# Patient Record
Sex: MALE | Race: WHITE | NOT HISPANIC OR LATINO
[De-identification: names, ages, dates, MRNs, and addresses within clinical notes are randomized per-mention and may not be internally consistent; named-entity substitution may affect disease eponyms.]

---

## 2017-01-17 ENCOUNTER — APPOINTMENT (OUTPATIENT)
Dept: PODIATRY | Facility: HOSPITAL | Age: 47
End: 2017-01-17

## 2017-03-28 ENCOUNTER — APPOINTMENT (OUTPATIENT)
Dept: PODIATRY | Facility: HOSPITAL | Age: 47
End: 2017-03-28

## 2017-05-23 ENCOUNTER — APPOINTMENT (OUTPATIENT)
Dept: NEUROLOGY | Facility: CLINIC | Age: 47
End: 2017-05-23

## 2017-05-23 ENCOUNTER — OUTPATIENT (OUTPATIENT)
Dept: OUTPATIENT SERVICES | Facility: HOSPITAL | Age: 47
LOS: 1 days | Discharge: HOME | End: 2017-05-23

## 2017-05-30 ENCOUNTER — APPOINTMENT (OUTPATIENT)
Dept: PODIATRY | Facility: HOSPITAL | Age: 47
End: 2017-05-30

## 2017-06-28 DIAGNOSIS — G40.802 OTHER EPILEPSY, NOT INTRACTABLE, WITHOUT STATUS EPILEPTICUS: ICD-10-CM

## 2017-08-01 ENCOUNTER — APPOINTMENT (OUTPATIENT)
Dept: PODIATRY | Facility: HOSPITAL | Age: 47
End: 2017-08-01

## 2017-09-05 ENCOUNTER — OUTPATIENT (OUTPATIENT)
Dept: OUTPATIENT SERVICES | Facility: HOSPITAL | Age: 47
LOS: 1 days | Discharge: HOME | End: 2017-09-05

## 2017-09-05 ENCOUNTER — APPOINTMENT (OUTPATIENT)
Dept: PODIATRY | Facility: HOSPITAL | Age: 47
End: 2017-09-05

## 2017-09-05 DIAGNOSIS — K52.9 NONINFECTIVE GASTROENTERITIS AND COLITIS, UNSPECIFIED: ICD-10-CM

## 2017-09-05 DIAGNOSIS — N39.0 URINARY TRACT INFECTION, SITE NOT SPECIFIED: ICD-10-CM

## 2017-09-05 DIAGNOSIS — F79 UNSPECIFIED INTELLECTUAL DISABILITIES: ICD-10-CM

## 2017-09-05 DIAGNOSIS — B35.1 TINEA UNGUIUM: ICD-10-CM

## 2017-09-05 DIAGNOSIS — E03.9 HYPOTHYROIDISM, UNSPECIFIED: ICD-10-CM

## 2017-09-05 DIAGNOSIS — G40.909 EPILEPSY, UNSPECIFIED, NOT INTRACTABLE, WITHOUT STATUS EPILEPTICUS: ICD-10-CM

## 2017-09-06 ENCOUNTER — APPOINTMENT (OUTPATIENT)
Dept: OTOLARYNGOLOGY | Facility: CLINIC | Age: 47
End: 2017-09-06
Payer: MEDICAID

## 2017-09-06 VITALS — BODY MASS INDEX: 19.1 KG/M2 | HEIGHT: 68 IN | WEIGHT: 126 LBS

## 2017-09-06 PROCEDURE — 69210 REMOVE IMPACTED EAR WAX UNI: CPT

## 2017-09-06 PROCEDURE — 99213 OFFICE O/P EST LOW 20 MIN: CPT | Mod: 25

## 2017-09-14 ENCOUNTER — OUTPATIENT (OUTPATIENT)
Dept: OUTPATIENT SERVICES | Facility: HOSPITAL | Age: 47
LOS: 1 days | Discharge: HOME | End: 2017-09-14

## 2017-09-14 DIAGNOSIS — F79 UNSPECIFIED INTELLECTUAL DISABILITIES: ICD-10-CM

## 2017-09-14 DIAGNOSIS — E87.1 HYPO-OSMOLALITY AND HYPONATREMIA: ICD-10-CM

## 2017-09-14 DIAGNOSIS — E03.9 HYPOTHYROIDISM, UNSPECIFIED: ICD-10-CM

## 2017-09-14 DIAGNOSIS — N39.0 URINARY TRACT INFECTION, SITE NOT SPECIFIED: ICD-10-CM

## 2017-09-14 DIAGNOSIS — K52.9 NONINFECTIVE GASTROENTERITIS AND COLITIS, UNSPECIFIED: ICD-10-CM

## 2017-09-14 DIAGNOSIS — G40.909 EPILEPSY, UNSPECIFIED, NOT INTRACTABLE, WITHOUT STATUS EPILEPTICUS: ICD-10-CM

## 2017-11-28 ENCOUNTER — OUTPATIENT (OUTPATIENT)
Dept: OUTPATIENT SERVICES | Facility: HOSPITAL | Age: 47
LOS: 1 days | Discharge: HOME | End: 2017-11-28

## 2017-11-28 ENCOUNTER — APPOINTMENT (OUTPATIENT)
Dept: NEUROLOGY | Facility: CLINIC | Age: 47
End: 2017-11-28

## 2017-11-28 VITALS
HEART RATE: 75 BPM | BODY MASS INDEX: 19.4 KG/M2 | HEIGHT: 68 IN | SYSTOLIC BLOOD PRESSURE: 102 MMHG | DIASTOLIC BLOOD PRESSURE: 70 MMHG | WEIGHT: 128 LBS

## 2017-11-28 DIAGNOSIS — F79 UNSPECIFIED INTELLECTUAL DISABILITIES: ICD-10-CM

## 2017-11-28 DIAGNOSIS — E03.9 HYPOTHYROIDISM, UNSPECIFIED: ICD-10-CM

## 2017-11-28 DIAGNOSIS — G40.909 EPILEPSY, UNSPECIFIED, NOT INTRACTABLE, WITHOUT STATUS EPILEPTICUS: ICD-10-CM

## 2017-11-28 DIAGNOSIS — K52.9 NONINFECTIVE GASTROENTERITIS AND COLITIS, UNSPECIFIED: ICD-10-CM

## 2017-11-28 DIAGNOSIS — N39.0 URINARY TRACT INFECTION, SITE NOT SPECIFIED: ICD-10-CM

## 2018-01-27 ENCOUNTER — INPATIENT (INPATIENT)
Facility: HOSPITAL | Age: 48
LOS: 16 days | Discharge: NURSING, REHAB OR LONG-TERM CARE | End: 2018-02-13
Attending: HOSPITALIST | Admitting: INTERNAL MEDICINE

## 2018-02-03 VITALS
DIASTOLIC BLOOD PRESSURE: 67 MMHG | RESPIRATION RATE: 18 BRPM | TEMPERATURE: 96 F | SYSTOLIC BLOOD PRESSURE: 106 MMHG | HEART RATE: 82 BPM

## 2018-02-03 RX ORDER — BENZTROPINE MESYLATE 1 MG
1 TABLET ORAL EVERY 12 HOURS
Qty: 0 | Refills: 0 | Status: DISCONTINUED | OUTPATIENT
Start: 2018-02-03 | End: 2018-02-13

## 2018-02-03 RX ORDER — BUPROPION HYDROCHLORIDE 150 MG/1
150 TABLET, EXTENDED RELEASE ORAL DAILY
Qty: 0 | Refills: 0 | Status: DISCONTINUED | OUTPATIENT
Start: 2018-02-03 | End: 2018-02-13

## 2018-02-03 RX ORDER — MIRTAZAPINE 45 MG/1
15 TABLET, ORALLY DISINTEGRATING ORAL AT BEDTIME
Qty: 0 | Refills: 0 | Status: DISCONTINUED | OUTPATIENT
Start: 2018-02-03 | End: 2018-02-13

## 2018-02-03 RX ORDER — DIVALPROEX SODIUM 500 MG/1
750 TABLET, DELAYED RELEASE ORAL EVERY 8 HOURS
Qty: 0 | Refills: 0 | Status: DISCONTINUED | OUTPATIENT
Start: 2018-02-03 | End: 2018-02-13

## 2018-02-03 RX ORDER — LEVOTHYROXINE SODIUM 125 MCG
100 TABLET ORAL DAILY
Qty: 0 | Refills: 0 | Status: DISCONTINUED | OUTPATIENT
Start: 2018-02-04 | End: 2018-02-13

## 2018-02-03 RX ORDER — ENOXAPARIN SODIUM 100 MG/ML
40 INJECTION SUBCUTANEOUS AT BEDTIME
Qty: 0 | Refills: 0 | Status: COMPLETED | OUTPATIENT
Start: 2018-02-03 | End: 2018-02-04

## 2018-02-03 RX ORDER — HALOPERIDOL DECANOATE 100 MG/ML
10 INJECTION INTRAMUSCULAR EVERY 8 HOURS
Qty: 0 | Refills: 0 | Status: DISCONTINUED | OUTPATIENT
Start: 2018-02-03 | End: 2018-02-08

## 2018-02-03 RX ORDER — ACETAMINOPHEN 500 MG
650 TABLET ORAL EVERY 6 HOURS
Qty: 0 | Refills: 0 | Status: DISCONTINUED | OUTPATIENT
Start: 2018-02-03 | End: 2018-02-13

## 2018-02-03 RX ORDER — SENNA PLUS 8.6 MG/1
1 TABLET ORAL AT BEDTIME
Qty: 0 | Refills: 0 | Status: DISCONTINUED | OUTPATIENT
Start: 2018-02-03 | End: 2018-02-13

## 2018-02-03 RX ADMIN — MIRTAZAPINE 15 MILLIGRAM(S): 45 TABLET, ORALLY DISINTEGRATING ORAL at 21:33

## 2018-02-03 RX ADMIN — Medication 1 MILLIGRAM(S): at 18:35

## 2018-02-03 RX ADMIN — HALOPERIDOL DECANOATE 10 MILLIGRAM(S): 100 INJECTION INTRAMUSCULAR at 21:32

## 2018-02-03 RX ADMIN — SENNA PLUS 1 TABLET(S): 8.6 TABLET ORAL at 21:33

## 2018-02-03 RX ADMIN — ENOXAPARIN SODIUM 40 MILLIGRAM(S): 100 INJECTION SUBCUTANEOUS at 21:30

## 2018-02-04 RX ADMIN — MIRTAZAPINE 15 MILLIGRAM(S): 45 TABLET, ORALLY DISINTEGRATING ORAL at 21:52

## 2018-02-04 RX ADMIN — Medication 1 MILLIGRAM(S): at 17:48

## 2018-02-04 RX ADMIN — DIVALPROEX SODIUM 750 MILLIGRAM(S): 500 TABLET, DELAYED RELEASE ORAL at 21:53

## 2018-02-04 RX ADMIN — Medication 1 MILLIGRAM(S): at 06:07

## 2018-02-04 RX ADMIN — SENNA PLUS 1 TABLET(S): 8.6 TABLET ORAL at 21:52

## 2018-02-04 RX ADMIN — HALOPERIDOL DECANOATE 10 MILLIGRAM(S): 100 INJECTION INTRAMUSCULAR at 14:01

## 2018-02-04 RX ADMIN — DIVALPROEX SODIUM 750 MILLIGRAM(S): 500 TABLET, DELAYED RELEASE ORAL at 00:12

## 2018-02-04 RX ADMIN — HALOPERIDOL DECANOATE 10 MILLIGRAM(S): 100 INJECTION INTRAMUSCULAR at 06:06

## 2018-02-04 RX ADMIN — ENOXAPARIN SODIUM 40 MILLIGRAM(S): 100 INJECTION SUBCUTANEOUS at 21:52

## 2018-02-04 RX ADMIN — DIVALPROEX SODIUM 750 MILLIGRAM(S): 500 TABLET, DELAYED RELEASE ORAL at 06:41

## 2018-02-04 RX ADMIN — Medication 100 MICROGRAM(S): at 06:07

## 2018-02-04 RX ADMIN — DIVALPROEX SODIUM 750 MILLIGRAM(S): 500 TABLET, DELAYED RELEASE ORAL at 14:01

## 2018-02-04 RX ADMIN — HALOPERIDOL DECANOATE 10 MILLIGRAM(S): 100 INJECTION INTRAMUSCULAR at 22:04

## 2018-02-04 RX ADMIN — BUPROPION HYDROCHLORIDE 150 MILLIGRAM(S): 150 TABLET, EXTENDED RELEASE ORAL at 12:21

## 2018-02-04 NOTE — PROGRESS NOTE ADULT - SUBJECTIVE AND OBJECTIVE BOX
S : Still refusing to walk. NO CP/ SOB      Vital Signs Last 24 Hrs  T(C): 36.1 (04 Feb 2018 14:28), Max: 36.1 (04 Feb 2018 06:18)  T(F): 96.9 (04 Feb 2018 14:28), Max: 96.9 (04 Feb 2018 06:18)  HR: 97 (04 Feb 2018 14:28) (79 - 97)  BP: 131/77 (04 Feb 2018 14:28) (106/67 - 131/77)  BP(mean): --  RR: 16 (04 Feb 2018 14:28) (16 - 18)  SpO2: --  PHYSICAL EXAM:    Constitutional: NAD, awake and alert, Mentally retarded  HEENT: PERR, EOMI, Normal Hearing, MMM  Neck: Soft and supple, No LAD, No JVD  Respiratory: Breath sounds are clear bilaterally, No wheezing, rales or rhonchi  Cardiovascular: S1 and S2, regular rate and rhythm, no Murmurs, gallops or rubs  Gastrointestinal: Bowel Sounds present, soft, nontender, nondistended, no guarding, no rebound  Extremities: No peripheral edema  Vascular: 2+ peripheral pulses  Neurological: A/O x 3, no focal deficits  Musculoskeletal: 5/5 strength b/l upper and lower extremities  Skin: No rashes    MEDICATIONS:  MEDICATIONS  (STANDING):  benztropine 1 milliGRAM(s) Oral every 12 hours  buPROPion XL . 150 milliGRAM(s) Oral daily  diVALproex  milliGRAM(s) Oral every 8 hours  enoxaparin Injectable 40 milliGRAM(s) SubCutaneous at bedtime  haloperidol     Tablet 10 milliGRAM(s) Oral every 8 hours  levothyroxine 100 MICROGram(s) Oral daily  mirtazapine 15 milliGRAM(s) Oral at bedtime  senna 1 Tablet(s) Oral at bedtime      LABS: All Labs Reviewed:                Blood Culture:

## 2018-02-04 NOTE — PROGRESS NOTE ADULT - ASSESSMENT
1) Mild Mental Reatardation  2) Schizophrenia   3)  Refusal to walk    Just a behavioral issue. No intervention per psych. Will check with Group home again tomorrow. At one point in time during this admission did take a few steps. Will only walk further when he wishes to. Jose take a lot of convincing like a child. Unable to achieve that yesterday. Best chances will be in his familiar surroundings.     Try to transfer him back to his group home tomorrow.     Will F/u.

## 2018-02-05 ENCOUNTER — TRANSCRIPTION ENCOUNTER (OUTPATIENT)
Age: 48
End: 2018-02-05

## 2018-02-05 LAB
ANION GAP SERPL CALC-SCNC: 6 MMOL/L — LOW (ref 7–14)
BUN SERPL-MCNC: 11 MG/DL — SIGNIFICANT CHANGE UP (ref 10–20)
CALCIUM SERPL-MCNC: 9.2 MG/DL — SIGNIFICANT CHANGE UP (ref 8.5–10.1)
CHLORIDE SERPL-SCNC: 94 MMOL/L — LOW (ref 98–110)
CO2 SERPL-SCNC: 29 MMOL/L — SIGNIFICANT CHANGE UP (ref 17–32)
CREAT SERPL-MCNC: 0.8 MG/DL — SIGNIFICANT CHANGE UP (ref 0.7–1.5)
GLUCOSE SERPL-MCNC: 76 MG/DL — SIGNIFICANT CHANGE UP (ref 70–110)
HCT VFR BLD CALC: 34.5 % — LOW (ref 42–52)
HGB BLD-MCNC: 11.9 G/DL — LOW (ref 14–18)
MCHC RBC-ENTMCNC: 33.3 PG — HIGH (ref 27–31)
MCHC RBC-ENTMCNC: 34.5 G/DL — SIGNIFICANT CHANGE UP (ref 32–37)
MCV RBC AUTO: 96.6 FL — HIGH (ref 80–94)
NRBC # BLD: 0 /100 WBCS — SIGNIFICANT CHANGE UP (ref 0–0)
PLATELET # BLD AUTO: 150 K/UL — SIGNIFICANT CHANGE UP (ref 130–400)
POTASSIUM SERPL-MCNC: 4.1 MMOL/L — SIGNIFICANT CHANGE UP (ref 3.5–5)
POTASSIUM SERPL-SCNC: 4.1 MMOL/L — SIGNIFICANT CHANGE UP (ref 3.5–5)
RBC # BLD: 3.57 M/UL — LOW (ref 4.7–6.1)
RBC # FLD: 13.2 % — SIGNIFICANT CHANGE UP (ref 11.5–14.5)
SODIUM SERPL-SCNC: 129 MMOL/L — LOW (ref 135–146)
WBC # BLD: 9.27 K/UL — SIGNIFICANT CHANGE UP (ref 4.8–10.8)
WBC # FLD AUTO: 9.27 K/UL — SIGNIFICANT CHANGE UP (ref 4.8–10.8)

## 2018-02-05 RX ADMIN — HALOPERIDOL DECANOATE 10 MILLIGRAM(S): 100 INJECTION INTRAMUSCULAR at 06:11

## 2018-02-05 RX ADMIN — HALOPERIDOL DECANOATE 10 MILLIGRAM(S): 100 INJECTION INTRAMUSCULAR at 21:56

## 2018-02-05 RX ADMIN — SENNA PLUS 1 TABLET(S): 8.6 TABLET ORAL at 21:56

## 2018-02-05 RX ADMIN — Medication 1 MILLIGRAM(S): at 17:26

## 2018-02-05 RX ADMIN — MIRTAZAPINE 15 MILLIGRAM(S): 45 TABLET, ORALLY DISINTEGRATING ORAL at 21:56

## 2018-02-05 RX ADMIN — BUPROPION HYDROCHLORIDE 150 MILLIGRAM(S): 150 TABLET, EXTENDED RELEASE ORAL at 12:52

## 2018-02-05 RX ADMIN — DIVALPROEX SODIUM 750 MILLIGRAM(S): 500 TABLET, DELAYED RELEASE ORAL at 06:10

## 2018-02-05 RX ADMIN — Medication 1 MILLIGRAM(S): at 06:11

## 2018-02-05 RX ADMIN — Medication 100 MICROGRAM(S): at 06:10

## 2018-02-05 RX ADMIN — DIVALPROEX SODIUM 750 MILLIGRAM(S): 500 TABLET, DELAYED RELEASE ORAL at 14:31

## 2018-02-05 RX ADMIN — HALOPERIDOL DECANOATE 10 MILLIGRAM(S): 100 INJECTION INTRAMUSCULAR at 15:11

## 2018-02-05 RX ADMIN — DIVALPROEX SODIUM 750 MILLIGRAM(S): 500 TABLET, DELAYED RELEASE ORAL at 21:57

## 2018-02-05 NOTE — DISCHARGE NOTE ADULT - NS AS ACTIVITY OBS
Bathing allowed/Showering allowed/Sex allowed/Do not make important decisions/Walking-Outdoors allowed/Stairs allowed/Walking-Indoors allowed

## 2018-02-05 NOTE — DIETITIAN INITIAL EVALUATION ADULT. - OTHER INFO
Refusal to walk 2/2 R hip pain, no displaced fx. pain control. f/u PT/rehab. Pt direct care provider at bedside who was unsure of pt UBW but denied wt changes. PMH: mild MR, schizophrenia, ICD control disorder, hypothyroid, osteroporosis, epilepsy

## 2018-02-05 NOTE — PROGRESS NOTE ADULT - SUBJECTIVE AND OBJECTIVE BOX
S : Still refusing to walk.      Vital Signs Last 24 Hrs  T(C): 36.7 (05 Feb 2018 13:00), Max: 36.7 (05 Feb 2018 13:00)  T(F): 98.1 (05 Feb 2018 13:00), Max: 98.1 (05 Feb 2018 13:00)  HR: 88 (05 Feb 2018 06:00) (84 - 88)  BP: 116/79 (05 Feb 2018 13:00) (109/55 - 116/79)  BP(mean): --  RR: 18 (05 Feb 2018 13:00) (16 - 18)  SpO2: 97% (04 Feb 2018 20:28) (97% - 97%)  PHYSICAL EXAM:    Constitutional: NAD, awake and alert, well-developed  HEENT: PERR, EOMI, Normal Hearing, MMM  Neck: Soft and supple, No LAD, No JVD  Respiratory: Breath sounds are clear bilaterally, No wheezing, rales or rhonchi  Cardiovascular: S1 and S2, regular rate and rhythm, no Murmurs, gallops or rubs  Gastrointestinal: Bowel Sounds present, soft, nontender, nondistended, no guarding, no rebound  Extremities: No peripheral edema  Vascular: 2+ peripheral pulses  Neurological: A/O x 3, no focal deficits  Musculoskeletal: 5/5 strength b/l upper and lower extremities  Skin: No rashes    MEDICATIONS:  MEDICATIONS  (STANDING):  benztropine 1 milliGRAM(s) Oral every 12 hours  buPROPion XL . 150 milliGRAM(s) Oral daily  diVALproex  milliGRAM(s) Oral every 8 hours  haloperidol     Tablet 10 milliGRAM(s) Oral every 8 hours  levothyroxine 100 MICROGram(s) Oral daily  mirtazapine 15 milliGRAM(s) Oral at bedtime  senna 1 Tablet(s) Oral at bedtime      LABS: All Labs Reviewed:                        11.9   9.27  )-----------( 150      ( 05 Feb 2018 05:09 )             34.5     02-05    129<L>  |  94<L>  |  11  ----------------------------<  76  4.1   |  29  |  0.8    Ca    9.2      05 Feb 2018 05:09            Blood Culture:

## 2018-02-05 NOTE — DISCHARGE NOTE ADULT - MEDICATION SUMMARY - MEDICATIONS TO TAKE
I will START or STAY ON the medications listed below when I get home from the hospital:    calcium carbonate  -- calcium carbonate and calcium gluconate-vitamin d2 oral 400 mg oral 2 times per day  -- Indication: For osteopenia    Depakote  -- depakote oral (divalproex oral) 1 tablet oral 3 times a day  -- Indication: For mood d/o    mirtazapine 15 mg oral tablet  -- mirtazapine 15mg tablet 1 tablet oral at bedtime  -- Indication: For mood d/o    benztropine  -- benztropine oral 2 times per day  -- Indication: For eps    Haldol 5 mg/mL injectable solution  -- haldol 5mg/ml injection solution (haloperidol lactate 5mg/ml injection solution)1 solution injection every 8 hours  -- Indication: For schizphrenia    Fosamax  -- fosamax oral (alendronate oral) oral every saturday  -- Indication: For osteopenia    Senna Lax 8.6 mg oral tablet  -- senna lax 8.6 mg tablet (sennosides 8.6 mg tablet) 1 tablet oral every day   -- Indication: For constipation    buPROPion  -- bupropion HCl oral every morning  -- Indication: For mood d/o    Synthroid 100 mcg (0.1 mg) oral tablet  -- synthroid 100mcg tablet (levothyroxine 100 mcg tablet) 1 tablet oral every day  -- Indication: For hypothyroidism I will START or STAY ON the medications listed below when I get home from the hospital:    calcium carbonate  -- calcium carbonate and calcium gluconate-vitamin d2 oral 400 mg oral 2 times per day  -- Indication: For osteopenia    Depakote  -- depakote oral (divalproex oral) 1 tablet oral 3 times a day  -- Indication: For mood d/o    mirtazapine 15 mg oral tablet  -- mirtazapine 15mg tablet 1 tablet oral at bedtime  -- Indication: For mood d/o    benztropine  -- benztropine oral 2 times per day  -- Indication: For eps    haloperidol 0.5 mg oral tablet  -- 1 tab(s) by mouth 2 times a day, As needed, agitation  -- Indication: For Schizophrenia, unspecified type    Fosamax  -- fosamax oral (alendronate oral) oral every saturday  -- Indication: For osteopenia    Senna Lax 8.6 mg oral tablet  -- senna lax 8.6 mg tablet (sennosides 8.6 mg tablet) 1 tablet oral every day   -- Indication: For constipation    buPROPion  -- bupropion HCl oral every morning  -- Indication: For mood d/o    Synthroid 100 mcg (0.1 mg) oral tablet  -- synthroid 100mcg tablet (levothyroxine 100 mcg tablet) 1 tablet oral every day  -- Indication: For hypothyroidism    Multiple Vitamins oral tablet  -- 1 tab(s) by mouth once a day  -- Indication: For HCM

## 2018-02-05 NOTE — DISCHARGE NOTE ADULT - PATIENT PORTAL LINK FT
You can access the [facility] Patient Portal, offered by [facility], by registering with the following website: [email or web address]

## 2018-02-05 NOTE — DIETITIAN INITIAL EVALUATION ADULT. - ORAL INTAKE PTA
pt direct care provider states pt with good appetite/intake PTP (consuming >3 meals a day with snacks and 2-3 Ensure supplements)/good

## 2018-02-05 NOTE — DISCHARGE NOTE ADULT - CARE PLAN
Principal Discharge DX:	Schizophrenia, unspecified type  Goal:	mood stability  Assessment and plan of treatment:	take all meds as prescribed  psychotherapy  Secondary Diagnosis:	Mental retardation

## 2018-02-05 NOTE — PROGRESS NOTE ADULT - ASSESSMENT
1) Mild Mental Reatardation  2) Schizophrenia   3)  Refusal to walk    Just a behavioral issue. No intervention per psych. Will check with Group home again tomorrow. At one point in time during this admission did take a few steps. Will only walk further when he wishes to. Jose take a lot of convincing like a child. Unable to achieve that yesterday. Best chances will be in his familiar surroundings.     Try to transfer him back to his group home tomorrow.     Will F/u. 1) Mild Mental Reatardation  2) Schizophrenia   3)  Refusal to walk    Just a behavioral issue. No intervention per psych. Will check with Group home again tomorrow. At one point in time during this admission did take a few steps. Will only walk further when he wishes to. Jose take a lot of convincing like a child. Unable to achieve that yesterday. Best chances will be in his familiar surroundings.     Today again he stood up but didn't show the will to walk. Per his RN at his Group [facility], [facility], he will have to go to [facility].     Will discuss again with them tomorrow that his best bet is his group home if they can take him.     4) Hyponatremia : Monitor. If persists, do free water restriction.   Check Uosm, lytes.     Will F/u.

## 2018-02-05 NOTE — DISCHARGE NOTE ADULT - HOSPITAL COURSE
Pt presented with the fall and inabilty to walk. The hospital course was complicated by the UTI and was treated with antibiotics. Pt needs physical therapy and rehab. Pt also needs to follow up with psychiatry.

## 2018-02-05 NOTE — PROGRESS NOTE ADULT - SUBJECTIVE AND OBJECTIVE BOX
; Lorena Pearson;   Patient is a 47y old  Male who presents with a chief complaint of inability to ambulate.    Vital Signs Last 24 Hrs  T(C): 36.1 (05 Feb 2018 06:00), Max: 36.6 (04 Feb 2018 20:31)  T(F): 97 (05 Feb 2018 06:00), Max: 97.8 (04 Feb 2018 20:31)  HR: 88 (05 Feb 2018 06:00) (84 - 97)  BP: 109/55 (05 Feb 2018 06:00) (109/55 - 131/77)  BP(mean): --  RR: 18 (05 Feb 2018 06:00) (16 - 18)  SpO2: 97% (04 Feb 2018 20:28) (97% - 97%)  CAPILLARY BLOOD GLUCOSE          PAST MEDICAL & SURGICAL HISTORY:    MEDICATIONS  (STANDING):  benztropine 1 milliGRAM(s) Oral every 12 hours  buPROPion XL . 150 milliGRAM(s) Oral daily  diVALproex  milliGRAM(s) Oral every 8 hours  haloperidol     Tablet 10 milliGRAM(s) Oral every 8 hours  levothyroxine 100 MICROGram(s) Oral daily  mirtazapine 15 milliGRAM(s) Oral at bedtime  senna 1 Tablet(s) Oral at bedtime    MEDICATIONS  (PRN):  acetaminophen   Tablet. 650 milliGRAM(s) Oral every 6 hours PRN Severe Pain (7 - 10)      Physical Exam:  Constitutional: NAD, awake and alert, Mentally retarded  HEENT: PERR, EOMI, Normal Hearing, MMM  Neck: Soft and supple, No LAD, No JVD  Respiratory: Breath sounds are clear bilaterally, No wheezing, rales or rhonchi  Cardiovascular: S1 and S2, regular rate and rhythm, no Murmurs, gallops or rubs  Gastrointestinal: Bowel Sounds present, soft, nontender, nondistended, no guarding, no rebound  Extremities: No peripheral edema  Vascular: 2+ peripheral pulses  Neurological: A/O x 3, no focal deficits  Musculoskeletal: 5/5 strength b/l upper and lower extremities  Skin: No rashes    Labs:                        11.9   9.27  )-----------( 150      ( 05 Feb 2018 05:09 )             34.5               I&O's Summary      Imaging:    ECG:    Assessment and Plan:   ·		  1) Mild Mental Reatardation  2) Schizophrenia   3)  Refusal to walk    Just a behavioral issue. No intervention per psych. Will check with Group home again tomorrow. At one point in time during this admission did take a few steps. Will only walk further when he wishes to. Jose take a lot of convincing like a child. Unable to achieve that yesterday. Best chances will be in his familiar surroundings.     Try to transfer him back to his group home tomorrow.     Will F/u. ; Lorena Pearson;   Patient is a 47y old  Male who presents with a chief complaint of B/L knee pain after falling down in group home prior to arrival landing on knees, currently unable to ambulate. Pt usually able to walk by himself, now refusing to walk.     Vital Signs Last 24 Hrs  T(C): 36.1 (05 Feb 2018 06:00), Max: 36.6 (04 Feb 2018 20:31)  T(F): 97 (05 Feb 2018 06:00), Max: 97.8 (04 Feb 2018 20:31)  HR: 88 (05 Feb 2018 06:00) (84 - 97)  BP: 109/55 (05 Feb 2018 06:00) (109/55 - 131/77)  BP(mean): --  RR: 18 (05 Feb 2018 06:00) (16 - 18)  SpO2: 97% (04 Feb 2018 20:28) (97% - 97%)  CAPILLARY BLOOD GLUCOSE          PAST MEDICAL & SURGICAL HISTORY: Anemia, Hypothyroid, Mental retardation ,Schizophrenia, Seizure  Allergies phenothiazines, macrolides    MEDICATIONS  (STANDING):  benztropine 1 milliGRAM(s) Oral every 12 hours  buPROPion XL . 150 milliGRAM(s) Oral daily  diVALproex  milliGRAM(s) Oral every 8 hours  haloperidol     Tablet 10 milliGRAM(s) Oral every 8 hours  levothyroxine 100 MICROGram(s) Oral daily  mirtazapine 15 milliGRAM(s) Oral at bedtime  senna 1 Tablet(s) Oral at bedtime    MEDICATIONS  (PRN):  acetaminophen   Tablet. 650 milliGRAM(s) Oral every 6 hours PRN Severe Pain (7 - 10)      Physical Exam:  Constitutional: NAD, awake and alert, Mentally retarded  HEENT: PERR, EOMI, Normal Hearing, MMM  Neck: Soft and supple, No LAD, No JVD  Respiratory: Breath sounds are clear bilaterally, No wheezing, rales or rhonchi  Cardiovascular: S1 and S2, regular rate and rhythm, no Murmurs, gallops or rubs  Gastrointestinal: Bowel Sounds present, soft, nontender, nondistended, no guarding, no rebound  Extremities: No peripheral edema  Vascular: 2+ peripheral pulses  Neurological: A/O x 3, no focal deficits  Musculoskeletal: 5/5 strength b/l upper and lower extremities  Skin: No rashes    Labs:                        11.9   9.27  )-----------( 150      ( 05 Feb 2018 05:09 )             34.5               I&O's Summary      Imaging:    ECG:    Assessment and Plan:   ·		  1) Mild Mental Retardation  -c/w home meds, behavioral intervention    2) Schizophrenia   -as per psych- no intervention at this time  -c/w home meds  -behavioral intervention    3)  Refusal to walk  -PT/Rehab  -confirm Xrays were done, if not order  -psychoeduation    4. Dispo- back to group home Patient is a 47y old  Male who presents with a chief complaint of B/L knee pain after falling down in group home prior to arrival landing on knees, currently unable to ambulate. Pt usually able to walk by himself, now refusing to walk.     Vital Signs Last 24 Hrs  T(C): 36.1 (05 Feb 2018 06:00), Max: 36.6 (04 Feb 2018 20:31)  T(F): 97 (05 Feb 2018 06:00), Max: 97.8 (04 Feb 2018 20:31)  HR: 88 (05 Feb 2018 06:00) (84 - 97)  BP: 109/55 (05 Feb 2018 06:00) (109/55 - 131/77)  BP(mean): --  RR: 18 (05 Feb 2018 06:00) (16 - 18)  SpO2: 97% (04 Feb 2018 20:28) (97% - 97%)  CAPILLARY BLOOD GLUCOSE    PAST MEDICAL & SURGICAL HISTORY: Anemia, Hypothyroid, Mental retardation ,Schizophrenia, Seizure  Allergies phenothiazines, macrolides    MEDICATIONS  (STANDING):  benztropine 1 milliGRAM(s) Oral every 12 hours  buPROPion XL . 150 milliGRAM(s) Oral daily  diVALproex  milliGRAM(s) Oral every 8 hours  haloperidol     Tablet 10 milliGRAM(s) Oral every 8 hours  levothyroxine 100 MICROGram(s) Oral daily  mirtazapine 15 milliGRAM(s) Oral at bedtime  senna 1 Tablet(s) Oral at bedtime    MEDICATIONS  (PRN):  acetaminophen   Tablet. 650 milliGRAM(s) Oral every 6 hours PRN Severe Pain (7 - 10)      Physical Exam:  Constitutional: NAD, awake and alert, Mentally retarded  HEENT: PERR, EOMI, Normal Hearing, MMM  Neck: Soft and supple, No LAD, No JVD  Respiratory: Breath sounds are clear bilaterally, No wheezing, rales or rhonchi  Cardiovascular: S1 and S2, regular rate and rhythm, no Murmurs, gallops or rubs  Gastrointestinal: Bowel Sounds present, soft, nontender, nondistended, no guarding, no rebound  Extremities: No peripheral edema  Vascular: 2+ peripheral pulses  Neurological: A/O x 3, no focal deficits  Musculoskeletal: 5/5 strength b/l upper and lower extremities  Skin: No rashes    Labs:                        11.9   9.27  )-----------( 150      ( 05 Feb 2018 05:09 )             34.5               I&O's Summary      Imaging: negative for acute pathology, + for osteopenia    ECG:    Assessment and Plan:   ·		  1) Mild Mental Retardation  -c/w home meds, behavioral intervention    2) Schizophrenia   -as per psych- no intervention at this time  -c/w home meds  -behavioral intervention    3)  Refusal to walk  -psychoeduation    4. Dispo- back to group home. Extensive counseling given to charge nurse at group home [facility]  that pt's presentation is most likely behavioral and requires extensive psychoeducation and therapy. Pt is med stable for discharge and would not benefit from pharmacotherapy adjustment. [facility] informed pt would be d/c'ed today.

## 2018-02-06 DIAGNOSIS — R33.9 RETENTION OF URINE, UNSPECIFIED: ICD-10-CM

## 2018-02-06 LAB
ANION GAP SERPL CALC-SCNC: 9 MMOL/L — SIGNIFICANT CHANGE UP (ref 7–14)
BUN SERPL-MCNC: 13 MG/DL — SIGNIFICANT CHANGE UP (ref 10–20)
CALCIUM SERPL-MCNC: 9.7 MG/DL — SIGNIFICANT CHANGE UP (ref 8.5–10.1)
CHLORIDE SERPL-SCNC: 96 MMOL/L — LOW (ref 98–110)
CO2 SERPL-SCNC: 31 MMOL/L — SIGNIFICANT CHANGE UP (ref 17–32)
CREAT SERPL-MCNC: 0.8 MG/DL — SIGNIFICANT CHANGE UP (ref 0.7–1.5)
GLUCOSE SERPL-MCNC: 67 MG/DL — LOW (ref 70–110)
HCT VFR BLD CALC: 35.5 % — LOW (ref 42–52)
HGB BLD-MCNC: 12.3 G/DL — LOW (ref 14–18)
MCHC RBC-ENTMCNC: 33.7 PG — HIGH (ref 27–31)
MCHC RBC-ENTMCNC: 34.6 G/DL — SIGNIFICANT CHANGE UP (ref 32–37)
MCV RBC AUTO: 97.3 FL — HIGH (ref 80–94)
NRBC # BLD: 0 /100 WBCS — SIGNIFICANT CHANGE UP (ref 0–0)
PLATELET # BLD AUTO: 129 K/UL — LOW (ref 130–400)
POTASSIUM SERPL-MCNC: 4 MMOL/L — SIGNIFICANT CHANGE UP (ref 3.5–5)
POTASSIUM SERPL-SCNC: 4 MMOL/L — SIGNIFICANT CHANGE UP (ref 3.5–5)
RBC # BLD: 3.65 M/UL — LOW (ref 4.7–6.1)
RBC # FLD: 13.6 % — SIGNIFICANT CHANGE UP (ref 11.5–14.5)
SODIUM SERPL-SCNC: 136 MMOL/L — SIGNIFICANT CHANGE UP (ref 135–146)
WBC # BLD: 8.61 K/UL — SIGNIFICANT CHANGE UP (ref 4.8–10.8)
WBC # FLD AUTO: 8.61 K/UL — SIGNIFICANT CHANGE UP (ref 4.8–10.8)

## 2018-02-06 RX ADMIN — HALOPERIDOL DECANOATE 10 MILLIGRAM(S): 100 INJECTION INTRAMUSCULAR at 17:48

## 2018-02-06 RX ADMIN — DIVALPROEX SODIUM 750 MILLIGRAM(S): 500 TABLET, DELAYED RELEASE ORAL at 05:42

## 2018-02-06 RX ADMIN — Medication 1 MILLIGRAM(S): at 05:42

## 2018-02-06 RX ADMIN — Medication 100 MICROGRAM(S): at 05:42

## 2018-02-06 RX ADMIN — BUPROPION HYDROCHLORIDE 150 MILLIGRAM(S): 150 TABLET, EXTENDED RELEASE ORAL at 11:53

## 2018-02-06 RX ADMIN — HALOPERIDOL DECANOATE 10 MILLIGRAM(S): 100 INJECTION INTRAMUSCULAR at 22:08

## 2018-02-06 RX ADMIN — DIVALPROEX SODIUM 750 MILLIGRAM(S): 500 TABLET, DELAYED RELEASE ORAL at 17:49

## 2018-02-06 RX ADMIN — MIRTAZAPINE 15 MILLIGRAM(S): 45 TABLET, ORALLY DISINTEGRATING ORAL at 22:05

## 2018-02-06 RX ADMIN — DIVALPROEX SODIUM 750 MILLIGRAM(S): 500 TABLET, DELAYED RELEASE ORAL at 22:06

## 2018-02-06 RX ADMIN — Medication 1 MILLIGRAM(S): at 17:47

## 2018-02-06 RX ADMIN — SENNA PLUS 1 TABLET(S): 8.6 TABLET ORAL at 22:06

## 2018-02-06 RX ADMIN — HALOPERIDOL DECANOATE 10 MILLIGRAM(S): 100 INJECTION INTRAMUSCULAR at 05:42

## 2018-02-06 NOTE — CONSULT NOTE ADULT - SUBJECTIVE AND OBJECTIVE BOX
Patient is a 47y old  Male who presents with a chief complaint of inability to ambulate (05 Feb 2018 10:19)      HPI:      PAST MEDICAL & SURGICAL HISTORY:      REVIEW OF SYSTEMS:    CONSTITUTIONAL:  fevers or chills  HEENT: No visual changes  ENDO: No sweating  NECK: No pain or stiffness  MUSCULOSKELETAL: No back pain, no joint pain  RESPIRATORY: No shortness of breath  CARDIOVASCULAR: No chest pain  GASTROINTESTINAL: No abdominal or epigastric pain. No nausea, vomiting,  No diarrhea or constipation.   NEUROLOGICAL: No mental status changes  PSYCH: No depression, no mood changes  SKIN: No itching      MEDICATIONS  (STANDING):  benztropine 1 milliGRAM(s) Oral every 12 hours  buPROPion XL . 150 milliGRAM(s) Oral daily  diVALproex  milliGRAM(s) Oral every 8 hours  haloperidol     Tablet 10 milliGRAM(s) Oral every 8 hours  levothyroxine 100 MICROGram(s) Oral daily  mirtazapine 15 milliGRAM(s) Oral at bedtime  senna 1 Tablet(s) Oral at bedtime    MEDICATIONS  (PRN):  acetaminophen   Tablet. 650 milliGRAM(s) Oral every 6 hours PRN Severe Pain (7 - 10)      Allergies    erythromycin (Other)  phenothiazines (Unknown)    Intolerances        SOCIAL HISTORY: No illicit drug use    FAMILY HISTORY:      Vital Signs Last 24 Hrs  T(C): 36.4 (06 Feb 2018 13:00), Max: 37.1 (06 Feb 2018 06:39)  T(F): 97.5 (06 Feb 2018 13:00), Max: 98.7 (06 Feb 2018 06:39)  HR: 84 (06 Feb 2018 13:00) (80 - 84)  BP: 101/62 (06 Feb 2018 13:00) (101/62 - 116/73)  BP(mean): --  RR: 18 (06 Feb 2018 13:00) (18 - 18)  SpO2: --    PHYSICAL EXAM:    Constitutional: NAD, well-developed  HEENT: EOMI  Neck: no pain  Back: No CVA tenderness  Respiratory: No accessory respiratory muscle use  Abd: Soft, NT/ND  no organomegally  no hernia  :   SUSANNAH:   Extremities: no edema  Neurological: A/O x 3  Psychiatric: Normal mood, normal affect  Skin: No rashes    I&O's Summary      LABS:                        12.3   8.61  )-----------( 129      ( 06 Feb 2018 07:20 )             35.5     02-06    136  |  96<L>  |  13  ----------------------------<  67<L>  4.0   |  31  |  0.8    Ca    9.7      06 Feb 2018 07:20          Urine Culture:         RADIOLOGY & ADDITIONAL STUDIES: Patient is a 47y old  Male who presents with a chief complaint of inability to ambulate (05 Feb 2018 10:19)      HPI:48 y/o Male w pmhx of MR, called to place difficult long after nurse did bladder scan at bed side showing >900cc. Pt laying in bed comfortably, denying any pain, fevers, chills, N/V      PAST MEDICAL & SURGICAL HISTORY:      REVIEW OF SYSTEMS:    CONSTITUTIONAL:  No fevers or chills  HEENT: No visual changes  ENDO: No sweating  NECK: No pain or stiffness  MUSCULOSKELETAL: No back pain, no joint pain  RESPIRATORY: No shortness of breath  CARDIOVASCULAR: No chest pain  GASTROINTESTINAL: No abdominal or epigastric pain. No nausea, vomiting,  No diarrhea or constipation.   NEUROLOGICAL: No mental status changes  PSYCH: No depression, no mood changes  SKIN: No itching      MEDICATIONS  (STANDING):  benztropine 1 milliGRAM(s) Oral every 12 hours  buPROPion XL . 150 milliGRAM(s) Oral daily  diVALproex  milliGRAM(s) Oral every 8 hours  haloperidol     Tablet 10 milliGRAM(s) Oral every 8 hours  levothyroxine 100 MICROGram(s) Oral daily  mirtazapine 15 milliGRAM(s) Oral at bedtime  senna 1 Tablet(s) Oral at bedtime    MEDICATIONS  (PRN):  acetaminophen   Tablet. 650 milliGRAM(s) Oral every 6 hours PRN Severe Pain (7 - 10)      Allergies    erythromycin (Other)  phenothiazines (Unknown)    Intolerances        SOCIAL HISTORY: No illicit drug use    FAMILY HISTORY:      Vital Signs Last 24 Hrs  T(C): 36.4 (06 Feb 2018 13:00), Max: 37.1 (06 Feb 2018 06:39)  T(F): 97.5 (06 Feb 2018 13:00), Max: 98.7 (06 Feb 2018 06:39)  HR: 84 (06 Feb 2018 13:00) (80 - 84)  BP: 101/62 (06 Feb 2018 13:00) (101/62 - 116/73)  BP(mean): --  RR: 18 (06 Feb 2018 13:00) (18 - 18)  SpO2: --    PHYSICAL EXAM:    Constitutional: NAD, well-developed  HEENT: EOMI  Neck: no pain  Back: No CVA tenderness  Respiratory: No accessory respiratory muscle use  Abd: Soft, NT/ND  no organomegally  no hernia, No suprapubic tenderness, bladder non palpable, No CVAT b/l  : + blood at meatus (traumatic long attempt)   SUSANNAH:   Extremities: no edema  Neurological: A/O x 3  Psychiatric: Normal mood, normal affect  Skin: No rashes    I&O's Summary      LABS:                        12.3   8.61  )-----------( 129      ( 06 Feb 2018 07:20 )             35.5     02-06    136  |  96<L>  |  13  ----------------------------<  67<L>  4.0   |  31  |  0.8    Ca    9.7      06 Feb 2018 07:20          Urine Culture:         RADIOLOGY & ADDITIONAL STUDIES: Patient is a 47y old  Male who presents with a chief complaint of inability to ambulate (05 Feb 2018 10:19)      HPI:46 y/o Male w pmhx of MR, called to place difficult long after nurse did bladder scan at bed side showing >900cc. Pt laying in bed comfortably, denying any pain, fevers, chills, N/V      PAST MEDICAL & SURGICAL HISTORY:      REVIEW OF SYSTEMS:    CONSTITUTIONAL:  No fevers or chills  HEENT: No visual changes  ENDO: No sweating  NECK: No pain or stiffness  MUSCULOSKELETAL: No back pain, no joint pain  RESPIRATORY: No shortness of breath  CARDIOVASCULAR: No chest pain  GASTROINTESTINAL: No abdominal or epigastric pain. No nausea, vomiting,  No diarrhea or constipation.   NEUROLOGICAL: No mental status changes  PSYCH: No depression, no mood changes  SKIN: No itching      MEDICATIONS  (STANDING):  benztropine 1 milliGRAM(s) Oral every 12 hours  buPROPion XL . 150 milliGRAM(s) Oral daily  diVALproex  milliGRAM(s) Oral every 8 hours  haloperidol     Tablet 10 milliGRAM(s) Oral every 8 hours  levothyroxine 100 MICROGram(s) Oral daily  mirtazapine 15 milliGRAM(s) Oral at bedtime  senna 1 Tablet(s) Oral at bedtime    MEDICATIONS  (PRN):  acetaminophen   Tablet. 650 milliGRAM(s) Oral every 6 hours PRN Severe Pain (7 - 10)      Allergies    erythromycin (Other)  phenothiazines (Unknown)    Intolerances        SOCIAL HISTORY: No illicit drug use    FAMILY HISTORY:      Vital Signs Last 24 Hrs  T(C): 36.4 (06 Feb 2018 13:00), Max: 37.1 (06 Feb 2018 06:39)  T(F): 97.5 (06 Feb 2018 13:00), Max: 98.7 (06 Feb 2018 06:39)  HR: 84 (06 Feb 2018 13:00) (80 - 84)  BP: 101/62 (06 Feb 2018 13:00) (101/62 - 116/73)  BP(mean): --  RR: 18 (06 Feb 2018 13:00) (18 - 18)  SpO2: --    PHYSICAL EXAM:    Constitutional: NAD, well-developed  HEENT: EOMI  Neck: no pain  Back: No CVA tenderness  Respiratory: No accessory respiratory muscle use  Abd: Soft, NT/ND  no organomegally  no hernia, No suprapubic tenderness, bladder non palpable, No CVAT b/l  : + blood at meatus (traumatic long attempt)   SUSANNAH:   Extremities: no edema  Neurological: A/O x 3  Psychiatric: Normal mood, normal affect  Skin: No rashes    I&O's Summary      LABS:                        12.3   8.61  )-----------( 129      ( 06 Feb 2018 07:20 )             35.5     02-06    136  |  96<L>  |  13  ----------------------------<  67<L>  4.0   |  31  |  0.8    Ca    9.7      06 Feb 2018 07:20          Urine Culture: NG from 02/08        RADIOLOGY & ADDITIONAL STUDIES:    bladder sono      EXAM:  US URINARY BLADDER            PROCEDURE DATE:  02/06/2018            INTERPRETATION:  Clinical History / Reason for exam: Bladder wall   trabeculation.    Urinary bladder and prostate sonogram.    Correlation: CT abdomen and pelvis December 25, 2016.    Urinary bladder wall thickness is upper normal limits measuring 0.47 cm   with trabeculation. There is intraluminal debris with no stone or mass.   Bilateral ureteral jets. Urinary bladder volume 780 cc.    The prostate is not visualized.        Impression:    1.  Urinary bladder trabeculation wall thickness upper normal limits.   Intraluminal debris.    2.  Urinary bladder volume 780 cc. It is reported that the patient was   unable to void. Co    3.  Nonvisualization of the prostate.    renal sono 02/06 no hydro

## 2018-02-06 NOTE — CONSULT NOTE ADULT - PROBLEM SELECTOR RECOMMENDATION 9
STAT bladder sono w PVR  If in retention will do bedside urethral dilation and long placement.  Discussed W Dr. Cole

## 2018-02-06 NOTE — PROGRESS NOTE ADULT - SUBJECTIVE AND OBJECTIVE BOX
Patient is a 47y old  Male who presents with a chief complaint of B/L knee pain after falling down in group home prior to arrival landing on knees, currently unable to ambulate. Pt usually able to walk by himself, now refusing to walk.     Pt had 700ccs urine in bladder US overnight. long placed.    PAST MEDICAL & SURGICAL HISTORY: Anemia, Hypothyroid, Mental retardation ,Schizophrenia, Seizure  Allergies phenothiazines, macrolides      Vital Signs Last 24 Hrs  T(C): 37.1 (06 Feb 2018 06:39), Max: 37.1 (06 Feb 2018 06:39)  T(F): 98.7 (06 Feb 2018 06:39), Max: 98.7 (06 Feb 2018 06:39)  HR: 80 (06 Feb 2018 06:39) (80 - 82)  BP: 115/75 (06 Feb 2018 06:39) (115/75 - 116/79)  BP(mean): --  RR: 18 (06 Feb 2018 06:39) (18 - 18)  SpO2: --  CAPILLARY BLOOD GLUCOSE          PAST MEDICAL & SURGICAL HISTORY:    MEDICATIONS  (STANDING):  benztropine 1 milliGRAM(s) Oral every 12 hours  buPROPion XL . 150 milliGRAM(s) Oral daily  diVALproex  milliGRAM(s) Oral every 8 hours  haloperidol     Tablet 10 milliGRAM(s) Oral every 8 hours  levothyroxine 100 MICROGram(s) Oral daily  mirtazapine 15 milliGRAM(s) Oral at bedtime  senna 1 Tablet(s) Oral at bedtime    MEDICATIONS  (PRN):  acetaminophen   Tablet. 650 milliGRAM(s) Oral every 6 hours PRN Severe Pain (7 - 10)      Physical Exam:  General: WNWD, NAD  HEENT: Neck supple, no lymphadenopathy, PERRLA, EOMI  Heart: RRR, S1, S2 noted, no murmurs, rubs, gallops  Lungs: CTA b/l, no wheezes, rales, rhonchi   Abdomen: soft, non tender, non distended, + bowel sounds  Extremities: no C/C/E, full ROM in all extremities  Neuro: A&O x 3, no focal deficits  Skin: No rashes    Labs:                        11.9   9.27  )-----------( 150      ( 05 Feb 2018 05:09 )             34.5             02-05    129<L>  |  94<L>  |  11  ----------------------------<  76  4.1   |  29  |  0.8    Ca    9.2      05 Feb 2018 05:09    Imaging: negative for acute pathology, + for osteopenia    ECG:    Assessment and Plan:   ·		  1.Urinary Retention with hyponatremia  -pt had 700 ccs upon bladder scan after nurse noticed pt had not urinated for 8 hours, long inserted  -possibly secondary to anticholinergic meds  -Trend BMP  -check Uosm and lytes  -US retroperitoneal    1) Mild Mental Retardation  -c/w home meds    2) Schizophrenia   -as per psych- no intervention at this time  -c/w home meds  -behavioral intervention    3)  Refusal to walk  -psychoeduation    4. Dispo- back to group home. Extensive counseling given to charge nurse at group home [facility]  that pt's presentation is most likely behavioral and requires extensive psychoeducation and therapy. Pt is med stable for discharge and would not benefit from pharmacotherapy adjustment. [facility] informed pt would be d/c'ed today. [facility] requesting pt go to SNF, however it is medical team's opinion pt would benefit from familiar group home surroundings and SNF would be inappropriate Patient is a 47y old  Male who presents with a chief complaint of B/L knee pain after falling down in group home prior to arrival landing on knees, currently unable to ambulate. Pt usually able to walk by himself, now refusing to walk.     Pt had 700ccs urine in bladder US overnight. long placed.    PAST MEDICAL & SURGICAL HISTORY: Anemia, Hypothyroid, Mental retardation ,Schizophrenia, Seizure  Allergies phenothiazines, macrolides    Vital Signs Last 24 Hrs  T(C): 37.1 (06 Feb 2018 06:39), Max: 37.1 (06 Feb 2018 06:39)  T(F): 98.7 (06 Feb 2018 06:39), Max: 98.7 (06 Feb 2018 06:39)  HR: 80 (06 Feb 2018 06:39) (80 - 82)  BP: 115/75 (06 Feb 2018 06:39) (115/75 - 116/79)  BP(mean): --  RR: 18 (06 Feb 2018 06:39) (18 - 18)  SpO2: --  CAPILLARY BLOOD GLUCOSE    MEDICATIONS  (STANDING):  benztropine 1 milliGRAM(s) Oral every 12 hours  buPROPion XL . 150 milliGRAM(s) Oral daily  diVALproex  milliGRAM(s) Oral every 8 hours  haloperidol     Tablet 10 milliGRAM(s) Oral every 8 hours  levothyroxine 100 MICROGram(s) Oral daily  mirtazapine 15 milliGRAM(s) Oral at bedtime  senna 1 Tablet(s) Oral at bedtime    MEDICATIONS  (PRN):  acetaminophen   Tablet. 650 milliGRAM(s) Oral every 6 hours PRN Severe Pain (7 - 10)    Physical Exam:  General: WNWD, NAD  HEENT: Neck supple, no lymphadenopathy, PERRLA, EOMI  Heart: RRR, S1, S2 noted, no murmurs, rubs, gallops  Lungs: CTA b/l, no wheezes, rales, rhonchi   Abdomen: soft, non tender, non distended, + bowel sounds  Extremities: no C/C/E, full ROM in all extremities  Neuro: A&O x 3, no focal deficits  Skin: No rashes    Labs:                        11.9   9.27  )-----------( 150      ( 05 Feb 2018 05:09 )             34.5             02-05    129<L>  |  94<L>  |  11  ----------------------------<  76  4.1   |  29  |  0.8    Ca    9.2      05 Feb 2018 05:09    Feb 6, 2018 labs pending.    Imaging: negative for acute pathology, + for osteopenia    ECG:    Assessment and Plan:   ·		  1.Urinary Retention with hyponatremia  -pt had 700 ccs upon bladder scan after nurse noticed pt had not urinated for 8 hours. Pt urinated spontaneously, repeat bladder scan showed over 900ccs of urine. 16 gauge long inserted.  -Uro consult pending  -possibly secondary to anticholinergic meds  -Trend BMP  -check Uosm and lytes  -US retroperitoneal pending    1) Mild Mental Retardation  -c/w home meds    2) Schizophrenia   -as per psych- no intervention at this time  -c/w home meds  -behavioral intervention    3)  Refusal to walk  -psychoeduation, r/o relationship to urinary retention  -PT eval pending    4. Dispo- back to group home once stable Patient is a 47y old  Male who presents with a chief complaint of B/L knee pain after falling down in group home prior to arrival landing on knees, currently unable to ambulate. Pt usually able to walk by himself, now refusing to walk.     Pt had 700ccs urine in bladder US overnight. long placed.    PAST MEDICAL & SURGICAL HISTORY: Anemia, Hypothyroid, Mental retardation ,Schizophrenia, Seizure  Allergies phenothiazines, macrolides    Vital Signs Last 24 Hrs  T(C): 37.1 (06 Feb 2018 06:39), Max: 37.1 (06 Feb 2018 06:39)  T(F): 98.7 (06 Feb 2018 06:39), Max: 98.7 (06 Feb 2018 06:39)  HR: 80 (06 Feb 2018 06:39) (80 - 82)  BP: 115/75 (06 Feb 2018 06:39) (115/75 - 116/79)  BP(mean): --  RR: 18 (06 Feb 2018 06:39) (18 - 18)  SpO2: --  CAPILLARY BLOOD GLUCOSE    MEDICATIONS  (STANDING):  benztropine 1 milliGRAM(s) Oral every 12 hours  buPROPion XL . 150 milliGRAM(s) Oral daily  diVALproex  milliGRAM(s) Oral every 8 hours  haloperidol     Tablet 10 milliGRAM(s) Oral every 8 hours  levothyroxine 100 MICROGram(s) Oral daily  mirtazapine 15 milliGRAM(s) Oral at bedtime  senna 1 Tablet(s) Oral at bedtime    MEDICATIONS  (PRN):  acetaminophen   Tablet. 650 milliGRAM(s) Oral every 6 hours PRN Severe Pain (7 - 10)    Physical Exam:  General: WNWD, NAD  HEENT: Neck supple, no lymphadenopathy, PERRLA, EOMI  Heart: RRR, S1, S2 noted, no murmurs, rubs, gallops  Lungs: CTA b/l, no wheezes, rales, rhonchi   Abdomen: soft, non tender, non distended, + bowel sounds  Extremities: pt unwilling to participate in exam  Neuro: A&O x2  Skin: No rashes    Labs:                        11.9   9.27  )-----------( 150      ( 05 Feb 2018 05:09 )             34.5             02-05    129<L>  |  94<L>  |  11  ----------------------------<  76  4.1   |  29  |  0.8    Ca    9.2      05 Feb 2018 05:09    Feb 6, 2018 labs pending.    Imaging: negative for acute pathology, + for osteopenia    ECG:    Assessment and Plan:   ·		  1.Urinary Retention with hyponatremia  -pt had 700 ccs upon bladder scan after nurse noticed pt had not urinated for 8 hours. Pt urinated spontaneously, repeat bladder scan showed over 900ccs of urine. 16 gauge long inserted.  -Uro consult pending  -possibly secondary to anticholinergic meds  -Trend BMP  -check Uosm and lytes  -US retroperitoneal pending    1) Mild Mental Retardation  -c/w home meds    2) Schizophrenia   -as per psych- no intervention at this time  -c/w home meds  -behavioral intervention    3)  Refusal to walk  -psychoeduation, r/o relationship to urinary retention  -PT eval pending    4. Dispo- back to group home once stable

## 2018-02-06 NOTE — CONSULT NOTE ADULT - ASSESSMENT
46 y/o male with difficult long placement, 48 y/o male with difficult long placement, working with my associate DR. Blackmon a Long was placed and should be left for a week    needs better catheter care  if he is intolerant it can be removed but increased risk of going into retention    he can be D/C home with a long and f/u in the clinics if otherwise ok

## 2018-02-06 NOTE — CONSULT NOTE ADULT - ATTENDING COMMENTS
Patient seen and examined at the bedside  likely urethral stricutre  urinary retention  bladder sonogram showed a pvr of about 800ml -- final read still pending  long catheter needed for I and O    using sterile technique I was able to place a complicated long catheter using a sensor wire and dilating to 18F  I then placed a 16F Chipewwa tip catheter successfully into the bladder  about 200ml came out which was clear    Recommend keep long catheter at least a week then can have trial of void

## 2018-02-07 LAB
ALBUMIN SERPL ELPH-MCNC: 2.9 G/DL — LOW (ref 3–5.5)
ALP SERPL-CCNC: 81 U/L — SIGNIFICANT CHANGE UP (ref 30–115)
ALT FLD-CCNC: 14 U/L — SIGNIFICANT CHANGE UP (ref 0–41)
ANION GAP SERPL CALC-SCNC: 6 MMOL/L — LOW (ref 7–14)
AST SERPL-CCNC: 25 U/L — SIGNIFICANT CHANGE UP (ref 0–41)
BILIRUB SERPL-MCNC: 0.4 MG/DL — SIGNIFICANT CHANGE UP (ref 0.2–1.2)
BUN SERPL-MCNC: 13 MG/DL — SIGNIFICANT CHANGE UP (ref 10–20)
CALCIUM SERPL-MCNC: 9.6 MG/DL — SIGNIFICANT CHANGE UP (ref 8.5–10.1)
CHLORIDE SERPL-SCNC: 100 MMOL/L — SIGNIFICANT CHANGE UP (ref 98–110)
CHLORIDE UR-SCNC: 67 MMOL/L — SIGNIFICANT CHANGE UP
CO2 SERPL-SCNC: 29 MMOL/L — SIGNIFICANT CHANGE UP (ref 17–32)
CREAT ?TM UR-MCNC: 120 MG/DL — SIGNIFICANT CHANGE UP
CREAT SERPL-MCNC: 0.8 MG/DL — SIGNIFICANT CHANGE UP (ref 0.7–1.5)
GLUCOSE SERPL-MCNC: 87 MG/DL — SIGNIFICANT CHANGE UP (ref 70–110)
HCT VFR BLD CALC: 35.6 % — LOW (ref 42–52)
HGB BLD-MCNC: 12.4 G/DL — LOW (ref 14–18)
MCHC RBC-ENTMCNC: 33.2 PG — HIGH (ref 27–31)
MCHC RBC-ENTMCNC: 34.8 G/DL — SIGNIFICANT CHANGE UP (ref 32–37)
MCV RBC AUTO: 95.2 FL — HIGH (ref 80–94)
NRBC # BLD: 0 /100 WBCS — SIGNIFICANT CHANGE UP (ref 0–0)
OSMOLALITY UR: 703 MOS/KG — SIGNIFICANT CHANGE UP (ref 50–1400)
PLATELET # BLD AUTO: 126 K/UL — LOW (ref 130–400)
POTASSIUM SERPL-MCNC: 4.6 MMOL/L — SIGNIFICANT CHANGE UP (ref 3.5–5)
POTASSIUM SERPL-SCNC: 4.6 MMOL/L — SIGNIFICANT CHANGE UP (ref 3.5–5)
PROT SERPL-MCNC: 6.3 G/DL — SIGNIFICANT CHANGE UP (ref 6–8)
RBC # BLD: 3.74 M/UL — LOW (ref 4.7–6.1)
RBC # FLD: 13.2 % — SIGNIFICANT CHANGE UP (ref 11.5–14.5)
SODIUM SERPL-SCNC: 135 MMOL/L — SIGNIFICANT CHANGE UP (ref 135–146)
SODIUM UR-SCNC: 67 MMOL/L — SIGNIFICANT CHANGE UP
WBC # BLD: 8.85 K/UL — SIGNIFICANT CHANGE UP (ref 4.8–10.8)
WBC # FLD AUTO: 8.85 K/UL — SIGNIFICANT CHANGE UP (ref 4.8–10.8)

## 2018-02-07 RX ORDER — MEROPENEM 1 G/30ML
1000 INJECTION INTRAVENOUS ONCE
Qty: 0 | Refills: 0 | Status: COMPLETED | OUTPATIENT
Start: 2018-02-07 | End: 2018-02-07

## 2018-02-07 RX ORDER — SODIUM CHLORIDE 9 MG/ML
1000 INJECTION INTRAMUSCULAR; INTRAVENOUS; SUBCUTANEOUS
Qty: 0 | Refills: 0 | Status: DISCONTINUED | OUTPATIENT
Start: 2018-02-07 | End: 2018-02-10

## 2018-02-07 RX ORDER — MEROPENEM 1 G/30ML
INJECTION INTRAVENOUS
Qty: 0 | Refills: 0 | Status: DISCONTINUED | OUTPATIENT
Start: 2018-02-07 | End: 2018-02-08

## 2018-02-07 RX ORDER — SODIUM CHLORIDE 9 MG/ML
500 INJECTION INTRAMUSCULAR; INTRAVENOUS; SUBCUTANEOUS ONCE
Qty: 0 | Refills: 0 | Status: COMPLETED | OUTPATIENT
Start: 2018-02-07 | End: 2018-02-07

## 2018-02-07 RX ORDER — ACETAMINOPHEN 500 MG
650 TABLET ORAL EVERY 6 HOURS
Qty: 0 | Refills: 0 | Status: DISCONTINUED | OUTPATIENT
Start: 2018-02-07 | End: 2018-02-13

## 2018-02-07 RX ORDER — SODIUM CHLORIDE 9 MG/ML
500 INJECTION INTRAMUSCULAR; INTRAVENOUS; SUBCUTANEOUS ONCE
Qty: 0 | Refills: 0 | Status: DISCONTINUED | OUTPATIENT
Start: 2018-02-07 | End: 2018-02-11

## 2018-02-07 RX ORDER — MEROPENEM 1 G/30ML
1000 INJECTION INTRAVENOUS EVERY 8 HOURS
Qty: 0 | Refills: 0 | Status: DISCONTINUED | OUTPATIENT
Start: 2018-02-07 | End: 2018-02-08

## 2018-02-07 RX ADMIN — DIVALPROEX SODIUM 750 MILLIGRAM(S): 500 TABLET, DELAYED RELEASE ORAL at 14:31

## 2018-02-07 RX ADMIN — SODIUM CHLORIDE 75 MILLILITER(S): 9 INJECTION INTRAMUSCULAR; INTRAVENOUS; SUBCUTANEOUS at 23:27

## 2018-02-07 RX ADMIN — HALOPERIDOL DECANOATE 10 MILLIGRAM(S): 100 INJECTION INTRAMUSCULAR at 21:34

## 2018-02-07 RX ADMIN — Medication 1 MILLIGRAM(S): at 06:24

## 2018-02-07 RX ADMIN — HALOPERIDOL DECANOATE 10 MILLIGRAM(S): 100 INJECTION INTRAMUSCULAR at 06:24

## 2018-02-07 RX ADMIN — MEROPENEM 100 MILLIGRAM(S): 1 INJECTION INTRAVENOUS at 21:37

## 2018-02-07 RX ADMIN — Medication 650 MILLIGRAM(S): at 04:23

## 2018-02-07 RX ADMIN — SODIUM CHLORIDE 75 MILLILITER(S): 9 INJECTION INTRAMUSCULAR; INTRAVENOUS; SUBCUTANEOUS at 23:21

## 2018-02-07 RX ADMIN — DIVALPROEX SODIUM 750 MILLIGRAM(S): 500 TABLET, DELAYED RELEASE ORAL at 06:25

## 2018-02-07 RX ADMIN — HALOPERIDOL DECANOATE 10 MILLIGRAM(S): 100 INJECTION INTRAMUSCULAR at 15:26

## 2018-02-07 RX ADMIN — DIVALPROEX SODIUM 750 MILLIGRAM(S): 500 TABLET, DELAYED RELEASE ORAL at 21:36

## 2018-02-07 RX ADMIN — Medication 650 MILLIGRAM(S): at 22:50

## 2018-02-07 RX ADMIN — SODIUM CHLORIDE 75 MILLILITER(S): 9 INJECTION INTRAMUSCULAR; INTRAVENOUS; SUBCUTANEOUS at 23:19

## 2018-02-07 RX ADMIN — SENNA PLUS 1 TABLET(S): 8.6 TABLET ORAL at 21:28

## 2018-02-07 RX ADMIN — MEROPENEM 100 MILLIGRAM(S): 1 INJECTION INTRAVENOUS at 13:38

## 2018-02-07 RX ADMIN — Medication 100 MICROGRAM(S): at 06:24

## 2018-02-07 RX ADMIN — Medication 1 MILLIGRAM(S): at 17:51

## 2018-02-07 RX ADMIN — MEROPENEM 100 MILLIGRAM(S): 1 INJECTION INTRAVENOUS at 08:42

## 2018-02-07 RX ADMIN — MIRTAZAPINE 15 MILLIGRAM(S): 45 TABLET, ORALLY DISINTEGRATING ORAL at 21:28

## 2018-02-07 RX ADMIN — Medication 325 MILLIGRAM(S): at 18:01

## 2018-02-07 RX ADMIN — BUPROPION HYDROCHLORIDE 150 MILLIGRAM(S): 150 TABLET, EXTENDED RELEASE ORAL at 12:19

## 2018-02-07 NOTE — CHART NOTE - NSCHARTNOTEFT_GEN_A_CORE
Event Note:     S: Called by nurse to evaluate pt for hyperthermia 104 rectally with /62 . Pt is mentally disabled at baseline and unable to communicate. Pt is being treated for complicated cystitis with meropenem for less than 24hr. Pt is more lethargic than baseline but in no resp distress. As per nurse pt has been unable to eat or drink most of the day and his urine is dark yellow      O:  T(C): 40.2 (02-07-18 @ 22:00), Max: 40.2 (02-07-18 @ 22:00)  T(F): 104.3 (02-07-18 @ 22:00), Max: 104.3 (02-07-18 @ 22:00)  HR: 114 (02-07-18 @ 21:50) (114 - 114)  BP: 100/62 (02-07-18 @ 21:50) (100/62 - 100/62)  RR: 16 (02-07-18 @ 21:50) (16 - 16)                          12.4   8.85  )-----------( 126      ( 07 Feb 2018 03:52 )             35.6     02-07    135  |  100  |  13  ----------------------------<  87  4.6   |  29  |  0.8    Ca    9.6      07 Feb 2018 03:52    TPro  6.3  /  Alb  2.9<L>  /  TBili  0.4  /  DBili  x   /  AST  25  /  ALT  14  /  AlkPhos  81  02-07    LIVER FUNCTIONS - ( 07 Feb 2018 03:52 )  Alb: 2.9 g/dL / Pro: 6.3 g/dL / ALK PHOS: 81 U/L / ALT: 14 U/L / AST: 25 U/L / GGT: x               Gen: Lethargic  CV: Regular rhythm but tachycardic  Resp: CTAB; No W/R/R; No increased WOB   Abd: soft; ND; NT  Extr: No pedal edema  : Packer in place with 200cc of dark yellow urine     A/P: 47yMale with complicated cystitis  - IVF bolus 1000cc STAT NS, Recheck BP after.   - Tylenol Supp and Cooling blanket  - Cot Meropenem 1gr q8hr  - F/U blood and urine culture

## 2018-02-07 NOTE — PROVIDER CONTACT NOTE (OTHER) - SITUATION
MD Yoder # [id number] notified of patient's lethargy. Pt is responsive to sternal rub but not name calling. As per MD Yoder, pt was seen in am and MD is aware of lethargy. No further dispo given.

## 2018-02-07 NOTE — PROGRESS NOTE ADULT - SUBJECTIVE AND OBJECTIVE BOX
Patient is a 47y old  Male who presents with a chief complaint of B/L knee pain after falling down in group home prior to arrival landing on knees, currently unable to ambulate. Pt usually able to walk by himself, now refusing to walk.     Yesterday, Pt had over 900ccs of urine s/p void in bladder scan. S/p Uro intervention, long placed overnight, which yielded purulent drainage, urine and blood. Pt spiked fever of 102. UA, UC, BC, CBC, CMP pending. Pt started on Saray 1g q8h this AM pending cultures.     PAST MEDICAL & SURGICAL HISTORY: Anemia, Hypothyroid, Mental retardation ,Schizophrenia, Seizure  Allergies phenothiazines, macrolides      Vital Signs Last 24 Hrs  T(C): 37.9 (07 Feb 2018 06:20), Max: 38.9 (07 Feb 2018 05:22)  T(F): 100.2 (07 Feb 2018 06:20), Max: 102.1 (07 Feb 2018 05:22)  HR: 92 (07 Feb 2018 05:22) (84 - 92)  BP: 122/71 (07 Feb 2018 05:22) (101/62 - 122/71)  BP(mean): --  RR: 17 (07 Feb 2018 05:22) (16 - 18)  SpO2: --  CAPILLARY BLOOD GLUCOSE    PAST MEDICAL & SURGICAL HISTORY:    MEDICATIONS  (STANDING):  benztropine 1 milliGRAM(s) Oral every 12 hours  buPROPion XL . 150 milliGRAM(s) Oral daily  diVALproex  milliGRAM(s) Oral every 8 hours  haloperidol     Tablet 10 milliGRAM(s) Oral every 8 hours  levothyroxine 100 MICROGram(s) Oral daily  meropenem  IVPB 1000 milliGRAM(s) IV Intermittent once  meropenem  IVPB 1000 milliGRAM(s) IV Intermittent every 8 hours  meropenem  IVPB      mirtazapine 15 milliGRAM(s) Oral at bedtime  senna 1 Tablet(s) Oral at bedtime    MEDICATIONS  (PRN):  acetaminophen   Tablet 650 milliGRAM(s) Oral every 6 hours PRN For Temp greater than 38 C (100.4 F)  acetaminophen   Tablet. 650 milliGRAM(s) Oral every 6 hours PRN Severe Pain (7 - 10)      Physical Exam:  General: WNWD, NAD  HEENT: Neck supple, no lymphadenopathy, PERRLA, EOMI  Heart: RRR, S1, S2 noted, no murmurs, rubs, gallops  Lungs: CTA b/l, no wheezes, rales, rhonchi   Abdomen: soft, non tender, non distended, + bowel sounds  Extremities: no C/C/E, full ROM in all extremities  Neuro: A&O x 3, no focal deficits  Skin: No rashes    Labs:                        12.4   8.85  )-----------( 126      ( 07 Feb 2018 03:52 )             35.6             02-07    135  |  100  |  13  ----------------------------<  87  4.6   |  29  |  0.8    Ca    9.6      07 Feb 2018 03:52    TPro  6.3  /  Alb  2.9<L>  /  TBili  0.4  /  DBili  x   /  AST  25  /  ALT  14  /  AlkPhos  81  02-07    LIVER FUNCTIONS - ( 07 Feb 2018 03:52 )  Alb: 2.9 g/dL / Pro: 6.3 g/dL / ALK PHOS: 81 U/L / ALT: 14 U/L / AST: 25 U/L / GGT: x                   I&O's Summary    06 Feb 2018 07:01  -  07 Feb 2018 06:51  --------------------------------------------------------  IN: 0 mL / OUT: 600 mL / NET: -600 mL      1) UTI r/o with Acute Urinary Retention today : PVR >900cc on Bladder Sono.   - long placed overnight, which yielded purulent drainage, urine and blood. Pt spiked fever of 102. UA, UC, BC, CBC, CMP pending. Pt started on Saray 1g q8h this AM pending cultures.   -Urology found a likely Urethral Stricture, Dilated at bedside and now s/p long by urology. ,Keep the long for around 1 week then TOV. f/u Urology.   -Trend BMP  -check Uosm and lytes    2) Mild Mental Retardation  -c/w home meds    3) Schizophrenia   -as per psych- no intervention at this time  -c/w home meds  -behavioral intervention    4)  Refusal to walk  -psychoeduation, r/o relationship to urinary retention  -PT eval pending    5. Dispo- back to group home once stable Patient is a 47y old  Male who presents with a chief complaint of B/L knee pain after falling down in group home prior to arrival landing on knees, currently unable to ambulate. Pt usually able to walk by himself, now refusing to walk.     Yesterday, Pt had over 900ccs of urine s/p void in bladder scan. S/p Uro intervention, long placed overnight, which yielded purulent drainage, urine and blood. Pt spiked fever of 102. UA, UC, BC, CBC, CMP pending. Pt started on Saray 1g q8h this AM pending cultures.     PAST MEDICAL & SURGICAL HISTORY: Anemia, Hypothyroid, Mental retardation ,Schizophrenia, Seizure  Allergies phenothiazines, macrolides      Vital Signs Last 24 Hrs  T(C): 37.9 (07 Feb 2018 06:20), Max: 38.9 (07 Feb 2018 05:22)  T(F): 100.2 (07 Feb 2018 06:20), Max: 102.1 (07 Feb 2018 05:22)  HR: 92 (07 Feb 2018 05:22) (84 - 92)  BP: 122/71 (07 Feb 2018 05:22) (101/62 - 122/71)  BP(mean): --  RR: 17 (07 Feb 2018 05:22) (16 - 18)  SpO2: --  CAPILLARY BLOOD GLUCOSE    PAST MEDICAL & SURGICAL HISTORY:    MEDICATIONS  (STANDING):  benztropine 1 milliGRAM(s) Oral every 12 hours  buPROPion XL . 150 milliGRAM(s) Oral daily  diVALproex  milliGRAM(s) Oral every 8 hours  haloperidol     Tablet 10 milliGRAM(s) Oral every 8 hours  levothyroxine 100 MICROGram(s) Oral daily  meropenem  IVPB 1000 milliGRAM(s) IV Intermittent once  meropenem  IVPB 1000 milliGRAM(s) IV Intermittent every 8 hours  meropenem  IVPB      mirtazapine 15 milliGRAM(s) Oral at bedtime  senna 1 Tablet(s) Oral at bedtime    MEDICATIONS  (PRN):  acetaminophen   Tablet 650 milliGRAM(s) Oral every 6 hours PRN For Temp greater than 38 C (100.4 F)  acetaminophen   Tablet. 650 milliGRAM(s) Oral every 6 hours PRN Severe Pain (7 - 10)      Physical Exam:  General: WNWD, NAD  HEENT: Neck supple, no lymphadenopathy, PERRLA, EOMI  Heart: RRR, S1, S2 noted, no murmurs, rubs, gallops  Lungs: CTA b/l, no wheezes, rales, rhonchi   Abdomen: soft, non tender, non distended, + bowel sounds  Extremities: no C/C/E, full ROM in all extremities  Neuro: A&O x 3, no focal deficits  Skin: No rashes    Labs:                        12.4   8.85  )-----------( 126      ( 07 Feb 2018 03:52 )             35.6             02-07    135  |  100  |  13  ----------------------------<  87  4.6   |  29  |  0.8    Ca    9.6      07 Feb 2018 03:52    TPro  6.3  /  Alb  2.9<L>  /  TBili  0.4  /  DBili  x   /  AST  25  /  ALT  14  /  AlkPhos  81  02-07    LIVER FUNCTIONS - ( 07 Feb 2018 03:52 )  Alb: 2.9 g/dL / Pro: 6.3 g/dL / ALK PHOS: 81 U/L / ALT: 14 U/L / AST: 25 U/L / GGT: x                   I&O's Summary    06 Feb 2018 07:01  -  07 Feb 2018 06:51  --------------------------------------------------------  IN: 0 mL / OUT: 600 mL / NET: -600 mL      1) Complicated cystitis  - long placed overnight, which yielded purulent drainage, urine and blood. Pt spiked fever of 102. UA, BC, CBC, CMP pending. Pt started on Saray 1g q8h this AM, pending cultures.   -UC not drawn during long placement. Clinical decision made to not draw now since long in place, pt started on abx, and currently afebrile.  -Urology impression- likely Urethral Stricture, Dilated at bedside and now s/p long by urology. ,Keep the long for around 1 week then TOV. f/u Urology.   -Trend BMP  - Uosm and lytes    2) Mild Mental Retardation  -c/w home meds    3) Schizophrenia   -as per psych- no intervention at this time  -c/w home meds  -behavioral intervention    4)  Refusal to walk  -psychoeduation, r/o relationship to urinary retention. Reattempt s/p resolution of complicated cystitis.  -PT eval pending    5. Dispo- back to group home once stable Patient is a 47y old  Male who presents with a chief complaint of B/L knee pain after falling down in group home prior to arrival landing on knees, currently unable to ambulate. Pt usually able to walk by himself, now refusing to walk.     Yesterday, Pt had over 900ccs of urine s/p void in bladder scan. S/p Uro intervention, long placed overnight, which yielded purulent drainage, urine and blood. Pt spiked fever of 102. UA, UC, BC, CBC, CMP pending. Pt started on Saray 1g q8h this AM pending cultures.     PAST MEDICAL & SURGICAL HISTORY: Anemia, Hypothyroid, Mental retardation ,Schizophrenia, Seizure  Allergies phenothiazines, macrolides      Vital Signs Last 24 Hrs  T(C): 37.9 (07 Feb 2018 06:20), Max: 38.9 (07 Feb 2018 05:22)  T(F): 100.2 (07 Feb 2018 06:20), Max: 102.1 (07 Feb 2018 05:22)  HR: 92 (07 Feb 2018 05:22) (84 - 92)  BP: 122/71 (07 Feb 2018 05:22) (101/62 - 122/71)  BP(mean): --  RR: 17 (07 Feb 2018 05:22) (16 - 18)  SpO2: --  CAPILLARY BLOOD GLUCOSE    PAST MEDICAL & SURGICAL HISTORY:    MEDICATIONS  (STANDING):  benztropine 1 milliGRAM(s) Oral every 12 hours  buPROPion XL . 150 milliGRAM(s) Oral daily  diVALproex  milliGRAM(s) Oral every 8 hours  haloperidol     Tablet 10 milliGRAM(s) Oral every 8 hours  levothyroxine 100 MICROGram(s) Oral daily  meropenem  IVPB 1000 milliGRAM(s) IV Intermittent once  meropenem  IVPB 1000 milliGRAM(s) IV Intermittent every 8 hours  meropenem  IVPB      mirtazapine 15 milliGRAM(s) Oral at bedtime  senna 1 Tablet(s) Oral at bedtime    MEDICATIONS  (PRN):  acetaminophen   Tablet 650 milliGRAM(s) Oral every 6 hours PRN For Temp greater than 38 C (100.4 F)  acetaminophen   Tablet. 650 milliGRAM(s) Oral every 6 hours PRN Severe Pain (7 - 10)      Physical Exam:  General: Pt somnolent, not compliant with exam  HEENT: Neck supple, no lymphadenopathy, PERRLA, EOMI  Heart: RRR, S1, S2 noted, no murmurs, rubs, gallops  Lungs: CTA b/l, no wheezes, rales, rhonchi   Abdomen:  + bowel sounds, + guarding  Extremities: no C/C/E, full ROM in all extremities  Neuro: A&O x 2  Skin: No rashes    Labs:                        12.4   8.85  )-----------( 126      ( 07 Feb 2018 03:52 )             35.6             02-07    135  |  100  |  13  ----------------------------<  87  4.6   |  29  |  0.8    Ca    9.6      07 Feb 2018 03:52    TPro  6.3  /  Alb  2.9<L>  /  TBili  0.4  /  DBili  x   /  AST  25  /  ALT  14  /  AlkPhos  81  02-07    LIVER FUNCTIONS - ( 07 Feb 2018 03:52 )  Alb: 2.9 g/dL / Pro: 6.3 g/dL / ALK PHOS: 81 U/L / ALT: 14 U/L / AST: 25 U/L / GGT: x                   I&O's Summary    06 Feb 2018 07:01  -  07 Feb 2018 06:51  --------------------------------------------------------  IN: 0 mL / OUT: 600 mL / NET: -600 mL      1) Complicated cystitis  - long placed overnight, which yielded purulent drainage, urine and blood. Pt spiked fever of 102. UA, BC, CBC, CMP pending. Pt started on Saray 1g q8h this AM, pending cultures.   -UC not drawn during long placement. Clinical decision made to not draw now since long in place, pt started on abx, and currently afebrile.  -Urology impression- likely Urethral Stricture, Dilated at bedside and now s/p long by urology. ,Keep the long for around 1 week then TOV. f/u Urology.   -Trend BMP  - Uosm and lytes    2) Mild Mental Retardation  -c/w home meds    3) Schizophrenia   -as per psych- no intervention at this time  -c/w home meds  -behavioral intervention    4)  Refusal to walk  -psychoeduation, r/o relationship to urinary retention. Reattempt s/p resolution of complicated cystitis.  -PT eval pending    5. Dispo- back to group home once stable Patient is a 47y old  Male who presents with a chief complaint of B/L knee pain after falling down in group home prior to arrival landing on knees, currently unable to ambulate. Pt usually able to walk by himself, now refusing to walk.     Yesterday, Pt had over 900ccs of urine s/p void in bladder scan. S/p Uro intervention, long placed overnight, which yielded purulent drainage, urine and blood. Pt spiked fever of 102. UA, UC, BC, CBC, CMP pending. Pt started on Saray 1g q8h this AM pending cultures.     PAST MEDICAL & SURGICAL HISTORY: Anemia, Hypothyroid, Mental retardation ,Schizophrenia, Seizure  Allergies phenothiazines, macrolides      Vital Signs Last 24 Hrs  T(C): 37.9 (07 Feb 2018 06:20), Max: 38.9 (07 Feb 2018 05:22)  T(F): 100.2 (07 Feb 2018 06:20), Max: 102.1 (07 Feb 2018 05:22)  HR: 92 (07 Feb 2018 05:22) (84 - 92)  BP: 122/71 (07 Feb 2018 05:22) (101/62 - 122/71)  BP(mean): --  RR: 17 (07 Feb 2018 05:22) (16 - 18)  SpO2: --  CAPILLARY BLOOD GLUCOSE    PAST MEDICAL & SURGICAL HISTORY:    MEDICATIONS  (STANDING):  benztropine 1 milliGRAM(s) Oral every 12 hours  buPROPion XL . 150 milliGRAM(s) Oral daily  diVALproex  milliGRAM(s) Oral every 8 hours  haloperidol     Tablet 10 milliGRAM(s) Oral every 8 hours  levothyroxine 100 MICROGram(s) Oral daily  meropenem  IVPB 1000 milliGRAM(s) IV Intermittent once  meropenem  IVPB 1000 milliGRAM(s) IV Intermittent every 8 hours  meropenem  IVPB      mirtazapine 15 milliGRAM(s) Oral at bedtime  senna 1 Tablet(s) Oral at bedtime    MEDICATIONS  (PRN):  acetaminophen   Tablet 650 milliGRAM(s) Oral every 6 hours PRN For Temp greater than 38 C (100.4 F)  acetaminophen   Tablet. 650 milliGRAM(s) Oral every 6 hours PRN Severe Pain (7 - 10)      Physical Exam:  General: Pt somnolent, not compliant with exam  HEENT: Neck supple, no lymphadenopathy, PERRLA, EOMI  Heart: RRR, S1, S2 noted, no murmurs, rubs, gallops  Lungs: CTA b/l, no wheezes, rales, rhonchi   Abdomen:  + bowel sounds, + guarding  Extremities: no C/C/E, full ROM in all extremities  Neuro: A&O x 2  Skin: No rashes    Labs:                        12.4   8.85  )-----------( 126      ( 07 Feb 2018 03:52 )             35.6             02-07    135  |  100  |  13  ----------------------------<  87  4.6   |  29  |  0.8    Ca    9.6      07 Feb 2018 03:52    TPro  6.3  /  Alb  2.9<L>  /  TBili  0.4  /  DBili  x   /  AST  25  /  ALT  14  /  AlkPhos  81  02-07    LIVER FUNCTIONS - ( 07 Feb 2018 03:52 )  Alb: 2.9 g/dL / Pro: 6.3 g/dL / ALK PHOS: 81 U/L / ALT: 14 U/L / AST: 25 U/L / GGT: x                   I&O's Summary    06 Feb 2018 07:01  -  07 Feb 2018 06:51  --------------------------------------------------------  IN: 0 mL / OUT: 600 mL / NET: -600 mL      1) Complicated cystitis  - long placed overnight, which yielded purulent drainage, urine and blood. Pt spiked fever of 102. UA, BC, CBC, CMP pending. Pt started on Saray 1g q8h this AM, pending cultures.   -UC not drawn during long placement. Clinical decision made to not draw now since long in place, pt started on abx, and currently afebrile.  -Urology impression- likely Urethral Stricture, Dilated at bedside and now s/p long by urology. ,Keep the long for around 1 week then TOV. f/u Urology.   -Trend BMP  - Uosm and lytes    2) Mild Mental Retardation  -c/w home meds    3) Schizophrenia   -as per psych- no intervention at this time  -c/w home meds  -behavioral intervention    4)  Refusal to walk  -psychoeduation, r/o relationship to urinary retention. Reattempt s/p resolution of complicated cystitis.  -PT eval pending  5. Dispo- back to group home once stable    Patient was evaluated by bedside, all labs and radiology studies was reviewed, agree with Medical Resident's medical plan outlined above.   On physical exam:  General: Patient is laying comfortably in bed, NAD  HEENT: AT, NC  LUNG: CTA B/L  CVS: normal S1, S2, RRR  ABD: BS present, normoactive , non-tender, non-distended  EXT: no E/C/C, positive peripheral pulses b/l  : long draining clear yellow urine  Neuro: lethargic, not following verbal commands

## 2018-02-08 LAB
ANION GAP SERPL CALC-SCNC: 6 MMOL/L — LOW (ref 7–14)
APPEARANCE UR: CLEAR — SIGNIFICANT CHANGE UP
BILIRUB UR-MCNC: NEGATIVE — SIGNIFICANT CHANGE UP
BUN SERPL-MCNC: 11 MG/DL — SIGNIFICANT CHANGE UP (ref 10–20)
CALCIUM SERPL-MCNC: 9 MG/DL — SIGNIFICANT CHANGE UP (ref 8.5–10.1)
CHLORIDE SERPL-SCNC: 102 MMOL/L — SIGNIFICANT CHANGE UP (ref 98–110)
CO2 SERPL-SCNC: 29 MMOL/L — SIGNIFICANT CHANGE UP (ref 17–32)
COLOR SPEC: YELLOW — SIGNIFICANT CHANGE UP
CREAT SERPL-MCNC: 0.8 MG/DL — SIGNIFICANT CHANGE UP (ref 0.7–1.5)
DIFF PNL FLD: (no result)
GLUCOSE SERPL-MCNC: 73 MG/DL — SIGNIFICANT CHANGE UP (ref 70–110)
GLUCOSE UR QL: NEGATIVE MG/DL — SIGNIFICANT CHANGE UP
HCT VFR BLD CALC: 33.5 % — LOW (ref 42–52)
HGB BLD-MCNC: 11.5 G/DL — LOW (ref 14–18)
KETONES UR-MCNC: NEGATIVE — SIGNIFICANT CHANGE UP
LEUKOCYTE ESTERASE UR-ACNC: NEGATIVE — SIGNIFICANT CHANGE UP
MCHC RBC-ENTMCNC: 33.3 PG — HIGH (ref 27–31)
MCHC RBC-ENTMCNC: 34.3 G/DL — SIGNIFICANT CHANGE UP (ref 32–37)
MCV RBC AUTO: 97.1 FL — HIGH (ref 80–94)
NITRITE UR-MCNC: NEGATIVE — SIGNIFICANT CHANGE UP
NRBC # BLD: 0 /100 WBCS — SIGNIFICANT CHANGE UP (ref 0–0)
PH UR: 6 — SIGNIFICANT CHANGE UP (ref 5–8)
PLATELET # BLD AUTO: 95 K/UL — LOW (ref 130–400)
POTASSIUM SERPL-MCNC: 3.7 MMOL/L — SIGNIFICANT CHANGE UP (ref 3.5–5)
POTASSIUM SERPL-SCNC: 3.7 MMOL/L — SIGNIFICANT CHANGE UP (ref 3.5–5)
PROT UR-MCNC: NEGATIVE MG/DL — SIGNIFICANT CHANGE UP
RBC # BLD: 3.45 M/UL — LOW (ref 4.7–6.1)
RBC # FLD: 13.3 % — SIGNIFICANT CHANGE UP (ref 11.5–14.5)
RBC CASTS # UR COMP ASSIST: (no result) /HPF
SODIUM SERPL-SCNC: 137 MMOL/L — SIGNIFICANT CHANGE UP (ref 135–146)
SP GR SPEC: 1.01 — SIGNIFICANT CHANGE UP (ref 1.01–1.03)
UROBILINOGEN FLD QL: 1 MG/DL (ref 0.2–0.2)
VALPROATE SERPL-MCNC: 40 UG/ML — LOW (ref 50–100)
WBC # BLD: 9.59 K/UL — SIGNIFICANT CHANGE UP (ref 4.8–10.8)
WBC # FLD AUTO: 9.59 K/UL — SIGNIFICANT CHANGE UP (ref 4.8–10.8)

## 2018-02-08 RX ORDER — CEFEPIME 1 G/1
2000 INJECTION, POWDER, FOR SOLUTION INTRAMUSCULAR; INTRAVENOUS ONCE
Qty: 0 | Refills: 0 | Status: COMPLETED | OUTPATIENT
Start: 2018-02-08 | End: 2018-02-08

## 2018-02-08 RX ORDER — ENOXAPARIN SODIUM 100 MG/ML
40 INJECTION SUBCUTANEOUS ONCE
Qty: 0 | Refills: 0 | Status: DISCONTINUED | OUTPATIENT
Start: 2018-02-08 | End: 2018-02-08

## 2018-02-08 RX ORDER — ENOXAPARIN SODIUM 100 MG/ML
40 INJECTION SUBCUTANEOUS DAILY
Qty: 0 | Refills: 0 | Status: DISCONTINUED | OUTPATIENT
Start: 2018-02-08 | End: 2018-02-09

## 2018-02-08 RX ORDER — HALOPERIDOL DECANOATE 100 MG/ML
1 INJECTION INTRAMUSCULAR EVERY 8 HOURS
Qty: 0 | Refills: 0 | Status: DISCONTINUED | OUTPATIENT
Start: 2018-02-08 | End: 2018-02-10

## 2018-02-08 RX ORDER — CEFEPIME 1 G/1
2000 INJECTION, POWDER, FOR SOLUTION INTRAMUSCULAR; INTRAVENOUS EVERY 12 HOURS
Qty: 0 | Refills: 0 | Status: DISCONTINUED | OUTPATIENT
Start: 2018-02-08 | End: 2018-02-09

## 2018-02-08 RX ORDER — CEFEPIME 1 G/1
INJECTION, POWDER, FOR SOLUTION INTRAMUSCULAR; INTRAVENOUS
Qty: 0 | Refills: 0 | Status: DISCONTINUED | OUTPATIENT
Start: 2018-02-08 | End: 2018-02-09

## 2018-02-08 RX ADMIN — HALOPERIDOL DECANOATE 1 MILLIGRAM(S): 100 INJECTION INTRAMUSCULAR at 21:55

## 2018-02-08 RX ADMIN — DIVALPROEX SODIUM 750 MILLIGRAM(S): 500 TABLET, DELAYED RELEASE ORAL at 05:59

## 2018-02-08 RX ADMIN — MIRTAZAPINE 15 MILLIGRAM(S): 45 TABLET, ORALLY DISINTEGRATING ORAL at 21:55

## 2018-02-08 RX ADMIN — Medication 100 MICROGRAM(S): at 05:59

## 2018-02-08 RX ADMIN — SENNA PLUS 1 TABLET(S): 8.6 TABLET ORAL at 21:56

## 2018-02-08 RX ADMIN — DIVALPROEX SODIUM 750 MILLIGRAM(S): 500 TABLET, DELAYED RELEASE ORAL at 21:55

## 2018-02-08 RX ADMIN — DIVALPROEX SODIUM 750 MILLIGRAM(S): 500 TABLET, DELAYED RELEASE ORAL at 13:23

## 2018-02-08 RX ADMIN — SODIUM CHLORIDE 75 MILLILITER(S): 9 INJECTION INTRAMUSCULAR; INTRAVENOUS; SUBCUTANEOUS at 13:19

## 2018-02-08 RX ADMIN — HALOPERIDOL DECANOATE 10 MILLIGRAM(S): 100 INJECTION INTRAMUSCULAR at 05:58

## 2018-02-08 RX ADMIN — MEROPENEM 100 MILLIGRAM(S): 1 INJECTION INTRAVENOUS at 05:57

## 2018-02-08 RX ADMIN — Medication 1 MILLIGRAM(S): at 05:59

## 2018-02-08 RX ADMIN — ENOXAPARIN SODIUM 40 MILLIGRAM(S): 100 INJECTION SUBCUTANEOUS at 13:24

## 2018-02-08 RX ADMIN — BUPROPION HYDROCHLORIDE 150 MILLIGRAM(S): 150 TABLET, EXTENDED RELEASE ORAL at 11:38

## 2018-02-08 RX ADMIN — CEFEPIME 100 MILLIGRAM(S): 1 INJECTION, POWDER, FOR SOLUTION INTRAMUSCULAR; INTRAVENOUS at 20:45

## 2018-02-08 RX ADMIN — Medication 1 MILLIGRAM(S): at 20:44

## 2018-02-08 RX ADMIN — CEFEPIME 100 MILLIGRAM(S): 1 INJECTION, POWDER, FOR SOLUTION INTRAMUSCULAR; INTRAVENOUS at 13:19

## 2018-02-08 NOTE — PROGRESS NOTE ADULT - SUBJECTIVE AND OBJECTIVE BOX
Patient is a 47y old  Male who presents with a chief complaint of B/L knee pain after falling down in group home prior to arrival landing on knees, currently unable to ambulate. Pt usually able to walk by himself, now refusing to walk.   Yesterday, Pt had over 900ccs of urine s/p void in bladder scan. S/p Uro intervention, long placed overnight 2/7, which yielded purulent drainage, urine and blood. Pt spiked fever of 102. UA, UC, BC, sent. Pt started on Saray 1g q8h 2/7 AM pending cultures.   Overnight, Pt spiked fever 104 with /62 and . Pt was given fluids and rectal tylenol. UA not recorded, UA and UC reordered. Due to worsening clinical picture, ID consulted. Due to lack of leukocytosis and pt's long hospital course, rapid flu resent.       Vital Signs Last 24 Hrs  T(C): 36.9 (08 Feb 2018 06:32), Max: 40.2 (07 Feb 2018 22:00)  T(F): 98.5 (08 Feb 2018 06:32), Max: 104.3 (07 Feb 2018 22:00)  HR: 84 (08 Feb 2018 06:32) (72 - 114)  BP: 115/67 (08 Feb 2018 06:32) (100/62 - 128/82)  BP(mean): --  RR: 16 (08 Feb 2018 06:32) (16 - 18)  SpO2: --  CAPILLARY BLOOD GLUCOSE          PAST MEDICAL & SURGICAL HISTORY:    MEDICATIONS  (STANDING):  benztropine 1 milliGRAM(s) Oral every 12 hours  buPROPion XL . 150 milliGRAM(s) Oral daily  diVALproex  milliGRAM(s) Oral every 8 hours  haloperidol     Tablet 10 milliGRAM(s) Oral every 8 hours  levothyroxine 100 MICROGram(s) Oral daily  meropenem  IVPB 1000 milliGRAM(s) IV Intermittent every 8 hours  meropenem  IVPB      mirtazapine 15 milliGRAM(s) Oral at bedtime  senna 1 Tablet(s) Oral at bedtime  sodium chloride 0.9% Bolus 500 milliLiter(s) IV Bolus once  sodium chloride 0.9% Bolus 500 milliLiter(s) IV Bolus once    MEDICATIONS  (PRN):  acetaminophen   Tablet 650 milliGRAM(s) Oral every 6 hours PRN For Temp greater than 38 C (100.4 F)  acetaminophen   Tablet. 650 milliGRAM(s) Oral every 6 hours PRN Severe Pain (7 - 10)  acetaminophen  Suppository 650 milliGRAM(s) Rectal every 6 hours PRN For Temp greater than 38 C (100.4 F)    General: Patient is somnolent  HEENT: AT, NC  LUNG: CTA B/L  CVS: normal S1, S2, RRR  ABD: BS present, normoactive , non-tender, non-distended  EXT: no E/C/C, positive peripheral pulses b/l  : long draining clear yellow urine, concentrated  Neuro: lethargic, not following verbal commands    Labs:                        12.4   8.85  )-----------( 126      ( 07 Feb 2018 03:52 )             35.6             02-07    135  |  100  |  13  ----------------------------<  87  4.6   |  29  |  0.8    Ca    9.6      07 Feb 2018 03:52    TPro  6.3  /  Alb  2.9<L>  /  TBili  0.4  /  DBili  x   /  AST  25  /  ALT  14  /  AlkPhos  81  02-07    LIVER FUNCTIONS - ( 07 Feb 2018 03:52 )  Alb: 2.9 g/dL / Pro: 6.3 g/dL / ALK PHOS: 81 U/L / ALT: 14 U/L / AST: 25 U/L / GGT: x                   I&O's Summary    06 Feb 2018 07:01  -  07 Feb 2018 07:00  --------------------------------------------------------  IN: 0 mL / OUT: 600 mL / NET: -600 mL    07 Feb 2018 07:01  -  08 Feb 2018 06:55  --------------------------------------------------------  IN: 1000 mL / OUT: 1850 mL / NET: -850 mL    Imaging: < from: US Urinary Bladder (02.06.18 @ 17:18) >  1.  Urinary bladder trabeculation wall thickness upper normal limits.   Intraluminal debris.    2.  Urinary bladder volume 780 cc. It is reported that the patient was   unable to void. Co    3.  Nonvisualization of the prostate.      A/P    1) Complicated cystitis  -Pt spiked fever overnight. UA not recorded. UA, UC reordered. BC pending. ID consult ordered for worsening clinical picture. Rapid flu sent for r/o due to long hospital course, worsening clinical picture, and lack of leukocytosis.   - s/p long placement by Uro 2/7 overnight. Keep the long for around 1 week then TOV. f/u Urology.   - Pt started on Saray 1g q8h started 2/7 AM (febrile, from group home), pending cultures.   - BMP and CBC trending    2) Mild Mental Retardation  -c/w home meds    3) Schizophrenia   -c/w home meds  -behavioral interventions as necessary     4)  Refusal to walk  -psychoeduation, r/o relationship to urinary retention. Reattempt s/p resolution of complicated cystitis.  -PT eval when stable    5. Dispo- back to group home once stable Patient is a 47y old  Male who presents with a chief complaint of B/L knee pain after falling down in group home prior to arrival landing on knees, currently unable to ambulate. Pt usually able to walk by himself, now refusing to walk.   Yesterday, Pt had over 900ccs of urine s/p void in bladder scan. S/p Uro intervention, long placed overnight 2/7, which yielded purulent drainage, urine and blood. Pt spiked fever of 102. UA, UC, BC, sent. Pt started on Saray 1g q8h 2/7 AM pending cultures.   Overnight, Pt spiked fever 104 with /62 and . Pt was given fluids and rectal tylenol. UA not recorded, UA and UC reordered. Due to worsening clinical picture, ID consulted. Due to lack of leukocytosis and pt's long hospital course, rapid flu resent.       Vital Signs Last 24 Hrs  T(C): 36.9 (08 Feb 2018 06:32), Max: 40.2 (07 Feb 2018 22:00)  T(F): 98.5 (08 Feb 2018 06:32), Max: 104.3 (07 Feb 2018 22:00)  HR: 84 (08 Feb 2018 06:32) (72 - 114)  BP: 115/67 (08 Feb 2018 06:32) (100/62 - 128/82)  BP(mean): --  RR: 16 (08 Feb 2018 06:32) (16 - 18)  SpO2: --  CAPILLARY BLOOD GLUCOSE          PAST MEDICAL & SURGICAL HISTORY:    MEDICATIONS  (STANDING):  benztropine 1 milliGRAM(s) Oral every 12 hours  buPROPion XL . 150 milliGRAM(s) Oral daily  diVALproex  milliGRAM(s) Oral every 8 hours  haloperidol     Tablet 10 milliGRAM(s) Oral every 8 hours  levothyroxine 100 MICROGram(s) Oral daily  meropenem  IVPB 1000 milliGRAM(s) IV Intermittent every 8 hours  meropenem  IVPB      mirtazapine 15 milliGRAM(s) Oral at bedtime  senna 1 Tablet(s) Oral at bedtime  sodium chloride 0.9% Bolus 500 milliLiter(s) IV Bolus once  sodium chloride 0.9% Bolus 500 milliLiter(s) IV Bolus once    MEDICATIONS  (PRN):  acetaminophen   Tablet 650 milliGRAM(s) Oral every 6 hours PRN For Temp greater than 38 C (100.4 F)  acetaminophen   Tablet. 650 milliGRAM(s) Oral every 6 hours PRN Severe Pain (7 - 10)  acetaminophen  Suppository 650 milliGRAM(s) Rectal every 6 hours PRN For Temp greater than 38 C (100.4 F)    General: Patient is somnolent  HEENT: AT, NC  LUNG: CTA B/L  CVS: normal S1, S2, RRR  ABD: BS present, normoactive , non-tender, non-distended  EXT: no E/C/C, positive peripheral pulses b/l  : long draining clear yellow urine, concentrated  Neuro: lethargic, not following verbal commands    Labs:                        12.4   8.85  )-----------( 126      ( 07 Feb 2018 03:52 )             35.6             02-07    135  |  100  |  13  ----------------------------<  87  4.6   |  29  |  0.8    Ca    9.6      07 Feb 2018 03:52    TPro  6.3  /  Alb  2.9<L>  /  TBili  0.4  /  DBili  x   /  AST  25  /  ALT  14  /  AlkPhos  81  02-07    LIVER FUNCTIONS - ( 07 Feb 2018 03:52 )  Alb: 2.9 g/dL / Pro: 6.3 g/dL / ALK PHOS: 81 U/L / ALT: 14 U/L / AST: 25 U/L / GGT: x                   I&O's Summary    06 Feb 2018 07:01  -  07 Feb 2018 07:00  --------------------------------------------------------  IN: 0 mL / OUT: 600 mL / NET: -600 mL    07 Feb 2018 07:01  -  08 Feb 2018 06:55  --------------------------------------------------------  IN: 1000 mL / OUT: 1850 mL / NET: -850 mL    Imaging: < from: US Urinary Bladder (02.06.18 @ 17:18) >  1.  Urinary bladder trabeculation wall thickness upper normal limits.   Intraluminal debris.    2.  Urinary bladder volume 780 cc. It is reported that the patient was   unable to void. Co    3.  Nonvisualization of the prostate.      A/P    1) Complicated cystitis  -Pt spiked fever overnight. UA not recorded. UA, UC reordered. BC pending. ID consult ordered for worsening clinical picture. Rapid flu sent for r/o due to long hospital course, worsening clinical picture, and lack of leukocytosis.   - s/p long placement by Uro 2/7 overnight. Keep the long for around 1 week then TOV. f/u Urology.   - Pt started on Saray 1g q8h started 2/7 AM (febrile, from group home), pending cultures.   -c/w IV fluids  - BMP and CBC trending    2) Mild Mental Retardation  -c/w home meds    3) Schizophrenia   -c/w home meds  -behavioral interventions as necessary     4)  Refusal to walk  -psychoeduation, r/o relationship to urinary retention. Reattempt s/p resolution of complicated cystitis.  -PT eval when stable    5. Dispo- back to group home once stable Patient is a 47y old  Male who presents with a chief complaint of B/L knee pain after falling down in group home prior to arrival landing on knees, currently unable to ambulate. Pt usually able to walk by himself, now refusing to walk.   Yesterday, Pt had over 900ccs of urine s/p void in bladder scan. S/p Uro intervention, long placed overnight 2/7, which yielded purulent drainage, urine and blood. Pt spiked fever of 102. UA, UC, BC, sent. Pt started on Saray 1g q8h 2/7 AM pending cultures.   Overnight, Pt spiked fever 104 with /62 and . Pt was given fluids and rectal tylenol. UA not recorded, UA and UC reordered. Due to worsening clinical picture, ID consulted. Pt has not had leukocytosis during hospital course. UA returned + for hematuria.      Vital Signs Last 24 Hrs  T(C): 36.9 (08 Feb 2018 06:32), Max: 40.2 (07 Feb 2018 22:00)  T(F): 98.5 (08 Feb 2018 06:32), Max: 104.3 (07 Feb 2018 22:00)  HR: 84 (08 Feb 2018 06:32) (72 - 114)  BP: 115/67 (08 Feb 2018 06:32) (100/62 - 128/82)  BP(mean): --  RR: 16 (08 Feb 2018 06:32) (16 - 18)  SpO2: --  CAPILLARY BLOOD GLUCOSE    PAST MEDICAL & SURGICAL HISTORY:  Anemia, Hypothyroid, Mental retardation ,Schizophrenia, Seizure  Allergies phenothiazines, macrolides    MEDICATIONS  (STANDING):  benztropine 1 milliGRAM(s) Oral every 12 hours  buPROPion XL . 150 milliGRAM(s) Oral daily  diVALproex  milliGRAM(s) Oral every 8 hours  haloperidol     Tablet 10 milliGRAM(s) Oral every 8 hours  levothyroxine 100 MICROGram(s) Oral daily  meropenem  IVPB 1000 milliGRAM(s) IV Intermittent every 8 hours  meropenem  IVPB      mirtazapine 15 milliGRAM(s) Oral at bedtime  senna 1 Tablet(s) Oral at bedtime  sodium chloride 0.9% Bolus 500 milliLiter(s) IV Bolus once  sodium chloride 0.9% Bolus 500 milliLiter(s) IV Bolus once    MEDICATIONS  (PRN):  acetaminophen   Tablet 650 milliGRAM(s) Oral every 6 hours PRN For Temp greater than 38 C (100.4 F)  acetaminophen   Tablet. 650 milliGRAM(s) Oral every 6 hours PRN Severe Pain (7 - 10)  acetaminophen  Suppository 650 milliGRAM(s) Rectal every 6 hours PRN For Temp greater than 38 C (100.4 F)    General: NAD, pt eating breakfast  HEENT: AT, NC  LUNG: CTA B/L  CVS: normal S1, S2, RRR  ABD: BS present, normoactive , non-tender, non-distended  EXT: no E/C/C, positive peripheral pulses b/l  : long draining clear yellow urine  Neuro: AAOx2    Labs:                        12.4   8.85  )-----------( 126      ( 07 Feb 2018 03:52 )             35.6             02-07    135  |  100  |  13  ----------------------------<  87  4.6   |  29  |  0.8    Ca    9.6      07 Feb 2018 03:52    TPro  6.3  /  Alb  2.9<L>  /  TBili  0.4  /  DBili  x   /  AST  25  /  ALT  14  /  AlkPhos  81  02-07    LIVER FUNCTIONS - ( 07 Feb 2018 03:52 )  Alb: 2.9 g/dL / Pro: 6.3 g/dL / ALK PHOS: 81 U/L / ALT: 14 U/L / AST: 25 U/L / GGT: x           Urinalysis (02.08.18 @ 06:54)    pH Urine: 6.0    Glucose Qualitative, Urine: Negative mg/dL    Blood, Urine: Large    Color: Yellow    Urine Appearance: Clear    Bilirubin: Negative    Ketone - Urine: Negative    Specific Gravity: 1.015    Protein, Urine: Negative mg/dL    Urobilinogen: 1.0 mg/dL    Nitrite: Negative    Leukocyte Esterase Concentration: Negative    Urine Microscopic-Add On (NC) (02.08.18 @ 06:54)    Red Blood Cell - Urine: 5-10 /HPF            I&O's Summary    06 Feb 2018 07:01  -  07 Feb 2018 07:00  --------------------------------------------------------  IN: 0 mL / OUT: 600 mL / NET: -600 mL    07 Feb 2018 07:01  -  08 Feb 2018 06:55  --------------------------------------------------------  IN: 1000 mL / OUT: 1850 mL / NET: -850 mL    Imaging: < from: US Urinary Bladder (02.06.18 @ 17:18) >  1.  Urinary bladder trabeculation wall thickness upper normal limits.   Intraluminal debris.    2.  Urinary bladder volume 780 cc. It is reported that the patient was   unable to void. Co    3.  Nonvisualization of the prostate.      A/P    1) Complicated cystitis  -Pt spiked fever overnight. UA not recorded. UA, UC reordered. BC pending. ID consult ordered for worsening clinical picture and lack of leukocytosis.   - s/p long placement by Uro 2/7 overnight. Keep the long for around 1 week then TOV. f/u Urology.   - Pt started on Saray 1g q8h started 2/7 AM (febrile, from group home), pending cultures.   -c/w IV fluids  - BMP and CBC trending  -tylenol for fever    2) Intellectual Disability  -behavioral interventions as necessary    3) Schizophrenia   -c/w home meds  -behavioral interventions as necessary     4)  Refusal to walk  -psychoeduation, r/o relationship to urinary retention. Reattempt s/p resolution of complicated cystitis.  -PT eval when stable    5. Dispo- back to group home once stable as most appropriate for someone with this pt's [facility] Patient is a 47y old  Male who presents with a chief complaint of B/L knee pain after falling down in group home prior to arrival landing on knees, currently unable to ambulate. Pt usually able to walk by himself, now refusing to walk.   Yesterday, Pt had over 900ccs of urine s/p void in bladder scan. S/p Uro intervention, long placed overnight 2/7, which yielded purulent drainage, urine and blood. Pt spiked fever of 102. UA, UC, BC, sent. Pt started on Saray 1g q8h 2/7 AM pending cultures.   Overnight, Pt spiked fever 104 with /62 and . Pt was given fluids and rectal tylenol. UA not recorded, UA and UC reordered. Due to worsening clinical picture, ID consulted. Pt has not had leukocytosis during hospital course. UA returned + for hematuria.      Vital Signs Last 24 Hrs  T(C): 36.9 (08 Feb 2018 06:32), Max: 40.2 (07 Feb 2018 22:00)  T(F): 98.5 (08 Feb 2018 06:32), Max: 104.3 (07 Feb 2018 22:00)  HR: 84 (08 Feb 2018 06:32) (72 - 114)  BP: 115/67 (08 Feb 2018 06:32) (100/62 - 128/82)  BP(mean): --  RR: 16 (08 Feb 2018 06:32) (16 - 18)  SpO2: --  CAPILLARY BLOOD GLUCOSE    PAST MEDICAL & SURGICAL HISTORY:  Anemia, Hypothyroid, Mental retardation ,Schizophrenia, Seizure  Allergies phenothiazines, macrolides    MEDICATIONS  (STANDING):  benztropine 1 milliGRAM(s) Oral every 12 hours  buPROPion XL . 150 milliGRAM(s) Oral daily  diVALproex  milliGRAM(s) Oral every 8 hours  haloperidol     Tablet 10 milliGRAM(s) Oral every 8 hours  levothyroxine 100 MICROGram(s) Oral daily  meropenem  IVPB 1000 milliGRAM(s) IV Intermittent every 8 hours  meropenem  IVPB      mirtazapine 15 milliGRAM(s) Oral at bedtime  senna 1 Tablet(s) Oral at bedtime  sodium chloride 0.9% Bolus 500 milliLiter(s) IV Bolus once  sodium chloride 0.9% Bolus 500 milliLiter(s) IV Bolus once    MEDICATIONS  (PRN):  acetaminophen   Tablet 650 milliGRAM(s) Oral every 6 hours PRN For Temp greater than 38 C (100.4 F)  acetaminophen   Tablet. 650 milliGRAM(s) Oral every 6 hours PRN Severe Pain (7 - 10)  acetaminophen  Suppository 650 milliGRAM(s) Rectal every 6 hours PRN For Temp greater than 38 C (100.4 F)    General: NAD, pt eating breakfast  HEENT: AT, NC  LUNG: CTA B/L  CVS: normal S1, S2, RRR  ABD: BS present, normoactive , non-tender, non-distended  EXT: no E/C/C, positive peripheral pulses b/l  : long draining clear yellow urine  Neuro: AAOx2    Labs:                        12.4   8.85  )-----------( 126      ( 07 Feb 2018 03:52 )             35.6             02-07    135  |  100  |  13  ----------------------------<  87  4.6   |  29  |  0.8    Ca    9.6      07 Feb 2018 03:52    TPro  6.3  /  Alb  2.9<L>  /  TBili  0.4  /  DBili  x   /  AST  25  /  ALT  14  /  AlkPhos  81  02-07    LIVER FUNCTIONS - ( 07 Feb 2018 03:52 )  Alb: 2.9 g/dL / Pro: 6.3 g/dL / ALK PHOS: 81 U/L / ALT: 14 U/L / AST: 25 U/L / GGT: x           Urinalysis (02.08.18 @ 06:54)    pH Urine: 6.0    Glucose Qualitative, Urine: Negative mg/dL    Blood, Urine: Large    Color: Yellow    Urine Appearance: Clear    Bilirubin: Negative    Ketone - Urine: Negative    Specific Gravity: 1.015    Protein, Urine: Negative mg/dL    Urobilinogen: 1.0 mg/dL    Nitrite: Negative    Leukocyte Esterase Concentration: Negative    Urine Microscopic-Add On (NC) (02.08.18 @ 06:54)    Red Blood Cell - Urine: 5-10 /HPF            I&O's Summary    06 Feb 2018 07:01  -  07 Feb 2018 07:00  --------------------------------------------------------  IN: 0 mL / OUT: 600 mL / NET: -600 mL    07 Feb 2018 07:01  -  08 Feb 2018 06:55  --------------------------------------------------------  IN: 1000 mL / OUT: 1850 mL / NET: -850 mL    Imaging: < from: US Urinary Bladder (02.06.18 @ 17:18) >  1.  Urinary bladder trabeculation wall thickness upper normal limits.   Intraluminal debris.    2.  Urinary bladder volume 780 cc. It is reported that the patient was   unable to void. Co    3.  Nonvisualization of the prostate.      A/P    1) Complicated cystitis  -Pt spiked fever overnight. UA not recorded. UA, UC reordered. BC pending.   -Cefepime 2gm q12h as per ID   - s/p long placement by Uro 2/7 overnight. Keep the long for around 1 week then TOV. f/u Urology.   -c/w IV fluids  - BMP and CBC trending  -tylenol for fever    2) Intellectual Disability  -behavioral interventions as necessary    3) Schizophrenia   -c/w home meds  -behavioral interventions as necessary     4)  Refusal to walk  -psychoeduation, r/o relationship to urinary retention. Reattempt s/p resolution of complicated cystitis.  -PT eval when stable    5. Dispo- back to group home once stable as most appropriate for someone with this pt's [facility]

## 2018-02-08 NOTE — CONSULT NOTE ADULT - SUBJECTIVE AND OBJECTIVE BOX
ALINA CHERY  47y, Male  Allergy: erythromycin (Other)  phenothiazines (Unknown)      HPI: 47 year old male with MR lives in a group home, past medical history of schizophrenia; presented with bilateral knee pain after fall on his knees. Was found to have 700ccs of bladder retention, long placed by urology drained purulent bloody urine with possible urethral stricture. Patient spiked 104F yesterday and was started on Meropenem.     FAMILY HISTORY:    PAST MEDICAL & SURGICAL HISTORY:        VITALS:  T(F): 98.5, Max: 104.3 (18 @ 22:00)  HR: 84  BP: 115/67  RR: 16Vital Signs Last 24 Hrs  T(C): 36.9 (2018 06:32), Max: 40.2 (2018 22:00)  T(F): 98.5 (2018 06:32), Max: 104.3 (2018 22:00)  HR: 84 (2018 06:32) (72 - 114)  BP: 115/67 (2018 06:32) (100/62 - 128/82)  BP(mean): --  RR: 16 (2018 06:32) (16 - 18)  SpO2: --    TESTS & MEASUREMENTS:                        12.4   8.85  )-----------( 126      ( 2018 03:52 )             35.6     02-08    137  |  102  |  11  ----------------------------<  73  3.7   |  29  |  0.8    Ca    9.0      2018 06:32    TPro  6.3  /  Alb  2.9<L>  /  TBili  0.4  /  DBili  x   /  AST  25  /  ALT  14  /  AlkPhos  81  02-07    LIVER FUNCTIONS - ( 2018 03:52 )  Alb: 2.9 g/dL / Pro: 6.3 g/dL / ALK PHOS: 81 U/L / ALT: 14 U/L / AST: 25 U/L / GGT: x             Urinalysis Basic - ( 2018 06:54 )    Color: Yellow / Appearance: Clear / S.015 / pH: x  Gluc: x / Ketone: Negative  / Bili: Negative / Urobili: 1.0 mg/dL   Blood: x / Protein: Negative mg/dL / Nitrite: Negative   Leuk Esterase: Negative / RBC: 5-10 /HPF / WBC x   Sq Epi: x / Non Sq Epi: x / Bacteria: x          RADIOLOGY & ADDITIONAL TESTS:  < from: US Urinary Bladder (18 @ 17:18) >  1.  Urinary bladder trabeculation wall thickness upper normal limits.   Intraluminal debris.    2.  Urinary bladder volume 780 cc. It is reported that the patient was   unable to void. Co    3.  Nonvisualization of the prostate.    ANTIBIOTICS:  meropenem  IVPB 1000 milliGRAM(s) IV Intermittent every 8 hours  meropenem  IVPB ALINA CHERY  47y, Male  Allergy: erythromycin (Other)  phenothiazines (Unknown)      HPI: 47 year old male with MR lives in a group home, past medical history of schizophrenia; presented with bilateral knee pain after fall on his knees. Was found to have 700ccs of bladder retention, long placed by urology drained purulent bloody urine with possible urethral stricture. Patient spiked 104F yesterday and was started on Meropenem.   Is feeling better today, has no complaints at this time.     FAMILY HISTORY:    PAST MEDICAL & SURGICAL HISTORY:        VITALS:  T(F): 98.5, Max: 104.3 (18 @ 22:00)  HR: 84  BP: 115/67  RR: 16Vital Signs Last 24 Hrs  T(C): 36.9 (2018 06:32), Max: 40.2 (2018 22:00)  T(F): 98.5 (2018 06:32), Max: 104.3 (2018 22:00)  HR: 84 (2018 06:32) (72 - 114)  BP: 115/67 (2018 06:32) (100/62 - 128/82)  BP(mean): --  RR: 16 (2018 06:32) (16 - 18)  SpO2: --    TESTS & MEASUREMENTS:                        12.4   8.85  )-----------( 126      ( 2018 03:52 )             35.6     02-08    137  |  102  |  11  ----------------------------<  73  3.7   |  29  |  0.8    Ca    9.0      2018 06:32    TPro  6.3  /  Alb  2.9<L>  /  TBili  0.4  /  DBili  x   /  AST  25  /  ALT  14  /  AlkPhos  81  02-07    LIVER FUNCTIONS - ( 2018 03:52 )  Alb: 2.9 g/dL / Pro: 6.3 g/dL / ALK PHOS: 81 U/L / ALT: 14 U/L / AST: 25 U/L / GGT: x             Urinalysis Basic - ( 2018 06:54 )    Color: Yellow / Appearance: Clear / S.015 / pH: x  Gluc: x / Ketone: Negative  / Bili: Negative / Urobili: 1.0 mg/dL   Blood: x / Protein: Negative mg/dL / Nitrite: Negative   Leuk Esterase: Negative / RBC: 5-10 /HPF / WBC x   Sq Epi: x / Non Sq Epi: x / Bacteria: x          RADIOLOGY & ADDITIONAL TESTS:  < from: US Urinary Bladder (18 @ 17:18) >  1.  Urinary bladder trabeculation wall thickness upper normal limits.   Intraluminal debris.    2.  Urinary bladder volume 780 cc. It is reported that the patient was   unable to void. Co    3.  Nonvisualization of the prostate.    ANTIBIOTICS:  meropenem  IVPB 1000 milliGRAM(s) IV Intermittent every 8 hours

## 2018-02-08 NOTE — CONSULT NOTE ADULT - SUBJECTIVE AND OBJECTIVE BOX
ALINA CHERY  47y, Male  Allergy: erythromycin (Other)  phenothiazines (Unknown)      HPI:    HPI: 47 year old male with MR lives in a group home, past medical history of schizophrenia; presented with bilateral knee pain after fall on his knees. Was found to have 700ccs of bladder retention, long placed by urology drained purulent bloody urine with possible urethral stricture. Patient spiked 104F yesterday and was started on Meropenem.   Is feeling better today, has no complaints at this time.  FAMILY HISTORY:    PAST MEDICAL & SURGICAL HISTORY:        VITALS:  T(F): 98.5, Max: 104.3 (18 @ 22:00)  HR: 84  BP: 115/67  RR: 16Vital Signs Last 24 Hrs  T(C): 36.9 (2018 06:32), Max: 40.2 (2018 22:00)  T(F): 98.5 (2018 06:32), Max: 104.3 (2018 22:00)  HR: 84 (2018 06:32) (72 - 114)  BP: 115/67 (2018 06:32) (100/62 - 128/82)  BP(mean): --  RR: 16 (2018 06:32) (16 - 18)      TESTS & MEASUREMENTS:                        12.4   8.85  )-----------( 126      ( 2018 03:52 )             35.6     02-08    137  |  102  |  11  ----------------------------<  73  3.7   |  29  |  0.8    Ca    9.0      2018 06:32    TPro  6.3  /  Alb  2.9<L>  /  TBili  0.4  /  DBili  x   /  AST  25  /  ALT  14  /  AlkPhos  81  02-07    LIVER FUNCTIONS - ( 2018 03:52 )  Alb: 2.9 g/dL / Pro: 6.3 g/dL / ALK PHOS: 81 U/L / ALT: 14 U/L / AST: 25 U/L / GGT: x             Urinalysis Basic - ( 2018 06:54 )    Color: Yellow / Appearance: Clear / S.015 / pH: x  Gluc: x / Ketone: Negative  / Bili: Negative / Urobili: 1.0 mg/dL   Blood: x / Protein: Negative mg/dL / Nitrite: Negative   Leuk Esterase: Negative / RBC: 5-10 /HPF / WBC x   Sq Epi: x / Non Sq Epi: x / Bacteria: x          RADIOLOGY & ADDITIONAL TESTS:    ANTIBIOTICS:  meropenem  IVPB 1000 milliGRAM(s) IV Intermittent every 8 hours  meropenem  IVPB

## 2018-02-08 NOTE — CONSULT NOTE ADULT - ASSESSMENT
A/P: 47 year old make with MR and schizophrenia presents with urinary retention and fever of 104F. Patient has a normal white count in this admission and U/A is negative for leukocyte esterase and nitrates, culture is pending. A/P: 47 year old make with MR and schizophrenia presents with urinary retention and fever of 104F. Patient has a normal white count in this admission and U/A is negative for leukocyte esterase and nitrates, culture is pending.     NOT DISCUSSED WITH ATTENDING

## 2018-02-08 NOTE — CONSULT NOTE ADULT - GENITOURINARY COMMENTS
Packer in an draining 800ccs of clear yellow urine
Packer in an draining 800ccs of clear yellow urine

## 2018-02-09 LAB
ANION GAP SERPL CALC-SCNC: 6 MMOL/L — LOW (ref 7–14)
BUN SERPL-MCNC: 6 MG/DL — LOW (ref 10–20)
CALCIUM SERPL-MCNC: 8.7 MG/DL — SIGNIFICANT CHANGE UP (ref 8.5–10.1)
CHLORIDE SERPL-SCNC: 102 MMOL/L — SIGNIFICANT CHANGE UP (ref 98–110)
CO2 SERPL-SCNC: 31 MMOL/L — SIGNIFICANT CHANGE UP (ref 17–32)
CREAT SERPL-MCNC: 0.7 MG/DL — SIGNIFICANT CHANGE UP (ref 0.7–1.5)
CULTURE RESULTS: NO GROWTH — SIGNIFICANT CHANGE UP
GLUCOSE SERPL-MCNC: 63 MG/DL — LOW (ref 70–110)
HCT VFR BLD CALC: 30.7 % — LOW (ref 42–52)
HGB BLD-MCNC: 10.3 G/DL — LOW (ref 14–18)
MCHC RBC-ENTMCNC: 33.1 PG — HIGH (ref 27–31)
MCHC RBC-ENTMCNC: 33.6 G/DL — SIGNIFICANT CHANGE UP (ref 32–37)
MCV RBC AUTO: 98.7 FL — HIGH (ref 80–94)
NRBC # BLD: 0 /100 WBCS — SIGNIFICANT CHANGE UP (ref 0–0)
PLATELET # BLD AUTO: 87 K/UL — LOW (ref 130–400)
POTASSIUM SERPL-MCNC: 3.7 MMOL/L — SIGNIFICANT CHANGE UP (ref 3.5–5)
POTASSIUM SERPL-SCNC: 3.7 MMOL/L — SIGNIFICANT CHANGE UP (ref 3.5–5)
RBC # BLD: 3.11 M/UL — LOW (ref 4.7–6.1)
RBC # FLD: 13.5 % — SIGNIFICANT CHANGE UP (ref 11.5–14.5)
SODIUM SERPL-SCNC: 139 MMOL/L — SIGNIFICANT CHANGE UP (ref 135–146)
SPECIMEN SOURCE: SIGNIFICANT CHANGE UP
WBC # BLD: 7.38 K/UL — SIGNIFICANT CHANGE UP (ref 4.8–10.8)
WBC # FLD AUTO: 7.38 K/UL — SIGNIFICANT CHANGE UP (ref 4.8–10.8)

## 2018-02-09 RX ORDER — CIPROFLOXACIN LACTATE 400MG/40ML
500 VIAL (ML) INTRAVENOUS EVERY 12 HOURS
Qty: 0 | Refills: 0 | Status: DISCONTINUED | OUTPATIENT
Start: 2018-02-09 | End: 2018-02-10

## 2018-02-09 RX ADMIN — SODIUM CHLORIDE 75 MILLILITER(S): 9 INJECTION INTRAMUSCULAR; INTRAVENOUS; SUBCUTANEOUS at 03:12

## 2018-02-09 RX ADMIN — DIVALPROEX SODIUM 750 MILLIGRAM(S): 500 TABLET, DELAYED RELEASE ORAL at 05:34

## 2018-02-09 RX ADMIN — HALOPERIDOL DECANOATE 1 MILLIGRAM(S): 100 INJECTION INTRAMUSCULAR at 05:34

## 2018-02-09 RX ADMIN — MIRTAZAPINE 15 MILLIGRAM(S): 45 TABLET, ORALLY DISINTEGRATING ORAL at 21:39

## 2018-02-09 RX ADMIN — DIVALPROEX SODIUM 750 MILLIGRAM(S): 500 TABLET, DELAYED RELEASE ORAL at 13:44

## 2018-02-09 RX ADMIN — HALOPERIDOL DECANOATE 1 MILLIGRAM(S): 100 INJECTION INTRAMUSCULAR at 21:40

## 2018-02-09 RX ADMIN — Medication 1 MILLIGRAM(S): at 05:34

## 2018-02-09 RX ADMIN — SODIUM CHLORIDE 1000 MILLILITER(S): 9 INJECTION INTRAMUSCULAR; INTRAVENOUS; SUBCUTANEOUS at 13:46

## 2018-02-09 RX ADMIN — Medication 100 MICROGRAM(S): at 05:34

## 2018-02-09 RX ADMIN — ENOXAPARIN SODIUM 40 MILLIGRAM(S): 100 INJECTION SUBCUTANEOUS at 11:39

## 2018-02-09 RX ADMIN — Medication 500 MILLIGRAM(S): at 18:33

## 2018-02-09 RX ADMIN — DIVALPROEX SODIUM 750 MILLIGRAM(S): 500 TABLET, DELAYED RELEASE ORAL at 21:40

## 2018-02-09 RX ADMIN — BUPROPION HYDROCHLORIDE 150 MILLIGRAM(S): 150 TABLET, EXTENDED RELEASE ORAL at 11:38

## 2018-02-09 RX ADMIN — Medication 1 TABLET(S): at 16:33

## 2018-02-09 RX ADMIN — Medication 1 MILLIGRAM(S): at 18:20

## 2018-02-09 RX ADMIN — CEFEPIME 100 MILLIGRAM(S): 1 INJECTION, POWDER, FOR SOLUTION INTRAMUSCULAR; INTRAVENOUS at 05:35

## 2018-02-09 RX ADMIN — SENNA PLUS 1 TABLET(S): 8.6 TABLET ORAL at 21:39

## 2018-02-09 RX ADMIN — HALOPERIDOL DECANOATE 1 MILLIGRAM(S): 100 INJECTION INTRAMUSCULAR at 13:45

## 2018-02-09 RX ADMIN — SODIUM CHLORIDE 75 MILLILITER(S): 9 INJECTION INTRAMUSCULAR; INTRAVENOUS; SUBCUTANEOUS at 16:39

## 2018-02-09 NOTE — PROGRESS NOTE ADULT - SUBJECTIVE AND OBJECTIVE BOX
ALINA CHERY  47y, Male      OVERNIGHT EVENTS:  none, alert, responsive, happy, has long cath, no hematuria  VITALS:  T(F): 96.9, Max: 98.5 (18 @ 06:32)  HR: 76  BP: 111/71  RR: 17Vital Signs Last 24 Hrs  T(C): 36.1 (2018 06:08), Max: 36.9 (2018 06:32)  T(F): 96.9 (2018 06:08), Max: 98.5 (2018 06:32)  HR: 76 (2018 06:08) (72 - 84)  BP: 111/71 (2018 06:08) (90/53 - 115/67)  BP(mean): --  RR: 17 (2018 06:08) (16 - 18)  SpO2: --    TESTS & MEASUREMENTS:                        11.5   9.59  )-----------( 95       ( 2018 06:32 )             33.5         137  |  102  |  11  ----------------------------<  73  3.7   |  29  |  0.8    Ca    9.0      2018 06:32          Culture - Blood (collected 18 @ 03:52)  Source: .Blood Blood  Preliminary Report (18 @ 15:01):    No growth to date.      Urinalysis Basic - ( 2018 06:54 )    Color: Yellow / Appearance: Clear / S.015 / pH: x  Gluc: x / Ketone: Negative  / Bili: Negative / Urobili: 1.0 mg/dL   Blood: x / Protein: Negative mg/dL / Nitrite: Negative   Leuk Esterase: Negative / RBC: 5-10 /HPF / WBC x   Sq Epi: x / Non Sq Epi: x / Bacteria: x          RADIOLOGY & ADDITIONAL TESTS:    ANTIBIOTICS:  cefepime  IVPB      cefepime  IVPB 2000 milliGRAM(s) IV Intermittent every 12 hours

## 2018-02-09 NOTE — PROGRESS NOTE ADULT - SUBJECTIVE AND OBJECTIVE BOX
Patient is a 47y old  Male who presents with a chief complaint of B/L knee pain after falling down in group home prior to arrival landing on knees, currently unable to ambulate. Pt usually able to walk by himself, now refusing to walk.    Pt had over 900ccs of urine s/p void in bladder scan. S/p Uro intervention, long placed overnight , which yielded purulent drainage, urine and blood. Pt spiked fever of 102. UA, UC, BC, sent. Pt started on Saray 1g q8h  AM pending cultures.    overnight Pt spiked fever 104 with /62 and . Pt was given fluids and rectal tylenol. UA not recorded, UA and UC reordered.   Pt afebrile since.    Vital Signs Last 24 Hrs  T(C): 36.1 (2018 06:08), Max: 36.9 (2018 21:11)  T(F): 96.9 (2018 06:08), Max: 98.5 (2018 21:11)  HR: 76 (2018 06:08) (72 - 84)  BP: 111/71 (2018 06:08) (90/53 - 111/71)  BP(mean): --  RR: 17 (2018 06:08) (17 - 18)  SpO2: --  CAPILLARY BLOOD GLUCOSE    PAST MEDICAL & SURGICAL HISTORY: Anemia, Hypothyroid, Mental retardation ,Schizophrenia, Seizure  Allergies phenothiazines, macrolides    MEDICATIONS  (STANDING):  benztropine 1 milliGRAM(s) Oral every 12 hours  buPROPion XL . 150 milliGRAM(s) Oral daily  cefepime  IVPB      cefepime  IVPB 2000 milliGRAM(s) IV Intermittent every 12 hours  diVALproex  milliGRAM(s) Oral every 8 hours  enoxaparin Injectable 40 milliGRAM(s) SubCutaneous daily  haloperidol     Tablet 1 milliGRAM(s) Oral every 8 hours  levothyroxine 100 MICROGram(s) Oral daily  mirtazapine 15 milliGRAM(s) Oral at bedtime  senna 1 Tablet(s) Oral at bedtime  sodium chloride 0.9% Bolus 500 milliLiter(s) IV Bolus once  sodium chloride 0.9% Bolus 500 milliLiter(s) IV Bolus once  sodium chloride 0.9%. 1000 milliLiter(s) (75 mL/Hr) IV Continuous <Continuous>    MEDICATIONS  (PRN):  acetaminophen   Tablet 650 milliGRAM(s) Oral every 6 hours PRN For Temp greater than 38 C (100.4 F)  acetaminophen   Tablet. 650 milliGRAM(s) Oral every 6 hours PRN Severe Pain (7 - 10)  acetaminophen  Suppository 650 milliGRAM(s) Rectal every 6 hours PRN For Temp greater than 38 C (100.4 F)      General: NAD  HEENT: AT, NC  LUNG: CTA B/L  CVS: normal S1, S2, RRR  ABD: BS present, normoactive , non-tender, non-distended  EXT: no E/C/C, positive peripheral pulses b/l  : long draining clear yellow urine  Neuro: AAOx2    Labs:                        11.5   9.59  )-----------( 95       ( 2018 06:32 )             33.5             02-08    137  |  102  |  11  ----------------------------<  73  3.7   |  29  |  0.8    Ca    9.0      2018 06:32    Urinalysis Basic - ( 2018 06:54 )    Color: Yellow / Appearance: Clear / S.015 / pH: x  Gluc: x / Ketone: Negative  / Bili: Negative / Urobili: 1.0 mg/dL   Blood: x / Protein: Negative mg/dL / Nitrite: Negative   Leuk Esterase: Negative / RBC: 5-10 /HPF / WBC x   Sq Epi: x / Non Sq Epi: x / Bacteria: x    Culture - Blood (collected 2018 03:52)  Source: .Blood Blood  Preliminary Report (2018 15:01):    No growth to date.      I&O's Summary    2018 07:01  -  2018 07:00  --------------------------------------------------------  IN: 2000 mL / OUT: 2750 mL / NET: -750 mL    Imaging:Imaging: < from: US Urinary Bladder (18 @ 17:18) >  1.  Urinary bladder trabeculation wall thickness upper normal limits.   Intraluminal debris.    2.  Urinary bladder volume 780 cc. It is reported that the patient was   unable to void. Co    3.  Nonvisualization of the prostate.      A/P    1) Traumatic sepsis vs Complicated cystitis r/o  - pt afebrile x 24hrs  -BC NGTD  -As per ID- transient sepsis related to trauma, d/c iv ABx and change to vantin 299mg q12h for 7 days.  - s/p long placement by Uro  overnight. Keep the long for around 1 week then TOV. f/u Urology.   -d/c IV fluids as pt has normal PO intake  - BMP and CBC trending  -tylenol for fever PRN    2) Intellectual Disability  -behavioral interventions as necessary    3) Schizophrenia   -c/w home meds  -behavioral interventions as necessary     4)  Refusal to walk  -psychoeduation  -PT reeval pending as pt now stable    5. Dispo- back to group home once stable as most appropriate for someone with this pt's PMH Patient is a 47y old  Male who presents with a chief complaint of B/L knee pain after falling down in group home prior to arrival landing on knees, currently unable to ambulate. Pt usually able to walk by himself, now refusing to walk.    Pt had over 900ccs of urine s/p void in bladder scan. S/p Uro intervention, long placed overnight , which yielded purulent drainage, urine and blood. Pt spiked fever of 102. UA, UC, BC, sent. Pt started on Saray 1g q8h  AM pending cultures.    overnight Pt spiked fever 104 with /62 and . Pt was given fluids and rectal tylenol. UA not recorded, UA and UC reordered.   Pt afebrile since.    Vital Signs Last 24 Hrs  T(C): 36.1 (2018 06:08), Max: 36.9 (2018 21:11)  T(F): 96.9 (2018 06:08), Max: 98.5 (2018 21:11)  HR: 76 (2018 06:08) (72 - 84)  BP: 111/71 (2018 06:08) (90/53 - 111/71)  BP(mean): --  RR: 17 (2018 06:08) (17 - 18)  SpO2: --  CAPILLARY BLOOD GLUCOSE    PAST MEDICAL & SURGICAL HISTORY: Anemia, Hypothyroid, Mental retardation ,Schizophrenia, Seizure  Allergies phenothiazines, macrolides    MEDICATIONS  (STANDING):  benztropine 1 milliGRAM(s) Oral every 12 hours  buPROPion XL . 150 milliGRAM(s) Oral daily  cefepime  IVPB      cefepime  IVPB 2000 milliGRAM(s) IV Intermittent every 12 hours  diVALproex  milliGRAM(s) Oral every 8 hours  enoxaparin Injectable 40 milliGRAM(s) SubCutaneous daily  haloperidol     Tablet 1 milliGRAM(s) Oral every 8 hours  levothyroxine 100 MICROGram(s) Oral daily  mirtazapine 15 milliGRAM(s) Oral at bedtime  senna 1 Tablet(s) Oral at bedtime  sodium chloride 0.9% Bolus 500 milliLiter(s) IV Bolus once  sodium chloride 0.9% Bolus 500 milliLiter(s) IV Bolus once  sodium chloride 0.9%. 1000 milliLiter(s) (75 mL/Hr) IV Continuous <Continuous>    MEDICATIONS  (PRN):  acetaminophen   Tablet 650 milliGRAM(s) Oral every 6 hours PRN For Temp greater than 38 C (100.4 F)  acetaminophen   Tablet. 650 milliGRAM(s) Oral every 6 hours PRN Severe Pain (7 - 10)  acetaminophen  Suppository 650 milliGRAM(s) Rectal every 6 hours PRN For Temp greater than 38 C (100.4 F)      General: NAD  HEENT: AT, NC  LUNG: CTA B/L  CVS: normal S1, S2, RRR  ABD: BS present, normoactive , non-tender, non-distended  EXT: no E/C/C, positive peripheral pulses b/l  : long draining clear yellow urine  Neuro: AAOx2  Skin: diffuse rash over chest, back and b/l upper extremities     Labs:                        11.5   9.59  )-----------( 95       ( 2018 06:32 )             33.5             02-    137  |  102  |  11  ----------------------------<  73  3.7   |  29  |  0.8    Ca    9.0      2018 06:32    Urinalysis Basic - ( 2018 06:54 )    Color: Yellow / Appearance: Clear / S.015 / pH: x  Gluc: x / Ketone: Negative  / Bili: Negative / Urobili: 1.0 mg/dL   Blood: x / Protein: Negative mg/dL / Nitrite: Negative   Leuk Esterase: Negative / RBC: 5-10 /HPF / WBC x   Sq Epi: x / Non Sq Epi: x / Bacteria: x    Culture - Blood (collected 2018 03:52)  Source: .Blood Blood  Preliminary Report (2018 15:01):    No growth to date.      I&O's Summary    2018 07:01  -  2018 07:00  --------------------------------------------------------  IN: 2000 mL / OUT: 2750 mL / NET: -750 mL    Imaging:Imaging: < from: US Urinary Bladder (18 @ 17:18) >  1.  Urinary bladder trabeculation wall thickness upper normal limits.   Intraluminal debris.    2.  Urinary bladder volume 780 cc. It is reported that the patient was   unable to void. Co    3.  Nonvisualization of the prostate.      A/P    1) Traumatic sepsis vs Complicated cystitis r/o  - pt afebrile x 24hrs  -BC NGTD  -As per ID- transient sepsis related to trauma, d/c iv ABx and change to vantin 299mg q12h for 7 days.  -Pt developed rash s/p cefepime. Cefepime d/c'ed as no longer indicated, will give solumedrol if needed.  - s/p long placement by Uro  overnight. Keep the long for around 1 week then TOV. f/u Urology.   -d/c IV fluids as pt has normal PO intake  - BMP and CBC trending  -tylenol for fever PRN    2) Intellectual Disability  -behavioral interventions as necessary    3) Schizophrenia   -c/w home meds  -behavioral interventions as necessary     4)  Refusal to walk  -psychoeduation  -PT reeval pending as pt now stable    5. Dispo- back to group home once stable as most appropriate for someone with this pt's PMH Patient is a 47y old  Male who presents with a chief complaint of B/L knee pain after falling down in group home prior to arrival landing on knees, currently unable to ambulate. Pt usually able to walk by himself, now refusing to walk.    Pt had over 900ccs of urine s/p void in bladder scan. S/p Uro intervention, long placed overnight , which yielded purulent drainage, urine and blood. Pt spiked fever of 102. UA, UC, BC, sent. Pt started on Saray 1g q8h  AM pending cultures.    overnight Pt spiked fever 104 with /62 and . Pt was given fluids and rectal tylenol. UA not recorded, UA and UC reordered.   Pt afebrile since.    Vital Signs Last 24 Hrs  T(C): 36.1 (2018 06:08), Max: 36.9 (2018 21:11)  T(F): 96.9 (2018 06:08), Max: 98.5 (2018 21:11)  HR: 76 (2018 06:08) (72 - 84)  BP: 111/71 (2018 06:08) (90/53 - 111/71)  BP(mean): --  RR: 17 (2018 06:08) (17 - 18)  SpO2: --  CAPILLARY BLOOD GLUCOSE    PAST MEDICAL & SURGICAL HISTORY: Anemia, Hypothyroid, Mental retardation ,Schizophrenia, Seizure  Allergies phenothiazines, macrolides    MEDICATIONS  (STANDING):  benztropine 1 milliGRAM(s) Oral every 12 hours  buPROPion XL . 150 milliGRAM(s) Oral daily  cefepime  IVPB      cefepime  IVPB 2000 milliGRAM(s) IV Intermittent every 12 hours  diVALproex  milliGRAM(s) Oral every 8 hours  enoxaparin Injectable 40 milliGRAM(s) SubCutaneous daily  haloperidol     Tablet 1 milliGRAM(s) Oral every 8 hours  levothyroxine 100 MICROGram(s) Oral daily  mirtazapine 15 milliGRAM(s) Oral at bedtime  senna 1 Tablet(s) Oral at bedtime  sodium chloride 0.9% Bolus 500 milliLiter(s) IV Bolus once  sodium chloride 0.9% Bolus 500 milliLiter(s) IV Bolus once  sodium chloride 0.9%. 1000 milliLiter(s) (75 mL/Hr) IV Continuous <Continuous>    MEDICATIONS  (PRN):  acetaminophen   Tablet 650 milliGRAM(s) Oral every 6 hours PRN For Temp greater than 38 C (100.4 F)  acetaminophen   Tablet. 650 milliGRAM(s) Oral every 6 hours PRN Severe Pain (7 - 10)  acetaminophen  Suppository 650 milliGRAM(s) Rectal every 6 hours PRN For Temp greater than 38 C (100.4 F)      General: NAD  HEENT: AT, NC  LUNG: CTA B/L  CVS: normal S1, S2, RRR  ABD: BS present, normoactive , non-tender, non-distended  EXT: no E/C/C, positive peripheral pulses b/l  : long draining clear yellow urine  Neuro: AAOx2  Skin: diffuse rash over chest, back and b/l upper extremities     Labs:                        11.5   9.59  )-----------( 95       ( 2018 06:32 )             33.5             02-    137  |  102  |  11  ----------------------------<  73  3.7   |  29  |  0.8    Ca    9.0      2018 06:32    Urinalysis Basic - ( 2018 06:54 )    Color: Yellow / Appearance: Clear / S.015 / pH: x  Gluc: x / Ketone: Negative  / Bili: Negative / Urobili: 1.0 mg/dL   Blood: x / Protein: Negative mg/dL / Nitrite: Negative   Leuk Esterase: Negative / RBC: 5-10 /HPF / WBC x   Sq Epi: x / Non Sq Epi: x / Bacteria: x    Culture - Blood (collected 2018 03:52)  Source: .Blood Blood  Preliminary Report (2018 15:01):    No growth to date.      I&O's Summary    2018 07:01  -  2018 07:00  --------------------------------------------------------  IN: 2000 mL / OUT: 2750 mL / NET: -750 mL    Imaging:Imaging: < from: US Urinary Bladder (18 @ 17:18) >  1.  Urinary bladder trabeculation wall thickness upper normal limits.   Intraluminal debris.    2.  Urinary bladder volume 780 cc. It is reported that the patient was   unable to void. Co    3.  Nonvisualization of the prostate.      A/P    1) Traumatic sepsis vs Complicated cystitis r/o  - pt afebrile x 24hrs  -BC NGTD  -As per ID- transient sepsis related to trauma, d/c iv ABx and change to Cipro q12h for 7 days.  -Pt developed rash s/p cefepime. Cefepime d/c'ed as no longer indicated, will give solumedrol if needed.  - s/p long placement by Uro  overnight. Keep the long for around 1 week then TOV. f/u Urology.   -d/c IV fluids as pt has normal PO intake  - BMP and CBC trending  -tylenol for fever PRN    2) Intellectual Disability  -behavioral interventions as necessary    3) Schizophrenia   -c/w home meds  -behavioral interventions as necessary     4)  Refusal to walk  -psychoeduation  -PT reeval pending as pt now stable    5. Dispo- back to group home once stable as most appropriate for someone with this pt's PMH

## 2018-02-09 NOTE — PROGRESS NOTE ADULT - ASSESSMENT
A/P: 47 year old make with MR and schizophrenia presents with urinary retention and fever of 104F  . Patient has a normal white count in this admission and U/A is negative for leukocyte esterase and nitrates.  blood cultures are negative, nl WBC  had transient sepsis which was trauma related  could discontinue iv ABx and change to vantin 299mg q12h for 7 days.    recall ID prn please.

## 2018-02-10 DIAGNOSIS — F79 UNSPECIFIED INTELLECTUAL DISABILITIES: ICD-10-CM

## 2018-02-10 DIAGNOSIS — F20.9 SCHIZOPHRENIA, UNSPECIFIED: ICD-10-CM

## 2018-02-10 LAB
BASOPHILS # BLD AUTO: 0.01 K/UL — SIGNIFICANT CHANGE UP (ref 0–0.2)
BASOPHILS NFR BLD AUTO: 0.2 % — SIGNIFICANT CHANGE UP (ref 0–1)
EOSINOPHIL # BLD AUTO: 0.05 K/UL — SIGNIFICANT CHANGE UP (ref 0–0.7)
EOSINOPHIL NFR BLD AUTO: 0.8 % — SIGNIFICANT CHANGE UP (ref 0–8)
FOLATE SERPL-MCNC: 14.6 NG/ML — SIGNIFICANT CHANGE UP (ref 4.8–24.2)
HCT VFR BLD CALC: 30.3 % — LOW (ref 42–52)
HGB BLD-MCNC: 10.4 G/DL — LOW (ref 14–18)
IMM GRANULOCYTES NFR BLD AUTO: 0.6 % — HIGH (ref 0.1–0.3)
LYMPHOCYTES # BLD AUTO: 1.25 K/UL — SIGNIFICANT CHANGE UP (ref 1.2–3.4)
LYMPHOCYTES # BLD AUTO: 18.8 % — LOW (ref 20.5–51.1)
MCHC RBC-ENTMCNC: 33.5 PG — HIGH (ref 27–31)
MCHC RBC-ENTMCNC: 34.3 G/DL — SIGNIFICANT CHANGE UP (ref 32–37)
MCV RBC AUTO: 97.7 FL — HIGH (ref 80–94)
MONOCYTES # BLD AUTO: 1.04 K/UL — HIGH (ref 0.1–0.6)
MONOCYTES NFR BLD AUTO: 15.6 % — HIGH (ref 1.7–9.3)
NEUTROPHILS # BLD AUTO: 4.26 K/UL — SIGNIFICANT CHANGE UP (ref 1.4–6.5)
NEUTROPHILS NFR BLD AUTO: 64 % — SIGNIFICANT CHANGE UP (ref 42.2–75.2)
PLATELET # BLD AUTO: 94 K/UL — LOW (ref 130–400)
RBC # BLD: 3.1 M/UL — LOW (ref 4.7–6.1)
RBC # FLD: 13.3 % — SIGNIFICANT CHANGE UP (ref 11.5–14.5)
VIT B12 SERPL-MCNC: 1951 PG/ML — HIGH (ref 232–1245)
WBC # BLD: 6.65 K/UL — SIGNIFICANT CHANGE UP (ref 4.8–10.8)
WBC # FLD AUTO: 6.65 K/UL — SIGNIFICANT CHANGE UP (ref 4.8–10.8)

## 2018-02-10 RX ORDER — LACTOBACILLUS ACIDOPHILUS 100MM CELL
1 CAPSULE ORAL
Qty: 0 | Refills: 0 | Status: DISCONTINUED | OUTPATIENT
Start: 2018-02-10 | End: 2018-02-13

## 2018-02-10 RX ORDER — HALOPERIDOL DECANOATE 100 MG/ML
0.5 INJECTION INTRAMUSCULAR
Qty: 0 | Refills: 0 | Status: DISCONTINUED | OUTPATIENT
Start: 2018-02-10 | End: 2018-02-11

## 2018-02-10 RX ORDER — CIPROFLOXACIN LACTATE 400MG/40ML
250 VIAL (ML) INTRAVENOUS
Qty: 0 | Refills: 0 | Status: COMPLETED | OUTPATIENT
Start: 2018-02-10 | End: 2018-02-12

## 2018-02-10 RX ADMIN — Medication 1 MILLIGRAM(S): at 05:31

## 2018-02-10 RX ADMIN — MIRTAZAPINE 15 MILLIGRAM(S): 45 TABLET, ORALLY DISINTEGRATING ORAL at 21:42

## 2018-02-10 RX ADMIN — Medication 1 TABLET(S): at 13:21

## 2018-02-10 RX ADMIN — Medication 100 MICROGRAM(S): at 05:32

## 2018-02-10 RX ADMIN — Medication 250 MILLIGRAM(S): at 21:41

## 2018-02-10 RX ADMIN — HALOPERIDOL DECANOATE 0.5 MILLIGRAM(S): 100 INJECTION INTRAMUSCULAR at 21:41

## 2018-02-10 RX ADMIN — DIVALPROEX SODIUM 750 MILLIGRAM(S): 500 TABLET, DELAYED RELEASE ORAL at 13:22

## 2018-02-10 RX ADMIN — Medication 1 MILLIGRAM(S): at 21:42

## 2018-02-10 RX ADMIN — DIVALPROEX SODIUM 750 MILLIGRAM(S): 500 TABLET, DELAYED RELEASE ORAL at 05:32

## 2018-02-10 RX ADMIN — HALOPERIDOL DECANOATE 1 MILLIGRAM(S): 100 INJECTION INTRAMUSCULAR at 05:31

## 2018-02-10 RX ADMIN — Medication 1 TABLET(S): at 13:23

## 2018-02-10 RX ADMIN — Medication 500 MILLIGRAM(S): at 05:31

## 2018-02-10 RX ADMIN — DIVALPROEX SODIUM 750 MILLIGRAM(S): 500 TABLET, DELAYED RELEASE ORAL at 21:40

## 2018-02-10 RX ADMIN — BUPROPION HYDROCHLORIDE 150 MILLIGRAM(S): 150 TABLET, EXTENDED RELEASE ORAL at 13:22

## 2018-02-10 NOTE — PROGRESS NOTE ADULT - SUBJECTIVE AND OBJECTIVE BOX
ALINA CHERY  [facility]      Patient is a 47y old  Male who presents with a chief complaint of inability to ambulate, with s/p fall at group home, which was not witnessed.         Patient was evaluated  and examined by bedside, as per caregiver report, patient had a good night sleep, had bowel movement, - long draining clear yellow urine, patient tolerating diet well, remains afebrile          REVIEW OF SYSTEMS:  unable to obtain due to patient's mental retardation state.      T(C): , Max: 36.7 (02-10-18 @ 06:07)  HR: 81 (02-10-18 @ 06:07)  BP: 120/68 (02-10-18 @ 06:07)  RR: 18 (02-10-18 @ 06:07)  SpO2: 98% (02-09-18 @ 22:00)  CAPILLARY BLOOD GLUCOSE          PHYSICAL EXAM:  General: NAD, awake, patient is laying comfortably in bed  HEENT: AT, NC, Supple, NO JVD, NO CB  Lungs: CTA B/L, no wheezing, no rhonchi  CVS: normal S1, S2, RRR, NO M/G/R  Abdomen: soft, bowel sounds present, non-tender, non-distended  Extremities: no edema, no clubbing, no cyanosis, positive peripheral pulses b/l, peripheral muscle wasting present  : no penile or testicular edema, long catheter-draining clear yellow urine  Neuro: patient functioning at his baseline, gait not tested  Skin: resolved pink rash on neck and upper chest area, no ecchymosis      LAB  CBC  Date: 02-09-18 @ 07:39  Mean cell Drcpslfkur04.1  Mean cell Hemoglobin Conc33.6  Mean cell Volum 98.7  Platelet count-Automate 87  RBC Count 3.11  Red Cell Distrib Width13.5  WBC Count7.38  % Albumin, Urine--  Hematocrit 30.7  Hemoglobin 10.3  CBC  Date: 02-08-18 @ 06:32  Mean cell Ohbbssffbq72.3  Mean cell Hemoglobin Conc34.3  Mean cell Volum 97.1  Platelet count-Automate 95  RBC Count 3.45  Red Cell Distrib Width13.3  WBC Count9.59  % Albumin, Urine--  Hematocrit 33.5  Hemoglobin 11.5  CBC  Date: 02-07-18 @ 03:52  Mean cell Hrzeowwked58.2  Mean cell Hemoglobin Conc34.8  Mean cell Volum 95.2  Platelet count-Automate 126  RBC Count 3.74  Red Cell Distrib Width13.2  WBC Count8.85  % Albumin, Urine--  Hematocrit 35.6  Hemoglobin 12.4  CBC  Date: 02-06-18 @ 07:20  Mean cell Qceodikluz62.7  Mean cell Hemoglobin Conc34.6  Mean cell Volum 97.3  Platelet count-Automate 129  RBC Count 3.65  Red Cell Distrib Width13.6  WBC Count8.61  % Albumin, Urine--  Hematocrit 35.5  Hemoglobin 12.3  CBC  Date: 02-05-18 @ 05:09  Mean cell Hsigyzxosl64.3  Mean cell Hemoglobin Conc34.5  Mean cell Volum 96.6  Platelet count-Automate 150  RBC Count 3.57  Red Cell Distrib Width13.2  WBC Count9.27  % Albumin, Urine--  Hematocrit 34.5  Hemoglobin 11.9    [facility]  02-09-18 @ 07:39  Blood Gas Arterial-Calcium,Ionized--  Blood Urea Nitrogen, Serum 6 mg/dL<L> [10 - 20]  Carbon Dioxide, Serum31 mmol/L [17 - 32]  Chloride, [id number] mmol/L [98 - 110]  Creatinie, Serum0.7 mg/dL [0.7 - 1.5]  Glucose, Serum63 mg/dL<L> [70 - 110]  Potassium, Serum3.7 mmol/L [3.5 - 5.0]  Sodium, Serum 139 mmol/L [135 - 146]  [facility]  02-08-18 @ 06:32  Blood Gas Arterial-Calcium,Ionized--  Blood Urea Nitrogen, Serum 11 mg/dL [10 - 20]  Carbon Dioxide, Serum29 mmol/L [17 - 32]  Chloride, [id number] mmol/L [98 - 110]  Creatinie, Serum0.8 mg/dL [0.7 - 1.5]  Glucose, Serum73 mg/dL [70 - 110]  Potassium, Serum3.7 mmol/L [3.5 - 5.0]  Sodium, Serum 137 mmol/L [135 - 146]  [facility]  02-07-18 @ 03:52  Blood Gas Arterial-Calcium,Ionized--  Blood Urea Nitrogen, Serum 13 mg/dL [10 - 20]  Carbon Dioxide, Serum29 mmol/L [17 - 32]  Chloride, [id number] mmol/L [98 - 110]  Creatinie, Serum0.8 mg/dL [0.7 - 1.5]  Glucose, Serum87 mg/dL [70 - 110]  Potassium, Serum4.6 mmol/L [3.5 - 5.0]  Sodium, Serum 135 mmol/L [135 - 146]  BMP  02-06-18 @ 07:20  Blood Gas Arterial-Calcium,Ionized--  Blood Urea Nitrogen, Serum 13 mg/dL [10 - 20]  Carbon Dioxide, Serum31 mmol/L [17 - 32]  Chloride, Serum96 mmol/L<L> [98 - 110]  Creatinie, Serum0.8 mg/dL [0.7 - 1.5]  Glucose, Serum67 mg/dL<L> [70 - 110]  Potassium, Serum4.0 mmol/L [3.5 - 5.0]  Sodium, Serum 136 mmol/L [135 - 146]              Microbiology:    Culture - Urine (collected 02-08-18 @ 06:54)  Source: .Urine Clean Catch (Midstream)  Final Report (02-09-18 @ 12:18):    No growth    Culture - Blood (collected 02-07-18 @ 03:52)  Source: .Blood Blood  Preliminary Report (02-08-18 @ 15:01):    No growth to date.        RADIOLOGY & ADDITIONAL TESTS:  CXR(2/8/18) - no acute cardiopulmonary changes        Medications:  acetaminophen   Tablet 650 milliGRAM(s) Oral every 6 hours PRN  acetaminophen   Tablet. 650 milliGRAM(s) Oral every 6 hours PRN  acetaminophen  Suppository 650 milliGRAM(s) Rectal every 6 hours PRN  benztropine 1 milliGRAM(s) Oral every 12 hours  buPROPion XL . 150 milliGRAM(s) Oral daily  ciprofloxacin     Tablet 250 milliGRAM(s) Oral two times a day  diVALproex  milliGRAM(s) Oral every 8 hours  haloperidol     Tablet 0.5 milliGRAM(s) Oral two times a day  lactobacillus acidophilus 1 Tablet(s) Oral two times a day with meals  levothyroxine 100 MICROGram(s) Oral daily  mirtazapine 15 milliGRAM(s) Oral at bedtime  multivitamin 1 Tablet(s) Oral daily  senna 1 Tablet(s) Oral at bedtime  sodium chloride 0.9% Bolus 500 milliLiter(s) IV Bolus once    Assessment and Plan:  1) S/P FALL IN GROUP HOME  -CT of right hip, diffuse osteopenia, no acute fractures.   -Right knee x ray -no acute fractures  - pt remains afebrile  - blood cultures and urine cultures negative  -when long was placed, pus was noted, currently resolved, patient will be completing oral ciprofloxacin tx for 2 more days  -(add probiotics, meanwhile patient is on abx tx.)  -PT/OT tx . as tolerated      2) Acute Urinary retention - patient has good urine output, renal/bladder  US- no acute obstruction, no hydronephrosis  - will complete voiding trial today.        3) Intellectual Disability  -behavioral interventions as necessary    4) Schizophrenia   -decreased haldol dose, to avoid excessive sedation.  -behavioral interventions as necessary   - caregiver at bedside with 24 assistance    5)  Refusal to walk  -to encourage ambulation with PT/OT  tx.    6) Hypothyroidism- elevated TSH- 6.94    - obtain free t3, free t 4, continue daily Synthroid supplementation    7) Macrocytic anemia- normal vit b 12 and folate levels, continue daily MVI supplementation, hemoglobin remains stable    8) Thrombocytopenia- continue to monitor platelets count, lovenox is being held.    9) New onset rash ? post Cefepime tx., noted on 02/09/18 ,  currently resolved, continue clinical observation.    10) Dispo- back to group home, possibly on monday.

## 2018-02-10 NOTE — PROGRESS NOTE ADULT - PROBLEM SELECTOR PLAN 1
e had placed a Long after dilation to stay a week. Long now out.  check how well / if pt is voiding with bladder scan / sono. ck urine C&S now that long is out. recall if further issues develop otherwise f/u [facility] clinic after d/c

## 2018-02-10 NOTE — PROGRESS NOTE ADULT - SUBJECTIVE AND OBJECTIVE BOX
pt had long placed a few days ago after urethral dilation. we requested it be left for 7 days.   hospitalist saw the patient earlier to day and decided te catheter had been in long enough so it was d/c'd as per my PA he has no knowledge of  being called to see if it was okay.    pt voided since but he was incontinent.    pt now curled in a ball asleep  and resistant to waking

## 2018-02-11 LAB
HCT VFR BLD CALC: 30.8 % — LOW (ref 42–52)
HGB BLD-MCNC: 10.3 G/DL — LOW (ref 14–18)
MCHC RBC-ENTMCNC: 32.6 PG — HIGH (ref 27–31)
MCHC RBC-ENTMCNC: 33.4 G/DL — SIGNIFICANT CHANGE UP (ref 32–37)
MCV RBC AUTO: 97.5 FL — HIGH (ref 80–94)
NRBC # BLD: 0 /100 WBCS — SIGNIFICANT CHANGE UP (ref 0–0)
PLATELET # BLD AUTO: 128 K/UL — LOW (ref 130–400)
RBC # BLD: 3.16 M/UL — LOW (ref 4.7–6.1)
RBC # FLD: 13.2 % — SIGNIFICANT CHANGE UP (ref 11.5–14.5)
WBC # BLD: 6.96 K/UL — SIGNIFICANT CHANGE UP (ref 4.8–10.8)
WBC # FLD AUTO: 6.96 K/UL — SIGNIFICANT CHANGE UP (ref 4.8–10.8)

## 2018-02-11 RX ORDER — HALOPERIDOL DECANOATE 100 MG/ML
0.5 INJECTION INTRAMUSCULAR
Qty: 0 | Refills: 0 | Status: DISCONTINUED | OUTPATIENT
Start: 2018-02-11 | End: 2018-02-13

## 2018-02-11 RX ADMIN — Medication 1 TABLET(S): at 16:46

## 2018-02-11 RX ADMIN — Medication 1 MILLIGRAM(S): at 06:19

## 2018-02-11 RX ADMIN — BUPROPION HYDROCHLORIDE 150 MILLIGRAM(S): 150 TABLET, EXTENDED RELEASE ORAL at 11:36

## 2018-02-11 RX ADMIN — SENNA PLUS 1 TABLET(S): 8.6 TABLET ORAL at 21:31

## 2018-02-11 RX ADMIN — MIRTAZAPINE 15 MILLIGRAM(S): 45 TABLET, ORALLY DISINTEGRATING ORAL at 21:30

## 2018-02-11 RX ADMIN — Medication 250 MILLIGRAM(S): at 06:20

## 2018-02-11 RX ADMIN — DIVALPROEX SODIUM 750 MILLIGRAM(S): 500 TABLET, DELAYED RELEASE ORAL at 21:31

## 2018-02-11 RX ADMIN — Medication 100 MICROGRAM(S): at 06:19

## 2018-02-11 RX ADMIN — Medication 250 MILLIGRAM(S): at 19:58

## 2018-02-11 RX ADMIN — Medication 1 TABLET(S): at 11:35

## 2018-02-11 RX ADMIN — DIVALPROEX SODIUM 750 MILLIGRAM(S): 500 TABLET, DELAYED RELEASE ORAL at 06:20

## 2018-02-11 RX ADMIN — DIVALPROEX SODIUM 750 MILLIGRAM(S): 500 TABLET, DELAYED RELEASE ORAL at 14:28

## 2018-02-11 RX ADMIN — Medication 1 MILLIGRAM(S): at 19:57

## 2018-02-11 NOTE — PROGRESS NOTE ADULT - SUBJECTIVE AND OBJECTIVE BOX
Patient is a 47y old  Male who presents with a chief complaint of B/L knee pain after falling down in group home prior to arrival landing on knees, currently unable to ambulate. Pt usually able to walk by himself, now refusing to walk.   2/7 Pt had over 900ccs of urine s/p void in bladder scan. S/p Uro intervention, long placed overnight 2/7, which yielded purulent drainage, urine and blood. Pt spiked fever of 102. UA, UC, BC, sent. Pt started on Saray 1g q8h 2/7 AM pending cultures.   2/8 overnight Pt spiked fever 104 with /62 and . Pt was given fluids and rectal tylenol. UA not recorded, UA and UC reordered.   Pt afebrile since.    Vital Signs Last 24 Hrs  T(C): 36.8 (11 Feb 2018 04:24), Max: 37.4 (10 Feb 2018 14:51)  T(F): 98.3 (11 Feb 2018 04:24), Max: 99.3 (10 Feb 2018 14:51)  HR: 73 (11 Feb 2018 04:24) (73 - 77)  BP: 95/57 (11 Feb 2018 04:24) (95/57 - 110/70)  BP(mean): --  RR: 16 (11 Feb 2018 04:24) (16 - 18)  SpO2: 100% (10 Feb 2018 14:51) (100% - 100%)  CAPILLARY BLOOD GLUCOSE    PAST MEDICAL & SURGICAL HISTORY:Anemia, Hypothyroid, intellectual disability, Schizophrenia, Seizure  Allergies phenothiazines, macrolides    MEDICATIONS  (STANDING):  benztropine 1 milliGRAM(s) Oral every 12 hours  buPROPion XL . 150 milliGRAM(s) Oral daily  ciprofloxacin     Tablet 250 milliGRAM(s) Oral two times a day  diVALproex  milliGRAM(s) Oral every 8 hours  haloperidol     Tablet 0.5 milliGRAM(s) Oral two times a day  lactobacillus acidophilus 1 Tablet(s) Oral two times a day with meals  levothyroxine 100 MICROGram(s) Oral daily  mirtazapine 15 milliGRAM(s) Oral at bedtime  multivitamin 1 Tablet(s) Oral daily  senna 1 Tablet(s) Oral at bedtime  sodium chloride 0.9% Bolus 500 milliLiter(s) IV Bolus once    MEDICATIONS  (PRN):  acetaminophen   Tablet 650 milliGRAM(s) Oral every 6 hours PRN For Temp greater than 38 C (100.4 F)  acetaminophen   Tablet. 650 milliGRAM(s) Oral every 6 hours PRN Severe Pain (7 - 10)  acetaminophen  Suppository 650 milliGRAM(s) Rectal every 6 hours PRN For Temp greater than 38 C (100.4 F)      Physical Exam:  General: NAD  HEENT: PERRLA,   Heart: RRR, S1, S2 noted,   Lungs: CTA b/l,   Abdomen: soft, non tender, non distended, + bowel sounds  Extremities: no C/C/E  Neuro: A&O x 2  Skin: No rashes    Labs:                        10.4   6.65  )-----------( 94       ( 10 Feb 2018 04:30 )             30.3     I&O's Summary    10 Feb 2018 07:01  -  11 Feb 2018 07:00  --------------------------------------------------------  IN: 0 mL / OUT: 452 mL / NET: -452 mL    A/P    1) Traumatic sepsis vs Complicated cystitis r/o  - pt afebrile x 24hrs+  -BC NGTD  -As per ID- transient sepsis related to trauma, d/c iv ABx and change to Vantin. Vantin not in pharmacy, decision made to switch to Cipro q12h for 7 days.  - Long d/c'ed 2/10 as per attending. OP f/u with Uro.  - BMP and CBC trending  -tylenol for fever PRN  -B12 WNL    2) Intellectual Disability  -behavioral interventions as necessary    3) Schizophrenia   -c/w home meds, Haldol lowered from 1mg q8h to 0.5 q12h as per attending. Monitor for signs of worsening psychosis.  -behavioral interventions as necessary     4)  Refusal to walk  -psychoeduation  -PT reeval pending     5. Dispo- back to group home once stable as most appropriate for someone with this pt's PMH

## 2018-02-11 NOTE — PROGRESS NOTE ADULT - SUBJECTIVE AND OBJECTIVE BOX
ALINA CHERY  [facility] T1-3A 012 A ([facility] T1-3A)            Patient was evaluated and examined  by bedside, awake today, asking to eat food, smiling today, remains afebrile, post d/c long yesterday, no post voidal residual, rechecked today with 21 ml, during bedside urinary  bladder scan by covering nurse.  Patient will be encouraged to ambulate today, with anticipated plan to be d/c to group home tomorrow                REVIEW OF SYSTEMS:  unable to obtain due to patients intellectual disabilities      T(C): , Max: 37.4 (02-10-18 @ 14:51)  HR: 73 (02-11-18 @ 04:24)  BP: 95/57 (02-11-18 @ 04:24)  RR: 16 (02-11-18 @ 04:24)  SpO2: 100% (02-10-18 @ 14:51)  CAPILLARY BLOOD GLUCOSE          PHYSICAL EXAM:  General: NAD, awake, patient is laying comfortably in bed  HEENT: AT, NC, Supple, NO JVD, NO CB  Lungs: CTA B/L, no wheezing, no rhonchi  CVS: normal S1, S2, RRR, NO M/G/R  Abdomen: soft, bowel sounds present, non-tender, non-distended  Extremities: no edema, no clubbing, no cyanosis, positive peripheral pulses b/l, peripheral muscle wasting present  : no penile or testicular edema  Neuro: patient functioning at his baseline, gait not tested  Skin: resolved pink rash on neck and upper chest area, no ecchymosis      LAB  CBC  Date: 02-10-18 @ 04:30  Mean cell Wsfrhfsiau48.5  Mean cell Hemoglobin Conc34.3  Mean cell Volum 97.7  Platelet count-Automate 94  RBC Count 3.10  Red Cell Distrib Width13.3  WBC Count6.65  % Albumin, Urine--  Hematocrit 30.3  Hemoglobin 10.4  CBC  Date: 02-09-18 @ 07:39  Mean cell Pkrkrzeyym79.1  Mean cell Hemoglobin Conc33.6  Mean cell Volum 98.7  Platelet count-Automate 87  RBC Count 3.11  Red Cell Distrib Width13.5  WBC Count7.38  % Albumin, Urine--  Hematocrit 30.7  Hemoglobin 10.3  CBC  Date: 02-08-18 @ 06:32  Mean cell Lnmpfqnecz72.3  Mean cell Hemoglobin Conc34.3  Mean cell Volum 97.1  Platelet count-Automate 95  RBC Count 3.45  Red Cell Distrib Width13.3  WBC Count9.59  % Albumin, Urine--  Hematocrit 33.5  Hemoglobin 11.5  CBC  Date: 02-07-18 @ 03:52  Mean cell Clfelupqdc05.2  Mean cell Hemoglobin Conc34.8  Mean cell Volum 95.2  Platelet count-Automate 126  RBC Count 3.74  Red Cell Distrib Width13.2  WBC Count8.85  % Albumin, Urine--  Hematocrit 35.6  Hemoglobin 12.4  CBC  Date: 02-06-18 @ 07:20  Mean cell Rhadzrzmcj68.7  Mean cell Hemoglobin Conc34.6  Mean cell Volum 97.3  Platelet count-Automate 129  RBC Count 3.65  Red Cell Distrib Width13.6  WBC Count8.61  % Albumin, Urine--  Hematocrit 35.5  Hemoglobin 12.3  CBC  Date: 02-05-18 @ 05:09  Mean cell Fyoxflnqzg51.3  Mean cell Hemoglobin Conc34.5  Mean cell Volum 96.6  Platelet count-Automate 150  RBC Count 3.57  Red Cell Distrib Width13.2  WBC Count9.27  % Albumin, Urine--  Hematocrit 34.5  Hemoglobin 11.9    [facility] [place] @ 07:39  Blood Gas Arterial-Calcium,Ionized--  Blood Urea Nitrogen, Serum 6 mg/dL<L> [10 - 20]  Carbon Dioxide, Serum31 mmol/L [17 - 32]  Chloride, Umzni481 mmol/L [98 - 110]  Creatinie, Serum0.7 mg/dL [0.7 - 1.5]  Glucose, Serum63 mg/dL<L> [70 - 110]  Potassium, Serum3.7 mmol/L [3.5 - 5.0]  Sodium, Serum 139 mmol/L [135 - 146]  [facility] [place]18 @ 06:32  Blood Gas Arterial-Calcium,Ionized--  Blood Urea Nitrogen, Serum 11 mg/dL [10 - 20]  Carbon Dioxide, Serum29 mmol/L [17 - 32]  Chloride, Eyjzz419 mmol/L [98 - 110]  Creatinie, Serum0.8 mg/dL [0.7 - 1.5]  Glucose, Serum73 mg/dL [70 - 110]  Potassium, Serum3.7 mmol/L [3.5 - 5.0]  Sodium, Serum 137 mmol/L [135 - 146]  [facility] [place]18 @ 03:52  Blood Gas Arterial-Calcium,Ionized--  Blood Urea Nitrogen, Serum 13 mg/dL [10 - 20]  Carbon Dioxide, Serum29 mmol/L [17 - 32]  Chloride, [id number] mmol/L [98 - 110]  Creatinie, Serum0.8 mg/dL [0.7 - 1.5]  Glucose, Serum87 mg/dL [70 - 110]  Potassium, Serum4.6 mmol/L [3.5 - 5.0]  Sodium, Serum 135 mmol/L [135 - 146]              Microbiology:    Culture - Urine (collected 02-08-18 @ 06:54)  Source: .Urine Clean Catch (Midstream)  Final Report (02-09-18 @ 12:18):    No growth    Culture - Blood (collected 02-07-18 @ 03:52)  Source: .Blood Blood  Preliminary Report (02-08-18 @ 15:01):    No growth to date.        RADIOLOGY & ADDITIONAL TESTS:        Medications:  acetaminophen   Tablet 650 milliGRAM(s) Oral every 6 hours PRN  acetaminophen   Tablet. 650 milliGRAM(s) Oral every 6 hours PRN  acetaminophen  Suppository 650 milliGRAM(s) Rectal every 6 hours PRN  benztropine 1 milliGRAM(s) Oral every 12 hours  buPROPion XL . 150 milliGRAM(s) Oral daily  ciprofloxacin     Tablet 250 milliGRAM(s) Oral two times a day  diVALproex  milliGRAM(s) Oral every 8 hours  haloperidol     Tablet 0.5 milliGRAM(s) Oral two times a day PRN  lactobacillus acidophilus 1 Tablet(s) Oral two times a day with meals  levothyroxine 100 MICROGram(s) Oral daily  mirtazapine 15 milliGRAM(s) Oral at bedtime  multivitamin 1 Tablet(s) Oral daily  senna 1 Tablet(s) Oral at bedtime        Assessment and Plan:    1) S/P FALL IN GROUP HOME  -CT of right hip, diffuse osteopenia, no acute fractures.   -Right knee x ray -no acute fractures  - pt remains afebrile  - blood cultures and urine cultures negative  -PT/OT tx . as tolerated, patient to be encouraged to participate with PT/OT tx.      2) Acute Urinary retention - patient has good urine output, renal/bladder  US- no acute obstruction, no hydronephrosis  - voiding trial was successful yesterday, patient urination w/o retention.   - patient will need outpatient  f/up  -? UTI although urine cxs negative, when long was placed, pus was noted. patient completing oral ciprofloxacin tx.     3) Intellectual Disability  -behavioral interventions as necessary    4) Schizophrenia   -decreased haldol dose, to avoid excessive sedation.  -behavioral interventions as necessary   - caregiver at bedside with 24 assistance    5)  Refusal to walk  -to encourage ambulation with PT/OT  tx.    6) Hypothyroidism- elevated TSH- 6.94    - f/up  free t3, free t 4, continue daily Synthroid supplementation    7) Macrocytic anemia- normal vit b 12 and folate levels, continue daily MVI supplementation, hemoglobin remains stable    8) Thrombocytopenia- platelets level remain stable, will need outpatient CBC monitoring    9) New onset rash ? post Cefepime tx., noted on 02/09/18 ,  resolved, continue clinical observation.    10) Dispo- back to group home on monday.

## 2018-02-12 LAB
CULTURE RESULTS: SIGNIFICANT CHANGE UP
HCT VFR BLD CALC: 30.5 % — LOW (ref 42–52)
HGB BLD-MCNC: 10.5 G/DL — LOW (ref 14–18)
MCHC RBC-ENTMCNC: 33.4 PG — HIGH (ref 27–31)
MCHC RBC-ENTMCNC: 34.4 G/DL — SIGNIFICANT CHANGE UP (ref 32–37)
MCV RBC AUTO: 97.1 FL — HIGH (ref 80–94)
NRBC # BLD: 0 /100 WBCS — SIGNIFICANT CHANGE UP (ref 0–0)
PLATELET # BLD AUTO: 142 K/UL — SIGNIFICANT CHANGE UP (ref 130–400)
RBC # BLD: 3.14 M/UL — LOW (ref 4.7–6.1)
RBC # FLD: 13.1 % — SIGNIFICANT CHANGE UP (ref 11.5–14.5)
SPECIMEN SOURCE: SIGNIFICANT CHANGE UP
WBC # BLD: 6.45 K/UL — SIGNIFICANT CHANGE UP (ref 4.8–10.8)
WBC # FLD AUTO: 6.45 K/UL — SIGNIFICANT CHANGE UP (ref 4.8–10.8)

## 2018-02-12 RX ADMIN — BUPROPION HYDROCHLORIDE 150 MILLIGRAM(S): 150 TABLET, EXTENDED RELEASE ORAL at 12:15

## 2018-02-12 RX ADMIN — MIRTAZAPINE 15 MILLIGRAM(S): 45 TABLET, ORALLY DISINTEGRATING ORAL at 21:14

## 2018-02-12 RX ADMIN — Medication 1 MILLIGRAM(S): at 05:30

## 2018-02-12 RX ADMIN — Medication 1 MILLIGRAM(S): at 17:38

## 2018-02-12 RX ADMIN — Medication 1 TABLET(S): at 17:38

## 2018-02-12 RX ADMIN — DIVALPROEX SODIUM 750 MILLIGRAM(S): 500 TABLET, DELAYED RELEASE ORAL at 05:47

## 2018-02-12 RX ADMIN — Medication 1 TABLET(S): at 12:15

## 2018-02-12 RX ADMIN — SENNA PLUS 1 TABLET(S): 8.6 TABLET ORAL at 21:14

## 2018-02-12 RX ADMIN — Medication 1 TABLET(S): at 08:51

## 2018-02-12 RX ADMIN — Medication 100 MICROGRAM(S): at 05:30

## 2018-02-12 RX ADMIN — DIVALPROEX SODIUM 750 MILLIGRAM(S): 500 TABLET, DELAYED RELEASE ORAL at 17:38

## 2018-02-12 RX ADMIN — DIVALPROEX SODIUM 750 MILLIGRAM(S): 500 TABLET, DELAYED RELEASE ORAL at 21:17

## 2018-02-12 RX ADMIN — Medication 250 MILLIGRAM(S): at 05:30

## 2018-02-12 NOTE — PROGRESS NOTE ADULT - ATTENDING COMMENTS
Patient was evaluated and examined by bedside, tolerating diet well, remains afebrile, no dysuria, no urinary retention, patient refused to walk.  All labs, radiology studies, VS was reviewed  GENERAL: NAD, awake, patient is sitting comfortably in a chair  HEENT: AT, NC, PERRLA, SUPPLE, NO JVD, NO CB  LUNG: CTA B/L  CVS: normal S1, S2, RRR, NO M/G/R  ABDOMEN: soft, bowel sounds present, normoactive in all 4 quadrants, non-tender, non-distended  EXT: no E/C/C, positive PP b/l extremities  NEURO: no acute focal neurological deficits, gait not tested  SKIN: no rash, no ecchymosis    I agree with medical plan outlined by Medical resident as stated above. Patient is awaiting to be d/c ? to STR prior to d/c to group home due to physical deconditioning with ambulatory dysfunction, for PT/OT  tx.  continue current medical regimen tx.
1) Mild Mental Reatardation  2) Schizophrenia   3)  Refusal to walk    Just a behavioral issue. No intervention per psych. At one point in time during this admission did take a few steps. Will only walk further when he wishes to. Jose take a lot of convincing like a child. Unable to achieve that as of now. Best chances will be in his familiar surroundings.     Today again he stood up but didn't show the will to walk. Took a few steps with his knees bent. Per his RN at his Group Home, [facility], he will have to go to STR. Doing JOSUE now.         4) Hyponatremia : Was just a one time thing on 2/5. Better today.     5) Acute Urinary Retention today : PVR >900cc on Bladder Sono.   Urology found a likely Urethral Stricture.  Dilated at bedside and now s/p long by urology.   Keep the long for around 1 week then TOV. f/u Urology.       Will F/u
Patient was evaluated and examined by bedside, more awake today, tolerated breakfast well, as per caregiver report, long draining clear yellow urine. All labs, radiology studies, VS was reviewed, on physical exam- no pulmonary congestion, cvs: normal S1,S2, RRR, abdomen is soft, bs present, nt, nd, ext: no e/c/c, neuro: awake, non-verbal  I agree with medical plan outlined by Medical resident, with additional recommendations:  continue IVF hydration, f/up urine cxs and blood cxs, obtain portable CXR, f/up CBC, BMP  in am  to prevent lethargic state - decrease haldol dose and review valproic acid levels.  continue to encourage po intake.   patient is not medically stable for d/c.
Patient was evaluated and examined by bedside, tolerating diet well, as per caregiver report- patient had good appetite, ate breakfast, noted to have mild dry cough, no fever, no N/V/D  All labs, radiology studies, VS was reviewed  on physical exam: HEENT: at,nc, supple, no JVD. Skin: non-raised pink colored rash noted on  neck /upper chest/upper back area  Lungs: cta b/l, CVS: normal S1, S2, RRR , ABD: soft, bs present, nt,nd  EXT: peripheral muscle wasting present, no edema, positive peripheral pulses b/l  I have reviewed medical plan outlined by medical resident with additional recommendations:  -new onset skin rash -continue to observe, if remains persistent consider ? allergy to ciprofloxacin tx.  - new onset thrombocytopenia- d/c lovenox, send hit, start on manual venodyne boots compressions b/l for dvt proph.  - anemia- f/up iron/tibc, folate, vit b 12 levels, started on daily mvi suppelment  -h/o MR,seizures- resumed on home regimen tx.  patient is not medically stable for d/c today.

## 2018-02-12 NOTE — CHART NOTE - NSCHARTNOTEFT_GEN_A_CORE
Registered Dietitian Limited Follow-Up     Pt stable for discharge to group home as behavioral issue, but group home insisting pt go rehab/ stay in hospital. Regular diet with good appetite/intake - 100% of meals and snacks per pt health aide at bedside. Health aide denies GI distress/issues chewing swallowing. Last BM 2/11. Skin intact. BS =15.  No new weights to assess. No nutrition interventions at this time. Registered Dietitian Limited Follow-Up     Pt stable for discharge to group home as behavioral issue, but group home insisting pt go rehab/ stay in hospital. Regular diet with good appetite/intake - 100% of meals and snacks per pt health aide at bedside. Health aide denies GI distress/issues chewing swallowing. Last BM 2/11. Skin intact. BS =15. Labs and meds reviewed. No new weights to assess. No nutrition interventions at this time.

## 2018-02-12 NOTE — PROGRESS NOTE ADULT - SUBJECTIVE AND OBJECTIVE BOX
Patient is a 47y old  Male who presents with a chief complaint of inability to ambulate (05 Feb 2018 10:19)      PAST MEDICAL & SURGICAL HISTORY:      MEDICATIONS  (STANDING):  benztropine 1 milliGRAM(s) Oral every 12 hours  buPROPion XL . 150 milliGRAM(s) Oral daily  diVALproex  milliGRAM(s) Oral every 8 hours  lactobacillus acidophilus 1 Tablet(s) Oral two times a day with meals  levothyroxine 100 MICROGram(s) Oral daily  mirtazapine 15 milliGRAM(s) Oral at bedtime  multivitamin 1 Tablet(s) Oral daily  senna 1 Tablet(s) Oral at bedtime    MEDICATIONS  (PRN):  acetaminophen   Tablet 650 milliGRAM(s) Oral every 6 hours PRN For Temp greater than 38 C (100.4 F)  acetaminophen   Tablet. 650 milliGRAM(s) Oral every 6 hours PRN Severe Pain (7 - 10)  acetaminophen  Suppository 650 milliGRAM(s) Rectal every 6 hours PRN For Temp greater than 38 C (100.4 F)  haloperidol     Tablet 0.5 milliGRAM(s) Oral two times a day PRN agitation      Allergy:  erythromycin (Other)  phenothiazines (Unknown)      Overnight events:    Vital Signs Last 24 Hrs  T(C): 36.6 (12 Feb 2018 04:20), Max: 36.6 (12 Feb 2018 04:20)  T(F): 97.9 (12 Feb 2018 04:20), Max: 97.9 (12 Feb 2018 04:20)  HR: 67 (12 Feb 2018 04:20) (67 - 85)  BP: 112/71 (12 Feb 2018 04:20) (91/53 - 112/71)  BP(mean): --  RR: 16 (12 Feb 2018 04:20) (16 - 18)  SpO2: --  CAPILLARY BLOOD GLUCOSE      Physical Exam:    -     General :     -      HEENT:    -      Cardiac:    -      Pulm:    -      GI:    -      Musculoskeletal:    -      Neuro:    Labs:                        10.3   6.96  )-----------( 128      ( 11 Feb 2018 07:40 )             30.8                                         Imaging:    A/P    1) Traumatic sepsis vs Complicated cystitis r/o  - pt afebrile   -BC NGTD  -As per ID- transient sepsis related to trauma, d/c iv ABx and change to Vantin. Vantin not in pharmacy, decision made to switch to Cipro q12h for 7 days.  - Birdie d/c'ed 2/10 as per attending. OP f/u with Uro.  - BMP and CBC trending  -tylenol for fever PRN  -B12 WNL    2) Intellectual Disability  -behavioral interventions as necessary    3) Schizophrenia   -c/w home meds, Haldol lowered from 1mg q8h to 0.5 q12h as per attending. Monitor for signs of worsening psychosis.  -behavioral interventions as necessary     4)  Refusal to walk  -psychoeduation  -PT reeval pending     5. Dispo- back to group home once stable as most appropriate for someone with this pt's PMH Patient is a 47y old  Male who presents with a chief complaint of inability to ambulate (05 Feb 2018 10:19)      PAST MEDICAL & SURGICAL HISTORY:      MEDICATIONS  (STANDING):  benztropine 1 milliGRAM(s) Oral every 12 hours  buPROPion XL . 150 milliGRAM(s) Oral daily  diVALproex  milliGRAM(s) Oral every 8 hours  lactobacillus acidophilus 1 Tablet(s) Oral two times a day with meals  levothyroxine 100 MICROGram(s) Oral daily  mirtazapine 15 milliGRAM(s) Oral at bedtime  multivitamin 1 Tablet(s) Oral daily  senna 1 Tablet(s) Oral at bedtime    MEDICATIONS  (PRN):  acetaminophen   Tablet 650 milliGRAM(s) Oral every 6 hours PRN For Temp greater than 38 C (100.4 F)  acetaminophen   Tablet. 650 milliGRAM(s) Oral every 6 hours PRN Severe Pain (7 - 10)  acetaminophen  Suppository 650 milliGRAM(s) Rectal every 6 hours PRN For Temp greater than 38 C (100.4 F)  haloperidol     Tablet 0.5 milliGRAM(s) Oral two times a day PRN agitation      Allergy:  erythromycin (Other)  phenothiazines (Unknown)      Overnight events:    Vital Signs Last 24 Hrs  T(C): 36.6 (12 Feb 2018 04:20), Max: 36.6 (12 Feb 2018 04:20)  T(F): 97.9 (12 Feb 2018 04:20), Max: 97.9 (12 Feb 2018 04:20)  HR: 67 (12 Feb 2018 04:20) (67 - 85)  BP: 112/71 (12 Feb 2018 04:20) (91/53 - 112/71)  BP(mean): --  RR: 16 (12 Feb 2018 04:20) (16 - 18)  SpO2: --    Physical Exam:    -     General : NAD 	     -      HEENT: WNL    -      Cardiac: Regular S1/S2 No M/R/G    -      Pulm: CTABL      Labs:                        10.3   6.96  )-----------( 128      ( 11 Feb 2018 07:40 )             30.8               A/P    1) Traumatic sepsis vs Complicated cystitis r/o  - pt afebrile   -BC NGTD Urine culture   -As per ID- transient sepsis related to trauma, d/c iv ABx   - Birdie d/c'ed 2/10 as per attending. OP f/u with Uro.  -tylenol for fever PRN    2) Intellectual Disability  -behavioral interventions as necessary    3) Schizophrenia   -c/w home meds, Haldol lowered from 1mg q8h to 0.5 q12h as per attending. Monitor for signs of worsening psychosis.  -behavioral interventions as necessary     4)  Refusal to walk  -psychoeduation  -PT reevaluation     5. Dispo- back to group home once stable as most appropriate for someone with this pt's PMH

## 2018-02-13 ENCOUNTER — APPOINTMENT (OUTPATIENT)
Dept: PODIATRY | Facility: HOSPITAL | Age: 48
End: 2018-02-13

## 2018-02-13 VITALS
HEART RATE: 86 BPM | DIASTOLIC BLOOD PRESSURE: 78 MMHG | SYSTOLIC BLOOD PRESSURE: 120 MMHG | RESPIRATION RATE: 18 BRPM | OXYGEN SATURATION: 97 % | TEMPERATURE: 97 F

## 2018-02-13 LAB
HCT VFR BLD CALC: 33.3 % — LOW (ref 42–52)
HGB BLD-MCNC: 11.1 G/DL — LOW (ref 14–18)
MCHC RBC-ENTMCNC: 33 PG — HIGH (ref 27–31)
MCHC RBC-ENTMCNC: 33.3 G/DL — SIGNIFICANT CHANGE UP (ref 32–37)
MCV RBC AUTO: 99.1 FL — HIGH (ref 80–94)
NRBC # BLD: 0 /100 WBCS — SIGNIFICANT CHANGE UP (ref 0–0)
PLATELET # BLD AUTO: 189 K/UL — SIGNIFICANT CHANGE UP (ref 130–400)
RBC # BLD: 3.36 M/UL — LOW (ref 4.7–6.1)
RBC # FLD: 13.3 % — SIGNIFICANT CHANGE UP (ref 11.5–14.5)
WBC # BLD: 6.85 K/UL — SIGNIFICANT CHANGE UP (ref 4.8–10.8)
WBC # FLD AUTO: 6.85 K/UL — SIGNIFICANT CHANGE UP (ref 4.8–10.8)

## 2018-02-13 RX ORDER — HALOPERIDOL DECANOATE 100 MG/ML
0 INJECTION INTRAMUSCULAR
Qty: 0 | Refills: 0 | COMMUNITY

## 2018-02-13 RX ORDER — HALOPERIDOL DECANOATE 100 MG/ML
1 INJECTION INTRAMUSCULAR
Qty: 0 | Refills: 0 | COMMUNITY
Start: 2018-02-13

## 2018-02-13 RX ADMIN — BUPROPION HYDROCHLORIDE 150 MILLIGRAM(S): 150 TABLET, EXTENDED RELEASE ORAL at 11:45

## 2018-02-13 RX ADMIN — DIVALPROEX SODIUM 750 MILLIGRAM(S): 500 TABLET, DELAYED RELEASE ORAL at 13:57

## 2018-02-13 RX ADMIN — Medication 1 TABLET(S): at 08:44

## 2018-02-13 RX ADMIN — Medication 1 MILLIGRAM(S): at 17:41

## 2018-02-13 RX ADMIN — Medication 1 MILLIGRAM(S): at 05:17

## 2018-02-13 RX ADMIN — Medication 100 MICROGRAM(S): at 05:17

## 2018-02-13 RX ADMIN — Medication 1 TABLET(S): at 17:41

## 2018-02-13 RX ADMIN — DIVALPROEX SODIUM 750 MILLIGRAM(S): 500 TABLET, DELAYED RELEASE ORAL at 05:20

## 2018-02-13 RX ADMIN — Medication 1 TABLET(S): at 11:45

## 2018-02-13 NOTE — PROGRESS NOTE ADULT - SUBJECTIVE AND OBJECTIVE BOX
ALINA CHERY  [facility] T1-3A 012 A ([facility] T1-3A)            Patient was evaluated and examined  by bedside, tolerating diet well, remains afebrile, awaiting to be d/c to SN today          REVIEW OF SYSTEMS:  unable to obtain due to patient's intellectual disability      T(C): , Max: 36.1 (02-13-18 @ 06:04)  HR: 71 (02-13-18 @ 06:04)  BP: 127/85 (02-13-18 @ 06:04)  RR: 18 (02-13-18 @ 06:04)  SpO2: --  CAPILLARY BLOOD GLUCOSE  87 (12 Feb 2018 11:29)          PHYSICAL EXAM:  GENERAL: NAD, awake, patient is sitting comfortably in a chair  HEENT: AT, NC, PERRLA, SUPPLE, NO JVD, NO CB  LUNG: CTA B/L  CVS: normal S1, S2, RRR, NO M/G/R  ABDOMEN: soft, bowel sounds present, normoactive in all 4 quadrants, non-tender, non-distended  EXT: no E/C/C, positive PP b/l extremities  NEURO: no acute focal neurological deficits, gait not tested  SKIN: no rash, no ecchymosis      LAB  CBC  Date: 02-13-18 @ 07:15  Mean cell Qzojoupdmw80.0  Mean cell Hemoglobin Conc33.3  Mean cell Volum 99.1  Platelet count-Automate 189  RBC Count 3.36  Red Cell Distrib Width13.3  WBC Count6.85  % Albumin, Urine--  Hematocrit 33.3  Hemoglobin 11.1  CBC  Date: 02-12-18 @ 07:19  Mean cell Qssgqyvmgl59.4  Mean cell Hemoglobin Conc34.4  Mean cell Volum 97.1  Platelet count-Automate 142  RBC Count 3.14  Red Cell Distrib Width13.1  WBC Count6.45  % Albumin, Urine--  Hematocrit 30.5  Hemoglobin 10.5  CBC  Date: 02-11-18 @ 07:40  Mean cell Wmuybuqblq09.6  Mean cell Hemoglobin Conc33.4  Mean cell Volum 97.5  Platelet count-Automate 128  RBC Count 3.16  Red Cell Distrib Width13.2  WBC Count6.96  % Albumin, Urine--  Hematocrit 30.8  Hemoglobin 10.3  CBC  Date: 02-10-18 @ 04:30  Mean cell Dtpvrwgnsb99.5  Mean cell Hemoglobin Conc34.3  Mean cell Volum 97.7  Platelet count-Automate 94  RBC Count 3.10  Red Cell Distrib Width13.3  WBC Count6.65  % Albumin, Urine--  Hematocrit 30.3  Hemoglobin 10.4  CBC  Date: 02-09-18 @ 07:39  Mean cell Vjyaymyymj86.1  Mean cell Hemoglobin Conc33.6  Mean cell Volum 98.7  Platelet count-Automate 87  RBC Count 3.11  Red Cell Distrib Width13.5  WBC Count7.38  % Albumin, Urine--  Hematocrit 30.7  Hemoglobin 10.3  CBC  Date: 02-08-18 @ 06:32  Mean cell Owpdohypuf85.3  Mean cell Hemoglobin Conc34.3  Mean cell Volum 97.1  Platelet count-Automate 95  RBC Count 3.45  Red Cell Distrib Width13.3  WBC Count9.59  % Albumin, Urine--  Hematocrit 33.5  Hemoglobin 11.5  CBC  Date: 02-07-18 @ 03:52  Mean cell Oohepvknal07.2  Mean cell Hemoglobin Conc34.8  Mean cell Volum 95.2  Platelet count-Automate 126  RBC Count 3.74  Red Cell Distrib Width13.2  WBC Count8.85  % Albumin, Urine--  Hematocrit 35.6  Hemoglobin 12.4    BMP  02-09-18 @ 07:39  Blood Gas Arterial-Calcium,Ionized--  Blood Urea Nitrogen, Serum 6 mg/dL<L> [10 - 20]  Carbon Dioxide, Serum31 mmol/L [17 - 32]  Chloride, Cusua489 mmol/L [98 - 110]  Creatinie, Serum0.7 mg/dL [0.7 - 1.5]  Glucose, Serum63 mg/dL<L> [70 - 110]  Potassium, Serum3.7 mmol/L [3.5 - 5.0]  Sodium, Serum 139 mmol/L [135 - 146]              Microbiology:    Culture - Urine (collected 02-08-18 @ 06:54)  Source: .Urine Clean Catch (Midstream)  Final Report (02-09-18 @ 12:18):    No growth    Culture - Blood (collected 02-07-18 @ 03:52)  Source: .Blood Blood  Final Report (02-12-18 @ 15:00):    No growth at 5 days.        Medications:  acetaminophen   Tablet 650 milliGRAM(s) Oral every 6 hours PRN  acetaminophen   Tablet. 650 milliGRAM(s) Oral every 6 hours PRN  acetaminophen  Suppository 650 milliGRAM(s) Rectal every 6 hours PRN  benztropine 1 milliGRAM(s) Oral every 12 hours  buPROPion XL . 150 milliGRAM(s) Oral daily  diVALproex  milliGRAM(s) Oral every 8 hours  haloperidol     Tablet 0.5 milliGRAM(s) Oral two times a day PRN  lactobacillus acidophilus 1 Tablet(s) Oral two times a day with meals  levothyroxine 100 MICROGram(s) Oral daily  mirtazapine 15 milliGRAM(s) Oral at bedtime  multivitamin 1 Tablet(s) Oral daily  senna 1 Tablet(s) Oral at bedtime        Assessment and Plan:  1) S/P FALL IN GROUP HOME  -CT of right hip, diffuse osteopenia, no acute fractures.   -Right knee x ray -no acute fractures  - pt remains afebrile  - blood cultures and urine cultures negative  -PT/OT tx . as tolerated, patient to be encouraged to participate with PT/OT tx.      2) Acute Urinary retention - patient has good urine output, renal/bladder  US- no acute obstruction, no hydronephrosis  - voiding trial was successful, patient urination w/o retention.   - patient will need outpatient  f/up  -? UTI although urine cxs negative, when long was placed, pus was noted. patient completed oral ciprofloxacin tx.     3) Intellectual Disability  -behavioral interventions as necessary    4) Schizophrenia   -decreased haldol dose, to avoid excessive sedation.  -behavioral interventions as necessary   - caregiver at bedside with 24 assistance    5)  Refusal to walk  -to encourage ambulation with PT/OT  tx.    6) Hypothyroidism- elevated TSH- 6.94    -  continue daily Synthroid supplementation    7) Macrocytic anemia- normal vit b 12 and folate levels, continue daily MVI supplementation, hemoglobin remains stable    8) Thrombocytopenia- platelets level remain stable    9) New onset rash ? post Cefepime tx., noted on 02/09/18 ,  resolved    10) Dispo- d/c to [facility] level of care, for PT/OT therapy prior to d/c to group home

## 2018-02-14 LAB
IRON SATN MFR SERPL: 18 % — SIGNIFICANT CHANGE UP (ref 16–55)
IRON SATN MFR SERPL: 41 UG/DL — LOW (ref 45–165)
TIBC SERPL-MCNC: 227 UG/DL — SIGNIFICANT CHANGE UP (ref 220–430)
UIBC SERPL-MCNC: 186 UG/DL — SIGNIFICANT CHANGE UP (ref 110–370)

## 2018-02-15 ENCOUNTER — OUTPATIENT (OUTPATIENT)
Dept: OUTPATIENT SERVICES | Facility: HOSPITAL | Age: 48
LOS: 1 days | Discharge: HOME | End: 2018-02-15

## 2018-02-15 DIAGNOSIS — M25.551 PAIN IN RIGHT HIP: ICD-10-CM

## 2018-02-15 DIAGNOSIS — E03.9 HYPOTHYROIDISM, UNSPECIFIED: ICD-10-CM
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
## 2018-02-15 DIAGNOSIS — R26.2 DIFFICULTY IN WALKING, NOT ELSEWHERE CLASSIFIED: ICD-10-CM

## 2018-02-15 DIAGNOSIS — M81.8 OTHER OSTEOPOROSIS WITHOUT CURRENT PATHOLOGICAL FRACTURE: ICD-10-CM

## 2018-02-15 DIAGNOSIS — F70 MILD INTELLECTUAL DISABILITIES: ICD-10-CM

## 2018-02-15 DIAGNOSIS — A41.89 OTHER SPECIFIED SEPSIS: ICD-10-CM

## 2018-02-15 DIAGNOSIS — M25.562 PAIN IN LEFT KNEE: ICD-10-CM

## 2018-02-15 DIAGNOSIS — Z88.1 ALLERGY STATUS TO OTHER ANTIBIOTIC AGENTS STATUS: ICD-10-CM

## 2018-02-15 DIAGNOSIS — F20.9 SCHIZOPHRENIA, UNSPECIFIED: ICD-10-CM

## 2018-02-15 DIAGNOSIS — G40.909 EPILEPSY, UNSPECIFIED, NOT INTRACTABLE, WITHOUT STATUS EPILEPTICUS: ICD-10-CM

## 2018-02-15 DIAGNOSIS — D69.6 THROMBOCYTOPENIA, UNSPECIFIED: ICD-10-CM

## 2018-02-15 DIAGNOSIS — R21 RASH AND OTHER NONSPECIFIC SKIN ERUPTION: ICD-10-CM

## 2018-02-15 DIAGNOSIS — N35.9 URETHRAL STRICTURE, UNSPECIFIED: ICD-10-CM
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
## 2018-02-15 DIAGNOSIS — R33.8 OTHER RETENTION OF URINE: ICD-10-CM

## 2018-02-15 DIAGNOSIS — N30.91 CYSTITIS, UNSPECIFIED WITH HEMATURIA: ICD-10-CM

## 2018-02-15 DIAGNOSIS — D64.9 ANEMIA, UNSPECIFIED: ICD-10-CM

## 2018-02-15 DIAGNOSIS — F63.9 IMPULSE DISORDER, UNSPECIFIED: ICD-10-CM

## 2018-02-15 DIAGNOSIS — M25.561 PAIN IN RIGHT KNEE: ICD-10-CM

## 2018-02-15 DIAGNOSIS — E87.1 HYPO-OSMOLALITY AND HYPONATREMIA: ICD-10-CM
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
## 2018-02-16 DIAGNOSIS — R33.8 OTHER RETENTION OF URINE: ICD-10-CM

## 2018-02-16 DIAGNOSIS — F79 UNSPECIFIED INTELLECTUAL DISABILITIES: ICD-10-CM

## 2018-02-16 DIAGNOSIS — F20.3 UNDIFFERENTIATED SCHIZOPHRENIA: ICD-10-CM

## 2018-02-20 ENCOUNTER — OUTPATIENT (OUTPATIENT)
Dept: OUTPATIENT SERVICES | Facility: HOSPITAL | Age: 48
LOS: 1 days | Discharge: HOME | End: 2018-02-20

## 2018-02-20 ENCOUNTER — EMERGENCY (EMERGENCY)
Facility: HOSPITAL | Age: 48
LOS: 0 days | Discharge: NURSING, REHAB OR LONG-TERM CARE | End: 2018-02-20
Attending: EMERGENCY MEDICINE | Admitting: INTERNAL MEDICINE

## 2018-02-20 VITALS
HEIGHT: 67 IN | WEIGHT: 139.99 LBS | TEMPERATURE: 97 F | OXYGEN SATURATION: 97 % | DIASTOLIC BLOOD PRESSURE: 87 MMHG | HEART RATE: 78 BPM | RESPIRATION RATE: 18 BRPM | SYSTOLIC BLOOD PRESSURE: 117 MMHG

## 2018-02-20 DIAGNOSIS — Z79.899 OTHER LONG TERM (CURRENT) DRUG THERAPY: ICD-10-CM

## 2018-02-20 DIAGNOSIS — R33.9 RETENTION OF URINE, UNSPECIFIED: ICD-10-CM

## 2018-02-20 DIAGNOSIS — F20.9 SCHIZOPHRENIA, UNSPECIFIED: ICD-10-CM

## 2018-02-20 DIAGNOSIS — Z88.1 ALLERGY STATUS TO OTHER ANTIBIOTIC AGENTS STATUS: ICD-10-CM

## 2018-02-20 DIAGNOSIS — F31.30 BIPOLAR DISORDER, CURRENT EPISODE DEPRESSED, MILD OR MODERATE SEVERITY, UNSPECIFIED: ICD-10-CM

## 2018-02-20 DIAGNOSIS — F20.3 UNDIFFERENTIATED SCHIZOPHRENIA: ICD-10-CM

## 2018-02-20 DIAGNOSIS — E03.9 HYPOTHYROIDISM, UNSPECIFIED: ICD-10-CM

## 2018-02-20 DIAGNOSIS — F79 UNSPECIFIED INTELLECTUAL DISABILITIES: ICD-10-CM

## 2018-02-20 DIAGNOSIS — Z88.8 ALLERGY STATUS TO OTHER DRUGS, MEDICAMENTS AND BIOLOGICAL SUBSTANCES STATUS: ICD-10-CM

## 2018-02-20 DIAGNOSIS — R33.8 OTHER RETENTION OF URINE: ICD-10-CM

## 2018-02-20 NOTE — ED PROVIDER NOTE - ATTENDING CONTRIBUTION TO CARE
Pt is a 48yo male with schizophrenia who was sent by NH due to Urology PA unable to place Long catheter today.  No complaints.  NH requesting no labs just long placement.    Exam: soft abomdne, no CVA tenderness, cap refill <2s, normal penis and testicles  Imp: chronic retention  Plan: long placement

## 2018-02-20 NOTE — ED PROVIDER NOTE - PHYSICAL EXAMINATION
Vital Signs: I have reviewed the initial vital signs.  Constitutional: well-nourished, appears stated age, no acute distress  Cardiovascular: regular rate, regular rhythm, well-perfused extremities  Respiratory: unlabored respiratory effort, clear to auscultation bilaterally  Gastrointestinal: soft, non-tender abdomen, no pulsatile mass

## 2018-02-20 NOTE — ED PROVIDER NOTE - OBJECTIVE STATEMENT
46 y/o male presents for long catheder placement . patient had difficulty at nursing home with placing long . no fever,chills, abdominal pain

## 2018-02-20 NOTE — ED ADULT NURSE NOTE - PMH
Anemia    Bipolar 1 disorder    Constipation    Dyspepsia    Epilepsy    Hypothyroidism    Major depressive disorder    Schizophrenia    Urinary retention

## 2018-02-22 ENCOUNTER — OUTPATIENT (OUTPATIENT)
Dept: OUTPATIENT SERVICES | Facility: HOSPITAL | Age: 48
LOS: 1 days | Discharge: HOME | End: 2018-02-22

## 2018-02-24 DIAGNOSIS — F20.3 UNDIFFERENTIATED SCHIZOPHRENIA: ICD-10-CM

## 2018-02-24 DIAGNOSIS — G40.919 EPILEPSY, UNSPECIFIED, INTRACTABLE, WITHOUT STATUS EPILEPTICUS: ICD-10-CM

## 2018-02-26 DIAGNOSIS — M85.88 OTHER SPECIFIED DISORDERS OF BONE DENSITY AND STRUCTURE, OTHER SITE: ICD-10-CM

## 2018-02-26 DIAGNOSIS — N30.81 OTHER CYSTITIS WITH HEMATURIA: ICD-10-CM

## 2018-02-26 DIAGNOSIS — T81.4XXA INFECTION FOLLOWING A PROCEDURE, INITIAL ENCOUNTER: ICD-10-CM

## 2018-02-26 DIAGNOSIS — S89.81XA OTHER SPECIFIED INJURIES OF RIGHT LOWER LEG, INITIAL ENCOUNTER: ICD-10-CM
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
## 2018-02-26 DIAGNOSIS — S89.82XA OTHER SPECIFIED INJURIES OF LEFT LOWER LEG, INITIAL ENCOUNTER: ICD-10-CM

## 2018-02-26 DIAGNOSIS — T36.1X5A ADVERSE EFFECT OF CEPHALOSPORINS AND OTHER BETA-LACTAM ANTIBIOTICS, INITIAL ENCOUNTER: ICD-10-CM

## 2018-02-26 DIAGNOSIS — S79.811A OTHER SPECIFIED INJURIES OF RIGHT HIP, INITIAL ENCOUNTER: ICD-10-CM

## 2018-02-26 DIAGNOSIS — R33.9 RETENTION OF URINE, UNSPECIFIED: ICD-10-CM

## 2018-02-26 DIAGNOSIS — N39.498 OTHER SPECIFIED URINARY INCONTINENCE: ICD-10-CM

## 2018-03-07 ENCOUNTER — APPOINTMENT (OUTPATIENT)
Dept: OTOLARYNGOLOGY | Facility: CLINIC | Age: 48
End: 2018-03-07

## 2018-04-12 ENCOUNTER — APPOINTMENT (OUTPATIENT)
Dept: UROLOGY | Facility: CLINIC | Age: 48
End: 2018-04-12
Payer: MEDICAID

## 2018-04-12 ENCOUNTER — OUTPATIENT (OUTPATIENT)
Dept: OUTPATIENT SERVICES | Facility: HOSPITAL | Age: 48
LOS: 1 days | Discharge: HOME | End: 2018-04-12

## 2018-04-12 VITALS
RESPIRATION RATE: 16 BRPM | DIASTOLIC BLOOD PRESSURE: 80 MMHG | OXYGEN SATURATION: 98 % | WEIGHT: 128 LBS | HEART RATE: 75 BPM | SYSTOLIC BLOOD PRESSURE: 100 MMHG | BODY MASS INDEX: 19.4 KG/M2 | HEIGHT: 68 IN

## 2018-04-12 PROCEDURE — 99213 OFFICE O/P EST LOW 20 MIN: CPT

## 2018-04-23 ENCOUNTER — OUTPATIENT (OUTPATIENT)
Dept: OUTPATIENT SERVICES | Facility: HOSPITAL | Age: 48
LOS: 1 days | Discharge: HOME | End: 2018-04-23

## 2018-04-23 DIAGNOSIS — R33.9 RETENTION OF URINE, UNSPECIFIED: ICD-10-CM

## 2018-05-08 ENCOUNTER — LABORATORY RESULT (OUTPATIENT)
Age: 48
End: 2018-05-08

## 2018-05-08 ENCOUNTER — APPOINTMENT (OUTPATIENT)
Dept: NEUROLOGY | Facility: CLINIC | Age: 48
End: 2018-05-08

## 2018-05-08 ENCOUNTER — OUTPATIENT (OUTPATIENT)
Dept: OUTPATIENT SERVICES | Facility: HOSPITAL | Age: 48
LOS: 1 days | Discharge: HOME | End: 2018-05-08

## 2018-05-08 VITALS — DIASTOLIC BLOOD PRESSURE: 77 MMHG | SYSTOLIC BLOOD PRESSURE: 109 MMHG | HEART RATE: 80 BPM

## 2018-05-15 ENCOUNTER — OUTPATIENT (OUTPATIENT)
Dept: OUTPATIENT SERVICES | Facility: HOSPITAL | Age: 48
LOS: 1 days | Discharge: HOME | End: 2018-05-15

## 2018-05-15 ENCOUNTER — APPOINTMENT (OUTPATIENT)
Dept: PODIATRY | Facility: HOSPITAL | Age: 48
End: 2018-05-15
Payer: MEDICAID

## 2018-05-15 DIAGNOSIS — F70 MILD INTELLECTUAL DISABILITIES: ICD-10-CM

## 2018-05-15 DIAGNOSIS — B35.1 TINEA UNGUIUM: ICD-10-CM

## 2018-05-15 PROCEDURE — 11721 DEBRIDE NAIL 6 OR MORE: CPT | Mod: NC

## 2018-05-24 ENCOUNTER — OUTPATIENT (OUTPATIENT)
Dept: OUTPATIENT SERVICES | Facility: HOSPITAL | Age: 48
LOS: 1 days | Discharge: HOME | End: 2018-05-24

## 2018-05-24 ENCOUNTER — APPOINTMENT (OUTPATIENT)
Dept: UROLOGY | Facility: CLINIC | Age: 48
End: 2018-05-24
Payer: MEDICAID

## 2018-05-24 VITALS
SYSTOLIC BLOOD PRESSURE: 117 MMHG | DIASTOLIC BLOOD PRESSURE: 83 MMHG | WEIGHT: 134 LBS | BODY MASS INDEX: 20.31 KG/M2 | HEART RATE: 98 BPM | HEIGHT: 68 IN

## 2018-05-24 PROCEDURE — 99213 OFFICE O/P EST LOW 20 MIN: CPT

## 2018-05-29 LAB
25(OH)D3 SERPL-MCNC: 94 NG/ML
ALBUMIN SERPL ELPH-MCNC: 3.8 G/DL
ALP BLD-CCNC: 84 U/L
ALT SERPL-CCNC: 7 U/L
AMMONIA PLAS-MCNC: 33 UMOL/L
ANION GAP SERPL CALC-SCNC: 13 MMOL/L
AST SERPL-CCNC: 19 U/L
BILIRUB SERPL-MCNC: 0.4 MG/DL
BUN SERPL-MCNC: 18 MG/DL
CALCIUM SERPL-MCNC: 10.1 MG/DL
CHLORIDE SERPL-SCNC: 96 MMOL/L
CO2 SERPL-SCNC: 29 MMOL/L
CREAT SERPL-MCNC: 1 MG/DL
GLUCOSE SERPL-MCNC: 106 MG/DL
POTASSIUM SERPL-SCNC: 4 MMOL/L
PROT SERPL-MCNC: 7.3 G/DL
SODIUM SERPL-SCNC: 138 MMOL/L
VALPROATE SERPL-MCNC: 93 UG/ML

## 2018-07-03 ENCOUNTER — OUTPATIENT (OUTPATIENT)
Dept: OUTPATIENT SERVICES | Facility: HOSPITAL | Age: 48
LOS: 1 days | Discharge: HOME | End: 2018-07-03

## 2018-07-03 DIAGNOSIS — E87.1 HYPO-OSMOLALITY AND HYPONATREMIA: ICD-10-CM

## 2018-07-18 ENCOUNTER — APPOINTMENT (OUTPATIENT)
Dept: OTOLARYNGOLOGY | Facility: CLINIC | Age: 48
End: 2018-07-18
Payer: MEDICAID

## 2018-07-18 VITALS — HEIGHT: 68 IN | WEIGHT: 134 LBS | BODY MASS INDEX: 20.31 KG/M2

## 2018-07-18 PROBLEM — E03.9 HYPOTHYROIDISM, UNSPECIFIED: Chronic | Status: ACTIVE | Noted: 2018-02-20

## 2018-07-18 PROBLEM — F32.9 MAJOR DEPRESSIVE DISORDER, SINGLE EPISODE, UNSPECIFIED: Chronic | Status: ACTIVE | Noted: 2018-02-20

## 2018-07-18 PROBLEM — R10.13 EPIGASTRIC PAIN: Chronic | Status: ACTIVE | Noted: 2018-02-20

## 2018-07-18 PROBLEM — K59.00 CONSTIPATION, UNSPECIFIED: Chronic | Status: ACTIVE | Noted: 2018-02-20

## 2018-07-18 PROBLEM — F31.9 BIPOLAR DISORDER, UNSPECIFIED: Chronic | Status: ACTIVE | Noted: 2018-02-20

## 2018-07-18 PROBLEM — F20.9 SCHIZOPHRENIA, UNSPECIFIED: Chronic | Status: ACTIVE | Noted: 2018-02-20

## 2018-07-18 PROBLEM — D64.9 ANEMIA, UNSPECIFIED: Chronic | Status: ACTIVE | Noted: 2018-02-20

## 2018-07-18 PROCEDURE — 69210 REMOVE IMPACTED EAR WAX UNI: CPT

## 2018-07-18 PROCEDURE — 99213 OFFICE O/P EST LOW 20 MIN: CPT | Mod: 25

## 2018-07-31 ENCOUNTER — APPOINTMENT (OUTPATIENT)
Dept: PODIATRY | Facility: HOSPITAL | Age: 48
End: 2018-07-31

## 2018-08-14 ENCOUNTER — OUTPATIENT (OUTPATIENT)
Dept: OUTPATIENT SERVICES | Facility: HOSPITAL | Age: 48
LOS: 1 days | Discharge: HOME | End: 2018-08-14

## 2018-08-14 ENCOUNTER — APPOINTMENT (OUTPATIENT)
Dept: PODIATRY | Facility: HOSPITAL | Age: 48
End: 2018-08-14
Payer: MEDICAID

## 2018-08-14 DIAGNOSIS — M79.672 PAIN IN LEFT FOOT: ICD-10-CM

## 2018-08-14 DIAGNOSIS — F70 MILD INTELLECTUAL DISABILITIES: ICD-10-CM

## 2018-08-14 DIAGNOSIS — B35.1 TINEA UNGUIUM: ICD-10-CM

## 2018-08-14 DIAGNOSIS — M79.671 PAIN IN RIGHT FOOT: ICD-10-CM

## 2018-08-14 PROCEDURE — 11721 DEBRIDE NAIL 6 OR MORE: CPT | Mod: NC

## 2018-10-02 ENCOUNTER — APPOINTMENT (OUTPATIENT)
Dept: PODIATRY | Facility: HOSPITAL | Age: 48
End: 2018-10-02

## 2018-10-03 ENCOUNTER — OUTPATIENT (OUTPATIENT)
Dept: OUTPATIENT SERVICES | Facility: HOSPITAL | Age: 48
LOS: 1 days | Discharge: HOME | End: 2018-10-03

## 2018-10-03 DIAGNOSIS — R33.9 RETENTION OF URINE, UNSPECIFIED: ICD-10-CM

## 2018-10-11 ENCOUNTER — OUTPATIENT (OUTPATIENT)
Dept: OUTPATIENT SERVICES | Facility: HOSPITAL | Age: 48
LOS: 1 days | Discharge: HOME | End: 2018-10-11
Payer: MEDICAID

## 2018-10-11 ENCOUNTER — APPOINTMENT (OUTPATIENT)
Dept: PODIATRY | Facility: HOSPITAL | Age: 48
End: 2018-10-11

## 2018-10-11 DIAGNOSIS — M79.672 PAIN IN LEFT FOOT: ICD-10-CM

## 2018-10-11 DIAGNOSIS — F70 MILD INTELLECTUAL DISABILITIES: ICD-10-CM

## 2018-10-11 DIAGNOSIS — B35.1 TINEA UNGUIUM: ICD-10-CM

## 2018-10-11 DIAGNOSIS — M79.671 PAIN IN RIGHT FOOT: ICD-10-CM

## 2018-10-11 PROCEDURE — 11721 DEBRIDE NAIL 6 OR MORE: CPT | Mod: NC

## 2018-10-15 ENCOUNTER — OUTPATIENT (OUTPATIENT)
Dept: OUTPATIENT SERVICES | Facility: HOSPITAL | Age: 48
LOS: 1 days | Discharge: HOME | End: 2018-10-15

## 2018-10-15 DIAGNOSIS — R33.9 RETENTION OF URINE, UNSPECIFIED: ICD-10-CM

## 2018-10-18 ENCOUNTER — APPOINTMENT (OUTPATIENT)
Dept: UROLOGY | Facility: CLINIC | Age: 48
End: 2018-10-18
Payer: MEDICAID

## 2018-10-18 ENCOUNTER — OUTPATIENT (OUTPATIENT)
Dept: OUTPATIENT SERVICES | Facility: HOSPITAL | Age: 48
LOS: 1 days | Discharge: HOME | End: 2018-10-18

## 2018-10-18 VITALS
SYSTOLIC BLOOD PRESSURE: 120 MMHG | HEIGHT: 68 IN | HEART RATE: 93 BPM | WEIGHT: 135 LBS | DIASTOLIC BLOOD PRESSURE: 81 MMHG | BODY MASS INDEX: 20.46 KG/M2

## 2018-10-18 PROCEDURE — 99213 OFFICE O/P EST LOW 20 MIN: CPT

## 2018-10-25 ENCOUNTER — OUTPATIENT (OUTPATIENT)
Dept: OUTPATIENT SERVICES | Facility: HOSPITAL | Age: 48
LOS: 1 days | Discharge: HOME | End: 2018-10-25

## 2018-10-25 DIAGNOSIS — N36.5 URETHRAL FALSE PASSAGE: ICD-10-CM

## 2018-11-13 ENCOUNTER — OUTPATIENT (OUTPATIENT)
Dept: OUTPATIENT SERVICES | Facility: HOSPITAL | Age: 48
LOS: 1 days | Discharge: HOME | End: 2018-11-13

## 2018-11-13 ENCOUNTER — APPOINTMENT (OUTPATIENT)
Dept: NEUROLOGY | Facility: CLINIC | Age: 48
End: 2018-11-13

## 2018-11-15 ENCOUNTER — APPOINTMENT (OUTPATIENT)
Dept: UROLOGY | Facility: CLINIC | Age: 48
End: 2018-11-15
Payer: MEDICAID

## 2018-11-15 ENCOUNTER — OUTPATIENT (OUTPATIENT)
Dept: OUTPATIENT SERVICES | Facility: HOSPITAL | Age: 48
LOS: 1 days | Discharge: HOME | End: 2018-11-15

## 2018-11-15 VITALS — HEIGHT: 68 IN

## 2018-11-15 PROCEDURE — 99213 OFFICE O/P EST LOW 20 MIN: CPT

## 2018-12-13 ENCOUNTER — OUTPATIENT (OUTPATIENT)
Dept: OUTPATIENT SERVICES | Facility: HOSPITAL | Age: 48
LOS: 1 days | Discharge: HOME | End: 2018-12-13

## 2018-12-13 ENCOUNTER — APPOINTMENT (OUTPATIENT)
Dept: PODIATRY | Facility: HOSPITAL | Age: 48
End: 2018-12-13
Payer: MEDICAID

## 2018-12-13 DIAGNOSIS — M79.671 PAIN IN RIGHT FOOT: ICD-10-CM

## 2018-12-13 DIAGNOSIS — M79.672 PAIN IN LEFT FOOT: ICD-10-CM

## 2018-12-13 DIAGNOSIS — B35.1 TINEA UNGUIUM: ICD-10-CM

## 2018-12-13 PROCEDURE — 11721 DEBRIDE NAIL 6 OR MORE: CPT

## 2019-01-07 ENCOUNTER — OUTPATIENT (OUTPATIENT)
Dept: OUTPATIENT SERVICES | Facility: HOSPITAL | Age: 49
LOS: 1 days | Discharge: HOME | End: 2019-01-07

## 2019-01-07 DIAGNOSIS — N39.0 URINARY TRACT INFECTION, SITE NOT SPECIFIED: ICD-10-CM

## 2019-01-07 DIAGNOSIS — R80.9 PROTEINURIA, UNSPECIFIED: ICD-10-CM

## 2019-01-07 DIAGNOSIS — E87.1 HYPO-OSMOLALITY AND HYPONATREMIA: ICD-10-CM

## 2019-01-09 ENCOUNTER — EMERGENCY (EMERGENCY)
Facility: HOSPITAL | Age: 49
LOS: 0 days | Discharge: HOME | End: 2019-01-09
Admitting: STUDENT IN AN ORGANIZED HEALTH CARE EDUCATION/TRAINING PROGRAM

## 2019-01-09 VITALS
SYSTOLIC BLOOD PRESSURE: 118 MMHG | TEMPERATURE: 97 F | HEART RATE: 83 BPM | DIASTOLIC BLOOD PRESSURE: 84 MMHG | RESPIRATION RATE: 16 BRPM | OXYGEN SATURATION: 100 %

## 2019-01-09 DIAGNOSIS — Z79.899 OTHER LONG TERM (CURRENT) DRUG THERAPY: ICD-10-CM

## 2019-01-09 DIAGNOSIS — E03.9 HYPOTHYROIDISM, UNSPECIFIED: ICD-10-CM

## 2019-01-09 DIAGNOSIS — G40.909 EPILEPSY, UNSPECIFIED, NOT INTRACTABLE, WITHOUT STATUS EPILEPTICUS: ICD-10-CM

## 2019-01-09 DIAGNOSIS — F79 UNSPECIFIED INTELLECTUAL DISABILITIES: ICD-10-CM

## 2019-01-09 DIAGNOSIS — Z88.1 ALLERGY STATUS TO OTHER ANTIBIOTIC AGENTS STATUS: ICD-10-CM

## 2019-01-09 DIAGNOSIS — Z88.8 ALLERGY STATUS TO OTHER DRUGS, MEDICAMENTS AND BIOLOGICAL SUBSTANCES: ICD-10-CM

## 2019-01-09 DIAGNOSIS — R23.3 SPONTANEOUS ECCHYMOSES: ICD-10-CM

## 2019-01-09 DIAGNOSIS — M79.671 PAIN IN RIGHT FOOT: ICD-10-CM

## 2019-01-09 DIAGNOSIS — F25.0 SCHIZOAFFECTIVE DISORDER, BIPOLAR TYPE: ICD-10-CM

## 2019-01-09 DIAGNOSIS — M79.676 PAIN IN UNSPECIFIED TOE(S): ICD-10-CM

## 2019-01-09 DIAGNOSIS — M79.89 OTHER SPECIFIED SOFT TISSUE DISORDERS: ICD-10-CM

## 2019-01-09 RX ORDER — CEPHALEXIN 500 MG
1 CAPSULE ORAL
Qty: 30 | Refills: 0 | OUTPATIENT
Start: 2019-01-09 | End: 2019-01-18

## 2019-01-09 NOTE — ED PROVIDER NOTE - MUSCULOSKELETAL, MLM
Right foot:  FROM, (+) tenderness/swelling/ecchymosis/erythema Right great toe and dorsum foot, no motor or sensory deficit; pedal pulses 2+

## 2019-01-09 NOTE — ED PROVIDER NOTE - OBJECTIVE STATEMENT
48 y.o. male with a PMH of MR, schizophrenia, hypothyroidism, seizure disorder (childhood), and hyponatremia presented to the ER with health aid c/o pain to Right great toe x1 day.  Denies fever, chills, trauma.  Similar episode last year.

## 2019-01-09 NOTE — ED PROVIDER NOTE - MEDICAL DECISION MAKING DETAILS
prednisone for inflammation; po anbx; recheck in 48 hours; pt will follow up with PCP in 2-3 days; any new or worsening symptoms, pt will return to ER.   Note complete

## 2019-01-10 NOTE — ED POST DISCHARGE NOTE - DETAILS
LEFT MESSAGE FOR LEONCIO WINSTON AT [facility] [phone]. SPOKE WITH RNLEONCIO AND DISCUSSED REPORT, REPORT FAXED [phone]

## 2019-01-17 ENCOUNTER — EMERGENCY (EMERGENCY)
Facility: HOSPITAL | Age: 49
LOS: 0 days | Discharge: HOME | End: 2019-01-17
Attending: EMERGENCY MEDICINE | Admitting: EMERGENCY MEDICINE

## 2019-01-17 VITALS
OXYGEN SATURATION: 99 % | TEMPERATURE: 99 F | DIASTOLIC BLOOD PRESSURE: 75 MMHG | SYSTOLIC BLOOD PRESSURE: 114 MMHG | RESPIRATION RATE: 20 BRPM | HEART RATE: 89 BPM

## 2019-01-17 VITALS
OXYGEN SATURATION: 95 % | HEART RATE: 58 BPM | SYSTOLIC BLOOD PRESSURE: 98 MMHG | DIASTOLIC BLOOD PRESSURE: 60 MMHG | TEMPERATURE: 97 F

## 2019-01-17 DIAGNOSIS — R53.1 WEAKNESS: ICD-10-CM

## 2019-01-17 DIAGNOSIS — Z88.8 ALLERGY STATUS TO OTHER DRUGS, MEDICAMENTS AND BIOLOGICAL SUBSTANCES: ICD-10-CM

## 2019-01-17 DIAGNOSIS — S92.411A DISPLACED FRACTURE OF PROXIMAL PHALANX OF RIGHT GREAT TOE, INITIAL ENCOUNTER FOR CLOSED FRACTURE: ICD-10-CM

## 2019-01-17 DIAGNOSIS — Z88.0 ALLERGY STATUS TO PENICILLIN: ICD-10-CM
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
## 2019-01-17 DIAGNOSIS — E03.9 HYPOTHYROIDISM, UNSPECIFIED: ICD-10-CM
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
## 2019-01-17 DIAGNOSIS — Z79.899 OTHER LONG TERM (CURRENT) DRUG THERAPY: ICD-10-CM
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
## 2019-01-17 LAB
ALBUMIN SERPL ELPH-MCNC: 3.6 G/DL — SIGNIFICANT CHANGE UP (ref 3.5–5.2)
ALP SERPL-CCNC: 94 U/L — SIGNIFICANT CHANGE UP (ref 30–115)
ALT FLD-CCNC: 9 U/L — SIGNIFICANT CHANGE UP (ref 0–41)
ANION GAP SERPL CALC-SCNC: 14 MMOL/L — SIGNIFICANT CHANGE UP (ref 7–14)
APPEARANCE UR: CLEAR — SIGNIFICANT CHANGE UP
AST SERPL-CCNC: 22 U/L — SIGNIFICANT CHANGE UP (ref 0–41)
BASOPHILS # BLD AUTO: 0.02 K/UL — SIGNIFICANT CHANGE UP (ref 0–0.2)
BASOPHILS NFR BLD AUTO: 0.1 % — SIGNIFICANT CHANGE UP (ref 0–1)
BILIRUB SERPL-MCNC: 0.7 MG/DL — SIGNIFICANT CHANGE UP (ref 0.2–1.2)
BILIRUB UR-MCNC: NEGATIVE — SIGNIFICANT CHANGE UP
BUN SERPL-MCNC: 18 MG/DL — SIGNIFICANT CHANGE UP (ref 10–20)
CALCIUM SERPL-MCNC: 9.5 MG/DL — SIGNIFICANT CHANGE UP (ref 8.5–10.1)
CHLORIDE SERPL-SCNC: 93 MMOL/L — LOW (ref 98–110)
CO2 SERPL-SCNC: 28 MMOL/L — SIGNIFICANT CHANGE UP (ref 17–32)
COLOR SPEC: YELLOW — SIGNIFICANT CHANGE UP
CREAT SERPL-MCNC: 1.1 MG/DL — SIGNIFICANT CHANGE UP (ref 0.7–1.5)
DIFF PNL FLD: ABNORMAL
EOSINOPHIL # BLD AUTO: 0.02 K/UL — SIGNIFICANT CHANGE UP (ref 0–0.7)
EOSINOPHIL NFR BLD AUTO: 0.1 % — SIGNIFICANT CHANGE UP (ref 0–8)
GLUCOSE SERPL-MCNC: 75 MG/DL — SIGNIFICANT CHANGE UP (ref 70–99)
GLUCOSE UR QL: NEGATIVE MG/DL — SIGNIFICANT CHANGE UP
HCT VFR BLD CALC: 37.1 % — LOW (ref 42–52)
HGB BLD-MCNC: 12.4 G/DL — LOW (ref 14–18)
IMM GRANULOCYTES NFR BLD AUTO: 1.2 % — HIGH (ref 0.1–0.3)
KETONES UR-MCNC: ABNORMAL
LEUKOCYTE ESTERASE UR-ACNC: NEGATIVE — SIGNIFICANT CHANGE UP
LYMPHOCYTES # BLD AUTO: 1.82 K/UL — SIGNIFICANT CHANGE UP (ref 1.2–3.4)
LYMPHOCYTES # BLD AUTO: 13.4 % — LOW (ref 20.5–51.1)
MCHC RBC-ENTMCNC: 32.8 PG — HIGH (ref 27–31)
MCHC RBC-ENTMCNC: 33.4 G/DL — SIGNIFICANT CHANGE UP (ref 32–37)
MCV RBC AUTO: 98.1 FL — HIGH (ref 80–94)
MONOCYTES # BLD AUTO: 2.11 K/UL — HIGH (ref 0.1–0.6)
MONOCYTES NFR BLD AUTO: 15.5 % — HIGH (ref 1.7–9.3)
NEUTROPHILS # BLD AUTO: 9.49 K/UL — HIGH (ref 1.4–6.5)
NEUTROPHILS NFR BLD AUTO: 69.7 % — SIGNIFICANT CHANGE UP (ref 42.2–75.2)
NITRITE UR-MCNC: NEGATIVE — SIGNIFICANT CHANGE UP
NRBC # BLD: 0 /100 WBCS — SIGNIFICANT CHANGE UP (ref 0–0)
PH UR: 7.5 — SIGNIFICANT CHANGE UP (ref 5–8)
PLATELET # BLD AUTO: 119 K/UL — LOW (ref 130–400)
POTASSIUM SERPL-MCNC: 4.2 MMOL/L — SIGNIFICANT CHANGE UP (ref 3.5–5)
POTASSIUM SERPL-SCNC: 4.2 MMOL/L — SIGNIFICANT CHANGE UP (ref 3.5–5)
PROT SERPL-MCNC: 6.8 G/DL — SIGNIFICANT CHANGE UP (ref 6–8)
PROT UR-MCNC: NEGATIVE MG/DL — SIGNIFICANT CHANGE UP
RBC # BLD: 3.78 M/UL — LOW (ref 4.7–6.1)
RBC # FLD: 14.3 % — SIGNIFICANT CHANGE UP (ref 11.5–14.5)
RBC CASTS # UR COMP ASSIST: >50 /HPF
SODIUM SERPL-SCNC: 135 MMOL/L — SIGNIFICANT CHANGE UP (ref 135–146)
SP GR SPEC: 1.01 — SIGNIFICANT CHANGE UP (ref 1.01–1.03)
UROBILINOGEN FLD QL: 2 MG/DL (ref 0.2–0.2)
WBC # BLD: 13.62 K/UL — HIGH (ref 4.8–10.8)
WBC # FLD AUTO: 13.62 K/UL — HIGH (ref 4.8–10.8)

## 2019-01-17 RX ORDER — SODIUM CHLORIDE 9 MG/ML
1000 INJECTION, SOLUTION INTRAVENOUS ONCE
Qty: 0 | Refills: 0 | Status: COMPLETED | OUTPATIENT
Start: 2019-01-17 | End: 2019-01-17

## 2019-01-17 RX ADMIN — SODIUM CHLORIDE 2000 MILLILITER(S): 9 INJECTION, SOLUTION INTRAVENOUS at 17:03

## 2019-01-17 NOTE — ED ADULT NURSE NOTE - TEMPLATE
Spoke with pt regards to BC, pt was advised that I would speak to Chasidy Cabrera and give her call back on tomorrow.   General

## 2019-01-17 NOTE — ED PROVIDER NOTE - NSFOLLOWUPCLINICS_GEN_ALL_ED_FT
[facility]  Podiatry  .  [place]   Phone: [phone]  Fax:   Follow Up Time:

## 2019-01-17 NOTE — ED PROVIDER NOTE - ATTENDING CONTRIBUTION TO CARE
47 yo male group home resident h/o MR, hypothyroidism, depression, bipolar disease, epilepsy brought from group home for evaluation of "weakness" and unwillingness to ambulate,  Seen in ED about a week ago for rt great toe pain.  As per [facility] attendant he refuses to ambulate for meals and needs assistance walking,  No documented fever, chills, vomiting, diarrhea, complaints of abdominal pain or urinary symptoms. Middle aged male resting on a stretcher, NAD, PERRL, dry oral mucosa, nml work fo breathing, RRR, abdomen soft, NT/ND,, FROM at all joints, moving all extremities against gravity, + ecchymosis of the rt great toe, distal pulses intact, awake, alert and largely cooperative.  Will check labs, give fluids, reassess.

## 2019-01-17 NOTE — ED PROVIDER NOTE - PROGRESS NOTE DETAILS
Pt tolerating PO without difficulty. Aid at the bedside states pt always takes a very long time to eat. UA is negative, x-ray of the rt great toe shows a fracture, Darco shoe was applied, patient should follow up with podiatry. I spoke with  nurse, Gloria, discussed lab results and x-ray findings,  Advised to follow up with podiatrist within a week.  She verbalized understanding and is amenable with the plan.  Copies of tests sent with patient.

## 2019-01-17 NOTE — ED ADULT NURSE NOTE - INTERVENTIONS DEFINITIONS
Instruct patient to call for assistance/Physically safe environment: no spills, clutter or unnecessary equipment

## 2019-01-17 NOTE — ED ADULT NURSE NOTE - PMH
Anemia    Bipolar 1 disorder    Constipation    Dyspepsia    Epilepsy    Hypothyroidism    Major depressive disorder    Schizophrenia    Urinary retention Anemia    Bipolar 1 disorder    Constipation    Dyspepsia    Epilepsy    Hypothyroidism    Major depressive disorder    MR (mental retardation)    Schizophrenia    Urinary retention

## 2019-01-17 NOTE — ED PROVIDER NOTE - MEDICAL DECISION MAKING DETAILS
47 yo male sent for refusal to ambulate for 2 weeks, h/o rt great toe injury; found to have rt proximal phalanx fx.  follow up with podiatry.

## 2019-01-17 NOTE — ED PROVIDER NOTE - PMH
Anemia    Bipolar 1 disorder    Constipation    Dyspepsia    Epilepsy    Hypothyroidism    Major depressive disorder    MR (mental retardation)    Schizophrenia    Urinary retention

## 2019-01-17 NOTE — ED PROVIDER NOTE - PHYSICAL EXAMINATION
Constitutional: Thin. Well appearing. Awake and appropriately interactive.   Head: Atraumatic.  Eyes: EOMI without discomfort.   ENT: No nasal discharge. Mucous membranes moist.  Neck: Supple. Painless ROM.  Cardiovascular: Regular rhythm. Regular rate. Normal S1 and S2. No murmurs. 2+ pulses in all extremities.   Pulmonary: Normal respiratory rate and effort. Lungs clear to auscultation bilaterally. No wheezing, rales, or rhonchi. Bilateral, equal lung expansion.   Abdominal: Soft. Nondistended. Nontender. No rebound or guarding.   Extremities: No pelvic tenderness or instability. No knee or ankle tenderness. R great toe tenderness, ecchymosis, and warmth. Bilateral legs held flexed at knees and hips, but no contractures. Full ROM.   Skin: No rashes.   Neuro: AAOx3. No focal neurological deficits.  Psych: Normal mood. Normal affect.

## 2019-01-17 NOTE — ED PROVIDER NOTE - OBJECTIVE STATEMENT
48y M w PMH MR, schizophrenia, MDD, bipolar 1, epilepsy with known fracture of the proximal phalanx of the R hallux sent from group home for evaluation. Pt has not ambulated for the last 2 weeks, and group wants to confirm that he is medically well. Pt is otherwise at baseline. No n/v/d/f/c. Acting normally other than refusing to walk.

## 2019-02-12 ENCOUNTER — APPOINTMENT (OUTPATIENT)
Dept: PODIATRY | Facility: HOSPITAL | Age: 49
End: 2019-02-12

## 2019-02-12 PROBLEM — F79 UNSPECIFIED INTELLECTUAL DISABILITIES: Chronic | Status: ACTIVE | Noted: 2019-01-17

## 2019-02-28 ENCOUNTER — INPATIENT (INPATIENT)
Facility: HOSPITAL | Age: 49
LOS: 4 days | Discharge: NURSING, REHAB OR LONG-TERM CARE | End: 2019-03-05
Attending: INTERNAL MEDICINE | Admitting: INTERNAL MEDICINE
Payer: MEDICAID

## 2019-02-28 VITALS
RESPIRATION RATE: 18 BRPM | TEMPERATURE: 99 F | SYSTOLIC BLOOD PRESSURE: 141 MMHG | HEART RATE: 84 BPM | OXYGEN SATURATION: 100 % | DIASTOLIC BLOOD PRESSURE: 81 MMHG

## 2019-02-28 LAB
ALBUMIN SERPL ELPH-MCNC: 3.2 G/DL — LOW (ref 3.5–5.2)
ALP SERPL-CCNC: 182 U/L — HIGH (ref 30–115)
ALT FLD-CCNC: 15 U/L — SIGNIFICANT CHANGE UP (ref 0–41)
ANION GAP SERPL CALC-SCNC: 9 MMOL/L — SIGNIFICANT CHANGE UP (ref 7–14)
APPEARANCE UR: ABNORMAL
AST SERPL-CCNC: 37 U/L — SIGNIFICANT CHANGE UP (ref 0–41)
BACTERIA # UR AUTO: ABNORMAL /HPF
BASOPHILS # BLD AUTO: 0.01 K/UL — SIGNIFICANT CHANGE UP (ref 0–0.2)
BASOPHILS NFR BLD AUTO: 0.1 % — SIGNIFICANT CHANGE UP (ref 0–1)
BILIRUB SERPL-MCNC: 0.3 MG/DL — SIGNIFICANT CHANGE UP (ref 0.2–1.2)
BILIRUB UR-MCNC: NEGATIVE — SIGNIFICANT CHANGE UP
BUN SERPL-MCNC: 19 MG/DL — SIGNIFICANT CHANGE UP (ref 10–20)
CALCIUM SERPL-MCNC: 9.3 MG/DL — SIGNIFICANT CHANGE UP (ref 8.5–10.1)
CHLORIDE SERPL-SCNC: 99 MMOL/L — SIGNIFICANT CHANGE UP (ref 98–110)
CO2 SERPL-SCNC: 30 MMOL/L — SIGNIFICANT CHANGE UP (ref 17–32)
COLOR SPEC: SIGNIFICANT CHANGE UP
CREAT SERPL-MCNC: 0.9 MG/DL — SIGNIFICANT CHANGE UP (ref 0.7–1.5)
DIFF PNL FLD: ABNORMAL
EOSINOPHIL # BLD AUTO: 0.03 K/UL — SIGNIFICANT CHANGE UP (ref 0–0.7)
EOSINOPHIL NFR BLD AUTO: 0.3 % — SIGNIFICANT CHANGE UP (ref 0–8)
GLUCOSE SERPL-MCNC: 76 MG/DL — SIGNIFICANT CHANGE UP (ref 70–99)
GLUCOSE UR QL: NEGATIVE MG/DL — SIGNIFICANT CHANGE UP
HCT VFR BLD CALC: 33.9 % — LOW (ref 42–52)
HGB BLD-MCNC: 10.9 G/DL — LOW (ref 14–18)
IMM GRANULOCYTES NFR BLD AUTO: 0.5 % — HIGH (ref 0.1–0.3)
KETONES UR-MCNC: 40
LEUKOCYTE ESTERASE UR-ACNC: ABNORMAL
LYMPHOCYTES # BLD AUTO: 1.54 K/UL — SIGNIFICANT CHANGE UP (ref 1.2–3.4)
LYMPHOCYTES # BLD AUTO: 17.9 % — LOW (ref 20.5–51.1)
MAGNESIUM SERPL-MCNC: 2 MG/DL — SIGNIFICANT CHANGE UP (ref 1.8–2.4)
MCHC RBC-ENTMCNC: 32.2 G/DL — SIGNIFICANT CHANGE UP (ref 32–37)
MCHC RBC-ENTMCNC: 32.9 PG — HIGH (ref 27–31)
MCV RBC AUTO: 102.4 FL — HIGH (ref 80–94)
MONOCYTES # BLD AUTO: 1.2 K/UL — HIGH (ref 0.1–0.6)
MONOCYTES NFR BLD AUTO: 13.9 % — HIGH (ref 1.7–9.3)
NEUTROPHILS # BLD AUTO: 5.79 K/UL — SIGNIFICANT CHANGE UP (ref 1.4–6.5)
NEUTROPHILS NFR BLD AUTO: 67.3 % — SIGNIFICANT CHANGE UP (ref 42.2–75.2)
NITRITE UR-MCNC: NEGATIVE — SIGNIFICANT CHANGE UP
NRBC # BLD: 0 /100 WBCS — SIGNIFICANT CHANGE UP (ref 0–0)
PH UR: 7 — SIGNIFICANT CHANGE UP (ref 5–8)
PLATELET # BLD AUTO: 111 K/UL — LOW (ref 130–400)
POTASSIUM SERPL-MCNC: 5.1 MMOL/L — HIGH (ref 3.5–5)
POTASSIUM SERPL-SCNC: 5.1 MMOL/L — HIGH (ref 3.5–5)
PROT SERPL-MCNC: 6.5 G/DL — SIGNIFICANT CHANGE UP (ref 6–8)
PROT UR-MCNC: 100 MG/DL
RBC # BLD: 3.31 M/UL — LOW (ref 4.7–6.1)
RBC # FLD: 15.3 % — HIGH (ref 11.5–14.5)
RBC CASTS # UR COMP ASSIST: ABNORMAL /HPF
SODIUM SERPL-SCNC: 138 MMOL/L — SIGNIFICANT CHANGE UP (ref 135–146)
SP GR SPEC: 1.02 — SIGNIFICANT CHANGE UP (ref 1.01–1.03)
TROPONIN T SERPL-MCNC: <0.01 NG/ML — SIGNIFICANT CHANGE UP
UROBILINOGEN FLD QL: 2 MG/DL (ref 0.2–0.2)
WBC # BLD: 8.61 K/UL — SIGNIFICANT CHANGE UP (ref 4.8–10.8)
WBC # FLD AUTO: 8.61 K/UL — SIGNIFICANT CHANGE UP (ref 4.8–10.8)
WBC UR QL: >50 /HPF

## 2019-02-28 RX ORDER — MIRTAZAPINE 45 MG/1
15 TABLET, ORALLY DISINTEGRATING ORAL
Qty: 0 | Refills: 0 | Status: DISCONTINUED | OUTPATIENT
Start: 2019-02-28 | End: 2019-03-05

## 2019-02-28 RX ORDER — CHOLECALCIFEROL (VITAMIN D3) 125 MCG
1000 CAPSULE ORAL DAILY
Qty: 0 | Refills: 0 | Status: DISCONTINUED | OUTPATIENT
Start: 2019-02-28 | End: 2019-03-05

## 2019-02-28 RX ORDER — SENNA PLUS 8.6 MG/1
2 TABLET ORAL AT BEDTIME
Qty: 0 | Refills: 0 | Status: DISCONTINUED | OUTPATIENT
Start: 2019-02-28 | End: 2019-03-05

## 2019-02-28 RX ORDER — ALENDRONATE SODIUM 70 MG/1
0 TABLET ORAL
Qty: 0 | Refills: 0 | COMMUNITY

## 2019-02-28 RX ORDER — CHOLECALCIFEROL (VITAMIN D3) 125 MCG
400 CAPSULE ORAL
Qty: 0 | Refills: 0 | Status: DISCONTINUED | OUTPATIENT
Start: 2019-02-28 | End: 2019-02-28

## 2019-02-28 RX ORDER — BUPROPION HYDROCHLORIDE 150 MG/1
150 TABLET, EXTENDED RELEASE ORAL DAILY
Qty: 0 | Refills: 0 | Status: DISCONTINUED | OUTPATIENT
Start: 2019-02-28 | End: 2019-03-05

## 2019-02-28 RX ORDER — BENZTROPINE MESYLATE 1 MG
1 TABLET ORAL EVERY 12 HOURS
Qty: 0 | Refills: 0 | Status: DISCONTINUED | OUTPATIENT
Start: 2019-02-28 | End: 2019-03-05

## 2019-02-28 RX ORDER — CALCIUM CARBONATE 500(1250)
1 TABLET ORAL
Qty: 0 | Refills: 0 | Status: DISCONTINUED | OUTPATIENT
Start: 2019-02-28 | End: 2019-03-05

## 2019-02-28 RX ORDER — DIVALPROEX SODIUM 500 MG/1
0 TABLET, DELAYED RELEASE ORAL
Qty: 0 | Refills: 0 | COMMUNITY

## 2019-02-28 RX ORDER — SODIUM CHLORIDE 9 MG/ML
1000 INJECTION INTRAMUSCULAR; INTRAVENOUS; SUBCUTANEOUS ONCE
Qty: 0 | Refills: 0 | Status: COMPLETED | OUTPATIENT
Start: 2019-02-28 | End: 2019-02-28

## 2019-02-28 RX ORDER — CEFTRIAXONE 500 MG/1
1 INJECTION, POWDER, FOR SOLUTION INTRAMUSCULAR; INTRAVENOUS EVERY 24 HOURS
Qty: 0 | Refills: 0 | Status: DISCONTINUED | OUTPATIENT
Start: 2019-02-28 | End: 2019-03-04

## 2019-02-28 RX ORDER — LEVOTHYROXINE SODIUM 125 MCG
100 TABLET ORAL DAILY
Qty: 0 | Refills: 0 | Status: DISCONTINUED | OUTPATIENT
Start: 2019-02-28 | End: 2019-03-05

## 2019-02-28 RX ORDER — BUPROPION HYDROCHLORIDE 150 MG/1
0 TABLET, EXTENDED RELEASE ORAL
Qty: 0 | Refills: 0 | COMMUNITY

## 2019-02-28 RX ORDER — DIVALPROEX SODIUM 500 MG/1
750 TABLET, DELAYED RELEASE ORAL
Qty: 0 | Refills: 0 | Status: DISCONTINUED | OUTPATIENT
Start: 2019-02-28 | End: 2019-03-04

## 2019-02-28 RX ORDER — SENNA PLUS 8.6 MG/1
1 TABLET ORAL
Qty: 0 | Refills: 0 | COMMUNITY

## 2019-02-28 RX ORDER — ENOXAPARIN SODIUM 100 MG/ML
40 INJECTION SUBCUTANEOUS DAILY
Qty: 0 | Refills: 0 | Status: DISCONTINUED | OUTPATIENT
Start: 2019-02-28 | End: 2019-03-05

## 2019-02-28 RX ORDER — BENZTROPINE MESYLATE 1 MG
0 TABLET ORAL
Qty: 0 | Refills: 0 | COMMUNITY

## 2019-02-28 RX ORDER — DOCUSATE SODIUM 100 MG
100 CAPSULE ORAL
Qty: 0 | Refills: 0 | Status: DISCONTINUED | OUTPATIENT
Start: 2019-02-28 | End: 2019-03-05

## 2019-02-28 RX ORDER — CEFTRIAXONE 500 MG/1
1 INJECTION, POWDER, FOR SOLUTION INTRAMUSCULAR; INTRAVENOUS ONCE
Qty: 0 | Refills: 0 | Status: COMPLETED | OUTPATIENT
Start: 2019-02-28 | End: 2019-02-28

## 2019-02-28 RX ADMIN — DIVALPROEX SODIUM 750 MILLIGRAM(S): 500 TABLET, DELAYED RELEASE ORAL at 22:45

## 2019-02-28 RX ADMIN — SODIUM CHLORIDE 2000 MILLILITER(S): 9 INJECTION INTRAMUSCULAR; INTRAVENOUS; SUBCUTANEOUS at 16:34

## 2019-02-28 RX ADMIN — SENNA PLUS 2 TABLET(S): 8.6 TABLET ORAL at 22:45

## 2019-02-28 RX ADMIN — CEFTRIAXONE 100 GRAM(S): 500 INJECTION, POWDER, FOR SOLUTION INTRAMUSCULAR; INTRAVENOUS at 20:24

## 2019-02-28 NOTE — H&P ADULT - ASSESSMENT
a 47 yo man with history of* mild intellectual disability, schizophrenia, impulse control disorder, hypothyroidism, seizure, osteopenia and anemia was brought in [facility] group home because of Refusal to ambulate and bilateral feet swelling.    *Bilateral feet swelling  -minimal on physical exam  -r/o dependant edema, can be secondary to low albumin (alb =3.2 and positive proteinuria) r/o uncontrolled hypothyroidism? -r/o DVT less likely  -keep feet elevated  -check protein/cr ratio, add ensure  -check TSH  -duplex lower extremities    *Refusal to ambulate   -r/o uncontrolled depression r/o neurologic disease like b12 deficiency r/o myopathy  -as per aid, he was checked by dr Smith the psychologist this week and no changes have been made; consider inpatient psych evaluation  -check b12 and folate  -check CK  -get PT    *Hypothyroidism  -check TSH  -c/w levothyroxine    * Mild intellectual disability, schizophrenia, impulse control disorder  -c/w benztropine, bupropion and Remeron  -as per group home papers, haldol is 10mg tid!! [phone] and she confirmed the dose!  -will give 5 mg three times for tonight and please double check with psychiatrist in am    *Seizure  -c/w Depakote  -check level  -check ammonia    *Macrocytic anemia  -check b12 and folate    *DVt ppx: lovenox s/c  *Diet: regular with ensure  *the patient is self-surrogate a 49 yo man with history of* mild intellectual disability, schizophrenia, impulse control disorder, hypothyroidism, seizure, osteopenia and anemia was brought in [facility] group home because of Refusal to ambulate and bilateral feet swelling.    *Bilateral feet swelling  -minimal on physical exam  -r/o dependant edema, can be secondary to low albumin (alb =3.2 and positive proteinuria) r/o uncontrolled hypothyroidism? -r/o DVT less likely  -keep feet elevated  -check protein/cr ratio, add ensure  -check TSH  -duplex lower extremities    *Refusal to ambulate   -r/o uncontrolled depression r/o neurologic disease like b12 deficiency r/o myopathy  -as per aid, he was checked by dr Smith the psychologist this week and no changes have been made; consider inpatient psych evaluation  -check b12 and folate  -check CK  -get PT    *Possible UTI  -positive UA, no symptoms but cannot rely on patients complaints  -follow up urine culture  -c/w ceftriaxone    *Hypothyroidism  -check TSH  -c/w levothyroxine    * Mild intellectual disability, schizophrenia, impulse control disorder  -c/w benztropine, bupropion and Remeron  -as per group home papers, haldol is 10mg tid!! the [facility] [phone] contacted and she confirmed the dose!  -will give 5 mg three times for tonight and please double check with psychiatrist in am    *Seizure  -c/w Depakote  -check level  -check ammonia    *Macrocytic anemia  -check b12 and folate    *DVt ppx: lovenox s/c  *Diet: regular with ensure  *the patient is self-surrogate a 49 yo man with history of* mild intellectual disability, schizophrenia, impulse control disorder, hypothyroidism, seizure, osteopenia and anemia was brought in [facility] group home because of Refusal to ambulate and bilateral feet swelling.    *Bilateral feet swelling  -minimal on physical exam  -r/o dependant edema, can be secondary to low albumin (alb =3.2 and positive proteinuria) r/o uncontrolled hypothyroidism? -r/o DVT less likely  -keep feet elevated  -check protein/cr ratio, add ensure  -check TSH  -duplex lower extremities    *Refusal to ambulate   -r/o uncontrolled depression r/o neurologic disease like b12 deficiency r/o myopathy  -as per aid, he was checked by dr Smith the psychologist this week and no changes have been made; consider inpatient psych evaluation  -check b12 and folate  -check CK  -get PT    *Possible UTI  -positive UA, no symptoms but cannot rely on patients complaints  -follow up urine culture  -c/w ceftriaxone    *Hypothyroidism  -check TSH  -c/w levothyroxine    * Mild intellectual disability, schizophrenia, impulse control disorder  -c/w benztropine, bupropion and Remeron  -as per group home papers, haldol is 10mg tid!! the [facility] [phone] contacted and she confirmed the dose!  -will give 5 mg three times for tonight and please double check with psychiatrist    *Seizure  -c/w Depakote  -check level  -check ammonia    *Macrocytic anemia  -check b12 and folate    *DVt ppx: lovenox s/c  *Diet: regular with ensure  *the patient is self-surrogate a 49 yo man with history of* mild intellectual disability, schizophrenia, impulse control disorder, hypothyroidism, seizure, osteopenia and anemia was brought in [facility] group home because of Refusal to ambulate and bilateral feet swelling.    *Bilateral feet swelling  -minimal on physical exam  -r/o dependant edema, can be secondary to low albumin (alb =3.2 and positive proteinuria) r/o uncontrolled hypothyroidism? -r/o DVT less likely  -keep feet elevated  -check protein/cr ratio, add ensure  -check TSH  -duplex lower extremities    *Refusal to ambulate   -r/o uncontrolled depression r/o neurologic disease like b12 deficiency r/o myopathy  -as per aid, he was checked by dr Smith the psychologist this week and no changes have been made; consider inpatient psych evaluation  -check b12 and folate  -check CK  -get PT    *Possible UTI  -positive UA, no symptoms but cannot rely on patients complaints  -follow up urine culture  -c/w ceftriaxone    *Hypothyroidism  -check TSH  -c/w levothyroxine    * Mild intellectual disability, schizophrenia, impulse control disorder  -c/w benztropine, bupropion and Remeron  -as per group home papers, haldol is 10mg tid!! the [facility] [phone] contacted and she confirmed the dose!  -will give 5 mg three times for tonight and please double check with psychiatrist    *Seizure  -c/w Depakote  -check level  -check ammonia    *Macrocytic anemia  -check b12 and folate    - Vitmain D deficiency - supplemented     *DVt ppx: lovenox s/c  *Diet: regular with ensure  *the patient is self-surrogate

## 2019-02-28 NOTE — H&P ADULT - HISTORY OF PRESENT ILLNESS
a 49 yo man with history of mild intellectual disability, schizophrenia, impulse control disorder, hypothyroidism, seizure, osteopenia and anemia was brought in [facility] because of Refusal to ambulate and bilateral feet swelling. as per the aid [place] at the bedside, since Jan 8th he had right big toe fracture, they don't know how he had it and he said he does not remember, he was evaluated in the ED twice since then. his feet are getting more swollen and she thinks that is why he is not walking, he is refusing even to get out of bed. upon asking him, he said his feet are ok. she also reports that he lost around 35 pounds since Nov 2018 (last weight I have in the system is 135 lbs oct 2018) and he is been also refusing to eat. he kept asking during my evaluation to get him pumpkin or apple pies which [place] said she does the same at home then he refuses to eat them. No fever, no chills, no urinary symptoms, no other specific complains.

## 2019-02-28 NOTE — H&P ADULT - PMH
Anemia    Bipolar 1 disorder    Constipation    Dyspepsia    Epilepsy    Hypothyroidism    Major depressive disorder    MR (mental retardation)    Schizophrenia

## 2019-02-28 NOTE — ED PROVIDER NOTE - CLINICAL SUMMARY MEDICAL DECISION MAKING FREE TEXT BOX
pt with increased in uncooperative behavior, dec po intake, weight loss, refusal to walk, sent by group home due to difficulty at the home.  found to have uti on evaluation, consider delirium?

## 2019-02-28 NOTE — ED PROVIDER NOTE - OBJECTIVE STATEMENT
49 yo male SNP with PMH of epilepsy, MDD, Bipolar disorder,  anemia, hypothyroidism brought to the ED by aide from [facility] c/o 49 yo male SNP with PMH of epilepsy, MDD, Bipolar disorder,  anemia, hypothyroidism brought to the ED by aide from [facility] stating that the patient has been having behavior issues and is refusing to walk on his own, attend doc appts, and is not eating as much as he use to.  Aide states that patient sustained a fracture to his toe last month and since then 47 yo male SNP with PMH of epilepsy, MDD, Bipolar disorder,  anemia, hypothyroidism brought to the ED by aide from [facility] stating that the patient has been having behavior issues and is refusing to walk on his own, attend doc appts, and is not eating as much as he use to.  Aide states that patient sustained a fracture to his toe last month and since then has been acting out more then normal and aide is not sure if he is not ambulating due to fracture.  Aide has noticed patient become more lethargic and has B/L foot swelling.  Patient was told to follow up with ortho, but aide states patient has been refusing to go to appointment. Aide states patient has lost approximately 40 lb since this past December.  Aide denies patient having fever, chills, chest pain, SOB, N/V/D, syncope, or traumatic event or fall.

## 2019-02-28 NOTE — ED PROVIDER NOTE - PHYSICAL EXAMINATION
GENERAL: Well-nourished, Well-developed. NAD.  HEAD: No visible or palpable bumps or hematomas. No ecchymosis behind ears B/L.  Eyes: + pale conjunctiva B/L, PERRLA, EOMI. No asymmetry. No nystagmus. Non-icteric sclera.  ENMT: Dry mucous membranes. No pharyngeal erythema or exudates. Uvula midline. No oral lesions.   Neck: Supple. FROM  CVS: No reproducible chest wall tenderness. Normal S1,S2. No murmurs appreciated on auscultation   RESP: No use of accessory muscles. Chest rise symmetrical with good expansion. Lungs clear to auscultation B/L. No wheezing, rales, or rhonchi auscultated.  GI: Normal auscultation of bowel sounds in all 4 quadrants. Soft, Nontender, Nondistended. No guarding  MSK: + mild swelling to feet B/L with no overlying erythema, ecchymosis, or signs of infection. FROM of toes B.L  Skin: Warm, Dry. No rashes or lesions. Mild delayed cap refill B/L  EXT: Radial and pedal pulses present B/L. No calf tenderness or swelling B/L. No palpable cords. No pedal edema B/L.  Neuro: AA&O x 3. Verbal. No slurring of speech. No facial droop. No tremors. Sensation grossly intact. Strength 5/5 B/L.   Psych:  Appropriate mood and affect. Cooperative. Calm.

## 2019-02-28 NOTE — H&P ADULT - ATTENDING COMMENTS
#Progress Note Handoff    Pending (specify):  Consults_Psychiatry Test Results_Urine culture   Family discussion: - Group home resident and d/w care giver  Disposition: Unknown at this time    Worsening depression - Psychiatry follow ups

## 2019-02-28 NOTE — H&P ADULT - NSHPLABSRESULTS_GEN_ALL_CORE
Labs:                        10.9   8.61  )-----------( 111      ( 2019 16:05 )             33.9                 138  |  99  |  19  ----------------------------<  76  5.1<H>   |  30  |  0.9    Ca    9.3      2019 16:05  Mg     2.0         TPro  6.5  /  Alb  3.2<L>  /  TBili  0.3  /  DBili  x   /  AST  37  /  ALT  15  /  AlkPhos  182<H>      LIVER FUNCTIONS - ( 2019 16:05 )  Alb: 3.2 g/dL / Pro: 6.5 g/dL / ALK PHOS: 182 U/L / ALT: 15 U/L / AST: 37 U/L / GGT: x                   CARDIAC MARKERS ( 2019 16:05 )  x     / <0.01 ng/mL / x     / x     / x        Urinalysis Basic - ( 2019 16:30 )    Color: Dark Yellow / Appearance: Turbid / S.025 / pH: x  Gluc: x / Ketone: 40  / Bili: Negative / Urobili: 2.0 mg/dL   Blood: x / Protein: 100 mg/dL / Nitrite: Negative   Leuk Esterase: Large / RBC: 3-5 /HPF / WBC >50 /HPF   Sq Epi: x / Non Sq Epi: x / Bacteria: Few /HPF    Imaging:  < from: CT Head No Cont (19 @ 18:50) >    IMPRESSION:  No CT evidence of acute intracranial pathology.  Prominence of the ventricles and cortical sulci nonproportional to patient's stated age .  < end of copied text >    ECG:< from: 12 Lead ECG (19 @ 17:35) >  Diagnosis Line Normal sinus rhythm  Normal ECG  Confirmed by Kj Huffman ([id number]) on 2019 6:23:19 PM  < end of copied text >

## 2019-02-28 NOTE — ED PROVIDER NOTE - ATTENDING CONTRIBUTION TO CARE
49 yo m with pmh of MR, CP, schizophrenia, bipolar, seizure disorder, hypothyroidism, biba with c/o AMS.  as per aide, pt has been uncooperative at group home for a few weeks.  noted with toe fracture a few weeks ago and since then, has refused to walk and has been refusing to eat.  will ask for food, but will not eat, very unusual for pt as he usually eats and finishes his meals.  staff had noted that pt has been uncooperative at the home, more aggressive towards the staff.  no fever no chills.  no n/v/d.  as per aide, pt has lost 40lbs in 2 months.  pt is verbal, but not a reliable historian.  exam: thin, ncat, perrl, eomi, mmm, rrr, ctab, abd soft, nt,nd aox3, pallor imp: pt with increased in uncooperative behavior, dec po intake, weight loss, refusal to walk, sent by group home due to difficulty at the home.  found to have uti on evaluation, consider delirium?

## 2019-02-28 NOTE — H&P ADULT - NSHPPHYSICALEXAM_GEN_ALL_CORE
ICU Vital Signs Last 24 Hrs  T(C): 35.4 (28 Feb 2019 16:30), Max: 37.1 (28 Feb 2019 14:08)  T(F): 95.8 (28 Feb 2019 16:30), Max: 98.7 (28 Feb 2019 14:08)  HR: 84 (28 Feb 2019 16:30) (84 - 84)  BP: 111/72 (28 Feb 2019 16:30) (111/72 - 141/81)  RR: 18 (28 Feb 2019 16:30) (18 - 18)  SpO2: 99% (28 Feb 2019 16:30) (99% - 100%)    GENERAL: NAD, speaks in full sentences, no signs of respiratory distress  HEAD:  Atraumatic, Normocephalic  EYES: EOMI, PERRLA, poor injected conjunctiva  NECK: Supple, No JVD  CHEST/LUNG: Clear to auscultation bilaterally; No wheeze; No crackles; No accessory muscles used  HEART: Regular rate and rhythm; No murmurs;   ABDOMEN: Soft, Nontender, Nondistended; Bowel sounds present; No guarding  EXTREMITIES:  No cyanosis, +1 pitting pedal edema  NEUROLOGY: refuses to move his legs, some atrophy?  SKIN: No rashes or lesions

## 2019-02-28 NOTE — ED ADULT NURSE NOTE - OBJECTIVE STATEMENT
PT comes from group home with aid at the bedside. Per aid, pt has had decreased in eating/ drinking over the last few days. Aide, denies any nausea/vomiting, diarrhea. Lung sounds clear bilaterally, abdomen soft non tender. Bilateral trace foot edema.

## 2019-02-28 NOTE — ED ADULT TRIAGE NOTE - CHIEF COMPLAINT QUOTE
patient brought in by EMS from [facility] home for defiant behavior,. Patient refuses to eat, drink and urinate in the toilet.

## 2019-02-28 NOTE — ED PROVIDER NOTE - NS ED ROS FT
Constitutional:  (+) malaise (+) wt. loss (-) fever  Eyes: (-) visual changes  ENMT: (-) nasal or chest congestion (-) runny nose (-) sore throat (-) hoarseness  Cardiac: (-) chest pain (-) syncope  Respiratory: (-) cough (-) hemoptysis (-) SOB  GI: (+) decrease in appetite (-) vomiting (-) diarrhea (-) abdominal pain (-) hematochezia  : (-) hematuria (-) incontinence  Neuro: (+) AMS (-) dizziness (-) LOC (-) head injury (-) tremors  Skin: (-) rash (-) laceration  Psychiatric: (+) behavior issues (-) hallucinations (-) SI/HI (-) intoxication  Except as documented in the HPI, all other systems are negative.

## 2019-03-01 ENCOUNTER — TRANSCRIPTION ENCOUNTER (OUTPATIENT)
Age: 49
End: 2019-03-01

## 2019-03-01 PROBLEM — R33.9 RETENTION OF URINE, UNSPECIFIED: Chronic | Status: INACTIVE | Noted: 2018-02-20 | Resolved: 2019-02-28

## 2019-03-01 LAB
AMMONIA BLD-MCNC: 40 UMOL/L — SIGNIFICANT CHANGE UP (ref 11–55)
BASOPHILS # BLD AUTO: 0.01 K/UL — SIGNIFICANT CHANGE UP (ref 0–0.2)
BASOPHILS NFR BLD AUTO: 0.1 % — SIGNIFICANT CHANGE UP (ref 0–1)
CK SERPL-CCNC: 42 U/L — SIGNIFICANT CHANGE UP (ref 0–225)
EOSINOPHIL # BLD AUTO: 0.04 K/UL — SIGNIFICANT CHANGE UP (ref 0–0.7)
EOSINOPHIL NFR BLD AUTO: 0.5 % — SIGNIFICANT CHANGE UP (ref 0–8)
FOLATE SERPL-MCNC: 10.7 NG/ML — SIGNIFICANT CHANGE UP
HCT VFR BLD CALC: 33.5 % — LOW (ref 42–52)
HGB BLD-MCNC: 10.7 G/DL — LOW (ref 14–18)
IMM GRANULOCYTES NFR BLD AUTO: 0.4 % — HIGH (ref 0.1–0.3)
LYMPHOCYTES # BLD AUTO: 1.82 K/UL — SIGNIFICANT CHANGE UP (ref 1.2–3.4)
LYMPHOCYTES # BLD AUTO: 23.5 % — SIGNIFICANT CHANGE UP (ref 20.5–51.1)
MCHC RBC-ENTMCNC: 31.9 G/DL — LOW (ref 32–37)
MCHC RBC-ENTMCNC: 33 PG — HIGH (ref 27–31)
MCV RBC AUTO: 103.4 FL — HIGH (ref 80–94)
MONOCYTES # BLD AUTO: 0.99 K/UL — HIGH (ref 0.1–0.6)
MONOCYTES NFR BLD AUTO: 12.8 % — HIGH (ref 1.7–9.3)
NEUTROPHILS # BLD AUTO: 4.84 K/UL — SIGNIFICANT CHANGE UP (ref 1.4–6.5)
NEUTROPHILS NFR BLD AUTO: 62.7 % — SIGNIFICANT CHANGE UP (ref 42.2–75.2)
NRBC # BLD: 0 /100 WBCS — SIGNIFICANT CHANGE UP (ref 0–0)
PLATELET # BLD AUTO: 100 K/UL — LOW (ref 130–400)
RBC # BLD: 3.24 M/UL — LOW (ref 4.7–6.1)
RBC # FLD: 15.5 % — HIGH (ref 11.5–14.5)
TSH SERPL-MCNC: 3.74 UIU/ML — SIGNIFICANT CHANGE UP (ref 0.27–4.2)
VALPROATE SERPL-MCNC: 129 UG/ML — CRITICAL HIGH (ref 50–100)
VIT B12 SERPL-MCNC: 1725 PG/ML — HIGH (ref 232–1245)
WBC # BLD: 7.73 K/UL — SIGNIFICANT CHANGE UP (ref 4.8–10.8)
WBC # FLD AUTO: 7.73 K/UL — SIGNIFICANT CHANGE UP (ref 4.8–10.8)

## 2019-03-01 PROCEDURE — 93970 EXTREMITY STUDY: CPT | Mod: 26

## 2019-03-01 RX ORDER — HALOPERIDOL DECANOATE 100 MG/ML
10 INJECTION INTRAMUSCULAR THREE TIMES A DAY
Qty: 0 | Refills: 0 | Status: DISCONTINUED | OUTPATIENT
Start: 2019-03-01 | End: 2019-03-02

## 2019-03-01 RX ORDER — HALOPERIDOL DECANOATE 100 MG/ML
10 INJECTION INTRAMUSCULAR THREE TIMES A DAY
Qty: 0 | Refills: 0 | Status: DISCONTINUED | OUTPATIENT
Start: 2019-03-01 | End: 2019-03-01

## 2019-03-01 RX ADMIN — Medication 100 MILLIGRAM(S): at 06:01

## 2019-03-01 RX ADMIN — Medication 1000 UNIT(S): at 12:32

## 2019-03-01 RX ADMIN — ENOXAPARIN SODIUM 40 MILLIGRAM(S): 100 INJECTION SUBCUTANEOUS at 12:32

## 2019-03-01 RX ADMIN — MIRTAZAPINE 15 MILLIGRAM(S): 45 TABLET, ORALLY DISINTEGRATING ORAL at 19:51

## 2019-03-01 RX ADMIN — Medication 1 MILLIGRAM(S): at 06:01

## 2019-03-01 RX ADMIN — BUPROPION HYDROCHLORIDE 150 MILLIGRAM(S): 150 TABLET, EXTENDED RELEASE ORAL at 12:32

## 2019-03-01 RX ADMIN — DIVALPROEX SODIUM 750 MILLIGRAM(S): 500 TABLET, DELAYED RELEASE ORAL at 22:25

## 2019-03-01 RX ADMIN — Medication 100 MILLIGRAM(S): at 19:51

## 2019-03-01 RX ADMIN — CEFTRIAXONE 100 GRAM(S): 500 INJECTION, POWDER, FOR SOLUTION INTRAMUSCULAR; INTRAVENOUS at 12:32

## 2019-03-01 RX ADMIN — Medication 1 TABLET(S): at 06:01

## 2019-03-01 RX ADMIN — Medication 100 MICROGRAM(S): at 06:01

## 2019-03-01 RX ADMIN — Medication 1 TABLET(S): at 19:50

## 2019-03-01 RX ADMIN — SENNA PLUS 2 TABLET(S): 8.6 TABLET ORAL at 21:21

## 2019-03-01 RX ADMIN — DIVALPROEX SODIUM 750 MILLIGRAM(S): 500 TABLET, DELAYED RELEASE ORAL at 06:00

## 2019-03-01 RX ADMIN — Medication 1 MILLIGRAM(S): at 19:50

## 2019-03-01 RX ADMIN — DIVALPROEX SODIUM 750 MILLIGRAM(S): 500 TABLET, DELAYED RELEASE ORAL at 19:49

## 2019-03-01 NOTE — DISCHARGE NOTE ADULT - OTHER SIGNIFICANT FINDINGS
< from: [facility] Duplex Lower Ext Vein Scan, Bilat (03.01.19 @ 10:07) >  Impression:  No evidence of deep venous thrombosis or superficial thrombophlebitis in   the bilateral lower extremities.  < end of copied text >    < from: CT Head No Cont (02.28.19 @ 18:50) >  IMPRESSION:  No CT evidence of acute intracranial pathology.    Prominence of the ventricles and cortical sulci nonproportional to   patient's stated age .    < end of copied text > < from: [facility] Duplex Lower Ext Vein Scan, Bilat (03.01.19 @ 10:07) >  Impression:  No evidence of deep venous thrombosis or superficial thrombophlebitis in   the bilateral lower extremities.        < from: CT Head No Cont (02.28.19 @ 18:50) >  IMPRESSION:  No CT evidence of acute intracranial pathology.    Prominence of the ventricles and cortical sulci nonproportional to   patient's stated age .

## 2019-03-01 NOTE — DISCHARGE NOTE ADULT - MEDICATION SUMMARY - MEDICATIONS TO STOP TAKING
I will STOP taking the medications listed below when I get home from the hospital:    Keflex 500 mg oral capsule  -- 1 cap(s) by mouth every 8 hours   -- Finish all this medication unless otherwise directed by prescriber.    predniSONE 20 mg oral tablet  -- 3 tab(s) by mouth once a day   -- It is very important that you take or use this exactly as directed.  Do not skip doses or discontinue unless directed by your doctor.  Obtain medical advice before taking any non-prescription drugs as some may affect the action of this medication.  Take with food or milk.    Depakote 250 mg oral delayed release tablet  -- 3 tab(s) by mouth 3 times a day

## 2019-03-01 NOTE — DISCHARGE NOTE ADULT - MEDICATION SUMMARY - MEDICATIONS TO TAKE
I will START or STAY ON the medications listed below when I get home from the hospital:    calcium carbonate  -- calcium carbonate and calcium gluconate-vitamin d2 oral 400 mg oral 2 times per day  -- Indication: For Supplements    Depakote 500 mg oral delayed release tablet  -- 1 tab(s) by mouth 3 times a day   -- Do not take this drug if you are pregnant.  It is very important that you take or use this exactly as directed.  Do not skip doses or discontinue unless directed by your doctor.  May cause drowsiness.  Alcohol may intensify this effect.  Use care when operating dangerous machinery.  Swallow whole.  Do not crush.    -- Indication: For Seizure    mirtazapine 15 mg oral tablet  -- mirtazapine 15mg tablet 1 tablet oral at bedtime  -- Indication: For depression    benztropine 1 mg oral tablet  -- 1 tab(s) by mouth 2 times a day  -- Indication: For Anti parkinson    haloperidol 10 mg oral tablet  -- 1 tab(s) by mouth 3 times a day  -- Indication: For Agitation    alendronate 35 mg oral tablet  -- 1 tab(s) by mouth once a week on saturday  -- Indication: For prevention of bone resorption    Senexon-S 50 mg-8.6 mg oral tablet  -- 1 tab(s) by mouth once a day (at bedtime)  -- Indication: For constipation    buPROPion 150 mg/24 hours (XL) oral tablet, extended release  -- 1 tab(s) by mouth every 24 hours  -- Indication: For Smoking cessation    Synthroid 100 mcg (0.1 mg) oral tablet  -- synthroid 100mcg tablet (levothyroxine 100 mcg tablet) 1 tablet oral every day  -- Indication: For Hypothyroidism

## 2019-03-01 NOTE — CONSULT NOTE ADULT - SUBJECTIVE AND OBJECTIVE BOX
Patient is a 48y old  Male who presents with a chief complaint of Refusal to ambulate and bilateral feet swelling. (01 Mar 2019 11:32)    HPI:  a 49 yo man with history of mild intellectual disability, schizophrenia, impulse control disorder, hypothyroidism, seizure, osteopenia and anemia was brought in [facility] home because of Refusal to ambulate and bilateral feet swelling. as per the aid [place] at the bedside, since  he had right big toe fracture, they don't know how he had it and he said he does not remember, he was evaluated in the ED twice since then. his feet are getting more swollen and she thinks that is why he is not walking, he is refusing even to get out of bed. upon asking him, he said his feet are ok. she also reports that he lost around 35 pounds since 2018 (last weight I have in the system is 135 lbs oct 2018) and he is been also refusing to eat. he kept asking during my evaluation to get him pumpkin or apple pies which [place] said she does the same at home then he refuses to eat them. No fever, no chills, no urinary symptoms, no other specific complains. (2019 21:07)      PAST MEDICAL & SURGICAL HISTORY:  MR (mental retardation)  Constipation  Hypothyroidism  Dyspepsia  Anemia  Major depressive disorder  Bipolar 1 disorder  Schizophrenia  Epilepsy  No significant past surgical history      Hospital Course: Bilateral feet swelling  -minimal on physical exam  -r/o dependant edema, can be secondary to low albumin (alb =3.2 and positive proteinuria) r/o uncontrolled hypothyroidism? -r/o DVT less likely  -keep feet elevated  -check protein/cr ratio, add ensure  -check TSH  -duplex lower extremities    # Refusal to ambulate, resolved  -r/o uncontrolled depression r/o neurologic disease like b12 deficiency r/o myopathy  - pt ambulated today in the morning   -check b12 and folate  -check CK  -get PT    # Possible UTI  -positive UA, no symptoms but cannot rely on patients complaints  -follow up urine culture  -c/w ceftriaxone (started )    *Hypothyroidism  -f/u TSH  -c/w levothyroxine    # Mild intellectual disability, schizophrenia, impulse control disorder  -c/w benztropine, bupropion and Remeron  -halodol 10 mg TID PRN     #Seizure  -c/w Depakote  -f/u level  -f/u ammonia    #Macrocytic anemia  -check b12 and folate    #DVt ppx: lovenox s/c  #Diet: regular with ensure      TODAY'S SUBJECTIVE & REVIEW OF SYMPTOMS:     Constitutional WNL   Cardio WNL   Resp WNL   GI WNL  Heme WNL  Endo WNL  Skin WNL  MSK WNL  Neuro WNL  Cognitive WNL  Psych WNL  + incontinence    MEDICATIONS  (STANDING):  benztropine 1 milliGRAM(s) Oral every 12 hours  buPROPion XL . 150 milliGRAM(s) Oral daily  calcium carbonate    500 mG (Tums) Chewable 1 Tablet(s) Chew two times a day  cefTRIAXone   IVPB 1 Gram(s) IV Intermittent every 24 hours  cholecalciferol 1000 Unit(s) Oral daily  diVALproex  milliGRAM(s) Oral <User Schedule>  docusate sodium 100 milliGRAM(s) Oral two times a day  enoxaparin Injectable 40 milliGRAM(s) SubCutaneous daily  levothyroxine 100 MICROGram(s) Oral daily  mirtazapine 15 milliGRAM(s) Oral <User Schedule>  senna 2 Tablet(s) Oral at bedtime    MEDICATIONS  (PRN):  haloperidol     Tablet 10 milliGRAM(s) Oral three times a day PRN Agitation      FAMILY HISTORY:  No pertinent family history in first degree relatives      Allergies    erythromycin (Other)  phenothiazines (Unknown)    Intolerances        SOCIAL HISTORY:    [    ] Etoh  [    ] Smoking  [    ] Substance abuse     Home Environment:  [    ] Home Alone  [    ] Lives with Family  [ x   ] Home Health Aid    Dwelling:  [    ] Apartment  [    ] Private House  [ xx   ] Adult Home  [    ] Skilled Nursing Facility      [    ] Short Term  [    ] Long Term  [    ] Stairs                           [    ] Elevator     FUNCTIONAL STATUS PTA: (Check all that apply)  Ambulation: [     ]Independent    [  x  ] Dependent     [    ] Non-Ambulatory  Assistive Device: [    ] SA Cane  [    ]  Q Cane  [    ] Walker  [    ]  Wheelchair  ADL : [    ] Independent  [   x ]  Dependent   AMBULATES HHA    Vital Signs Last 24 Hrs  T(C): 35.9 (01 Mar 2019 04:52), Max: 37.1 (2019 14:08)  T(F): 96.7 (01 Mar 2019 04:52), Max: 98.7 (2019 14:08)  HR: 81 (01 Mar 2019 04:52) (73 - 84)  BP: 120/66 (01 Mar 2019 04:52) (111/72 - 141/81)  BP(mean): --  RR: 17 (01 Mar 2019 04:52) (17 - 18)  SpO2: 99% (2019 16:30) (99% - 100%)      PHYSICAL EXAM: Alert & Oriented X2  GENERAL: NAD, well-groomed, well-developed  HEAD:  Atraumatic, Normocephalic  EYES: EOMI, PERRLA, conjunctiva and sclera clear  NECK: Supple  CHEST/LUNG: Clear bilaterally  HEART: Regular rate and rhythm  ABDOMEN: Soft, Nontender, Nondistended; Bowel sounds present  EXTREMITIES:  no calf tenderness,trace edema BLES  increased tone les    NERVOUS SYSTEM:  Cranial Nerves 2-12 intact [ x   ] Abnormal  [    ]  ROM: WFL all extremities [  x  ]  Abnormal [     ]  Motor Strength: WFL all extremities  [   x ]  Abnormal [    ]  Sensation: intact to light touch [   x ] Abnormal [    ]    FUNCTIONAL STATUS:  Bed Mobility: [   ]  Independent [    ]  Supervision [  x  ]  Needs Assistance [  ]  N/A  Transfers: [    ]  Independent [    ]  Supervision [ x   ]  Needs Assistance [    ]  N/A    Ambulation:  [    ]  Independent [    ]  Supervision [  x  ]  Needs Assistance [    ]  N/A   ADL:  [    ]   Independent [ x   ] Requires Assistance [    ] N/A   AMBULATED HHA WITH PT    LABS:                        10.7   7.73  )-----------( 100      ( 01 Mar 2019 07:50 )             33.5         138  |  99  |  19  ----------------------------<  76  5.1<H>   |  30  |  0.9    Ca    9.3      2019 16:05  Mg     2.0         TPro  6.5  /  Alb  3.2<L>  /  TBili  0.3  /  DBili  x   /  AST  37  /  ALT  15  /  AlkPhos  182<H>        Urinalysis Basic - ( 2019 16:30 )    Color: Dark Yellow / Appearance: Turbid / S.025 / pH: x  Gluc: x / Ketone: 40  / Bili: Negative / Urobili: 2.0 mg/dL   Blood: x / Protein: 100 mg/dL / Nitrite: Negative   Leuk Esterase: Large / RBC: 3-5 /HPF / WBC >50 /HPF   Sq Epi: x / Non Sq Epi: x / Bacteria: Few /HPF        RADIOLOGY & ADDITIONAL STUDIES:

## 2019-03-01 NOTE — PHYSICAL THERAPY INITIAL EVALUATION ADULT - IMPAIRMENTS CONTRIBUTING TO GAIT DEVIATIONS, PT EVAL
decreased ROM/decreased flexibility/impaired coordination/decreased strength/impaired balance/cognition/impaired postural control

## 2019-03-01 NOTE — DISCHARGE NOTE ADULT - ADDITIONAL INSTRUCTIONS
Please follow up with your primary care doctor in 1-2 weeks.   Please follow up with your psychiatrist. Please follow up with your primary care doctor in 1-2 weeks.   Please follow up with your psychiatrist.  Depakote level has been decreased to 500mg  3 times/day. Get Depakote level checked in 2 days as outpt. Please follow up with your primary care doctor in 1-2 weeks.   Please follow up with your psychiatrist.  follow up with Orthopedics Dr Guzmán as outpt in 1 week.  Depakote level has been decreased to 500mg  3 times/day. Get Depakote level checked in 2 days as outpt.

## 2019-03-01 NOTE — CONSULT NOTE ADULT - ASSESSMENT
IMPRESSION: Rehab of gait ab    PRECAUTIONS: [    ] Cardiac  [    ] Respiratory  [    ] Seizures [    ] Contact Isolation  [    ] Droplet Isolation  [    ] Other    Weight Bearing Status:     RECOMMENDATION:    Out of Bed to Chair     DVT/Decubiti Prophylaxis    REHAB PLAN:     [   x  ] Bedside P/T 3-5 times a week   [     ] Bedside O/T  2-3 times a week   [     ] No Rehab Therapy Indicated   [     ]  Speech Therapy   Conditioning/ROM                                 ADL  Bed Mobility                                            Conditioning/ROM  Transfers                                                  Bed Mobility  Sitting /Standing Balance                      Transfers                                        Gait Training                                            Sitting/Standing Balance  Stair Training [   ]Applicable                 Home equipment Eval                                                                     Splinting  [   ] Only      GOALS:   ADL   [ x   ]   Independent         Transfers  [x    ] Independent            Ambulation  [ x    ] Independent     [     ] With device                            [    ]  CG                                               [    ]  CG                                                    [     ] CG                            [    ] Min A                                          [    ] Min A                                                [     ] Min  A          DISCHARGE PLAN:   [     ]  Good candidate for Intensive Rehabilitation/Hospital based                                             Will tolerate 3hrs Intensive Rehab Daily                                       [    x  ]  Short Term Rehab in Skilled Nursing Facility                                VS                                     [    x  ]  Home with Outpatient or  services AH                                         [      ]  Possible Candidate for Intensive Hospital based Rehab IMPRESSION: Rehab of gait ab    PRECAUTIONS: [    ] Cardiac  [    ] Respiratory  [    ] Seizures [    ] Contact Isolation  [    ] Droplet Isolation  [    ] Other    Weight Bearing Status:     RECOMMENDATION: CHECK B12/ FOLATE    Out of Bed to Chair     DVT/Decubiti Prophylaxis    REHAB PLAN:     [   x  ] Bedside P/T 3-5 times a week   [     ] Bedside O/T  2-3 times a week   [     ] No Rehab Therapy Indicated   [     ]  Speech Therapy   Conditioning/ROM                                 ADL  Bed Mobility                                            Conditioning/ROM  Transfers                                                  Bed Mobility  Sitting /Standing Balance                      Transfers                                        Gait Training                                            Sitting/Standing Balance  Stair Training [   ]Applicable                 Home equipment Eval                                                                     Splinting  [   ] Only      GOALS:   ADL   [ x   ]   Independent         Transfers  [x    ] Independent            Ambulation  [ x    ] Independent     [     ] With device                            [    ]  CG                                               [    ]  CG                                                    [     ] CG                            [    ] Min A                                          [    ] Min A                                                [     ] Min  A          DISCHARGE PLAN:   [     ]  Good candidate for Intensive Rehabilitation/Hospital based                                             Will tolerate 3hrs Intensive Rehab Daily                                       [    x  ]  Short Term Rehab in Skilled Nursing Facility                                VS                                     [    x  ]  Home with Outpatient or  services                                          [      ]  Possible Candidate for Intensive Hospital based Rehab

## 2019-03-01 NOTE — PROGRESS NOTE ADULT - SUBJECTIVE AND OBJECTIVE BOX
ALIAN CHERY 48y Male  MRN#: [id number]     SUBJECTIVE  Patient is a 48y old Male who presents with a chief complaint of Refusal to ambulate and bilateral feet swelling. (2019 21:07)    Today is hospital day 1d, and this morning he is lying in bed without distress. Pt has intelligent disability. Aids by bedside explained pt had b/l swelling feet and thats why they brought patient in to hospital.  No acute overnight events.     OBJECTIVE  PAST MEDICAL & SURGICAL HISTORY  MR (mental retardation)  Constipation  Hypothyroidism  Dyspepsia  Anemia  Major depressive disorder  Bipolar 1 disorder  Schizophrenia  Epilepsy  No significant past surgical history    ALLERGIES:  erythromycin (Other)  phenothiazines (Unknown)    MEDICATIONS:  STANDING MEDICATIONS  benztropine 1 milliGRAM(s) Oral every 12 hours  buPROPion XL . 150 milliGRAM(s) Oral daily  calcium carbonate    500 mG (Tums) Chewable 1 Tablet(s) Chew two times a day  cefTRIAXone   IVPB 1 Gram(s) IV Intermittent every 24 hours  cholecalciferol 1000 Unit(s) Oral daily  diVALproex  milliGRAM(s) Oral <User Schedule>  docusate sodium 100 milliGRAM(s) Oral two times a day  enoxaparin Injectable 40 milliGRAM(s) SubCutaneous daily  levothyroxine 100 MICROGram(s) Oral daily  mirtazapine 15 milliGRAM(s) Oral <User Schedule>  senna 2 Tablet(s) Oral at bedtime    PRN MEDICATIONS    HOME MEDICATIONS  Home Medications:  alendronate 35 mg oral tablet: 1 tab(s) orally once a week on saturday (2019 20:47)  benztropine 1 mg oral tablet: 1 tab(s) orally 2 times a day (2019 21:00)  buPROPion 150 mg/24 hours (XL) oral tablet, extended release: 1 tab(s) orally every 24 hours (2019 20:48)  calcium carbonate: calcium carbonate and calcium gluconate-vitamin d2 oral 400 mg oral 2 times per day (2018 21:25)  Depakote 250 mg oral delayed release tablet: 3 tab(s) orally 3 times a day (2019 21:02)  haloperidol 10 mg oral tablet: 1 tab(s) orally 3 times a day, As Needed (2019 21:06)  mirtazapine 15 mg oral tablet: mirtazapine 15mg tablet 1 tablet oral at bedtime (2018 21:25)  Senexon-S 50 mg-8.6 mg oral tablet: 1 tab(s) orally once a day (at bedtime) (2019 20:59)  Synthroid 100 mcg (0.1 mg) oral tablet: synthroid 100mcg tablet (levothyroxine 100 mcg tablet) 1 tablet oral every day (2018 21:25)      VITAL SIGNS: Last 24 Hours  T(C): 35.9 (01 Mar 2019 04:52), Max: 37.1 (2019 14:08)  T(F): 96.7 (01 Mar 2019 04:52), Max: 98.7 (2019 14:08)  HR: 81 (01 Mar 2019 04:52) (73 - 84)  BP: 120/66 (01 Mar 2019 04:52) (111/72 - 141/81)  BP(mean): --  RR: 17 (01 Mar 2019 04:52) (17 - 18)  SpO2: 99% (2019 16:30) (99% - 100%)      LABS:                        10.7   7.73  )-----------( 100      ( 01 Mar 2019 07:50 )             33.5         138  |  99  |  19  ----------------------------<  76  5.1<H>   |  30  |  0.9    Ca    9.3      2019 16:05  Mg     2.0         TPro  6.5  /  Alb  3.2<L>  /  TBili  0.3  /  DBili  x   /  AST  37  /  ALT  15  /  AlkPhos  182<H>      LIVER FUNCTIONS - ( 2019 16:05 )  Alb: 3.2 g/dL / Pro: 6.5 g/dL / ALK PHOS: 182 U/L / ALT: 15 U/L / AST: 37 U/L / GGT: x             Urinalysis Basic - ( 2019 16:30 )    Color: Dark Yellow / Appearance: Turbid / S.025 / pH: x  Gluc: x / Ketone: 40  / Bili: Negative / Urobili: 2.0 mg/dL   Blood: x / Protein: 100 mg/dL / Nitrite: Negative   Leuk Esterase: Large / RBC: 3-5 /HPF / WBC >50 /HPF   Sq Epi: x / Non Sq Epi: x / Bacteria: Few /HPF        Creatine Kinase, Serum: 42 U/L (19 @ 01:03)  Troponin T, Serum: <0.01 ng/mL (19 @ 16:05)      CARDIAC MARKERS ( 01 Mar 2019 01:03 )  x     / x     / 42 U/L / x     / x      CARDIAC MARKERS ( 2019 16:05 )  x     / <0.01 ng/mL / x     / x     / x          CAPILLARY BLOOD GLUCOSE      RADIOLOGY:    PHYSICAL EXAM:  GENERAL: NAD, AAO x 2, 48y M  HEAD:  Atraumatic, Normocephalic  EYES: EOMI, conjunctiva clear and sclera white  NECK: Supple, No JVD  CHEST/LUNG: Clear to auscultation bilaterally; No wheeze; No crackles; No accessory muscles used  HEART: Regular rate and rhythm; No murmurs;   ABDOMEN: Soft, Nontender, Nondistended; Bowel sounds present; No guarding  EXTREMITIES:  b/l swelling feet  NEUROLOGY: non-focal    ADMISSION SUMMARY  Patient is a 48y old Male who presents with a chief complaint of Refusal to ambulate and bilateral feet swelling. (2019 21:07)

## 2019-03-01 NOTE — DISCHARGE NOTE ADULT - HOSPITAL COURSE
49 yo man with history of mild intellectual disability, schizophrenia, impulse control disorder, hypothyroidism, seizure, osteopenia and anemia was brought in [facility] home because of Refusal to ambulate and bilateral feet swelling.    $Bilateral feet swelling  -minimal on physical exam  -r/o dependant edema, can be secondary to low albumin (alb =3.2 and positive proteinuria) r/o uncontrolled hypothyroidism? -r/o DVT less likely  -keep feet elevated  -add ensure  -duplex lower extremities WNL    # Refusal to ambulate, resolved  -r/o uncontrolled depression r/o neurologic disease like b12 deficiency r/o myopathy  - pt ambulated today in the morning   -c/w PT    # Possible UTI  -positive UA, no symptoms but cannot rely on patients complaints  -follow up urine culture  -completed ceftriaxone (started Feb 28) for 3 days    *Hypothyroidism  -f/u TSH  -c/w levothyroxine    # Mild intellectual disability, schizophrenia, impulse control disorder  -c/w benztropine, bupropion and Remeron  -halodol 10 mg TID PRN     #Seizure  -c/w Depakote  -f/u level as OP  -f/u ammonia    #Macrocytic anemia - f/u PMD 49 yo man with history of mild intellectual disability, schizophrenia, impulse control disorder, hypothyroidism, seizure, osteopenia and anemia was brought in [facility] home because of Refusal to ambulate and bilateral feet swelling. as per the aid [place] at the bedside, since Jan 8th he had right big toe fracture, they don't know how he had it and he said he does not remember, he was evaluated in the ED twice since then. his feet were getting more swollen and she thinks that is why he is not walking, he is refusing even to get out of bed. upon asking him, he said his feet are ok. she also reported that he lost around 35 pounds since Nov 2018 (last weight I have in the system is 135 lbs oct 2018) and he has been also refusing to eat.    >>>Pt was worked up for suspected DVT as the cause of bilateral lower extremity swelling. Duplex Lower extremity was negative for DVT. TSH level was also within normal limit. Xray bilateral feet showed osteopenia but no acute fracture. Orthopedics recommended it is likely degenerative joint disease and to continue using hard sole shoe.    >>>His urinalysis was also positive, Urine culture was positive for enterococcus fecalis. Pt received iv antibiotics and improved. 49 yo man with history of mild intellectual disability, schizophrenia, impulse control disorder, hypothyroidism, seizure, osteopenia and anemia was brought in [facility] home because of Refusal to ambulate and bilateral feet swelling. as per the aid [place] at the bedside, since Jan 8th he had right big toe fracture, they don't know how he had it and he said he does not remember, he was evaluated in the ED twice since then. his feet were getting more swollen and she thinks that is why he is not walking, he is refusing even to get out of bed. upon asking him, he said his feet are ok. she also reported that he lost around 35 pounds since Nov 2018 (last weight I have in the system is 135 lbs oct 2018) and he has been also refusing to eat.    # Refusal to ambulate,S/p injury to right toe- Psychologic due to mild intellectual disability, schizophrenia, impulse control disorder  - pt evaluated by ortho- Likely DJD R 1st MTP joint vs new injury.  Hard sole shoe.No orthopaedic intervention is needed at this time.   - Refusal to ambulate might be psychologic, sec to no gross deformity or orthopaedic contraindication to WBAT.   - May follow up with Dr. Guzmán as an outpatient if pain persists.   -PT eval - recommend STR    # Hypothyroidism  -c/w synthroid  - tsh- 3.74    #Macrocytic anemia  - elevated B12, normal folate TSH    # Mild intellectual disability, schizophrenia, impulse control disorder  -c/w benztropine, bupropion and Remeron  -haldol 10 mg TID   -Pt with elevated valproic acid level- decrease depakote to 500mg TID  - recheck valproic acid levels in 2-3 days  - case discussed with psychiatry team    # H/o seizure disoder  -c/w Depakote  - seizure precautions    >>>Pt was worked up for suspected DVT as the cause of bilateral lower extremity swelling. Duplex Lower extremity was negative for DVT. Xray bilateral feet showed osteopenia but no acute fracture. Orthopedics recommended it is likely degenerative joint disease and to continue using hard sole shoe.    >>>His urinalysis was also positive, Urine culture was positive for enterococcus fecalis. Pt received iv antibiotics and improved.

## 2019-03-01 NOTE — PHYSICAL THERAPY INITIAL EVALUATION ADULT - TRANSFER SAFETY CONCERNS NOTED: TOILET, REHAB EVAL
decreased weight-shifting ability/decreased balance during turns/decreased proprioception/decreased safety awareness

## 2019-03-01 NOTE — DISCHARGE NOTE ADULT - PROVIDER TOKENS
PROVIDER:[TOKEN:[[id number]],FOLLOWUP:[1 week]] PROVIDER:[TOKEN:[[id number]],FOLLOWUP:[1 week]],PROVIDER:[TOKEN:[[id number]]]

## 2019-03-01 NOTE — PHYSICAL THERAPY INITIAL EVALUATION ADULT - GENERAL OBSERVATIONS, REHAB EVAL
Pt seen [id number] for a total of 40 minutes. Pt encountered semifowlers in bed, no apparent distress, Aide present bedside, pt agreeable to physical therapy.

## 2019-03-01 NOTE — PHYSICAL THERAPY INITIAL EVALUATION ADULT - PERTINENT HX OF CURRENT PROBLEM, REHAB EVAL
Pt adm for inability to ambulate, decreased PO intake, and bilateral feet swelling. Pt with history of mild intellectual disability, schizophrenia, seizure, and more below.

## 2019-03-01 NOTE — DISCHARGE NOTE ADULT - CARE PROVIDER_API CALL
Nj Alicia)  Internal Medicine  [address] [place] [address]  Phone: [phone]  Fax: [phone]  Follow Up Time: 1 week Nj Alicia)  Internal Medicine  [address] [place] [address]  Phone: [phone]  Fax: [phone]  Follow Up Time: 1 week Nj Alicia)  Internal Medicine  [address] [place] [address]  Phone: [phone]  Fax: [phone]  Follow Up Time: 1 week    Timbo Guzmán)  Orthopaedic Surgery Surgery  [address] [address]  Phone: [phone]  Fax: [phone]  Follow Up Time:

## 2019-03-01 NOTE — PHYSICAL THERAPY INITIAL EVALUATION ADULT - ADDITIONAL COMMENTS
Pt ambulates with 1 person hand held assistance. There is a flight of steps at the group home he resides at.

## 2019-03-01 NOTE — DISCHARGE NOTE ADULT - PLAN OF CARE
Stable. Medical management. Please follow up with your primary care doctor. Your feet swelling likely due to low albumin. Please continue your medications as prescribed. Please follow up with your primary care doctor in 1-2 weeks. Resolved. Medical management. Please follow up with your primary care doctor. You completed your course of antibiotics in the hospital. Please continue your medications as prescribed. Please follow up with your primary care doctor in 1-2 weeks. Stable. Medical management. Please follow up with your psychiatrist. Please continue your medications as prescribed. Please follow up with your psychiatrist. Please continue your medications as prescribed. Please follow up with your primary care doctor in 1-2 weeks. Please continue your medications as prescribed. Please follow up with your primary care doctor in 1-2 weeks. Please obtain your blood work regularly to assess your anemia. Please continue medications as prescribed. Please follow up with psychiatrist.  Depakote has been decreased to 500mg Three times a day.  Follow up with PMD in 2 days and get depakote level checked in 2 days. Please continue medications as prescribed. Please follow up with primary care doctor in 1-2 weeks. Please continue medications as prescribed. Please follow up with primary care doctor in 1-2 weeks. Please obtain your blood work regularly to assess your anemia. Pt presented with complain of bilateral lower extremity swelling but it was subjective. There is no edema on examination.  Lower extremity Duplex was negative for DVT.   TSH was within normal limit.  Pt advised to continue taking medications as prescribed.  Maintain healthy diet and stay active. Pt completed course of antibiotics in the hospital.   Please continue medications as prescribed. Please follow up with primary care doctor in 1-2 weeks. Pt completed course of antibiotics in the hospital.   Please continue medications as prescribed. Please follow up with primary care doctor in 1-2 weeks.  stay hydrated.  maintain good personal and hand hygiene. Pt presented with complain of bilateral lower extremity swelling but it was subjective. There is no edema on examination.  Lower extremity Duplex was negative for DVT.   TSH was within normal limit.  Pt advised to continue taking medications as prescribed.  Maintain healthy diet and stay active.  follow up with Orthopedics Dr Guzmán as outpt in 1 week.  wear hard sole shoes. Continue Levothyroxine. Pt presented with complain of bilateral lower extremity swelling but it was subjective. There is no edema on examination.  Refusal to ambulate might be psychologic, no gross deformity or orthopaedic contraindication to WBAT.   Lower extremity Duplex was negative for DVT.   TSH was within normal limit.  Pt advised to continue taking medications as prescribed.  Maintain healthy diet and stay active.  follow up with Orthopedics Dr Guzmán as outpt in 1 week.  wear hard sole shoes.  Please follow up with psychiatrist.  Depakote has been decreased to 500mg Three times a day.  Follow up with PMD in 2 days and get depakote level checked in 2 days.

## 2019-03-01 NOTE — PHYSICAL THERAPY INITIAL EVALUATION ADULT - GAIT DEVIATIONS NOTED, PT EVAL
increased time in double stance/decreased weight-shifting ability/+ crouched giat, pt leans forward despite VC/TC to straighten posture. +Flexed hips/knees bilaterally/decreased eufemia/decreased velocity of limb motion

## 2019-03-01 NOTE — PHYSICAL THERAPY INITIAL EVALUATION ADULT - IMPAIRMENTS FOUND, PT EVAL
fine motor/joint integrity and mobility/neuromotor development and sensory integration/ROM/ergonomics and body mechanics/arousal, attention, and cognition/gait, locomotion, and balance/cognitive impairment/integumentary integrity/muscle strength/aerobic capacity/endurance/gross motor

## 2019-03-01 NOTE — PHYSICAL THERAPY INITIAL EVALUATION ADULT - PERSONAL SAFETY AND JUDGMENT, REHAB EVAL
pt ambulated with unsafe gait pattern and has difficulty following directions/at risk behaviors demonstrated

## 2019-03-01 NOTE — PROGRESS NOTE ADULT - ASSESSMENT
a 47 yo man with history of* mild intellectual disability, schizophrenia, impulse control disorder, hypothyroidism, seizure, osteopenia and anemia was brought in [facility] home because of Refusal to ambulate and bilateral feet swelling.    $Bilateral feet swelling  -minimal on physical exam  -r/o dependant edema, can be secondary to low albumin (alb =3.2 and positive proteinuria) r/o uncontrolled hypothyroidism? -r/o DVT less likely  -keep feet elevated  -check protein/cr ratio, add ensure  -check TSH  -duplex lower extremities    # Refusal to ambulate, resolved  -r/o uncontrolled depression r/o neurologic disease like b12 deficiency r/o myopathy  - pt ambulated today in the morning   -check b12 and folate  -check CK  -get PT    # Possible UTI  -positive UA, no symptoms but cannot rely on patients complaints  -follow up urine culture  -c/w ceftriaxone (started Feb 28)    *Hypothyroidism  -f/u TSH  -c/w levothyroxine    # Mild intellectual disability, schizophrenia, impulse control disorder  -c/w benztropine, bupropion and Remeron  -halodol 10 mg TID PRN     #Seizure  -c/w Depakote  -f/u level  -f/u ammonia    #Macrocytic anemia  -check b12 and folate    #DVt ppx: lovenox s/c  #Diet: regular with ensure

## 2019-03-01 NOTE — DISCHARGE NOTE ADULT - CARE PROVIDERS DIRECT ADDRESSES
[email or web address]

## 2019-03-01 NOTE — PHYSICAL THERAPY INITIAL EVALUATION ADULT - PLANNED THERAPY INTERVENTIONS, PT EVAL
balance training/bed mobility training/gait training/stair negotiation/ROM/strengthening/transfer training

## 2019-03-01 NOTE — PHYSICAL THERAPY INITIAL EVALUATION ADULT - CRITERIA FOR SKILLED THERAPEUTIC INTERVENTIONS
anticipated discharge recommendation/functional limitations in following categories/therapy frequency/predicted duration of therapy intervention/anticipated equipment needs at discharge/risk reduction/prevention/rehab potential/impairments found

## 2019-03-01 NOTE — DISCHARGE NOTE ADULT - PATIENT PORTAL LINK FT
You can access the [facility] Patient Portal, offered by [facility], by registering with the following website: [email or web address]

## 2019-03-02 DIAGNOSIS — F20.9 SCHIZOPHRENIA, UNSPECIFIED: ICD-10-CM

## 2019-03-02 DIAGNOSIS — F63.9 IMPULSE DISORDER, UNSPECIFIED: ICD-10-CM

## 2019-03-02 DIAGNOSIS — F79 UNSPECIFIED INTELLECTUAL DISABILITIES: ICD-10-CM

## 2019-03-02 RX ORDER — HALOPERIDOL DECANOATE 100 MG/ML
10 INJECTION INTRAMUSCULAR THREE TIMES A DAY
Qty: 0 | Refills: 0 | Status: DISCONTINUED | OUTPATIENT
Start: 2019-03-02 | End: 2019-03-05

## 2019-03-02 RX ADMIN — MIRTAZAPINE 15 MILLIGRAM(S): 45 TABLET, ORALLY DISINTEGRATING ORAL at 21:17

## 2019-03-02 RX ADMIN — CEFTRIAXONE 100 GRAM(S): 500 INJECTION, POWDER, FOR SOLUTION INTRAMUSCULAR; INTRAVENOUS at 11:56

## 2019-03-02 RX ADMIN — Medication 100 MICROGRAM(S): at 05:23

## 2019-03-02 RX ADMIN — HALOPERIDOL DECANOATE 10 MILLIGRAM(S): 100 INJECTION INTRAMUSCULAR at 21:14

## 2019-03-02 RX ADMIN — DIVALPROEX SODIUM 750 MILLIGRAM(S): 500 TABLET, DELAYED RELEASE ORAL at 22:38

## 2019-03-02 RX ADMIN — SENNA PLUS 2 TABLET(S): 8.6 TABLET ORAL at 21:15

## 2019-03-02 RX ADMIN — Medication 1 TABLET(S): at 05:22

## 2019-03-02 RX ADMIN — Medication 100 MILLIGRAM(S): at 17:40

## 2019-03-02 RX ADMIN — DIVALPROEX SODIUM 750 MILLIGRAM(S): 500 TABLET, DELAYED RELEASE ORAL at 06:05

## 2019-03-02 RX ADMIN — ENOXAPARIN SODIUM 40 MILLIGRAM(S): 100 INJECTION SUBCUTANEOUS at 11:56

## 2019-03-02 RX ADMIN — DIVALPROEX SODIUM 750 MILLIGRAM(S): 500 TABLET, DELAYED RELEASE ORAL at 17:39

## 2019-03-02 RX ADMIN — Medication 100 MILLIGRAM(S): at 05:23

## 2019-03-02 RX ADMIN — Medication 1 TABLET(S): at 17:40

## 2019-03-02 RX ADMIN — Medication 1000 UNIT(S): at 11:57

## 2019-03-02 RX ADMIN — BUPROPION HYDROCHLORIDE 150 MILLIGRAM(S): 150 TABLET, EXTENDED RELEASE ORAL at 11:57

## 2019-03-02 RX ADMIN — Medication 1 MILLIGRAM(S): at 05:23

## 2019-03-02 NOTE — PROGRESS NOTE ADULT - ATTENDING COMMENTS
#Progress Note Handoff  Pending (specify):  ortho and Psychiatry consult  Family discussion: spoken to the group home care taker   Disposition: Unknown now

## 2019-03-02 NOTE — BEHAVIORAL HEALTH ASSESSMENT NOTE - SUMMARY
This is a 49yo single, , male, resides at [facility], on disability, history of mild-moderate intellectual disability, chronic unspecified schizophrenia, and impulse control d/o, no known prior suicide attempts or IPP admissions, in outpatient psychiatric treatment with Dr. Smith at [address] [place] [address], who was admitted to medicine for bilateral LE swelling. Psychiatry was consulted to evaluate for depression as per primary team, patient has a poor appetite and is refusing to eat his meals.    Upon assessment, patient is not depressed, suicidal, psychotic, or manic. He is not an imminent danger to himself or others and does not require inpatient psychiatric hospitalization at this time. Patient's appetite seems to be improving as per his aid, he had breakfast and half of his lunch today and appears to be at his baseline mental status. Per patient's [facility] nurse manager, patient should be receiving haldol 10mg TID standing dose not PRN. Recommend rechecking patient's depakote level tomorrow morning prior to AM dose of depakote as his level from yesterday is supratherapeutic at 129. Patient also appears jaundiced and thus, recommend checking hepatic panel and hepatitis panel as depakote may cause liver toxicity. Psychiatry will continue to follow as patient's depakote dosage may need to be decreased.

## 2019-03-02 NOTE — PROGRESS NOTE ADULT - SUBJECTIVE AND OBJECTIVE BOX
ALINA CHERY 48y Male  MRN#: [id number]     SUBJECTIVE  Patient is a 48y old Male who presents with a chief complaint of Refusal to ambulate and bilateral feet swelling. (01 Mar 2019 15:20)    Today is hospital day 2d, and this morning he is lying in bed without distress.   No acute overnight events.     OBJECTIVE  PAST MEDICAL & SURGICAL HISTORY  MR (mental retardation)  Constipation  Hypothyroidism  Dyspepsia  Anemia  Major depressive disorder  Bipolar 1 disorder  Schizophrenia  Epilepsy  No significant past surgical history    ALLERGIES:  erythromycin (Other)  phenothiazines (Unknown)    MEDICATIONS:  STANDING MEDICATIONS  benztropine 1 milliGRAM(s) Oral every 12 hours  buPROPion XL . 150 milliGRAM(s) Oral daily  calcium carbonate    500 mG (Tums) Chewable 1 Tablet(s) Chew two times a day  cefTRIAXone   IVPB 1 Gram(s) IV Intermittent every 24 hours  cholecalciferol 1000 Unit(s) Oral daily  diVALproex  milliGRAM(s) Oral <User Schedule>  docusate sodium 100 milliGRAM(s) Oral two times a day  enoxaparin Injectable 40 milliGRAM(s) SubCutaneous daily  levothyroxine 100 MICROGram(s) Oral daily  mirtazapine 15 milliGRAM(s) Oral <User Schedule>  senna 2 Tablet(s) Oral at bedtime    PRN MEDICATIONS  haloperidol     Tablet 10 milliGRAM(s) Oral three times a day PRN    HOME MEDICATIONS  Home Medications:  alendronate 35 mg oral tablet: 1 tab(s) orally once a week on saturday (2019 20:47)  benztropine 1 mg oral tablet: 1 tab(s) orally 2 times a day (2019 21:00)  buPROPion 150 mg/24 hours (XL) oral tablet, extended release: 1 tab(s) orally every 24 hours (2019 20:48)  calcium carbonate: calcium carbonate and calcium gluconate-vitamin d2 oral 400 mg oral 2 times per day (2018 21:25)  Depakote 250 mg oral delayed release tablet: 3 tab(s) orally 3 times a day (2019 21:02)  haloperidol 10 mg oral tablet: 1 tab(s) orally 3 times a day, As Needed (2019 21:06)  mirtazapine 15 mg oral tablet: mirtazapine 15mg tablet 1 tablet oral at bedtime (2018 21:25)  Senexon-S 50 mg-8.6 mg oral tablet: 1 tab(s) orally once a day (at bedtime) (2019 20:59)  Synthroid 100 mcg (0.1 mg) oral tablet: synthroid 100mcg tablet (levothyroxine 100 mcg tablet) 1 tablet oral every day (2018 21:25)      VITAL SIGNS: Last 24 Hours  T(C): 36 (02 Mar 2019 04:54), Max: 36 (01 Mar 2019 14:04)  T(F): 96.8 (02 Mar 2019 04:54), Max: 96.8 (01 Mar 2019 14:04)  HR: 71 (02 Mar 2019 04:54) (71 - 77)  BP: 116/72 (02 Mar 2019 04:54) (101/62 - 116/72)  BP(mean): --  RR: 18 (02 Mar 2019 04:54) (18 - 18)  SpO2: 97% (01 Mar 2019 20:15) (97% - 97%)      LABS:                        10.7   7.73  )-----------( 100      ( 01 Mar 2019 07:50 )             33.5         138  |  99  |  19  ----------------------------<  76  5.1<H>   |  30  |  0.9    Ca    9.3      2019 16:05  Mg     2.0         TPro  6.5  /  Alb  3.2<L>  /  TBili  0.3  /  DBili  x   /  AST  37  /  ALT  15  /  AlkPhos  182<H>      LIVER FUNCTIONS - ( 2019 16:05 )  Alb: 3.2 g/dL / Pro: 6.5 g/dL / ALK PHOS: 182 U/L / ALT: 15 U/L / AST: 37 U/L / GGT: x             Urinalysis Basic - ( 2019 16:30 )    Color: Dark Yellow / Appearance: Turbid / S.025 / pH: x  Gluc: x / Ketone: 40  / Bili: Negative / Urobili: 2.0 mg/dL   Blood: x / Protein: 100 mg/dL / Nitrite: Negative   Leuk Esterase: Large / RBC: 3-5 /HPF / WBC >50 /HPF   Sq Epi: x / Non Sq Epi: x / Bacteria: Few /HPF            CARDIAC MARKERS ( 01 Mar 2019 01:03 )  x     / x     / 42 U/L / x     / x      CARDIAC MARKERS ( 2019 16:05 )  x     / <0.01 ng/mL / x     / x     / x          CAPILLARY BLOOD GLUCOSE          RADIOLOGY:  < from: [facility] Duplex Lower Ext Vein Scan, Bilat (19 @ 10:07) >  Impression:    No evidence of deep venous thrombosis or superficial thrombophlebitis in   the bilateral lower extremities.    < end of copied text >    PHYSICAL EXAM:  GENERAL: NAD, AAO x 2, 48y M  HEAD:  Atraumatic, Normocephalic  EYES: EOMI, conjunctiva clear and sclera white  NECK: Supple, No JVD  CHEST/LUNG: Clear to auscultation bilaterally; No wheeze; No crackles; No accessory muscles used  HEART: Regular rate and rhythm; No murmurs;   ABDOMEN: Soft, Nontender, Nondistended; Bowel sounds present; No guarding  EXTREMITIES:  b/l swelling feet  NEUROLOGY: non-focal      ADMISSION SUMMARY  Patient is a 48y old Male who presents with a chief complaint of Refusal to ambulate and bilateral feet swelling. (01 Mar 2019 15:20) ALINA CHERY 48y Male  MRN#: [id number]     SUBJECTIVE  Patient is a 48y old Male who presents with a chief complaint of Refusal to ambulate and bilateral feet swelling. (01 Mar 2019 15:20)    Today is hospital day 2d, and this morning he is lying in bed without distress.   No acute overnight events.     OBJECTIVE  PAST MEDICAL & SURGICAL HISTORY  MR (mental retardation)  Constipation  Hypothyroidism  Dyspepsia  Anemia  Major depressive disorder  Bipolar 1 disorder  Schizophrenia  Epilepsy  No significant past surgical history    ALLERGIES:  erythromycin (Other)  phenothiazines (Unknown)    MEDICATIONS:  STANDING MEDICATIONS  benztropine 1 milliGRAM(s) Oral every 12 hours  buPROPion XL . 150 milliGRAM(s) Oral daily  calcium carbonate    500 mG (Tums) Chewable 1 Tablet(s) Chew two times a day  cefTRIAXone   IVPB 1 Gram(s) IV Intermittent every 24 hours  cholecalciferol 1000 Unit(s) Oral daily  diVALproex  milliGRAM(s) Oral <User Schedule>  docusate sodium 100 milliGRAM(s) Oral two times a day  enoxaparin Injectable 40 milliGRAM(s) SubCutaneous daily  levothyroxine 100 MICROGram(s) Oral daily  mirtazapine 15 milliGRAM(s) Oral <User Schedule>  senna 2 Tablet(s) Oral at bedtime    PRN MEDICATIONS  haloperidol     Tablet 10 milliGRAM(s) Oral three times a day PRN    HOME MEDICATIONS  Home Medications:  alendronate 35 mg oral tablet: 1 tab(s) orally once a week on saturday (2019 20:47)  benztropine 1 mg oral tablet: 1 tab(s) orally 2 times a day (2019 21:00)  buPROPion 150 mg/24 hours (XL) oral tablet, extended release: 1 tab(s) orally every 24 hours (2019 20:48)  calcium carbonate: calcium carbonate and calcium gluconate-vitamin d2 oral 400 mg oral 2 times per day (2018 21:25)  Depakote 250 mg oral delayed release tablet: 3 tab(s) orally 3 times a day (2019 21:02)  haloperidol 10 mg oral tablet: 1 tab(s) orally 3 times a day, As Needed (2019 21:06)  mirtazapine 15 mg oral tablet: mirtazapine 15mg tablet 1 tablet oral at bedtime (2018 21:25)  Senexon-S 50 mg-8.6 mg oral tablet: 1 tab(s) orally once a day (at bedtime) (2019 20:59)  Synthroid 100 mcg (0.1 mg) oral tablet: synthroid 100mcg tablet (levothyroxine 100 mcg tablet) 1 tablet oral every day (2018 21:25)      VITAL SIGNS: Last 24 Hours  T(C): 36 (02 Mar 2019 04:54), Max: 36 (01 Mar 2019 14:04)  T(F): 96.8 (02 Mar 2019 04:54), Max: 96.8 (01 Mar 2019 14:04)  HR: 71 (02 Mar 2019 04:54) (71 - 77)  BP: 116/72 (02 Mar 2019 04:54) (101/62 - 116/72)  BP(mean): --  RR: 18 (02 Mar 2019 04:54) (18 - 18)  SpO2: 97% (01 Mar 2019 20:15) (97% - 97%)      LABS:                        10.7   7.73  )-----------( 100      ( 01 Mar 2019 07:50 )             33.5         138  |  99  |  19  ----------------------------<  76  5.1<H>   |  30  |  0.9    Ca    9.3      2019 16:05  Mg     2.0         TPro  6.5  /  Alb  3.2<L>  /  TBili  0.3  /  DBili  x   /  AST  37  /  ALT  15  /  AlkPhos  182<H>      LIVER FUNCTIONS - ( 2019 16:05 )  Alb: 3.2 g/dL / Pro: 6.5 g/dL / ALK PHOS: 182 U/L / ALT: 15 U/L / AST: 37 U/L / GGT: x             Urinalysis Basic - ( 2019 16:30 )    Color: Dark Yellow / Appearance: Turbid / S.025 / pH: x  Gluc: x / Ketone: 40  / Bili: Negative / Urobili: 2.0 mg/dL   Blood: x / Protein: 100 mg/dL / Nitrite: Negative   Leuk Esterase: Large / RBC: 3-5 /HPF / WBC >50 /HPF   Sq Epi: x / Non Sq Epi: x / Bacteria: Few /HPF            CARDIAC MARKERS ( 01 Mar 2019 01:03 )  x     / x     / 42 U/L / x     / x      CARDIAC MARKERS ( 2019 16:05 )  x     / <0.01 ng/mL / x     / x     / x          CAPILLARY BLOOD GLUCOSE          RADIOLOGY:  < from: [facility] Duplex Lower Ext Vein Scan, Bilat (19 @ 10:07) >  Impression:    No evidence of deep venous thrombosis or superficial thrombophlebitis in   the bilateral lower extremities.    < end of copied text >    PHYSICAL EXAM:  GENERAL: NAD, AAO x 2, 48y M  HEAD:  Atraumatic, Normocephalic  EYES: EOMI, conjunctiva clear and sclera white  NECK: Supple, No JVD  CHEST/LUNG: Clear to auscultation bilaterally; No wheeze; No crackles; No accessory muscles used  HEART: Regular rate and rhythm; No murmurs;   ABDOMEN: Soft, Nontender, Nondistended; Bowel sounds present; No guarding  EXTREMITIES:  b/l swelling feet improved  NEUROLOGY: non-focal      ADMISSION SUMMARY  Patient is a 48y old Male who presents with a chief complaint of Refusal to ambulate and bilateral feet swelling. (01 Mar 2019 15:20)

## 2019-03-02 NOTE — DIETITIAN INITIAL EVALUATION ADULT. - PHYSICAL APPEARANCE
BMI 20.7, confused/disoriented, skin: intact (BS 14), very pale,  moderate muscle wasting in temporal region observed- per health aide pt. has had that for a very long time. Unable to awaken pt. for a full physical assessment. RN attempted to medicate pt., but he wouldn't  wake up.

## 2019-03-02 NOTE — BEHAVIORAL HEALTH ASSESSMENT NOTE - HPI (INCLUDE ILLNESS QUALITY, SEVERITY, DURATION, TIMING, CONTEXT, MODIFYING FACTORS, ASSOCIATED SIGNS AND SYMPTOMS)
This is a 49yo single, , male, resides at [facility], on disability, history of mild-moderate intellectual disability, chronic unspecified schizophrenia, and impulse control d/o, no known prior suicide attempts or IPP admissions, in outpatient psychiatric treatment with Dr. Smith at [address] [place] [address], who was admitted to medicine for bilateral LE swelling. Psychiatry was consulted to evaluate for depression as per primary team, patient has a poor appetite and is refusing to eat his meals.    Upon approach, This is a 49yo single, , male, resides at [facility], on disability, history of mild-moderate intellectual disability, chronic unspecified schizophrenia, and impulse control d/o, no known prior suicide attempts or IPP admissions, in outpatient psychiatric treatment with Dr. Smith at [address] [place] [address], who was admitted to medicine for bilateral LE swelling. Psychiatry was consulted to evaluate for depression as per primary team, patient has a poor appetite and is refusing to eat his meals.    Upon approach, patient was calm and cooperative. He tells writer "Hi I like your hair. I'm your boyfriend!" while smiling. When asked how he is feeling, he states "I'm fine". When asked why he is not eating, he states "The food is bad here". He reports "I'm good. I'm good. I'm not sick. I want to go home". He states "I sleep good. I'm doing good". He denies feeling sad. He denies feeling nervous and states "I hate it here. I want to go home". He denies hearing voices others cannot hear or seeing things others cannot see. He denies feeling paranoid and states he feels safe in the hospital but states "I hate it here.". He denies any symptoms suggestive of tomi. He denies any other acute complaints at this time.     spoke to pt's aid at bedside- Argentina MayerVypsrakx-[phone]- she states that patient is at his mental baseline- talkative, sometimes quiet, jokes around, but lately states that he has not been eating all of his meals. She states that he ate his breakfast and half of lunch, "which is better than the last few days". She does not believe patient is depressed. She is unsure of how he fell and broke his toe. She states he is not physically aggressive but can be verbally aggressive at times. She has no other acute concerns for patient.

## 2019-03-02 NOTE — BEHAVIORAL HEALTH ASSESSMENT NOTE - NSBHCONSULTRECOMMENDOTHER_PSY_A_CORE FT
Recommend changing patient's haldol 10mg TID to standing and not PRN (confirmed with his group home). Recommend rechecking patient's depakote level tomorrow morning prior to AM dose of depakote as his level from yesterday is supratherapeutic at 129. Patient also appears jaundiced and thus, recommend checking hepatic panel and hepatitis panel as depakote may cause liver toxicity. Psychiatry will continue to follow as patient's depakote dosage may need to be decreased.

## 2019-03-02 NOTE — DIETITIAN INITIAL EVALUATION ADULT. - ORAL INTAKE PTA
poor/Per health aide pt. used to have very good appetite, but it declined over the past few weeks. Pt. requests breakfast at every meal, so he gets it prepared but doesn't eat it. He nibbles at meals or refuses to eat altogether.  Pt. was started on Ensure supplements and he initially drunk them regularly, but now refusing per health aide. No other supplement use, but pt. noted to be on appetite stimulant since July 2018.

## 2019-03-02 NOTE — DIETITIAN INITIAL EVALUATION ADULT. - FACTORS AFF FOOD INTAKE
persistent lack of appetite/no GI distress or chew/swallow difficulty noted, last BM 3/1. Per health aide usually regular BM's at group home

## 2019-03-02 NOTE — DIETITIAN INITIAL EVALUATION ADULT. - OTHER INFO
Initial assessment for malnutrition consult. P/w: Refusal to ambulate and bilateral feet swelling. -r/o dependant edema, can be secondary to low albumin (alb =3.2 and positive proteinuria) r/o uncontrolled hypothyroidism? -r/o DVT less likely. -duplex lower extremities WNL. Refusal to ambulate, resolved.  Possible UTI: on abx. Mild intellectual disability, schizophrenia, impulse control disorder- on meds.

## 2019-03-02 NOTE — BEHAVIORAL HEALTH ASSESSMENT NOTE - RISK ASSESSMENT
low- Although patient has an acute medical issue (toe fracture) and has some history of physical aggression, he is not depressed or suicidal, has no known history of past suicide attempts, has residential stability, access to care, strong social support, and is future oriented wants to go home). He is not an imminent danger to himself or others and does not require inpatient psychiatric hospitalization at this time.

## 2019-03-02 NOTE — DIETITIAN INITIAL EVALUATION ADULT. - DIET TYPE
supplement (specify)/regular/Ensure pudding TID. Per health aide ~50-75% PO at meals since admit, much better intake than usual. Refused Ensure pudding.

## 2019-03-02 NOTE — BEHAVIORAL HEALTH ASSESSMENT NOTE - NSBHCHARTREVIEWLAB_PSY_A_CORE FT
Valproic Acid Level, Serum (03.01.19 @ 07:50)    Valproic Acid Level, Serum: 129.0: TYPE:(C=Critical, N=Notification, A=Abnormal)     Complete Blood Count + Automated Diff in AM (03.01.19 @ 07:50)    WBC Count: 7.73 K/uL    RBC Count: 3.24 M/uL    Hemoglobin: 10.7 g/dL    Hematocrit: 33.5 %    Mean Cell Volume: 103.4 fL    Mean Cell Hemoglobin: 33.0 pg    Mean Cell Hemoglobin Conc: 31.9 g/dL    Red Cell Distrib Width: 15.5 %    Platelet Count - Automated: 100 K/uL    Auto Neutrophil #: 4.84 K/uL    Auto Lymphocyte #: 1.82 K/uL    Auto Monocyte #: 0.99 K/uL    Auto Eosinophil #: 0.04 K/uL    Auto Basophil #: 0.01 K/uL    Auto Neutrophil %: 62.7: Differential percentages must be correlated with absolute numbers for  clinical significance. %    Auto Lymphocyte %: 23.5 %    Auto Monocyte %: 12.8 %    Auto Eosinophil %: 0.5 %    Auto Basophil %: 0.1 %    Auto Immature Granulocyte %: 0.4 %    Nucleated RBC: 0 /100 WBCs

## 2019-03-02 NOTE — CHART NOTE - NSCHARTNOTEFT_GEN_A_CORE
Upon Nutritional Assessment by the Registered Dietitian your patient was determined to meet criteria / has evidence of the following diagnosis/diagnoses:          [ ]  Mild Protein Calorie Malnutrition        [x]  Moderate Protein Calorie Malnutrition        [ ] Severe Protein Calorie Malnutrition        [ ] Unspecified Protein Calorie Malnutrition        [ ] Underweight / BMI <19        [ ] Morbid Obesity / BMI > 40      Findings as based on:  •  Comprehensive nutrition assessment and consultation    Moderate protein calorie malnutrition in setting of chronic illness as evidenced by <75% est energy needs intake >1 month, moderate muscle wasting in temporal region, >6% UBW loss in ~ 3 months      Treatment:    The following diet has been recommended: Continue regular diet and add Ensure enlive TID, discontinue Ensure pudding. Continue Remeron daily.       PROVIDER Section:     By signing this assessment you are acknowledging and agree with the diagnosis/diagnoses assigned by the Registered Dietitian    Comments:

## 2019-03-02 NOTE — PROGRESS NOTE ADULT - ASSESSMENT
a 47 yo man with history of* mild intellectual disability, schizophrenia, impulse control disorder, hypothyroidism, seizure, osteopenia and anemia was brought in [facility] home because of Refusal to ambulate and bilateral feet swelling.    # Bilateral feet swelling  -minimal on physical exam  -r/o dependant edema, can be secondary to low albumin (alb =3.2 and positive proteinuria) r/o uncontrolled hypothyroidism? -r/o DVT less likely  -keep feet elevated  -check protein/cr ratio, add ensure  -check TSH  -duplex lower extremities WNL  - f/u B/l Feet XR    # Refusal to ambulate, resolved  -r/o uncontrolled depression r/o neurologic disease like b12 deficiency r/o myopathy  - pt ambulated today in the morning   -b12 elevated and folate WNL  -TSH WNL  -CK WNL  -f/u PT  - psychiatry consult for depression    # Possible UTI  -positive UA, no symptoms but cannot rely on patients complaints  -follow up urine culture  -c/w ceftriaxone (started Feb 28)    # Hypothyroidism  -TSH WNL  -c/w levothyroxine    # Mild intellectual disability, schizophrenia, impulse control disorder  -c/w benztropine, bupropion and Remeron  -halodol 10 mg TID PRN     #Seizure  -c/w Depakote  -f/u level    #Macrocytic anemia  -b12 elevated and folate WNL    #DVt ppx: lovenox s/c  #Diet: regular with ensure a 49 yo man with history of* mild intellectual disability, schizophrenia, impulse control disorder, hypothyroidism, seizure, osteopenia and anemia was brought in [facility] home because of Refusal to ambulate and bilateral feet swelling.    # Bilateral feet swelling  -minimal on physical exam  -r/o dependant edema, can be secondary to low albumin (alb =3.2 and positive proteinuria) r/o uncontrolled hypothyroidism? -r/o DVT less likely  -keep feet elevated  - add ensure  -duplex lower extremities WNL  - f/u B/l Feet XR    # Refusal to ambulate, resolved  -r/o uncontrolled depression r/o neurologic disease like b12 deficiency r/o myopathy  - pt ambulated today in the morning   -b12 elevated and folate WNL  -TSH WNL  -CK WNL  -f/u PT  - psychiatry consult for depression    # Possible UTI  -positive UA, no symptoms but cannot rely on patients complaints  -follow up urine culture  -c/w ceftriaxone (started Feb 28)    # Hypothyroidism  -TSH WNL  -c/w levothyroxine    # Mild intellectual disability, schizophrenia, impulse control disorder  -c/w benztropine, bupropion and Remeron  -halodol 10 mg TID PRN     #Seizure  -c/w Depakote  -f/u level    #Macrocytic anemia  -b12 elevated and folate WNL    #DVt ppx: lovenox s/c  #Diet: regular with ensure a 49 yo man with history of* mild intellectual disability, schizophrenia, impulse control disorder, hypothyroidism, seizure, osteopenia and anemia was brought in [facility] home because of Refusal to ambulate and bilateral feet swelling.    # Bilateral feet swelling  -minimal on physical exam  -r/o dependant edema, can be secondary to low albumin (alb =3.2 and positive proteinuria) r/o uncontrolled hypothyroidism? -r/o DVT less likely. NO DVT on doppler   -keep feet elevated  - add ensure  -duplex lower extremities WNL  - f/u B/l Feet XR    # Refusal to ambulate, resolved  - worsening depression   -b12 elevated and folate WNL  -TSH WNL  -CK WNL  -f/u PT  - psychiatry consult for depression    # Possible UTI  -positive UA, no symptoms but cannot rely on patients complaints  -follow up urine culture  -c/w ceftriaxone (started Feb 28)    # Hypothyroidism  -TSH WNL  -c/w levothyroxine    # Mild intellectual disability, schizophrenia, impulse control disorder  -c/w benztropine, bupropion and Remeron  -halodol 10 mg TID PRN     #Seizure  -c/w Depakote  -f/u level    #Macrocytic anemia  -b12 elevated and folate WNL    #DVt ppx: lovenox s/c  #Diet: regular with ensure    Foot pain and right first phalanx fracture. - ortho evaluation

## 2019-03-02 NOTE — BEHAVIORAL HEALTH ASSESSMENT NOTE - OTHER PAST PSYCHIATRIC HISTORY (INCLUDE DETAILS REGARDING ONSET, COURSE OF ILLNESS, INPATIENT/OUTPATIENT TREATMENT)
history of mild-moderate intellectual disability, chronic unspecified schizophrenia, and impulse control d/o, no known prior suicide attempts or IPP admissions, in outpatient psychiatric treatment with Dr. Smith at [address] [place] [address]

## 2019-03-02 NOTE — DIETITIAN INITIAL EVALUATION ADULT. - ENERGY NEEDS
calorie 1752-1898kcal (MSJ x 1.2-1.3 AF) lean towards higher end for recent weight loss  protein 62-74g (1-1.2g/kg CBW) as above   fluid 1ml/kcal or per LIP

## 2019-03-02 NOTE — BEHAVIORAL HEALTH ASSESSMENT NOTE - NSBHCHARTREVIEWINVESTIGATE_PSY_A_CORE FT
< from: 12 Lead ECG (02.28.19 @ 17:35) >      Ventricular Rate 79 BPM    Atrial Rate 79 BPM    P-R Interval 132 ms    QRS Duration 74 ms    Q-T Interval 386 ms    QTC Calculation(Bezet) 442 ms    P Axis 45 degrees    R Axis 55 degrees    T Axis 68 degrees    Diagnosis Line Normal sinus rhythm  Normal ECG    < end of copied text >

## 2019-03-02 NOTE — BEHAVIORAL HEALTH ASSESSMENT NOTE - NSBHCHARTREVIEWVS_PSY_A_CORE FT
ICU Vital Signs Last 24 Hrs  T(C): 36 (02 Mar 2019 13:23), Max: 36 (02 Mar 2019 04:54)  T(F): 96.8 (02 Mar 2019 13:23), Max: 96.8 (02 Mar 2019 04:54)  HR: 69 (02 Mar 2019 13:23) (69 - 77)  BP: 112/66 (02 Mar 2019 13:23) (101/62 - 116/72)  BP(mean): --  ABP: --  ABP(mean): --  RR: 18 (02 Mar 2019 13:23) (18 - 18)  SpO2: 97% (01 Mar 2019 20:15) (97% - 97%)

## 2019-03-02 NOTE — BEHAVIORAL HEALTH ASSESSMENT NOTE - NSBHMEDSOTHERFT_PSY_A_CORE
confirmed w/ nurse manage- Shantelle- at [facility] home- [phone]- wellbutrin 150mg Qdaily, remeron 15mg QHS, senekot, synthroid 100mcg Qdaily, benztropine 1mg BID, depakote 750mg TID, haldol 10mg TID (standing).

## 2019-03-03 LAB
-  AMPICILLIN: SIGNIFICANT CHANGE UP
-  CIPROFLOXACIN: SIGNIFICANT CHANGE UP
-  LEVOFLOXACIN: SIGNIFICANT CHANGE UP
-  NITROFURANTOIN: SIGNIFICANT CHANGE UP
-  TETRACYCLINE: SIGNIFICANT CHANGE UP
-  VANCOMYCIN: SIGNIFICANT CHANGE UP
ALBUMIN SERPL ELPH-MCNC: 2.9 G/DL — LOW (ref 3.5–5.2)
ALP SERPL-CCNC: 172 U/L — HIGH (ref 30–115)
ALT FLD-CCNC: 7 U/L — SIGNIFICANT CHANGE UP (ref 0–41)
ANION GAP SERPL CALC-SCNC: 6 MMOL/L — LOW (ref 7–14)
AST SERPL-CCNC: 12 U/L — SIGNIFICANT CHANGE UP (ref 0–41)
BILIRUB SERPL-MCNC: 0.3 MG/DL — SIGNIFICANT CHANGE UP (ref 0.2–1.2)
BUN SERPL-MCNC: 12 MG/DL — SIGNIFICANT CHANGE UP (ref 10–20)
CALCIUM SERPL-MCNC: 9.3 MG/DL — SIGNIFICANT CHANGE UP (ref 8.5–10.1)
CHLORIDE SERPL-SCNC: 98 MMOL/L — SIGNIFICANT CHANGE UP (ref 98–110)
CO2 SERPL-SCNC: 33 MMOL/L — HIGH (ref 17–32)
CREAT SERPL-MCNC: 0.7 MG/DL — SIGNIFICANT CHANGE UP (ref 0.7–1.5)
CULTURE RESULTS: SIGNIFICANT CHANGE UP
GLUCOSE SERPL-MCNC: 75 MG/DL — SIGNIFICANT CHANGE UP (ref 70–99)
METHOD TYPE: SIGNIFICANT CHANGE UP
ORGANISM # SPEC MICROSCOPIC CNT: SIGNIFICANT CHANGE UP
ORGANISM # SPEC MICROSCOPIC CNT: SIGNIFICANT CHANGE UP
POTASSIUM SERPL-MCNC: 4.2 MMOL/L — SIGNIFICANT CHANGE UP (ref 3.5–5)
POTASSIUM SERPL-SCNC: 4.2 MMOL/L — SIGNIFICANT CHANGE UP (ref 3.5–5)
PROT SERPL-MCNC: 6.2 G/DL — SIGNIFICANT CHANGE UP (ref 6–8)
SODIUM SERPL-SCNC: 137 MMOL/L — SIGNIFICANT CHANGE UP (ref 135–146)
SPECIMEN SOURCE: SIGNIFICANT CHANGE UP
VALPROATE SERPL-MCNC: 121 UG/ML — CRITICAL HIGH (ref 50–100)

## 2019-03-03 RX ADMIN — Medication 100 MILLIGRAM(S): at 06:15

## 2019-03-03 RX ADMIN — MIRTAZAPINE 15 MILLIGRAM(S): 45 TABLET, ORALLY DISINTEGRATING ORAL at 17:21

## 2019-03-03 RX ADMIN — Medication 1 MILLIGRAM(S): at 06:15

## 2019-03-03 RX ADMIN — SENNA PLUS 2 TABLET(S): 8.6 TABLET ORAL at 22:03

## 2019-03-03 RX ADMIN — HALOPERIDOL DECANOATE 10 MILLIGRAM(S): 100 INJECTION INTRAMUSCULAR at 06:15

## 2019-03-03 RX ADMIN — BUPROPION HYDROCHLORIDE 150 MILLIGRAM(S): 150 TABLET, EXTENDED RELEASE ORAL at 12:03

## 2019-03-03 RX ADMIN — Medication 100 MILLIGRAM(S): at 17:21

## 2019-03-03 RX ADMIN — Medication 1 TABLET(S): at 17:20

## 2019-03-03 RX ADMIN — Medication 1000 UNIT(S): at 12:04

## 2019-03-03 RX ADMIN — CEFTRIAXONE 100 GRAM(S): 500 INJECTION, POWDER, FOR SOLUTION INTRAMUSCULAR; INTRAVENOUS at 12:03

## 2019-03-03 RX ADMIN — Medication 1 TABLET(S): at 06:16

## 2019-03-03 RX ADMIN — HALOPERIDOL DECANOATE 10 MILLIGRAM(S): 100 INJECTION INTRAMUSCULAR at 13:10

## 2019-03-03 RX ADMIN — DIVALPROEX SODIUM 750 MILLIGRAM(S): 500 TABLET, DELAYED RELEASE ORAL at 07:29

## 2019-03-03 RX ADMIN — Medication 1 MILLIGRAM(S): at 17:21

## 2019-03-03 RX ADMIN — ENOXAPARIN SODIUM 40 MILLIGRAM(S): 100 INJECTION SUBCUTANEOUS at 12:03

## 2019-03-03 RX ADMIN — DIVALPROEX SODIUM 750 MILLIGRAM(S): 500 TABLET, DELAYED RELEASE ORAL at 17:20

## 2019-03-03 RX ADMIN — HALOPERIDOL DECANOATE 10 MILLIGRAM(S): 100 INJECTION INTRAMUSCULAR at 21:24

## 2019-03-03 RX ADMIN — Medication 100 MICROGRAM(S): at 06:15

## 2019-03-03 RX ADMIN — DIVALPROEX SODIUM 750 MILLIGRAM(S): 500 TABLET, DELAYED RELEASE ORAL at 23:20

## 2019-03-03 NOTE — PROGRESS NOTE BEHAVIORAL HEALTH - NSBHCHARTREVIEWVS_PSY_A_CORE FT
Vital Signs Last 24 Hrs  T(C): 35.6 (03 Mar 2019 04:48), Max: 36.2 (02 Mar 2019 19:50)  T(F): 96.1 (03 Mar 2019 04:48), Max: 97.1 (02 Mar 2019 19:50)  HR: 69 (03 Mar 2019 04:48) (69 - 81)  BP: 104/75 (03 Mar 2019 04:48) (104/75 - 112/66)  BP(mean): --  RR: 17 (03 Mar 2019 04:48) (16 - 18)  SpO2: --

## 2019-03-03 NOTE — CONSULT NOTE ADULT - SUBJECTIVE AND OBJECTIVE BOX
Orthopedic Consult  CC: Asked to evaluate R Hallux pain, possible distal phalanx fracture by Medicine service.    HPI:  48y Male patient who is admitted to medicine service and was found to have possible old fracture of R Hallux distal phalanx on XR of R foot. Patient seen and examined in his room, denies complaints of pain about above noted fracture. Patient is poor historian, hx of mental disease with questionable capacity. Patient stated he sustained a blunt trauma to his R big toe when he was in his nursing home about a month ago. Patient denies any sensory complaints associated with the fracture.  Endorses chronic history of pain in the area of the fracture.      Review of Symptoms: No recent fevers, night sweats or other constitutional symptoms.  No unexplained weight loss, weight gain, bowl or bladder disturbance.    PMedHx and PsurgHx: ALTERED MENTAL STATUS  ^PSYCH EVAL  No pertinent family history in first degree relatives  MR (mental retardation)  Anemia  Major depressive disorder  Bipolar 1 disorder  Schizophrenia  Epilepsy  Swelling of both lower extremities  Altered mental status  Intellectual disability  Impulse control disorder  Schizophrenia, chronic condition  No significant past surgical history    Medications: benztropine 1 milliGRAM(s) Oral every 12 hours  buPROPion XL . 150 milliGRAM(s) Oral daily  calcium carbonate    500 mG (Tums) Chewable 1 Tablet(s) Chew two times a day  cefTRIAXone   IVPB 1 Gram(s) IV Intermittent every 24 hours  cholecalciferol 1000 Unit(s) Oral daily  diVALproex  milliGRAM(s) Oral <User Schedule>  docusate sodium 100 milliGRAM(s) Oral two times a day  enoxaparin Injectable 40 milliGRAM(s) SubCutaneous daily  haloperidol     Tablet 10 milliGRAM(s) Oral three times a day  levothyroxine 100 MICROGram(s) Oral daily  mirtazapine 15 milliGRAM(s) Oral <User Schedule>  senna 2 Tablet(s) Oral at bedtime    Allergies: erythromycin (Other)  phenothiazines (Unknown)      Social: Ambulatory status walks with assistive device  Cigarette Use: chronic use  counseled at the bedside at length regarding the risks of continued tobacco use, length of counseling ~10min  cessation suggested, various options discussed  nicotine patch while inpatient offered  advised to follow up with outpatient PMD for continued monitoring and therapy  ETOH use: social; no evidence of abuse  Drugs: Denies.    Physical Exam    R Foot  No gross deformity noted, skin intact, no swelling or ecchymosis.   NTTP Hallux  Able flex/extend hallux and IPJ  SILT dp/sp/s/s/t  Motor intact EHL/FHL/TA/GS  Foot wwp    Laboratory:      03-03    137  |  98  |  12  ----------------------------<  75  4.2   |  33<H>  |  0.7    Ca    9.3      03 Mar 2019 07:00    TPro  6.2  /  Alb  2.9<L>  /  TBili  0.3  /  DBili  x   /  AST  12  /  ALT  7   /  AlkPhos  172<H>  03-03 03-03    137  |  98  |  12  ----------------------------<  75  4.2   |  33<H>  |  0.7    Ca    9.3      03 Mar 2019 07:00    TPro  6.2  /  Alb  2.9<L>  /  TBili  0.3  /  DBili  x   /  AST  12  /  ALT  7   /  AlkPhos  172<H>  03-03        Radiographs  R Foot (AP, Lateral): Hallux rigidus, possible old fracture of based of distal phalanx      Impression and Plan: 48y Male patient with R Hallux rigidus and possible old fracture of the base of distal phalanx.   - Pain control PRN  - No orthopaedic intervention is needed at this time.   - Refusal to ambulate might be psychologic, no gross deformity or orthopaedic contraindication to WBAT.   - May follow up with Dr. Guzmán as an outpatient if pain persists. Orthopedic Consult  CC: Asked to evaluate R Hallux pain, possible distal phalanx fracture by Medicine service.    HPI:  48y Male patient who is admitted to medicine service and was found to have possible old fracture of R Hallux distal phalanx on XR of R foot. Patient seen and examined in his room, denies complaints of pain about above noted fracture. Patient is poor historian, hx of mental disease with questionable capacity. Patient stated he sustained a blunt trauma to his R big toe when he was in his nursing home about a month ago. Patient denies any sensory complaints associated with the fracture.  Endorses chronic history of pain in the area of the fracture.      Review of Symptoms: No recent fevers, night sweats or other constitutional symptoms.  No unexplained weight loss, weight gain, bowl or bladder disturbance.    PMedHx and PsurgHx: ALTERED MENTAL STATUS  ^PSYCH EVAL  No pertinent family history in first degree relatives  MR (mental retardation)  Anemia  Major depressive disorder  Bipolar 1 disorder  Schizophrenia  Epilepsy  Swelling of both lower extremities  Altered mental status  Intellectual disability  Impulse control disorder  Schizophrenia, chronic condition  No significant past surgical history    Medications: benztropine 1 milliGRAM(s) Oral every 12 hours  buPROPion XL . 150 milliGRAM(s) Oral daily  calcium carbonate    500 mG (Tums) Chewable 1 Tablet(s) Chew two times a day  cefTRIAXone   IVPB 1 Gram(s) IV Intermittent every 24 hours  cholecalciferol 1000 Unit(s) Oral daily  diVALproex  milliGRAM(s) Oral <User Schedule>  docusate sodium 100 milliGRAM(s) Oral two times a day  enoxaparin Injectable 40 milliGRAM(s) SubCutaneous daily  haloperidol     Tablet 10 milliGRAM(s) Oral three times a day  levothyroxine 100 MICROGram(s) Oral daily  mirtazapine 15 milliGRAM(s) Oral <User Schedule>  senna 2 Tablet(s) Oral at bedtime    Allergies: erythromycin (Other)  phenothiazines (Unknown)      Social: Ambulatory status walks with assistive device  Cigarette Use: chronic use  counseled at the bedside at length regarding the risks of continued tobacco use, length of counseling ~10min  cessation suggested, various options discussed  nicotine patch while inpatient offered  advised to follow up with outpatient PMD for continued monitoring and therapy  ETOH use: social; no evidence of abuse  Drugs: Denies.    Physical Exam    R Foot  No gross deformity noted, skin intact, no swelling or ecchymosis.   NTTP Hallux  Able flex/extend hallux and IPJ  SILT dp/sp/s/s/t  Motor intact EHL/FHL/TA/GS  Foot wwp    Laboratory:      03-03    137  |  98  |  12  ----------------------------<  75  4.2   |  33<H>  |  0.7    Ca    9.3      03 Mar 2019 07:00    TPro  6.2  /  Alb  2.9<L>  /  TBili  0.3  /  DBili  x   /  AST  12  /  ALT  7   /  AlkPhos  172<H>  03-03 03-03    137  |  98  |  12  ----------------------------<  75  4.2   |  33<H>  |  0.7    Ca    9.3      03 Mar 2019 07:00    TPro  6.2  /  Alb  2.9<L>  /  TBili  0.3  /  DBili  x   /  AST  12  /  ALT  7   /  AlkPhos  172<H>  03-03        Radiographs  R Foot (AP, Lateral): Hallux rigidus, possible old fracture of based of distal phalanx      Impression and Plan: 48y Male patient with R Hallux rigidus and possible old fracture of the base of distal phalanx.   - Hard sole shoe  - Pain control PRN  - No orthopaedic intervention is needed at this time.   - Refusal to ambulate might be psychologic, no gross deformity or orthopaedic contraindication to WBAT.   - May follow up with Dr. Guzmán as an outpatient if pain persists.

## 2019-03-03 NOTE — PROGRESS NOTE BEHAVIORAL HEALTH - NSBHFUPINTERVALHXFT_PSY_A_CORE
No acute events overnight. Pt eating breakfast upon initial approach, reports he is providers GF. When aid returns to bedside, he then reports she is his GF. Pt reports he feels fine, denies acute complaints. Pt is AAOx1 (self), which is baseline as per aid. She reports currently is acting as usual self, denies acute psychiatric concerns. Pt denies S/H/I/I/P. LFTs appreciated, alk phos elevated. Pending Depakote level for today.

## 2019-03-03 NOTE — PROGRESS NOTE ADULT - SUBJECTIVE AND OBJECTIVE BOX
ALINA CHERY 48y Male  MRN#: [id number]     SUBJECTIVE  Patient is a 48y old Male who presents with a chief complaint of Refusal to ambulate and bilateral feet swelling. (01 Mar 2019 15:20)    No acute overnight events.     OBJECTIVE  PAST MEDICAL & SURGICAL HISTORY  MR (mental retardation)  Constipation  Hypothyroidism  Dyspepsia  Anemia  Major depressive disorder  Bipolar 1 disorder  Schizophrenia  Epilepsy  No significant past surgical history    ALLERGIES:  erythromycin (Other)  phenothiazines (Unknown)    MEDICATIONS:  STANDING MEDICATIONS  benztropine 1 milliGRAM(s) Oral every 12 hours  buPROPion XL . 150 milliGRAM(s) Oral daily  calcium carbonate    500 mG (Tums) Chewable 1 Tablet(s) Chew two times a day  cefTRIAXone   IVPB 1 Gram(s) IV Intermittent every 24 hours  cholecalciferol 1000 Unit(s) Oral daily  diVALproex  milliGRAM(s) Oral <User Schedule>  docusate sodium 100 milliGRAM(s) Oral two times a day  enoxaparin Injectable 40 milliGRAM(s) SubCutaneous daily  levothyroxine 100 MICROGram(s) Oral daily  mirtazapine 15 milliGRAM(s) Oral <User Schedule>  senna 2 Tablet(s) Oral at bedtime    PRN MEDICATIONS  haloperidol     Tablet 10 milliGRAM(s) Oral three times a day PRN    HOME MEDICATIONS  Home Medications:  alendronate 35 mg oral tablet: 1 tab(s) orally once a week on saturday (2019 20:47)  benztropine 1 mg oral tablet: 1 tab(s) orally 2 times a day (2019 21:00)  buPROPion 150 mg/24 hours (XL) oral tablet, extended release: 1 tab(s) orally every 24 hours (2019 20:48)  calcium carbonate: calcium carbonate and calcium gluconate-vitamin d2 oral 400 mg oral 2 times per day (2018 21:25)  Depakote 250 mg oral delayed release tablet: 3 tab(s) orally 3 times a day (2019 21:02)  haloperidol 10 mg oral tablet: 1 tab(s) orally 3 times a day, As Needed (2019 21:06)  mirtazapine 15 mg oral tablet: mirtazapine 15mg tablet 1 tablet oral at bedtime (2018 21:25)  Senexon-S 50 mg-8.6 mg oral tablet: 1 tab(s) orally once a day (at bedtime) (2019 20:59)  Synthroid 100 mcg (0.1 mg) oral tablet: synthroid 100mcg tablet (levothyroxine 100 mcg tablet) 1 tablet oral every day (2018 21:25)      Vital Signs Last 24 Hrs  T(C): 35.6 (03 Mar 2019 04:48), Max: 36.2 (02 Mar 2019 19:50)  T(F): 96.1 (03 Mar 2019 04:48), Max: 97.1 (02 Mar 2019 19:50)  HR: 69 (03 Mar 2019 04:48) (69 - 81)  BP: 104/75 (03 Mar 2019 04:48) (104/75 - 105/55)  BP(mean): --  RR: 17 (03 Mar 2019 04:48) (16 - 17)      LABS:                        10.7   7.73  )-----------( 100      ( 01 Mar 2019 07:50 )             33.5         138  |  99  |  19  ----------------------------<  76  5.1<H>   |  30  |  0.9    Ca    9.3      2019 16:05  Mg     2.0         TPro  6.5  /  Alb  3.2<L>  /  TBili  0.3  /  DBili  x   /  AST  37  /  ALT  15  /  AlkPhos  182<H>      LIVER FUNCTIONS - ( 2019 16:05 )  Alb: 3.2 g/dL / Pro: 6.5 g/dL / ALK PHOS: 182 U/L / ALT: 15 U/L / AST: 37 U/L / GGT: x             Urinalysis Basic - ( 2019 16:30 )    Color: Dark Yellow / Appearance: Turbid / S.025 / pH: x  Gluc: x / Ketone: 40  / Bili: Negative / Urobili: 2.0 mg/dL   Blood: x / Protein: 100 mg/dL / Nitrite: Negative   Leuk Esterase: Large / RBC: 3-5 /HPF / WBC >50 /HPF   Sq Epi: x / Non Sq Epi: x / Bacteria: Few /HPF            CARDIAC MARKERS ( 01 Mar 2019 01:03 )  x     / x     / 42 U/L / x     / x      CARDIAC MARKERS ( 2019 16:05 )  x     / <0.01 ng/mL / x     / x     / x          CAPILLARY BLOOD GLUCOSE          RADIOLOGY:  < from: VA Duplex Lower Ext Vein Scan, Bilat (19 @ 10:07) >  Impression:    No evidence of deep venous thrombosis or superficial thrombophlebitis in   the bilateral lower extremities.    < end of copied text >    PHYSICAL EXAM:  GENERAL: NAD, AAO x 2, 48y M  HEAD:  Atraumatic, Normocephalic  EYES: EOMI, conjunctiva clear and sclera white  NECK: Supple, No JVD  CHEST/LUNG: Clear to auscultation bilaterally; No wheeze; No crackles; No accessory muscles used  HEART: Regular rate and rhythm; No murmurs;   ABDOMEN: Soft, Nontender, Nondistended; Bowel sounds present; No guarding  EXTREMITIES:  b/l swelling feet improved  NEUROLOGY: non-focal      ADMISSION SUMMARY  Patient is a 48y old Male who presents with a chief complaint of Refusal to ambulate and bilateral feet swelling. (01 Mar 2019 15:20)

## 2019-03-03 NOTE — PROGRESS NOTE ADULT - ASSESSMENT
a 49 yo man with history of* mild intellectual disability, schizophrenia, impulse control disorder, hypothyroidism, seizure, osteopenia and anemia was brought in [facility] because of Refusal to ambulate and bilateral feet swelling.    # Bilateral feet swelling - resolved now   -minimal on physical exam  -r/o dependant edema, can be secondary to low albumin (alb =3.2 and positive proteinuria) r/o uncontrolled hypothyroidism? -r/o DVT less likely. NO DVT on doppler   -keep feet elevated  - add ensure  -duplex lower extremities WNL  - f/u B/l Feet XR        # Refusal to ambulate, resolved secondary to the pain at the foot secondary to the fracture patient had about a month ago at the group home     # - worsening depression   -b12 elevated and folate WNL  -TSH WNL  -CK WNL  -f/u PT  - psychiatry consult for depression - Appreciated.   - Follow Depakote level. drawn today. its high 129.   - Medications adjustments accordingly     # UTI  -positive UA, no symptoms but cannot rely on patients complaints  -follow up urine culture  -c/w ceftriaxone (started Feb 28)  D/C after march 5th dose     # Hypothyroidism  -TSH WNL  -c/w levothyroxine    # Mild intellectual disability, schizophrenia, impulse control disorder  -c/w benztropine, bupropion and Remeron  - Halodol 10 mg TID. adjusted     #Seizure  -c/w Depakote  -f/u level AND ADJUST ACCORDINGLY .     #Macrocytic anemia  -b12 elevated and folate WNL    #DVt ppx: lovenox s/c  #Diet: regular with ensure    Foot pain and right first phalanx fracture. - ortho evaluation     #Progress Note Handoff -     Pending (specify):  Consults - ORTHO   Family discussion: Group home personnel   Disposition: Unknown at this time

## 2019-03-03 NOTE — CONSULT NOTE ADULT - ATTENDING COMMENTS
Pt. 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

## 2019-03-04 RX ORDER — DIVALPROEX SODIUM 500 MG/1
500 TABLET, DELAYED RELEASE ORAL THREE TIMES A DAY
Qty: 0 | Refills: 0 | Status: DISCONTINUED | OUTPATIENT
Start: 2019-03-04 | End: 2019-03-05

## 2019-03-04 RX ORDER — DIVALPROEX SODIUM 500 MG/1
1 TABLET, DELAYED RELEASE ORAL
Qty: 90 | Refills: 0
Start: 2019-03-04 | End: 2019-04-02

## 2019-03-04 RX ORDER — HALOPERIDOL DECANOATE 100 MG/ML
1 INJECTION INTRAMUSCULAR
Qty: 0 | Refills: 0 | DISCHARGE
Start: 2019-03-04

## 2019-03-04 RX ORDER — DIVALPROEX SODIUM 500 MG/1
3 TABLET, DELAYED RELEASE ORAL
Qty: 0 | Refills: 0 | COMMUNITY

## 2019-03-04 RX ADMIN — Medication 1 MILLIGRAM(S): at 18:28

## 2019-03-04 RX ADMIN — Medication 1 TABLET(S): at 18:28

## 2019-03-04 RX ADMIN — Medication 1000 UNIT(S): at 11:28

## 2019-03-04 RX ADMIN — DIVALPROEX SODIUM 500 MILLIGRAM(S): 500 TABLET, DELAYED RELEASE ORAL at 22:09

## 2019-03-04 RX ADMIN — ENOXAPARIN SODIUM 40 MILLIGRAM(S): 100 INJECTION SUBCUTANEOUS at 11:28

## 2019-03-04 RX ADMIN — BUPROPION HYDROCHLORIDE 150 MILLIGRAM(S): 150 TABLET, EXTENDED RELEASE ORAL at 11:28

## 2019-03-04 RX ADMIN — Medication 1 MILLIGRAM(S): at 05:40

## 2019-03-04 RX ADMIN — HALOPERIDOL DECANOATE 10 MILLIGRAM(S): 100 INJECTION INTRAMUSCULAR at 05:40

## 2019-03-04 RX ADMIN — Medication 100 MILLIGRAM(S): at 18:28

## 2019-03-04 RX ADMIN — MIRTAZAPINE 15 MILLIGRAM(S): 45 TABLET, ORALLY DISINTEGRATING ORAL at 18:28

## 2019-03-04 RX ADMIN — HALOPERIDOL DECANOATE 10 MILLIGRAM(S): 100 INJECTION INTRAMUSCULAR at 22:09

## 2019-03-04 RX ADMIN — Medication 100 MILLIGRAM(S): at 05:40

## 2019-03-04 RX ADMIN — DIVALPROEX SODIUM 750 MILLIGRAM(S): 500 TABLET, DELAYED RELEASE ORAL at 06:14

## 2019-03-04 RX ADMIN — HALOPERIDOL DECANOATE 10 MILLIGRAM(S): 100 INJECTION INTRAMUSCULAR at 13:40

## 2019-03-04 RX ADMIN — Medication 1 TABLET(S): at 05:40

## 2019-03-04 RX ADMIN — SENNA PLUS 2 TABLET(S): 8.6 TABLET ORAL at 22:09

## 2019-03-04 RX ADMIN — Medication 100 MICROGRAM(S): at 05:40

## 2019-03-04 NOTE — PROGRESS NOTE ADULT - SUBJECTIVE AND OBJECTIVE BOX
Patient is a 48y old  Male who presents with a chief complaint of Refusal to ambulate and bilateral feet swelling. (03 Mar 2019 16:31)    Patient was seen and examined.  no new events    PAST MEDICAL & SURGICAL HISTORY:  MR (mental retardation)  Constipation  Hypothyroidism  Dyspepsia  Anemia  Major depressive disorder  Bipolar 1 disorder  Schizophrenia  Epilepsy  No significant past surgical history    Allergies  erythromycin (Other)  phenothiazines (Unknown)    MEDICATIONS  (STANDING):  benztropine 1 milliGRAM(s) Oral every 12 hours  buPROPion XL . 150 milliGRAM(s) Oral daily  calcium carbonate    500 mG (Tums) Chewable 1 Tablet(s) Chew two times a day  cholecalciferol 1000 Unit(s) Oral daily  docusate sodium 100 milliGRAM(s) Oral two times a day  enoxaparin Injectable 40 milliGRAM(s) SubCutaneous daily  haloperidol     Tablet 10 milliGRAM(s) Oral three times a day  levothyroxine 100 MICROGram(s) Oral daily  mirtazapine 15 milliGRAM(s) Oral <User Schedule>  senna 2 Tablet(s) Oral at bedtime    MEDICATIONS  (PRN):    Vital Signs Last 24 Hrs  T(C): 35.6  T(F): 96  HR: 75  BP: 128/78  BP(mean): --  RR: 18  SpO2: --    O/E:  Awake, not in distress.  HEENT: atraumatic, EOMI.  Chest: clear.  CVS: SIS2 +, no murmur.  P/A: Soft, BS+  CNS: non focal.  Ext: no edema feet.  Skin: no rash, no ulcers.  All systems reviewed positive findings as above.          03-03    137  |  98  |  12  ----------------------------<  75  4.2   |  33<H>  |  0.7    Ca    9.3      03 Mar 2019 07:00    TPro  6.2  /  Alb  2.9<L>  /  TBili  0.3  /  DBili  x   /  AST  12  /  ALT  7   /  AlkPhos  172<H>  03-03

## 2019-03-04 NOTE — PROGRESS NOTE ADULT - REASON FOR ADMISSION
Refusal to ambulate and bilateral feet swelling.

## 2019-03-04 NOTE — PROGRESS NOTE BEHAVIORAL HEALTH - NSBHCHARTREVIEWLAB_PSY_A_CORE FT
Valproic Acid Level, Serum (03.03.19 @ 07:00)    Valproic Acid Level, Serum: 121.0: Critical value:  TYPE:(C=Critical, N=Notification, A=Abnormal) C

## 2019-03-04 NOTE — PROGRESS NOTE ADULT - ASSESSMENT
a 47 yo man with history of* mild intellectual disability, schizophrenia, impulse control disorder, hypothyroidism, seizure, osteopenia and anemia was brought in [facility] home because of Refusal to ambulate and bilateral feet swelling.    # Bilateral feet swelling (subjective) and refusal to ambulate  -minimal on physical exam  -NO DVT on doppler 3/1  -keep feet elevated  -added ensure  -B/l Feet XR 3/1 revealed osteopenia so occult fracture could not be excluded  -Ortho rec DJD right fst MTP vs new injury/hard sole shoe/fu  -b12 elevated and folate WNL  -TSH WNL  -CK WNL      # Acute UTI: improving  -positive UA,  -urine culture showed enterococcus fecalis 2/28  -c/w ceftriaxone (started Feb 28), stopped today    # Hypothyroidism  -TSH WNL  -c/w levothyroxine    # Mild intellectual disability, schizophrenia, impulse control disorder  -c/w benztropine, bupropion and Remeron  -halodol 10 mg TID PRN     #Seizure  -Depakote decreased to 500 TID  -Depalote level 121  3/1    #Macrocytic anemia  -b12 elevated and folate WNL        DVt ppx: lovenox s/c  GI PPx: Not indicated  Diet: regular with ensure  Dispo: awaiting placement

## 2019-03-04 NOTE — PROGRESS NOTE BEHAVIORAL HEALTH - NSBHFUPINTERVALHXFT_PSY_A_CORE
Pt seen and examined. He reports feeling "good". He states he slept "fine" and reports that he ate all of his breakfast. He states "food here is not that good". He denies any other acute complaints at this time.     Per staff, no acute events reported overnight

## 2019-03-04 NOTE — PROGRESS NOTE BEHAVIORAL HEALTH - AFFECT CONGRUENCE
“You can access the FollowHealth Patient Portal, offered by [facility], by registering with the following website: [email or web address]”
Congruent
Congruent

## 2019-03-04 NOTE — PROGRESS NOTE BEHAVIORAL HEALTH - AXIS III
hypothyroidism, seizures, osteopenia, anemia, cataract
hypothyroidism, seizures, osteopenia, anemia, cataract

## 2019-03-04 NOTE — PROGRESS NOTE BEHAVIORAL HEALTH - NSBHCONSULTFOLLOWAFTERCARE_PSY_A_CORE FT
Spoke to pt's group home nursing supervisor- Shantelle Mclean- [phone]- who states that patient will be discharged to [facility] upon discharge and she understands that patient will need repeat depakote levels in 2-3 days. Patient will follow up with the psychiatrist at [facility].

## 2019-03-04 NOTE — PROGRESS NOTE BEHAVIORAL HEALTH - CASE SUMMARY
Mr Aberanthy is a 48 year old man with a history of intellectual Disability and Schizophrenia who was admitted to the medical floor for bilateral leg swelling.  Psychiatry consult was initially called for the evaluation of depressed mood as patient was observed not to be eating. It was later found that his Depakote level was sub therapeutic. According to the staff member of his group home who was at his bedside, patient appears to be at his baseline mental status and does not appear delirious despite the high Depakote level. Patient does not appear depressed, neither does he appear to be acutely psychotic or manic. He does not appear to have any suicidal ideations, intent or plan. Any behavioral issues at this time, is most probably because of intellectual disability. However, patient appears to be in good behavioral control. At this time, patient is not considered an imminent danger to himself or others and does not need inpatient psychiatric hospitalization. Patient will benefit from reducing the dose of his Depakote for now as it continues to be supra-therapeutic and monitoring the Depakote level until it is within the therapeutic range. The psychiatrist in the nursing home that patient is being discharged to can monitor patient’s Depakote level and monitor for acute mood symptoms and adjust the medication accordingly.

## 2019-03-04 NOTE — PROGRESS NOTE BEHAVIORAL HEALTH - RISK ASSESSMENT
Although patient has an acute medical issue (toe fracture) and has some history of physical aggression, he is not depressed or suicidal, has no known history of past suicide attempts, has residential stability, access to care, strong social support, and is future oriented wants to go home). He is not an imminent danger to himself or others and does not require inpatient psychiatric hospitalization at this time.
Although patient has an acute medical issue (toe fracture) and has some history of physical aggression, he is not depressed or suicidal, has no known history of past suicide attempts, has residential stability, access to care, strong social support, and is future oriented wants to go home). He is not an imminent danger to himself or others and does not require inpatient psychiatric hospitalization at this time.

## 2019-03-04 NOTE — PROGRESS NOTE BEHAVIORAL HEALTH - NSBHCHARTREVIEWVS_PSY_A_CORE FT
ICU Vital Signs Last 24 Hrs  T(C): 35.7 (04 Mar 2019 04:50), Max: 36.3 (03 Mar 2019 20:57)  T(F): 96.2 (04 Mar 2019 04:50), Max: 97.4 (03 Mar 2019 20:57)  HR: 69 (04 Mar 2019 04:50) (69 - 75)  BP: 109/65 (04 Mar 2019 04:50) (107/65 - 111/69)  BP(mean): --  ABP: --  ABP(mean): --  RR: 18 (04 Mar 2019 04:50) (18 - 18)  SpO2: --

## 2019-03-04 NOTE — PROGRESS NOTE ADULT - SUBJECTIVE AND OBJECTIVE BOX
SUBJECTIVE:  Patient is a 48y old Male who presents with a chief complaint of Refusal to ambulate and bilateral feet swelling. (03 Mar 2019 16:31)  Currently admitted to medicine with the primary diagnosis of Swelling of both lower extremities     Today is hospital day 4d. This morning he is resting comfortably in bed and reports no new issues or overnight events.       PAST MEDICAL & SURGICAL HISTORY  MR (mental retardation)  Constipation  Hypothyroidism  Dyspepsia  Anemia  Major depressive disorder  Bipolar 1 disorder  Schizophrenia  Epilepsy  No significant past surgical history    SOCIAL HISTORY:  Negative for smoking/alcohol/drug use.     ALLERGIES:  erythromycin (Other)  phenothiazines (Unknown)    MEDICATIONS:  STANDING MEDICATIONS  benztropine 1 milliGRAM(s) Oral every 12 hours  buPROPion XL . 150 milliGRAM(s) Oral daily  calcium carbonate    500 mG (Tums) Chewable 1 Tablet(s) Chew two times a day  cholecalciferol 1000 Unit(s) Oral daily  docusate sodium 100 milliGRAM(s) Oral two times a day  enoxaparin Injectable 40 milliGRAM(s) SubCutaneous daily  haloperidol     Tablet 10 milliGRAM(s) Oral three times a day  levothyroxine 100 MICROGram(s) Oral daily  mirtazapine 15 milliGRAM(s) Oral <User Schedule>  senna 2 Tablet(s) Oral at bedtime    PRN MEDICATIONS    Home Medications:  alendronate 35 mg oral tablet: 1 tab(s) orally once a week on saturday (28 Feb 2019 20:47)  benztropine 1 mg oral tablet: 1 tab(s) orally 2 times a day (28 Feb 2019 21:00)  buPROPion 150 mg/24 hours (XL) oral tablet, extended release: 1 tab(s) orally every 24 hours (28 Feb 2019 20:48)  calcium carbonate: calcium carbonate and calcium gluconate-vitamin d2 oral 400 mg oral 2 times per day (04 Feb 2018 21:25)  haloperidol 10 mg oral tablet: 1 tab(s) orally 3 times a day (04 Mar 2019 11:29)  mirtazapine 15 mg oral tablet: mirtazapine 15mg tablet 1 tablet oral at bedtime (04 Feb 2018 21:25)  Senexon-S 50 mg-8.6 mg oral tablet: 1 tab(s) orally once a day (at bedtime) (28 Feb 2019 20:59)  Synthroid 100 mcg (0.1 mg) oral tablet: synthroid 100mcg tablet (levothyroxine 100 mcg tablet) 1 tablet oral every day (04 Feb 2018 21:25)    Vital Signs Last 24 Hrs  T(C): 35.6 (04 Mar 2019 13:01), Max: 36.3 (03 Mar 2019 20:57)  T(F): 96 (04 Mar 2019 13:01), Max: 97.4 (03 Mar 2019 20:57)  HR: 75 (04 Mar 2019 13:01) (69 - 75)  BP: 128/78 (04 Mar 2019 13:01) (109/65 - 128/78)  BP(mean): --  RR: 18 (04 Mar 2019 13:01) (18 - 18)  SpO2: --      LABS:    03-03    137  |  98  |  12  ----------------------------<  75  4.2   |  33<H>  |  0.7    Ca    9.3      03 Mar 2019 07:00    TPro  6.2  /  Alb  2.9<L>  /  TBili  0.3  /  DBili  x   /  AST  12  /  ALT  7   /  AlkPhos  172<H>  03-03        RADIOLOGY:  < from: Xray Foot AP + Lateral + Oblique, Bilat (03.01.19 @ 17:55) >  IMPRESSION:  There is no evidence of fracture or dislocation. The bones are markedly   osteopenic.  Therefore, an occult fracture cannot be excluded on plain   film and if clinically warranted, consider a CT/MRI for further   evaluation.      < from: VA Duplex Lower Ext Vein Scan, Bilat (03.01.19 @ 10:07) >  Impression:  No evidence of deep venous thrombosis or superficial thrombophlebitis in   the bilateral lower extremities.      < from: CT Head No Cont (02.28.19 @ 18:50) >  IMPRESSION:  No CT evidence of acute intracranial pathology.    Prominence of the ventricles and cortical sulci nonproportional to   patient's stated age .      < from: Xray Chest 1 View-PORTABLE IMMEDIATE (02.28.19 @ 17:24) >  Impression:    No radiographic evidence of acute cardiopulmonary disease.    PHYSICAL EXAM:  GEN: No acute distress  LUNGS: Clear to auscultation bilaterally   HEART: S1/S2 present. RRR.   ABD: Soft, non-tender, non-distended. Bowel sounds present  NEURO: AAO x 3

## 2019-03-04 NOTE — PROGRESS NOTE ADULT - ASSESSMENT
49 yo man with history of mild intellectual disability, schizophrenia, impulse control disorder, hypothyroidism, seizure, osteopenia and anemia was brought in [facility] home because of Refusal to ambulate and bilateral feet swelling. as per the aid [place] at the bedside, since Jan 8th he had right big toe fracture, they don't know how he had it and he said he does not remember, he was evaluated in the ED twice since then. his feet are getting more swollen and she thinks that is why he is not walking, he is refusing even to get out of bed. upon asking him, he said his feet are ok. she also reports that he lost around 35 pounds since Nov 2018 (last weight I have in the system is 135 lbs oct 2018) and he is been also refusing to eat. he kept asking during my evaluation to get him pumpkin or apple pies which Argentina said she does the same at home then he refuses to eat them.     # Refusal to ambulate,S/p injury to right toe  - pt evaluated by ortho- Likely DJD R 1st MTP joint vs new injury.  Hard sole shoe.No orthopaedic intervention is needed at this time.   - Refusal to ambulate might be psychologic, no gross deformity or orthopaedic contraindication to WBAT.   - May follow up with Dr. Guzmán as an outpatient if pain persists.   -PT eval - recommend STR    # Hypothyroidism  -c/w synthroid  - tsh- 3.74    #Macrocytic anemia  - elevated B12, normal folate TSH    # Mild intellectual disability, schizophrenia, impulse control disorder  -c/w benztropine, bupropion and Remeron  -haldol 10 mg TID   -Pt with elevated valproic acid level- decrease depakote to 500mg TID  - recheck valproic acid levels in 2-3 days  - case discussed with psychiatry team    # H/o seizure disoder  -c/w Depakote  - seizure precautions    #DVT prophylaxis    #Disposition- awaiting rehab bed, probable discharge in AM

## 2019-03-04 NOTE — PROGRESS NOTE BEHAVIORAL HEALTH - SUMMARY
47yo single, , male, resides at [facility], on disability, history of mild-moderate intellectual disability, chronic unspecified schizophrenia, and impulse control d/o, no known prior suicide attempts or IPP admissions, in outpatient psychiatric treatment with Dr. Smith at [address] [place] [address], who was admitted to medicine for bilateral LE swelling. Psychiatry was consulted to evaluate for depression as per primary team, patient has a poor appetite and is refusing to eat his meals. Upon assessment, patient is does not present with objective signs of depression,, psychosis, or tomi. Patient's appetite appears to be improving, as pt was observed by provider eating a large breakfast. As per aid, and provider who knows pt, pt appears to be at his baseline mental status. Pt is not considered an imminent danger to himself or others at this time, and does not require inpatient psychiatric hospitalization. LFTs observed to be WNL, although alk phos is elevated. Psychiatry will continue to follow as patient's depakote level is pending, and dosage may need to be decreased.
47yo single, , male, resides at [facility], on disability, history of mild-moderate intellectual disability, chronic unspecified schizophrenia, and impulse control d/o, no known prior suicide attempts or IPP admissions, in outpatient psychiatric treatment with Dr. Smith at [address] [place] [address], who was admitted to medicine for bilateral LE swelling. Psychiatry was consulted to evaluate for depression as per primary team, patient has a poor appetite and is refusing to eat his meals. Upon assessment, patient is does not present with objective signs of depression,, psychosis, or tomi. Patient's appetite appears to be improving, as pt was observed by provider eating a large breakfast. As per aid, and provider who knows pt, pt appears to be at his baseline mental status. Pt is not considered an imminent danger to himself or others at this time, and does not require inpatient psychiatric hospitalization.     Patient's depakote level is high at 121. Recommend decreasing depakote to 500mg PO TID. Spoke to pt's [facility] nursing supervisor- Shantelle Mclean- [phone]- who states that patient will be discharged to [facility] upon discharge and she understands that patient will need repeat depakote levels in 2-3 days. Patient will follow up with the psychiatrist at [facility].

## 2019-03-04 NOTE — PROGRESS NOTE BEHAVIORAL HEALTH - NSBHCONSULTOBSREASON_PSY_A_CORE FT
per unit routine, pt has aid from residency at bedside
per unit routine, pt has aid from residency at bedside

## 2019-03-05 VITALS
RESPIRATION RATE: 18 BRPM | HEART RATE: 78 BPM | TEMPERATURE: 96 F | DIASTOLIC BLOOD PRESSURE: 73 MMHG | SYSTOLIC BLOOD PRESSURE: 110 MMHG

## 2019-03-05 RX ADMIN — ENOXAPARIN SODIUM 40 MILLIGRAM(S): 100 INJECTION SUBCUTANEOUS at 11:12

## 2019-03-05 RX ADMIN — Medication 1 TABLET(S): at 06:53

## 2019-03-05 RX ADMIN — Medication 1 MILLIGRAM(S): at 06:53

## 2019-03-05 RX ADMIN — Medication 100 MICROGRAM(S): at 06:52

## 2019-03-05 RX ADMIN — HALOPERIDOL DECANOATE 10 MILLIGRAM(S): 100 INJECTION INTRAMUSCULAR at 06:53

## 2019-03-05 RX ADMIN — DIVALPROEX SODIUM 500 MILLIGRAM(S): 500 TABLET, DELAYED RELEASE ORAL at 06:53

## 2019-03-05 RX ADMIN — Medication 100 MILLIGRAM(S): at 06:53

## 2019-03-05 RX ADMIN — BUPROPION HYDROCHLORIDE 150 MILLIGRAM(S): 150 TABLET, EXTENDED RELEASE ORAL at 11:12

## 2019-03-05 RX ADMIN — Medication 1000 UNIT(S): at 11:12

## 2019-03-05 NOTE — CHART NOTE - NSCHARTNOTEFT_GEN_A_CORE
Registered Dietitian Follow-Up     Patient Profile Reviewed                           Yes [x]   No []     Nutrition History Previously Obtained        Yes [x]  No []       Pertinent Subjective Information: Pt w/ improvement in PO intake per pt aide. consumes ensure pudding supplements- desires ensure enlive, however, pt to be d/c. Reports PO intake 100% today at BF and >75% recently.     Pertinent Medical Interventions: Pt presented to ED w/ chief complaint of refusal to ambulate and b/l feet swelling. Pt medically stable for discharge.     Diet order: regular w/ ensure pudding BID     Anthropometrics:  - Ht. 68"  - Wt. 2.28 136#  - %wt change  - BMI 20.7  - IBW 70kg+/-10%     Pertinent Lab Data: (3/3) CO2 33, alk phos 172 (3/1) hgb 10.7 hct 33.5     Pertinent Meds: lovenox, vit D3, colace, synthroid, remeron, senna     Physical Findings:  - Appearance: moderate muscle wasting in temporal region- long term per aide  - GI function: last bM 3/4, denies GI distress  - Tubes:  - Oral/Mouth cavity: tolerating regular diet consistency  - Skin: BS 16, skin intact     Nutrition Requirements  Weight Used: 136# per initial assessment     Estimated Energy Needs    Continue [x]  Adjust []  Adjusted Energy Recommendations:   kcal/day   1752-1898kcal (MSJ x 1.2-1.3 AF) lean towards higher end for recent weight loss     Estimated Protein Needs    Continue [x]  Adjust []  Adjusted Protein Recommendations:   gm/day   62-74g (1-1.2g/kg CBW) as above      Estimated Fluid Needs        Continue [x]  Adjust []  Adjusted Fluid Recommendations:   mL/day   1ml/kcal or per LIP    Nutrient Intake: likely meeting needs         [] Previous Nutrition Diagnosis: moderate PCM            [x] Ongoing          [] Resolved    [] No active nutrition diagnosis identified at this time     Nutrition Intervention meals and snacks, med food supplements  continue w/ regular diet, consider changing PO supplements to Ensure Enlive TID if pt not d/mehnaz     Goal/Expected Outcome: Pt to consume >75% of meal trays within 7 days     Indicator/Monitoring: RD to monitor diet order, energy intake, NFPF, body comp, glucose and renal profile    Pt no longer at risk, f/u 7 days

## 2019-03-06 LAB
CULTURE RESULTS: SIGNIFICANT CHANGE UP
SPECIMEN SOURCE: SIGNIFICANT CHANGE UP

## 2019-03-07 ENCOUNTER — OUTPATIENT (OUTPATIENT)
Dept: OUTPATIENT SERVICES | Facility: HOSPITAL | Age: 49
LOS: 1 days | Discharge: HOME | End: 2019-03-07

## 2019-03-07 DIAGNOSIS — G40.919 EPILEPSY, UNSPECIFIED, INTRACTABLE, WITHOUT STATUS EPILEPTICUS: ICD-10-CM

## 2019-03-07 DIAGNOSIS — R33.8 OTHER RETENTION OF URINE: ICD-10-CM

## 2019-03-07 DIAGNOSIS — D64.9 ANEMIA, UNSPECIFIED: ICD-10-CM

## 2019-03-08 DIAGNOSIS — M85.80 OTHER SPECIFIED DISORDERS OF BONE DENSITY AND STRUCTURE, UNSPECIFIED SITE: ICD-10-CM

## 2019-03-08 DIAGNOSIS — G40.909 EPILEPSY, UNSPECIFIED, NOT INTRACTABLE, WITHOUT STATUS EPILEPTICUS: ICD-10-CM

## 2019-03-08 DIAGNOSIS — N39.0 URINARY TRACT INFECTION, SITE NOT SPECIFIED: ICD-10-CM

## 2019-03-08 DIAGNOSIS — R63.6 UNDERWEIGHT: ICD-10-CM

## 2019-03-08 DIAGNOSIS — Z72.0 TOBACCO USE: ICD-10-CM

## 2019-03-08 DIAGNOSIS — E55.9 VITAMIN D DEFICIENCY, UNSPECIFIED: ICD-10-CM

## 2019-03-08 DIAGNOSIS — F20.9 SCHIZOPHRENIA, UNSPECIFIED: ICD-10-CM

## 2019-03-08 DIAGNOSIS — B95.2 ENTEROCOCCUS AS THE CAUSE OF DISEASES CLASSIFIED ELSEWHERE: ICD-10-CM

## 2019-03-08 DIAGNOSIS — D53.9 NUTRITIONAL ANEMIA, UNSPECIFIED: ICD-10-CM

## 2019-03-08 DIAGNOSIS — E03.9 HYPOTHYROIDISM, UNSPECIFIED: ICD-10-CM

## 2019-03-08 DIAGNOSIS — R41.82 ALTERED MENTAL STATUS, UNSPECIFIED: ICD-10-CM

## 2019-03-08 DIAGNOSIS — F71 MODERATE INTELLECTUAL DISABILITIES: ICD-10-CM

## 2019-03-08 DIAGNOSIS — M79.89 OTHER SPECIFIED SOFT TISSUE DISORDERS: ICD-10-CM

## 2019-03-08 DIAGNOSIS — F31.9 BIPOLAR DISORDER, UNSPECIFIED: ICD-10-CM

## 2019-03-08 DIAGNOSIS — F63.9 IMPULSE DISORDER, UNSPECIFIED: ICD-10-CM

## 2019-03-08 DIAGNOSIS — M19.071 PRIMARY OSTEOARTHRITIS, RIGHT ANKLE AND FOOT: ICD-10-CM

## 2019-04-09 ENCOUNTER — APPOINTMENT (OUTPATIENT)
Dept: PODIATRY | Facility: HOSPITAL | Age: 49
End: 2019-04-09

## 2019-05-14 ENCOUNTER — APPOINTMENT (OUTPATIENT)
Dept: NEUROLOGY | Facility: CLINIC | Age: 49
End: 2019-05-14
Payer: MEDICAID

## 2019-06-11 ENCOUNTER — APPOINTMENT (OUTPATIENT)
Dept: PODIATRY | Facility: HOSPITAL | Age: 49
End: 2019-06-11
Payer: MEDICAID

## 2019-06-11 ENCOUNTER — OUTPATIENT (OUTPATIENT)
Dept: OUTPATIENT SERVICES | Facility: HOSPITAL | Age: 49
LOS: 1 days | Discharge: HOME | End: 2019-06-11

## 2019-06-11 DIAGNOSIS — M79.672 PAIN IN LEFT FOOT: ICD-10-CM

## 2019-06-11 DIAGNOSIS — M79.671 PAIN IN RIGHT FOOT: ICD-10-CM

## 2019-06-11 DIAGNOSIS — B35.1 TINEA UNGUIUM: ICD-10-CM

## 2019-06-11 PROCEDURE — 11721 DEBRIDE NAIL 6 OR MORE: CPT

## 2019-06-11 NOTE — PHYSICAL EXAM
[Full Pulse] : the pedal pulses are present [FreeTextEntry1] : thick, dystrophic, discolored nails with subungual debris x 10

## 2019-07-17 ENCOUNTER — RX RENEWAL (OUTPATIENT)
Age: 49
End: 2019-07-17

## 2019-07-17 ENCOUNTER — APPOINTMENT (OUTPATIENT)
Dept: OTOLARYNGOLOGY | Facility: CLINIC | Age: 49
End: 2019-07-17
Payer: MEDICAID

## 2019-07-17 ENCOUNTER — OTHER (OUTPATIENT)
Age: 49
End: 2019-07-17

## 2019-07-17 DIAGNOSIS — R04.0 EPISTAXIS: ICD-10-CM

## 2019-07-17 PROCEDURE — 69210 REMOVE IMPACTED EAR WAX UNI: CPT

## 2019-07-17 PROCEDURE — 99213 OFFICE O/P EST LOW 20 MIN: CPT | Mod: 25

## 2019-07-17 PROCEDURE — 31231 NASAL ENDOSCOPY DX: CPT

## 2019-07-17 RX ORDER — BACITRACIN 500 [IU]/G
500 OINTMENT TOPICAL
Qty: 1 | Refills: 4 | Status: ACTIVE | COMMUNITY
Start: 2019-07-17 | End: 1900-01-01

## 2019-07-17 NOTE — HISTORY OF PRESENT ILLNESS
[FreeTextEntry1] : 7/17/19 Patient is following up for bilateral clogged ears. Patient with history of cerumen impaction, requiring removal. Denies otalgia and otorrhea. \par \par Patient presents with new complaints of epistaxis. Patient's aide states episodes have been present for about 1 week occurring intermittently. Alleviated on it's own. Occurring from both nostrils. Denies trauma. Patient poor historian.

## 2019-07-17 NOTE — PHYSICAL EXAM
[de-identified] : cerumen impaction bilaterally  [Midline] : trachea located in midline position [Normal] : no rashes

## 2019-08-01 ENCOUNTER — RX RENEWAL (OUTPATIENT)
Age: 49
End: 2019-08-01

## 2019-08-07 ENCOUNTER — LABORATORY RESULT (OUTPATIENT)
Age: 49
End: 2019-08-07

## 2019-08-07 ENCOUNTER — OUTPATIENT (OUTPATIENT)
Dept: OUTPATIENT SERVICES | Facility: HOSPITAL | Age: 49
LOS: 1 days | Discharge: HOME | End: 2019-08-07

## 2019-08-07 DIAGNOSIS — R56.9 UNSPECIFIED CONVULSIONS: ICD-10-CM

## 2019-08-16 ENCOUNTER — APPOINTMENT (OUTPATIENT)
Dept: OTOLARYNGOLOGY | Facility: CLINIC | Age: 49
End: 2019-08-16
Payer: MEDICAID

## 2019-08-16 DIAGNOSIS — J34.89 OTHER SPECIFIED DISORDERS OF NOSE AND NASAL SINUSES: ICD-10-CM

## 2019-08-16 PROCEDURE — 99213 OFFICE O/P EST LOW 20 MIN: CPT

## 2019-08-16 NOTE — HISTORY OF PRESENT ILLNESS
[FreeTextEntry1] : 8/16/19 Patient is following up for epistaxis and clogged ears. Patient was prescribed bacitracin BID. Patient unsure if still bleeding.

## 2019-08-29 ENCOUNTER — OUTPATIENT (OUTPATIENT)
Dept: OUTPATIENT SERVICES | Facility: HOSPITAL | Age: 49
LOS: 1 days | Discharge: HOME | End: 2019-08-29

## 2019-08-29 ENCOUNTER — APPOINTMENT (OUTPATIENT)
Dept: PODIATRY | Facility: HOSPITAL | Age: 49
End: 2019-08-29
Payer: MEDICAID

## 2019-08-29 DIAGNOSIS — M79.672 PAIN IN LEFT FOOT: ICD-10-CM

## 2019-08-29 DIAGNOSIS — B35.1 TINEA UNGUIUM: ICD-10-CM

## 2019-08-29 DIAGNOSIS — M79.671 PAIN IN RIGHT FOOT: ICD-10-CM

## 2019-08-29 PROCEDURE — 11721 DEBRIDE NAIL 6 OR MORE: CPT | Mod: NC

## 2019-10-03 ENCOUNTER — OUTPATIENT (OUTPATIENT)
Dept: OUTPATIENT SERVICES | Facility: HOSPITAL | Age: 49
LOS: 1 days | Discharge: HOME | End: 2019-10-03

## 2019-10-03 ENCOUNTER — APPOINTMENT (OUTPATIENT)
Dept: UROLOGY | Facility: CLINIC | Age: 49
End: 2019-10-03
Payer: MEDICAID

## 2019-10-03 DIAGNOSIS — N36.5 URETHRAL FALSE PASSAGE: ICD-10-CM

## 2019-10-03 PROCEDURE — 99213 OFFICE O/P EST LOW 20 MIN: CPT

## 2019-10-03 NOTE — HISTORY OF PRESENT ILLNESS
[FreeTextEntry1] : This is a 48 year male with mild mental handicap who was seen in the hospital for a difficult long catheter. Patient had a false passage/urethral stricture that required bedside cystoscopy to help with long placement. \par \par The catheter was removed before discharge from the hospital and he passed a trial of void. He has also states that he is urinating without any difficult. no blood, no dysuria. Aide states that she has not noticed any issue with his urination\par \par April 23, 2018 US Bladder; showed: bladder trabeculation. PVR was 187. prostate 25g. \par \par US Bladder on oct 15 th showed: thickening and trabeculated bladder with intraluminal debris\par  ml, prostate volume 33 ml \par \par retrograde urethrogram.\par no new issues since his last visit.\par \par after reviewing his x ray it is deemed that he will need some form of intervention to open the area of narrowing his urethra, I referred to Dr. Parmjit Mendoza at [place] last visit.

## 2019-10-03 NOTE — PHYSICAL EXAM
[General Appearance - Well Nourished] : well nourished [General Appearance - In No Acute Distress] : no acute distress [Abdomen Soft] : soft [Costovertebral Angle Tenderness] : no ~M costovertebral angle tenderness [Abdomen Tenderness] : non-tender [Urethral Meatus] : meatus normal [Skin Color & Pigmentation] : normal skin color and pigmentation [Edema] : no peripheral edema [] : no respiratory distress [Respiration, Rhythm And Depth] : normal respiratory rhythm and effort [Exaggerated Use Of Accessory Muscles For Inspiration] : no accessory muscle use [Not Anxious] : not anxious

## 2019-10-03 NOTE — ASSESSMENT
[FreeTextEntry1] : This is a 48 year male with mild mental handicap who was seen in the hospital for a difficult long catheter. Patient had a false passage/urethral stricture that required bedside cystoscopy to help with long placement. \par \par The catheter was removed before discharge from the hospital and he passed a trial of void. He has also states that he is urinating without any difficult. no blood, no dysuria. Aide states that she has not noticed any issue with his urination\par \par April 23, 2018 US Bladder; showed: bladder trabeculation. PVR was 187. prostate 25g. \par \par US Bladder on oct 15 th showed: thickening and trabeculated bladder with intraluminal debris\par  ml, prostate volume 33 ml \par \par retrograde urethrogram.\par no new issues since his last visit.\par \par after reviewing his x ray it is deemed that he will need some form of intervention to open the area of narrowing his urethra, I referred to Dr. Parmjit Mendoza at [place] last visit.

## 2019-10-08 ENCOUNTER — OUTPATIENT (OUTPATIENT)
Dept: OUTPATIENT SERVICES | Facility: HOSPITAL | Age: 49
LOS: 1 days | Discharge: HOME | End: 2019-10-08

## 2019-10-08 ENCOUNTER — APPOINTMENT (OUTPATIENT)
Dept: NEUROLOGY | Facility: CLINIC | Age: 49
End: 2019-10-08
Payer: MEDICAID

## 2019-10-08 VITALS — HEART RATE: 91 BPM | DIASTOLIC BLOOD PRESSURE: 85 MMHG | SYSTOLIC BLOOD PRESSURE: 136 MMHG

## 2019-10-08 PROCEDURE — 99213 OFFICE O/P EST LOW 20 MIN: CPT | Mod: NC

## 2019-10-08 NOTE — END OF VISIT
[] : Resident [FreeTextEntry3] : Patient seen and examined with resident and results reviewed by me personally.\par Patient is pleasant but appears to have difficulty hearing and/or following commands\par No seizures using a walker.\par No change in behavior\par \par Plan \par 1. Continue Depakote\par 2. Follow up in 6-9 months\par 3. Call for changes

## 2019-10-08 NOTE — HISTORY OF PRESENT ILLNESS
[FreeTextEntry1] : follow up [de-identified] : 48 yr old M with Hx of epilepsy on depakote, mild intellectual disability, schizophrenia, impulse control disorder, \par hypothyroidism, osteopenia and anemia here for follow up.\par was admitted on Feb for weakness and refuse ambulating, Depakote level was aurotherapeutic, dose was \par changed from 750mg TID to 500 mg TID, has been doing well, no breakthrough seizure, \par no headache or change in mental status. depakote level wnl on August 2019

## 2019-10-08 NOTE — ASSESSMENT
[FreeTextEntry1] : 46 yo with intellectual disability, SZD, hypothyroid currently here for followup for seizures currently doing well on depakote.\par depakote level wnl on August 2019\par \par Plan:\par - continue with depakote  mg 2 tab PO TID\par - RTC 6 months.

## 2019-10-08 NOTE — REVIEW OF SYSTEMS
[Negative] : Heme/Lymph [Chest Pain] : no chest pain [Palpitations] : no palpitations [Claudication] : no  leg claudication [Orthopena] : no orthopnea [Paroxysmal Nocturnal Dyspnea] : no paroxysmal nocturnal dyspnea [Headache] : no headache [Dizziness] : no dizziness [Fainting] : no fainting [Unsteady Walk] : no ataxia [Memory Loss] : no memory loss [Suicidal] : not suicidal [Insomnia] : no insomnia [Anxiety] : no anxiety

## 2019-10-08 NOTE — PHYSICAL EXAM
[Well Nourished] : well nourished [No Acute Distress] : no acute distress [Well Developed] : well developed [PERRL] : pupils equal round and reactive to light [Normal Sclera/Conjunctiva] : normal sclera/conjunctiva [EOMI] : extraocular movements intact [Normal Outer Ear/Nose] : the outer ears and nose were normal in appearance [Normal Oropharynx] : the oropharynx was normal [Normal TMs] : both tympanic membranes were normal [No Lymphadenopathy] : no lymphadenopathy [Supple] : supple [No Accessory Muscle Use] : no accessory muscle use [No Respiratory Distress] : no respiratory distress  [Clear to Auscultation] : lungs were clear to auscultation bilaterally [Normal Rate] : normal rate  [Regular Rhythm] : with a regular rhythm [No Carotid Bruits] : no carotid bruits [Normal S1, S2] : normal S1 and S2 [No Varicosities] : no varicosities [No Edema] : there was no peripheral edema [No Abdominal Bruit] : a ~M bruit was not heard ~T in the abdomen [No Palpable Aorta] : no palpable aorta [No Extremity Clubbing/Cyanosis] : no extremity clubbing/cyanosis [Normal] : no joint swelling and grossly normal strength and tone [Coordination Grossly Intact] : coordination grossly intact [No Focal Deficits] : no focal deficits [Normal Gait] : normal gait [Normal Affect] : the affect was normal [Speech Grossly Normal] : speech grossly normal [Memory Grossly Normal] : memory grossly normal [Normal Mood] : the mood was normal [Normal Insight/Judgement] : insight and judgment were intact [de-identified] : severe kyphosis with slight ataxia, narrow based.

## 2019-10-17 ENCOUNTER — EMERGENCY (EMERGENCY)
Facility: HOSPITAL | Age: 49
LOS: 0 days | Discharge: HOME | End: 2019-10-17
Admitting: EMERGENCY MEDICINE
Payer: MEDICAID

## 2019-10-17 VITALS
SYSTOLIC BLOOD PRESSURE: 130 MMHG | DIASTOLIC BLOOD PRESSURE: 71 MMHG | OXYGEN SATURATION: 97 % | TEMPERATURE: 96 F | RESPIRATION RATE: 18 BRPM | WEIGHT: 126.1 LBS | HEIGHT: 68 IN | HEART RATE: 91 BPM
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
## 2019-10-17 DIAGNOSIS — S92.402A DISPLACED UNSPECIFIED FRACTURE OF LEFT GREAT TOE, INITIAL ENCOUNTER FOR CLOSED FRACTURE: ICD-10-CM
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
## 2019-10-17 DIAGNOSIS — M79.675 PAIN IN LEFT TOE(S): ICD-10-CM
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
## 2019-10-17 PROCEDURE — 99284 EMERGENCY DEPT VISIT MOD MDM: CPT | Mod: 25

## 2019-10-17 PROCEDURE — 73630 X-RAY EXAM OF FOOT: CPT | Mod: 26,LT

## 2019-10-17 PROCEDURE — 28490 TREAT BIG TOE FRACTURE: CPT | Mod: 54

## 2019-10-17 NOTE — ED PROVIDER NOTE - OBJECTIVE STATEMENT
48 y.o male w/ pmhx noted below presents to the ED for evaluation of left toe pain x 1 day.  walking into van and stubbed L great toe.  Sudden onset left toe pain, worse w/ ambulation, alleviated with rest, mild severity, no radiation of pain. Denies head injury, upper extremity pain, paresthesias, ankle/knee/hip pain.  No further complaints.

## 2019-10-17 NOTE — ED PROVIDER NOTE - CLINICAL SUMMARY MEDICAL DECISION MAKING FREE TEXT BOX
witnessed injury by staff. no head injury.  questionable fx.  will splint and f/u with pod.  I have discussed the discharge plan with the patient. The patient agrees with the plan, as discussed.  The patient understands Emergency Department diagnosis is a preliminary diagnosis often based on limited information and that the patient must adhere to the follow-up plan as discussed.  The patient understands that if the symptoms worsen or if prescribed medications do not have the desired/planned effect that the patient may return to the Emergency Department at any time for further evaluation and treatment.

## 2019-10-17 NOTE — ED PROVIDER NOTE - NS ED ROS FT
CONST: No fever, chills or bodyaches  EYES: No pain, redness, drainage or visual changes.  ENT: No ear pain or discharge, nasal discharge or congestion. No sore throat  CARD: No chest pain, palpitations  RESP: No SOB, cough, hemoptysis. No hx of asthma or COPD  GI: No abdominal pain, N/V/D  MS: + toe pain. No back pain.  SKIN: + ecchymosis of toe  NEURO: No headache, dizziness, paresthesias or LOC

## 2019-10-17 NOTE — ED PROVIDER NOTE - PATIENT PORTAL LINK FT
You can access the FollowMyHealth Patient Portal offered by [facility] by registering at the following website: [email or web address]. By joining [employer]’s FollowMyHealth portal, you will also be able to view your health information using other applications (apps) compatible with our system.

## 2019-10-17 NOTE — ED PROVIDER NOTE - CARE PROVIDER_API CALL
Keith Toledo (DPM)  [facility]  [address] [facility] [address], [address]  [place] [address]  Phone: [phone]  Fax: [phone]  Follow Up Time: 1-3 Days

## 2019-10-17 NOTE — ED PROVIDER NOTE - PHYSICAL EXAMINATION
CONST: Well appearing in NAD  EYES:  Sclera and conjunctiva clear.  MS: + diffuse ecchymosis and TTP L great toe, from without difficulty, cap refill < 2 seconds, no ankle/knee/hip pain. Normal ROM in all extremities. No edema of lower extremities, no calf pain, radial pulses 2+ bilaterally  SKIN: Warm, dry, no acute rashes. Good turgor  NEURO: A&Ox3, No focal deficits. Strength 5/5 with no sensory deficits. Steady gait

## 2019-10-24 ENCOUNTER — APPOINTMENT (OUTPATIENT)
Dept: PODIATRY | Facility: HOSPITAL | Age: 49
End: 2019-10-24
Payer: MEDICAID

## 2019-10-24 ENCOUNTER — OUTPATIENT (OUTPATIENT)
Dept: OUTPATIENT SERVICES | Facility: HOSPITAL | Age: 49
LOS: 1 days | Discharge: HOME | End: 2019-10-24

## 2019-10-24 DIAGNOSIS — M79.672 PAIN IN LEFT FOOT: ICD-10-CM

## 2019-10-24 DIAGNOSIS — B35.1 TINEA UNGUIUM: ICD-10-CM

## 2019-10-24 DIAGNOSIS — S99.929A UNSPECIFIED INJURY OF UNSPECIFIED FOOT, INITIAL ENCOUNTER: ICD-10-CM

## 2019-10-24 DIAGNOSIS — M79.671 PAIN IN RIGHT FOOT: ICD-10-CM

## 2019-10-24 PROCEDURE — 99212 OFFICE O/P EST SF 10 MIN: CPT | Mod: NC,25

## 2019-10-24 PROCEDURE — 11721 DEBRIDE NAIL 6 OR MORE: CPT | Mod: NC

## 2019-10-24 NOTE — PROCEDURE
[FreeTextEntry1] : manual debridement of all diseased nails times ten \par xrays reviewed. did not reveal any fractures\par patient can return to regular shoe wear, as tolerated\par pt can return his regular activity\par return 2 month

## 2019-10-24 NOTE — HISTORY OF PRESENT ILLNESS
[FreeTextEntry1] : 48year old MRDD male presents for nail care. Pt presented with a darco shoe to the right foot.

## 2019-10-24 NOTE — PHYSICAL EXAM
[Full Pulse] : the pedal pulses are present [FreeTextEntry1] : thick friable nails times ten. ecchymosis on the left hallux

## 2019-11-13 ENCOUNTER — FORM ENCOUNTER (OUTPATIENT)
Age: 49
End: 2019-11-13

## 2019-11-14 ENCOUNTER — OUTPATIENT (OUTPATIENT)
Dept: OUTPATIENT SERVICES | Facility: HOSPITAL | Age: 49
LOS: 1 days | Discharge: HOME | End: 2019-11-14
Payer: MEDICAID

## 2019-11-14 DIAGNOSIS — N20.2 CALCULUS OF KIDNEY WITH CALCULUS OF URETER: ICD-10-CM

## 2019-11-14 PROCEDURE — 76770 US EXAM ABDO BACK WALL COMP: CPT | Mod: 26

## 2019-11-18 ENCOUNTER — OUTPATIENT (OUTPATIENT)
Dept: OUTPATIENT SERVICES | Facility: HOSPITAL | Age: 49
LOS: 1 days | Discharge: HOME | End: 2019-11-18

## 2019-11-18 DIAGNOSIS — E87.1 HYPO-OSMOLALITY AND HYPONATREMIA: ICD-10-CM

## 2019-11-21 ENCOUNTER — APPOINTMENT (OUTPATIENT)
Dept: UROLOGY | Facility: CLINIC | Age: 49
End: 2019-11-21
Payer: MEDICAID

## 2019-11-21 ENCOUNTER — OUTPATIENT (OUTPATIENT)
Dept: OUTPATIENT SERVICES | Facility: HOSPITAL | Age: 49
LOS: 1 days | Discharge: HOME | End: 2019-11-21

## 2019-11-21 PROCEDURE — 99214 OFFICE O/P EST MOD 30 MIN: CPT

## 2019-11-21 NOTE — ASSESSMENT
[FreeTextEntry1] : This is a 48 year male with mild mental handicap who was seen in the hospital for a difficult long catheter. Patient had a false passage/urethral stricture that required bedside cystoscopy to help with long placement. \par \par The catheter was removed before discharge from the hospital and he passed a trial of void. He has also states that he is urinating without any difficult. no blood, no dysuria. Aide states that she has not noticed any issue with his urination\par \par April 23, 2018 US Bladder; showed: bladder trabeculation. PVR was 187. prostate 25g. \par \par US Bladder on oct 15 th showed: thickening and trabeculated bladder with intraluminal debris\par  ml, prostate volume 33 ml \par \par no new issues since his last visit.\par \par Nov 2019\par US Bladder; prostate 16g with pvr 301ml\par US Renal; normal renal us

## 2019-11-21 NOTE — PHYSICAL EXAM
[General Appearance - Well Nourished] : well nourished [General Appearance - In No Acute Distress] : no acute distress [Abdomen Soft] : soft [Abdomen Tenderness] : non-tender [Costovertebral Angle Tenderness] : no ~M costovertebral angle tenderness [Urethral Meatus] : meatus normal [Skin Color & Pigmentation] : normal skin color and pigmentation [Edema] : no peripheral edema [] : no respiratory distress [Respiration, Rhythm And Depth] : normal respiratory rhythm and effort [Exaggerated Use Of Accessory Muscles For Inspiration] : no accessory muscle use [Not Anxious] : not anxious

## 2019-11-21 NOTE — HISTORY OF PRESENT ILLNESS
[FreeTextEntry1] : This is a 48 year male with mild mental handicap who was seen in the hospital for a difficult long catheter. Patient had a false passage/urethral stricture that required bedside cystoscopy to help with long placement. \par \par The catheter was removed before discharge from the hospital and he passed a trial of void. He has also states that he is urinating without any difficult. no blood, no dysuria. Aide states that she has not noticed any issue with his urination\par \par April 23, 2018 US Bladder; showed: bladder trabeculation. PVR was 187. prostate 25g. \par \par US Bladder on oct 15 th showed: thickening and trabeculated bladder with intraluminal debris\par  ml, prostate volume 33 ml \par \par no new issues since his last visit.\par \par Nov 2019-- images reviewed by me today\par US Bladder; prostate 16g with pvr 301ml\par US Renal; normal renal us\par

## 2020-01-02 ENCOUNTER — OUTPATIENT (OUTPATIENT)
Dept: OUTPATIENT SERVICES | Facility: HOSPITAL | Age: 50
LOS: 1 days | Discharge: HOME | End: 2020-01-02

## 2020-01-02 ENCOUNTER — APPOINTMENT (OUTPATIENT)
Dept: PODIATRY | Facility: HOSPITAL | Age: 50
End: 2020-01-02
Payer: MEDICAID

## 2020-01-02 DIAGNOSIS — M79.671 PAIN IN RIGHT FOOT: ICD-10-CM

## 2020-01-02 DIAGNOSIS — M79.672 PAIN IN LEFT FOOT: ICD-10-CM

## 2020-01-02 DIAGNOSIS — B35.1 TINEA UNGUIUM: ICD-10-CM

## 2020-01-02 PROCEDURE — 11721 DEBRIDE NAIL 6 OR MORE: CPT | Mod: NC

## 2020-03-05 ENCOUNTER — APPOINTMENT (OUTPATIENT)
Dept: PODIATRY | Facility: HOSPITAL | Age: 50
End: 2020-03-05
Payer: MEDICAID

## 2020-03-05 ENCOUNTER — OUTPATIENT (OUTPATIENT)
Dept: OUTPATIENT SERVICES | Facility: HOSPITAL | Age: 50
LOS: 1 days | Discharge: HOME | End: 2020-03-05

## 2020-03-05 DIAGNOSIS — M79.672 PAIN IN LEFT FOOT: ICD-10-CM

## 2020-03-05 DIAGNOSIS — B35.3 TINEA PEDIS: ICD-10-CM

## 2020-03-05 DIAGNOSIS — M79.671 PAIN IN RIGHT FOOT: ICD-10-CM

## 2020-03-05 PROCEDURE — 11721 DEBRIDE NAIL 6 OR MORE: CPT | Mod: NC

## 2020-03-12 ENCOUNTER — APPOINTMENT (OUTPATIENT)
Dept: UROLOGY | Facility: CLINIC | Age: 50
End: 2020-03-12

## 2020-04-19 ENCOUNTER — EMERGENCY (EMERGENCY)
Facility: HOSPITAL | Age: 50
LOS: 0 days | Discharge: HOME | End: 2020-04-19
Attending: EMERGENCY MEDICINE | Admitting: EMERGENCY MEDICINE
Payer: MEDICAID

## 2020-04-19 VITALS
DIASTOLIC BLOOD PRESSURE: 76 MMHG | TEMPERATURE: 96 F | RESPIRATION RATE: 20 BRPM | OXYGEN SATURATION: 97 % | HEART RATE: 80 BPM | SYSTOLIC BLOOD PRESSURE: 107 MMHG

## 2020-04-19 VITALS
OXYGEN SATURATION: 98 % | HEIGHT: 68 IN | DIASTOLIC BLOOD PRESSURE: 71 MMHG | HEART RATE: 64 BPM | RESPIRATION RATE: 18 BRPM | WEIGHT: 126.1 LBS | SYSTOLIC BLOOD PRESSURE: 121 MMHG | TEMPERATURE: 96 F

## 2020-04-19 VITALS — HEIGHT: 68 IN | WEIGHT: 126.1 LBS

## 2020-04-19 DIAGNOSIS — Z88.1 ALLERGY STATUS TO OTHER ANTIBIOTIC AGENTS STATUS: ICD-10-CM

## 2020-04-19 DIAGNOSIS — R09.81 NASAL CONGESTION: ICD-10-CM

## 2020-04-19 DIAGNOSIS — Z02.9 ENCOUNTER FOR ADMINISTRATIVE EXAMINATIONS, UNSPECIFIED: ICD-10-CM

## 2020-04-19 DIAGNOSIS — U07.1 COVID-19: ICD-10-CM

## 2020-04-19 DIAGNOSIS — R05 COUGH: ICD-10-CM

## 2020-04-19 DIAGNOSIS — Z88.8 ALLERGY STATUS TO OTHER DRUGS, MEDICAMENTS AND BIOLOGICAL SUBSTANCES STATUS: ICD-10-CM

## 2020-04-19 DIAGNOSIS — Z20.828 CONTACT WITH AND (SUSPECTED) EXPOSURE TO OTHER VIRAL COMMUNICABLE DISEASES: ICD-10-CM

## 2020-04-19 DIAGNOSIS — E03.9 HYPOTHYROIDISM, UNSPECIFIED: ICD-10-CM

## 2020-04-19 PROCEDURE — 71046 X-RAY EXAM CHEST 2 VIEWS: CPT | Mod: 26

## 2020-04-19 PROCEDURE — 99284 EMERGENCY DEPT VISIT MOD MDM: CPT

## 2020-04-19 PROCEDURE — 74176 CT ABD & PELVIS W/O CONTRAST: CPT | Mod: 26

## 2020-04-19 NOTE — ED PROVIDER NOTE - OBJECTIVE STATEMENT
48 y/o male with hx of intellectual disability, seizure disorder, hypothyroidism presents to the ed from group home for cough and congestion today. patient with known exposure to +covid patients. patient without fever, congestion.  patient seen earlier in the Ed, had covid testing, CXR and dc home. patient called back for abnormal cxr

## 2020-04-19 NOTE — ED PROVIDER NOTE - OBJECTIVE STATEMENT
50 y/o male with hx of intellectual disability, seizure disorder, hypothyroidism presents to the ed from group home for cough and congestion today. patient with known exposure to +covid patients. patient without fever, congestion.

## 2020-04-19 NOTE — ED ADULT NURSE NOTE - NSFALLRSKASSESSDT_ED_ALL_ED
REASON FOR VISIT:  Chief Complaint   Patient presents with   • Surgical Followup     S/P phaco with intraocular lens implant right eye 8/14/2018.       HISTORY OF PRESENT ILLNESS:  70 year old female is here for 1 day postoperative  follow up on cataract surgery in the right eye.  Patient says vision is blurry. Denies any flashes or new floaters.  Patient is using Acular QID, Predforte QID and Vigamox QID right eye.      ASSESSMENT:  1. Pseudophakia of right eye        PLAN:  No orders of the defined types were placed in this encounter.      FOLLOWUP:  Return in about 1 week (around 8/22/2018).      History:    Past Medical History:   Diagnosis Date   • Arthritis of knee 1/21/2016   • Cataract    • Hypertension        Past Surgical History:   Procedure Laterality Date   • Cataract extraction w/ intraocular lens implant      right eye 8/14/2018   • Foot surgery Left     heel   • Knee arthroscopy w/ laser Left    • Nasal septum surgery     • Total knee replacement Bilateral     right 2/29/2016, left 4/27/2016       ALLERGIES:   Allergen Reactions   • Lactose Intolerance (No Food Restrictions) DIARRHEA   • Penicillins RASH   • Sulfa Antibiotics RASH       Family History:  I reviewed the technician's history as outlined in EPIC; there are no additions.    Pertinent systemic medications:  See EPIC for full medication list      Eye medications: Acular QID, Predforte QID and Vigamox QID right eye    Past Ocular History:    CE with IOL Right 08/14/2018.       Impression and Plan:  1. 1 d s/p CE with PCIOL, right eye: Continue Acular QID, Predforte QID and Vigamox QID.  Continue eye shield at bedtime. No eye rubbing, bending, lifting or strenuous activity.          
19-Apr-2020 12:04

## 2020-04-19 NOTE — ED PROVIDER NOTE - CLINICAL SUMMARY MEDICAL DECISION MAKING FREE TEXT BOX
49y male above PMH sent from  for eval, has congestion, multiple covid+ contacts, on exam vital signs appreciated, well appearing smiling conversant cor reg lungs cta abd snt, imaging reviewed (colonic interposition no infiltrate), swabbed for covid/cohorting purposes. Staff counseled regarding conditions which should prompt return.

## 2020-04-19 NOTE — ED PROVIDER NOTE - ATTENDING CONTRIBUTION TO CARE
49y male above PMH seen by myself earlier for covid eval and discharged returns after radiologist "cannot rule out free air" on cxr, PE unchanged, will obtain cta/p

## 2020-04-19 NOTE — ED PROVIDER NOTE - CLINICAL SUMMARY MEDICAL DECISION MAKING FREE TEXT BOX
Pt sent to r/o "free air" after presenting for cough and cxr read as abnl, PE unchanged, imaging reveiwed, will d/c

## 2020-04-19 NOTE — ED PROVIDER NOTE - NSFOLLOWUPCLINICS_GEN_ALL_ED_FT
[facility]  Family Medicine  [address] [place] [address]  Phone: [phone]  Fax:   Follow Up Time:

## 2020-04-19 NOTE — ED PROVIDER NOTE - PATIENT PORTAL LINK FT
You can access the FollowMyHealth Patient Portal offered by [facility] by registering at the following website: [email or web address]. By joining [employer]’s FollowMyHealth portal, you will also be able to view your health information using other applications (apps) compatible with our system.

## 2020-04-19 NOTE — ED PROVIDER NOTE - PATIENT PORTAL LINK FT
You can access the FollowMyHealth Patient Portal offered by [facility] by registering at the following website: [email or web address]. By joining [employer]’s FollowMyHealth portal, you will also be able to view your health information using other applications (apps) compatible with our system.

## 2020-04-19 NOTE — ED ADULT NURSE NOTE - NSIMPLEMENTINTERV_GEN_ALL_ED
Implemented All Fall Risk Interventions:  Breeding to call system. Call bell, personal items and telephone within reach. Instruct patient to call for assistance. Room bathroom lighting operational. Non-slip footwear when patient is off stretcher. Physically safe environment: no spills, clutter or unnecessary equipment. Stretcher in lowest position, wheels locked, appropriate side rails in place. Provide visual cue, wrist band, yellow gown, etc. Monitor gait and stability. Monitor for mental status changes and reorient to person, place, and time. Review medications for side effects contributing to fall risk. Reinforce activity limits and safety measures with patient and family.

## 2020-04-20 LAB — SARS-COV-2 RNA SPEC QL NAA+PROBE: DETECTED

## 2020-04-22 NOTE — ED POST DISCHARGE NOTE - RESULT SUMMARY
+ COVID-19. SPOKE WITH KAREN SABILLON,  IN GROUP HOME. DISCUSSED RESULTS. ADVISED 14 DAYS OF QUARANTINE, 6 FT. SOCIAL DISTANCING. OTHER PATIENT IN GROUP HOME HAVE COVID-19 SO STAFF HAS EXPERIENCE ON ISOLATION AND QUARANTINE MEASURES. TOLD BYRON TO CALL MD NOW TO INFORM.

## 2020-05-07 ENCOUNTER — APPOINTMENT (OUTPATIENT)
Dept: PODIATRY | Facility: HOSPITAL | Age: 50
End: 2020-05-07

## 2020-06-03 ENCOUNTER — RESULT REVIEW (OUTPATIENT)
Age: 50
End: 2020-06-03

## 2020-06-11 ENCOUNTER — APPOINTMENT (OUTPATIENT)
Dept: PODIATRY | Facility: HOSPITAL | Age: 50
End: 2020-06-11

## 2020-06-30 ENCOUNTER — OUTPATIENT (OUTPATIENT)
Dept: OUTPATIENT SERVICES | Facility: HOSPITAL | Age: 50
LOS: 1 days | Discharge: HOME | End: 2020-06-30

## 2020-06-30 ENCOUNTER — APPOINTMENT (OUTPATIENT)
Dept: PODIATRY | Facility: CLINIC | Age: 50
End: 2020-06-30
Payer: MEDICAID

## 2020-06-30 DIAGNOSIS — M79.672 PAIN IN LEFT FOOT: ICD-10-CM

## 2020-06-30 DIAGNOSIS — M79.671 PAIN IN RIGHT FOOT: ICD-10-CM

## 2020-06-30 DIAGNOSIS — B35.1 TINEA UNGUIUM: ICD-10-CM

## 2020-06-30 PROCEDURE — 11721 DEBRIDE NAIL 6 OR MORE: CPT | Mod: NC

## 2020-06-30 NOTE — PHYSICAL EXAM
[Full Pulse] : the pedal pulses are present [FreeTextEntry1] : thick friable dystrophic nails  w/ discoloration x 10

## 2020-08-06 NOTE — PROGRESS NOTE ADULT - PROBLEM/PLAN-1
She has had poison ivy for almost a week and it is spreading to her L eye and genitals.   DISPLAY PLAN FREE TEXT

## 2020-08-25 ENCOUNTER — APPOINTMENT (OUTPATIENT)
Dept: PODIATRY | Facility: CLINIC | Age: 50
End: 2020-08-25
Payer: MEDICAID

## 2020-08-25 ENCOUNTER — OUTPATIENT (OUTPATIENT)
Dept: OUTPATIENT SERVICES | Facility: HOSPITAL | Age: 50
LOS: 1 days | Discharge: HOME | End: 2020-08-25

## 2020-08-25 DIAGNOSIS — B35.1 TINEA UNGUIUM: ICD-10-CM

## 2020-08-25 DIAGNOSIS — M79.671 PAIN IN RIGHT FOOT: ICD-10-CM

## 2020-08-25 DIAGNOSIS — M79.672 PAIN IN LEFT FOOT: ICD-10-CM

## 2020-08-25 PROCEDURE — 11721 DEBRIDE NAIL 6 OR MORE: CPT | Mod: NC

## 2020-08-25 NOTE — PROCEDURE
[FreeTextEntry1] : -Aseptic debridement of all fungal nails.  Patient has pain about the toes secondary to nail pressure and relates improvement with periodic debridement.\par -return 2 months\par

## 2020-09-16 ENCOUNTER — APPOINTMENT (OUTPATIENT)
Dept: OTOLARYNGOLOGY | Facility: CLINIC | Age: 50
End: 2020-09-16
Payer: MEDICAID

## 2020-09-16 VITALS — HEIGHT: 68 IN | TEMPERATURE: 97.8 F | BODY MASS INDEX: 25.76 KG/M2 | WEIGHT: 170 LBS

## 2020-09-16 PROCEDURE — 99212 OFFICE O/P EST SF 10 MIN: CPT | Mod: 25

## 2020-09-16 NOTE — HISTORY OF PRESENT ILLNESS
[de-identified] : 8/16/19 Patient is following up for epistaxis and clogged ears. Patient was prescribed bacitracin BID. Patient unsure if still bleeding.  [FreeTextEntry1] : 09/16/2020 Patient presents today for a follow up on b/l clogged ears. Patient states he feels fine with no complaints .

## 2020-09-16 NOTE — PHYSICAL EXAM
[de-identified] : bilateral impacted wax cleaned with curette [Midline] : trachea located in midline position [Normal] : no rashes

## 2020-09-29 ENCOUNTER — APPOINTMENT (OUTPATIENT)
Dept: NEUROLOGY | Facility: CLINIC | Age: 50
End: 2020-09-29
Payer: MEDICAID

## 2020-09-29 ENCOUNTER — OUTPATIENT (OUTPATIENT)
Dept: OUTPATIENT SERVICES | Facility: HOSPITAL | Age: 50
LOS: 1 days | Discharge: HOME | End: 2020-09-29

## 2020-09-29 PROCEDURE — 99213 OFFICE O/P EST LOW 20 MIN: CPT | Mod: GC

## 2020-09-29 RX ORDER — BENZTROPINE MESYLATE 1 MG/1
1 TABLET ORAL TWICE DAILY
Refills: 0 | Status: ACTIVE | COMMUNITY
Start: 2020-09-29

## 2020-09-29 RX ORDER — HALOPERIDOL 10 MG/1
10 TABLET ORAL
Refills: 0 | Status: ACTIVE | COMMUNITY
Start: 2020-09-29

## 2020-09-29 NOTE — PHYSICAL EXAM
[No Acute Distress] : no acute distress [Well Nourished] : well nourished [Well Developed] : well developed [Normal Sclera/Conjunctiva] : normal sclera/conjunctiva [PERRL] : pupils equal round and reactive to light [EOMI] : extraocular movements intact [Normal Outer Ear/Nose] : the outer ears and nose were normal in appearance [Normal Oropharynx] : the oropharynx was normal [Normal TMs] : both tympanic membranes were normal [No Lymphadenopathy] : no lymphadenopathy [Supple] : supple [No Respiratory Distress] : no respiratory distress  [No Accessory Muscle Use] : no accessory muscle use [Clear to Auscultation] : lungs were clear to auscultation bilaterally [Normal Rate] : normal rate  [Regular Rhythm] : with a regular rhythm [Normal S1, S2] : normal S1 and S2 [No Carotid Bruits] : no carotid bruits [No Abdominal Bruit] : a ~M bruit was not heard ~T in the abdomen [No Varicosities] : no varicosities [No Edema] : there was no peripheral edema [No Palpable Aorta] : no palpable aorta [No Extremity Clubbing/Cyanosis] : no extremity clubbing/cyanosis [Normal] : no joint swelling and grossly normal strength and tone [Coordination Grossly Intact] : coordination grossly intact [No Focal Deficits] : no focal deficits [Normal Gait] : normal gait [Speech Grossly Normal] : speech grossly normal [Memory Grossly Normal] : memory grossly normal [Normal Affect] : the affect was normal [Normal Mood] : the mood was normal [Normal Insight/Judgement] : insight and judgment were intact [de-identified] : severe kyphosis with slight ataxia, narrow based.

## 2020-09-29 NOTE — HISTORY OF PRESENT ILLNESS
[Divalproex (Depakote)] : divalproex (Depakote) [FreeTextEntry1] : 43 [de-identified] : >4 years ago

## 2020-09-29 NOTE — ASSESSMENT
[FreeTextEntry1] : 48 yo with intellectual disability, SZD, hypothyroid currently here for followup for seizures currently doing well on depakote.\par \par \par Plan:\par - continue with depakote  mg 2 tab PO TID\par - Repeat depakote level \par - repeat Ammonia level\par - RTC 6 months.

## 2020-10-22 NOTE — DISCHARGE NOTE ADULT - NSFOLLOWUPAPPTWD_ALL_CORE_SI[facility]
Prep Survey      Responses   [facility] patient discharged from?  [facility]   Is LACE score < 7 ?  No   Eligibility  Readm Mgmt   Discharge diagnosis  Small bowel obstruction  [s/p exploratory laparotomy with pylorus stomal hernia repair on 10/18/2020]   Does the patient have one of the following disease processes/diagnoses(primary or secondary)?  General Surgery   Does the patient have Home health ordered?  Yes   What is the Home health agency?   [facility]   Is there a DME ordered?  No   Comments regarding appointments  Has f/u with surgeon, needs f/u with PCP   Prep survey completed?  Yes          Radha Baxter RN         1

## 2020-10-27 ENCOUNTER — APPOINTMENT (OUTPATIENT)
Dept: PODIATRY | Facility: CLINIC | Age: 50
End: 2020-10-27
Payer: MEDICAID

## 2020-10-27 ENCOUNTER — OUTPATIENT (OUTPATIENT)
Dept: OUTPATIENT SERVICES | Facility: HOSPITAL | Age: 50
LOS: 1 days | Discharge: HOME | End: 2020-10-27

## 2020-10-27 DIAGNOSIS — M79.671 PAIN IN RIGHT FOOT: ICD-10-CM

## 2020-10-27 DIAGNOSIS — S99.929A UNSPECIFIED INJURY OF UNSPECIFIED FOOT, INITIAL ENCOUNTER: ICD-10-CM

## 2020-10-27 DIAGNOSIS — B35.1 TINEA UNGUIUM: ICD-10-CM

## 2020-10-27 DIAGNOSIS — M79.672 PAIN IN LEFT FOOT: ICD-10-CM

## 2020-10-27 PROCEDURE — 11721 DEBRIDE NAIL 6 OR MORE: CPT | Mod: NC

## 2020-11-21 ENCOUNTER — OUTPATIENT (OUTPATIENT)
Dept: OUTPATIENT SERVICES | Facility: HOSPITAL | Age: 50
LOS: 1 days | Discharge: HOME | End: 2020-11-21
Payer: MEDICAID

## 2020-11-21 VITALS
SYSTOLIC BLOOD PRESSURE: 128 MMHG | OXYGEN SATURATION: 96 % | TEMPERATURE: 96 F | WEIGHT: 160.94 LBS | RESPIRATION RATE: 16 BRPM | HEART RATE: 74 BPM | DIASTOLIC BLOOD PRESSURE: 83 MMHG

## 2020-11-21 DIAGNOSIS — H25.12 AGE-RELATED NUCLEAR CATARACT, LEFT EYE: ICD-10-CM

## 2020-11-21 DIAGNOSIS — Z01.818 ENCOUNTER FOR OTHER PREPROCEDURAL EXAMINATION: ICD-10-CM

## 2020-11-21 DIAGNOSIS — Z98.890 OTHER SPECIFIED POSTPROCEDURAL STATES: Chronic | ICD-10-CM

## 2020-11-21 DIAGNOSIS — Z87.81 PERSONAL HISTORY OF (HEALED) TRAUMATIC FRACTURE: Chronic | ICD-10-CM

## 2020-11-21 PROCEDURE — 71046 X-RAY EXAM CHEST 2 VIEWS: CPT | Mod: 26

## 2020-11-21 NOTE — H&P PST ADULT - REASON FOR ADMISSION
49 yr old male with decreased vision pt found to have Cataracts. Pt electing to have Cataract extration with intraocular lens implant left eye   Dr. Brooks  12/08/2020

## 2020-11-21 NOTE — H&P PST ADULT - HISTORY OF PRESENT ILLNESS
49 yr old male accompanied with Direct Care giver Argentina Mayer with intellectual and developmental delay ambulating with a walker independently. Pt with decreased vision pt found to have Cataracts. Pt electing to have Cataract extraction with intraocular lens implant left eye  PT at present denies any CP, SOB, Palpitations, or Dysuria for past 2 weeks  PT ambulates with cane due to Kyphosis.   Information gathered from group home chart, direct care giver and patient  ** Pt unable to void had just gone to bathroom prior to visit  pt denies any covid s/s,  tested positive in the past  for antibodies  Pt and direct care giver aware of testing appointment 12/05/2020 9am at [place]  pt advised self quarantine till day of procedure    Anesthesia Alert  NO--Difficult Airway  NO--History of neck surgery or radiation  NO--Limited ROM of neck  NO--History of Malignant hyperthermia  NO--No personal or family history of Pseudocholinesterase deficiency.  NO--Prior Anesthesia Complication  NO--Latex Allergy  NO--Loose teeth  NO--History of Rheumatoid Arthritis  NO--SOSA  NO--Other_____

## 2020-11-21 NOTE — H&P PST ADULT - NSICDXPASTMEDICALHX_GEN_ALL_CORE_FT
PAST MEDICAL HISTORY:  Anemia     Bipolar 1 disorder     Constipation     Dyspepsia     Epilepsy last event 4+ yrs ago    Hypothyroidism     Major depressive disorder     MR (mental retardation)     Schizophrenia

## 2020-11-21 NOTE — H&P PST ADULT - NSANTHOSAYNRD_GEN_A_CORE
No. SOSA screening performed.  STOP BANG Legend: 0-2 = LOW Risk; 3-4 = INTERMEDIATE Risk; 5-8 = HIGH Risk

## 2020-11-21 NOTE — H&P PST ADULT - NSICDXPASTSURGICALHX_GEN_ALL_CORE_FT
PAST SURGICAL HISTORY:  H/O cystoscopy     H/O fracture of fibula nondisplaced 03/2010    H/O laminectomy 03/2001

## 2020-11-25 PROBLEM — G40.909 EPILEPSY, UNSPECIFIED, NOT INTRACTABLE, WITHOUT STATUS EPILEPTICUS: Chronic | Status: ACTIVE | Noted: 2018-02-20

## 2020-12-05 ENCOUNTER — OUTPATIENT (OUTPATIENT)
Dept: OUTPATIENT SERVICES | Facility: HOSPITAL | Age: 50
LOS: 1 days | Discharge: HOME | End: 2020-12-05

## 2020-12-05 DIAGNOSIS — Z98.890 OTHER SPECIFIED POSTPROCEDURAL STATES: Chronic | ICD-10-CM

## 2020-12-05 DIAGNOSIS — Z87.81 PERSONAL HISTORY OF (HEALED) TRAUMATIC FRACTURE: Chronic | ICD-10-CM

## 2020-12-05 DIAGNOSIS — Z11.59 ENCOUNTER FOR SCREENING FOR OTHER VIRAL DISEASES: ICD-10-CM

## 2020-12-08 ENCOUNTER — OUTPATIENT (OUTPATIENT)
Dept: OUTPATIENT SERVICES | Facility: HOSPITAL | Age: 50
LOS: 1 days | Discharge: HOME | End: 2020-12-08

## 2020-12-08 VITALS
SYSTOLIC BLOOD PRESSURE: 131 MMHG | HEIGHT: 69 IN | RESPIRATION RATE: 18 BRPM | WEIGHT: 169.98 LBS | OXYGEN SATURATION: 97 % | TEMPERATURE: 98 F | DIASTOLIC BLOOD PRESSURE: 83 MMHG | HEART RATE: 78 BPM

## 2020-12-08 VITALS — SYSTOLIC BLOOD PRESSURE: 119 MMHG | RESPIRATION RATE: 18 BRPM | DIASTOLIC BLOOD PRESSURE: 83 MMHG | HEART RATE: 78 BPM

## 2020-12-08 DIAGNOSIS — Z98.890 OTHER SPECIFIED POSTPROCEDURAL STATES: Chronic | ICD-10-CM

## 2020-12-08 DIAGNOSIS — Z87.81 PERSONAL HISTORY OF (HEALED) TRAUMATIC FRACTURE: Chronic | ICD-10-CM

## 2020-12-08 RX ORDER — SODIUM CHLORIDE 9 MG/ML
1000 INJECTION, SOLUTION INTRAVENOUS
Refills: 0 | Status: DISCONTINUED | OUTPATIENT
Start: 2020-12-08 | End: 2020-12-22

## 2020-12-08 RX ORDER — ACETAMINOPHEN 500 MG
325 TABLET ORAL ONCE
Refills: 0 | Status: DISCONTINUED | OUTPATIENT
Start: 2020-12-08 | End: 2020-12-22

## 2020-12-08 RX ADMIN — SODIUM CHLORIDE 100 MILLILITER(S): 9 INJECTION, SOLUTION INTRAVENOUS at 15:29

## 2020-12-08 NOTE — ASU PATIENT PROFILE, ADULT - PMH
Anemia    Bipolar 1 disorder    Constipation    Dyspepsia    Epilepsy  last event 4+ yrs ago  Hypothyroidism    Major depressive disorder    MR (mental retardation)    Schizophrenia

## 2020-12-08 NOTE — PACU DISCHARGE NOTE - COMMENTS
49 y o male S/P Left ECCE with IOL Implant, GA/LMA without complications. VS /89 HR 90 RR 16 T 97.3 SaO2 96%. Pt tolerated procedure well.

## 2020-12-29 ENCOUNTER — OUTPATIENT (OUTPATIENT)
Dept: OUTPATIENT SERVICES | Facility: HOSPITAL | Age: 50
LOS: 1 days | Discharge: HOME | End: 2020-12-29

## 2020-12-29 ENCOUNTER — APPOINTMENT (OUTPATIENT)
Dept: PODIATRY | Facility: CLINIC | Age: 50
End: 2020-12-29
Payer: MEDICAID

## 2020-12-29 DIAGNOSIS — B35.1 TINEA UNGUIUM: ICD-10-CM

## 2020-12-29 DIAGNOSIS — Z87.81 PERSONAL HISTORY OF (HEALED) TRAUMATIC FRACTURE: Chronic | ICD-10-CM

## 2020-12-29 DIAGNOSIS — Z98.890 OTHER SPECIFIED POSTPROCEDURAL STATES: Chronic | ICD-10-CM

## 2020-12-29 DIAGNOSIS — M79.672 PAIN IN LEFT FOOT: ICD-10-CM

## 2020-12-29 DIAGNOSIS — M79.671 PAIN IN RIGHT FOOT: ICD-10-CM

## 2020-12-29 PROCEDURE — 11721 DEBRIDE NAIL 6 OR MORE: CPT | Mod: NC

## 2021-02-23 ENCOUNTER — OUTPATIENT (OUTPATIENT)
Dept: OUTPATIENT SERVICES | Facility: HOSPITAL | Age: 51
LOS: 1 days | Discharge: HOME | End: 2021-02-23

## 2021-02-23 ENCOUNTER — APPOINTMENT (OUTPATIENT)
Dept: PODIATRY | Facility: CLINIC | Age: 51
End: 2021-02-23
Payer: MEDICAID

## 2021-02-23 DIAGNOSIS — Z87.81 PERSONAL HISTORY OF (HEALED) TRAUMATIC FRACTURE: Chronic | ICD-10-CM

## 2021-02-23 DIAGNOSIS — Z98.890 OTHER SPECIFIED POSTPROCEDURAL STATES: Chronic | ICD-10-CM

## 2021-02-23 PROCEDURE — 11721 DEBRIDE NAIL 6 OR MORE: CPT | Mod: NC

## 2021-02-25 DIAGNOSIS — M79.671 PAIN IN RIGHT FOOT: ICD-10-CM

## 2021-02-25 DIAGNOSIS — B35.1 TINEA UNGUIUM: ICD-10-CM

## 2021-02-25 DIAGNOSIS — M79.672 PAIN IN LEFT FOOT: ICD-10-CM

## 2021-03-02 ENCOUNTER — APPOINTMENT (OUTPATIENT)
Dept: NEUROLOGY | Facility: CLINIC | Age: 51
End: 2021-03-02
Payer: MEDICAID

## 2021-03-02 ENCOUNTER — OUTPATIENT (OUTPATIENT)
Dept: OUTPATIENT SERVICES | Facility: HOSPITAL | Age: 51
LOS: 1 days | Discharge: HOME | End: 2021-03-02

## 2021-03-02 VITALS
HEIGHT: 68 IN | DIASTOLIC BLOOD PRESSURE: 89 MMHG | BODY MASS INDEX: 25.76 KG/M2 | HEART RATE: 82 BPM | TEMPERATURE: 97.2 F | SYSTOLIC BLOOD PRESSURE: 130 MMHG | WEIGHT: 170 LBS

## 2021-03-02 DIAGNOSIS — Z87.81 PERSONAL HISTORY OF (HEALED) TRAUMATIC FRACTURE: Chronic | ICD-10-CM

## 2021-03-02 DIAGNOSIS — Z98.890 OTHER SPECIFIED POSTPROCEDURAL STATES: Chronic | ICD-10-CM

## 2021-03-02 PROCEDURE — 99212 OFFICE O/P EST SF 10 MIN: CPT | Mod: GC

## 2021-03-02 NOTE — END OF VISIT
[FreeTextEntry3] : No seizure per aid. He is stable. Will check the Depakote level, ammonia level and CMP. \par RTC in 6 months  [Time Spent: ___ minutes] : I have spent [unfilled] minutes of time on the encounter.

## 2021-03-02 NOTE — PHYSICAL EXAM
[Impaired Insight] : insight and judgment were intact [Affect] : the affect was normal [Person] : oriented to person [Motor Tone] : muscle tone was normal in all four extremities [Motor Strength] : muscle strength was normal in all four extremities [Involuntary Movements] : no involuntary movements were seen [No Muscle Atrophy] : normal bulk in all four extremities [Cranial Nerves Optic (II)] : visual acuity intact bilaterally,  visual fields full to confrontation, pupils equal round and reactive to light [Cranial Nerves Oculomotor (III)] : extraocular motion intact [Cranial Nerves Trigeminal (V)] : facial sensation intact symmetrically [Cranial Nerves Facial (VII)] : face symmetrical [Cranial Nerves Vestibulocochlear (VIII)] : hearing was intact bilaterally [Cranial Nerves Accessory (XI - Cranial And Spinal)] : head turning and shoulder shrug symmetric [Balance] : balance was intact [PERRL With Normal Accommodation] : pupils were equal in size, round, reactive to light, with normal accommodation [Outer Ear] : the ears and nose were normal in appearance [Hearing Threshold Finger Rub Not Coke] : hearing was normal [Neck Appearance] : the appearance of the neck was normal [Heart Sounds] : normal S1 and S2 [Edema] : there was no peripheral edema [Abnormal Walk] : normal gait [Skin Turgor] : normal skin turgor [] : no rash [FreeTextEntry1] : intellectual impairment at baseline but oriented  [Paresis Pronator Drift Right-Sided] : no pronator drift on the right [Paresis Pronator Drift Left-Sided] : no pronator drift on the left [Motor Strength Upper Extremities Bilaterally] : strength was normal in both upper extremities [Motor Strength Lower Extremities Bilaterally] : strength was normal in both lower extremities

## 2021-03-02 NOTE — ASSESSMENT
[FreeTextEntry1] : 51 yo with intellectual disability, SZD, hypothyroid currently here for followup for seizures currently doing well on depakote.\par \par \par Plan:\par - continue with Depakote  mg 2 tab PO TID, refills sent to pharmacy \par - Blood work: check valproic acid, ammonia levels, and LFTs (last checked in 2019/2018 normal)\par - RTC 6 months \par

## 2021-03-02 NOTE — HISTORY OF PRESENT ILLNESS
[FreeTextEntry1] : 51 YO Male from [facility] w/PMHx of intellectual/developmental disability, hypothyroidism, seizure hx/ epilepsy controlled on Depakote presenting for follow up. The patient was last seen by neurology in September 2020. Per aide, patient has had no seizures for > 4 years.  No new complaints, feeling well. \par

## 2021-04-27 ENCOUNTER — OUTPATIENT (OUTPATIENT)
Dept: OUTPATIENT SERVICES | Facility: HOSPITAL | Age: 51
LOS: 1 days | Discharge: HOME | End: 2021-04-27

## 2021-04-27 ENCOUNTER — APPOINTMENT (OUTPATIENT)
Dept: PODIATRY | Facility: CLINIC | Age: 51
End: 2021-04-27
Payer: MEDICAID

## 2021-04-27 DIAGNOSIS — L85.3 XEROSIS CUTIS: ICD-10-CM

## 2021-04-27 DIAGNOSIS — Z87.81 PERSONAL HISTORY OF (HEALED) TRAUMATIC FRACTURE: Chronic | ICD-10-CM

## 2021-04-27 DIAGNOSIS — M79.671 PAIN IN RIGHT FOOT: ICD-10-CM

## 2021-04-27 DIAGNOSIS — M79.672 PAIN IN LEFT FOOT: ICD-10-CM

## 2021-04-27 DIAGNOSIS — Z98.890 OTHER SPECIFIED POSTPROCEDURAL STATES: Chronic | ICD-10-CM

## 2021-04-27 PROCEDURE — 11721 DEBRIDE NAIL 6 OR MORE: CPT | Mod: NC

## 2021-04-27 PROCEDURE — 99212 OFFICE O/P EST SF 10 MIN: CPT | Mod: NC,25

## 2021-04-27 NOTE — PHYSICAL EXAM
[FreeTextEntry1] : thick, dystrophic, discolored nails with subungual debris x 10\par \par recommend using daily moisturizer B/L LE RX Ammonium lactate\par \par  within normal limits

## 2021-04-27 NOTE — PROCEDURE
[FreeTextEntry1] : -Aseptic debridement of all fungal nails.  Patient has pain about the toes secondary to nail pressure and relates improvement with periodic debridement.\par - recommend using daily moisturizer B/L LE RX Ammonium lactate\par -return 2 months\par

## 2021-04-27 NOTE — HISTORY OF PRESENT ILLNESS
[FreeTextEntry1] : 50 year old MRDD male returns for continued care of mycotic nails \par \par Patient also has a complaint of bilateral foot dryness \par

## 2021-05-18 ENCOUNTER — TRANSCRIPTION ENCOUNTER (OUTPATIENT)
Age: 51
End: 2021-05-18

## 2021-07-19 ENCOUNTER — INPATIENT (INPATIENT)
Facility: HOSPITAL | Age: 51
LOS: 7 days | Discharge: HOME | End: 2021-07-27
Attending: INTERNAL MEDICINE | Admitting: INTERNAL MEDICINE
Payer: MEDICAID

## 2021-07-19 VITALS
HEART RATE: 75 BPM | DIASTOLIC BLOOD PRESSURE: 82 MMHG | OXYGEN SATURATION: 99 % | SYSTOLIC BLOOD PRESSURE: 137 MMHG | HEIGHT: 69 IN | TEMPERATURE: 98 F | RESPIRATION RATE: 18 BRPM | WEIGHT: 169.98 LBS

## 2021-07-19 DIAGNOSIS — Z87.81 PERSONAL HISTORY OF (HEALED) TRAUMATIC FRACTURE: Chronic | ICD-10-CM

## 2021-07-19 DIAGNOSIS — Z98.890 OTHER SPECIFIED POSTPROCEDURAL STATES: Chronic | ICD-10-CM

## 2021-07-19 LAB
ALBUMIN SERPL ELPH-MCNC: 4.2 G/DL — SIGNIFICANT CHANGE UP (ref 3.5–5.2)
ALP SERPL-CCNC: 100 U/L — SIGNIFICANT CHANGE UP (ref 30–115)
ALT FLD-CCNC: 14 U/L — SIGNIFICANT CHANGE UP (ref 0–41)
ANION GAP SERPL CALC-SCNC: 11 MMOL/L — SIGNIFICANT CHANGE UP (ref 7–14)
AST SERPL-CCNC: 36 U/L — SIGNIFICANT CHANGE UP (ref 0–41)
BASOPHILS # BLD AUTO: 0.02 K/UL — SIGNIFICANT CHANGE UP (ref 0–0.2)
BASOPHILS NFR BLD AUTO: 0.1 % — SIGNIFICANT CHANGE UP (ref 0–1)
BILIRUB SERPL-MCNC: 0.7 MG/DL — SIGNIFICANT CHANGE UP (ref 0.2–1.2)
BUN SERPL-MCNC: 12 MG/DL — SIGNIFICANT CHANGE UP (ref 10–20)
CALCIUM SERPL-MCNC: 9.6 MG/DL — SIGNIFICANT CHANGE UP (ref 8.5–10.1)
CHLORIDE SERPL-SCNC: 92 MMOL/L — LOW (ref 98–110)
CO2 SERPL-SCNC: 25 MMOL/L — SIGNIFICANT CHANGE UP (ref 17–32)
CREAT SERPL-MCNC: 1.1 MG/DL — SIGNIFICANT CHANGE UP (ref 0.7–1.5)
EOSINOPHIL # BLD AUTO: 0.01 K/UL — SIGNIFICANT CHANGE UP (ref 0–0.7)
EOSINOPHIL NFR BLD AUTO: 0.1 % — SIGNIFICANT CHANGE UP (ref 0–8)
GLUCOSE SERPL-MCNC: 108 MG/DL — HIGH (ref 70–99)
HCT VFR BLD CALC: 41.9 % — LOW (ref 42–52)
HGB BLD-MCNC: 14.4 G/DL — SIGNIFICANT CHANGE UP (ref 14–18)
IMM GRANULOCYTES NFR BLD AUTO: 0.7 % — HIGH (ref 0.1–0.3)
LYMPHOCYTES # BLD AUTO: 0.8 K/UL — LOW (ref 1.2–3.4)
LYMPHOCYTES # BLD AUTO: 5.2 % — LOW (ref 20.5–51.1)
MCHC RBC-ENTMCNC: 31.9 PG — HIGH (ref 27–31)
MCHC RBC-ENTMCNC: 34.4 G/DL — SIGNIFICANT CHANGE UP (ref 32–37)
MCV RBC AUTO: 92.9 FL — SIGNIFICANT CHANGE UP (ref 80–94)
MONOCYTES # BLD AUTO: 2.17 K/UL — HIGH (ref 0.1–0.6)
MONOCYTES NFR BLD AUTO: 14.2 % — HIGH (ref 1.7–9.3)
NEUTROPHILS # BLD AUTO: 12.22 K/UL — HIGH (ref 1.4–6.5)
NEUTROPHILS NFR BLD AUTO: 79.7 % — HIGH (ref 42.2–75.2)
NRBC # BLD: 0 /100 WBCS — SIGNIFICANT CHANGE UP (ref 0–0)
OSMOLALITY SERPL: 273 MOS/KG — LOW (ref 275–300)
PLATELET # BLD AUTO: 147 K/UL — SIGNIFICANT CHANGE UP (ref 130–400)
POTASSIUM SERPL-MCNC: 4.6 MMOL/L — SIGNIFICANT CHANGE UP (ref 3.5–5)
POTASSIUM SERPL-SCNC: 4.6 MMOL/L — SIGNIFICANT CHANGE UP (ref 3.5–5)
PROT SERPL-MCNC: 7.7 G/DL — SIGNIFICANT CHANGE UP (ref 6–8)
RBC # BLD: 4.51 M/UL — LOW (ref 4.7–6.1)
RBC # FLD: 13.6 % — SIGNIFICANT CHANGE UP (ref 11.5–14.5)
SARS-COV-2 RNA SPEC QL NAA+PROBE: SIGNIFICANT CHANGE UP
SODIUM SERPL-SCNC: 128 MMOL/L — LOW (ref 135–146)
TROPONIN T SERPL-MCNC: <0.01 NG/ML — SIGNIFICANT CHANGE UP
TROPONIN T SERPL-MCNC: <0.01 NG/ML — SIGNIFICANT CHANGE UP
WBC # BLD: 15.32 K/UL — HIGH (ref 4.8–10.8)
WBC # FLD AUTO: 15.32 K/UL — HIGH (ref 4.8–10.8)

## 2021-07-19 PROCEDURE — 99223 1ST HOSP IP/OBS HIGH 75: CPT

## 2021-07-19 PROCEDURE — 73630 X-RAY EXAM OF FOOT: CPT | Mod: 26,LT

## 2021-07-19 PROCEDURE — 71045 X-RAY EXAM CHEST 1 VIEW: CPT | Mod: 26,59

## 2021-07-19 PROCEDURE — 73610 X-RAY EXAM OF ANKLE: CPT | Mod: 26,LT

## 2021-07-19 PROCEDURE — 71046 X-RAY EXAM CHEST 2 VIEWS: CPT | Mod: 26

## 2021-07-19 PROCEDURE — 99285 EMERGENCY DEPT VISIT HI MDM: CPT

## 2021-07-19 PROCEDURE — 93010 ELECTROCARDIOGRAM REPORT: CPT

## 2021-07-19 PROCEDURE — 93971 EXTREMITY STUDY: CPT | Mod: 26,LT

## 2021-07-19 PROCEDURE — 31575 DIAGNOSTIC LARYNGOSCOPY: CPT

## 2021-07-19 RX ORDER — ENOXAPARIN SODIUM 100 MG/ML
40 INJECTION SUBCUTANEOUS AT BEDTIME
Refills: 0 | Status: DISCONTINUED | OUTPATIENT
Start: 2021-07-19 | End: 2021-07-27

## 2021-07-19 RX ORDER — ALENDRONATE SODIUM 70 MG/1
1 TABLET ORAL
Qty: 0 | Refills: 0 | DISCHARGE

## 2021-07-19 RX ORDER — ACETAMINOPHEN 500 MG
650 TABLET ORAL ONCE
Refills: 0 | Status: COMPLETED | OUTPATIENT
Start: 2021-07-19 | End: 2021-07-19

## 2021-07-19 RX ORDER — LEVOTHYROXINE SODIUM 125 MCG
100 TABLET ORAL DAILY
Refills: 0 | Status: DISCONTINUED | OUTPATIENT
Start: 2021-07-19 | End: 2021-07-27

## 2021-07-19 RX ORDER — BUPROPION HYDROCHLORIDE 150 MG/1
150 TABLET, EXTENDED RELEASE ORAL DAILY
Refills: 0 | Status: DISCONTINUED | OUTPATIENT
Start: 2021-07-19 | End: 2021-07-27

## 2021-07-19 RX ORDER — HALOPERIDOL DECANOATE 100 MG/ML
10 INJECTION INTRAMUSCULAR THREE TIMES A DAY
Refills: 0 | Status: DISCONTINUED | OUTPATIENT
Start: 2021-07-19 | End: 2021-07-27

## 2021-07-19 RX ORDER — DIVALPROEX SODIUM 500 MG/1
500 TABLET, DELAYED RELEASE ORAL THREE TIMES A DAY
Refills: 0 | Status: DISCONTINUED | OUTPATIENT
Start: 2021-07-19 | End: 2021-07-27

## 2021-07-19 RX ORDER — SODIUM CHLORIDE 9 MG/ML
1000 INJECTION INTRAMUSCULAR; INTRAVENOUS; SUBCUTANEOUS ONCE
Refills: 0 | Status: COMPLETED | OUTPATIENT
Start: 2021-07-19 | End: 2021-07-19

## 2021-07-19 RX ORDER — MIRTAZAPINE 45 MG/1
15 TABLET, ORALLY DISINTEGRATING ORAL AT BEDTIME
Refills: 0 | Status: DISCONTINUED | OUTPATIENT
Start: 2021-07-19 | End: 2021-07-27

## 2021-07-19 RX ORDER — CHLORHEXIDINE GLUCONATE 213 G/1000ML
1 SOLUTION TOPICAL
Refills: 0 | Status: DISCONTINUED | OUTPATIENT
Start: 2021-07-19 | End: 2021-07-27

## 2021-07-19 RX ORDER — BENZTROPINE MESYLATE 1 MG
1 TABLET ORAL
Refills: 0 | Status: DISCONTINUED | OUTPATIENT
Start: 2021-07-19 | End: 2021-07-27

## 2021-07-19 RX ORDER — CALCIUM CARBONATE 500(1250)
400 TABLET ORAL
Qty: 0 | Refills: 0 | DISCHARGE

## 2021-07-19 RX ADMIN — DIVALPROEX SODIUM 500 MILLIGRAM(S): 500 TABLET, DELAYED RELEASE ORAL at 22:34

## 2021-07-19 RX ADMIN — HALOPERIDOL DECANOATE 10 MILLIGRAM(S): 100 INJECTION INTRAMUSCULAR at 22:34

## 2021-07-19 RX ADMIN — ENOXAPARIN SODIUM 40 MILLIGRAM(S): 100 INJECTION SUBCUTANEOUS at 22:34

## 2021-07-19 RX ADMIN — MIRTAZAPINE 15 MILLIGRAM(S): 45 TABLET, ORALLY DISINTEGRATING ORAL at 22:34

## 2021-07-19 RX ADMIN — SODIUM CHLORIDE 1000 MILLILITER(S): 9 INJECTION INTRAMUSCULAR; INTRAVENOUS; SUBCUTANEOUS at 16:29

## 2021-07-19 RX ADMIN — Medication 1 MILLIGRAM(S): at 18:54

## 2021-07-19 RX ADMIN — Medication 650 MILLIGRAM(S): at 11:08

## 2021-07-19 NOTE — CONSULT NOTE ADULT - SUBJECTIVE AND OBJECTIVE BOX
Pt is a 51yo Male who presents with intellectual disability, hypothyroidism, seizure disorder (on Depakote), bipolar disorder, schizophrenia  s/p mechanical fall yesterday and an episode of chest pain this morning- ENT c/s for FB sensation s/p eating chicken. Patient seen and examined at bedside with Dr. Kathleen. Per Medical Resident, patient was eating lunch which included chicken and swallowed the wrong way. Patient's aid at bedside states he has voice changes, patient was able to swallow apple juice without issues/ coughing.     HPI:  The patient is a 50 year-old male with a PMH of intellectual disability, hypothyroidism, seizure disorder (on Depakote), bipolar disorder, schizophrenia, presenting from [facility] s/p mechanical fall yesterday and an episode of chest pain this morning. Per assisted staff (Argentina), the fall was unwitnessed but she believes he was walking with his walker and fell on his left ankle; when he was found he was conscious, oriented; there was no sign of loss of control of his bowels or bladder and he was not exhibiting tremors. This morning, per staff, around 9 AM he was complaining of chest pain while sitting; it has persisted since this time; he reports it is worse when pressing on his ribcage and with inspiration; he denied any associated shortness of breath or dizziness at the time. Patient denies any recent burning on urination, abdominal discomfort, nausea, vomiting or other symptoms.  (19 Jul 2021 14:24)    PAST MEDICAL & SURGICAL HISTORY:  Epilepsy  last event 4+ yrs ago    Schizophrenia    Bipolar 1 disorder    Major depressive disorder    Anemia    Dyspepsia    Hypothyroidism    Constipation    MR (mental retardation)    H/O cystoscopy    H/O laminectomy  03/2001    H/O fracture of fibula  nondisplaced 03/2010        MEDICATIONS  (STANDING):  benztropine 1 milliGRAM(s) Oral two times a day  buPROPion XL (24-Hour) . 150 milliGRAM(s) Oral daily  chlorhexidine 4% Liquid 1 Application(s) Topical <User Schedule>  diVALproex  milliGRAM(s) Oral three times a day  enoxaparin Injectable 40 milliGRAM(s) SubCutaneous at bedtime  haloperidol     Tablet 10 milliGRAM(s) Oral three times a day  levothyroxine 100 MICROGram(s) Oral daily  mirtazapine 15 milliGRAM(s) Oral at bedtime  sodium chloride 0.9% Bolus 1000 milliLiter(s) IV Bolus once    MEDICATIONS  (PRN):      Allergies    erythromycin (Other)  phenothiazines (Unknown)    Intolerances          SOCIAL HISTORY:    FAMILY HISTORY:  No pertinent family history in first degree relatives        REVIEW OF SYSTEMS   [ ] Due to altered mental status/intubation, subjective information were not able to be obtained from patient. History was obtained, to the extent possible, from review of the chart and collateral sources of information.    Vital Signs Last 24 Hrs  T(C): 36.7 (19 Jul 2021 13:01), Max: 36.7 (19 Jul 2021 09:20)  T(F): 98 (19 Jul 2021 13:01), Max: 98 (19 Jul 2021 09:20)  HR: 95 (19 Jul 2021 13:01) (75 - 95)  BP: 136/78 (19 Jul 2021 13:01) (136/78 - 137/82)  BP(mean): --  RR: 18 (19 Jul 2021 13:01) (18 - 18)  SpO2: 93% (19 Jul 2021 13:01) (93% - 99%)    GEN: Well-developed, well-nourished. NAD, awake and alert. No drooling or pooling of secretions. No stridor or stertor. Good vocal quality, no hoarseness.   SKIN: Good color, non diaphoretic.  HEENT: NC/AT; Oral mucosa prink and moist. No erythema or edema noted to buccal mucosa, tongue, FOM, uvula or posterior oropharynx. Uvula midline.   NECK: Traceha midline, Neck supple, no TTP to B/L lateral neck, no cervical LAD.  RESP: No dyspnea, non-labored breathing. No use of accessory muscles.   CARDIO: +S1/S2  ABDO: Soft, NT.  EXT: MCLEAN x 4    Fiberoptic Laryngoscopy: Patient scoped by Dr. Kathleen No masses or lesions noted to NP/OP/HP. Laryngeal structures intact, no edema or erythema noted. Epiglottis crisp, no edema. TVC/FVC mobile and intact, no glottic gap noted.     LABS:                        14.4   15.32 )-----------( 147      ( 19 Jul 2021 11:30 )             41.9     07-19    128<L>  |  92<L>  |  12  ----------------------------<  108<H>  4.6   |  25  |  1.1    Ca    9.6      19 Jul 2021 11:30    TPro  7.7  /  Alb  4.2  /  TBili  0.7  /  DBili  x   /  AST  36  /  ALT  14  /  AlkPhos  100  07-19          RADIOLOGY & ADDITIONAL STUDIES:

## 2021-07-19 NOTE — ED PROVIDER NOTE - CLINICAL SUMMARY MEDICAL DECISION MAKING FREE TEXT BOX
Patient presented with chest pain and LLE pain s/p mechanical fall last night with walker. Complaining of chest pain and LLE pain without ability to ambulate since the fall. Patient is poor historian given baseline mental status. Otherwise on arrival afebrile, HD stable, neurovascularly intact. Obtained EKG which was non-ischemic without evidence of STEMI. Obtained labs which were grossly unremarkable including no significant leukocytosis, anemia, signs of dehydration/DARYL, transaminitis or significant electrolyte abnormalities. Trop negative. CXR showed (+) possible PNA although this is unclear. LE xrays negative for acute fx or dislocation. Patient unable to ambulate and given the above will require admission for further ACS rule out, IV abx, inability to ambulate. HD stable at time of admission

## 2021-07-19 NOTE — CONSULT NOTE ADULT - ASSESSMENT
51yo Male who presents with intellectual disability, hypothyroidism, seizure disorder (on Depakote), bipolar disorder, schizophrenia  s/p mechanical fall yesterday and an episode of chest pain this morning- ENT c/s for FB sensation s/p eating chicken.    Plan:   - Patient seen and examined at bedside with Dr. Kathleen   - FFL wnl, no obstruction or airway patent   - Case d/w Medical team

## 2021-07-19 NOTE — H&P ADULT - ATTENDING COMMENTS
**Hx and physical limited due to pt being a poor historian. Supplemental information obtained from NH chart, house staff, and EMR.     51 YO M with a PMH of intellectual disability, hypothyroidism, SZ disorder, bipolar disorder, and schizophrenia who was sent in from his group home for eval of CP s/p mechanical fall yesterday. Pt points to the right side of his chest when asked about pain and agrees that it is worse with inspiration. ROS negative except as above.     In the ED, Chest X-Ray with bibasilar atelectasis. XRs of left ankle with no acute process. ENT consulted for possible airway obstruction from aspiration, and FFL was negative. Pt started on IVFs (NS) and IV ABXs (Levoflox).     FMHx: Reviewed, not relevant    Physical exam shows pt in NAD. VSS, afebrile, not hypoxic on RA. A&O. Non-focal neuro exam. Muscle strength/sensation intact. CTA B/L with no W/C/R. RRR, no M/G/R. ABD is soft and non-tender, normoactive BSs. LEs without swelling. No rashes. Labs and radiology as above.    Chest pain, atypical, suspect musculoskeletal s/p mechanical fall vs community acquired pneumonia (gram-negative); SIRs present on arrival. Serial cardiac enzymes and EKGs. PRN pain management. A1c, TSH, and Lipid panel. FU procal/CRP    Hyponatremia. Send urine/serum osmo and urine lytes. TSH. Serial BMPs. Do not over correct (<8-10 in 24 hours).     Diabetes mellitus with hyperglycemia. A1c. FSs. Insulin PRN.     HX of intellectual disability, hypothyroidism, SZ disorder, bipolar disorder, and schizophrenia. Restart home meds, except as stated above. DVT PPX. Inform PCP of pt's admission to hospital. My note supersedes the residents note.

## 2021-07-19 NOTE — ED PROVIDER NOTE - OBJECTIVE STATEMENT
49 yo M pmhx intellectual disability, hypothyroidism, anemia, bipolar d/o, schizophrenia, seizure d/o, sent to the ED from intermediate for evaluation of episode of non radiating, non exertional, substernal chest pain that has since resolved, pt also had mechanical fall while using walker last night. Pt fell backwards, landed on bed, no head trauma, no loc, witnessed by staff, not on anticoags. Staff reporting since that time pt has been complaining of L foot and ankle pain and has not been ambulating due to the pain. Denies any fever, chills, nausea, vomiting, abd pain, sob, weakness, numbness/tingling, weakness, back pain, headache, dizziness.

## 2021-07-19 NOTE — H&P ADULT - ASSESSMENT
The patient is a 50 year-old male with a PMH of intellectual disability, hypothyroidism, seizure disorder (on Depakote), bipolar disorder, schizophrenia, presenting s/p mechanical fall yesterday and an episode of chest pain this morning, admitted for further w/u.    # Chest pain, reproducible since yesterday, possibly secondary to mechanical fall vs community acquired pneumonia  WBC 15; afebrile since at home; CXR showing bibasilar atelectasis w/ RML opacity; EKG w/ no evidence of ischemia  S/p Levaquin 750 mg IV x 1 in ED;  c/w Levaquin 750 mg PO once daily beginning tomorrow  F/u AM CBC, BMP, repeat trops 4 PM and 11:30 PM  F/u AM EKG  Vitals per routine  Admit to Telemetry x 24 hr monitoring    # Possible aspiration of food this afternoon  Patient talking in complete sentences though coughing intermittently; SpO2 %  Seen by ENT (Dr. Kathleen); no evidence of upper airway obstruction  F/u repeat CXR for evidence of aspiration  NPO for now pending Speech/Swallow recommendations  Vitals per routine    # H/o seizures  Per group home staff, no witnessed seizure episode w/in last ~4 years  C/w Depakote 500 mg TID    # Hypothyroidism  C/w Synthroid 100 mcg once daily    # CHG  # GI ppx- not indicated  # DVT ppx- Lovenox 40 mg SQ qHS  # Activity- ambulate w/ assistance  # Dispo- acute on Levaquin; pending PT  # Diet- NPO for now given possible aspiration

## 2021-07-19 NOTE — H&P ADULT - NSHPPHYSICALEXAM_GEN_ALL_CORE
Vital Signs Last 24 Hrs  T(C): 36.7 (19 Jul 2021 13:01), Max: 36.7 (19 Jul 2021 09:20)  T(F): 98 (19 Jul 2021 13:01), Max: 98 (19 Jul 2021 09:20)  HR: 95 (19 Jul 2021 13:01) (75 - 95)  BP: 136/78 (19 Jul 2021 13:01) (136/78 - 137/82)  BP(mean): --  RR: 18 (19 Jul 2021 13:01) (18 - 18)  SpO2: 93% (19 Jul 2021 13:01) (93% - 99%)    VITAL SIGNS: I have reviewed nursing notes and confirm.  CONSTITUTIONAL: Well-developed; well-nourished; in no acute distress.  SKIN: Skin exam is warm and dry, no acute rash.  HEAD: Normocephalic; atraumatic.  EYES: PERRL, EOM intact; conjunctivae clear, and sclera white.  ENT: No nasal discharge; airway clear. Throat clear.  NECK: Supple; non tender.  No lymphadenopathy.  CARD: S1, S2 normal; no murmurs, gallops, or rubs. Regular rate and rhythm.  RESP: No wheezes, rales or rhonchi.  ABD: Normal bowel sounds; soft; non-distended; non-tender; no hepatosplenomegaly.  EXT: Normal ROM. No clubbing, cyanosis or edema.  NEURO: Alert, oriented. Grossly unremarkable. No focal deficits.  PSYCH: Cooperative, appropriate.

## 2021-07-19 NOTE — ED PROVIDER NOTE - ATTENDING CONTRIBUTION TO CARE
50 year old male, pmhx as documented presenting from a group home for chest pain described as substernal, non-radiating, no palliative or provocative factors, mild severity, which has since resolved. Per aid at bedside, patient is at baseline mental status. Patient was also complaining of LLE pain s/p mechanical fall last night. Fall unwitnessed and patient denies memory of the event. Unknown LOC. Per report patient has not been ambulating 2/2 pain in his LLE and chest pain. Normally ambulatory at baseline. Otherwise denies fevers, N/V/D, numbness/weakness or any other complaints.    Vital Signs: I have reviewed the initial vital signs.  Constitutional: NAD, well-nourished, appears stated age, no acute distress.  HEENT: Airway patent, moist MM, no erythema/swelling/deformity of oral structures. EOMI, PERRLA.  CV: regular rate, regular rhythm, well-perfused extremities, 2+ b/l DP and radial pulses equal.  Lungs: BCTA, no increased WOB.  ABD: NTND, no guarding or rebound, no pulsatile mass, no hernias.   MSK: Neck supple, nontender, nl ROM, no stepoff. Chest nontender. Back nontender in TLS spine or to b/l bony structures or flanks. (+) tenderness to LLE in ankle and foot region, otherwise ext nontender, nl rom, no deformity.   INTEG: Skin warm, dry, no rash.  NEURO: A&Ox3, normal strength, nl sensation throughout, normal speech.   PSYCH: Calm, cooperative, normal affect and interaction.    Will obtain EKG, CXR, labs, for CP, xrays, LLE, re-eval.

## 2021-07-19 NOTE — ED PROVIDER NOTE - CARE PLAN
Principal Discharge DX:	CAP (community acquired pneumonia)  Secondary Diagnosis:	Fall  Secondary Diagnosis:	Hyponatremia  Secondary Diagnosis:	Hypochloremia  Secondary Diagnosis:	Chest pain  Secondary Diagnosis:	Inability to ambulate due to ankle or foot

## 2021-07-19 NOTE — ED ADULT TRIAGE NOTE - CHIEF COMPLAINT QUOTE
patient brought in from group home for generalized body pain since this AM, as per EMT, patient has decreased ambulation upon arrival.

## 2021-07-19 NOTE — ED PROVIDER NOTE - PHYSICAL EXAMINATION
GENERAL: Well-nourished, Well-developed. NAD.  HEAD: No visible or palpable bumps or hematomas. No ecchymosis behind ears B/L.  Eyes: PERRLA, EOMI. No asymmetry. No nystagmus. No conjunctival injection. Non-icteric sclera.  ENMT: MMM.   Neck: Supple. FROM  CVS: RRR. Normal S1,S2. No murmurs appreciated on auscultation   RESP: No use of accessory muscles. Chest rise symmetrical with good expansion. Lungs clear to auscultation B/L. No wheezing, rales, or rhonchi auscultated.  GI: Normal auscultation of bowel sounds in all 4 quadrants. Soft, Nontender, Nondistended. No guarding or rebound tenderness. No CVAT B/L.  MSK: (+)L foot/ankle ttp and mild swelling. No visible signs of trauma such as ecchymosis, erythema, or swelling. No midline spinal TTP. FROM of back with flexion and extension.  Skin: Warm, Dry. No rashes or lesions. Good cap refill < 2 sec B/L.  EXT: Radial and pedal pulses present B/L. No calf tenderness B/L. No palpable cords.   Neuro: AA&O x 3. Sensation grossly intact. Strength 5/5 B/L.

## 2021-07-19 NOTE — ED ADULT NURSE NOTE - NSIMPLEMENTINTERV_GEN_ALL_ED
Implemented All Fall with Harm Risk Interventions:  [place] to call system. Call bell, personal items and telephone within reach. Instruct patient to call for assistance. Room bathroom lighting operational. Non-slip footwear when patient is off stretcher. Physically safe environment: no spills, clutter or unnecessary equipment. Stretcher in lowest position, wheels locked, appropriate side rails in place. Provide visual cue, wrist band, yellow gown, etc. Monitor gait and stability. Monitor for mental status changes and reorient to person, place, and time. Review medications for side effects contributing to fall risk. Reinforce activity limits and safety measures with patient and family. Provide visual clues: red socks.

## 2021-07-19 NOTE — ED PROVIDER NOTE - NS ED ROS FT
Constitutional: (-) fever (-) malaise (-) diaphoresis (-) chills   Eyes: (-) visual changes (-) eye pain (-) eye discharge (-) photophobia (-) FB sensation  ENMT: (-) nasal or chest congestion (-) runny nose (-) sore throat (-) hoarseness  (-) hearing changes (-) ear pain (-) ear discharge or infections (-) neck pain (-) neck stiffness  Cardiac: (+) chest pain  (-) palpitations (-) syncope (-) edema  Respiratory: (-) cough (-) SOB (-) BLISS  GI: (-) nausea (-) vomiting (-) diarrhea (-) abdominal pain   : (-) dysuria (-) increased frequency  (-) hematuria (-) incontinence  MS: (-) back pain (+)L foot and ankle pain  Neuro: (-) headache (-) dizziness (-) numbness/tingling to extremities B/L (-) weakness (-) LOC (-) head injury (-) AMS (-) saddle anesthesia (-) tremors  Skin: (-) rash (-) laceration    Except as documented in the HPI, all other systems are negative.

## 2021-07-19 NOTE — ED ADULT NURSE NOTE - OBJECTIVE STATEMENT
As per staff at bedside Patient had witnessed fall yesterday where he tripped over walker and landed in his bed. Today patient present c/o left ankle pain generalized body aches and weakness.

## 2021-07-19 NOTE — H&P ADULT - NSHPLABSRESULTS_GEN_ALL_CORE
14.4   15.32 )-----------( 147      ( 19 Jul 2021 11:30 )             41.9     07-19    128<L>  |  92<L>  |  12  ----------------------------<  108<H>  4.6   |  25  |  1.1    Ca    9.6      19 Jul 2021 11:30    TPro  7.7  /  Alb  4.2  /  TBili  0.7  /  DBili  x   /  AST  36  /  ALT  14  /  AlkPhos  100  07-19    < from: Xray Chest 2 Views PA/Lat (07.19.21 @ 13:21) >      EXAM:  XR CHEST PA LAT 2V            PROCEDURE DATE:  07/19/2021            INTERPRETATION:  Clinical History / Reason for exam: Chest pain    Comparison : Chest radiograph November 21, 2020.    Technique/Positioning: PA and lateral views of the chest.    Findings:    Support devices: None.    Cardiac/mediastinum/hilum: Unremarkable.    Lung parenchyma/Pleura: Low lung volumes with bibasilar atelectasis. No pneumothorax.    Skeleton/soft tissues: Gaseous distention of the left:, Partially imaged.    Impression:    Low lung volumes with bibasilar atelectasis.    Gaseous distention of the left colon, partially imaged.    --- End of Report ---    ------------------------------    < from: Xray Ankle Complete 3 Views, Left (07.19.21 @ 13:22) >      EXAM:  XR ANKLE COMP MIN 3 VIEWS LT            PROCEDURE DATE:  07/19/2021            INTERPRETATION:  Clinical History/Reason For Exam: Ankle pain.    Comparison: June 30, 2010.    Procedure: Three views of the left ankle.    Findings:  There is no evidence of acute fracture or dislocation.  Ankle mortise is intact. Chronic left distal fibular injury. No ankle joint effusion. Generalized osteopenia..    Impression:  No acute fracture or dislocation.    --- End of Report ---

## 2021-07-19 NOTE — H&P ADULT - HISTORY OF PRESENT ILLNESS
The patient is a 50 year-old male with a PMH of intellectual disability, hypothyroidism, seizure disorder (on Depakote), bipolar disorder, schizophrenia, presenting from [facility] s/p mechanical fall yesterday and an episode of chest pain this morning. Per [facility] staff ([place]), the fall was unwitnessed but she believes he was walking with his walker and fell on his left ankle; when he was found he was conscious, oriented; there was no sign of loss of control of his bowels or bladder and he was not exhibiting tremors. This morning, per staff, around 9 AM he was complaining of chest pain while sitting; it has persisted since this time; he reports it is worse when pressing on his ribcage and with inspiration; he denied any associated shortness of breath or dizziness at the time. Patient denies any recent burning on urination, abdominal discomfort, nausea, vomiting or other symptoms.

## 2021-07-20 LAB
A1C WITH ESTIMATED AVERAGE GLUCOSE RESULT: 5 % — SIGNIFICANT CHANGE UP (ref 4–5.6)
ANION GAP SERPL CALC-SCNC: 13 MMOL/L — SIGNIFICANT CHANGE UP (ref 7–14)
BASOPHILS # BLD AUTO: 0.04 K/UL — SIGNIFICANT CHANGE UP (ref 0–0.2)
BASOPHILS NFR BLD AUTO: 0.4 % — SIGNIFICANT CHANGE UP (ref 0–1)
BUN SERPL-MCNC: 10 MG/DL — SIGNIFICANT CHANGE UP (ref 10–20)
CALCIUM SERPL-MCNC: 8.9 MG/DL — SIGNIFICANT CHANGE UP (ref 8.5–10.1)
CHLORIDE SERPL-SCNC: 94 MMOL/L — LOW (ref 98–110)
CHOLEST SERPL-MCNC: 116 MG/DL — SIGNIFICANT CHANGE UP
CO2 SERPL-SCNC: 25 MMOL/L — SIGNIFICANT CHANGE UP (ref 17–32)
COVID-19 SPIKE DOMAIN AB INTERP: POSITIVE
COVID-19 SPIKE DOMAIN ANTIBODY RESULT: >250 U/ML — HIGH
CREAT SERPL-MCNC: 0.9 MG/DL — SIGNIFICANT CHANGE UP (ref 0.7–1.5)
CRP SERPL-MCNC: 117 MG/L — HIGH
EOSINOPHIL # BLD AUTO: 0.13 K/UL — SIGNIFICANT CHANGE UP (ref 0–0.7)
EOSINOPHIL NFR BLD AUTO: 1.2 % — SIGNIFICANT CHANGE UP (ref 0–8)
ESTIMATED AVERAGE GLUCOSE: 97 MG/DL — SIGNIFICANT CHANGE UP (ref 68–114)
GLUCOSE SERPL-MCNC: 77 MG/DL — SIGNIFICANT CHANGE UP (ref 70–99)
HCT VFR BLD CALC: 40.4 % — LOW (ref 42–52)
HDLC SERPL-MCNC: 53 MG/DL — SIGNIFICANT CHANGE UP
HGB BLD-MCNC: 13.5 G/DL — LOW (ref 14–18)
IMM GRANULOCYTES NFR BLD AUTO: 0.8 % — HIGH (ref 0.1–0.3)
LIPID PNL WITH DIRECT LDL SERPL: 39 MG/DL — SIGNIFICANT CHANGE UP
LYMPHOCYTES # BLD AUTO: 1.14 K/UL — LOW (ref 1.2–3.4)
LYMPHOCYTES # BLD AUTO: 10.3 % — LOW (ref 20.5–51.1)
MCHC RBC-ENTMCNC: 30.8 PG — SIGNIFICANT CHANGE UP (ref 27–31)
MCHC RBC-ENTMCNC: 33.4 G/DL — SIGNIFICANT CHANGE UP (ref 32–37)
MCV RBC AUTO: 92 FL — SIGNIFICANT CHANGE UP (ref 80–94)
MONOCYTES # BLD AUTO: 1.83 K/UL — HIGH (ref 0.1–0.6)
MONOCYTES NFR BLD AUTO: 16.6 % — HIGH (ref 1.7–9.3)
NEUTROPHILS # BLD AUTO: 7.8 K/UL — HIGH (ref 1.4–6.5)
NEUTROPHILS NFR BLD AUTO: 70.7 % — SIGNIFICANT CHANGE UP (ref 42.2–75.2)
NON HDL CHOLESTEROL: 63 MG/DL — SIGNIFICANT CHANGE UP
NRBC # BLD: 0 /100 WBCS — SIGNIFICANT CHANGE UP (ref 0–0)
PLATELET # BLD AUTO: 115 K/UL — LOW (ref 130–400)
POTASSIUM SERPL-MCNC: 4.3 MMOL/L — SIGNIFICANT CHANGE UP (ref 3.5–5)
POTASSIUM SERPL-SCNC: 4.3 MMOL/L — SIGNIFICANT CHANGE UP (ref 3.5–5)
PROCALCITONIN SERPL-MCNC: 0.1 NG/ML — SIGNIFICANT CHANGE UP (ref 0.02–0.1)
RBC # BLD: 4.39 M/UL — LOW (ref 4.7–6.1)
RBC # FLD: 13.6 % — SIGNIFICANT CHANGE UP (ref 11.5–14.5)
SARS-COV-2 IGG+IGM SERPL QL IA: >250 U/ML — HIGH
SARS-COV-2 IGG+IGM SERPL QL IA: POSITIVE
SODIUM SERPL-SCNC: 132 MMOL/L — LOW (ref 135–146)
TRIGL SERPL-MCNC: 70 MG/DL — SIGNIFICANT CHANGE UP
TROPONIN T SERPL-MCNC: <0.01 NG/ML — SIGNIFICANT CHANGE UP
TROPONIN T SERPL-MCNC: <0.01 NG/ML — SIGNIFICANT CHANGE UP
TSH SERPL-MCNC: 5.96 UIU/ML — HIGH (ref 0.27–4.2)
WBC # BLD: 11.03 K/UL — HIGH (ref 4.8–10.8)
WBC # FLD AUTO: 11.03 K/UL — HIGH (ref 4.8–10.8)

## 2021-07-20 PROCEDURE — 99233 SBSQ HOSP IP/OBS HIGH 50: CPT

## 2021-07-20 PROCEDURE — 74018 RADEX ABDOMEN 1 VIEW: CPT | Mod: 26

## 2021-07-20 RX ORDER — ACETAMINOPHEN 500 MG
650 TABLET ORAL EVERY 6 HOURS
Refills: 0 | Status: DISCONTINUED | OUTPATIENT
Start: 2021-07-20 | End: 2021-07-27

## 2021-07-20 RX ADMIN — HALOPERIDOL DECANOATE 10 MILLIGRAM(S): 100 INJECTION INTRAMUSCULAR at 05:21

## 2021-07-20 RX ADMIN — DIVALPROEX SODIUM 500 MILLIGRAM(S): 500 TABLET, DELAYED RELEASE ORAL at 05:20

## 2021-07-20 RX ADMIN — Medication 650 MILLIGRAM(S): at 23:46

## 2021-07-20 RX ADMIN — MIRTAZAPINE 15 MILLIGRAM(S): 45 TABLET, ORALLY DISINTEGRATING ORAL at 21:24

## 2021-07-20 RX ADMIN — Medication 1 MILLIGRAM(S): at 05:20

## 2021-07-20 RX ADMIN — DIVALPROEX SODIUM 500 MILLIGRAM(S): 500 TABLET, DELAYED RELEASE ORAL at 14:44

## 2021-07-20 RX ADMIN — HALOPERIDOL DECANOATE 10 MILLIGRAM(S): 100 INJECTION INTRAMUSCULAR at 21:24

## 2021-07-20 RX ADMIN — HALOPERIDOL DECANOATE 10 MILLIGRAM(S): 100 INJECTION INTRAMUSCULAR at 14:44

## 2021-07-20 RX ADMIN — BUPROPION HYDROCHLORIDE 150 MILLIGRAM(S): 150 TABLET, EXTENDED RELEASE ORAL at 11:54

## 2021-07-20 RX ADMIN — Medication 650 MILLIGRAM(S): at 21:38

## 2021-07-20 RX ADMIN — CHLORHEXIDINE GLUCONATE 1 APPLICATION(S): 213 SOLUTION TOPICAL at 05:20

## 2021-07-20 RX ADMIN — DIVALPROEX SODIUM 500 MILLIGRAM(S): 500 TABLET, DELAYED RELEASE ORAL at 21:24

## 2021-07-20 RX ADMIN — ENOXAPARIN SODIUM 40 MILLIGRAM(S): 100 INJECTION SUBCUTANEOUS at 21:27

## 2021-07-20 RX ADMIN — Medication 100 MICROGRAM(S): at 05:21

## 2021-07-20 RX ADMIN — Medication 1 MILLIGRAM(S): at 18:27

## 2021-07-20 NOTE — PHYSICAL THERAPY INITIAL EVALUATION ADULT - GENERAL OBSERVATIONS, REHAB EVAL
8:43-9:25 42 min  pt rec in bed in NAD, pt's clothes and linens were wet, PT and RN changed pt and linen, group home attendant at the b/s, pt agreeable to PT with encouragement

## 2021-07-20 NOTE — PROGRESS NOTE ADULT - SUBJECTIVE AND OBJECTIVE BOX
ALINA CHERY  50y, Male  Allergy: erythromycin (Other)  phenothiazines (Unknown)    Hospital Day: 1d    Patient seen and examined earlier today. No acute events overnight.    PMH/PSH:  PAST MEDICAL & SURGICAL HISTORY:  Epilepsy  last event 4+ yrs ago    Schizophrenia    Bipolar 1 disorder    Major depressive disorder    Anemia    Dyspepsia    Hypothyroidism    Constipation    MR (mental retardation)    H/O cystoscopy    H/O laminectomy  03/2001    H/O fracture of fibula  nondisplaced 03/2010    VITALS:  T(F): 95.8 (07-20-21 @ 05:14), Max: 96.1 (07-19-21 @ 16:13)  HR: 97 (07-20-21 @ 05:14)  BP: 134/90 (07-20-21 @ 05:14) (134/90 - 141/88)  RR: 18 (07-20-21 @ 05:14)  SpO2: 96% (07-19-21 @ 16:13)    TESTS & MEASUREMENTS:  Weight (Kg): 77.1 (07-19-21 @ 09:20)  BMI (kg/m2): 25.1 (07-19)    07-19-21 @ 07:01  -  07-20-21 @ 07:00  --------------------------------------------------------  IN: 150 mL / OUT: 0 mL / NET: 150 mL    07-20-21 @ 07:01  -  07-20-21 @ 13:38  --------------------------------------------------------  IN: 0 mL / OUT: 800 mL / NET: -800 mL                        13.5   11.03 )-----------( 115      ( 20 Jul 2021 06:40 )             40.4     07-20    132<L>  |  94<L>  |  10  ----------------------------<  77  4.3   |  25  |  0.9    Ca    8.9      20 Jul 2021 06:40    TPro  7.7  /  Alb  4.2  /  TBili  0.7  /  DBili  x   /  AST  36  /  ALT  14  /  AlkPhos  100  07-19    LIVER FUNCTIONS - ( 19 Jul 2021 11:30 )  Alb: 4.2 g/dL / Pro: 7.7 g/dL / ALK PHOS: 100 U/L / ALT: 14 U/L / AST: 36 U/L / GGT: x           CARDIAC MARKERS ( 20 Jul 2021 06:40 )  x     / <0.01 ng/mL / x     / x     / x      CARDIAC MARKERS ( 20 Jul 2021 01:03 )  x     / <0.01 ng/mL / x     / x     / x      CARDIAC MARKERS ( 19 Jul 2021 16:00 )  x     / <0.01 ng/mL / x     / x     / x      CARDIAC MARKERS ( 19 Jul 2021 11:30 )  x     / <0.01 ng/mL / x     / x     / x        RECENT DIAGNOSTIC ORDERS:  Diet, Regular (07-20-21 @ 13:27)  Basic Metabolic Panel: AM Sched. Collection: 21-Jul-2021 04:30 (07-20-21 @ 11:52)  Complete Blood Count + Automated Diff: AM Sched. Collection: 21-Jul-2021 04:30 (07-20-21 @ 11:52)  Procalcitonin, Serum: 11:00 (07-20-21 @ 09:55)  Creatinine, Random Urine: AM Sched. Collection: 20-Jul-2021 04:30 (07-20-21 @ 00:20)  Osmolality, Random Urine: AM Sched. Collection: 20-Jul-2021 04:30 (07-20-21 @ 00:20)  Sodium, Random Urine: AM Sched. Collection: 20-Jul-2021 04:30 (07-20-21 @ 00:20)  C-Reactive Protein, Serum: AM Sched. Collection: 20-Jul-2021 04:30 (07-20-21 @ 00:20)  Procalcitonin, Serum: AM Sched. Collection: 20-Jul-2021 04:30 (07-20-21 @ 00:20)  Thyroid Stimulating Hormone, Serum: AM Sched. Collection: 20-Jul-2021 04:30 (07-20-21 @ 00:20)  C-Reactive Protein, Serum: Routine (07-19-21 @ 22:58)  Procalcitonin, Serum: Routine (07-19-21 @ 22:58)  COVID-19 [place] Domain Antibody: AM Sched. Collection: 20-Jul-2021 04:30 (07-19-21 @ 16:39)    MEDICATIONS:  MEDICATIONS  (STANDING):  benztropine 1 milliGRAM(s) Oral two times a day  buPROPion XL (24-Hour) . 150 milliGRAM(s) Oral daily  chlorhexidine 4% Liquid 1 Application(s) Topical <User Schedule>  diVALproex  milliGRAM(s) Oral three times a day  enoxaparin Injectable 40 milliGRAM(s) SubCutaneous at bedtime  haloperidol     Tablet 10 milliGRAM(s) Oral three times a day  levothyroxine 100 MICROGram(s) Oral daily  mirtazapine 15 milliGRAM(s) Oral at bedtime    MEDICATIONS  (PRN):    HOME MEDICATIONS:  benztropine 1 mg oral tablet (12-08)  buPROPion 150 mg/24 hours (XL) oral tablet, extended release (12-08)  haloperidol 10 mg oral tablet (12-08)  mirtazapine 15 mg oral tablet (12-08)  Senexon-S 50 mg-8.6 mg oral tablet (12-08)  Synthroid 100 mcg (0.1 mg) oral tablet (12-08)    REVIEW OF SYSTEMS:  All other review of systems is negative unless indicated above.     PHYSICAL EXAM:  GENERAL: NAD  HEENT: No Swelling  CHEST/LUNG: Good air entry, No wheezing  HEART: RRR, No murmurs  ABDOMEN: Soft, Bowel sounds present  EXTREMITIES:  No clubbing

## 2021-07-20 NOTE — SWALLOW BEDSIDE ASSESSMENT ADULT - SLP PERTINENT HISTORY OF CURRENT PROBLEM
50 y.o Male patient admitted to ED from [facility] s/p mechanical fall and an episode of chest pain. PMHx relevant for intellectual disability, hypothyroidism, seizure disorder (on Depakote), bipolar disorder, schizophrenia. Pt. consulted for ST swallow eval 2/2 onset of swallowing difficulties w/ chicken. Pt. known to ST service from previous admission with recommendations for a regular diet w/ thin liquids. CXR: Mild bibasilar opacities/atelectasis.

## 2021-07-20 NOTE — PROGRESS NOTE ADULT - SUBJECTIVE AND OBJECTIVE BOX
ALINA CHERY 50y Male  MRN#: [id number]   CODE STATUS:________    Hospital Day: 1d    Pt is currently admitted with the primary diagnosis of fall    SUBJECTIVE    No Overnight events     No Subjective complaints     Present Today:   - Birdie:  No                                            ----------------------------------------------------------  OBJECTIVE  PAST MEDICAL & SURGICAL HISTORY  Epilepsy  last event 4+ yrs ago    Schizophrenia    Bipolar 1 disorder    Major depressive disorder    Anemia    Dyspepsia    Hypothyroidism    Constipation    MR (mental retardation)    H/O cystoscopy    H/O laminectomy  03/2001    H/O fracture of fibula  nondisplaced 03/2010                                              -----------------------------------------------------------  ALLERGIES:  erythromycin (Other)  phenothiazines (Unknown)                                            ------------------------------------------------------------    HOME MEDICATIONS  Home Medications:  benztropine 1 mg oral tablet: 1 tab(s) orally 2 times a day (08 Dec 2020 12:38)  buPROPion 150 mg/24 hours (XL) oral tablet, extended release: 1 tab(s) orally every 24 hours (08 Dec 2020 12:38)  haloperidol 10 mg oral tablet: 1 tab(s) orally 3 times a day (08 Dec 2020 12:38)  mirtazapine 15 mg oral tablet: mirtazapine 15mg tablet 1 tablet oral at bedtime (08 Dec 2020 12:38)  Senexon-S 50 mg-8.6 mg oral tablet: 1 tab(s) orally once a day (at bedtime) (08 Dec 2020 12:38)  Synthroid 100 mcg (0.1 mg) oral tablet: synthroid 100mcg tablet (levothyroxine 100 mcg tablet) 1 tablet oral every day (08 Dec 2020 12:38)                           MEDICATIONS:  STANDING MEDICATIONS  benztropine 1 milliGRAM(s) Oral two times a day  buPROPion XL (24-Hour) . 150 milliGRAM(s) Oral daily  chlorhexidine 4% Liquid 1 Application(s) Topical <User Schedule>  diVALproex  milliGRAM(s) Oral three times a day  enoxaparin Injectable 40 milliGRAM(s) SubCutaneous at bedtime  haloperidol     Tablet 10 milliGRAM(s) Oral three times a day  levothyroxine 100 MICROGram(s) Oral daily  mirtazapine 15 milliGRAM(s) Oral at bedtime    PRN MEDICATIONS                                            ------------------------------------------------------------  VITAL SIGNS: Last 24 Hours  T(C): 36 (20 Jul 2021 13:39), Max: 36 (20 Jul 2021 13:39)  T(F): 96.8 (20 Jul 2021 13:39), Max: 96.8 (20 Jul 2021 13:39)  HR: 109 (20 Jul 2021 13:39) (91 - 109)  BP: 141/81 (20 Jul 2021 13:39) (134/90 - 141/88)  BP(mean): --  RR: 19 (20 Jul 2021 13:39) (18 - 19)  SpO2: 96% (19 Jul 2021 16:13) (96% - 96%)      07-19-21 @ 07:01  -  07-20-21 @ 07:00  --------------------------------------------------------  IN: 150 mL / OUT: 0 mL / NET: 150 mL    07-20-21 @ 07:01  -  07-20-21 @ 15:43  --------------------------------------------------------  IN: 180 mL / OUT: 800 mL / NET: -620 mL                                             --------------------------------------------------------------  LABS:                        13.5   11.03 )-----------( 115      ( 20 Jul 2021 06:40 )             40.4     07-20    132<L>  |  94<L>  |  10  ----------------------------<  77  4.3   |  25  |  0.9    Ca    8.9      20 Jul 2021 06:40    TPro  7.7  /  Alb  4.2  /  TBili  0.7  /  DBili  x   /  AST  36  /  ALT  14  /  AlkPhos  100  07-19          Troponin T, Serum: <0.01 ng/mL (07-20-21 @ 06:40)  Troponin T, Serum: <0.01 ng/mL (07-20-21 @ 01:03)  Troponin T, Serum: <0.01 ng/mL (07-19-21 @ 16:00)          CARDIAC MARKERS ( 20 Jul 2021 06:40 )  x     / <0.01 ng/mL / x     / x     / x      CARDIAC MARKERS ( 20 Jul 2021 01:03 )  x     / <0.01 ng/mL / x     / x     / x      CARDIAC MARKERS ( 19 Jul 2021 16:00 )  x     / <0.01 ng/mL / x     / x     / x      CARDIAC MARKERS ( 19 Jul 2021 11:30 )  x     / <0.01 ng/mL / x     / x     / x                                                  -------------------------------------------------------------  RADIOLOGY:    < from: Xray Ankle Complete 3 Views, Left (07.19.21 @ 13:22) >  Impression:  No acute fracture or dislocation.    --- End of Report ---              ELLIOTLANDAU MD; Attending Radiologist  This document has been electronically signed. Jul 19 2021  1:24PM    < end of copied text >  < from: Xray Chest 1 View- PORTABLE-Urgent (Xray Chest 1 View- PORTABLE-Urgent .) (07.19.21 @ 20:37) >  IMPRESSION:    Stable cardiomediastinal silhouette.    Mild bibasal opacities/atelectasis. No pneumothorax. Redemonstrated gaseous distention of the left colon.    Unchanged osseous structures.    --- End of Report ---              CLEMENTE JOEL MD; Attending Radiologist  This document has been electronically signed. Jul 20 2021  9:56AM    < end of copied text >                                            --------------------------------------------------------------    PHYSICAL EXAM:  GENERAL: NAD, alert  HEAD: Normocephalic; atraumatic  CARD: S1, S2 normal; no murmurs, gallops, or rubs. Regular rate and rhythm.  RESP: No wheezes, rales or rhonchi.  ABD: Normal bowel sounds; soft; non-distended; non-tender; no hepatosplenomegaly.  EXT: Normal ROM. No clubbing, cyanosis or edema.                                             --------------------------------------------------------------

## 2021-07-20 NOTE — PHYSICAL THERAPY INITIAL EVALUATION ADULT - REFERRING PHYSICIAN, REHAB EVAL
Attempted to see pt for PT IE, however pt is refusing to participate at this time. Pt's aid from group home at bedside states that the pt "loves the hospital, and will say anything to try and get admitted." PT will f/u as appropriate. CATRACHITO Welch made aware.
Florinda

## 2021-07-20 NOTE — SWALLOW BEDSIDE ASSESSMENT ADULT - COMMENTS
Pt's group home aide received at bedside, reports no h/o dysphagia with regular solids and thin liquids prior to hospitalization. + toleration without overt s/s of aspiration penetration.

## 2021-07-20 NOTE — PROGRESS NOTE ADULT - ASSESSMENT
The patient is a 50 year-old male with a PMH of intellectual disability, hypothyroidism, seizure disorder (on Depakote), bipolar disorder, schizophrenia, presenting s/p mechanical fall yesterday and an episode of chest pain this morning. his chest pain is reproducible by palpation, it goes more with costochondritis. in ED he was started on levofloxacin for CAP which was stopped for suspicion of pneumonitis instead of a CAP and he will be monitored off antibiotics. speech and swallow eval was done to rule out aspirations ->he was cleared for regular diet. possible dc tomorrow    # Chest pain, reproducible since yesterday, possibly secondary to mechanical fall vs community acquired pneumonia  WBC 15; afebrile since at home; CXR showing bibasilar atelectasis w/ RML opacity; EKG w/ no evidence of ischemia  S/p Levaquin 750 mg IV x 1 in ED -> stopped    # Possible aspiration of food this afternoon  Patient talking in complete sentences though coughing intermittently; SpO2 %  Seen by ENT (Dr. Kathleen); no evidence of upper airway obstruction  F/u repeat CXR for evidence of aspiration  Speech/Swallow recommendations -> regular diet      # H/o seizures  Per group home staff, no witnessed seizure episode w/in last ~4 years  C/w Depakote 500 mg TID    # Hypothyroidism  C/w Synthroid 100 mcg once daily    # CHG  # GI ppx- not indicated  # DVT ppx- Lovenox 40 mg SQ qHS  # Activity- ambulate w/ assistance  # Diet- regular

## 2021-07-20 NOTE — PROGRESS NOTE ADULT - ASSESSMENT
50 year-old male with a PMH of intellectual disability, hypothyroidism, seizure disorder (on Depakote), bipolar disorder, schizophrenia, presenting s/p mechanical fall yesterday and an episode of chest pain this morning, admitted for further w/u.    #Chest pain, ACS ruled out  Reproducible, possibly pleuritic chest pain from aspiration pneumonitis  CXR (07/19): Mild bibasal opacities/atelectasis  Troponins negative x4  - f/u S/S eval  - Monitor off antibioics    #Left colon distension  Seen on CXR  - f/u KUB  - Obtain CT Abdomen/Pelvis w oral contrast based on XR findings    #Hypothyroidism  - Cont levothyroxine 100mcg qD    #Seizures disorder  - Cont depakote  TID    #Bipolar  #Schizophrenia  - Cont haldol 10mg ID  - Cont bupropion XL 150mg qD  - COnt mirtazapine 15mg qHs  - Cont benztropine 1mg BID    DVT PPX, Lovenox    #Progress Note Handoff  Pending (specify): S/S eval, VENESSA  Family discussion: d/w caretaker at bedside  Disposition: [place] 50 year-old male with a PMH of intellectual disability, hypothyroidism, seizure disorder (on Depakote), bipolar disorder, schizophrenia, presenting s/p mechanical fall yesterday and an episode of chest pain this morning, admitted for further w/u.    #Chest pain, ACS ruled out  Reproducible, possibly pleuritic chest pain from aspiration pneumonitis  CXR (07/19): Mild bibasal opacities/atelectasis  Troponins negative x4  - f/u S/S eval  - Monitor off antibioics    #Left colon distension  Seen on CXR  - f/u KUB  - Obtain CT Abdomen/Pelvis w oral contrast based on XR findings    #Hypothyroidism  - Cont levothyroxine 100mcg qD    #Seizures disorder  - Cont depakote  TID    #Bipolar  #Schizophrenia  - Cont haldol 10mg ID  - Cont bupropion XL 150mg qD  - COnt mirtazapine 15mg qHs  - Cont benztropine 1mg BID    DVT PPX, Lovenox    #Progress Note Handoff  Pending (specify): S/S eval, KUB  Family discussion: d/w caretaker at bedside. Pt is [facility] of [facility], discussed case with charge nurse at [facility] ([phone])  Disposition:

## 2021-07-21 LAB
ANION GAP SERPL CALC-SCNC: 11 MMOL/L — SIGNIFICANT CHANGE UP (ref 7–14)
APPEARANCE UR: ABNORMAL
BACTERIA # UR AUTO: NEGATIVE — SIGNIFICANT CHANGE UP
BASOPHILS # BLD AUTO: 0.04 K/UL — SIGNIFICANT CHANGE UP (ref 0–0.2)
BASOPHILS NFR BLD AUTO: 0.4 % — SIGNIFICANT CHANGE UP (ref 0–1)
BILIRUB UR-MCNC: NEGATIVE — SIGNIFICANT CHANGE UP
BUN SERPL-MCNC: 12 MG/DL — SIGNIFICANT CHANGE UP (ref 10–20)
CALCIUM SERPL-MCNC: 9 MG/DL — SIGNIFICANT CHANGE UP (ref 8.5–10.1)
CHLORIDE SERPL-SCNC: 97 MMOL/L — LOW (ref 98–110)
CO2 SERPL-SCNC: 25 MMOL/L — SIGNIFICANT CHANGE UP (ref 17–32)
COLOR SPEC: YELLOW — SIGNIFICANT CHANGE UP
CREAT SERPL-MCNC: 0.8 MG/DL — SIGNIFICANT CHANGE UP (ref 0.7–1.5)
DIFF PNL FLD: NEGATIVE — SIGNIFICANT CHANGE UP
EOSINOPHIL # BLD AUTO: 0.3 K/UL — SIGNIFICANT CHANGE UP (ref 0–0.7)
EOSINOPHIL NFR BLD AUTO: 3.2 % — SIGNIFICANT CHANGE UP (ref 0–8)
EPI CELLS # UR: 0 /HPF — SIGNIFICANT CHANGE UP (ref 0–5)
GLUCOSE SERPL-MCNC: 85 MG/DL — SIGNIFICANT CHANGE UP (ref 70–99)
GLUCOSE UR QL: NEGATIVE — SIGNIFICANT CHANGE UP
HCT VFR BLD CALC: 39.7 % — LOW (ref 42–52)
HGB BLD-MCNC: 13.3 G/DL — LOW (ref 14–18)
HYALINE CASTS # UR AUTO: 0 /LPF — SIGNIFICANT CHANGE UP (ref 0–7)
IMM GRANULOCYTES NFR BLD AUTO: 1.1 % — HIGH (ref 0.1–0.3)
KETONES UR-MCNC: SIGNIFICANT CHANGE UP
LEUKOCYTE ESTERASE UR-ACNC: NEGATIVE — SIGNIFICANT CHANGE UP
LYMPHOCYTES # BLD AUTO: 1.84 K/UL — SIGNIFICANT CHANGE UP (ref 1.2–3.4)
LYMPHOCYTES # BLD AUTO: 19.7 % — LOW (ref 20.5–51.1)
MCHC RBC-ENTMCNC: 30.4 PG — SIGNIFICANT CHANGE UP (ref 27–31)
MCHC RBC-ENTMCNC: 33.5 G/DL — SIGNIFICANT CHANGE UP (ref 32–37)
MCV RBC AUTO: 90.6 FL — SIGNIFICANT CHANGE UP (ref 80–94)
MONOCYTES # BLD AUTO: 1.57 K/UL — HIGH (ref 0.1–0.6)
MONOCYTES NFR BLD AUTO: 16.8 % — HIGH (ref 1.7–9.3)
NEUTROPHILS # BLD AUTO: 5.5 K/UL — SIGNIFICANT CHANGE UP (ref 1.4–6.5)
NEUTROPHILS NFR BLD AUTO: 58.8 % — SIGNIFICANT CHANGE UP (ref 42.2–75.2)
NITRITE UR-MCNC: NEGATIVE — SIGNIFICANT CHANGE UP
NRBC # BLD: 0 /100 WBCS — SIGNIFICANT CHANGE UP (ref 0–0)
PH UR: 7 — SIGNIFICANT CHANGE UP (ref 5–8)
PLATELET # BLD AUTO: 115 K/UL — LOW (ref 130–400)
POTASSIUM SERPL-MCNC: 4.1 MMOL/L — SIGNIFICANT CHANGE UP (ref 3.5–5)
POTASSIUM SERPL-SCNC: 4.1 MMOL/L — SIGNIFICANT CHANGE UP (ref 3.5–5)
PROCALCITONIN SERPL-MCNC: 0.11 NG/ML — HIGH (ref 0.02–0.1)
PROT UR-MCNC: SIGNIFICANT CHANGE UP
RBC # BLD: 4.38 M/UL — LOW (ref 4.7–6.1)
RBC # FLD: 13.6 % — SIGNIFICANT CHANGE UP (ref 11.5–14.5)
RBC CASTS # UR COMP ASSIST: 2 /HPF — SIGNIFICANT CHANGE UP (ref 0–4)
SODIUM SERPL-SCNC: 133 MMOL/L — LOW (ref 135–146)
SP GR SPEC: 1.01 — SIGNIFICANT CHANGE UP (ref 1.01–1.03)
UROBILINOGEN FLD QL: ABNORMAL
WBC # BLD: 9.35 K/UL — SIGNIFICANT CHANGE UP (ref 4.8–10.8)
WBC # FLD AUTO: 9.35 K/UL — SIGNIFICANT CHANGE UP (ref 4.8–10.8)
WBC UR QL: 1 /HPF — SIGNIFICANT CHANGE UP (ref 0–5)

## 2021-07-21 PROCEDURE — 99232 SBSQ HOSP IP/OBS MODERATE 35: CPT

## 2021-07-21 PROCEDURE — 99221 1ST HOSP IP/OBS SF/LOW 40: CPT

## 2021-07-21 RX ORDER — SODIUM CHLORIDE 9 MG/ML
1000 INJECTION INTRAMUSCULAR; INTRAVENOUS; SUBCUTANEOUS
Refills: 0 | Status: DISCONTINUED | OUTPATIENT
Start: 2021-07-21 | End: 2021-07-23

## 2021-07-21 RX ORDER — MEROPENEM 1 G/30ML
1000 INJECTION INTRAVENOUS EVERY 8 HOURS
Refills: 0 | Status: DISCONTINUED | OUTPATIENT
Start: 2021-07-21 | End: 2021-07-21

## 2021-07-21 RX ADMIN — BUPROPION HYDROCHLORIDE 150 MILLIGRAM(S): 150 TABLET, EXTENDED RELEASE ORAL at 11:46

## 2021-07-21 RX ADMIN — Medication 100 MICROGRAM(S): at 05:26

## 2021-07-21 RX ADMIN — ENOXAPARIN SODIUM 40 MILLIGRAM(S): 100 INJECTION SUBCUTANEOUS at 22:03

## 2021-07-21 RX ADMIN — Medication 1 MILLIGRAM(S): at 17:23

## 2021-07-21 RX ADMIN — HALOPERIDOL DECANOATE 10 MILLIGRAM(S): 100 INJECTION INTRAMUSCULAR at 05:24

## 2021-07-21 RX ADMIN — DIVALPROEX SODIUM 500 MILLIGRAM(S): 500 TABLET, DELAYED RELEASE ORAL at 05:25

## 2021-07-21 RX ADMIN — DIVALPROEX SODIUM 500 MILLIGRAM(S): 500 TABLET, DELAYED RELEASE ORAL at 22:03

## 2021-07-21 RX ADMIN — HALOPERIDOL DECANOATE 10 MILLIGRAM(S): 100 INJECTION INTRAMUSCULAR at 22:02

## 2021-07-21 RX ADMIN — DIVALPROEX SODIUM 500 MILLIGRAM(S): 500 TABLET, DELAYED RELEASE ORAL at 13:05

## 2021-07-21 RX ADMIN — CHLORHEXIDINE GLUCONATE 1 APPLICATION(S): 213 SOLUTION TOPICAL at 05:23

## 2021-07-21 RX ADMIN — SODIUM CHLORIDE 75 MILLILITER(S): 9 INJECTION INTRAMUSCULAR; INTRAVENOUS; SUBCUTANEOUS at 10:43

## 2021-07-21 RX ADMIN — MIRTAZAPINE 15 MILLIGRAM(S): 45 TABLET, ORALLY DISINTEGRATING ORAL at 22:02

## 2021-07-21 RX ADMIN — HALOPERIDOL DECANOATE 10 MILLIGRAM(S): 100 INJECTION INTRAMUSCULAR at 13:05

## 2021-07-21 RX ADMIN — Medication 1 MILLIGRAM(S): at 05:26

## 2021-07-21 NOTE — PROGRESS NOTE ADULT - SUBJECTIVE AND OBJECTIVE BOX
ALINA CHERY 50y Male  MRN#: [id number]   CODE STATUS:________    Hospital Day: 2d    Pt is currently admitted with the primary diagnosis of fall    SUBJECTIVE    patient had an episode of fever last night (100.8) associated with urinary retention. straight cath was unsuccessful. urology consult was made but patient voided by himself.    patient still complains of chest pain, increased with palpation. he denied dyspnea, palpitation    Present Today:   - Packer:  No                                          ----------------------------------------------------------  OBJECTIVE  PAST MEDICAL & SURGICAL HISTORY  Epilepsy  last event 4+ yrs ago    Schizophrenia    Bipolar 1 disorder    Major depressive disorder    Anemia    Dyspepsia    Hypothyroidism    Constipation    MR (mental retardation)    H/O cystoscopy    H/O laminectomy  03/2001    H/O fracture of fibula  nondisplaced 03/2010                                              -----------------------------------------------------------  ALLERGIES:  erythromycin (Other)  phenothiazines (Unknown)                                            ------------------------------------------------------------    HOME MEDICATIONS  Home Medications:  benztropine 1 mg oral tablet: 1 tab(s) orally 2 times a day (08 Dec 2020 12:38)  buPROPion 150 mg/24 hours (XL) oral tablet, extended release: 1 tab(s) orally every 24 hours (08 Dec 2020 12:38)  haloperidol 10 mg oral tablet: 1 tab(s) orally 3 times a day (08 Dec 2020 12:38)  mirtazapine 15 mg oral tablet: mirtazapine 15mg tablet 1 tablet oral at bedtime (08 Dec 2020 12:38)  Senexon-S 50 mg-8.6 mg oral tablet: 1 tab(s) orally once a day (at bedtime) (08 Dec 2020 12:38)  Synthroid 100 mcg (0.1 mg) oral tablet: synthroid 100mcg tablet (levothyroxine 100 mcg tablet) 1 tablet oral every day (08 Dec 2020 12:38)                           MEDICATIONS:  STANDING MEDICATIONS  benztropine 1 milliGRAM(s) Oral two times a day  buPROPion XL (24-Hour) . 150 milliGRAM(s) Oral daily  chlorhexidine 4% Liquid 1 Application(s) Topical <User Schedule>  diVALproex  milliGRAM(s) Oral three times a day  enoxaparin Injectable 40 milliGRAM(s) SubCutaneous at bedtime  haloperidol     Tablet 10 milliGRAM(s) Oral three times a day  levothyroxine 100 MICROGram(s) Oral daily  mirtazapine 15 milliGRAM(s) Oral at bedtime  sodium chloride 0.9%. 1000 milliLiter(s) IV Continuous <Continuous>    PRN MEDICATIONS  acetaminophen   Tablet .. 650 milliGRAM(s) Oral every 6 hours PRN                                            ------------------------------------------------------------  VITAL SIGNS: Last 24 Hours  T(C): 35.3 (21 Jul 2021 05:34), Max: 38.2 (20 Jul 2021 21:14)  T(F): 95.6 (21 Jul 2021 05:34), Max: 100.8 (20 Jul 2021 21:14)  HR: 84 (21 Jul 2021 05:34) (84 - 113)  BP: 130/79 (21 Jul 2021 05:34) (123/67 - 141/81)  BP(mean): --  RR: 18 (21 Jul 2021 05:34) (18 - 19)  SpO2: --      07-20-21 @ 07:01  -  07-21-21 @ 07:00  --------------------------------------------------------  IN: 180 mL / OUT: 800 mL / NET: -620 mL                                             --------------------------------------------------------------  LABS:                        13.3   9.35  )-----------( 115      ( 21 Jul 2021 06:40 )             39.7     07-21    133<L>  |  97<L>  |  12  ----------------------------<  85  4.1   |  25  |  0.8    Ca    9.0      21 Jul 2021 06:40    TPro  7.7  /  Alb  4.2  /  TBili  0.7  /  DBili  x   /  AST  36  /  ALT  14  /  AlkPhos  100  07-19                  CARDIAC MARKERS ( 20 Jul 2021 06:40 )  x     / <0.01 ng/mL / x     / x     / x      CARDIAC MARKERS ( 20 Jul 2021 01:03 )  x     / <0.01 ng/mL / x     / x     / x      CARDIAC MARKERS ( 19 Jul 2021 16:00 )  x     / <0.01 ng/mL / x     / x     / x      CARDIAC MARKERS ( 19 Jul 2021 11:30 )  x     / <0.01 ng/mL / x     / x     / x                                                  -------------------------------------------------------------  RADIOLOGY:    < from: Xray Kidney Ureter Bladder (07.20.21 @ 14:57) >  IMPRESSION:    Stool and air distended colon to 8 cm. No evidence of small bowel distention.    --- End of Report ---            RYANN SANTOS DO; Resident Radiologist  This document has been electronically signed.  KARLEY JONES MD; Attending Radiologist  This document has been electronically signed. Jul 21 2021  8:53AM    < end of copied text >                                            --------------------------------------------------------------    PHYSICAL EXAM:    GENERAL: NAD, alert  CARD: S1, S2 normal; no murmurs, gallops, or rubs. Regular rate and rhythm. chest pain upon palpation of chest  RESP: No wheezes, rales or rhonchi.  ABD: Normal bowel sounds; soft; non-distended; non-tender; no hepatosplenomegaly.  EXT: no lower limb edema

## 2021-07-21 NOTE — CONSULT NOTE ADULT - SUBJECTIVE AND OBJECTIVE BOX
UROLOGY:     51 yo M with PMH of intellectual disability, hypothyroidism, seizure disorder (on Depakote), Bipolar disorder, schizophrenia, presenting from [facility] s/p mechanical fall yesterday and an episode of chest pain this morning-  c/s for H/o ?Urethral stricture. Patient seen and examined at bedside. Subjective information was not able to be obtained from the patient, obtained to the extent possible of review of chart and collateral information. History was obtained to the extent possible from review of the chart and collateral sources of information.  Per patient's health aid patient not complaining of pain or mentioning any urge to void. Overnight,  patient had previous Bedside BS of 877cc, nursing staff attempted to place straight cath and met with resistance. Urology was called, at bedside, patient emptied bladder prior to Packer placement.  Per RN, patient's was not symptomatic; however, on bedside BS >600, 16 Fr Packer catheter was placed with immediate drainage of 800cc of clear yellow urine. Per patient's aide, patient has not been ambulating in the hospital and refusing to walk. He had a urethroplasty on   in [place] by private Urologist, Dr. Izabella Rojas and had no urinary concerns after post-op f/u.  Denies any hx of LUTS, fever, chills, N/V, abdominal pain, nocturia.      HPI:  The patient is a 50 year-old male with a PMH of intellectual disability, hypothyroidism, seizure disorder (on Depakote), bipolar disorder, schizophrenia, presenting from [facility] s/p mechanical fall yesterday and an episode of chest pain this morning. Per correction staff ([place]), the fall was unwitnessed but she believes he was walking with his walker and fell on his left ankle; when he was found he was conscious, oriented; there was no sign of loss of control of his bowels or bladder and he was not exhibiting tremors. This morning, per staff, around 9 AM he was complaining of chest pain while sitting; it has persisted since this time; he reports it is worse when pressing on his ribcage and with inspiration; he denied any associated shortness of breath or dizziness at the time. Patient denies any recent burning on urination, abdominal discomfort, nausea, vomiting or other symptoms.  (2021 14:24)        [  x  ] Due to Intellectual disability, subjective information was not able to be obtained from the patient. History was obtained to the extent possible from review of the chart and collateral sources of information.     PAST MEDICAL & SURGICAL HISTORY:  Epilepsy  last event 4+ yrs ago    Schizophrenia    Bipolar 1 disorder    Major depressive disorder    Anemia    Dyspepsia    Hypothyroidism    Constipation    MR (mental retardation)    H/O cystoscopy    H/O laminectomy  2001    H/O fracture of fibula  nondisplaced 2010        MEDICATIONS  (STANDING):  benztropine 1 milliGRAM(s) Oral two times a day  buPROPion XL (24-Hour) . 150 milliGRAM(s) Oral daily  chlorhexidine 4% Liquid 1 Application(s) Topical <User Schedule>  diVALproex  milliGRAM(s) Oral three times a day  enoxaparin Injectable 40 milliGRAM(s) SubCutaneous at bedtime  haloperidol     Tablet 10 milliGRAM(s) Oral three times a day  levothyroxine 100 MICROGram(s) Oral daily  meropenem  IVPB 1000 milliGRAM(s) IV Intermittent every 8 hours  mirtazapine 15 milliGRAM(s) Oral at bedtime  sodium chloride 0.9%. 1000 milliLiter(s) (75 mL/Hr) IV Continuous <Continuous>    MEDICATIONS  (PRN):  acetaminophen   Tablet .. 650 milliGRAM(s) Oral every 6 hours PRN Temp greater or equal to 38C (100.4F), Mild Pain (1 - 3)      Allergies    erythromycin (Other)  phenothiazines (Unknown)    Intolerances        SOCIAL HISTORY: No illicit drug use    FAMILY HISTORY:  No pertinent family history in first degree relatives        Vital Signs Last 24 Hrs  T(C): 37.1 (2021 12:29), Max: 38.2 (2021 21:14)  T(F): 98.7 (2021 12:29), Max: 100.8 (2021 21:14)  HR: 92 (2021 12:29) (84 - 113)  BP: 135/88 (2021 12:29) (123/67 - 141/81)  RR: 18 (2021 12:29) (18 - 19)    Physical Exam:   Constitutional: NAD, well-developed, Patient lying comfortably in bed   HEENT: EOMI  Neck: no pain  Back: No CVA tenderness b/l   Respiratory: No accessory respiratory muscle use  Abd: Soft, NT/ND  no organomegally  no hernia  :  16 Fr Packer in place draining clear yellow urine, 150cc urine, with Stat Lock on Left leg. No meatal discharge noted   Extremities: no edema  Skin: No rashes    Labs                        13.3   9.35  )-----------( 115      ( 2021 06:40 )             39.7     2021 06:40    133    |  97     |  12     ----------------------------<  85     4.1     |  25     |  0.8      Ca    9.0        2021 06:40        Urinalysis Basic - ( 2021 11:30 )    Color: Yellow / Appearance: Slightly Turbid / S.014 / pH: x  Gluc: x / Ketone: Trace  / Bili: Negative / Urobili: 3 mg/dL   Blood: x / Protein: Trace / Nitrite: Negative   Leuk Esterase: Negative / RBC: 2 /HPF / WBC 1 /HPF   Sq Epi: x / Non Sq Epi: 0 /HPF / Bacteria: Negative      I&O's Detail    2021 07:01  -  2021 07:00  --------------------------------------------------------  IN:    Oral Fluid: 180 mL  Total IN: 180 mL    OUT:    Voided (mL): 800 mL  Total OUT: 800 mL    Total NET: -620 mL      2021 07:01  -  2021 13:38  --------------------------------------------------------  IN:    Oral Fluid: 300 mL  Total IN: 300 mL    OUT:  Total OUT: 0 mL    Total NET: 300 mL          RADIOLOGY/IMAGING:   < from: Xray Kidney Ureter Bladder (21 @ 14:57) >  EXAM:  XR KUB 1 VIEW            PROCEDURE DATE:  2021            INTERPRETATION:  CLINICAL HISTORY/REASON FOR EXAM: Abdominal pain, assess colon distention    COMPARISON: Abdominal radiograph dated 12/3/2015    TECHNIQUE: KUB, 2 radiograph    FINDINGS:    No definite dilated small bowel is identified. There is gas and stool filled colon distended up to 7-8 cm. Moderate colonic stool in the right colon. Limited evaluation for free air on supine radiograph, although no large free air is identified..    Degenerative changes and compression deformities of the spine.    IMPRESSION:    Stool and air distended colon to 8 cm. No evidence of small bowel distention.    --- End of Report ---            RYANN SANTOS DO; Resident Radiologist  This document has been electronically signed.  KARLEY JONES MD; Attending Radiologist  This document has been electronically signed. 2021  8:53AM    < end of copied text >

## 2021-07-21 NOTE — PROGRESS NOTE ADULT - SUBJECTIVE AND OBJECTIVE BOX
Patient is a 50y old  Male who presents with a chief complaint of CAP, fall, hyponatremia, hypochloremia, chest pain, inability to ambulate w/ foot pain (2021 09:47)      Patient seen and examined at bedside.  Patient reports chest pain at approximately T2 that worsens with palpation, denies any shortness of breath.   ALLERGIES:  erythromycin (Other)  phenothiazines (Unknown)    MEDICATIONS:  acetaminophen   Tablet .. 650 milliGRAM(s) Oral every 6 hours PRN  benztropine 1 milliGRAM(s) Oral two times a day  buPROPion XL (24-Hour) . 150 milliGRAM(s) Oral daily  chlorhexidine 4% Liquid 1 Application(s) Topical <User Schedule>  diVALproex  milliGRAM(s) Oral three times a day  enoxaparin Injectable 40 milliGRAM(s) SubCutaneous at bedtime  haloperidol     Tablet 10 milliGRAM(s) Oral three times a day  levothyroxine 100 MICROGram(s) Oral daily  meropenem  IVPB 1000 milliGRAM(s) IV Intermittent every 8 hours  mirtazapine 15 milliGRAM(s) Oral at bedtime  sodium chloride 0.9%. 1000 milliLiter(s) IV Continuous <Continuous>    Vital Signs Last 24 Hrs  T(F): 98.7 (2021 12:29), Max: 100.8 (2021 21:14)  HR: 92 (2021 12:29) (84 - 113)  BP: 135/88 (2021 12:29) (123/67 - 135/88)  RR: 18 (2021 12:29) (18 - 18)  SpO2: --  I&O's Summary    2021 07:01  -  2021 07:00  --------------------------------------------------------  IN: 180 mL / OUT: 800 mL / NET: -620 mL    2021 07:01  -  2021 13:43  --------------------------------------------------------  IN: 300 mL / OUT: 0 mL / NET: 300 mL        PHYSICAL EXAM:  General: NAD, A/O x 3  ENT: MMM  Neck: Supple, No JVD  Lungs: Clear to auscultation bilaterally  Cardio: RRR, S1/S2, No murmurs, pain on palpation of T2 intrarib and rib bilateral  Abdomen: Soft, Nontender, mild distended, distended bladder; Bowel sounds present  Extremities: No cyanosis, No edema    LABS:                        13.3   9.35  )-----------( 115      ( 2021 06:40 )             39.7         133  |  97  |  12  ----------------------------<  85  4.1   |  25  |  0.8    Ca    9.0      2021 06:40    TPro  7.7  /  Alb  4.2  /  TBili  0.7  /  DBili  x   /  AST  36  /  ALT  14  /  AlkPhos  100  07-19    eGFR if Non African American: 103 mL/min/1.73M2 (21 @ 06:40)  eGFR if African American: 120 mL/min/1.73M2 (21 @ 06:40)        CARDIAC MARKERS ( 2021 06:40 )  x     / <0.01 ng/mL / x     / x     / x      CARDIAC MARKERS ( 2021 01:03 )  x     / <0.01 ng/mL / x     / x     / x      CARDIAC MARKERS ( 2021 16:00 )  x     / <0.01 ng/mL / x     / x     / x      CARDIAC MARKERS ( 2021 11:30 )  x     / <0.01 ng/mL / x     / x     / x          07-20 Chol 116 mg/dL LDL -- HDL 53 mg/dL Trig 70 mg/dL  TSH 5.96   TSH with FT4 reflex --  Total T3 --                  Urinalysis Basic - ( 2021 11:30 )    Color: Yellow / Appearance: Slightly Turbid / S.014 / pH: x  Gluc: x / Ketone: Trace  / Bili: Negative / Urobili: 3 mg/dL   Blood: x / Protein: Trace / Nitrite: Negative   Leuk Esterase: Negative / RBC: 2 /HPF / WBC 1 /HPF   Sq Epi: x / Non Sq Epi: 0 /HPF / Bacteria: Negative        COVID-19 PCR: NotDetec (21 @ 12:20)      RADIOLOGY & ADDITIONAL TESTS:    Care Discussed with Consultants/Other Providers:

## 2021-07-21 NOTE — CONSULT NOTE ADULT - ASSESSMENT
51 yo M with PMH of intellectual disability, hypothyroidism, seizure disorder (on Depakote), Bipolar disorder, schizophrenia, Hx of Urinary Retention with urethral stricture s/p Urethroplasty (in [place] by Dr. Izabella Rojas on 6/25)  presenting from [facility] s/p mechanical fall yesterday and an episode of chest pain this morning-  c/s for H/o ?Urethral stricture. Cr 0.8, UA negative,     Plan:   - 16 fr Long in place by RN draining clear yellow urine, 150cc noted in Long bag   - Continue Long Care   - F/u UCx   - No further acute  intervention indicated at this time  - Start flomax if not contraindicated  - D/c long when no longer medically necessary,   - Obtain Bladder US with PVR to evaluate retention after d/c long   - TOV when ambulating 150 ft or greater - if patient fails TOV will need to consider Long vs. CIC  and have patient f/u OP w/ private urologist  for further urologic management  - Discussed with [facility] Nurse and Healthaide,  that Long and CIC was not an option at [facility] home, due to nursing staff unequipped & inexperienced in taking care of Long/CIC. If patient fails TOV, may need to be placed in Community Home with ability to take care of catheter.   - PT eval   - Cont care per primary team  - Will d/w attending    51 yo M with PMH of intellectual disability, hypothyroidism, seizure disorder (on Depakote), Bipolar disorder, schizophrenia, Hx of Urinary Retention with urethral stricture s/p Urethroplasty (in [place] by Dr. Izabella Rojas on 6/25)  presenting from [facility] s/p mechanical fall yesterday and an episode of chest pain this morning-  c/s for H/o ?Urethral stricture. Cr 0.8, UA negative,     Plan:   - 16 fr Long in place by RN draining clear yellow urine, 150cc noted in Long bag   - Continue Long Care   - F/u UCx   - No further acute  intervention indicated at this time  - Start flomax if not contraindicated  - D/c long when no longer medically necessary,   - Obtain Bladder US with PVR to evaluate retention after d/c long   - TOV when ambulating 150 ft or greater - if patient fails TOV will need to consider Long  and have patient f/u OP w/ private urologist  for further urologic management  - Discussed with [facility] Home Nurse and Healthaide,  that Long and CIC was not an option at [facility], due to nursing staff unequipped & inexperienced in taking care of Long/CIC. If patient fails TOV, may need to be placed in Community Home with ability to take care of catheter.   - PT eval   - Cont care per primary team  - Will d/w attending

## 2021-07-21 NOTE — PROGRESS NOTE ADULT - ASSESSMENT
The patient is a 50 year-old male with a PMH of intellectual disability, hypothyroidism, seizure disorder (on Depakote), bipolar disorder, schizophrenia, presenting s/p mechanical fall yesterday and an episode of chest pain this morning. his chest pain is reproducible by palpation, it goes more with costochondritis. in ED he was started on levofloxacin for CAP which was stopped for suspicion of pneumonitis instead of a CAP and he will be monitored off antibiotics. speech and swallow eval was done to rule out aspirations ->he was cleared for regular diet. patient had an episode of fever associated with urinary retention (s/p dfailed straight cath but resolved by itself). history of ureter lengthening procedure 1 month ago?. urology eval needed. pancultures will be taken and patient will be reinstated on antibiotics    # Chest pain, reproducible since yesterday, possibly secondary to mechanical fall vs community acquired pneumonia  WBC 15; afebrile since at home; CXR showing bibasilar atelectasis w/ RML opacity; EKG w/ no evidence of ischemia  S/p Levaquin 750 mg IV x 1 in ED -> stopped (07/20)  - doesnt seem cardiac  - negative trop 3x  - no ekg changes  - dc telemetry    #fever 100.8  #urinary retention  - h/o of urological procedure 1 month ago due to episodes of urinary retention (lengthening of the ureter?)   - repeat bladder scan  - take UA UCx BCx  - start antibiotcs    # Possible aspiration of food  Patient talking in complete sentences though coughing intermittently; SpO2 %  Seen by ENT (Dr. Kathleen); no evidence of upper airway obstruction  F/u repeat CXR -> no evidence of aspiration  Speech/Swallow recommendations -> regular diet      # H/o seizures  Per group home staff, no witnessed seizure episode w/in last ~4 years  C/w Depakote 500 mg TID    # Hypothyroidism  C/w Synthroid 100 mcg once daily    # CHG  # GI ppx- not indicated  # DVT ppx- Lovenox 40 mg SQ qHS  # Activity- ambulate w/ assistance  # Diet- regular   The patient is a 50 year-old male with a PMH of intellectual disability, hypothyroidism, seizure disorder (on Depakote), bipolar disorder, schizophrenia, presenting s/p mechanical fall yesterday and an episode of chest pain this morning. his chest pain is reproducible by palpation, it goes more with costochondritis. in ED he was started on levofloxacin for CAP which was stopped for suspicion of pneumonitis instead of a CAP and he will be monitored off antibiotics. speech and swallow eval was done to rule out aspirations ->he was cleared for regular diet. patient had an episode of fever associated with urinary retention (s/p failed straight cath but resolved by itself). history of ureter lengthening procedure 1 month ago?. urology eval needed. pancultures will be taken and patient will be reinstated on antibiotics    # Chest pain, reproducible since yesterday, possibly secondary to mechanical fall vs community acquired pneumonia  WBC 15; afebrile since at home; CXR showing bibasilar atelectasis w/ RML opacity; EKG w/ no evidence of ischemia  S/p Levaquin 750 mg IV x 1 in ED -> stopped (07/20)  - doesnt seem cardiac  - negative trop 3x  - no ekg changes  - dc telemetry    #fever 100.8  #urinary retention  - h/o of urological procedure 1 month ago due to episodes of urinary retention (lengthening of the ureter?)   - repeat bladder scan  - take UA UCx BCx  - start antibiotcs (meropenem)  - pending uro eval    #distended bowel on KUB  - abdomen soft  - no stools since 3 days  - might be related to constipation  - will follow    # Possible aspiration of food  Patient talking in complete sentences though coughing intermittently; SpO2 %  Seen by ENT (Dr. Kathleen); no evidence of upper airway obstruction  F/u repeat CXR -> no evidence of aspiration  Speech/Swallow recommendations -> regular diet      # H/o seizures  Per group home staff, no witnessed seizure episode w/in last ~4 years  C/w Depakote 500 mg TID    # Hypothyroidism  C/w Synthroid 100 mcg once daily    # CHG  # GI ppx- not indicated  # DVT ppx- Lovenox 40 mg SQ qHS  # Activity- ambulate w/ assistance  # Diet- regular

## 2021-07-21 NOTE — CONSULT NOTE ADULT - ATTENDING COMMENTS
Scope exam wnl   no foreign body   plan per medicine
pt seen and examined. potentially he was not actually in retention and his "high bladder volume' was behavioral.   recommend trial of void and bladder sono w PVR  after voids/ambulating etc.  fu w pts urologist as outpatient

## 2021-07-22 LAB
CULTURE RESULTS: NO GROWTH — SIGNIFICANT CHANGE UP
SPECIMEN SOURCE: SIGNIFICANT CHANGE UP

## 2021-07-22 PROCEDURE — 99233 SBSQ HOSP IP/OBS HIGH 50: CPT

## 2021-07-22 RX ORDER — SENNA PLUS 8.6 MG/1
2 TABLET ORAL AT BEDTIME
Refills: 0 | Status: DISCONTINUED | OUTPATIENT
Start: 2021-07-22 | End: 2021-07-23

## 2021-07-22 RX ORDER — TAMSULOSIN HYDROCHLORIDE 0.4 MG/1
0.4 CAPSULE ORAL AT BEDTIME
Refills: 0 | Status: DISCONTINUED | OUTPATIENT
Start: 2021-07-22 | End: 2021-07-27

## 2021-07-22 RX ORDER — POLYETHYLENE GLYCOL 3350 17 G/17G
17 POWDER, FOR SOLUTION ORAL
Refills: 0 | Status: DISCONTINUED | OUTPATIENT
Start: 2021-07-22 | End: 2021-07-27

## 2021-07-22 RX ADMIN — HALOPERIDOL DECANOATE 10 MILLIGRAM(S): 100 INJECTION INTRAMUSCULAR at 05:41

## 2021-07-22 RX ADMIN — DIVALPROEX SODIUM 500 MILLIGRAM(S): 500 TABLET, DELAYED RELEASE ORAL at 05:41

## 2021-07-22 RX ADMIN — Medication 1 MILLIGRAM(S): at 17:33

## 2021-07-22 RX ADMIN — DIVALPROEX SODIUM 500 MILLIGRAM(S): 500 TABLET, DELAYED RELEASE ORAL at 23:34

## 2021-07-22 RX ADMIN — MIRTAZAPINE 15 MILLIGRAM(S): 45 TABLET, ORALLY DISINTEGRATING ORAL at 23:34

## 2021-07-22 RX ADMIN — HALOPERIDOL DECANOATE 10 MILLIGRAM(S): 100 INJECTION INTRAMUSCULAR at 23:34

## 2021-07-22 RX ADMIN — TAMSULOSIN HYDROCHLORIDE 0.4 MILLIGRAM(S): 0.4 CAPSULE ORAL at 23:34

## 2021-07-22 RX ADMIN — POLYETHYLENE GLYCOL 3350 17 GRAM(S): 17 POWDER, FOR SOLUTION ORAL at 09:49

## 2021-07-22 RX ADMIN — BUPROPION HYDROCHLORIDE 150 MILLIGRAM(S): 150 TABLET, EXTENDED RELEASE ORAL at 11:12

## 2021-07-22 RX ADMIN — CHLORHEXIDINE GLUCONATE 1 APPLICATION(S): 213 SOLUTION TOPICAL at 05:41

## 2021-07-22 RX ADMIN — ENOXAPARIN SODIUM 40 MILLIGRAM(S): 100 INJECTION SUBCUTANEOUS at 23:34

## 2021-07-22 RX ADMIN — HALOPERIDOL DECANOATE 10 MILLIGRAM(S): 100 INJECTION INTRAMUSCULAR at 13:47

## 2021-07-22 RX ADMIN — Medication 100 MICROGRAM(S): at 05:40

## 2021-07-22 RX ADMIN — TAMSULOSIN HYDROCHLORIDE 0.4 MILLIGRAM(S): 0.4 CAPSULE ORAL at 12:12

## 2021-07-22 RX ADMIN — POLYETHYLENE GLYCOL 3350 17 GRAM(S): 17 POWDER, FOR SOLUTION ORAL at 17:39

## 2021-07-22 RX ADMIN — DIVALPROEX SODIUM 500 MILLIGRAM(S): 500 TABLET, DELAYED RELEASE ORAL at 13:47

## 2021-07-22 RX ADMIN — Medication 1 MILLIGRAM(S): at 05:41

## 2021-07-22 RX ADMIN — SENNA PLUS 2 TABLET(S): 8.6 TABLET ORAL at 23:34

## 2021-07-22 NOTE — PROGRESS NOTE ADULT - ASSESSMENT
50 year-old male with a PMH of intellectual disability, hypothyroidism, seizure disorder (on Depakote), bipolar disorder, schizophrenia, presenting s/p mechanical fall yesterday and an episode of chest pain this morning, admitted for further w/u.    #Chest pain, ACS ruled out  Reproducible, consistent with costochondritis  Night of 7/20 patient did have elevated temperature    CXR (07/19): Mild bibasal opacities/atelectasis  Troponins negative x4  - f/u S/S eval  - ID consultation appreciated, no current concern for infection   Has been monitored on telemetry without dysrhythmia for over 24 hours    #Urinary retention  - Flomax started 7/22  - long placed 7/21 and removed 7/22  - urology consult appreciated  - monitor for urine output      #Left colon distension  Seen on CXR  - supp. ordered    #Hypothyroidism  - Cont levothyroxine 100mcg qD    #Seizures disorder  - Cont depakote  TID    #Bipolar  #Schizophrenia  - Cont haldol 10mg ID  - Cont bupropion XL 150mg qD  - COnt mirtazapine 15mg qHs  - Cont benztropine 1mg BID    DVT PPX, Lovenox    #Progress Note Handoff  Pending (specify): S/S VENESSA mota  Family discussion: d/w caretaker at bedside. Pt is [facility] of [facility], discussed case with charge nurse at [facility] ([phone])  Disposition:  if patient is able to void, plan on dc 7/23

## 2021-07-22 NOTE — PROGRESS NOTE ADULT - SUBJECTIVE AND OBJECTIVE BOX
Patient is a 50y old  Male who presents with a chief complaint of CAP, fall, hyponatremia, hypochloremia, chest pain, inability to ambulate w/ foot pain (2021 09:33)      Patient seen and examined at bedside.  Patient denies any chest pain or shortness of breath.     ALLERGIES:  erythromycin (Other)  phenothiazines (Unknown)    MEDICATIONS:  acetaminophen   Tablet .. 650 milliGRAM(s) Oral every 6 hours PRN  benztropine 1 milliGRAM(s) Oral two times a day  bisacodyl Suppository 10 milliGRAM(s) Rectal daily PRN  buPROPion XL (24-Hour) . 150 milliGRAM(s) Oral daily  chlorhexidine 4% Liquid 1 Application(s) Topical <User Schedule>  diVALproex  milliGRAM(s) Oral three times a day  enoxaparin Injectable 40 milliGRAM(s) SubCutaneous at bedtime  haloperidol     Tablet 10 milliGRAM(s) Oral three times a day  levothyroxine 100 MICROGram(s) Oral daily  mirtazapine 15 milliGRAM(s) Oral at bedtime  polyethylene glycol 3350 17 Gram(s) Oral two times a day  senna 2 Tablet(s) Oral at bedtime  sodium chloride 0.9%. 1000 milliLiter(s) IV Continuous <Continuous>  tamsulosin 0.4 milliGRAM(s) Oral at bedtime    Vital Signs Last 24 Hrs  T(F): 98.9 (2021 13:54), Max: 98.9 (2021 13:54)  HR: 97 (2021 13:54) (94 - 101)  BP: 126/84 (2021 13:54) (126/84 - 154/70)  RR: 18 (2021 13:54) (18 - 18)  SpO2: --  I&O's Summary    2021 07:01  -  2021 07:00  --------------------------------------------------------  IN: 1300 mL / OUT: 975 mL / NET: 325 mL    2021 07:01  -  2021 15:39  --------------------------------------------------------  IN: 400 mL / OUT: 1300 mL / NET: -900 mL        PHYSICAL EXAM:  General: NAD, A/O x 3  ENT: MMM  Neck: Supple, No JVD  Lungs: Clear to auscultation bilaterally  Cardio: RRR, S1/S2, No murmurs  Abdomen: Soft, Nontender, Nondistended; Bowel sounds present  Extremities: No cyanosis, No edema    LABS:                        13.3   9.35  )-----------( 115      ( 2021 06:40 )             39.7         133  |  97  |  12  ----------------------------<  85  4.1   |  25  |  0.8    Ca    9.0      2021 06:40      eGFR if Non African American: 103 mL/min/1.73M2 (21 @ 06:40)  eGFR if African American: 120 mL/min/1.73M2 (21 @ 06:40)        CARDIAC MARKERS ( 2021 06:40 )  x     / <0.01 ng/mL / x     / x     / x      CARDIAC MARKERS ( 2021 01:03 )  x     / <0.01 ng/mL / x     / x     / x      CARDIAC MARKERS ( 2021 16:00 )  x     / <0.01 ng/mL / x     / x     / x          07-20 Chol 116 mg/dL LDL -- HDL 53 mg/dL Trig 70 mg/dL  TSH 5.96   TSH with FT4 reflex --  Total T3 --                  Urinalysis Basic - ( 2021 11:30 )    Color: Yellow / Appearance: Slightly Turbid / S.014 / pH: x  Gluc: x / Ketone: Trace  / Bili: Negative / Urobili: 3 mg/dL   Blood: x / Protein: Trace / Nitrite: Negative   Leuk Esterase: Negative / RBC: 2 /HPF / WBC 1 /HPF   Sq Epi: x / Non Sq Epi: 0 /HPF / Bacteria: Negative        Culture - Urine (collected 2021 11:30)  Source: Catheterized Catheterized  Final Report (2021 12:51):    No growth      COVID-19 PCR: NotDetec (21 @ 12:20)      RADIOLOGY & ADDITIONAL TESTS:

## 2021-07-22 NOTE — CONSULT NOTE ADULT - SUBJECTIVE AND OBJECTIVE BOX
VIK ALINA  50y, Male  Allergy: erythromycin (Other)  phenothiazines (Unknown)      CHIEF COMPLAINT:   CAP, fall, hyponatremia, hypochloremia, chest pain, inability to ambulate w/ foot pain (2021 13:42)      LOS  3d    HPI  HPI:  The patient is a 50 year-old male with a PMH of intellectual disability, hypothyroidism, seizure disorder (on Depakote), bipolar disorder, schizophrenia, presenting from [facility] s/p mechanical fall yesterday and an episode of chest pain this morning. Per custodial staff ([place]), the fall was unwitnessed but she believes he was walking with his walker and fell on his left ankle; when he was found he was conscious, oriented; there was no sign of loss of control of his bowels or bladder and he was not exhibiting tremors. This morning, per staff, around 9 AM he was complaining of chest pain while sitting; it has persisted since this time; he reports it is worse when pressing on his ribcage and with inspiration; he denied any associated shortness of breath or dizziness at the time. Patient denies any recent burning on urination, abdominal discomfort, nausea, vomiting or other symptoms.  (2021 14:24)      INFECTIOUS DISEASE HISTORY:  ID consulted for fever and rule out PNA vs UTI    PMH  PAST MEDICAL & SURGICAL HISTORY:  Epilepsy  last event 4+ yrs ago    Schizophrenia    Bipolar 1 disorder    Major depressive disorder    Anemia    Dyspepsia    Hypothyroidism    Constipation    MR (mental retardation)    H/O cystoscopy    H/O laminectomy  2001    H/O fracture of fibula  nondisplaced 2010        FAMILY HISTORY  No pertinent family history in first degree relatives        SOCIAL HISTORY  Social History:  negative *3   from custodial (2019 21:07)        ROS  ***    VITALS:  T(F): 98, Max: 98.7 (21 @ 12:29)  HR: 94  BP: 133/89  RR: 18Vital Signs Last 24 Hrs  T(C): 36.7 (2021 05:25), Max: 37.1 (2021 12:29)  T(F): 98 (2021 05:25), Max: 98.7 (2021 12:29)  HR: 94 (2021 05:25) (92 - 101)  BP: 133/89 (2021 05:25) (133/89 - 154/70)  BP(mean): --  RR: 18 (2021 05:25) (18 - 18)  SpO2: --    PHYSICAL EXAM:  ***    TESTS & MEASUREMENTS:                        13.3   9.35  )-----------( 115      ( 2021 06:40 )             39.7         133<L>  |  97<L>  |  12  ----------------------------<  85  4.1   |  25  |  0.8    Ca    9.0      2021 06:40          Urinalysis Basic - ( 2021 11:30 )    Color: Yellow / Appearance: Slightly Turbid / S.014 / pH: x  Gluc: x / Ketone: Trace  / Bili: Negative / Urobili: 3 mg/dL   Blood: x / Protein: Trace / Nitrite: Negative   Leuk Esterase: Negative / RBC: 2 /HPF / WBC 1 /HPF   Sq Epi: x / Non Sq Epi: 0 /HPF / Bacteria: Negative              INFECTIOUS DISEASES TESTING  Procalcitonin, Serum: 0.11 ng/mL (21 @ 10:49)  Procalcitonin, Serum: 0.10 ng/mL (21 @ 06:40)  COVID-19 PCR: NotDetec (21 @ 12:20)      INFLAMMATORY MARKERS  C-Reactive Protein, Serum: 117 mg/L (21 @ 06:40)      RADIOLOGY & ADDITIONAL TESTS:  I have personally reviewed the last Chest xray  CXR  Xray Chest 1 View- PORTABLE-Urgent:   EXAM:  XR CHEST PORTABLE URGENT 1V            PROCEDURE DATE:  2021            INTERPRETATION:  STUDY INDICATION: screen for obstruction    TECHNIQUE:  Portable frontal view of the chest obtained.    COMPARISON: 2021.    FINDINGS/  IMPRESSION:    Stable cardiomediastinal silhouette.    Mild bibasal opacities/atelectasis. No pneumothorax. Redemonstrated gaseous distention of the left colon.    Unchanged osseous structures.    --- End of Report ---              CLEMENTE JOEL MD; Attending Radiologist  This document has been electronically signed. 2021  9:56AM (21 @ 20:37)      CT      CARDIOLOGY TESTING  12 Lead ECG:   Ventricular Rate 93 BPM    Atrial Rate 93 BPM    P-R Interval 130 ms    QRS Duration 82 ms    Q-T Interval 334 ms    QTC Calculation(Bazett) 415 ms    P Axis 22 degrees    R Axis -23 degrees    T Axis 62 degrees    Diagnosis Line Normal sinus rhythm  Baseline artifact  Nonspecific T wave abnormality  Left axis deviation    Confirmed by CANDIE BLANCO MD ([id number]) on 2021 12:40:19 PM (21 @ 11:04)      MEDICATIONS  benztropine 1 Oral two times a day  buPROPion XL (24-Hour) . 150 Oral daily  chlorhexidine 4% Liquid 1 Topical <User Schedule>  diVALproex  Oral three times a day  enoxaparin Injectable 40 SubCutaneous at bedtime  haloperidol     Tablet 10 Oral three times a day  levothyroxine 100 Oral daily  mirtazapine 15 Oral at bedtime  sodium chloride 0.9%. 1000 IV Continuous <Continuous>        ANTIBIOTICS:      ALLERGIES:  erythromycin (Other)  phenothiazines (Unknown)         VIK ALINA  50y, Male  Allergy: erythromycin (Other)  phenothiazines (Unknown)      CHIEF COMPLAINT:   CAP, fall, hyponatremia, hypochloremia, chest pain, inability to ambulate w/ foot pain (2021 13:42)      LOS  3d    HPI  HPI:  The patient is a 50 year-old male with a PMH of intellectual disability, hypothyroidism, seizure disorder (on Depakote), bipolar disorder, schizophrenia, presenting from [facility] s/p mechanical fall yesterday and an episode of chest pain this morning. Per [facility] staff ([place]), the fall was unwitnessed but she believes he was walking with his walker and fell on his left ankle; when he was found he was conscious, oriented; there was no sign of loss of control of his bowels or bladder and he was not exhibiting tremors. This morning, per staff, around 9 AM he was complaining of chest pain while sitting; it has persisted since this time; he reports it is worse when pressing on his ribcage and with inspiration; he denied any associated shortness of breath or dizziness at the time. Patient denies any recent burning on urination, abdominal discomfort, nausea, vomiting or other symptoms.  (2021 14:24)      INFECTIOUS DISEASE HISTORY:  ID consulted for fever and rule out PNA vs UTI  cannot obtain further history from the patient secondary to altered mental status or sedation     PMH  PAST MEDICAL & SURGICAL HISTORY:  Epilepsy  last event 4+ yrs ago    Schizophrenia    Bipolar 1 disorder    Major depressive disorder    Anemia    Dyspepsia    Hypothyroidism    Constipation    MR (mental retardation)    H/O cystoscopy    H/O laminectomy  2001    H/O fracture of fibula  nondisplaced 2010        FAMILY HISTORY  No pertinent family history in first degree relatives        SOCIAL HISTORY  Social History:  negative *3   from [facility] (2019 21:07)        ROS  unable to obtain history secondary to patient's mental status and/or sedation     VITALS:  T(F): 98, Max: 98.7 (21 @ 12:29)  HR: 94  BP: 133/89  RR: 18Vital Signs Last 24 Hrs  T(C): 36.7 (2021 05:25), Max: 37.1 (2021 12:29)  T(F): 98 (2021 05:25), Max: 98.7 (2021 12:29)  HR: 94 (2021 05:25) (92 - 101)  BP: 133/89 (2021 05:25) (133/89 - 154/70)  BP(mean): --  RR: 18 (2021 05:25) (18 - 18)  SpO2: --    PHYSICAL EXAM:  Gen: NAD, resting in bed  HEENT: Normocephalic, atraumatic  Neck: supple, no lymphadenopathy  CV: Regular rate & regular rhythm  Lungs: decreased BS at bases, no fremitus  Abdomen: Soft, BS present  Ext: Warm, well perfused  Neuro: non focal, awake  Skin: no rash, no erythema  Lines: no phlebitis   long  TESTS & MEASUREMENTS:                        13.3   9.35  )-----------( 115      ( 2021 06:40 )             39.7         133<L>  |  97<L>  |  12  ----------------------------<  85  4.1   |  25  |  0.8    Ca    9.0      2021 06:40          Urinalysis Basic - ( 2021 11:30 )    Color: Yellow / Appearance: Slightly Turbid / S.014 / pH: x  Gluc: x / Ketone: Trace  / Bili: Negative / Urobili: 3 mg/dL   Blood: x / Protein: Trace / Nitrite: Negative   Leuk Esterase: Negative / RBC: 2 /HPF / WBC 1 /HPF   Sq Epi: x / Non Sq Epi: 0 /HPF / Bacteria: Negative              INFECTIOUS DISEASES TESTING  Procalcitonin, Serum: 0.11 ng/mL (21 @ 10:49)  Procalcitonin, Serum: 0.10 ng/mL (21 @ 06:40)  COVID-19 PCR: NotDetec (21 @ 12:20)      INFLAMMATORY MARKERS  C-Reactive Protein, Serum: 117 mg/L (21 @ 06:40)      RADIOLOGY & ADDITIONAL TESTS:  I have personally reviewed the last Chest xray  CXR  Xray Chest 1 View- PORTABLE-Urgent:   EXAM:  XR CHEST PORTABLE URGENT 1V            PROCEDURE DATE:  2021            INTERPRETATION:  STUDY INDICATION: screen for obstruction    TECHNIQUE:  Portable frontal view of the chest obtained.    COMPARISON: 2021.    FINDINGS/  IMPRESSION:    Stable cardiomediastinal silhouette.    Mild bibasal opacities/atelectasis. No pneumothorax. Redemonstrated gaseous distention of the left colon.    Unchanged osseous structures.    --- End of Report ---              CLEMENTE JOEL MD; Attending Radiologist  This document has been electronically signed. 2021  9:56AM (21 @ 20:37)      CT      CARDIOLOGY TESTING  12 Lead ECG:   Ventricular Rate 93 BPM    Atrial Rate 93 BPM    P-R Interval 130 ms    QRS Duration 82 ms    Q-T Interval 334 ms    QTC Calculation(Bazett) 415 ms    P Axis 22 degrees    R Axis -23 degrees    T Axis 62 degrees    Diagnosis Line Normal sinus rhythm  Baseline artifact  Nonspecific T wave abnormality  Left axis deviation    Confirmed by FRANCIS SILVEIRA, CANDIE ([id number]) on 2021 12:40:19 PM (21 @ 11:04)      MEDICATIONS  benztropine 1 Oral two times a day  buPROPion XL (24-Hour) . 150 Oral daily  chlorhexidine 4% Liquid 1 Topical <User Schedule>  diVALproex  Oral three times a day  enoxaparin Injectable 40 SubCutaneous at bedtime  haloperidol     Tablet 10 Oral three times a day  levothyroxine 100 Oral daily  mirtazapine 15 Oral at bedtime  sodium chloride 0.9%. 1000 IV Continuous <Continuous>        ANTIBIOTICS:      ALLERGIES:  erythromycin (Other)  phenothiazines (Unknown)

## 2021-07-22 NOTE — PROGRESS NOTE ADULT - SUBJECTIVE AND OBJECTIVE BOX
ALINA CHERY 50y Male  MRN#: [id number]   CODE STATUS:________    Hospital Day: 3d    Pt is currently admitted with the primary diagnosis of     SUBJECTIVE    No Overnight events     No Subjective complaints     patient seen and examined    Present Today:   - Birdie:   Yes : Indication: retention                                              ----------------------------------------------------------  OBJECTIVE  PAST MEDICAL & SURGICAL HISTORY  Epilepsy  last event 4+ yrs ago    Schizophrenia    Bipolar 1 disorder    Major depressive disorder    Anemia    Dyspepsia    Hypothyroidism    Constipation    MR (mental retardation)    H/O cystoscopy    H/O laminectomy  2001    H/O fracture of fibula  nondisplaced 2010                                              -----------------------------------------------------------  ALLERGIES:  erythromycin (Other)  phenothiazines (Unknown)                                            ------------------------------------------------------------    HOME MEDICATIONS  Home Medications:  benztropine 1 mg oral tablet: 1 tab(s) orally 2 times a day (08 Dec 2020 12:38)  buPROPion 150 mg/24 hours (XL) oral tablet, extended release: 1 tab(s) orally every 24 hours (08 Dec 2020 12:38)  haloperidol 10 mg oral tablet: 1 tab(s) orally 3 times a day (08 Dec 2020 12:38)  mirtazapine 15 mg oral tablet: mirtazapine 15mg tablet 1 tablet oral at bedtime (08 Dec 2020 12:38)  Senexon-S 50 mg-8.6 mg oral tablet: 1 tab(s) orally once a day (at bedtime) (08 Dec 2020 12:38)  Synthroid 100 mcg (0.1 mg) oral tablet: synthroid 100mcg tablet (levothyroxine 100 mcg tablet) 1 tablet oral every day (08 Dec 2020 12:38)                           MEDICATIONS:  STANDING MEDICATIONS  benztropine 1 milliGRAM(s) Oral two times a day  buPROPion XL (24-Hour) . 150 milliGRAM(s) Oral daily  chlorhexidine 4% Liquid 1 Application(s) Topical <User Schedule>  diVALproex  milliGRAM(s) Oral three times a day  enoxaparin Injectable 40 milliGRAM(s) SubCutaneous at bedtime  haloperidol     Tablet 10 milliGRAM(s) Oral three times a day  levothyroxine 100 MICROGram(s) Oral daily  mirtazapine 15 milliGRAM(s) Oral at bedtime  polyethylene glycol 3350 17 Gram(s) Oral two times a day  senna 2 Tablet(s) Oral at bedtime  sodium chloride 0.9%. 1000 milliLiter(s) IV Continuous <Continuous>  tamsulosin 0.4 milliGRAM(s) Oral at bedtime    PRN MEDICATIONS  acetaminophen   Tablet .. 650 milliGRAM(s) Oral every 6 hours PRN  bisacodyl Suppository 10 milliGRAM(s) Rectal daily PRN                                            ------------------------------------------------------------  VITAL SIGNS: Last 24 Hours  T(C): 36.7 (2021 05:25), Max: 37.1 (2021 12:29)  T(F): 98 (2021 05:25), Max: 98.7 (2021 12:29)  HR: 94 (2021 05:25) (92 - 101)  BP: 133/89 (2021 05:25) (133/89 - 154/70)  BP(mean): --  RR: 18 (2021 05:25) (18 - 18)  SpO2: --      21 @ 07:01  -  21 @ 07:00  --------------------------------------------------------  IN: 1300 mL / OUT: 975 mL / NET: 325 mL                                             --------------------------------------------------------------  LABS:                        13.3   9.35  )-----------( 115      ( 2021 06:40 )             39.7     07-    133<L>  |  97<L>  |  12  ----------------------------<  85  4.1   |  25  |  0.8    Ca    9.0      2021 06:40        Urinalysis Basic - ( 2021 11:30 )    Color: Yellow / Appearance: Slightly Turbid / S.014 / pH: x  Gluc: x / Ketone: Trace  / Bili: Negative / Urobili: 3 mg/dL   Blood: x / Protein: Trace / Nitrite: Negative   Leuk Esterase: Negative / RBC: 2 /HPF / WBC 1 /HPF   Sq Epi: x / Non Sq Epi: 0 /HPF / Bacteria: Negative                                                            -------------------------------------------------------------  RADIOLOGY:                                            --------------------------------------------------------------    PHYSICAL EXAM:    GENERAL: NAD, alert  CARD: S1, S2 normal; no murmurs, gallops, or rubs. Regular rate and rhythm.   RESP: No wheezes, rales or rhonchi.  ABD: Normal bowel sounds; soft; non-distended; non-tender; no hepatosplenomegaly.  EXT: no lower limb edema       ALINA CHERY 50y Male  MRN#: [id number]   CODE STATUS:________    Hospital Day: 3d    Pt is currently admitted with the primary diagnosis of fall    SUBJECTIVE    No Overnight events     No Subjective complaints     patient seen and examined    Present Today:   - Birdie:   Yes : Indication: retention                                              ----------------------------------------------------------  OBJECTIVE  PAST MEDICAL & SURGICAL HISTORY  Epilepsy  last event 4+ yrs ago    Schizophrenia    Bipolar 1 disorder    Major depressive disorder    Anemia    Dyspepsia    Hypothyroidism    Constipation    MR (mental retardation)    H/O cystoscopy    H/O laminectomy  2001    H/O fracture of fibula  nondisplaced 2010                                              -----------------------------------------------------------  ALLERGIES:  erythromycin (Other)  phenothiazines (Unknown)                                            ------------------------------------------------------------    HOME MEDICATIONS  Home Medications:  benztropine 1 mg oral tablet: 1 tab(s) orally 2 times a day (08 Dec 2020 12:38)  buPROPion 150 mg/24 hours (XL) oral tablet, extended release: 1 tab(s) orally every 24 hours (08 Dec 2020 12:38)  haloperidol 10 mg oral tablet: 1 tab(s) orally 3 times a day (08 Dec 2020 12:38)  mirtazapine 15 mg oral tablet: mirtazapine 15mg tablet 1 tablet oral at bedtime (08 Dec 2020 12:38)  Senexon-S 50 mg-8.6 mg oral tablet: 1 tab(s) orally once a day (at bedtime) (08 Dec 2020 12:38)  Synthroid 100 mcg (0.1 mg) oral tablet: synthroid 100mcg tablet (levothyroxine 100 mcg tablet) 1 tablet oral every day (08 Dec 2020 12:38)                           MEDICATIONS:  STANDING MEDICATIONS  benztropine 1 milliGRAM(s) Oral two times a day  buPROPion XL (24-Hour) . 150 milliGRAM(s) Oral daily  chlorhexidine 4% Liquid 1 Application(s) Topical <User Schedule>  diVALproex  milliGRAM(s) Oral three times a day  enoxaparin Injectable 40 milliGRAM(s) SubCutaneous at bedtime  haloperidol     Tablet 10 milliGRAM(s) Oral three times a day  levothyroxine 100 MICROGram(s) Oral daily  mirtazapine 15 milliGRAM(s) Oral at bedtime  polyethylene glycol 3350 17 Gram(s) Oral two times a day  senna 2 Tablet(s) Oral at bedtime  sodium chloride 0.9%. 1000 milliLiter(s) IV Continuous <Continuous>  tamsulosin 0.4 milliGRAM(s) Oral at bedtime    PRN MEDICATIONS  acetaminophen   Tablet .. 650 milliGRAM(s) Oral every 6 hours PRN  bisacodyl Suppository 10 milliGRAM(s) Rectal daily PRN                                            ------------------------------------------------------------  VITAL SIGNS: Last 24 Hours  T(C): 36.7 (2021 05:25), Max: 37.1 (2021 12:29)  T(F): 98 (2021 05:25), Max: 98.7 (2021 12:29)  HR: 94 (2021 05:25) (92 - 101)  BP: 133/89 (2021 05:25) (133/89 - 154/70)  BP(mean): --  RR: 18 (2021 05:25) (18 - 18)  SpO2: --      21 @ 07:01  -  21 @ 07:00  --------------------------------------------------------  IN: 1300 mL / OUT: 975 mL / NET: 325 mL                                             --------------------------------------------------------------  LABS:                        13.3   9.35  )-----------( 115      ( 2021 06:40 )             39.7     07-    133<L>  |  97<L>  |  12  ----------------------------<  85  4.1   |  25  |  0.8    Ca    9.0      2021 06:40        Urinalysis Basic - ( 2021 11:30 )    Color: Yellow / Appearance: Slightly Turbid / S.014 / pH: x  Gluc: x / Ketone: Trace  / Bili: Negative / Urobili: 3 mg/dL   Blood: x / Protein: Trace / Nitrite: Negative   Leuk Esterase: Negative / RBC: 2 /HPF / WBC 1 /HPF   Sq Epi: x / Non Sq Epi: 0 /HPF / Bacteria: Negative                                                            -------------------------------------------------------------  RADIOLOGY:                                            --------------------------------------------------------------    PHYSICAL EXAM:    GENERAL: NAD, alert  CARD: S1, S2 normal; no murmurs, gallops, or rubs. Regular rate and rhythm.   RESP: No wheezes, rales or rhonchi.  ABD: Normal bowel sounds; soft; non-distended; non-tender; no hepatosplenomegaly.  EXT: no lower limb edema

## 2021-07-22 NOTE — CONSULT NOTE ADULT - ASSESSMENT
ASSESSMENT  50 year-old male with a PMH of intellectual disability, hypothyroidism, seizure disorder (on Depakote), bipolar disorder, schizophrenia, presenting from [facility] home s/p mechanical fall yesterday and an episode of chest pain this morning admitted 7/19. ID consulted for fever    IMPRESSION  #  #Sepsis ruled out on admission , isolated fever .8 + tachy 7/20, resolved    7/21 UA WBC 1     CXR Mild bibasal opacities/atelectasis- no evidence of PNA    Procalcitonin, Serum: 0.11 ng/mL (07-20-21 @ 10:49)    C-Reactive Protein, Serum: 117 mg/L (07-20-21 @ 06:40)  #Hyponatremia    #Abx allergy: erythromycin (Other)      Weight (kg): 77.1 (07-19-21 @ 09:20)    RECOMMENDATIONS  This is an incomplete consult note. All final recommendations to follow after interview and examination of the patient. Please follow recommendations noted below.    If any questions, please call or send a message on [employer]  Please continue to update ID with any pertinent new laboratory or radiographic findings  Spectra [id number]   ASSESSMENT  50 year-old male with a PMH of intellectual disability, hypothyroidism, seizure disorder (on Depakote), bipolar disorder, schizophrenia, presenting from [facility] home s/p mechanical fall yesterday and an episode of chest pain this morning admitted 7/19. ID consulted for fever    IMPRESSION  #No evidence of active infection, possible aspiration  #Sepsis ruled out on admission , isolated fever .8 + tachy 7/20, resolved    7/21 UA WBC 1     CXR Mild bibasal opacities/atelectasis- no evidence of PNA    Procalcitonin, Serum: 0.11 ng/mL (07-20-21 @ 10:49)    C-Reactive Protein, Serum: 117 mg/L (07-20-21 @ 06:40)  #Hyponatremia    #Abx allergy: erythromycin (Other)      Weight (kg): 77.1 (07-19-21 @ 09:20)    RECOMMENDATIONS  - Monitor off abx    Please recall ID PRN    If any questions, please call or send a message on [employer] Teams  Please continue to update ID with any pertinent new laboratory or radiographic findings  Spectra [id number]

## 2021-07-22 NOTE — PROGRESS NOTE ADULT - ASSESSMENT
The patient is a 50 year-old male with a PMH of intellectual disability, hypothyroidism, seizure disorder (on Depakote), bipolar disorder, schizophrenia, presenting s/p mechanical fall yesterday and an episode of chest pain this morning. his chest pain is reproducible by palpation, it goes more with costochondritis. in ED he was started on levofloxacin for CAP which was stopped for suspicion of pneumonitis instead of a CAP and he will be monitored off antibiotics. speech and swallow eval was done to rule out aspirations ->he was cleared for regular diet. patient had an episode of fever associated with urinary retention (s/p failed straight cath but resolved by itself). history of ureter lengthening procedure 1 month ago?. urology eval needed. pancultures will be taken awaiting ID for antibiotics    # Chest pain, reproducible since yesterday, possibly secondary to mechanical fall vs community acquired pneumonia  WBC 15; afebrile since at home; CXR showing bibasilar atelectasis w/ RML opacity; EKG w/ no evidence of ischemia  S/p Levaquin 750 mg IV x 1 in ED -> stopped (07/20)  - doesnt seem cardiac  - negative trop 3x  - no ekg changes  - dc telemetry    #fever 100.8  #urinary retention  - h/o of urological procedure 1 month ago due to episodes of urinary retention (lengthening of the ureter?)   - repeat bladder scan  - take UA UCx BCx  - uro eval -> US bladder when removing long + flomax  - ID eval pending      #distended bowel on KUB  - abdomen soft  - no stools since 3 days  - miralax given      # Possible aspiration of food  Patient talking in complete sentences though coughing intermittently; SpO2 %  Seen by ENT (Dr. Kathleen); no evidence of upper airway obstruction  F/u repeat CXR -> no evidence of aspiration  Speech/Swallow recommendations -> regular diet      # H/o seizures  Per group home staff, no witnessed seizure episode w/in last ~4 years  C/w Depakote 500 mg TID    # Hypothyroidism  C/w Synthroid 100 mcg once daily    # CHG  # GI ppx- not indicated  # DVT ppx- Lovenox 40 mg SQ qHS  # Activity- ambulate w/ assistance  # Diet- regular         The patient is a 50 year-old male with a PMH of intellectual disability, hypothyroidism, seizure disorder (on Depakote), bipolar disorder, schizophrenia, presenting s/p mechanical fall yesterday and an episode of chest pain this morning. his chest pain is reproducible by palpation, it goes more with costochondritis. in ED he was started on levofloxacin for CAP which was stopped for suspicion of pneumonitis instead of a CAP and he will be monitored off antibiotics. speech and swallow eval was done to rule out aspirations ->he was cleared for regular diet. patient had an episode of fever associated with urinary retention (s/p failed straight cath but resolved by itself). history of ureter lengthening procedure 1 month ago?. urology eval needed. pancultures will be taken. overnight there was no fever the next day. ID recommended to monitor off antibiotics. long will be removed and bladder scan will be done to assess for PVR.    # Chest pain, reproducible since yesterday, possibly secondary to mechanical fall vs community acquired pneumonia  WBC 15; afebrile since at home; CXR showing bibasilar atelectasis w/ RML opacity; EKG w/ no evidence of ischemia  S/p Levaquin 750 mg IV x 1 in ED -> stopped (07/20)  - doesnt seem cardiac  - negative trop 3x  - no ekg changes  - dc telemetry    #fever 100.8  #urinary retention  - h/o of urological procedure 1 month ago due to episodes of urinary retention (lengthening of the ureter?)   - repeat bladder scan  - take UA UCx BCx  - uro eval -> US bladder when removing long + flomax  - ID eval -> monitor off antibiotics      #distended bowel on KUB  - abdomen soft  - no stools since 3 days  - miralax given      # Possible aspiration of food  Patient talking in complete sentences though coughing intermittently; SpO2 %  Seen by ENT (Dr. Kathleen); no evidence of upper airway obstruction  F/u repeat CXR -> no evidence of aspiration  Speech/Swallow recommendations -> regular diet      # H/o seizures  Per group home staff, no witnessed seizure episode w/in last ~4 years  C/w Depakote 500 mg TID    # Hypothyroidism  C/w Synthroid 100 mcg once daily    # CHG  # GI ppx- not indicated  # DVT ppx- Lovenox 40 mg SQ qHS  # Activity- ambulate w/ assistance  # Diet- regular

## 2021-07-23 ENCOUNTER — TRANSCRIPTION ENCOUNTER (OUTPATIENT)
Age: 51
End: 2021-07-23

## 2021-07-23 LAB
ANION GAP SERPL CALC-SCNC: 10 MMOL/L — SIGNIFICANT CHANGE UP (ref 7–14)
BASOPHILS # BLD AUTO: 0.05 K/UL — SIGNIFICANT CHANGE UP (ref 0–0.2)
BASOPHILS NFR BLD AUTO: 0.6 % — SIGNIFICANT CHANGE UP (ref 0–1)
BUN SERPL-MCNC: 10 MG/DL — SIGNIFICANT CHANGE UP (ref 10–20)
CALCIUM SERPL-MCNC: 8.6 MG/DL — SIGNIFICANT CHANGE UP (ref 8.5–10.1)
CHLORIDE SERPL-SCNC: 100 MMOL/L — SIGNIFICANT CHANGE UP (ref 98–110)
CO2 SERPL-SCNC: 26 MMOL/L — SIGNIFICANT CHANGE UP (ref 17–32)
CREAT SERPL-MCNC: 0.7 MG/DL — SIGNIFICANT CHANGE UP (ref 0.7–1.5)
EOSINOPHIL # BLD AUTO: 0.37 K/UL — SIGNIFICANT CHANGE UP (ref 0–0.7)
EOSINOPHIL NFR BLD AUTO: 4.5 % — SIGNIFICANT CHANGE UP (ref 0–8)
GLUCOSE SERPL-MCNC: 91 MG/DL — SIGNIFICANT CHANGE UP (ref 70–99)
HCT VFR BLD CALC: 36.8 % — LOW (ref 42–52)
HGB BLD-MCNC: 12.2 G/DL — LOW (ref 14–18)
IMM GRANULOCYTES NFR BLD AUTO: 1 % — HIGH (ref 0.1–0.3)
LYMPHOCYTES # BLD AUTO: 1.66 K/UL — SIGNIFICANT CHANGE UP (ref 1.2–3.4)
LYMPHOCYTES # BLD AUTO: 20.3 % — LOW (ref 20.5–51.1)
MCHC RBC-ENTMCNC: 30.8 PG — SIGNIFICANT CHANGE UP (ref 27–31)
MCHC RBC-ENTMCNC: 33.2 G/DL — SIGNIFICANT CHANGE UP (ref 32–37)
MCV RBC AUTO: 92.9 FL — SIGNIFICANT CHANGE UP (ref 80–94)
MONOCYTES # BLD AUTO: 1.29 K/UL — HIGH (ref 0.1–0.6)
MONOCYTES NFR BLD AUTO: 15.8 % — HIGH (ref 1.7–9.3)
NEUTROPHILS # BLD AUTO: 4.74 K/UL — SIGNIFICANT CHANGE UP (ref 1.4–6.5)
NEUTROPHILS NFR BLD AUTO: 57.8 % — SIGNIFICANT CHANGE UP (ref 42.2–75.2)
NRBC # BLD: 0 /100 WBCS — SIGNIFICANT CHANGE UP (ref 0–0)
PLATELET # BLD AUTO: 132 K/UL — SIGNIFICANT CHANGE UP (ref 130–400)
POTASSIUM SERPL-MCNC: 4.1 MMOL/L — SIGNIFICANT CHANGE UP (ref 3.5–5)
POTASSIUM SERPL-SCNC: 4.1 MMOL/L — SIGNIFICANT CHANGE UP (ref 3.5–5)
RBC # BLD: 3.96 M/UL — LOW (ref 4.7–6.1)
RBC # FLD: 13.4 % — SIGNIFICANT CHANGE UP (ref 11.5–14.5)
SODIUM SERPL-SCNC: 136 MMOL/L — SIGNIFICANT CHANGE UP (ref 135–146)
WBC # BLD: 8.19 K/UL — SIGNIFICANT CHANGE UP (ref 4.8–10.8)
WBC # FLD AUTO: 8.19 K/UL — SIGNIFICANT CHANGE UP (ref 4.8–10.8)

## 2021-07-23 PROCEDURE — 93010 ELECTROCARDIOGRAM REPORT: CPT

## 2021-07-23 RX ORDER — IBUPROFEN 200 MG
400 TABLET ORAL
Refills: 0 | Status: DISCONTINUED | OUTPATIENT
Start: 2021-07-23 | End: 2021-07-27

## 2021-07-23 RX ORDER — IBUPROFEN 200 MG
1 TABLET ORAL
Qty: 0 | Refills: 0 | DISCHARGE
Start: 2021-07-23

## 2021-07-23 RX ORDER — SENNA PLUS 8.6 MG/1
2 TABLET ORAL EVERY 12 HOURS
Refills: 0 | Status: DISCONTINUED | OUTPATIENT
Start: 2021-07-23 | End: 2021-07-27

## 2021-07-23 RX ORDER — LACTULOSE 10 G/15ML
10 SOLUTION ORAL
Refills: 0 | Status: DISCONTINUED | OUTPATIENT
Start: 2021-07-23 | End: 2021-07-27

## 2021-07-23 RX ORDER — ACETAMINOPHEN 500 MG
2 TABLET ORAL
Qty: 0 | Refills: 0 | DISCHARGE
Start: 2021-07-23

## 2021-07-23 RX ORDER — TAMSULOSIN HYDROCHLORIDE 0.4 MG/1
1 CAPSULE ORAL
Qty: 30 | Refills: 0
Start: 2021-07-23 | End: 2021-08-21

## 2021-07-23 RX ORDER — TAMSULOSIN HYDROCHLORIDE 0.4 MG/1
1 CAPSULE ORAL
Qty: 0 | Refills: 0 | DISCHARGE
Start: 2021-07-23

## 2021-07-23 RX ADMIN — Medication 1 MILLIGRAM(S): at 05:53

## 2021-07-23 RX ADMIN — Medication 100 MICROGRAM(S): at 05:53

## 2021-07-23 RX ADMIN — POLYETHYLENE GLYCOL 3350 17 GRAM(S): 17 POWDER, FOR SOLUTION ORAL at 05:53

## 2021-07-23 RX ADMIN — DIVALPROEX SODIUM 500 MILLIGRAM(S): 500 TABLET, DELAYED RELEASE ORAL at 13:06

## 2021-07-23 RX ADMIN — TAMSULOSIN HYDROCHLORIDE 0.4 MILLIGRAM(S): 0.4 CAPSULE ORAL at 21:25

## 2021-07-23 RX ADMIN — LACTULOSE 10 GRAM(S): 10 SOLUTION ORAL at 17:06

## 2021-07-23 RX ADMIN — DIVALPROEX SODIUM 500 MILLIGRAM(S): 500 TABLET, DELAYED RELEASE ORAL at 21:25

## 2021-07-23 RX ADMIN — HALOPERIDOL DECANOATE 10 MILLIGRAM(S): 100 INJECTION INTRAMUSCULAR at 13:06

## 2021-07-23 RX ADMIN — POLYETHYLENE GLYCOL 3350 17 GRAM(S): 17 POWDER, FOR SOLUTION ORAL at 17:07

## 2021-07-23 RX ADMIN — BUPROPION HYDROCHLORIDE 150 MILLIGRAM(S): 150 TABLET, EXTENDED RELEASE ORAL at 11:29

## 2021-07-23 RX ADMIN — SENNA PLUS 2 TABLET(S): 8.6 TABLET ORAL at 17:07

## 2021-07-23 RX ADMIN — ENOXAPARIN SODIUM 40 MILLIGRAM(S): 100 INJECTION SUBCUTANEOUS at 21:25

## 2021-07-23 RX ADMIN — HALOPERIDOL DECANOATE 10 MILLIGRAM(S): 100 INJECTION INTRAMUSCULAR at 21:25

## 2021-07-23 RX ADMIN — Medication 1 MILLIGRAM(S): at 17:06

## 2021-07-23 RX ADMIN — DIVALPROEX SODIUM 500 MILLIGRAM(S): 500 TABLET, DELAYED RELEASE ORAL at 05:52

## 2021-07-23 RX ADMIN — MIRTAZAPINE 15 MILLIGRAM(S): 45 TABLET, ORALLY DISINTEGRATING ORAL at 21:25

## 2021-07-23 RX ADMIN — HALOPERIDOL DECANOATE 10 MILLIGRAM(S): 100 INJECTION INTRAMUSCULAR at 05:52

## 2021-07-23 RX ADMIN — LACTULOSE 10 GRAM(S): 10 SOLUTION ORAL at 11:29

## 2021-07-23 RX ADMIN — CHLORHEXIDINE GLUCONATE 1 APPLICATION(S): 213 SOLUTION TOPICAL at 05:52

## 2021-07-23 NOTE — PROCEDURE NOTE - ADDITIONAL PROCEDURE DETAILS
Under sterile technique 16fr long catheter placed easily without difficulty. Positive return of clear yellow urine draining. Long catheter balloon inflated with 10cc of sterile water. Catheter secured onto pt's right leg with STAT lock. Pt tolerated procedure well.

## 2021-07-23 NOTE — DISCHARGE NOTE PROVIDER - PROVIDER TOKENS
PROVIDER:[TOKEN:[[id number]]] PROVIDER:[TOKEN:[[id number]]],PROVIDER:[TOKEN:[[id number]]]

## 2021-07-23 NOTE — PROGRESS NOTE ADULT - SUBJECTIVE AND OBJECTIVE BOX
ALINA CHERY 50y Male  MRN#: [id number]   CODE STATUS: Full       SUBJECTIVE  Patient is a 50y old Male who presents with a chief complaint of CAP, fall, hyponatremia, hypochloremia, chest pain, inability to ambulate w/ foot pain (2021 15:38)  Currently admitted to medicine with the primary diagnosis of CAP (community acquired pneumonia)    Today is hospital day 4d, and this morning he is resting comfortably in bed. Patient is retaining with around 500cc on bladder scarn.   No overnight events.       OBJECTIVE  PAST MEDICAL & SURGICAL HISTORY  Epilepsy  last event 4+ yrs ago    Schizophrenia    Bipolar 1 disorder    Major depressive disorder    Anemia    Dyspepsia    Hypothyroidism    Constipation    MR (mental retardation)    H/O cystoscopy    H/O laminectomy  2001    H/O fracture of fibula  nondisplaced 2010      ALLERGIES:  erythromycin (Other)  phenothiazines (Unknown)    MEDICATIONS:  STANDING MEDICATIONS  benztropine 1 milliGRAM(s) Oral two times a day  buPROPion XL (24-Hour) . 150 milliGRAM(s) Oral daily  chlorhexidine 4% Liquid 1 Application(s) Topical <User Schedule>  diVALproex  milliGRAM(s) Oral three times a day  enoxaparin Injectable 40 milliGRAM(s) SubCutaneous at bedtime  haloperidol     Tablet 10 milliGRAM(s) Oral three times a day  levothyroxine 100 MICROGram(s) Oral daily  mirtazapine 15 milliGRAM(s) Oral at bedtime  polyethylene glycol 3350 17 Gram(s) Oral two times a day  senna 2 Tablet(s) Oral at bedtime  sodium chloride 0.9%. 1000 milliLiter(s) IV Continuous <Continuous>  tamsulosin 0.4 milliGRAM(s) Oral at bedtime    PRN MEDICATIONS  acetaminophen   Tablet .. 650 milliGRAM(s) Oral every 6 hours PRN  bisacodyl Suppository 10 milliGRAM(s) Rectal daily PRN      VITAL SIGNS: Last 24 Hours  T(C): 36.8 (2021 05:05), Max: 37.2 (2021 13:54)  T(F): 98.2 (2021 05:05), Max: 98.9 (2021 13:54)  HR: 113 (2021 05:05) (91 - 113)  BP: 159/82 (2021 05:05) (126/84 - 159/82)  BP(mean): --  RR: 18 (2021 05:05) (18 - 18)  SpO2: --    LABS:                        12.2   8.19  )-----------( 132      ( 2021 07:36 )             36.8     07-    136  |  100  |  10  ----------------------------<  91  4.1   |  26  |  0.7    Ca    8.6      2021 07:36    Urinalysis Basic - ( 2021 11:30 )  Color: Yellow / Appearance: Slightly Turbid / S.014 / pH: x  Gluc: x / Ketone: Trace  / Bili: Negative / Urobili: 3 mg/dL   Blood: x / Protein: Trace / Nitrite: Negative   Leuk Esterase: Negative / RBC: 2 /HPF / WBC 1 /HPF   Sq Epi: x / Non Sq Epi: 0 /HPF / Bacteria: Negative      Culture - Blood (collected 2021 16:00)  Source: .Blood Blood  Preliminary Report (2021 01:01):    No growth to date.    Culture - Blood (collected 2021 16:00)  Source: .Blood Blood  Preliminary Report (2021 01:01):    No growth to date.    Culture - Urine (collected 2021 11:30)  Source: Catheterized Catheterized  Final Report (2021 12:51):    No growth        PHYSICAL EXAM:  GENERAL: NAD, well-developed, resting comfortably in bed   HEENT:  Atraumatic, Normocephalic. EOMI, conjunctiva and sclera clear, No JVD  PULMONARY: Clear to auscultation bilaterally; No wheeze  CARDIOVASCULAR: tachycardic rate and rhythm; No murmurs, rubs, or gallops  GASTROINTESTINAL: Soft, Nontender, Nondistended; Bowel sounds present  MUSCULOSKELETAL: No clubbing, cyanosis, or edema  NEUROLOGY: non-focal  SKIN: No rashes or lesions      ASSESSMENT & PLAN  50 year-old male with a PMH of intellectual disability, hypothyroidism, seizure disorder (on Depakote), bipolar disorder, schizophrenia, presenting s/p mechanical fall yesterday and an episode of chest pain this morning, admitted for further w/u.    # Reproducible Chest pain likely 2/2 costochondritis   - isolated fever 100.8, CXR Mild bibasal opacities/atelectasis- no evidence of PNA  - Procal 0.11,   C-Reactive Protein, Serum: 117 mg/L (21 @ 06:40)  - ACS ruled out - Troponins negative x4  - ID recs: no abx, no evidence of infection,   - pain control: ibuprofen and tylenol     #Tachycardia   - F/u EKG   - on IVF     #Urinary retention  - Flomax started   - long placed  and removed  -> failed TOV    - urology consult appreciated  - monitor for urine output      #Left colon distension  - Seen on CXR  - supp. ordered  - increase senna to bid, add lactulose     #Hypothyroidism  - Cont levothyroxine 100mcg qD    #Seizures disorder  - Cont depakote  TID    #Bipolar  #Schizophrenia  - Cont haldol 10mg ID  - Cont bupropion XL 150mg qD  - COnt mirtazapine 15mg qHs  - Cont benztropine 1mg BID    DVT PPX, Lovenox  Diet Regular   Activity: IAT

## 2021-07-23 NOTE — DISCHARGE NOTE PROVIDER - CARE PROVIDERS DIRECT ADDRESSES
[email or web address]

## 2021-07-23 NOTE — DISCHARGE NOTE PROVIDER - CARE PROVIDER_API CALL
Bakari Bazzi  Internal Medicine  [address] [place] [address]  Phone: [phone]  Fax: [phone]  Follow Up Time:    Bakari Bazzi  Internal Medicine  [address] [place] [address]  Phone: [phone]  Fax: [phone]  Follow Up Time:     Saulo Herzog)  Urology  [address] [place] [address], [address]  [place] [address]  Phone: [phone]  Fax: [phone]  Follow Up Time:

## 2021-07-23 NOTE — DISCHARGE NOTE PROVIDER - NSDCMRMEDTOKEN_GEN_ALL_CORE_FT
acetaminophen 325 mg oral tablet: 2 tab(s) orally every 6 hours, As needed, Temp greater or equal to 38C (100.4F), Mild Pain (1 - 3)  benztropine 1 mg oral tablet: 1 tab(s) orally 2 times a day  buPROPion 150 mg/24 hours (XL) oral tablet, extended release: 1 tab(s) orally every 24 hours  Depakote 500 mg oral delayed release tablet: 1 tab(s) orally 3 times a day   haloperidol 10 mg oral tablet: 1 tab(s) orally 3 times a day  ibuprofen 400 mg oral tablet: 1 tab(s) orally 2 times a day, As needed, Mild Pain (1 - 3)  mirtazapine 15 mg oral tablet: mirtazapine 15mg tablet 1 tablet oral at bedtime  Senexon-S 50 mg-8.6 mg oral tablet: 1 tab(s) orally once a day (at bedtime)  Synthroid 100 mcg (0.1 mg) oral tablet: synthroid 100mcg tablet (levothyroxine 100 mcg tablet) 1 tablet oral every day   acetaminophen 325 mg oral tablet: 2 tab(s) orally every 6 hours, As needed, Temp greater or equal to 38C (100.4F), Mild Pain (1 - 3)  benztropine 1 mg oral tablet: 1 tab(s) orally 2 times a day  buPROPion 150 mg/24 hours (XL) oral tablet, extended release: 1 tab(s) orally every 24 hours  Depakote 500 mg oral delayed release tablet: 1 tab(s) orally 3 times a day   haloperidol 10 mg oral tablet: 1 tab(s) orally 3 times a day  ibuprofen 400 mg oral tablet: 1 tab(s) orally 2 times a day, As needed, Mild Pain (1 - 3)  mirtazapine 15 mg oral tablet: mirtazapine 15mg tablet 1 tablet oral at bedtime  Senexon-S 50 mg-8.6 mg oral tablet: 1 tab(s) orally once a day (at bedtime)  Synthroid 100 mcg (0.1 mg) oral tablet: synthroid 100mcg tablet (levothyroxine 100 mcg tablet) 1 tablet oral every day  tamsulosin 0.4 mg oral capsule: 1 cap(s) orally once a day (at bedtime)

## 2021-07-23 NOTE — CHART NOTE - NSCHARTNOTEFT_GEN_A_CORE
recalled today for long placement. As per Dr. Encarnacion, pt failed trial of void with bladder scan showing 500cc. As per resident pt's nurse attempted to straight pt but unsuccessful.     Under sterile technique 16fr long catheter placed easily without difficulty. Positive return of clear yellow urine draining. Long catheter balloon inflated with 10cc of sterile water. Catheter secured onto pt's right leg with STAT lock. Pt tolerated procedure well.     continue long catheter care   continue flomax  pt will need to follow-up as outpatient with Urology for Urodynamic testing   spoke with medicine team and aware of plan

## 2021-07-23 NOTE — DISCHARGE NOTE PROVIDER - HOSPITAL COURSE
The patient is a 50 year-old male with a PMH of intellectual disability, hypothyroidism, seizure disorder (on Depakote), bipolar disorder, schizophrenia, presenting from [facility] s/p mechanical fall yesterday and an episode of chest pain this morning. Per [facility] staff ([place]), the fall was unwitnessed but she believes he was walking with his walker and fell on his left ankle; when he was found he was conscious, oriented; there was no sign of loss of control of his bowels or bladder and he was not exhibiting tremors. This morning, per staff, around 9 AM he was complaining of chest pain while sitting; it has persisted since this time; he reports it is worse when pressing on his ribcage and with inspiration; he denied any associated shortness of breath or dizziness at the time. Patient denies any recent burning on urination, abdominal discomfort, nausea, vomiting or other symptoms. Infectious disease saw the patient for an isolated fever and no sign of infection. They recommended to monitor off antibiotics. Patient was retaining urine and had long placed 7/21 and removed the next day. He then failed TOV today and will be discharged with long.      The patient is a 50 year-old male with a PMH of intellectual disability, hypothyroidism, seizure disorder (on Depakote), bipolar disorder, schizophrenia, presenting from [facility] s/p mechanical fall yesterday and an episode of chest pain this morning. Per [facility] staff ([place]), the fall was unwitnessed but she believes he was walking with his walker and fell on his left ankle; when he was found he was conscious, oriented; there was no sign of loss of control of his bowels or bladder and he was not exhibiting tremors. This morning, per staff, around 9 AM he was complaining of chest pain while sitting; it has persisted since this time; he reports it is worse when pressing on his ribcage and with inspiration; he denied any associated shortness of breath or dizziness at the time. Patient denies any recent burning on urination, abdominal discomfort, nausea, vomiting or other symptoms. Infectious disease saw the patient for an isolated fever and no sign of infection. They recommended to monitor off antibiotics. Patient was retaining urine and had long placed 7/21 and removed the next day. He then failed TOV on day of discharge and will be discharged with long and new prescription of flomax. Will follow up with Urology within 1 week of discharge. Patient's tachycardia was evaluated via EKG and found to be sinus tachycardia. He noted that he still had chest pain, which was thought to be secondary to muscloskeletal pain. He was given advil with good affect and and decrease in HR. Can be provided 200mg advil q8hr PRN pain.    Vital Signs Last 24 Hrs  T(C): 35.7 (23 Jul 2021 12:45), Max: 36.8 (23 Jul 2021 05:05)  T(F): 96.3 (23 Jul 2021 12:45), Max: 98.2 (23 Jul 2021 05:05)  HR: 98 (23 Jul 2021 12:45) (91 - 113)  BP: 135/82 (23 Jul 2021 12:45) (131/75 - 159/82)  BP(mean): --  RR: 18 (23 Jul 2021 12:45) (18 - 18)  SpO2: --

## 2021-07-24 LAB
ALBUMIN SERPL ELPH-MCNC: 3.4 G/DL — LOW (ref 3.5–5.2)
ALP SERPL-CCNC: 82 U/L — SIGNIFICANT CHANGE UP (ref 30–115)
ALT FLD-CCNC: 9 U/L — SIGNIFICANT CHANGE UP (ref 0–41)
ANION GAP SERPL CALC-SCNC: 9 MMOL/L — SIGNIFICANT CHANGE UP (ref 7–14)
AST SERPL-CCNC: 17 U/L — SIGNIFICANT CHANGE UP (ref 0–41)
BASOPHILS # BLD AUTO: 0.04 K/UL — SIGNIFICANT CHANGE UP (ref 0–0.2)
BASOPHILS NFR BLD AUTO: 0.4 % — SIGNIFICANT CHANGE UP (ref 0–1)
BILIRUB SERPL-MCNC: 0.7 MG/DL — SIGNIFICANT CHANGE UP (ref 0.2–1.2)
BUN SERPL-MCNC: 10 MG/DL — SIGNIFICANT CHANGE UP (ref 10–20)
CALCIUM SERPL-MCNC: 9.5 MG/DL — SIGNIFICANT CHANGE UP (ref 8.5–10.1)
CHLORIDE SERPL-SCNC: 100 MMOL/L — SIGNIFICANT CHANGE UP (ref 98–110)
CO2 SERPL-SCNC: 28 MMOL/L — SIGNIFICANT CHANGE UP (ref 17–32)
CREAT SERPL-MCNC: 0.7 MG/DL — SIGNIFICANT CHANGE UP (ref 0.7–1.5)
EOSINOPHIL # BLD AUTO: 0.3 K/UL — SIGNIFICANT CHANGE UP (ref 0–0.7)
EOSINOPHIL NFR BLD AUTO: 3.3 % — SIGNIFICANT CHANGE UP (ref 0–8)
GLUCOSE SERPL-MCNC: 90 MG/DL — SIGNIFICANT CHANGE UP (ref 70–99)
HCT VFR BLD CALC: 38.3 % — LOW (ref 42–52)
HGB BLD-MCNC: 12.7 G/DL — LOW (ref 14–18)
IMM GRANULOCYTES NFR BLD AUTO: 1 % — HIGH (ref 0.1–0.3)
LYMPHOCYTES # BLD AUTO: 1.89 K/UL — SIGNIFICANT CHANGE UP (ref 1.2–3.4)
LYMPHOCYTES # BLD AUTO: 20.6 % — SIGNIFICANT CHANGE UP (ref 20.5–51.1)
MAGNESIUM SERPL-MCNC: 1.9 MG/DL — SIGNIFICANT CHANGE UP (ref 1.8–2.4)
MCHC RBC-ENTMCNC: 30.2 PG — SIGNIFICANT CHANGE UP (ref 27–31)
MCHC RBC-ENTMCNC: 33.2 G/DL — SIGNIFICANT CHANGE UP (ref 32–37)
MCV RBC AUTO: 91 FL — SIGNIFICANT CHANGE UP (ref 80–94)
MONOCYTES # BLD AUTO: 1.6 K/UL — HIGH (ref 0.1–0.6)
MONOCYTES NFR BLD AUTO: 17.4 % — HIGH (ref 1.7–9.3)
NEUTROPHILS # BLD AUTO: 5.26 K/UL — SIGNIFICANT CHANGE UP (ref 1.4–6.5)
NEUTROPHILS NFR BLD AUTO: 57.3 % — SIGNIFICANT CHANGE UP (ref 42.2–75.2)
NRBC # BLD: 0 /100 WBCS — SIGNIFICANT CHANGE UP (ref 0–0)
PLATELET # BLD AUTO: 156 K/UL — SIGNIFICANT CHANGE UP (ref 130–400)
POTASSIUM SERPL-MCNC: 4.1 MMOL/L — SIGNIFICANT CHANGE UP (ref 3.5–5)
POTASSIUM SERPL-SCNC: 4.1 MMOL/L — SIGNIFICANT CHANGE UP (ref 3.5–5)
PROT SERPL-MCNC: 6.3 G/DL — SIGNIFICANT CHANGE UP (ref 6–8)
RBC # BLD: 4.21 M/UL — LOW (ref 4.7–6.1)
RBC # FLD: 13.2 % — SIGNIFICANT CHANGE UP (ref 11.5–14.5)
SODIUM SERPL-SCNC: 137 MMOL/L — SIGNIFICANT CHANGE UP (ref 135–146)
WBC # BLD: 9.18 K/UL — SIGNIFICANT CHANGE UP (ref 4.8–10.8)
WBC # FLD AUTO: 9.18 K/UL — SIGNIFICANT CHANGE UP (ref 4.8–10.8)

## 2021-07-24 RX ADMIN — Medication 1 MILLIGRAM(S): at 06:15

## 2021-07-24 RX ADMIN — ENOXAPARIN SODIUM 40 MILLIGRAM(S): 100 INJECTION SUBCUTANEOUS at 22:04

## 2021-07-24 RX ADMIN — Medication 100 MICROGRAM(S): at 06:15

## 2021-07-24 RX ADMIN — TAMSULOSIN HYDROCHLORIDE 0.4 MILLIGRAM(S): 0.4 CAPSULE ORAL at 22:04

## 2021-07-24 RX ADMIN — BUPROPION HYDROCHLORIDE 150 MILLIGRAM(S): 150 TABLET, EXTENDED RELEASE ORAL at 12:27

## 2021-07-24 RX ADMIN — LACTULOSE 10 GRAM(S): 10 SOLUTION ORAL at 17:25

## 2021-07-24 RX ADMIN — HALOPERIDOL DECANOATE 10 MILLIGRAM(S): 100 INJECTION INTRAMUSCULAR at 17:24

## 2021-07-24 RX ADMIN — HALOPERIDOL DECANOATE 10 MILLIGRAM(S): 100 INJECTION INTRAMUSCULAR at 06:15

## 2021-07-24 RX ADMIN — SENNA PLUS 2 TABLET(S): 8.6 TABLET ORAL at 17:25

## 2021-07-24 RX ADMIN — DIVALPROEX SODIUM 500 MILLIGRAM(S): 500 TABLET, DELAYED RELEASE ORAL at 22:04

## 2021-07-24 RX ADMIN — Medication 1 MILLIGRAM(S): at 17:25

## 2021-07-24 RX ADMIN — DIVALPROEX SODIUM 500 MILLIGRAM(S): 500 TABLET, DELAYED RELEASE ORAL at 17:24

## 2021-07-24 RX ADMIN — HALOPERIDOL DECANOATE 10 MILLIGRAM(S): 100 INJECTION INTRAMUSCULAR at 22:04

## 2021-07-24 RX ADMIN — CHLORHEXIDINE GLUCONATE 1 APPLICATION(S): 213 SOLUTION TOPICAL at 06:15

## 2021-07-24 RX ADMIN — MIRTAZAPINE 15 MILLIGRAM(S): 45 TABLET, ORALLY DISINTEGRATING ORAL at 22:04

## 2021-07-24 RX ADMIN — POLYETHYLENE GLYCOL 3350 17 GRAM(S): 17 POWDER, FOR SOLUTION ORAL at 17:25

## 2021-07-24 RX ADMIN — DIVALPROEX SODIUM 500 MILLIGRAM(S): 500 TABLET, DELAYED RELEASE ORAL at 06:15

## 2021-07-24 NOTE — PROGRESS NOTE ADULT - ASSESSMENT
50 year-old male with a PMH of intellectual disability, hypothyroidism, seizure disorder (on Depakote), bipolar disorder, schizophrenia, presenting s/p mechanical fall yesterday and an episode of chest pain this morning, admitted for further w/u.    # Reproducible Chest pain likely 2/2 costochondritis   - isolated fever 100.8, CXR Mild bibasal opacities/atelectasis- no evidence of PNA  - Procal 0.11,   C-Reactive Protein, Serum: 117 mg/L (07-20-21 @ 06:40)  - ACS ruled out - Troponins negative x4  - ID recs: no abx, no evidence of infection,   - pain control: ibuprofen and tylenol     #Urinary retention  - Flomax started 7/22  - long placed 7/21 and removed 7/22 -> failed TOV 7/23   - urology consult appreciated- long catheter reinserted by Urology  - monitor for urine output      #sinus Tachycardia, resolved  - EKG 7/23: sinus tachy    #Left colon distension  - Seen on CXR  - supp. ordered  - increase senna to bid, add lactulose     #Hypothyroidism  - Cont levothyroxine 100mcg qD    #Seizures disorder  - Cont depakote  TID    #Bipolar  #Schizophrenia  - Cont haldol 10mg ID  - Cont bupropion XL 150mg qD  - COnt mirtazapine 15mg qHs  - Cont benztropine 1mg BID    DVT PPX, Lovenox  Diet Regular   Activity: IAT

## 2021-07-24 NOTE — PROGRESS NOTE ADULT - SUBJECTIVE AND OBJECTIVE BOX
SUBJECTIVE:    Patient is a 50y old Male who presents with a chief complaint of CAP, fall, hyponatremia, hypochloremia, chest pain, inability to ambulate w/ foot pain (23 Jul 2021 15:55)    Currently admitted to medicine with the primary diagnosis of Costochondritis     Today is hospital day 5d. This morning he is resting comfortably in bed. No overnight events.     PAST MEDICAL & SURGICAL HISTORY  Epilepsy  last event 4+ yrs ago    Schizophrenia    Bipolar 1 disorder    Major depressive disorder    Anemia    Dyspepsia    Hypothyroidism    Constipation    MR (mental retardation)    H/O cystoscopy    H/O laminectomy  03/2001    H/O fracture of fibula  nondisplaced 03/2010    ALLERGIES:  erythromycin (Other)  phenothiazines (Unknown)    MEDICATIONS:  STANDING MEDICATIONS  benztropine 1 milliGRAM(s) Oral two times a day  buPROPion XL (24-Hour) . 150 milliGRAM(s) Oral daily  chlorhexidine 4% Liquid 1 Application(s) Topical <User Schedule>  diVALproex  milliGRAM(s) Oral three times a day  enoxaparin Injectable 40 milliGRAM(s) SubCutaneous at bedtime  haloperidol     Tablet 10 milliGRAM(s) Oral three times a day  lactulose Syrup 10 Gram(s) Oral two times a day  levothyroxine 100 MICROGram(s) Oral daily  mirtazapine 15 milliGRAM(s) Oral at bedtime  polyethylene glycol 3350 17 Gram(s) Oral two times a day  senna 2 Tablet(s) Oral every 12 hours  tamsulosin 0.4 milliGRAM(s) Oral at bedtime    PRN MEDICATIONS  acetaminophen   Tablet .. 650 milliGRAM(s) Oral every 6 hours PRN  bisacodyl Suppository 10 milliGRAM(s) Rectal daily PRN  ibuprofen  Tablet. 400 milliGRAM(s) Oral two times a day PRN    VITALS:   T(F): 96.7  HR: 95  BP: 147/87  RR: 18  SpO2: --    LABS:                        12.7   9.18  )-----------( 156      ( 24 Jul 2021 06:38 )             38.3     07-24    137  |  100  |  10  ----------------------------<  90  4.1   |  28  |  0.7    Ca    9.5      24 Jul 2021 06:38  Mg     1.9     07-24    TPro  6.3  /  Alb  3.4<L>  /  TBili  0.7  /  DBili  x   /  AST  17  /  ALT  9   /  AlkPhos  82  07-24              Culture - Blood (collected 21 Jul 2021 16:00)  Source: .Blood Blood  Preliminary Report (23 Jul 2021 01:01):    No growth to date.    Culture - Blood (collected 21 Jul 2021 16:00)  Source: .Blood Blood  Preliminary Report (23 Jul 2021 01:01):    No growth to date.    Culture - Urine (collected 21 Jul 2021 11:30)  Source: Catheterized Catheterized  Final Report (22 Jul 2021 12:51):    No growth    RADIOLOGY:    PHYSICAL EXAM:  GEN: No acute distress, well-developed  LUNGS: Clear to auscultation bilaterally, no wheezes  HEART: Regular rate and rhythm, +s1 s2  ABD: Soft, non-tender, non-distended. Bowel sounds present  EXT: No clubbing, cyanosis, or edema  NEURO: non-focal    Intravenous access:   NG tube:   Packer Catheter:   Indwelling Urethral Catheter:     Connect To:  Straight Drainage/[place]    Indication:  Urinary Retention / Obstruction (07-21-21 @ 10:56) (not performed) [Active]

## 2021-07-25 ENCOUNTER — TRANSCRIPTION ENCOUNTER (OUTPATIENT)
Age: 51
End: 2021-07-25

## 2021-07-25 LAB
ALBUMIN SERPL ELPH-MCNC: 3.4 G/DL — LOW (ref 3.5–5.2)
ALP SERPL-CCNC: 85 U/L — SIGNIFICANT CHANGE UP (ref 30–115)
ALT FLD-CCNC: 8 U/L — SIGNIFICANT CHANGE UP (ref 0–41)
ANION GAP SERPL CALC-SCNC: 10 MMOL/L — SIGNIFICANT CHANGE UP (ref 7–14)
AST SERPL-CCNC: 18 U/L — SIGNIFICANT CHANGE UP (ref 0–41)
BASOPHILS # BLD AUTO: 0.04 K/UL — SIGNIFICANT CHANGE UP (ref 0–0.2)
BASOPHILS NFR BLD AUTO: 0.4 % — SIGNIFICANT CHANGE UP (ref 0–1)
BILIRUB SERPL-MCNC: 0.5 MG/DL — SIGNIFICANT CHANGE UP (ref 0.2–1.2)
BUN SERPL-MCNC: 12 MG/DL — SIGNIFICANT CHANGE UP (ref 10–20)
CALCIUM SERPL-MCNC: 9.5 MG/DL — SIGNIFICANT CHANGE UP (ref 8.5–10.1)
CHLORIDE SERPL-SCNC: 95 MMOL/L — LOW (ref 98–110)
CO2 SERPL-SCNC: 27 MMOL/L — SIGNIFICANT CHANGE UP (ref 17–32)
CREAT SERPL-MCNC: 0.7 MG/DL — SIGNIFICANT CHANGE UP (ref 0.7–1.5)
EOSINOPHIL # BLD AUTO: 0.29 K/UL — SIGNIFICANT CHANGE UP (ref 0–0.7)
EOSINOPHIL NFR BLD AUTO: 2.7 % — SIGNIFICANT CHANGE UP (ref 0–8)
GLUCOSE SERPL-MCNC: 86 MG/DL — SIGNIFICANT CHANGE UP (ref 70–99)
HCT VFR BLD CALC: 40.6 % — LOW (ref 42–52)
HGB BLD-MCNC: 14.1 G/DL — SIGNIFICANT CHANGE UP (ref 14–18)
IMM GRANULOCYTES NFR BLD AUTO: 1.5 % — HIGH (ref 0.1–0.3)
LYMPHOCYTES # BLD AUTO: 2.16 K/UL — SIGNIFICANT CHANGE UP (ref 1.2–3.4)
LYMPHOCYTES # BLD AUTO: 20.5 % — SIGNIFICANT CHANGE UP (ref 20.5–51.1)
MAGNESIUM SERPL-MCNC: 1.9 MG/DL — SIGNIFICANT CHANGE UP (ref 1.8–2.4)
MCHC RBC-ENTMCNC: 32 PG — HIGH (ref 27–31)
MCHC RBC-ENTMCNC: 34.7 G/DL — SIGNIFICANT CHANGE UP (ref 32–37)
MCV RBC AUTO: 92.3 FL — SIGNIFICANT CHANGE UP (ref 80–94)
MONOCYTES # BLD AUTO: 1.53 K/UL — HIGH (ref 0.1–0.6)
MONOCYTES NFR BLD AUTO: 14.5 % — HIGH (ref 1.7–9.3)
NEUTROPHILS # BLD AUTO: 6.37 K/UL — SIGNIFICANT CHANGE UP (ref 1.4–6.5)
NEUTROPHILS NFR BLD AUTO: 60.4 % — SIGNIFICANT CHANGE UP (ref 42.2–75.2)
NRBC # BLD: 0 /100 WBCS — SIGNIFICANT CHANGE UP (ref 0–0)
PLATELET # BLD AUTO: 162 K/UL — SIGNIFICANT CHANGE UP (ref 130–400)
POTASSIUM SERPL-MCNC: 4.2 MMOL/L — SIGNIFICANT CHANGE UP (ref 3.5–5)
POTASSIUM SERPL-SCNC: 4.2 MMOL/L — SIGNIFICANT CHANGE UP (ref 3.5–5)
PROT SERPL-MCNC: 6.4 G/DL — SIGNIFICANT CHANGE UP (ref 6–8)
RBC # BLD: 4.4 M/UL — LOW (ref 4.7–6.1)
RBC # FLD: 13.3 % — SIGNIFICANT CHANGE UP (ref 11.5–14.5)
SODIUM SERPL-SCNC: 132 MMOL/L — LOW (ref 135–146)
WBC # BLD: 10.55 K/UL — SIGNIFICANT CHANGE UP (ref 4.8–10.8)
WBC # FLD AUTO: 10.55 K/UL — SIGNIFICANT CHANGE UP (ref 4.8–10.8)

## 2021-07-25 RX ADMIN — POLYETHYLENE GLYCOL 3350 17 GRAM(S): 17 POWDER, FOR SOLUTION ORAL at 17:34

## 2021-07-25 RX ADMIN — LACTULOSE 10 GRAM(S): 10 SOLUTION ORAL at 05:16

## 2021-07-25 RX ADMIN — MIRTAZAPINE 15 MILLIGRAM(S): 45 TABLET, ORALLY DISINTEGRATING ORAL at 21:44

## 2021-07-25 RX ADMIN — ENOXAPARIN SODIUM 40 MILLIGRAM(S): 100 INJECTION SUBCUTANEOUS at 21:44

## 2021-07-25 RX ADMIN — LACTULOSE 10 GRAM(S): 10 SOLUTION ORAL at 17:34

## 2021-07-25 RX ADMIN — Medication 100 MICROGRAM(S): at 05:17

## 2021-07-25 RX ADMIN — SENNA PLUS 2 TABLET(S): 8.6 TABLET ORAL at 17:34

## 2021-07-25 RX ADMIN — POLYETHYLENE GLYCOL 3350 17 GRAM(S): 17 POWDER, FOR SOLUTION ORAL at 08:44

## 2021-07-25 RX ADMIN — Medication 1 MILLIGRAM(S): at 05:17

## 2021-07-25 RX ADMIN — HALOPERIDOL DECANOATE 10 MILLIGRAM(S): 100 INJECTION INTRAMUSCULAR at 21:44

## 2021-07-25 RX ADMIN — SENNA PLUS 2 TABLET(S): 8.6 TABLET ORAL at 05:17

## 2021-07-25 RX ADMIN — DIVALPROEX SODIUM 500 MILLIGRAM(S): 500 TABLET, DELAYED RELEASE ORAL at 21:44

## 2021-07-25 RX ADMIN — TAMSULOSIN HYDROCHLORIDE 0.4 MILLIGRAM(S): 0.4 CAPSULE ORAL at 21:44

## 2021-07-25 RX ADMIN — DIVALPROEX SODIUM 500 MILLIGRAM(S): 500 TABLET, DELAYED RELEASE ORAL at 13:33

## 2021-07-25 RX ADMIN — DIVALPROEX SODIUM 500 MILLIGRAM(S): 500 TABLET, DELAYED RELEASE ORAL at 05:17

## 2021-07-25 RX ADMIN — HALOPERIDOL DECANOATE 10 MILLIGRAM(S): 100 INJECTION INTRAMUSCULAR at 13:33

## 2021-07-25 RX ADMIN — CHLORHEXIDINE GLUCONATE 1 APPLICATION(S): 213 SOLUTION TOPICAL at 05:19

## 2021-07-25 RX ADMIN — HALOPERIDOL DECANOATE 10 MILLIGRAM(S): 100 INJECTION INTRAMUSCULAR at 05:17

## 2021-07-25 RX ADMIN — BUPROPION HYDROCHLORIDE 150 MILLIGRAM(S): 150 TABLET, EXTENDED RELEASE ORAL at 11:48

## 2021-07-25 RX ADMIN — Medication 1 MILLIGRAM(S): at 17:34

## 2021-07-25 NOTE — PROGRESS NOTE ADULT - ASSESSMENT
50 year-old male with a PMH of intellectual disability, hypothyroidism, seizure disorder (on Depakote), bipolar disorder, schizophrenia, presenting s/p mechanical fall yesterday and an episode of chest pain this morning, admitted for further w/u.    # Reproducible Chest pain likely 2/2 Costochondritis   - isolated fever 100.8, CXR Mild bibasal opacities/atelectasis- no evidence of PNA  - Procal 0.11,   C-Reactive Protein, Serum: 117 mg/L (07-20-21 @ 06:40)  - ACS ruled out, Troponin, negative x 4, EKG Sinus Tachy  - ID recs: no abx, no evidence of infection  - pain control: ibuprofen and tylenol     #Urinary Retention  - Flomax started 7/22  - long placed 7/21 and removed 7/22 -> failed TOV 7/23   - urology consult appreciated- long catheter reinserted by Urology  - monitor for urine output      # Sinus Tachycardia, resolved  - EKG 7/23: sinus tachy    #Left colon distension  - Seen on CXR  - supp. ordered  - increase senna to bid, add lactulose     #Hypothyroidism  - Cont levothyroxine 100mcg qD    #Seizures disorder  - Cont depakote  TID    #Bipolar  #Schizophrenia  - Cont haldol 10mg ID  - Cont bupropion XL 150mg qD  - COnt mirtazapine 15mg qHs  - Cont benztropine 1mg BID    DVT PPX, Lovenox  Diet Regular   Activity: IAT  Dispo: Patient was to be discharged back to group home yesterday but facility refused transfer as they do not believe patient is optimized as he is now 2 assist. PT re-ordered for assessment     Age appropriate

## 2021-07-25 NOTE — PROGRESS NOTE ADULT - SUBJECTIVE AND OBJECTIVE BOX
SUBJECTIVE:  HPI:  The patient is a 50 year-old male with a PMH of intellectual disability, hypothyroidism, seizure disorder (on Depakote), bipolar disorder, schizophrenia, presenting from [facility] s/p mechanical fall yesterday and an episode of chest pain this morning. Per jail staff ([place]), the fall was unwitnessed but she believes he was walking with his walker and fell on his left ankle; when he was found he was conscious, oriented; there was no sign of loss of control of his bowels or bladder and he was not exhibiting tremors. This morning, per staff, around 9 AM he was complaining of chest pain while sitting; it has persisted since this time; he reports it is worse when pressing on his ribcage and with inspiration; he denied any associated shortness of breath or dizziness at the time. Patient denies any recent burning on urination, abdominal discomfort, nausea, vomiting or other symptoms.  (19 Jul 2021 14:24)      PAST MEDICAL & SURGICAL HISTORY  PAST MEDICAL & SURGICAL HISTORY:  Epilepsy  last event 4+ yrs ago    Schizophrenia    Bipolar 1 disorder    Major depressive disorder    Anemia    Dyspepsia    Hypothyroidism    Constipation    MR (mental retardation)    H/O cystoscopy    H/O laminectomy  03/2001    H/O fracture of fibula  nondisplaced 03/2010      SOCIAL HISTORY:    ALLERGIES:  erythromycin (Other)  phenothiazines (Unknown)    MEDICATIONS:  STANDING MEDICATIONS  benztropine 1 milliGRAM(s) Oral two times a day  buPROPion XL (24-Hour) . 150 milliGRAM(s) Oral daily  chlorhexidine 4% Liquid 1 Application(s) Topical <User Schedule>  diVALproex  milliGRAM(s) Oral three times a day  enoxaparin Injectable 40 milliGRAM(s) SubCutaneous at bedtime  haloperidol     Tablet 10 milliGRAM(s) Oral three times a day  lactulose Syrup 10 Gram(s) Oral two times a day  levothyroxine 100 MICROGram(s) Oral daily  mirtazapine 15 milliGRAM(s) Oral at bedtime  polyethylene glycol 3350 17 Gram(s) Oral two times a day  senna 2 Tablet(s) Oral every 12 hours  tamsulosin 0.4 milliGRAM(s) Oral at bedtime    PRN MEDICATIONS  acetaminophen   Tablet .. 650 milliGRAM(s) Oral every 6 hours PRN  bisacodyl Suppository 10 milliGRAM(s) Rectal daily PRN  ibuprofen  Tablet. 400 milliGRAM(s) Oral two times a day PRN    VITALS:   T(F): 98.4  HR: 104  BP: 127/87  RR: 18  SpO2: --    LABS:                        14.1   10.55 )-----------( 162      ( 25 Jul 2021 07:44 )             40.6     07-25    132<L>  |  95<L>  |  12  ----------------------------<  86  4.2   |  27  |  0.7    Ca    9.5      25 Jul 2021 07:44  Mg     1.9     07-25    TPro  6.4  /  Alb  3.4<L>  /  TBili  0.5  /  DBili  x   /  AST  18  /  ALT  8   /  AlkPhos  85  07-25    RADIOLOGY:  GEN: No acute distress, well-developed  LUNGS: Clear to auscultation bilaterally, no wheezes  HEART: Regular rate and rhythm, +s1 s2  ABD: Soft, non-tender, non-distended. Bowel sounds present  EXT: No clubbing, cyanosis, or edema  NEURO: non-focal

## 2021-07-25 NOTE — CONSULT NOTE ADULT - SUBJECTIVE AND OBJECTIVE BOX
HPI:  The patient is a 50 year-old male with a PMH of intellectual disability, hypothyroidism, seizure disorder (on Depakote), bipolar disorder, schizophrenia, presenting from [facility] s/p mechanical fall yesterday and an episode of chest pain this morning. Per care home staff ([place]), the fall was unwitnessed but she believes he was walking with his walker and fell on his left ankle; when he was found he was conscious, oriented; there was no sign of loss of control of his bowels or bladder and he was not exhibiting tremors. This morning, per staff, around 9 AM he was complaining of chest pain while sitting; it has persisted since this time; he reports it is worse when pressing on his ribcage and with inspiration; he denied any associated shortness of breath or dizziness at the time. Patient denies any recent burning on urination, abdominal discomfort, nausea, vomiting or other symptoms.  (19 Jul 2021 14:24)      PAST MEDICAL & SURGICAL HISTORY:  Epilepsy  last event 4+ yrs ago    Schizophrenia    Bipolar 1 disorder    Major depressive disorder    Anemia    Dyspepsia    Hypothyroidism    Constipation    MR (mental retardation)    H/O cystoscopy    H/O laminectomy  03/2001    H/O fracture of fibula  nondisplaced 03/2010        HOSPITAL COURSE:    TODAY'S SUBJECTIVE & REVIEW OF SYMPTOMS:    Constitutional WNL  Cardiac WNL   Respiratory + cough, pleuritic chest pain  GI WNL   WNL  Endocrine WNL  Skin WNL  Musculoskeletal WNL  Neuro WNL  Cognitive impaired  Psych WNL      MEDICATIONS  (STANDING):  benztropine 1 milliGRAM(s) Oral two times a day  buPROPion XL (24-Hour) . 150 milliGRAM(s) Oral daily  chlorhexidine 4% Liquid 1 Application(s) Topical <User Schedule>  diVALproex  milliGRAM(s) Oral three times a day  enoxaparin Injectable 40 milliGRAM(s) SubCutaneous at bedtime  haloperidol     Tablet 10 milliGRAM(s) Oral three times a day  lactulose Syrup 10 Gram(s) Oral two times a day  levothyroxine 100 MICROGram(s) Oral daily  mirtazapine 15 milliGRAM(s) Oral at bedtime  polyethylene glycol 3350 17 Gram(s) Oral two times a day  senna 2 Tablet(s) Oral every 12 hours  tamsulosin 0.4 milliGRAM(s) Oral at bedtime    MEDICATIONS  (PRN):  acetaminophen   Tablet .. 650 milliGRAM(s) Oral every 6 hours PRN Temp greater or equal to 38C (100.4F), Mild Pain (1 - 3)  bisacodyl Suppository 10 milliGRAM(s) Rectal daily PRN Constipation  ibuprofen  Tablet. 400 milliGRAM(s) Oral two times a day PRN Mild Pain (1 - 3)      FAMILY HISTORY:  No pertinent family history in first degree relatives        Allergies    erythromycin (Other)  phenothiazines (Unknown)    Intolerances        SOCIAL HISTORY:    [  ] Smoking  [  ] EtOH  [  ] Substance abuse     Home Environment:  [  ] Alone  [  ] Lives with Family  [  ] Home Health Aide    Dwelling:  [  ] Private House  [  ] Apartment  [ X ] Group HOme  [  ] Skilled Nursing Facility      [  ] Short Term  [  ] Long Term  [  ] Stairs       Elevator [  ]    FUNCTIONAL STATUS PTA: (Check all that apply)  Ambulation: [ X  ]Independent    [  ] Dependent     [  ] Non-Ambulatory  Assistive Device: [  ] Straight Cane  [  ]  Quad Cane  [ X ] Walker  [  ]  Wheelchair  ADL: [  ] Independent  [  ]  Dependent       Vital Signs Last 24 Hrs  T(C): 36.6 (25 Jul 2021 14:10), Max: 36.9 (24 Jul 2021 19:54)  T(F): 97.9 (25 Jul 2021 14:10), Max: 98.4 (24 Jul 2021 19:54)  HR: 106 (25 Jul 2021 14:10) (104 - 107)  BP: 119/71 (25 Jul 2021 14:10) (119/71 - 127/87)  BP(mean): --  RR: 20 (25 Jul 2021 14:10) (18 - 20)  SpO2: --      PHYSICAL EXAM: Alert, speech fluent  GENERAL: Well-developed, well-nourished, in NAD  HEAD:  Normocephalic, atraumatic  EYES: EOMI, PERRLA, sclerae anicteric, conjunctivae not injected  NECK: Supple, No JVD, Normal thyroid  CHEST/LUNG: Clear to auscultation bilaterally; No rales, rhonchi, wheezing  HEART: Regular rate and rhythm; No murmurs, gallops, or rubs  ABDOMEN: Soft, nontender, nondistended; Bowel sounds present  EXTREMITIES:  2+ Peripheral pulses; No clubbing, cyanosis, or edema    NERVOUS SYSTEM:  Cranial Nerves II-XII intact [  ] Abnormal  [  ]  ROM: WFL all extremities [  ]  Abnormal [ X ] Decreased ROM bilateral shoulders  Motor Strength: WFL all extremities  [  ]  Abnormal [X  ]  Sensation: intact to light touch [  ] Abnormal [  ]  Reflexes: Symmetric [  ]  Abnormal [  ]    FUNCTIONAL STATUS:  Bed Mobility: Independent [  ]  Supervision [  ]  Needs Assistance [ X ]  N/A [  ]  Transfers: Independent [  ]  Supervision [  ]  Needs Assistance [X  ]  N/A [  ]   Ambulation: Independent [  ]  Supervision [  ]  Needs Assistance [X  ]  N/A [  ]  ADLs: Independent [  ] Requires Assistance [  ] N/A [  ]      LABS:                        14.1   10.55 )-----------( 162      ( 25 Jul 2021 07:44 )             40.6     07-25    132<L>  |  95<L>  |  12  ----------------------------<  86  4.2   |  27  |  0.7    Ca    9.5      25 Jul 2021 07:44  Mg     1.9     07-25    TPro  6.4  /  Alb  3.4<L>  /  TBili  0.5  /  DBili  x   /  AST  18  /  ALT  8   /  AlkPhos  85  07-25          RADIOLOGY & ADDITIONAL STUDIES:  < from: X-ray Chest 1 View- PORTABLE-Urgent (Xray Chest 1 View- PORTABLE-Urgent .) (07.19.21 @ 20:37) >    EXAM:  XR CHEST PORTABLE URGENT 1V            PROCEDURE DATE:  07/19/2021            INTERPRETATION:  STUDY INDICATION: screen for obstruction    TECHNIQUE:  Portable frontal view of the chest obtained.    COMPARISON: 7/19/2021.    FINDINGS/  IMPRESSION:    Stable cardiomediastinal silhouette.    Mild bibasal opacities/atelectasis. No pneumothorax. Redemonstrated gaseous distention of the left colon.    Unchanged osseous structures.    --- End of Report ---              CLEMENTE JOEL MD; Attending Radiologist  This document has been electronically signed. Jul 20 2021  9:56AM    < end of copied text >

## 2021-07-25 NOTE — DISCHARGE NOTE NURSING/CASE MANAGEMENT/SOCIAL WORK - PATIENT PORTAL LINK FT
You can access the FollowMyHealth Patient Portal offered by [facility] by registering at the following website: [email or web address]. By joining [employer]’s FollowMyHealth portal, you will also be able to view your health information using other applications (apps) compatible with our system.

## 2021-07-25 NOTE — CONSULT NOTE ADULT - ASSESSMENT
IMPRESSION: Rehabilitation for gait disorder, deconditioning    PRECAUTIONS: [  ] Cardiac  [  ] Respiratory  [ X ] Seizures [  ] Contact Precautions  [  ] Droplet Isolation  [  ] Other:      Weight Bearing Status:  WBAT    RECOMMENDATION:    Out of bed to chair     DVT/decubitus ulcer prophylaxis    REHABILITATION PLAN:     [ X  ] Bedside PT 3-5 times a week   [   ]   Bedside OT  2-3 times a week             [   ] No Rehab Therapy Indicated                   [   ]  Speech Therapy   Conditioning/ROM                                    ADL  Bed Mobility                                               Conditioning/ROM  Transfers                                                     Bed Mobility  Sitting /Standing Balance                         Transfers                                        Gait Training                                               Sitting/Standing Balance  Stair Training  [   ] Applicable                    Home Equipment Evaluation                                                                        Splinting  [   ] Only      GOALS:   ADL   [   ]   Independent                    Transfers  [ X  ] Independent                          Ambulation  [ X  ] Independent     [  X  ] With device                            [   ]  CG                                                         [   ]  CG                                                                  [   ] CG                            [    ] Min A                                                   [   ] Min A                                                              [   ] Min  A          DISCHARGE PLAN:  [    ]  Good candidate for Intensive Rehabilitation/Hospital-based 4A [facility]                                             Will tolerate 3 hours of Intensive Rehab Daily                                       [   X ]  Short Term Rehabilitation in Skilled Nursing Facility                                       [  X  ]  Home with Outpatient or VN services                                         [    ]  Possible Candidate for Intensive Hospital-Based Rehabilitation      Thank you.

## 2021-07-26 LAB
ALBUMIN SERPL ELPH-MCNC: 3.4 G/DL — LOW (ref 3.5–5.2)
ALP SERPL-CCNC: 108 U/L — SIGNIFICANT CHANGE UP (ref 30–115)
ALT FLD-CCNC: 11 U/L — SIGNIFICANT CHANGE UP (ref 0–41)
ANION GAP SERPL CALC-SCNC: 11 MMOL/L — SIGNIFICANT CHANGE UP (ref 7–14)
AST SERPL-CCNC: 20 U/L — SIGNIFICANT CHANGE UP (ref 0–41)
BASOPHILS # BLD AUTO: 0.04 K/UL — SIGNIFICANT CHANGE UP (ref 0–0.2)
BASOPHILS NFR BLD AUTO: 0.4 % — SIGNIFICANT CHANGE UP (ref 0–1)
BILIRUB SERPL-MCNC: 0.6 MG/DL — SIGNIFICANT CHANGE UP (ref 0.2–1.2)
BUN SERPL-MCNC: 12 MG/DL — SIGNIFICANT CHANGE UP (ref 10–20)
CALCIUM SERPL-MCNC: 9.5 MG/DL — SIGNIFICANT CHANGE UP (ref 8.5–10.1)
CHLORIDE SERPL-SCNC: 93 MMOL/L — LOW (ref 98–110)
CO2 SERPL-SCNC: 28 MMOL/L — SIGNIFICANT CHANGE UP (ref 17–32)
CREAT SERPL-MCNC: 0.7 MG/DL — SIGNIFICANT CHANGE UP (ref 0.7–1.5)
EOSINOPHIL # BLD AUTO: 0.19 K/UL — SIGNIFICANT CHANGE UP (ref 0–0.7)
EOSINOPHIL NFR BLD AUTO: 1.9 % — SIGNIFICANT CHANGE UP (ref 0–8)
GLUCOSE SERPL-MCNC: 76 MG/DL — SIGNIFICANT CHANGE UP (ref 70–99)
HCT VFR BLD CALC: 40.7 % — LOW (ref 42–52)
HGB BLD-MCNC: 13.7 G/DL — LOW (ref 14–18)
IMM GRANULOCYTES NFR BLD AUTO: 2.9 % — HIGH (ref 0.1–0.3)
LYMPHOCYTES # BLD AUTO: 1.99 K/UL — SIGNIFICANT CHANGE UP (ref 1.2–3.4)
LYMPHOCYTES # BLD AUTO: 19.4 % — LOW (ref 20.5–51.1)
MAGNESIUM SERPL-MCNC: 1.9 MG/DL — SIGNIFICANT CHANGE UP (ref 1.8–2.4)
MCHC RBC-ENTMCNC: 31.4 PG — HIGH (ref 27–31)
MCHC RBC-ENTMCNC: 33.7 G/DL — SIGNIFICANT CHANGE UP (ref 32–37)
MCV RBC AUTO: 93.3 FL — SIGNIFICANT CHANGE UP (ref 80–94)
MONOCYTES # BLD AUTO: 1.29 K/UL — HIGH (ref 0.1–0.6)
MONOCYTES NFR BLD AUTO: 12.6 % — HIGH (ref 1.7–9.3)
NEUTROPHILS # BLD AUTO: 6.44 K/UL — SIGNIFICANT CHANGE UP (ref 1.4–6.5)
NEUTROPHILS NFR BLD AUTO: 62.8 % — SIGNIFICANT CHANGE UP (ref 42.2–75.2)
NRBC # BLD: 0 /100 WBCS — SIGNIFICANT CHANGE UP (ref 0–0)
PLATELET # BLD AUTO: 204 K/UL — SIGNIFICANT CHANGE UP (ref 130–400)
POTASSIUM SERPL-MCNC: 4.1 MMOL/L — SIGNIFICANT CHANGE UP (ref 3.5–5)
POTASSIUM SERPL-SCNC: 4.1 MMOL/L — SIGNIFICANT CHANGE UP (ref 3.5–5)
PROT SERPL-MCNC: 6.4 G/DL — SIGNIFICANT CHANGE UP (ref 6–8)
RBC # BLD: 4.36 M/UL — LOW (ref 4.7–6.1)
RBC # FLD: 13.3 % — SIGNIFICANT CHANGE UP (ref 11.5–14.5)
SARS-COV-2 RNA SPEC QL NAA+PROBE: SIGNIFICANT CHANGE UP
SODIUM SERPL-SCNC: 132 MMOL/L — LOW (ref 135–146)
WBC # BLD: 10.25 K/UL — SIGNIFICANT CHANGE UP (ref 4.8–10.8)
WBC # FLD AUTO: 10.25 K/UL — SIGNIFICANT CHANGE UP (ref 4.8–10.8)

## 2021-07-26 RX ADMIN — LACTULOSE 10 GRAM(S): 10 SOLUTION ORAL at 05:52

## 2021-07-26 RX ADMIN — POLYETHYLENE GLYCOL 3350 17 GRAM(S): 17 POWDER, FOR SOLUTION ORAL at 05:53

## 2021-07-26 RX ADMIN — HALOPERIDOL DECANOATE 10 MILLIGRAM(S): 100 INJECTION INTRAMUSCULAR at 05:52

## 2021-07-26 RX ADMIN — TAMSULOSIN HYDROCHLORIDE 0.4 MILLIGRAM(S): 0.4 CAPSULE ORAL at 21:34

## 2021-07-26 RX ADMIN — DIVALPROEX SODIUM 500 MILLIGRAM(S): 500 TABLET, DELAYED RELEASE ORAL at 13:14

## 2021-07-26 RX ADMIN — HALOPERIDOL DECANOATE 10 MILLIGRAM(S): 100 INJECTION INTRAMUSCULAR at 21:34

## 2021-07-26 RX ADMIN — MIRTAZAPINE 15 MILLIGRAM(S): 45 TABLET, ORALLY DISINTEGRATING ORAL at 21:34

## 2021-07-26 RX ADMIN — DIVALPROEX SODIUM 500 MILLIGRAM(S): 500 TABLET, DELAYED RELEASE ORAL at 21:34

## 2021-07-26 RX ADMIN — SENNA PLUS 2 TABLET(S): 8.6 TABLET ORAL at 05:52

## 2021-07-26 RX ADMIN — SENNA PLUS 2 TABLET(S): 8.6 TABLET ORAL at 17:23

## 2021-07-26 RX ADMIN — Medication 100 MICROGRAM(S): at 05:52

## 2021-07-26 RX ADMIN — LACTULOSE 10 GRAM(S): 10 SOLUTION ORAL at 17:23

## 2021-07-26 RX ADMIN — POLYETHYLENE GLYCOL 3350 17 GRAM(S): 17 POWDER, FOR SOLUTION ORAL at 17:23

## 2021-07-26 RX ADMIN — HALOPERIDOL DECANOATE 10 MILLIGRAM(S): 100 INJECTION INTRAMUSCULAR at 13:15

## 2021-07-26 RX ADMIN — ENOXAPARIN SODIUM 40 MILLIGRAM(S): 100 INJECTION SUBCUTANEOUS at 21:34

## 2021-07-26 RX ADMIN — Medication 1 MILLIGRAM(S): at 17:23

## 2021-07-26 RX ADMIN — BUPROPION HYDROCHLORIDE 150 MILLIGRAM(S): 150 TABLET, EXTENDED RELEASE ORAL at 13:15

## 2021-07-26 RX ADMIN — DIVALPROEX SODIUM 500 MILLIGRAM(S): 500 TABLET, DELAYED RELEASE ORAL at 05:52

## 2021-07-26 RX ADMIN — Medication 1 MILLIGRAM(S): at 05:53

## 2021-07-26 RX ADMIN — CHLORHEXIDINE GLUCONATE 1 APPLICATION(S): 213 SOLUTION TOPICAL at 05:53

## 2021-07-26 NOTE — PROGRESS NOTE ADULT - SUBJECTIVE AND OBJECTIVE BOX
ALINA CHERY 50y Male  MRN#: [id number]   CODE STATUS: Full       SUBJECTIVE  Patient is a 50y old Male who presents with a chief complaint of CAP, fall, hyponatremia, hypochloremia, chest pain, inability to ambulate w/ foot pain (25 Jul 2021 15:45)  Currently admitted to medicine with the primary diagnosis of Costochondritis    Today is hospital day 7d, and this morning he is resting comfortably in bed with no complaints.   No overnight events.         OBJECTIVE  PAST MEDICAL & SURGICAL HISTORY  Epilepsy  last event 4+ yrs ago    Schizophrenia    Bipolar 1 disorder    Major depressive disorder    Anemia    Dyspepsia    Hypothyroidism    Constipation    MR (mental retardation)    H/O cystoscopy    H/O laminectomy  03/2001    H/O fracture of fibula  nondisplaced 03/2010      ALLERGIES:  erythromycin (Other)  phenothiazines (Unknown)    MEDICATIONS:  STANDING MEDICATIONS  benztropine 1 milliGRAM(s) Oral two times a day  buPROPion XL (24-Hour) . 150 milliGRAM(s) Oral daily  chlorhexidine 4% Liquid 1 Application(s) Topical <User Schedule>  diVALproex  milliGRAM(s) Oral three times a day  enoxaparin Injectable 40 milliGRAM(s) SubCutaneous at bedtime  haloperidol     Tablet 10 milliGRAM(s) Oral three times a day  lactulose Syrup 10 Gram(s) Oral two times a day  levothyroxine 100 MICROGram(s) Oral daily  mirtazapine 15 milliGRAM(s) Oral at bedtime  polyethylene glycol 3350 17 Gram(s) Oral two times a day  senna 2 Tablet(s) Oral every 12 hours  tamsulosin 0.4 milliGRAM(s) Oral at bedtime    PRN MEDICATIONS  acetaminophen   Tablet .. 650 milliGRAM(s) Oral every 6 hours PRN  bisacodyl Suppository 10 milliGRAM(s) Rectal daily PRN  ibuprofen  Tablet. 400 milliGRAM(s) Oral two times a day PRN      VITAL SIGNS: Last 24 Hours  T(C): 36.3 (26 Jul 2021 05:46), Max: 36.6 (25 Jul 2021 14:10)  T(F): 97.3 (26 Jul 2021 05:46), Max: 97.9 (25 Jul 2021 14:10)  HR: 105 (26 Jul 2021 05:46) (102 - 106)  BP: 123/85 (26 Jul 2021 05:46) (119/71 - 132/99)  BP(mean): --  RR: 18 (26 Jul 2021 05:46) (18 - 20)  SpO2: --    LABS:                        13.7   10.25 )-----------( 204      ( 26 Jul 2021 04:30 )             40.7     07-26    132<L>  |  93<L>  |  12  ----------------------------<  76  4.1   |  28  |  0.7    Ca    9.5      26 Jul 2021 04:30  Mg     1.9     07-26    TPro  6.4  /  Alb  3.4<L>  /  TBili  0.6  /  DBili  x   /  AST  20  /  ALT  11  /  AlkPhos  108  07-26      PHYSICAL EXAM:  GENERAL: NAD, well-developed, resting comfortably in bed   HEENT:  Atraumatic, Normocephalic. EOMI, conjunctiva and sclera clear, No JVD  PULMONARY: Clear to auscultation bilaterally; No wheeze  CARDIOVASCULAR: tachycardic rate and rhythm; No murmurs, rubs, or gallops  GASTROINTESTINAL: Soft, Nontender, Nondistended; Bowel sounds present  MUSCULOSKELETAL: No clubbing, cyanosis, or edema  NEUROLOGY: non-focal  SKIN: No rashes or lesions      ASSESSMENT & PLAN  50 year-old male with a PMH of intellectual disability, hypothyroidism, seizure disorder (on Depakote), bipolar disorder, schizophrenia, presenting s/p mechanical fall yesterday and an episode of chest pain this morning, admitted for further w/u.    # Reproducible Chest pain likely 2/2 costochondritis   - isolated fever 100.8, CXR Mild bibasal opacities/atelectasis- no evidence of PNA  - Procal 0.11,   C-Reactive Protein, Serum: 117 mg/L (07-20-21 @ 06:40)  - ACS ruled out - Troponins negative x4  - ID recs: no abx, no evidence of infection   - pain control: ibuprofen and tylenol     #Tachycardia   - EKG: sinus tachycardia      #Urinary retention  - Flomax started 7/22  - long placed 7/21 and removed 7/22 -> failed TOV 7/23 and long inserted again   - urology consult appreciated  - monitor for urine output      #Left colon distension  - Seen on CXR  - supp. ordered  - increase senna to bid, add lactulose     #Hypothyroidism  - Cont levothyroxine 100mcg qD    #Seizures disorder  - Cont depakote  TID    #Bipolar  #Schizophrenia  - Cont haldol 10mg ID  - Cont bupropion XL 150mg qD  - COnt mirtazapine 15mg qHs  - Cont benztropine 1mg BID    DVT PPX, Lovenox  Diet Regular   Activity: IAT

## 2021-07-27 VITALS
TEMPERATURE: 96 F | RESPIRATION RATE: 18 BRPM | HEART RATE: 95 BPM | SYSTOLIC BLOOD PRESSURE: 132 MMHG | DIASTOLIC BLOOD PRESSURE: 86 MMHG

## 2021-07-27 LAB
CULTURE RESULTS: SIGNIFICANT CHANGE UP
CULTURE RESULTS: SIGNIFICANT CHANGE UP
SPECIMEN SOURCE: SIGNIFICANT CHANGE UP
SPECIMEN SOURCE: SIGNIFICANT CHANGE UP

## 2021-07-27 RX ADMIN — Medication 100 MICROGRAM(S): at 07:22

## 2021-07-27 RX ADMIN — Medication 1 MILLIGRAM(S): at 07:20

## 2021-07-27 RX ADMIN — ENOXAPARIN SODIUM 40 MILLIGRAM(S): 100 INJECTION SUBCUTANEOUS at 22:16

## 2021-07-27 RX ADMIN — HALOPERIDOL DECANOATE 10 MILLIGRAM(S): 100 INJECTION INTRAMUSCULAR at 14:11

## 2021-07-27 RX ADMIN — DIVALPROEX SODIUM 500 MILLIGRAM(S): 500 TABLET, DELAYED RELEASE ORAL at 22:15

## 2021-07-27 RX ADMIN — TAMSULOSIN HYDROCHLORIDE 0.4 MILLIGRAM(S): 0.4 CAPSULE ORAL at 22:15

## 2021-07-27 RX ADMIN — HALOPERIDOL DECANOATE 10 MILLIGRAM(S): 100 INJECTION INTRAMUSCULAR at 22:15

## 2021-07-27 RX ADMIN — POLYETHYLENE GLYCOL 3350 17 GRAM(S): 17 POWDER, FOR SOLUTION ORAL at 19:26

## 2021-07-27 RX ADMIN — MIRTAZAPINE 15 MILLIGRAM(S): 45 TABLET, ORALLY DISINTEGRATING ORAL at 22:15

## 2021-07-27 RX ADMIN — SENNA PLUS 2 TABLET(S): 8.6 TABLET ORAL at 19:26

## 2021-07-27 RX ADMIN — DIVALPROEX SODIUM 500 MILLIGRAM(S): 500 TABLET, DELAYED RELEASE ORAL at 07:20

## 2021-07-27 RX ADMIN — LACTULOSE 10 GRAM(S): 10 SOLUTION ORAL at 19:26

## 2021-07-27 RX ADMIN — DIVALPROEX SODIUM 500 MILLIGRAM(S): 500 TABLET, DELAYED RELEASE ORAL at 14:11

## 2021-07-27 RX ADMIN — BUPROPION HYDROCHLORIDE 150 MILLIGRAM(S): 150 TABLET, EXTENDED RELEASE ORAL at 14:11

## 2021-07-27 RX ADMIN — HALOPERIDOL DECANOATE 10 MILLIGRAM(S): 100 INJECTION INTRAMUSCULAR at 07:22

## 2021-07-27 NOTE — PROGRESS NOTE ADULT - REASON FOR ADMISSION
CAP, fall, hyponatremia, hypochloremia, chest pain, inability to ambulate w/ foot pain

## 2021-07-27 NOTE — PROGRESS NOTE ADULT - SUBJECTIVE AND OBJECTIVE BOX
ALINA CHERY 50y Male  MRN#: [id number]   CODE STATUS: Full       SUBJECTIVE  Patient is a 50y old Male who presents with a chief complaint of CAP, fall, hyponatremia, hypochloremia, chest pain, inability to ambulate w/ foot pain (26 Jul 2021 10:43)  Currently admitted to medicine with the primary diagnosis of Costochondritis    Today is hospital day 8d, and this morning he is resting comfortably in bed with no complaints.   No overnight events.       OBJECTIVE  PAST MEDICAL & SURGICAL HISTORY  Epilepsy  last event 4+ yrs ago    Schizophrenia    Bipolar 1 disorder    Major depressive disorder    Anemia    Dyspepsia    Hypothyroidism    Constipation    MR (mental retardation)    H/O cystoscopy    H/O laminectomy  03/2001    H/O fracture of fibula  nondisplaced 03/2010      ALLERGIES:  erythromycin (Other)  phenothiazines (Unknown)    MEDICATIONS:  STANDING MEDICATIONS  benztropine 1 milliGRAM(s) Oral two times a day  buPROPion XL (24-Hour) . 150 milliGRAM(s) Oral daily  chlorhexidine 4% Liquid 1 Application(s) Topical <User Schedule>  diVALproex  milliGRAM(s) Oral three times a day  enoxaparin Injectable 40 milliGRAM(s) SubCutaneous at bedtime  haloperidol     Tablet 10 milliGRAM(s) Oral three times a day  lactulose Syrup 10 Gram(s) Oral two times a day  levothyroxine 100 MICROGram(s) Oral daily  mirtazapine 15 milliGRAM(s) Oral at bedtime  polyethylene glycol 3350 17 Gram(s) Oral two times a day  senna 2 Tablet(s) Oral every 12 hours  tamsulosin 0.4 milliGRAM(s) Oral at bedtime    PRN MEDICATIONS  acetaminophen   Tablet .. 650 milliGRAM(s) Oral every 6 hours PRN  bisacodyl Suppository 10 milliGRAM(s) Rectal daily PRN  ibuprofen  Tablet. 400 milliGRAM(s) Oral two times a day PRN      VITAL SIGNS: Last 24 Hours  T(C): 35.7 (27 Jul 2021 11:50), Max: 36.8 (26 Jul 2021 21:24)  T(F): 96.3 (27 Jul 2021 11:50), Max: 98.3 (26 Jul 2021 21:24)  HR: 92 (27 Jul 2021 11:50) (92 - 112)  BP: 138/79 (27 Jul 2021 11:50) (106/71 - 138/79)  BP(mean): --  RR: 18 (27 Jul 2021 11:50) (18 - 19)  SpO2: 99% (26 Jul 2021 21:24) (99% - 99%)    LABS:                        13.7   10.25 )-----------( 204      ( 26 Jul 2021 04:30 )             40.7     07-26    132<L>  |  93<L>  |  12  ----------------------------<  76  4.1   |  28  |  0.7    Ca    9.5      26 Jul 2021 04:30  Mg     1.9     07-26    TPro  6.4  /  Alb  3.4<L>  /  TBili  0.6  /  DBili  x   /  AST  20  /  ALT  11  /  AlkPhos  108  07-26      PHYSICAL EXAM:  GENERAL: NAD, well-developed, resting comfortably in bed   HEENT:  Atraumatic, Normocephalic. EOMI, conjunctiva and sclera clear, No JVD  PULMONARY: Clear to auscultation bilaterally; No wheeze  CARDIOVASCULAR: tachycardic rate and rhythm; No murmurs, rubs, or gallops  GASTROINTESTINAL: Soft, Nontender, Nondistended; Bowel sounds present  MUSCULOSKELETAL: No clubbing, cyanosis, or edema  NEUROLOGY: non-focal  SKIN: No rashes or lesions      ASSESSMENT & PLAN  50 year-old male with a PMH of intellectual disability, hypothyroidism, seizure disorder (on Depakote), bipolar disorder, schizophrenia, presenting s/p mechanical fall yesterday and an episode of chest pain this morning, admitted for further w/u.    # Reproducible Chest pain likely 2/2 costochondritis   - isolated fever 100.8, CXR Mild bibasal opacities/atelectasis- no evidence of PNA  - Procal 0.11,   C-Reactive Protein, Serum: 117 mg/L (07-20-21 @ 06:40)  - ACS ruled out - Troponins negative x4  - ID recs: no abx, no evidence of infection   - pain control: ibuprofen and tylenol     #Tachycardia   - EKG: sinus tachycardia      #Urinary retention  - Flomax started 7/22  - long placed 7/21 and removed 7/22 -> failed TOV 7/23 and long inserted again   - urology consult appreciated  - monitor for urine output      #Left colon distension  - Seen on CXR  - supp. ordered  - increase senna to bid, add lactulose     #Hypothyroidism  - Cont levothyroxine 100mcg qD    #Seizures disorder  - Cont depakote  TID    #Bipolar  #Schizophrenia  - Cont haldol 10mg ID  - Cont bupropion XL 150mg qD  - COnt mirtazapine 15mg qHs  - Cont benztropine 1mg BID    DVT PPX, Lovenox  Diet Regular   Activity: IAT

## 2021-07-27 NOTE — PROGRESS NOTE ADULT - PROVIDER SPECIALTY LIST ADULT
Internal Medicine
Hospitalist
Hospitalist
Internal Medicine
Hospitalist
Internal Medicine
Internal Medicine

## 2021-07-27 NOTE — CHART NOTE - NSCHARTNOTEFT_GEN_A_CORE
<<<RESIDENT DISCHARGE NOTE>>>     ALINA CHERY  MRN-[id number]    Patient seen and examined at bedside. Patient is still having some muscular chest pain. Tylenol and ibuprofen ordered PRN.   Patient will be discharged with long.     VITAL SIGNS:  T(F): 97.3 (07-27-21 @ 05:12), Max: 98.3 (07-26-21 @ 21:24)  HR: 97 (07-27-21 @ 05:12)  BP: 106/71 (07-27-21 @ 05:12)  SpO2: 99% (07-26-21 @ 21:24)      PHYSICAL EXAMINATION:  GEN: NAD, Resting comfortably in bed  PULM: Clear to auscultation bilaterally, No wheezes  CVS: Regular rate and rhythm, S1-S2, no murmurs  ABD: Soft, non-tender, non-distended, no guarding  EXT: No edema  NEURO: AAOx2, no focal deficits    TEST RESULTS:                        13.7   10.25 )-----------( 204      ( 26 Jul 2021 04:30 )             40.7       07-26    132<L>  |  93<L>  |  12  ----------------------------<  76  4.1   |  28  |  0.7    Ca    9.5      26 Jul 2021 04:30  Mg     1.9     07-26    TPro  6.4  /  Alb  3.4<L>  /  TBili  0.6  /  DBili  x   /  AST  20  /  ALT  11  /  AlkPhos  108  07-26      FINAL DISCHARGE INTERVIEW:  Resident(s) Present: Dr. Drea Encarnacion    DISCHARGE MEDICATION RECONCILIATION  reviewed with Attending     DISPOSITION:   [  ] Home,    [  ] Home with Visiting Nursing Services,   [  ]  SNF/ NH,    [  ] Acute Rehab (4A),   [ x ] Other (Specify: Group Home)

## 2021-07-27 NOTE — PROGRESS NOTE ADULT - NSICDXPILOT_GEN_ALL_CORE
[place]

## 2021-07-27 NOTE — PROGRESS NOTE ADULT - ATTENDING COMMENTS
50 year-old male with a PMH of intellectual disability, hypothyroidism, seizure disorder (on Depakote), bipolar disorder, schizophrenia, presenting s/p mechanical fall yesterday and an episode of chest pain this morning, admitted for further w/u. Patient was to be discharged back to group home yesterday but facility refused transfer as they do not believe patient is optimized as he is now 2 assist. PT re-ordered for assessment.     #Progress Note Handoff  Pending (specify):  n/a  Family discussion: spoke to group home. have several concerns about bringing him back when he is not ambulating(behavioral issue)  Disposition: STR at [facility]
50 year-old male with a PMH of intellectual disability, hypothyroidism, seizure disorder (on Depakote), bipolar disorder, schizophrenia, presenting s/p mechanical fall yesterday and an episode of chest pain this morning, admitted for further w/u. Patient was to be discharged back to group home yesterday but facility refused transfer as they do not believe patient is optimized as he is now 2 assist. PT re-ordered for assessment.     #Progress Note Handoff  Pending (specify):  n/a  Family discussion: patient  updated  Disposition: [facility]OSUE sent for STR
50 year-old male with a PMH of intellectual disability, hypothyroidism, seizure disorder (on Depakote), bipolar disorder, schizophrenia, presenting s/p mechanical fall yesterday and an episode of chest pain this morning, admitted for further w/u. Patient was to be discharged back to group home yesterday but facility refused transfer as they do not believe patient is optimized as he is now 2 assist. PT re-ordered for assessment.     #Progress Note Handoff  Pending (specify):  n/a  Family discussion: patient  updated  Disposition: returning back to group home tomorrow afternoon. Anticipate order placed
50 year-old male with a PMH of intellectual disability, hypothyroidism, seizure disorder (on Depakote), bipolar disorder, schizophrenia, presenting s/p mechanical fall yesterday and an episode of chest pain this morning, admitted for further w/u. Patient was to be discharged back to group home yesterday but facility refused transfer as they do not believe patient is optimized as he is now 2 assist. PT re-ordered for assessment. Awaiting furhter recs if patient needs dispo change to STR?

## 2021-08-01 DIAGNOSIS — Z88.1 ALLERGY STATUS TO OTHER ANTIBIOTIC AGENTS STATUS: ICD-10-CM

## 2021-08-01 DIAGNOSIS — E87.8 OTHER DISORDERS OF ELECTROLYTE AND FLUID BALANCE, NOT ELSEWHERE CLASSIFIED: ICD-10-CM

## 2021-08-01 DIAGNOSIS — E87.1 HYPO-OSMOLALITY AND HYPONATREMIA: ICD-10-CM

## 2021-08-01 DIAGNOSIS — R33.9 RETENTION OF URINE, UNSPECIFIED: ICD-10-CM

## 2021-08-01 DIAGNOSIS — R00.0 TACHYCARDIA, UNSPECIFIED: ICD-10-CM

## 2021-08-01 DIAGNOSIS — F20.9 SCHIZOPHRENIA, UNSPECIFIED: ICD-10-CM

## 2021-08-01 DIAGNOSIS — E03.9 HYPOTHYROIDISM, UNSPECIFIED: ICD-10-CM

## 2021-08-01 DIAGNOSIS — F31.9 BIPOLAR DISORDER, UNSPECIFIED: ICD-10-CM
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
## 2021-08-01 DIAGNOSIS — K63.89 OTHER SPECIFIED DISEASES OF INTESTINE: ICD-10-CM

## 2021-08-01 DIAGNOSIS — G40.909 EPILEPSY, UNSPECIFIED, NOT INTRACTABLE, WITHOUT STATUS EPILEPTICUS: ICD-10-CM

## 2021-08-01 DIAGNOSIS — J98.11 ATELECTASIS: ICD-10-CM
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
## 2021-08-01 DIAGNOSIS — M94.0 CHONDROCOSTAL JUNCTION SYNDROME [TIETZE]: ICD-10-CM

## 2021-09-07 ENCOUNTER — APPOINTMENT (OUTPATIENT)
Dept: NEUROLOGY | Facility: CLINIC | Age: 51
End: 2021-09-07

## 2021-09-21 ENCOUNTER — OUTPATIENT (OUTPATIENT)
Dept: OUTPATIENT SERVICES | Facility: HOSPITAL | Age: 51
LOS: 1 days | Discharge: HOME | End: 2021-09-21

## 2021-09-21 ENCOUNTER — APPOINTMENT (OUTPATIENT)
Dept: PODIATRY | Facility: CLINIC | Age: 51
End: 2021-09-21
Payer: MEDICAID

## 2021-09-21 DIAGNOSIS — Z98.890 OTHER SPECIFIED POSTPROCEDURAL STATES: Chronic | ICD-10-CM

## 2021-09-21 DIAGNOSIS — Z87.81 PERSONAL HISTORY OF (HEALED) TRAUMATIC FRACTURE: Chronic | ICD-10-CM

## 2021-09-21 PROCEDURE — 11721 DEBRIDE NAIL 6 OR MORE: CPT | Mod: NC

## 2021-09-21 NOTE — PHYSICAL EXAM
[FreeTextEntry1] : thick, dystrophic, discolored nails with subungual debris x 10\par \par recommend using daily moisturizer B/L LE RX Ammonium lactate\par \par

## 2021-09-21 NOTE — PROCEDURE
[FreeTextEntry1] : -Aseptic debridement of all fungal nails.  Patient has pain about the toes secondary to nail pressure and relates improvement with periodic debridement.\par - continue using daily moisturizer B/L\par -return 2 months\par

## 2021-09-21 NOTE — HISTORY OF PRESENT ILLNESS
[FreeTextEntry1] : 50 year old MRDD male here today for continued care of mycotic nails \par \par Patient also has a complaint of bilateral foot dryness \par

## 2021-11-16 ENCOUNTER — OUTPATIENT (OUTPATIENT)
Dept: OUTPATIENT SERVICES | Facility: HOSPITAL | Age: 51
LOS: 1 days | Discharge: HOME | End: 2021-11-16

## 2021-11-16 ENCOUNTER — APPOINTMENT (OUTPATIENT)
Dept: PODIATRY | Facility: CLINIC | Age: 51
End: 2021-11-16
Payer: MEDICAID

## 2021-11-16 DIAGNOSIS — Z87.81 PERSONAL HISTORY OF (HEALED) TRAUMATIC FRACTURE: Chronic | ICD-10-CM

## 2021-11-16 DIAGNOSIS — Z98.890 OTHER SPECIFIED POSTPROCEDURAL STATES: Chronic | ICD-10-CM

## 2021-11-16 PROCEDURE — 11721 DEBRIDE NAIL 6 OR MORE: CPT | Mod: NC

## 2021-11-16 NOTE — PHYSICAL EXAM
[FreeTextEntry1] : thick, dystrophic, discolored nails with subungual debris x 10\par \par \par \par

## 2021-11-16 NOTE — HISTORY OF PRESENT ILLNESS
[FreeTextEntry1] : 50 year old MRDD male here today for continued care of mycotic nails \par \par \par

## 2021-11-26 DIAGNOSIS — B35.1 TINEA UNGUIUM: ICD-10-CM

## 2021-11-26 DIAGNOSIS — M79.671 PAIN IN RIGHT FOOT: ICD-10-CM

## 2021-11-26 DIAGNOSIS — M79.672 PAIN IN LEFT FOOT: ICD-10-CM

## 2022-01-27 NOTE — PROGRESS NOTE ADULT - PROBLEM SELECTOR PROBLEM 3
Subjective:       Patient ID: Yuri Mena is a 58 y.o. male.    Chief Complaint: cough   HPI   The patient location is: home  The chief complaint leading to consultation is: cough    Visit type: audiovisual    Face to Face time with patient: 9 mins  10 minutes of total time spent on the encounter, which includes face to face time and non-face to face time preparing to see the patient (eg, review of tests), Obtaining and/or reviewing separately obtained history, Documenting clinical information in the electronic or other health record, Independently interpreting results (not separately reported) and communicating results to the patient/family/caregiver, or Care coordination (not separately reported).         Each patient to whom he or she provides medical services by telemedicine is:  (1) informed of the relationship between the physician and patient and the respective role of any other health care provider with respect to management of the patient; and (2) notified that he or she may decline to receive medical services by telemedicine and may withdraw from such care at any time.    Notes:   Patient calls in - sister is helping with the visit- for low grade temp/cough that started a few days ago   States the whole family that he is around had some similar symptoms and were treated with abx and cough medication with improvement   No one was + for covid   Pulse O2 is normal   Some shortness of breath per sister   Somewhat wheezing   Eating and drinking per usual   No GI complaints         Health Maintenance Due   Topic Date Due    TETANUS VACCINE  Never done    Shingles Vaccine (1 of 2) Never done       Past Medical History:   Diagnosis Date    Atrial fibrillation     Colitis     Diabetes mellitus 2019     07/12/2021    Hypercholesterolemia     Rubinstein-Taybi syndrome        Current Outpatient Medications   Medication Sig Dispense Refill    ascorbic acid, vitamin C, (VITAMIN C) 100 MG tablet Take 100  mg by mouth once daily.      aspirin 81 MG Chew Take 81 mg by mouth once daily.      atorvastatin (LIPITOR) 40 MG tablet Take 1 tablet (40 mg total) by mouth once daily. 30 tablet 2    calcium-vitamin D3 (CALCIUM 500 + D) 500 mg(1,250mg) -200 unit per tablet Take 1 tablet by mouth 2 (two) times daily with meals.      JANUVIA 25 mg Tab TAKE 1 TABLET (25 MG TOTAL) BY MOUTH ONCE DAILY. 30 tablet 5    ketoconazole (NIZORAL) 2 % cream Apply topically 2 (two) times daily. 30 g 1    metFORMIN (GLUCOPHAGE-XR) 500 MG ER 24hr tablet Take 1 tablet (500 mg total) by mouth 2 (two) times daily with meals. 180 tablet 3    pantoprazole (PROTONIX) 40 MG tablet Take 1 tablet (40 mg total) by mouth once daily. 30 tablet 2     No current facility-administered medications for this visit.       Review of Systems   Constitutional: Positive for fever. Negative for chills.   HENT: Positive for postnasal drip and rhinorrhea. Negative for ear pain and sore throat.    Respiratory: Positive for cough. Negative for shortness of breath and wheezing.    Cardiovascular: Negative for chest pain.   Musculoskeletal: Negative for myalgias.   Skin: Negative for rash.   Allergic/Immunologic: Negative for environmental allergies.   Neurological: Negative for headaches.       Objective:   Pulse 98   SpO2 98%      Physical Exam  Constitutional:       Appearance: Normal appearance.   HENT:      Head: Normocephalic and atraumatic.   Pulmonary:      Effort: Pulmonary effort is normal.      Comments: Coughing at times   Neurological:      Mental Status: He is alert and oriented to person, place, and time. Mental status is at baseline.   Psychiatric:         Mood and Affect: Mood normal.         Behavior: Behavior normal.           Lab Results   Component Value Date    WBC 5.68 08/02/2021    HGB 13.7 (L) 08/02/2021    HCT 42.4 08/02/2021     08/02/2021    CHOL 112 (L) 03/10/2021    TRIG 137 03/10/2021    HDL 41 03/10/2021    ALT 19 08/02/2021     AST 21 08/02/2021     08/02/2021    K 3.9 08/02/2021    CL 95 08/02/2021    CREATININE 0.8 08/02/2021    BUN 6 08/02/2021    CO2 32 (H) 08/02/2021    TSH 1.826 03/10/2021    PSA 1.2 03/10/2021    HGBA1C 5.2 11/04/2021       Assessment:       1. Rubinstein-Taybi syndrome    2. Controlled type 2 diabetes mellitus without complication, without long-term current use of insulin    3. Productive cough        Plan:   Rubinstein-Taybi syndrome    Controlled type 2 diabetes mellitus without complication, without long-term current use of insulin    Productive cough      Recommend a covid test  Family has had a LRI that is not covid so they are nto thinking he has it  With risk factors and newer tx available, suggest a test    Sending in tx for productive bronchitis    Mental retardation

## 2022-01-28 NOTE — DISCHARGE NOTE ADULT - CARE PLAN
Principal Discharge DX:	Swelling of both lower extremities  Goal:	Stable. Medical management. Please follow up with your primary care doctor.  Assessment and plan of treatment:	Your feet swelling likely due to low albumin. Please continue your medications as prescribed. Please follow up with your primary care doctor in 1-2 weeks.  Secondary Diagnosis:	UTI (urinary tract infection)  Goal:	Resolved. Medical management. Please follow up with your primary care doctor.  Assessment and plan of treatment:	You completed your course of antibiotics in the hospital. Please continue your medications as prescribed. Please follow up with your primary care doctor in 1-2 weeks.  Secondary Diagnosis:	Schizophrenia  Goal:	Stable. Medical management. Please follow up with your psychiatrist.  Assessment and plan of treatment:	Please continue your medications as prescribed. Please follow up with your psychiatrist.  Secondary Diagnosis:	Hypothyroidism  Goal:	Stable. Medical management. Please follow up with your primary care doctor.  Assessment and plan of treatment:	Please continue your medications as prescribed. Please follow up with your primary care doctor in 1-2 weeks.  Secondary Diagnosis:	Anemia  Goal:	Stable. Medical management. Please follow up with your primary care doctor.  Assessment and plan of treatment:	Please continue your medications as prescribed. Please follow up with your primary care doctor in 1-2 weeks. Please obtain your blood work regularly to assess your anemia. Principal Discharge DX:	Swelling of both lower extremities  Goal:	Stable. Medical management. Please follow up with your primary care doctor.  Assessment and plan of treatment:	Pt presented with complain of bilateral lower extremity swelling but it was subjective. There is no edema on examination.  Lower extremity Duplex was negative for DVT.   TSH was within normal limit.  Pt advised to continue taking medications as prescribed.  Maintain healthy diet and stay active.  Secondary Diagnosis:	UTI (urinary tract infection)  Goal:	Resolved. Medical management. Please follow up with your primary care doctor.  Assessment and plan of treatment:	Pt completed course of antibiotics in the hospital.   Please continue medications as prescribed. Please follow up with primary care doctor in 1-2 weeks.  Secondary Diagnosis:	Schizophrenia  Goal:	Stable. Medical management. Please follow up with your psychiatrist.  Assessment and plan of treatment:	Please continue medications as prescribed. Please follow up with psychiatrist.  Depakote has been decreased to 500mg Three times a day.  Follow up with PMD in 2 days and get depakote level checked in 2 days.  Secondary Diagnosis:	Hypothyroidism  Goal:	Stable. Medical management. Please follow up with your primary care doctor.  Assessment and plan of treatment:	Please continue medications as prescribed. Please follow up with primary care doctor in 1-2 weeks.  Secondary Diagnosis:	Anemia  Goal:	Stable. Medical management. Please follow up with your primary care doctor.  Assessment and plan of treatment:	Please continue medications as prescribed. Please follow up with primary care doctor in 1-2 weeks. Please obtain your blood work regularly to assess your anemia. Principal Discharge DX:	Swelling of both lower extremities  Goal:	Stable. Medical management. Please follow up with your primary care doctor.  Assessment and plan of treatment:	Pt presented with complain of bilateral lower extremity swelling but it was subjective. There is no edema on examination.  Lower extremity Duplex was negative for DVT.   TSH was within normal limit.  Pt advised to continue taking medications as prescribed.  Maintain healthy diet and stay active.  Secondary Diagnosis:	UTI (urinary tract infection)  Goal:	Resolved. Medical management. Please follow up with your primary care doctor.  Assessment and plan of treatment:	Pt completed course of antibiotics in the hospital.   Please continue medications as prescribed. Please follow up with primary care doctor in 1-2 weeks.  stay hydrated.  maintain good personal and hand hygiene.  Secondary Diagnosis:	Schizophrenia  Goal:	Stable. Medical management. Please follow up with your psychiatrist.  Assessment and plan of treatment:	Please continue medications as prescribed. Please follow up with psychiatrist.  Depakote has been decreased to 500mg Three times a day.  Follow up with PMD in 2 days and get depakote level checked in 2 days.  Secondary Diagnosis:	Hypothyroidism  Goal:	Stable. Medical management. Please follow up with your primary care doctor.  Assessment and plan of treatment:	Please continue medications as prescribed. Please follow up with primary care doctor in 1-2 weeks.  Secondary Diagnosis:	Anemia  Goal:	Stable. Medical management. Please follow up with your primary care doctor.  Assessment and plan of treatment:	Please continue medications as prescribed. Please follow up with primary care doctor in 1-2 weeks. Please obtain your blood work regularly to assess your anemia. Principal Discharge DX:	Swelling of both lower extremities  Goal:	Stable. Medical management. Please follow up with your primary care doctor.  Assessment and plan of treatment:	Pt presented with complain of bilateral lower extremity swelling but it was subjective. There is no edema on examination.  Lower extremity Duplex was negative for DVT.   TSH was within normal limit.  Pt advised to continue taking medications as prescribed.  Maintain healthy diet and stay active.  follow up with Orthopedics Dr Guzmán as outpt in 1 week.  wear hard sole shoes.  Secondary Diagnosis:	UTI (urinary tract infection)  Goal:	Resolved. Medical management. Please follow up with your primary care doctor.  Assessment and plan of treatment:	Pt completed course of antibiotics in the hospital.   Please continue medications as prescribed. Please follow up with primary care doctor in 1-2 weeks.  stay hydrated.  maintain good personal and hand hygiene.  Secondary Diagnosis:	Schizophrenia  Goal:	Stable. Medical management. Please follow up with your psychiatrist.  Assessment and plan of treatment:	Please continue medications as prescribed. Please follow up with psychiatrist.  Depakote has been decreased to 500mg Three times a day.  Follow up with PMD in 2 days and get depakote level checked in 2 days.  Secondary Diagnosis:	Hypothyroidism  Goal:	Stable. Medical management. Please follow up with your primary care doctor.  Assessment and plan of treatment:	Please continue medications as prescribed. Please follow up with primary care doctor in 1-2 weeks.  Secondary Diagnosis:	Anemia  Goal:	Stable. Medical management. Please follow up with your primary care doctor.  Assessment and plan of treatment:	Please continue medications as prescribed. Please follow up with primary care doctor in 1-2 weeks. Please obtain your blood work regularly to assess your anemia. Principal Discharge DX:	Schizophrenia  Goal:	Stable. Medical management. Please follow up with your primary care doctor.  Assessment and plan of treatment:	Pt presented with complain of bilateral lower extremity swelling but it was subjective. There is no edema on examination.  Refusal to ambulate might be psychologic, no gross deformity or orthopaedic contraindication to WBAT.   Lower extremity Duplex was negative for DVT.   TSH was within normal limit.  Pt advised to continue taking medications as prescribed.  Maintain healthy diet and stay active.  follow up with Orthopedics Dr Guzmán as outpt in 1 week.  wear hard sole shoes.  Please follow up with psychiatrist.  Depakote has been decreased to 500mg Three times a day.  Follow up with PMD in 2 days and get depakote level checked in 2 days.  Secondary Diagnosis:	UTI (urinary tract infection)  Goal:	Resolved. Medical management. Please follow up with your primary care doctor.  Assessment and plan of treatment:	Pt completed course of antibiotics in the hospital.   Please continue medications as prescribed. Please follow up with primary care doctor in 1-2 weeks.  stay hydrated.  maintain good personal and hand hygiene.  Secondary Diagnosis:	Hypothyroidism  Goal:	Stable. Medical management. Please follow up with your psychiatrist.  Assessment and plan of treatment:	Continue Levothyroxine.  Secondary Diagnosis:	Anemia  Goal:	Stable. Medical management. Please follow up with your primary care doctor.  Assessment and plan of treatment:	Please continue medications as prescribed. Please follow up with primary care doctor in 1-2 weeks. Normal gait / station

## 2022-02-15 ENCOUNTER — OUTPATIENT (OUTPATIENT)
Dept: OUTPATIENT SERVICES | Facility: HOSPITAL | Age: 52
LOS: 1 days | Discharge: HOME | End: 2022-02-15

## 2022-02-15 ENCOUNTER — APPOINTMENT (OUTPATIENT)
Dept: PODIATRY | Facility: CLINIC | Age: 52
End: 2022-02-15
Payer: MEDICAID

## 2022-02-15 DIAGNOSIS — Z98.890 OTHER SPECIFIED POSTPROCEDURAL STATES: Chronic | ICD-10-CM

## 2022-02-15 DIAGNOSIS — Z87.81 PERSONAL HISTORY OF (HEALED) TRAUMATIC FRACTURE: Chronic | ICD-10-CM

## 2022-02-15 PROCEDURE — 11721 DEBRIDE NAIL 6 OR MORE: CPT | Mod: NC

## 2022-02-25 DIAGNOSIS — B35.3 TINEA PEDIS: ICD-10-CM

## 2022-02-25 DIAGNOSIS — B35.1 TINEA UNGUIUM: ICD-10-CM

## 2022-02-25 DIAGNOSIS — M79.671 PAIN IN RIGHT FOOT: ICD-10-CM

## 2022-02-25 DIAGNOSIS — M79.672 PAIN IN LEFT FOOT: ICD-10-CM

## 2022-03-11 NOTE — PHYSICAL THERAPY INITIAL EVALUATION ADULT - MD/RN NOTIFIED
yes Hydroquinone Counseling:  Patient advised that medication may result in skin irritation, lightening (hypopigmentation), dryness, and burning.  In the event of skin irritation, the patient was advised to reduce the amount of the drug applied or use it less frequently.  Rarely, spots that are treated with hydroquinone can become darker (pseudoochronosis).  Should this occur, patient instructed to stop medication and call the office. The patient verbalized understanding of the proper use and possible adverse effects of hydroquinone.  All of the patient's questions and concerns were addressed.

## 2022-03-30 ENCOUNTER — APPOINTMENT (OUTPATIENT)
Dept: OTOLARYNGOLOGY | Facility: CLINIC | Age: 52
End: 2022-03-30

## 2022-04-22 ENCOUNTER — APPOINTMENT (OUTPATIENT)
Dept: OTOLARYNGOLOGY | Facility: CLINIC | Age: 52
End: 2022-04-22
Payer: MEDICAID

## 2022-04-22 DIAGNOSIS — H61.20 IMPACTED CERUMEN, UNSPECIFIED EAR: ICD-10-CM

## 2022-04-22 PROCEDURE — 69210 REMOVE IMPACTED EAR WAX UNI: CPT

## 2022-04-22 NOTE — PHYSICAL EXAM
[Normal] : mucosa is normal [Midline] : trachea located in midline position [de-identified] : cerumen impaction, removed with curette

## 2022-05-03 ENCOUNTER — APPOINTMENT (OUTPATIENT)
Dept: NEUROLOGY | Facility: CLINIC | Age: 52
End: 2022-05-03
Payer: MEDICAID

## 2022-05-03 ENCOUNTER — NON-APPOINTMENT (OUTPATIENT)
Age: 52
End: 2022-05-03

## 2022-05-03 ENCOUNTER — OUTPATIENT (OUTPATIENT)
Dept: OUTPATIENT SERVICES | Facility: HOSPITAL | Age: 52
LOS: 1 days | Discharge: HOME | End: 2022-05-03

## 2022-05-03 VITALS
TEMPERATURE: 97.2 F | BODY MASS INDEX: 26.22 KG/M2 | WEIGHT: 173 LBS | DIASTOLIC BLOOD PRESSURE: 88 MMHG | HEART RATE: 113 BPM | OXYGEN SATURATION: 97 % | SYSTOLIC BLOOD PRESSURE: 137 MMHG | HEIGHT: 68 IN

## 2022-05-03 DIAGNOSIS — Z98.890 OTHER SPECIFIED POSTPROCEDURAL STATES: Chronic | ICD-10-CM

## 2022-05-03 DIAGNOSIS — Z87.81 PERSONAL HISTORY OF (HEALED) TRAUMATIC FRACTURE: Chronic | ICD-10-CM

## 2022-05-03 PROCEDURE — 99213 OFFICE O/P EST LOW 20 MIN: CPT

## 2022-05-03 NOTE — PHYSICAL EXAM
[Impaired Insight] : insight and judgment were intact [Affect] : the affect was normal [Person] : oriented to person [Cranial Nerves Optic (II)] : visual acuity intact bilaterally,  visual fields full to confrontation, pupils equal round and reactive to light [Cranial Nerves Oculomotor (III)] : extraocular motion intact [Cranial Nerves Trigeminal (V)] : facial sensation intact symmetrically [Cranial Nerves Facial (VII)] : face symmetrical [Cranial Nerves Vestibulocochlear (VIII)] : hearing was intact bilaterally [Cranial Nerves Accessory (XI - Cranial And Spinal)] : head turning and shoulder shrug symmetric [Motor Tone] : muscle tone was normal in all four extremities [Motor Strength] : muscle strength was normal in all four extremities [Involuntary Movements] : no involuntary movements were seen [No Muscle Atrophy] : normal bulk in all four extremities [Balance] : balance was intact [PERRL With Normal Accommodation] : pupils were equal in size, round, reactive to light, with normal accommodation [Outer Ear] : the ears and nose were normal in appearance [Hearing Threshold Finger Rub Not Coffey] : hearing was normal [Neck Appearance] : the appearance of the neck was normal [Heart Sounds] : normal S1 and S2 [Edema] : there was no peripheral edema [Abnormal Walk] : normal gait [Skin Turgor] : normal skin turgor [] : no rash [FreeTextEntry1] : intellectual impairment at baseline but oriented  [Paresis Pronator Drift Right-Sided] : no pronator drift on the right [Paresis Pronator Drift Left-Sided] : no pronator drift on the left [Motor Strength Upper Extremities Bilaterally] : strength was normal in both upper extremities [Motor Strength Lower Extremities Bilaterally] : strength was normal in both lower extremities

## 2022-05-03 NOTE — ASSESSMENT
[FreeTextEntry1] : 51M from [facility] w/ h/o IDD, hypothyroidism, seizure disorder/epilepsy controlled on Depakote presenting for f/u and med refill (last seen Mar 2021)\par \par #Seizure Disorder\par - c/w Depakote  mg 2 tabs TID (refill sent to pharmacy)\par - Labs: CBC, CMP (LFT's), valproic acid level, and ammonia level (last checked in 2019 and normal)\par - RTC 12 months or PRN\par

## 2022-05-03 NOTE — HISTORY OF PRESENT ILLNESS
[FreeTextEntry1] : 51M from [facility] w/ h/o IDD, hypothyroidism, seizure disorder/epilepsy controlled on Depakote presenting for f/u and med refill (last seen Mar 2021). Per aide, patient has had no seizures for > 5 years. No new complaints and overall feeling well. \par

## 2022-05-04 NOTE — ED PROVIDER NOTE - PROGRESS NOTE3
Detail Level: Detailed Quality 130: Documentation Of Current Medications In The Medical Record: Current Medications Documented Stable.

## 2022-05-17 ENCOUNTER — OUTPATIENT (OUTPATIENT)
Dept: OUTPATIENT SERVICES | Facility: HOSPITAL | Age: 52
LOS: 1 days | Discharge: HOME | End: 2022-05-17

## 2022-05-17 ENCOUNTER — APPOINTMENT (OUTPATIENT)
Dept: PODIATRY | Facility: CLINIC | Age: 52
End: 2022-05-17
Payer: MEDICAID

## 2022-05-17 VITALS
HEART RATE: 101 BPM | BODY MASS INDEX: 26.07 KG/M2 | DIASTOLIC BLOOD PRESSURE: 79 MMHG | WEIGHT: 172 LBS | TEMPERATURE: 98.1 F | SYSTOLIC BLOOD PRESSURE: 117 MMHG | HEIGHT: 68 IN

## 2022-05-17 DIAGNOSIS — Z98.890 OTHER SPECIFIED POSTPROCEDURAL STATES: Chronic | ICD-10-CM

## 2022-05-17 DIAGNOSIS — Z87.81 PERSONAL HISTORY OF (HEALED) TRAUMATIC FRACTURE: Chronic | ICD-10-CM

## 2022-05-17 PROCEDURE — 11721 DEBRIDE NAIL 6 OR MORE: CPT | Mod: NC

## 2022-05-17 PROCEDURE — 99212 OFFICE O/P EST SF 10 MIN: CPT | Mod: NC,25

## 2022-05-17 RX ORDER — CLOTRIMAZOLE 10 MG/G
1 CREAM TOPICAL 3 TIMES DAILY
Qty: 1 | Refills: 3 | Status: ACTIVE | COMMUNITY
Start: 2022-05-17 | End: 1900-01-01

## 2022-05-17 NOTE — PROCEDURE
[FreeTextEntry1] : -Aseptic debridement of all fungal nails.  Patient has pain about the toes secondary to nail pressure and relates improvement with periodic debridement.\par - rx clotrimazole. apply to bottom and top part of the foot twice a day\par -return 2 months\par

## 2022-05-17 NOTE — HISTORY OF PRESENT ILLNESS
[FreeTextEntry1] : 51 year old MRDD male here today for continued care of mycotic nails \par \par \par

## 2022-05-17 NOTE — PHYSICAL EXAM
[FreeTextEntry1] : thick, dystrophic, discolored nails with subungual debris x 10\par Noted scaling lesions on the plantar surface of both feet \par \par \par

## 2022-05-18 DIAGNOSIS — M79.672 PAIN IN LEFT FOOT: ICD-10-CM

## 2022-05-18 DIAGNOSIS — B35.3 TINEA PEDIS: ICD-10-CM

## 2022-05-18 DIAGNOSIS — M79.671 PAIN IN RIGHT FOOT: ICD-10-CM

## 2022-05-18 DIAGNOSIS — B35.1 TINEA UNGUIUM: ICD-10-CM

## 2022-06-08 NOTE — H&P PST ADULT - ALCOHOL USE HISTORY SINGLE SELECT
never
Orientation to room/Bed in low position, brakes on/Side rails x 2 or 4 up, assess large gaps, such that a patient could get extremity or other body part entrapped, use additional safety procedures/Use of non-skid footwear for ambulating patients, use of appropriate size clothing to prevent risk of tripping/Assess eliminations need, assist as needed/Call light is within reach, educate patient/family on its functionality/Environment clear of unused equipment, furniture's in place, clear of hazards/Assess for adequate lighting, leave nightlight on

## 2022-07-19 ENCOUNTER — OUTPATIENT (OUTPATIENT)
Dept: OUTPATIENT SERVICES | Facility: HOSPITAL | Age: 52
LOS: 1 days | Discharge: HOME | End: 2022-07-19

## 2022-07-19 ENCOUNTER — APPOINTMENT (OUTPATIENT)
Dept: PODIATRY | Facility: CLINIC | Age: 52
End: 2022-07-19

## 2022-07-19 DIAGNOSIS — Z98.890 OTHER SPECIFIED POSTPROCEDURAL STATES: Chronic | ICD-10-CM

## 2022-07-19 DIAGNOSIS — Z87.81 PERSONAL HISTORY OF (HEALED) TRAUMATIC FRACTURE: Chronic | ICD-10-CM

## 2022-07-19 PROCEDURE — 99212 OFFICE O/P EST SF 10 MIN: CPT | Mod: NC,25

## 2022-07-19 PROCEDURE — 11721 DEBRIDE NAIL 6 OR MORE: CPT | Mod: NC

## 2022-07-19 RX ORDER — CLOTRIMAZOLE 10 MG/G
1 CREAM TOPICAL
Qty: 1 | Refills: 5 | Status: ACTIVE | COMMUNITY
Start: 2022-07-19 | End: 1900-01-01

## 2022-07-19 NOTE — PHYSICAL EXAM
[FreeTextEntry1] : thick, dystrophic, discolored nails with subungual debris x 10\par Noted scaling lesions on the plantar surface of both feet \par Dry flaky skin plantar foot in moccasin distribution B/L consistent with Tinea Pedis\par \par

## 2022-07-20 DIAGNOSIS — M79.671 PAIN IN RIGHT FOOT: ICD-10-CM

## 2022-07-20 DIAGNOSIS — B35.3 TINEA PEDIS: ICD-10-CM

## 2022-07-20 DIAGNOSIS — N35.912 UNSPECIFIED BULBOUS URETHRAL STRICTURE, MALE: ICD-10-CM

## 2022-07-20 DIAGNOSIS — M79.672 PAIN IN LEFT FOOT: ICD-10-CM

## 2022-07-20 DIAGNOSIS — B35.1 TINEA UNGUIUM: ICD-10-CM

## 2022-07-20 DIAGNOSIS — F70 MILD INTELLECTUAL DISABILITIES: ICD-10-CM

## 2022-08-15 ENCOUNTER — TRANSCRIPTION ENCOUNTER (OUTPATIENT)
Age: 52
End: 2022-08-15

## 2022-08-15 ENCOUNTER — INPATIENT (INPATIENT)
Facility: HOSPITAL | Age: 52
LOS: 8 days | Discharge: NURSING, REHAB OR LONG-TERM CARE | End: 2022-08-24
Attending: COLON & RECTAL SURGERY | Admitting: COLON & RECTAL SURGERY

## 2022-08-15 VITALS
TEMPERATURE: 95 F | HEIGHT: 69 IN | DIASTOLIC BLOOD PRESSURE: 109 MMHG | SYSTOLIC BLOOD PRESSURE: 162 MMHG | HEART RATE: 96 BPM | RESPIRATION RATE: 20 BRPM | WEIGHT: 160.06 LBS | OXYGEN SATURATION: 97 %

## 2022-08-15 DIAGNOSIS — Z87.81 PERSONAL HISTORY OF (HEALED) TRAUMATIC FRACTURE: Chronic | ICD-10-CM

## 2022-08-15 DIAGNOSIS — Z98.890 OTHER SPECIFIED POSTPROCEDURAL STATES: Chronic | ICD-10-CM

## 2022-08-15 LAB
ALBUMIN SERPL ELPH-MCNC: 4.2 G/DL — SIGNIFICANT CHANGE UP (ref 3.5–5.2)
ALP SERPL-CCNC: 95 U/L — SIGNIFICANT CHANGE UP (ref 30–115)
ALT FLD-CCNC: 10 U/L — SIGNIFICANT CHANGE UP (ref 0–41)
ANION GAP SERPL CALC-SCNC: 11 MMOL/L — SIGNIFICANT CHANGE UP (ref 7–14)
APPEARANCE UR: CLEAR — SIGNIFICANT CHANGE UP
APTT BLD: 32 SEC — SIGNIFICANT CHANGE UP (ref 27–39.2)
AST SERPL-CCNC: 20 U/L — SIGNIFICANT CHANGE UP (ref 0–41)
BACTERIA # UR AUTO: ABNORMAL
BASOPHILS # BLD AUTO: 0.02 K/UL — SIGNIFICANT CHANGE UP (ref 0–0.2)
BASOPHILS NFR BLD AUTO: 0.2 % — SIGNIFICANT CHANGE UP (ref 0–1)
BILIRUB SERPL-MCNC: 0.3 MG/DL — SIGNIFICANT CHANGE UP (ref 0.2–1.2)
BILIRUB UR-MCNC: NEGATIVE — SIGNIFICANT CHANGE UP
BLD GP AB SCN SERPL QL: SIGNIFICANT CHANGE UP
BUN SERPL-MCNC: 15 MG/DL — SIGNIFICANT CHANGE UP (ref 10–20)
CALCIUM SERPL-MCNC: 9.7 MG/DL — SIGNIFICANT CHANGE UP (ref 8.5–10.1)
CHLORIDE SERPL-SCNC: 96 MMOL/L — LOW (ref 98–110)
CO2 SERPL-SCNC: 26 MMOL/L — SIGNIFICANT CHANGE UP (ref 17–32)
COLOR SPEC: SIGNIFICANT CHANGE UP
CREAT SERPL-MCNC: 0.9 MG/DL — SIGNIFICANT CHANGE UP (ref 0.7–1.5)
DIFF PNL FLD: NEGATIVE — SIGNIFICANT CHANGE UP
EGFR: 103 ML/MIN/1.73M2 — SIGNIFICANT CHANGE UP
EOSINOPHIL # BLD AUTO: 0.03 K/UL — SIGNIFICANT CHANGE UP (ref 0–0.7)
EOSINOPHIL NFR BLD AUTO: 0.2 % — SIGNIFICANT CHANGE UP (ref 0–8)
EPI CELLS # UR: 0 /HPF — SIGNIFICANT CHANGE UP (ref 0–5)
GLUCOSE SERPL-MCNC: 64 MG/DL — LOW (ref 70–99)
GLUCOSE UR QL: NEGATIVE — SIGNIFICANT CHANGE UP
HCT VFR BLD CALC: 42 % — SIGNIFICANT CHANGE UP (ref 42–52)
HGB BLD-MCNC: 14.1 G/DL — SIGNIFICANT CHANGE UP (ref 14–18)
HYALINE CASTS # UR AUTO: 1 /LPF — SIGNIFICANT CHANGE UP (ref 0–7)
IMM GRANULOCYTES NFR BLD AUTO: 0.7 % — HIGH (ref 0.1–0.3)
INR BLD: 1.03 RATIO — SIGNIFICANT CHANGE UP (ref 0.65–1.3)
KETONES UR-MCNC: NEGATIVE — SIGNIFICANT CHANGE UP
LACTATE SERPL-SCNC: 1 MMOL/L — SIGNIFICANT CHANGE UP (ref 0.7–2)
LEUKOCYTE ESTERASE UR-ACNC: ABNORMAL
LIDOCAIN IGE QN: 29 U/L — SIGNIFICANT CHANGE UP (ref 7–60)
LYMPHOCYTES # BLD AUTO: 1.18 K/UL — LOW (ref 1.2–3.4)
LYMPHOCYTES # BLD AUTO: 9.7 % — LOW (ref 20.5–51.1)
MCHC RBC-ENTMCNC: 31.4 PG — HIGH (ref 27–31)
MCHC RBC-ENTMCNC: 33.6 G/DL — SIGNIFICANT CHANGE UP (ref 32–37)
MCV RBC AUTO: 93.5 FL — SIGNIFICANT CHANGE UP (ref 80–94)
MONOCYTES # BLD AUTO: 0.91 K/UL — HIGH (ref 0.1–0.6)
MONOCYTES NFR BLD AUTO: 7.5 % — SIGNIFICANT CHANGE UP (ref 1.7–9.3)
NEUTROPHILS # BLD AUTO: 9.96 K/UL — HIGH (ref 1.4–6.5)
NEUTROPHILS NFR BLD AUTO: 81.7 % — HIGH (ref 42.2–75.2)
NITRITE UR-MCNC: POSITIVE
NRBC # BLD: 0 /100 WBCS — SIGNIFICANT CHANGE UP (ref 0–0)
PH UR: 7 — SIGNIFICANT CHANGE UP (ref 5–8)
PLATELET # BLD AUTO: 158 K/UL — SIGNIFICANT CHANGE UP (ref 130–400)
POTASSIUM SERPL-MCNC: 4.1 MMOL/L — SIGNIFICANT CHANGE UP (ref 3.5–5)
POTASSIUM SERPL-SCNC: 4.1 MMOL/L — SIGNIFICANT CHANGE UP (ref 3.5–5)
PROT SERPL-MCNC: 7.3 G/DL — SIGNIFICANT CHANGE UP (ref 6–8)
PROT UR-MCNC: NEGATIVE — SIGNIFICANT CHANGE UP
PROTHROM AB SERPL-ACNC: 11.8 SEC — SIGNIFICANT CHANGE UP (ref 9.95–12.87)
RBC # BLD: 4.49 M/UL — LOW (ref 4.7–6.1)
RBC # FLD: 13.7 % — SIGNIFICANT CHANGE UP (ref 11.5–14.5)
RBC CASTS # UR COMP ASSIST: 1 /HPF — SIGNIFICANT CHANGE UP (ref 0–4)
SARS-COV-2 RNA SPEC QL NAA+PROBE: SIGNIFICANT CHANGE UP
SODIUM SERPL-SCNC: 133 MMOL/L — LOW (ref 135–146)
SP GR SPEC: 1.01 — SIGNIFICANT CHANGE UP (ref 1.01–1.03)
UROBILINOGEN FLD QL: SIGNIFICANT CHANGE UP
WBC # BLD: 12.19 K/UL — HIGH (ref 4.8–10.8)
WBC # FLD AUTO: 12.19 K/UL — HIGH (ref 4.8–10.8)
WBC UR QL: 19 /HPF — HIGH (ref 0–5)

## 2022-08-15 PROCEDURE — 99284 EMERGENCY DEPT VISIT MOD MDM: CPT

## 2022-08-15 PROCEDURE — 99222 1ST HOSP IP/OBS MODERATE 55: CPT | Mod: GC

## 2022-08-15 PROCEDURE — 45378 DIAGNOSTIC COLONOSCOPY: CPT

## 2022-08-15 PROCEDURE — 74177 CT ABD & PELVIS W/CONTRAST: CPT | Mod: 26,MA

## 2022-08-15 PROCEDURE — 74018 RADEX ABDOMEN 1 VIEW: CPT | Mod: 26,76

## 2022-08-15 PROCEDURE — 99285 EMERGENCY DEPT VISIT HI MDM: CPT

## 2022-08-15 PROCEDURE — 99223 1ST HOSP IP/OBS HIGH 75: CPT | Mod: 25

## 2022-08-15 PROCEDURE — 71045 X-RAY EXAM CHEST 1 VIEW: CPT | Mod: 26

## 2022-08-15 RX ORDER — SODIUM CHLORIDE 9 MG/ML
1000 INJECTION INTRAMUSCULAR; INTRAVENOUS; SUBCUTANEOUS ONCE
Refills: 0 | Status: COMPLETED | OUTPATIENT
Start: 2022-08-15 | End: 2022-08-15

## 2022-08-15 RX ORDER — ENOXAPARIN SODIUM 100 MG/ML
40 INJECTION SUBCUTANEOUS EVERY 24 HOURS
Refills: 0 | Status: DISCONTINUED | OUTPATIENT
Start: 2022-08-15 | End: 2022-08-18

## 2022-08-15 RX ORDER — ONDANSETRON 8 MG/1
4 TABLET, FILM COATED ORAL EVERY 6 HOURS
Refills: 0 | Status: DISCONTINUED | OUTPATIENT
Start: 2022-08-15 | End: 2022-08-18

## 2022-08-15 RX ORDER — HALOPERIDOL DECANOATE 100 MG/ML
10 INJECTION INTRAMUSCULAR THREE TIMES A DAY
Refills: 0 | Status: DISCONTINUED | OUTPATIENT
Start: 2022-08-15 | End: 2022-08-18

## 2022-08-15 RX ORDER — PANTOPRAZOLE SODIUM 20 MG/1
40 TABLET, DELAYED RELEASE ORAL DAILY
Refills: 0 | Status: DISCONTINUED | OUTPATIENT
Start: 2022-08-15 | End: 2022-08-18

## 2022-08-15 RX ORDER — PIPERACILLIN AND TAZOBACTAM 4; .5 G/20ML; G/20ML
3.38 INJECTION, POWDER, LYOPHILIZED, FOR SOLUTION INTRAVENOUS EVERY 8 HOURS
Refills: 0 | Status: DISCONTINUED | OUTPATIENT
Start: 2022-08-15 | End: 2022-08-16

## 2022-08-15 RX ORDER — BENZTROPINE MESYLATE 1 MG
1 TABLET ORAL
Refills: 0 | Status: DISCONTINUED | OUTPATIENT
Start: 2022-08-15 | End: 2022-08-18

## 2022-08-15 RX ORDER — MIRTAZAPINE 45 MG/1
15 TABLET, ORALLY DISINTEGRATING ORAL AT BEDTIME
Refills: 0 | Status: DISCONTINUED | OUTPATIENT
Start: 2022-08-15 | End: 2022-08-18

## 2022-08-15 RX ORDER — BUPROPION HYDROCHLORIDE 150 MG/1
150 TABLET, EXTENDED RELEASE ORAL DAILY
Refills: 0 | Status: DISCONTINUED | OUTPATIENT
Start: 2022-08-15 | End: 2022-08-18

## 2022-08-15 RX ORDER — VALPROIC ACID (AS SODIUM SALT) 250 MG/5ML
375 SOLUTION, ORAL ORAL THREE TIMES A DAY
Refills: 0 | Status: DISCONTINUED | OUTPATIENT
Start: 2022-08-15 | End: 2022-08-18

## 2022-08-15 RX ORDER — LEVOTHYROXINE SODIUM 125 MCG
100 TABLET ORAL DAILY
Refills: 0 | Status: DISCONTINUED | OUTPATIENT
Start: 2022-08-15 | End: 2022-08-18

## 2022-08-15 RX ORDER — PIPERACILLIN AND TAZOBACTAM 4; .5 G/20ML; G/20ML
3.38 INJECTION, POWDER, LYOPHILIZED, FOR SOLUTION INTRAVENOUS ONCE
Refills: 0 | Status: COMPLETED | OUTPATIENT
Start: 2022-08-15 | End: 2022-08-15

## 2022-08-15 RX ORDER — TAMSULOSIN HYDROCHLORIDE 0.4 MG/1
0.4 CAPSULE ORAL AT BEDTIME
Refills: 0 | Status: DISCONTINUED | OUTPATIENT
Start: 2022-08-15 | End: 2022-08-18

## 2022-08-15 RX ADMIN — SODIUM CHLORIDE 1000 MILLILITER(S): 9 INJECTION INTRAMUSCULAR; INTRAVENOUS; SUBCUTANEOUS at 12:26

## 2022-08-15 RX ADMIN — Medication 53.75 MILLIGRAM(S): at 23:02

## 2022-08-15 RX ADMIN — PIPERACILLIN AND TAZOBACTAM 200 GRAM(S): 4; .5 INJECTION, POWDER, LYOPHILIZED, FOR SOLUTION INTRAVENOUS at 22:01

## 2022-08-15 NOTE — CONSULT NOTE ADULT - ASSESSMENT
50 yo male with a pmh of intellectual disability, seizures, bipolar, schizophrenia, and depression BIBA for abdominal pain at nursing facility. GI consulted for sigmoid volvulus on CT scan.    # Sigmoid volvulus:  - patient is hemodynamically stable  - CT scan finding as above, discussed with radiology  - repeat KUB noted, also discussed with radiology  - s/p rectal tube placement with no decompression  - discussed with group home director at [phone], no next of kin/HCP/family on chart    recommendations:  - keep NPO  - STAT COVID  - STAT CBC/CMP/INR/Type and screen  - will plan for emergent colonoscopy for decompression will proceed with 2 physician consent)    discussed with patient/Aid/GP director/ER 50 yo male with a pmh of intellectual disability, seizures, bipolar, schizophrenia, and depression BIBA for abdominal pain at nursing facility. GI consulted for sigmoid volvulus on CT scan.    # Sigmoid volvulus:  - patient is hemodynamically stable  - CT scan finding as above, discussed with radiology  - repeat KUB noted, also discussed with radiology  - s/p rectal tube placement with no decompression  - discussed with group home director at [phone], no next of kin/HCP/family on chart    recommendations:  - keep NPO  - STAT COVID  - STAT CBC/CMP/INR/Type and screen  - will plan for emergent flex sigmodicscopy for decompression (will proceed with 2 physician consent)    discussed with patient/Aid/GP director/ER

## 2022-08-15 NOTE — CONSULT NOTE ADULT - SUBJECTIVE AND OBJECTIVE BOX
Gastroenterology Consultation:    Patient is a 51y old  Male who presents with a chief complaint of       Admitted on: 08-15-22      HPI:  50 yo male with a pmh of intellectual disability, seizures, bipolar, schizophrenia, and depression BIBA for abdominal pain at nursing facility. pt accompanied by aide who states that she noted pt looked pale and his BP was 91/38 and ambulance was called. pt started to c/o abdominal pain when ambulance arrived. pt has experienced dry heaves with no vomiting. denies any other symptoms including fevers, chill, headache, recent illness/travel, cough, chest pain, or SOB.      Prior EGD: none on chart    Prior Colonoscopy: none on chart      PAST MEDICAL & SURGICAL HISTORY:  Epilepsy  last event 4+ yrs ago      Schizophrenia      Bipolar 1 disorder      Major depressive disorder      Anemia      Dyspepsia      Hypothyroidism      Constipation      MR (mental retardation)      H/O cystoscopy      H/O laminectomy  03/2001      H/O fracture of fibula  nondisplaced 03/2010            FAMILY HISTORY:  No pertinent family history in first degree relatives        Social History:  Tobacco: -ve  Alcohol: -ve  Drugs: -ve    Home Medications:  acetaminophen 325 mg oral tablet: 2 tab(s) orally every 6 hours, As needed, Temp greater or equal to 38C (100.4F), Mild Pain (1 - 3) (23 Jul 2021 15:59)  benztropine 1 mg oral tablet: 1 tab(s) orally 2 times a day (08 Dec 2020 12:38)  buPROPion 150 mg/24 hours (XL) oral tablet, extended release: 1 tab(s) orally every 24 hours (08 Dec 2020 12:38)  haloperidol 10 mg oral tablet: 1 tab(s) orally 3 times a day (08 Dec 2020 12:38)  ibuprofen 400 mg oral tablet: 1 tab(s) orally 2 times a day, As needed, Mild Pain (1 - 3) (23 Jul 2021 15:59)  mirtazapine 15 mg oral tablet: mirtazapine 15mg tablet 1 tablet oral at bedtime (08 Dec 2020 12:38)  Senexon-S 50 mg-8.6 mg oral tablet: 1 tab(s) orally once a day (at bedtime) (08 Dec 2020 12:38)  Synthroid 100 mcg (0.1 mg) oral tablet: synthroid 100mcg tablet (levothyroxine 100 mcg tablet) 1 tablet oral every day (08 Dec 2020 12:38)        MEDICATIONS  (STANDING):    MEDICATIONS  (PRN):      Allergies  erythromycin (Other)  phenothiazines (Unknown)      Review of Systems:   Constitutional:  No Fever, No Chills  ENT/Mouth:  No Hearing Changes,  No Difficulty Swallowing  Eyes:  No Eye Pain, No Vision Changes  Cardiovascular:  No Chest Pain, No Palpitations  Respiratory:  No Cough, No Dyspnea  Gastrointestinal:  As described in HPI  Musculoskeletal:  No Joint Swelling, No Back Pain  Skin:  No Skin Lesions, No Jaundice  Neuro:  No Syncope, No Dizziness  Heme/Lymph:  No Bruising, No Bleeding.          Physical Examination:  T(C): 35.7 (08-15-22 @ 16:12), Max: 35.7 (08-15-22 @ 16:12)  HR: 78 (08-15-22 @ 16:12) (78 - 96)  BP: 170/91 (08-15-22 @ 16:12) (162/109 - 170/91)  RR: 18 (08-15-22 @ 16:12) (18 - 20)  SpO2: 97% (08-15-22 @ 16:12) (97% - 97%)  Height (cm): 175.3 (08-15-22 @ 09:55)  Weight (kg): 72.6 (08-15-22 @ 09:55)        GENERAL: AAOx3, no acute distress.  HEAD:  Atraumatic, Normocephalic  EYES: conjunctiva and sclera clear  NECK: Supple, no JVD or thyromegaly  CHEST/LUNG: Clear to auscultation bilaterally; No wheeze, rhonchi, or rales  HEART: Regular rate and rhythm; normal S1, S2, No murmurs.  ABDOMEN: Soft, nontender, nondistended; Bowel sounds present  NEUROLOGY: No asterixis or tremor.   SKIN: Intact, no jaundice        Data:                        14.1   12.19 )-----------( 158      ( 15 Aug 2022 12:00 )             42.0     Hgb Trend:  14.1  08-15-22 @ 12:00      08-15    133<L>  |  96<L>  |  15  ----------------------------<  64<L>  4.1   |  26  |  0.9    Ca    9.7      15 Aug 2022 12:00    TPro  7.3  /  Alb  4.2  /  TBili  0.3  /  DBili  x   /  AST  20  /  ALT  10  /  AlkPhos  95  08-15    Liver panel trend:  TBili 0.3   /   AST 20   /   ALT 10   /   AlkP 95   /   Tptn 7.3   /   Alb 4.2    /   DBili --      08-15              Radiology:  CT Abdomen and Pelvis w/ IV Cont:   ACC: [id number] EXAM:  CT ABDOMEN AND PELVIS IC                          PROCEDURE DATE:  08/15/2022          INTERPRETATION:  CLINICAL STATEMENT: Abdominal pain    TECHNIQUE: Contiguous axial CT images were obtained from the lower chest   to the pubic symphysis 75 cc of Omnipaque 350 intravenous contrast.  Oral   contrast was not given.  Reformatted images in the coronal and sagittal   planes were acquired.    COMPARISON CT: 4/19/2020    Study is limited by artifact due to overlying arms and motion.    FINDINGS:    LOWER CHEST: There is a new small right pleural effusion with new lower   lung field opacities    HEPATOBILIARY: Unremarkable..    SPLEEN: Unremarkable..    PANCREAS: Unremarkable..    ADRENAL GLANDS: Unremarkable..    KIDNEYS: Nohydronephrosis. Subcentimeter hypodensities are too small to   characterize.    ABDOMINOPELVIC NODES: Unremarkable..    PELVIC ORGANS: There is diffuse asymmetric bladder wall thickening with a   severely distended bladder. This does not appear significantly changed   since prior examination. There is mild pelvic ascites.    PERITONEUM/MESENTERY/BOWEL: There is swirling of mesentery in the mid   pelvis for exam on image 54 series 2 with dilatation in what appears to   be the sigmoid colon, findings concerning for sigmoid volvulus. The   remaining large bowel is distended with air and stool. However, the small   bowel is completely collapsed. There is distention of the stomach. There   is no evidence of free air.    BONES/SOFT TISSUES: Degenerative change. The bones are osteopenic...   There are compression deformities of T11, T12 and L2, stable.    OTHER: Mild vascular calcifications..      IMPRESSION:    Swirling of the mesentery in the mid pelvis with dilatation of what   appears to be the  sigmoid colon, findings are concerning for sigmoid   volvulus.    New small right pleural effusion with a new associated bibasilar opacities    ANGE Smith was made aware of the above findings on 8/15/2022 at 2:17 PM   with read back    --- End of Report ---            JOSE ARMANDO NOLASCO MD; Attending Radiologist  This document has been electronically signed. Aug 15 2022  2:18PM (08-15-22 @ 12:10)

## 2022-08-15 NOTE — CONSULT NOTE ADULT - ASSESSMENT
51M w/ PMH/PSH as above who presented with clinical and radiographic findings of acute sigmoid volvulus.  Discussed with advanced GI team and patient underwent STAT endoscopic decompression.  Patient underwent partial detorsion with twisted mucosa seen at 35-50cm, scope advanced to 100cm, noted to decompress but partial twisting observed upon scope removal.  Patient clinically improved and stool disimpacted during procedure.  Discussed with GI team and medical team plan for medical optimization and risk stratification prior to laparoscopic sigmoidectomy on this admission.    Plan:   -S/p endoscopic decompression   -Medical optimization and risk stratification prior to planned laparoscopic sigmoidectomy on this admission   -Patient unable to consent for himself, 2 physician consent performed for emergent endoscopic decompression, will again reach out to NH to identify HCP.   -DIscussed with surgical attending

## 2022-08-15 NOTE — CHART NOTE - NSCHARTNOTEFT_GEN_A_CORE
s/p colonoscopy with detortion of the sigmoid volvulus.   Keep NPO.  give 2 tap water enemas.  repeat KUB in the AM.  Surgery consult.    will follow Colonoscopy findings:   -Attempted colonoscopy by using low Co2 insufflation intermittently. Solid stool was noted from the rectum to sigmoid colon. With careful examination under water spirally twisted colonic mucosa was seen at 35 to 50 cm in the sigmoid colon signifying the torsional obstruction. After bypassing this point aggressive endoscopic evacuation of air was performed to decompress the dilated bowel. Reached upto 100 cm from the anal verge unable to advance further due to stool impaction. After decompressing the colon endoscopic detorsion of the volvulus was performed by clock wise rotation of the endoscope. While coming out partial twisting of the colon was noted again. Regardless of multiple attempts was able to achieve partial detorsion.    Recs:   NPO     2 tap water enema     X ray KUB daily    Surgery consult     Encourage ambulation

## 2022-08-15 NOTE — H&P ADULT - HISTORY OF PRESENT ILLNESS
Patient is a 50 yo male with PMH of Mild mental retardation, intellectual disability, Schizophrenia, Impulse control disorder, Seizures, Hypothyroidism, Exotropia, Cataracts, Osteopenia, presented from [facility] for hypotension and abdominal pain. Obtained hx from aid at bedside. Per Aid, patient was not feeling well and dry heaving at facility. On vital check at facility found to have a BP of 91/38?, called EMS. Patient reported abdominal pain to EMS and was brought to the hospital. Aid denies, fevers, chills, weight loss, sob, chest pain, vomiting, diarrhea at facility. Symptoms started today. At bedside, patient still reports diffuse abdominal pain, does not radiate anywhere else. Unable to answer other ROS questions.     In ED Vitals: T 95, /109, HR 96, RR 20, 97% on RA. WBC 12.19k, CMP wnl, lactate normal. UA positive   - KUB with distended bowel loops concerning for sigmoid volvulus   - CT A/P with Swirling of the mesentery in the mid pelvis with dilatation of what appears to be the  sigmoid colon, findings are concerning for sigmoid volvulus. New small right pleural effusion with a new associated bibasilar opacities.   - Chest Xray: Mild prominence of the interstitial markings and bibasilar atelectasis/fibrosis.    GI was consulted in ED. S/p rectal tube placement with no decompression. Taken emergently for flex sigmoidoscopy for decompression after 2 physician consent as patient does not have next of kin/HCP/family on chart.     Patient admitted to medicine for further eval.

## 2022-08-15 NOTE — ED PROVIDER NOTE - OBJECTIVE STATEMENT
50 yo male with a pmh of intellectual disability, seizures, bipolar, schizophrenia, and depression BIBA for hypotension at nursing facility. pt accompanied by aide who states that she noted pt looked pale and his BP was 91/38 and ambulance was called. pt started to c/o abdominal pain when ambulance arrived. pt has experienced dry heaves with no vomiting. denies any other symptoms including fevers, chill, headache, recent illness/travel, cough, chest pain, or SOB.

## 2022-08-15 NOTE — ED PROVIDER NOTE - NS ED ATTENDING STATEMENT MOD
This was a shared visit with the [facility]. I reviewed and verified the documentation and independently performed the documented:

## 2022-08-15 NOTE — CONSULT NOTE ADULT - SUBJECTIVE AND OBJECTIVE BOX
SURGERY CONSULT NOTE    Patient: ALINA CHERY , 51y (68)Male   MRN: [id number]  Location: [facility] 002 A  Visit: 08-15-22 Inpatient  Date: 08-15-22 @ 21:00    HPI: 51M w/ PMH of schizophrenia, intellectual disability,seizure disorder, hypothyroid, lives in a NH form which he was sent in after being found to have urinary retention 2/2 UTI, abdominal pain.  In the ED patient underwent a CTAP which had significant dilation of the sigmoid colon concerning for sigmoid volvulus, for which surgery was consulted.  Patient promptly seen bedside, in NAD, AAOx3 and with aide form group home, vitals WNL.  DIscussed with GI team and plan for STAT endoscopic decompression.      Reason for Surgical Consult:  Sigmoid Volvulus      PAST MEDICAL & SURGICAL HISTORY:  Epilepsy  last event 4+ yrs ago      Schizophrenia      Bipolar 1 disorder      Major depressive disorder      Anemia      Dyspepsia      Hypothyroidism      Constipation      MR (mental retardation)      H/O cystoscopy      H/O laminectomy  2001      H/O fracture of fibula  nondisplaced 2010          Home Medications:  benztropine 1 mg oral tablet: 1 tab(s) orally 2 times a day (08 Dec 2020 12:38)  buPROPion 150 mg/24 hours (XL) oral tablet, extended release: 1 tab(s) orally every 24 hours (08 Dec 2020 12:38)  divalproex sodium 250 mg oral delayed release tablet: 2 tab(s) orally 3 times a day (15 Aug 2022 20:52)  haloperidol 10 mg oral tablet: 1 tab(s) orally 3 times a day (08 Dec 2020 12:38)  mirtazapine 15 mg oral tablet: mirtazapine 15mg tablet 1 tablet oral at bedtime (08 Dec 2020 12:38)  Senexon-S 50 mg-8.6 mg oral tablet: 1 tab(s) orally once a day (at bedtime) (08 Dec 2020 12:38)  Synthroid 100 mcg (0.1 mg) oral tablet: synthroid 100mcg tablet (levothyroxine 100 mcg tablet) 1 tablet oral every day (08 Dec 2020 12:38)        VITALS:  T(F): 97.7 (08-15-22 @ 19:59), Max: 97.7 (08-15-22 @ 19:59)  HR: 79 (08-15-22 @ 20:15) (78 - 96)  BP: 155/93 (08-15-22 @ 20:15) (122/73 - 170/91)  RR: 18 (08-15-22 @ 20:15) (16 - 20)  SpO2: 98% (08-15-22 @ 20:15) (93% - 99%)    PHYSICAL EXAM:  General: NAD, AAOx3  Cardiac: RRR  Respiratory: Unlabored breathing at rest  Abdomen: Soft, non-disended, non-tender  Musculoskeletal: Strength 5/5 BL UE/LE, ROM intact, compartments soft  Neuro: Sensation grossly intact and equal throughout, no focal deficits  Skin: Warm/dry, normal color, no jaundice      MEDICATIONS  (STANDING):  enoxaparin Injectable 40 milliGRAM(s) SubCutaneous every 24 hours  pantoprazole  Injectable 40 milliGRAM(s) IV Push daily  piperacillin/tazobactam IVPB. 3.375 Gram(s) IV Intermittent once  piperacillin/tazobactam IVPB.. 3.375 Gram(s) IV Intermittent every 8 hours    MEDICATIONS  (PRN):      LAB/STUDIES:                        14.1   12.19 )-----------( 158      ( 15 Aug 2022 12:00 )             42.0     08-15    133<L>  |  96<L>  |  15  ----------------------------<  64<L>  4.1   |  26  |  0.9    Ca    9.7      15 Aug 2022 12:00    TPro  7.3  /  Alb  4.2  /  TBili  0.3  /  DBili  x   /  AST  20  /  ALT  10  /  AlkPhos  95  08-15    PT/INR - ( 15 Aug 2022 17:46 )   PT: 11.80 sec;   INR: 1.03 ratio         PTT - ( 15 Aug 2022 17:46 )  PTT:32.0 sec  LIVER FUNCTIONS - ( 15 Aug 2022 12:00 )  Alb: 4.2 g/dL / Pro: 7.3 g/dL / ALK PHOS: 95 U/L / ALT: 10 U/L / AST: 20 U/L / GGT: x           Urinalysis Basic - ( 15 Aug 2022 15:41 )    Color: Light Yellow / Appearance: Clear / S.015 / pH: x  Gluc: x / Ketone: Negative  / Bili: Negative / Urobili: <2 mg/dL   Blood: x / Protein: Negative / Nitrite: Positive   Leuk Esterase: Large / RBC: 1 /HPF / WBC 19 /HPF   Sq Epi: x / Non Sq Epi: 0 /HPF / Bacteria: Many                  IMAGING:  < from: CT Abdomen and Pelvis w/ IV Cont (08.15.22 @ 12:10) >  Swirling of the mesentery in the mid pelvis with dilatation of what   appears to be the  sigmoid colon, findings are concerning for sigmoid   volvulus.    < end of copied text >     SURGERY CONSULT NOTE    Patient: ALINA CHERY , 51y (68)Male   MRN: [id number]  Location: [facility] 002 A  Visit: 08-15-22 Inpatient  Date: 08-15-22 @ 21:00    HPI: 51M w/ PMH of schizophrenia, intellectual disability,seizure disorder, hypothyroid, lives in a NH form which he was sent in after being found to have urinary retention 2/2 UTI, abdominal pain.  In the ED patient underwent a CTAP which had significant dilation of the sigmoid colon concerning for sigmoid volvulus, for which surgery was consulted.  Patient promptly seen bedside, in NAD, AAOx3 and with aide form group home, vitals WNL.  DIscussed with GI team and plan for STAT endoscopic decompression.      Reason for Surgical Consult:  Sigmoid Volvulus      PAST MEDICAL & SURGICAL HISTORY:  Epilepsy  last event 4+ yrs ago      Schizophrenia      Bipolar 1 disorder      Major depressive disorder      Anemia      Dyspepsia      Hypothyroidism      Constipation      MR (mental retardation)      H/O cystoscopy      H/O laminectomy  2001      H/O fracture of fibula  nondisplaced 2010          Home Medications:  benztropine 1 mg oral tablet: 1 tab(s) orally 2 times a day (08 Dec 2020 12:38)  buPROPion 150 mg/24 hours (XL) oral tablet, extended release: 1 tab(s) orally every 24 hours (08 Dec 2020 12:38)  divalproex sodium 250 mg oral delayed release tablet: 2 tab(s) orally 3 times a day (15 Aug 2022 20:52)  haloperidol 10 mg oral tablet: 1 tab(s) orally 3 times a day (08 Dec 2020 12:38)  mirtazapine 15 mg oral tablet: mirtazapine 15mg tablet 1 tablet oral at bedtime (08 Dec 2020 12:38)  Senexon-S 50 mg-8.6 mg oral tablet: 1 tab(s) orally once a day (at bedtime) (08 Dec 2020 12:38)  Synthroid 100 mcg (0.1 mg) oral tablet: synthroid 100mcg tablet (levothyroxine 100 mcg tablet) 1 tablet oral every day (08 Dec 2020 12:38)        VITALS:  T(F): 97.7 (08-15-22 @ 19:59), Max: 97.7 (08-15-22 @ 19:59)  HR: 79 (08-15-22 @ 20:15) (78 - 96)  BP: 155/93 (08-15-22 @ 20:15) (122/73 - 170/91)  RR: 18 (08-15-22 @ 20:15) (16 - 20)  SpO2: 98% (08-15-22 @ 20:15) (93% - 99%)    PHYSICAL EXAM:  General: NAD, AAOx3  Cardiac: RRR  Respiratory: Unlabored breathing at rest  Abdomen: Soft, distended, non-tender in all quadrants  Musculoskeletal: Strength 5/5 BL UE/LE, ROM intact, compartments soft  Neuro: Sensation grossly intact and equal throughout, no focal deficits  Skin: Warm/dry, normal color, no jaundice      MEDICATIONS  (STANDING):  enoxaparin Injectable 40 milliGRAM(s) SubCutaneous every 24 hours  pantoprazole  Injectable 40 milliGRAM(s) IV Push daily  piperacillin/tazobactam IVPB. 3.375 Gram(s) IV Intermittent once  piperacillin/tazobactam IVPB.. 3.375 Gram(s) IV Intermittent every 8 hours    MEDICATIONS  (PRN):      LAB/STUDIES:                        14.1   12.19 )-----------( 158      ( 15 Aug 2022 12:00 )             42.0     08-15    133<L>  |  96<L>  |  15  ----------------------------<  64<L>  4.1   |  26  |  0.9    Ca    9.7      15 Aug 2022 12:00    TPro  7.3  /  Alb  4.2  /  TBili  0.3  /  DBili  x   /  AST  20  /  ALT  10  /  AlkPhos  95  08-15    PT/INR - ( 15 Aug 2022 17:46 )   PT: 11.80 sec;   INR: 1.03 ratio         PTT - ( 15 Aug 2022 17:46 )  PTT:32.0 sec  LIVER FUNCTIONS - ( 15 Aug 2022 12:00 )  Alb: 4.2 g/dL / Pro: 7.3 g/dL / ALK PHOS: 95 U/L / ALT: 10 U/L / AST: 20 U/L / GGT: x           Urinalysis Basic - ( 15 Aug 2022 15:41 )    Color: Light Yellow / Appearance: Clear / S.015 / pH: x  Gluc: x / Ketone: Negative  / Bili: Negative / Urobili: <2 mg/dL   Blood: x / Protein: Negative / Nitrite: Positive   Leuk Esterase: Large / RBC: 1 /HPF / WBC 19 /HPF   Sq Epi: x / Non Sq Epi: 0 /HPF / Bacteria: Many                  IMAGING:  < from: CT Abdomen and Pelvis w/ IV Cont (08.15.22 @ 12:10) >  Swirling of the mesentery in the mid pelvis with dilatation of what   appears to be the  sigmoid colon, findings are concerning for sigmoid   volvulus.    < end of copied text >      Attempted colonoscopy by using low Co2 insufflation intermittently. Solid stool was noted from the rectum to sigmoid colon. With careful examination under water spirally twisted colonic mucosa was seen at 35 to 50 cm in the sigmoid colon signifying the torsional obstruction. After bypassing this point aggressive endoscopic evacuation of air was performed to decompress the dilated bowel. Reached upto 100 cm from the anal verge unable to advance further due to stool impaction. After decompressing the colon endoscopic detorsion of the volvulus was performed by clock wise rotation of the endoscope. While coming out partial twisting of the colon was noted again. Regardless of multiple attempts was able to achieve partial detorsion.

## 2022-08-15 NOTE — H&P ADULT - ASSESSMENT
Patient is a 50 yo male with PMH of Mild mental retardation, intellectual disability, Schizophrenia, Impulse control disorder, Seizures, Hypothyroidism, Exotropia, Cataracts, Osteopenia, presented from [facility], [place], operated by [facility] for hypotension and abdominal pain found to  have sigmoid volvulus and septic on admission.     #Sigmoid Volvulus  - Abdomen soft, tender   - Lactate normal   - s/p rectal tube placement with no decompression in ED  - patient does not have a next of kin   - GI following   - s/p emergent flex sigmoidoscopy with successful detorsion by GI after 2 physician consent  - Keep NPO  - 2 tap water enemas as per GI  - KUB in am  - serial abdominal exams  - Surgery consult     #Sepsis on admission 2/2 to UTI/ LL pneumonia?/ sigmoid volvulus  - WBC 12k, hypothermic, tachycardic, tachypneic   - UA with high wbc, large leuk, positive nitrite, bacteria   - Chest Xray with Mild prominence of the interstitial markings and bibasilar atelectasis/fibrosis.  - CT A/P new small right pleural effusion with new lower lung field opacities, unclear if pneumonia   - Start Zosyn for broad coverage   - MRSA swab  - F/U UCX and Blood Culture  - procalc   - F/U procalc  - ID consult   - Speech and swallow eval once cleared by GI for PO diet     #Seizure/ Epilepsy  - c/w depakote  - Will switch to IV while NPO  - Patient is on divalproex sodium DR 250mg, 2 pills, TID. Confirmed with pharmacy. Equivalent IV is 375mg IV q6   - f/u valproate level     #Hypothyroidism  - C/w synthroid   - F/U TSH level     #Schizophrenia  #Impulse control Disorder  #Intellectual disability  - c/w home medications     #Misc:  - GI ppx: PPI qd  - DVT ppx: lovenox  - Activity bed rest/ fall risk  - Code status: Full code > per aid, patient makes his own decisions and has no next of kin however, given current medical complexity, unclear if patient has appropriate understanding.   - Diet: NPO  - Dispo: acute, admit to medicine

## 2022-08-15 NOTE — ED PROVIDER NOTE - CLINICAL SUMMARY MEDICAL DECISION MAKING FREE TEXT BOX
50yo M history of schizophrenia MR bipolar d/o Hypothyroidism 50yo M history of schizophrenia MR bipolar d/o Hypothyroidism presenting with abdominal pain with assoc nausea x today. Per aid, pt was hypotensive initially, now improved. No other acute complaints.  non toxic. NCAT PERRLA EOMI  normal wob  abdomen distended, mildly diffuse ttp WWPx4 neuro non focal. labs imaging reviewed. pt found to have sigmoid volvulus. sp GI eval, will take for sigmoidoscopy. surgery aware. will admit for further eval.

## 2022-08-15 NOTE — H&P ADULT - NSHPPHYSICALEXAM_GEN_ALL_CORE
GENERAL: AOx 2  HEENT:  Atraumatic, Normocephalic. EOMI, PERRLA, conjunctiva and sclera clear, No JVD  PULMONARY: Clear to auscultation bilaterally; No wheeze  CARDIOVASCULAR: Regular rate and rhythm; No murmurs, rubs, or gallops  GASTROINTESTINAL: soft, diffuse abdominal tenderness, bowel sounds diminished   MUSCULOSKELETAL:  2+ Peripheral Pulses, No clubbing, cyanosis, or edema  NEUROLOGY: non-focal  SKIN: No rashes or lesions

## 2022-08-16 LAB
A1C WITH ESTIMATED AVERAGE GLUCOSE RESULT: 5.1 % — SIGNIFICANT CHANGE UP (ref 4–5.6)
ALBUMIN SERPL ELPH-MCNC: 3.5 G/DL — SIGNIFICANT CHANGE UP (ref 3.5–5.2)
ALP SERPL-CCNC: 74 U/L — SIGNIFICANT CHANGE UP (ref 30–115)
ALT FLD-CCNC: 9 U/L — SIGNIFICANT CHANGE UP (ref 0–41)
ANION GAP SERPL CALC-SCNC: 6 MMOL/L — LOW (ref 7–14)
APTT BLD: 34.6 SEC — SIGNIFICANT CHANGE UP (ref 27–39.2)
AST SERPL-CCNC: 14 U/L — SIGNIFICANT CHANGE UP (ref 0–41)
BASOPHILS # BLD AUTO: 0.02 K/UL — SIGNIFICANT CHANGE UP (ref 0–0.2)
BASOPHILS NFR BLD AUTO: 0.2 % — SIGNIFICANT CHANGE UP (ref 0–1)
BILIRUB SERPL-MCNC: 0.4 MG/DL — SIGNIFICANT CHANGE UP (ref 0.2–1.2)
BUN SERPL-MCNC: 9 MG/DL — LOW (ref 10–20)
CALCIUM SERPL-MCNC: 9 MG/DL — SIGNIFICANT CHANGE UP (ref 8.5–10.1)
CHLORIDE SERPL-SCNC: 104 MMOL/L — SIGNIFICANT CHANGE UP (ref 98–110)
CHOLEST SERPL-MCNC: 115 MG/DL — SIGNIFICANT CHANGE UP
CO2 SERPL-SCNC: 27 MMOL/L — SIGNIFICANT CHANGE UP (ref 17–32)
CREAT SERPL-MCNC: 0.7 MG/DL — SIGNIFICANT CHANGE UP (ref 0.7–1.5)
CRP SERPL-MCNC: 3.7 MG/L — SIGNIFICANT CHANGE UP
EGFR: 111 ML/MIN/1.73M2 — SIGNIFICANT CHANGE UP
EOSINOPHIL # BLD AUTO: 0.1 K/UL — SIGNIFICANT CHANGE UP (ref 0–0.7)
EOSINOPHIL NFR BLD AUTO: 1.2 % — SIGNIFICANT CHANGE UP (ref 0–8)
ERYTHROCYTE [SEDIMENTATION RATE] IN BLOOD: 4 MM/HR — SIGNIFICANT CHANGE UP (ref 0–10)
ESTIMATED AVERAGE GLUCOSE: 100 MG/DL — SIGNIFICANT CHANGE UP (ref 68–114)
GLUCOSE SERPL-MCNC: 78 MG/DL — SIGNIFICANT CHANGE UP (ref 70–99)
HCT VFR BLD CALC: 36.8 % — LOW (ref 42–52)
HDLC SERPL-MCNC: 50 MG/DL — SIGNIFICANT CHANGE UP
HGB BLD-MCNC: 12.7 G/DL — LOW (ref 14–18)
IMM GRANULOCYTES NFR BLD AUTO: 0.5 % — HIGH (ref 0.1–0.3)
INR BLD: 1.03 RATIO — SIGNIFICANT CHANGE UP (ref 0.65–1.3)
LACTATE SERPL-SCNC: 0.6 MMOL/L — LOW (ref 0.7–2)
LIPID PNL WITH DIRECT LDL SERPL: 52 MG/DL — SIGNIFICANT CHANGE UP
LYMPHOCYTES # BLD AUTO: 1.81 K/UL — SIGNIFICANT CHANGE UP (ref 1.2–3.4)
LYMPHOCYTES # BLD AUTO: 21 % — SIGNIFICANT CHANGE UP (ref 20.5–51.1)
MCHC RBC-ENTMCNC: 31.4 PG — HIGH (ref 27–31)
MCHC RBC-ENTMCNC: 34.5 G/DL — SIGNIFICANT CHANGE UP (ref 32–37)
MCV RBC AUTO: 91.1 FL — SIGNIFICANT CHANGE UP (ref 80–94)
MONOCYTES # BLD AUTO: 0.97 K/UL — HIGH (ref 0.1–0.6)
MONOCYTES NFR BLD AUTO: 11.3 % — HIGH (ref 1.7–9.3)
MRSA PCR RESULT.: NEGATIVE — SIGNIFICANT CHANGE UP
NEUTROPHILS # BLD AUTO: 5.66 K/UL — SIGNIFICANT CHANGE UP (ref 1.4–6.5)
NEUTROPHILS NFR BLD AUTO: 65.8 % — SIGNIFICANT CHANGE UP (ref 42.2–75.2)
NON HDL CHOLESTEROL: 65 MG/DL — SIGNIFICANT CHANGE UP
NRBC # BLD: 0 /100 WBCS — SIGNIFICANT CHANGE UP (ref 0–0)
PLATELET # BLD AUTO: 152 K/UL — SIGNIFICANT CHANGE UP (ref 130–400)
POTASSIUM SERPL-MCNC: 3.9 MMOL/L — SIGNIFICANT CHANGE UP (ref 3.5–5)
POTASSIUM SERPL-SCNC: 3.9 MMOL/L — SIGNIFICANT CHANGE UP (ref 3.5–5)
PROCALCITONIN SERPL-MCNC: 0.03 NG/ML — SIGNIFICANT CHANGE UP (ref 0.02–0.1)
PROT SERPL-MCNC: 6.4 G/DL — SIGNIFICANT CHANGE UP (ref 6–8)
PROTHROM AB SERPL-ACNC: 11.8 SEC — SIGNIFICANT CHANGE UP (ref 9.95–12.87)
RBC # BLD: 4.04 M/UL — LOW (ref 4.7–6.1)
RBC # FLD: 13.6 % — SIGNIFICANT CHANGE UP (ref 11.5–14.5)
SODIUM SERPL-SCNC: 137 MMOL/L — SIGNIFICANT CHANGE UP (ref 135–146)
T4 AB SER-ACNC: 7.3 UG/DL — SIGNIFICANT CHANGE UP (ref 4.6–12)
TRIGL SERPL-MCNC: 63 MG/DL — SIGNIFICANT CHANGE UP
TSH SERPL-MCNC: 2.54 UIU/ML — SIGNIFICANT CHANGE UP (ref 0.27–4.2)
VALPROATE SERPL-MCNC: 66 UG/ML — SIGNIFICANT CHANGE UP (ref 50–100)
WBC # BLD: 8.6 K/UL — SIGNIFICANT CHANGE UP (ref 4.8–10.8)
WBC # FLD AUTO: 8.6 K/UL — SIGNIFICANT CHANGE UP (ref 4.8–10.8)

## 2022-08-16 PROCEDURE — 99233 SBSQ HOSP IP/OBS HIGH 50: CPT

## 2022-08-16 PROCEDURE — 99232 SBSQ HOSP IP/OBS MODERATE 35: CPT

## 2022-08-16 PROCEDURE — 74018 RADEX ABDOMEN 1 VIEW: CPT | Mod: 26

## 2022-08-16 PROCEDURE — 71045 X-RAY EXAM CHEST 1 VIEW: CPT | Mod: 26

## 2022-08-16 RX ORDER — CEFTRIAXONE 500 MG/1
2000 INJECTION, POWDER, FOR SOLUTION INTRAMUSCULAR; INTRAVENOUS EVERY 24 HOURS
Refills: 0 | Status: DISCONTINUED | OUTPATIENT
Start: 2022-08-16 | End: 2022-08-18

## 2022-08-16 RX ORDER — METRONIDAZOLE 500 MG
500 TABLET ORAL EVERY 8 HOURS
Refills: 0 | Status: DISCONTINUED | OUTPATIENT
Start: 2022-08-16 | End: 2022-08-17

## 2022-08-16 RX ADMIN — Medication 53.75 MILLIGRAM(S): at 13:23

## 2022-08-16 RX ADMIN — BUPROPION HYDROCHLORIDE 150 MILLIGRAM(S): 150 TABLET, EXTENDED RELEASE ORAL at 17:08

## 2022-08-16 RX ADMIN — Medication 100 MILLIGRAM(S): at 22:45

## 2022-08-16 RX ADMIN — ENOXAPARIN SODIUM 40 MILLIGRAM(S): 100 INJECTION SUBCUTANEOUS at 06:17

## 2022-08-16 RX ADMIN — HALOPERIDOL DECANOATE 10 MILLIGRAM(S): 100 INJECTION INTRAMUSCULAR at 21:44

## 2022-08-16 RX ADMIN — TAMSULOSIN HYDROCHLORIDE 0.4 MILLIGRAM(S): 0.4 CAPSULE ORAL at 21:44

## 2022-08-16 RX ADMIN — Medication 53.75 MILLIGRAM(S): at 21:45

## 2022-08-16 RX ADMIN — CEFTRIAXONE 100 MILLIGRAM(S): 500 INJECTION, POWDER, FOR SOLUTION INTRAMUSCULAR; INTRAVENOUS at 16:02

## 2022-08-16 RX ADMIN — PIPERACILLIN AND TAZOBACTAM 25 GRAM(S): 4; .5 INJECTION, POWDER, LYOPHILIZED, FOR SOLUTION INTRAVENOUS at 06:18

## 2022-08-16 RX ADMIN — PANTOPRAZOLE SODIUM 40 MILLIGRAM(S): 20 TABLET, DELAYED RELEASE ORAL at 13:03

## 2022-08-16 RX ADMIN — Medication 53.75 MILLIGRAM(S): at 05:02

## 2022-08-16 RX ADMIN — Medication 1 MILLIGRAM(S): at 17:08

## 2022-08-16 RX ADMIN — PIPERACILLIN AND TAZOBACTAM 25 GRAM(S): 4; .5 INJECTION, POWDER, LYOPHILIZED, FOR SOLUTION INTRAVENOUS at 14:26

## 2022-08-16 RX ADMIN — MIRTAZAPINE 15 MILLIGRAM(S): 45 TABLET, ORALLY DISINTEGRATING ORAL at 21:55

## 2022-08-16 NOTE — CONSULT NOTE ADULT - ATTENDING COMMENTS
Patient from [facility] with abdominal pain. Ct scan suggestive of volvulus. Xray KUB repeated Also strong suspicion of volvulus. Both CT scan and Xary KUB discussed with 2 radiologists. Concur. Will proceed with Flex Sigmoidoscopy/Colonoscopy and decompression. Spoke to Nursing Director from [facility] Kristopher Nazario ([phone]). Can obtain 2 physician consent and proceed.
Patient is a 51M with PMH schizophrenia, intellectual disability, seizure disorder, hypothyroid who presents with abdominal distention found to have sigmoid volvulus.  Patient is now s/p endoscopic decompression by GI    Patient seen and examined.  Patient with poor insight into his condition.  He denies abdominal pain flatus or BM    Abdomen  - soft, non distended, non tender    Vitals labs and images reviewed    Plan:  - Clear liquid diet  - KUB  - patient will require sigmoidectomy on this admission  - monitor bowel function
Counseled staff about diagnostic testing and treatment plan. All questions answered.

## 2022-08-16 NOTE — PROGRESS NOTE ADULT - ATTENDING COMMENTS
Patient is a 51M with PMH schizophrenia, intellectual disability, seizure disorder, hypothyroid who presents with abdominal distention found to have sigmoid volvulus.  Patient is now s/p endoscopic decompression by GI    Patient seen and examined.  He denies abdominal pain flatus or BM    Abdomen  - soft, non distended, non tender    Vitals labs and images reviewed    Plan:  - medical risk assessment for laparoscopic sigmoidectomy 8/18  - Clear liquid diet  - KUB  - monitor bowel function

## 2022-08-16 NOTE — CONSULT NOTE ADULT - SUBJECTIVE AND OBJECTIVE BOX
VIKALINA  51y, Male  Allergy: erythromycin (Other)  phenothiazines (Unknown)    CHIEF COMPLAINT: hypotension, abdominal pain (15 Aug 2022 17:42)    HPI:  HPI:  Patient is a 52 yo male with PMH of Mild mental retardation, intellectual disability, Schizophrenia, Impulse control disorder, Seizures, Hypothyroidism, Exotropia, Cataracts, Osteopenia, presented from [facility] for hypotension and abdominal pain. Obtained hx from aid at bedside. Per Aid, patient was not feeling well and dry heaving at facility. On vital check at facility found to have a BP of 91/38?, called EMS. Patient reported abdominal pain to EMS and was brought to the hospital. Aid denies, fevers, chills, weight loss, sob, chest pain, vomiting, diarrhea at facility. Symptoms started today. At bedside, patient reported diffuse abdominal pain, does not radiate anywhere else. Unable to answer other ROS questions.     In ED Vitals: T 95, /109, HR 96, RR 20, 97% on RA. WBC 12.19k, CMP wnl, lactate normal. UA positive   - KUB with distended bowel loops concerning for sigmoid volvulus   - CT A/P with Swirling of the mesentery in the mid pelvis with dilatation of what appears to be the  sigmoid colon, findings are concerning for sigmoid volvulus. New small right pleural effusion with a new associated bibasilar opacities.   - Chest Xray: Mild prominence of the interstitial markings and bibasilar atelectasis/fibrosis.    GI was consulted in ED. S/p rectal tube placement with no decompression. Taken emergently for flex sigmoidoscopy for decompression after 2 physician consent as patient does not have next of kin/HCP/family on chart.     Infectious Diseases History:  Old Micro Data/Cultures:     FAMILY HISTORY:  No pertinent family history in first degree relatives      PAST MEDICAL & SURGICAL HISTORY:  Epilepsy  last event 4+ yrs ago      Schizophrenia      Bipolar 1 disorder      Major depressive disorder      Anemia      Dyspepsia      Hypothyroidism      Constipation      MR (mental retardation)      H/O cystoscopy      H/O laminectomy  2001      H/O fracture of fibula  nondisplaced 2010          SOCIAL HISTORY  Social History:  Per aid, no smoking, no alcohol (15 Aug 2022 17:42)      Recent Travel:  Other Exposures:     ROS  General: Denies rigors, nightsweats  HEENT: Denies headache, rhinorrhea, sore throat, eye pain  CV: Denies CP, palpitations  PULM: Denies wheezing, hemoptysis  GI: Denies hematemesis, hematochezia, melena  : Denies discharge, hematuria  MSK: Denies arthralgias, myalgias  SKIN: Denies rash, lesions  NEURO: Denies paresthesias, weakness  PSYCH: Denies depression, anxiety    VITALS:  Vital Signs Last 24 Hrs  T(C): 36.9 (16 Aug 2022 08:00), Max: 36.9 (16 Aug 2022 08:00)  T(F): 98.4 (16 Aug 2022 08:00), Max: 98.4 (16 Aug 2022 08:00)  HR: 75 (16 Aug 2022 08:00) (69 - 94)  BP: 140/84 (16 Aug 2022 08:00) (122/73 - 170/91)  BP(mean): --  RR: 19 (16 Aug 2022 08:00) (16 - 22)  SpO2: 95% (16 Aug 2022 08:00) (93% - 99%)    Parameters below as of 16 Aug 2022 08:00  Patient On (Oxygen Delivery Method): nasal cannula  O2 Flow (L/min): 2        PHYSICAL EXAM:  Gen: NAD, resting in bed  HEENT: Normocephalic, atraumatic  Neck: supple, no lymphadenopathy  CV: Regular rate & regular rhythm  Lungs: CTAB  Abdomen: Soft, BS present  Ext: Warm, well perfused  Neuro: non focal, awake  Skin: no rash, no lesions  Lines: no phlebitis    TESTS & MEASUREMENTS:                        12.7   8.60  )-----------( 152      ( 16 Aug 2022 05:25 )             36.8     08-16    137  |  104  |  9<L>  ----------------------------<  78  3.9   |  27  |  0.7    Ca    9.0      16 Aug 2022 05:25    TPro  6.4  /  Alb  3.5  /  TBili  0.4  /  DBili  x   /  AST  14  /  ALT  9   /  AlkPhos  74  08-16      LIVER FUNCTIONS - ( 16 Aug 2022 05:25 )  Alb: 3.5 g/dL / Pro: 6.4 g/dL / ALK PHOS: 74 U/L / ALT: 9 U/L / AST: 14 U/L / GGT: x           Urinalysis Basic - ( 15 Aug 2022 15:41 )    Color: Light Yellow / Appearance: Clear / S.015 / pH: x  Gluc: x / Ketone: Negative  / Bili: Negative / Urobili: <2 mg/dL   Blood: x / Protein: Negative / Nitrite: Positive   Leuk Esterase: Large / RBC: 1 /HPF / WBC 19 /HPF   Sq Epi: x / Non Sq Epi: 0 /HPF / Bacteria: Many          Lactate, Blood: 0.6 mmol/L (22 @ 05:25)  Lactate, Blood: 1.0 mmol/L (08-15-22 @ 17:46)      INFECTIOUS DISEASES TESTING      RADIOLOGY & ADDITIONAL TESTS:    CXR      INTERPRETATION:  Clinical History / Reason for exam: Nausea and vomiting.    Comparison : Chest radiograph AP portable dated 2021 time 7:15   PM.    Technique/Positioning: Frontal view time 12:47 PM, poor inspiratory   effort.    Findings:    The overall heart size appears mildly enlarged which may be in part   related to projection and poor inspiration.    Mild prominence of the interstitial markings and bibasilar   atelectasis/fibrosis.    The visualized bowel appears distended.    Impression:    Bibasilar atelectasis/fibrosis.    The visualized bowel appears distended. A CT scan of the abdomen and   pelvis was performedthe same day, please see that report for further   information.    --- End of Report ---      CT  CT Abdomen and Pelvis w/ IV Cont:                       PROCEDURE DATE:  08/15/2022          INTERPRETATION:  CLINICAL STATEMENT: Abdominal pain    TECHNIQUE: Contiguous axial CT images were obtained from the lower chest   to the pubic symphysis 75 cc of Omnipaque 350 intravenous contrast.  Oral   contrast was not given.  Reformatted images in the coronal and sagittal   planes were acquired.    COMPARISON CT: 2020    Study is limited by artifact due to overlying arms and motion.    FINDINGS:    LOWER CHEST: There is a new small right pleural effusion with new lower   lung field opacities    HEPATOBILIARY: Unremarkable..    SPLEEN: Unremarkable..    PANCREAS: Unremarkable..    ADRENAL GLANDS: Unremarkable..    KIDNEYS: Nohydronephrosis. Subcentimeter hypodensities are too small to   characterize.    ABDOMINOPELVIC NODES: Unremarkable..    PELVIC ORGANS: There is diffuse asymmetric bladder wall thickening with a   severely distended bladder. This does not appear significantly changed   since prior examination. There is mild pelvic ascites.    PERITONEUM/MESENTERY/BOWEL: There is swirling of mesentery in the mid   pelvis for exam on image 54 series 2 with dilatation in what appears to   be the sigmoid colon, findings concerning for sigmoid volvulus. The   remaining large bowel is distended with air and stool. However, the small   bowel is completely collapsed. There is distention of the stomach. There   is no evidence of free air.    BONES/SOFT TISSUES: Degenerative change. The bones are osteopenic...   There are compression deformities of T11, T12 and L2, stable.    OTHER: Mild vascular calcifications..      IMPRESSION:    Swirling of the mesentery in the mid pelvis with dilatation of what   appears to be the  sigmoid colon, findings are concerning for sigmoid   volvulus.    New small right pleural effusion with a new associated bibasilar opacities    ANGE Smith was made aware of the above findings on 8/15/2022 at 2:17 PM   with read back    --- End of Report ---      MEDICATIONS  benztropine 1  buPROPion XL (24-Hour) . 150  enoxaparin Injectable 40  haloperidol     Tablet 10  levothyroxine 100  mirtazapine 15  pantoprazole  Injectable 40  piperacillin/tazobactam IVPB.. 3.375  tamsulosin 0.4  valproate sodium  IVPB 375

## 2022-08-16 NOTE — CONSULT NOTE ADULT - ASSESSMENT
ASSESSMENT  51yMale with a PMH of h PMH of Mild mental retardation, intellectual disability, Schizophrenia, Impulse control disorder, Seizures, Hypothyroidism, Exotropia, Cataracts, Osteopenia, presented from [facility] for hypotension and abdominal pain. Patient is afebrile during the past 24 hours. Ct imaging showed sigmoid volvulus, GI attempted doing a sigmoidoscopy detorsion which was partially successive. Surgery was consulted for surgical evaluation. SIRS on admission, patient was hypothermic 95 and WBC> 12.9, patient was started on zosyn 3.375 mg IV Q 8 hrs    patient is a poor historian due to intellectual disability.    IMPRESSION  # sigmoid volvulus:  - partial detorsion of the sigmoid was done   - surgery was consulted for a surgical evaluation   - WBC improved today 8.6  - patient is afebrile for 24 hours     # UTI  - patient has a positive urinalysis for infection   - patient has no urinary symptoms as reported by the nurse   -     # Pneumonia:  - patient has a right pleural effusion  - patient is desaturating to 90% on RA and sPO2 on 3L O2 NC is 98%   - patient has mild lower lobe crackles     RECOMMENDATIONS  - f/u pending cultures  - Stop zosyn  - start patient on ceftriaxone 1 g IV OD, Flagyl 500 mg PO TID, and ciprofloxacin 500 mg PO BID    -------------- this is incomplete note and plan is to discussed with the attending --------------    This is a pended note. All final recommendations to follow pending discussion with ID Attending        ASSESSMENT  51yMale with a PMH of h PMH of Mild mental retardation, intellectual disability, Schizophrenia, Impulse control disorder, Seizures, Hypothyroidism, Exotropia, Cataracts, Osteopenia, presented from [facility] for hypotension and abdominal pain. Patient is afebrile during the past 24 hours. Ct imaging showed sigmoid volvulus, GI attempted doing a sigmoidoscopy detorsion which was partially successive. Surgery was consulted for surgical evaluation. SIRS on admission, patient was hypothermic 95 and WBC> 12.9, patient was started on zosyn 3.375 mg IV Q 8 hrs    patient is a poor historian due to intellectual disability.    IMPRESSION    # sigmoid volvulus:  - partial detorsion of the sigmoid was done   - surgery was consulted for a surgical evaluation   - WBC improved today 8.6  - patient is afebrile for 24 hours       # UTI  - patient has a positive urinalysis for infection / asymptomatic bacteruria  - patient has no urinary symptoms as reported by the nurse   - FU urine culture     # Pneumonia:  - patient has a right pleural effusion  - patient is desaturating to 90% on RA and sPO2 on 3L O2 NC is 98%   - patient has mild lower lobe crackles   - patient has no evidence of pneumonia, findings are most probably due to diaphragmatic irritation     RECOMMENDATIONS  - f/u pending cultures  - Stop zosyn  - start flagyl 500 mg IV TID, and ceftriaxone 2 g  IV OD.  - FU surgery recommendations  - will continue to followup.    -------------- this is incomplete note and plan is to discussed with the attending --------------    This is a pended note. All final recommendations to follow pending discussion with ID Attending        ASSESSMENT  51yMale with a PMH of h PMH of Mild mental retardation, intellectual disability, Schizophrenia, Impulse control disorder, Seizures, Hypothyroidism, Exotropia, Cataracts, Osteopenia, presented from [facility] for hypotension and abdominal pain. Patient is afebrile during the past 24 hours. Ct imaging showed sigmoid volvulus, GI attempted doing a sigmoidoscopy detorsion which was partially successive. Surgery was consulted for surgical evaluation. SIRS on admission, patient was hypothermic 95 and WBC> 12.9, patient was started on zosyn 3.375 mg IV Q 8 hrs    patient is a poor historian due to intellectual disability.    IMPRESSION    # sigmoid volvulus/ intra-abdominal infection:  - partial detorsion of the sigmoid was done   - surgery was consulted for a surgical evaluation   - WBC improved today 8.6  - patient is afebrile for 24 hours       # UTI  - patient has a positive urinalysis for infection / asymptomatic bacteruria  - patient has no urinary symptoms as reported by the nurse   - FU urine culture     # Pneumonia:  - patient has a right pleural effusion  - patient is desaturating to 90% on RA and sPO2 on 3L O2 NC is 98%   - patient has mild lower lobe crackles   - patient has no evidence of pneumonia, findings are most probably due to diaphragmatic irritation     RECOMMENDATIONS  - f/u pending cultures  - Stop zosyn  - start flagyl 500 mg IV TID, and ceftriaxone 2 g  IV OD.  - FU surgery recommendations  - will continue to followup.    -------------- this is incomplete note and plan is to discussed with the attending --------------    This is a pended note. All final recommendations to follow pending discussion with ID Attending        ASSESSMENT  51yMale with a PMH of h PMH of Mild mental retardation, intellectual disability, Schizophrenia, Impulse control disorder, Seizures, Hypothyroidism, Exotropia, Cataracts, Osteopenia, presented from [facility] for hypotension and abdominal pain. Patient is afebrile during the past 24 hours. Ct imaging showed sigmoid volvulus, GI attempted doing a sigmoidoscopy detorsion which was partially successive. Surgery was consulted for surgical evaluation. SIRS on admission, patient was hypothermic 95 and WBC> 12.9, patient was started on zosyn 3.375 mg IV Q 8 hrs    patient is a poor historian due to intellectual disability.    IMPRESSION  On presentation sepsis secondary to sigmoid volvulus with possible ischemia with translocation  S/p partial correction of volvulus  Abd soft but says discomfort  Pyuria asymptomatic  No bacterial PNA      RECOMMENDATIONS  BCx  Off loading to prevent pressure sores and preventive measures to avoid aspiration   Stop zosyn  start flagyl 500 mg IV q8h  ceftriaxone 2 g  IV q24h.  FU surgery recommendations

## 2022-08-17 LAB
-  AMIKACIN: SIGNIFICANT CHANGE UP
-  AMOXICILLIN/CLAVULANIC ACID: SIGNIFICANT CHANGE UP
-  AMPICILLIN/SULBACTAM: SIGNIFICANT CHANGE UP
-  AMPICILLIN: SIGNIFICANT CHANGE UP
-  AZTREONAM: SIGNIFICANT CHANGE UP
-  CEFAZOLIN: SIGNIFICANT CHANGE UP
-  CEFEPIME: SIGNIFICANT CHANGE UP
-  CEFOXITIN: SIGNIFICANT CHANGE UP
-  CEFTRIAXONE: SIGNIFICANT CHANGE UP
-  CIPROFLOXACIN: SIGNIFICANT CHANGE UP
-  ERTAPENEM: SIGNIFICANT CHANGE UP
-  GENTAMICIN: SIGNIFICANT CHANGE UP
-  IMIPENEM: SIGNIFICANT CHANGE UP
-  LEVOFLOXACIN: SIGNIFICANT CHANGE UP
-  MEROPENEM: SIGNIFICANT CHANGE UP
-  NITROFURANTOIN: SIGNIFICANT CHANGE UP
-  PIPERACILLIN/TAZOBACTAM: SIGNIFICANT CHANGE UP
-  TIGECYCLINE: SIGNIFICANT CHANGE UP
-  TOBRAMYCIN: SIGNIFICANT CHANGE UP
-  TRIMETHOPRIM/SULFAMETHOXAZOLE: SIGNIFICANT CHANGE UP
ANION GAP SERPL CALC-SCNC: 11 MMOL/L — SIGNIFICANT CHANGE UP (ref 7–14)
APTT BLD: 33.4 SEC — SIGNIFICANT CHANGE UP (ref 27–39.2)
BUN SERPL-MCNC: 10 MG/DL — SIGNIFICANT CHANGE UP (ref 10–20)
CALCIUM SERPL-MCNC: 9.3 MG/DL — SIGNIFICANT CHANGE UP (ref 8.5–10.1)
CHLORIDE SERPL-SCNC: 102 MMOL/L — SIGNIFICANT CHANGE UP (ref 98–110)
CO2 SERPL-SCNC: 26 MMOL/L — SIGNIFICANT CHANGE UP (ref 17–32)
CREAT SERPL-MCNC: 0.8 MG/DL — SIGNIFICANT CHANGE UP (ref 0.7–1.5)
EGFR: 106 ML/MIN/1.73M2 — SIGNIFICANT CHANGE UP
GLUCOSE SERPL-MCNC: 65 MG/DL — LOW (ref 70–99)
HCT VFR BLD CALC: 38.1 % — LOW (ref 42–52)
HGB BLD-MCNC: 13.2 G/DL — LOW (ref 14–18)
INR BLD: 1.08 RATIO — SIGNIFICANT CHANGE UP (ref 0.65–1.3)
MAGNESIUM SERPL-MCNC: 1.7 MG/DL — LOW (ref 1.8–2.4)
MCHC RBC-ENTMCNC: 32.3 PG — HIGH (ref 27–31)
MCHC RBC-ENTMCNC: 34.6 G/DL — SIGNIFICANT CHANGE UP (ref 32–37)
MCV RBC AUTO: 93.2 FL — SIGNIFICANT CHANGE UP (ref 80–94)
METHOD TYPE: SIGNIFICANT CHANGE UP
NRBC # BLD: 0 /100 WBCS — SIGNIFICANT CHANGE UP (ref 0–0)
PHOSPHATE SERPL-MCNC: 4.3 MG/DL — SIGNIFICANT CHANGE UP (ref 2.1–4.9)
PLATELET # BLD AUTO: 153 K/UL — SIGNIFICANT CHANGE UP (ref 130–400)
POTASSIUM SERPL-MCNC: 3.9 MMOL/L — SIGNIFICANT CHANGE UP (ref 3.5–5)
POTASSIUM SERPL-SCNC: 3.9 MMOL/L — SIGNIFICANT CHANGE UP (ref 3.5–5)
PROTHROM AB SERPL-ACNC: 12.4 SEC — SIGNIFICANT CHANGE UP (ref 9.95–12.87)
RBC # BLD: 4.09 M/UL — LOW (ref 4.7–6.1)
RBC # FLD: 13.6 % — SIGNIFICANT CHANGE UP (ref 11.5–14.5)
SARS-COV-2 RNA SPEC QL NAA+PROBE: SIGNIFICANT CHANGE UP
SODIUM SERPL-SCNC: 139 MMOL/L — SIGNIFICANT CHANGE UP (ref 135–146)
VALPROATE SERPL-MCNC: 66 UG/ML — SIGNIFICANT CHANGE UP (ref 50–100)
WBC # BLD: 9.92 K/UL — SIGNIFICANT CHANGE UP (ref 4.8–10.8)
WBC # FLD AUTO: 9.92 K/UL — SIGNIFICANT CHANGE UP (ref 4.8–10.8)

## 2022-08-17 PROCEDURE — 71045 X-RAY EXAM CHEST 1 VIEW: CPT | Mod: 26

## 2022-08-17 PROCEDURE — 99232 SBSQ HOSP IP/OBS MODERATE 35: CPT | Mod: 57

## 2022-08-17 PROCEDURE — 74018 RADEX ABDOMEN 1 VIEW: CPT | Mod: 26

## 2022-08-17 PROCEDURE — 93010 ELECTROCARDIOGRAM REPORT: CPT

## 2022-08-17 PROCEDURE — 99233 SBSQ HOSP IP/OBS HIGH 50: CPT

## 2022-08-17 RX ORDER — METRONIDAZOLE 500 MG
1000 TABLET ORAL ONCE
Refills: 0 | Status: COMPLETED | OUTPATIENT
Start: 2022-08-17 | End: 2022-08-17

## 2022-08-17 RX ORDER — CHLORHEXIDINE GLUCONATE 213 G/1000ML
1 SOLUTION TOPICAL ONCE
Refills: 0 | Status: COMPLETED | OUTPATIENT
Start: 2022-08-17 | End: 2022-08-17

## 2022-08-17 RX ORDER — SODIUM CHLORIDE 9 MG/ML
1000 INJECTION, SOLUTION INTRAVENOUS
Refills: 0 | Status: DISCONTINUED | OUTPATIENT
Start: 2022-08-17 | End: 2022-08-18

## 2022-08-17 RX ORDER — NEOMYCIN SULFATE 500 MG/1
1000 TABLET ORAL ONCE
Refills: 0 | Status: COMPLETED | OUTPATIENT
Start: 2022-08-17 | End: 2022-08-17

## 2022-08-17 RX ORDER — SOD SULF/SODIUM/NAHCO3/KCL/PEG
1000 SOLUTION, RECONSTITUTED, ORAL ORAL ONCE
Refills: 0 | Status: COMPLETED | OUTPATIENT
Start: 2022-08-17 | End: 2022-08-17

## 2022-08-17 RX ORDER — SODIUM CHLORIDE 9 MG/ML
1000 INJECTION, SOLUTION INTRAVENOUS
Refills: 0 | Status: DISCONTINUED | OUTPATIENT
Start: 2022-08-17 | End: 2022-08-17

## 2022-08-17 RX ORDER — MAGNESIUM SULFATE 500 MG/ML
2 VIAL (ML) INJECTION
Refills: 0 | Status: COMPLETED | OUTPATIENT
Start: 2022-08-17 | End: 2022-08-17

## 2022-08-17 RX ORDER — DEXTROSE MONOHYDRATE, SODIUM CHLORIDE, AND POTASSIUM CHLORIDE 50; .745; 4.5 G/1000ML; G/1000ML; G/1000ML
1000 INJECTION, SOLUTION INTRAVENOUS
Refills: 0 | Status: DISCONTINUED | OUTPATIENT
Start: 2022-08-17 | End: 2022-08-17

## 2022-08-17 RX ADMIN — BUPROPION HYDROCHLORIDE 150 MILLIGRAM(S): 150 TABLET, EXTENDED RELEASE ORAL at 11:45

## 2022-08-17 RX ADMIN — Medication 100 MICROGRAM(S): at 05:52

## 2022-08-17 RX ADMIN — Medication 25 GRAM(S): at 17:31

## 2022-08-17 RX ADMIN — Medication 53.75 MILLIGRAM(S): at 22:31

## 2022-08-17 RX ADMIN — Medication 1 MILLIGRAM(S): at 05:52

## 2022-08-17 RX ADMIN — CEFTRIAXONE 100 MILLIGRAM(S): 500 INJECTION, POWDER, FOR SOLUTION INTRAMUSCULAR; INTRAVENOUS at 17:39

## 2022-08-17 RX ADMIN — HALOPERIDOL DECANOATE 10 MILLIGRAM(S): 100 INJECTION INTRAMUSCULAR at 22:31

## 2022-08-17 RX ADMIN — Medication 1 MILLIGRAM(S): at 17:39

## 2022-08-17 RX ADMIN — PANTOPRAZOLE SODIUM 40 MILLIGRAM(S): 20 TABLET, DELAYED RELEASE ORAL at 11:46

## 2022-08-17 RX ADMIN — Medication 1000 MILLIGRAM(S): at 14:11

## 2022-08-17 RX ADMIN — HALOPERIDOL DECANOATE 10 MILLIGRAM(S): 100 INJECTION INTRAMUSCULAR at 05:52

## 2022-08-17 RX ADMIN — Medication 1000 MILLIGRAM(S): at 22:28

## 2022-08-17 RX ADMIN — SODIUM CHLORIDE 100 MILLILITER(S): 9 INJECTION, SOLUTION INTRAVENOUS at 06:55

## 2022-08-17 RX ADMIN — ENOXAPARIN SODIUM 40 MILLIGRAM(S): 100 INJECTION SUBCUTANEOUS at 05:52

## 2022-08-17 RX ADMIN — Medication 1000 MILLILITER(S): at 14:00

## 2022-08-17 RX ADMIN — Medication 53.75 MILLIGRAM(S): at 14:12

## 2022-08-17 RX ADMIN — Medication 100 MILLIGRAM(S): at 06:55

## 2022-08-17 RX ADMIN — Medication 25 GRAM(S): at 11:45

## 2022-08-17 RX ADMIN — TAMSULOSIN HYDROCHLORIDE 0.4 MILLIGRAM(S): 0.4 CAPSULE ORAL at 22:27

## 2022-08-17 RX ADMIN — Medication 53.75 MILLIGRAM(S): at 05:51

## 2022-08-17 RX ADMIN — Medication 1000 MILLIGRAM(S): at 14:03

## 2022-08-17 RX ADMIN — HALOPERIDOL DECANOATE 10 MILLIGRAM(S): 100 INJECTION INTRAMUSCULAR at 14:03

## 2022-08-17 RX ADMIN — SODIUM CHLORIDE 125 MILLILITER(S): 9 INJECTION, SOLUTION INTRAVENOUS at 14:12

## 2022-08-17 RX ADMIN — MIRTAZAPINE 15 MILLIGRAM(S): 45 TABLET, ORALLY DISINTEGRATING ORAL at 22:27

## 2022-08-17 NOTE — PATIENT PROFILE ADULT - FALL HARM RISK - HARM RISK INTERVENTIONS

## 2022-08-17 NOTE — MEDICAL STUDENT PROGRESS NOTE(EDUCATION) - SUBJECTIVE AND OBJECTIVE BOX
Medicine Progress Note    Patient is a 51y old  Male who presents with a chief complaint of hypotension, abdominal pain (16 Aug 2022 13:07)    Patient is a 52 yo male with PMH of Mild mental retardation, intellectual disability, Schizophrenia, Impulse control disorder, Seizures, Hypothyroidism, Exotropia, Cataracts, Osteopenia, presented from [facility] for hypotension and abdominal pain. Obtained hx from aid at bedside. Per Aid, patient was not feeling well and dry heaving at facility. On vital check at facility found to have a BP of 91/38?, called EMS. Patient reported abdominal pain to EMS and was brought to the hospital. Aid denies, fevers, chills, weight loss, sob, chest pain, vomiting, diarrhea at facility. Symptoms started today. At bedside, patient still reports diffuse abdominal pain, does not radiate anywhere else. Unable to answer other ROS questions.     In ED Vitals: T 95, /109, HR 96, RR 20, 97% on RA. WBC 12.19k, CMP wnl, lactate normal. UA positive   - KUB with distended bowel loops concerning for sigmoid volvulus   - CT A/P with Swirling of the mesentery in the mid pelvis with dilatation of what appears to be the  sigmoid colon, findings are concerning for sigmoid volvulus. New small right pleural effusion with a new associated bibasilar opacities.   - Chest Xray: Mild prominence of the interstitial markings and bibasilar atelectasis/fibrosis.    GI was consulted in ED. S/p rectal tube placement with no decompression. Taken emergently for flex sigmoidoscopy for decompression after 2 physician consent as patient does not have next of kin/HCP/family on chart. Urgent colonoscopy on 8/15/22: spirally twisted colonic mucosa in the sigmoid colon signifying the torsional obstruction. Aggressive endoscopic evacuation of air was performed to decompress the dilated bowel. While coming out, partial twisting of the colon was noted again. Regardless of multiple attempts, was only able to achieve partial detorsion.    Patient admitted to medicine to prep for laparoscopic sigmoidectomy to be performed by colorectal surgery on 22.      SUBJECTIVE / OVERNIGHT EVENTS:  No BM. Denies abdominal pain, nausea or vomiting. Unable to get more ROS due to Pt being a poor historian.  Pt's NPO and being prepped for sigmoidectomy on 22.    MEDICATIONS  (STANDING):  benztropine 1 milliGRAM(s) Oral two times a day  buPROPion XL (24-Hour) . 150 milliGRAM(s) Oral daily  cefTRIAXone   IVPB 2000 milliGRAM(s) IV Intermittent every 24 hours  dextrose 5% + sodium chloride 0.45%. 1000 milliLiter(s) (125 mL/Hr) IV Continuous <Continuous>  enoxaparin Injectable 40 milliGRAM(s) SubCutaneous every 24 hours  haloperidol     Tablet 10 milliGRAM(s) Oral three times a day  levothyroxine 100 MICROGram(s) Oral daily  magnesium sulfate  IVPB 2 Gram(s) IV Intermittent every 2 hours  metroNIDAZOLE  IVPB 500 milliGRAM(s) IV Intermittent every 8 hours  mirtazapine 15 milliGRAM(s) Oral at bedtime  pantoprazole  Injectable 40 milliGRAM(s) IV Push daily  tamsulosin 0.4 milliGRAM(s) Oral at bedtime  valproate sodium  IVPB 375 milliGRAM(s) IV Intermittent three times a day    MEDICATIONS  (PRN):  ondansetron Injectable 4 milliGRAM(s) IV Push every 6 hours PRN Nausea and/or Vomiting    CAPILLARY BLOOD GLUCOSE        I&O's Summary    16 Aug 2022 07:01  -  17 Aug 2022 07:00  --------------------------------------------------------  IN: 0 mL / OUT: 1350 mL / NET: -1350 mL          Vital Signs Last 24 Hrs  T(C): 36.3 (17 Aug 2022 08:00), Max: 37.6 (17 Aug 2022 00:09)  T(F): 97.4 (17 Aug 2022 08:00), Max: 99.7 (17 Aug 2022 00:09)  HR: 69 (17 Aug 2022 08:00) (69 - 77)  BP: 113/66 (17 Aug 2022 08:00) (113/66 - 130/81)  BP(mean): --  RR: 18 (17 Aug 2022 08:00) (17 - 20)  SpO2: 97% (17 Aug 2022 08:00) (97% - 100%)    Parameters below as of 17 Aug 2022 08:00  Patient On (Oxygen Delivery Method): nasal cannula  O2 Flow (L/min): 2    PHYSICAL EXAM:  CONSTITUTIONAL: NAD, well-groomed  ENMT:  no lymphadenopathy, throat swelling, or signs of trauma noted  RESPIRATORY: Normal respiratory effort; lungs are clear to auscultation bilaterally  CARDIOVASCULAR: Regular rate and rhythm, normal S1 and S2, no murmur/rub/gallop; No lower extremity edema; Peripheral pulses are 2+ bilaterally  ABDOMEN: Nontender to palpation, normoactive bowel sounds, no rebound/guarding; No hepatosplenomegaly  PSYCH: A+O to person, cooperative   NEUROLOGY: CN 2-12 are intact  SKIN: No rashes; no palpable lesions    LABS:                        13.2   9.92  )-----------( 153      ( 17 Aug 2022 03:18 )             38.1     08-17    139  |  102  |  10  ----------------------------<  65<L>  3.9   |  26  |  0.8    Ca    9.3      17 Aug 2022 03:18  Phos  4.3     08-17  Mg     1.7     08-17    TPro  6.4  /  Alb  3.5  /  TBili  0.4  /  DBili  x   /  AST  14  /  ALT  9   /  AlkPhos  74  08-16    PT/INR - ( 16 Aug 2022 05:25 )   PT: 11.80 sec;   INR: 1.03 ratio         PTT - ( 16 Aug 2022 05:25 )  PTT:34.6 sec      Urinalysis Basic - ( 15 Aug 2022 15:41 )    Color: Light Yellow / Appearance: Clear / S.015 / pH: x  Gluc: x / Ketone: Negative  / Bili: Negative / Urobili: <2 mg/dL   Blood: x / Protein: Negative / Nitrite: Positive   Leuk Esterase: Large / RBC: 1 /HPF / WBC 19 /HPF   Sq Epi: x / Non Sq Epi: 0 /HPF / Bacteria: Many        Culture - Urine (collected 15 Aug 2022 15:41)  Source: Clean Catch Clean Catch (Midstream)  Preliminary Report (16 Aug 2022 22:30):    >100,000 CFU/ml Klebsiella pneumoniae      COVID-19 PCR: NotDetec (15 Aug 2022 16:28)      RADIOLOGY & ADDITIONAL TESTS:  Imaging from Last 24 Hours:  < from: Xray Chest 1 View- PORTABLE-Routine (Xray Chest 1 View- PORTABLE-Routine in AM.) (22 @ 08:20) >  IMPRESSION:    Telemetry leads overlie the thorax.    Stable cardiomediastinal silhouette.    Linear left midlung field atelectasis. No pneumothorax. Redemonstrated   gaseous distention of the left colon.    Unchanged osseous structures.    < end of copied text >        Electrocardiogram/QTc Interval:    COORDINATION OF CARE: yes  Care Discussed with Consultants/Other Providers: yes

## 2022-08-17 NOTE — PRE-ANESTHESIA EVALUATION ADULT - NSANTHPEFT_GEN_ALL_CORE
Pt called back and is waiting to hear back on Oxycodone refill and HA1C and Kidney function results from [facility] Main clinic (have not received yet) Interactive, conversational without insight into condition  NAD  RRR, normal s1 s2  Mild crackles on the right side, otherwise CTAB

## 2022-08-17 NOTE — PRE-ANESTHESIA EVALUATION ADULT - NSANTHOSAYNRD_GEN_A_CORE
No. SOSA screening performed.  STOP BANG Legend: 0-2 = LOW Risk; 3-4 = INTERMEDIATE Risk; 5-8 = HIGH Risk
No. SOSA screening performed.  STOP BANG Legend: 0-2 = LOW Risk; 3-4 = INTERMEDIATE Risk; 5-8 = HIGH Risk

## 2022-08-17 NOTE — MEDICAL STUDENT PROGRESS NOTE(EDUCATION) - NS MD HP STUD ASPLAN ASSES FT
Patient is a 52 yo male with PMH of Mild mental retardation, intellectual disability, Schizophrenia, Impulse control disorder, Seizures, Hypothyroidism, Exotropia, Cataracts, Osteopenia, presented from [facility] for hypotension and abdominal pain found to  have sigmoid volvulus and septic on admission.     #Sigmoid Volvulus  #Sepsis on admission 2/2 to UTI/ LL pneumonia?/ sigmoid volvulus  #Seizure/ Epilepsy  #Hypothyroidism  #Schizophrenia  #Impulse control Disorder  #Intellectual disability

## 2022-08-17 NOTE — MEDICAL STUDENT PROGRESS NOTE(EDUCATION) - NS MD HP STUD ASPLAN PLAN FT
#Sigmoid Volvulus  - Abdomen soft, tender   - Lactate normal   - s/p rectal tube placement with no decompression in ED  - patient does not have a next of kin   - GI following   - s/p emergent flex sigmoidoscopy with partial detorsion by GI after 2 physician consent  - Keep NPO  - 2 tap water enemas as per GI  -  Daily KUB in am  - serial abdominal exams  - Colorectal Surgery following, require 2 physician consent, pre op on 8/17/22, scheduled for sigmoidectomy on 8/18/22   - EKG ordered for surgery clearance    #Sepsis on admission 2/2 to UTI/ LL pneumonia?/ sigmoid volvulus  - WBC 12k, hypothermic, tachycardic, tachypneic   - UA with high wbc, large leuk, positive nitrite, bacteria   - Chest Xray with Mild prominence of the interstitial markings and bibasilar atelectasis/fibrosis.  - CT A/P new small right pleural effusion with new lower lung field opacities, unclear if pneumonia   - Started Zosyn for broad coverage   - ID consult, recommends BCx, Off loading to prevent pressure sores and preventive measures to avoid aspiration   Stop zosyn, start flagyl 500 mg IV q8h, ceftriaxone 2 g  IV q24h.  - Speech and swallow eval once cleared by GI for PO oral meds     #Seizure/ Epilepsy  - c/w depakote  - Will switch to IV while NPO  - Patient is on divalproex sodium DR 250mg, 2 pills, TID. Confirmed with pharmacy. Equivalent IV is 375mg IV q6   - valproate level 66.0 ug/mL (08.17.22 @ 03:18)      #Hypothyroidism  - C/w synthroid    - TSH level 2.54 uIU/mL (08.16.22 @ 05:25)   -T4, Serum: 7.3 ug/dL (08.16.22 @ 05:25)     #Schizophrenia  #Impulse control Disorder  #Intellectual disability  - c/w home medications     #Misc:  - GI ppx: PPI qd  - DVT ppx: lovenox  - Activity bed rest/ fall risk  - Code status: Full code > per aid, patient makes his own decisions and has no next of kin however, given current medical complexity, unclear if patient has appropriate understanding.   - Diet: NPO  - Dispo: acute, admit to medicine waiting for OR procedure

## 2022-08-17 NOTE — PROGRESS NOTE ADULT - ATTENDING COMMENTS
Patient is a 51M with PMH schizophrenia, intellectual disability, seizure disorder, hypothyroid who presents with abdominal distention found to have sigmoid volvulus.  Patient is now s/p endoscopic decompression by GI    Patient seen and examined.  He denies abdominal pain flatus or BM    Abdomen  - soft, non distended, non tender    Vitals labs and images reviewed    Plan:  - medical risk assessment - low risk for intermediate risk procedure  - laparoscopic sigmoidectomy possible open possible ostomy 8/18  - Clear liquid diet  - bowel prep today with neomycin and flagyl  - monitor bowel function  - NPO after midnight

## 2022-08-17 NOTE — PRE-ANESTHESIA EVALUATION ADULT - NSANTHPMHFT_GEN_ALL_CORE
Hypotension and abdominal pain 2/2 sepsis, resolved; Sigmoid Volvulus, new small right pleural effusion with a new associated bibasilar opacities    -  X-ray Kidney Ureter Bladder (08.17.22 @ 08:42) >Large lucency overlying the right upper quadrant and free air under the  diaphragm and bowel perforation cannot be excluded; Alternatively, this may represent a distended loop of bowel. Nonobstructive bowel gas pattern    -  on 8/15 s/p colonoscopy decompression--> Regardless of multiple attempts was able to achieve partial detorsion.

## 2022-08-18 ENCOUNTER — RESULT REVIEW (OUTPATIENT)
Age: 52
End: 2022-08-18

## 2022-08-18 ENCOUNTER — TRANSCRIPTION ENCOUNTER (OUTPATIENT)
Age: 52
End: 2022-08-18

## 2022-08-18 LAB
ANION GAP SERPL CALC-SCNC: 12 MMOL/L — SIGNIFICANT CHANGE UP (ref 7–14)
ANION GAP SERPL CALC-SCNC: 12 MMOL/L — SIGNIFICANT CHANGE UP (ref 7–14)
BASOPHILS # BLD AUTO: 0 K/UL — SIGNIFICANT CHANGE UP (ref 0–0.2)
BASOPHILS # BLD AUTO: 0.04 K/UL — SIGNIFICANT CHANGE UP (ref 0–0.2)
BASOPHILS NFR BLD AUTO: 0 % — SIGNIFICANT CHANGE UP (ref 0–1)
BASOPHILS NFR BLD AUTO: 0.5 % — SIGNIFICANT CHANGE UP (ref 0–1)
BLD GP AB SCN SERPL QL: SIGNIFICANT CHANGE UP
BUN SERPL-MCNC: 13 MG/DL — SIGNIFICANT CHANGE UP (ref 10–20)
BUN SERPL-MCNC: 8 MG/DL — LOW (ref 10–20)
CALCIUM SERPL-MCNC: 8.7 MG/DL — SIGNIFICANT CHANGE UP (ref 8.5–10.1)
CALCIUM SERPL-MCNC: 9 MG/DL — SIGNIFICANT CHANGE UP (ref 8.5–10.1)
CHLORIDE SERPL-SCNC: 102 MMOL/L — SIGNIFICANT CHANGE UP (ref 98–110)
CHLORIDE SERPL-SCNC: 99 MMOL/L — SIGNIFICANT CHANGE UP (ref 98–110)
CO2 SERPL-SCNC: 23 MMOL/L — SIGNIFICANT CHANGE UP (ref 17–32)
CO2 SERPL-SCNC: 29 MMOL/L — SIGNIFICANT CHANGE UP (ref 17–32)
CREAT SERPL-MCNC: 0.7 MG/DL — SIGNIFICANT CHANGE UP (ref 0.7–1.5)
CREAT SERPL-MCNC: 0.9 MG/DL — SIGNIFICANT CHANGE UP (ref 0.7–1.5)
CULTURE RESULTS: SIGNIFICANT CHANGE UP
EGFR: 103 ML/MIN/1.73M2 — SIGNIFICANT CHANGE UP
EGFR: 111 ML/MIN/1.73M2 — SIGNIFICANT CHANGE UP
EOSINOPHIL # BLD AUTO: 0 K/UL — SIGNIFICANT CHANGE UP (ref 0–0.7)
EOSINOPHIL # BLD AUTO: 0.11 K/UL — SIGNIFICANT CHANGE UP (ref 0–0.7)
EOSINOPHIL NFR BLD AUTO: 0 % — SIGNIFICANT CHANGE UP (ref 0–8)
EOSINOPHIL NFR BLD AUTO: 1.5 % — SIGNIFICANT CHANGE UP (ref 0–8)
GLUCOSE BLDC GLUCOMTR-MCNC: 104 MG/DL — HIGH (ref 70–99)
GLUCOSE BLDC GLUCOMTR-MCNC: 122 MG/DL — HIGH (ref 70–99)
GLUCOSE SERPL-MCNC: 163 MG/DL — HIGH (ref 70–99)
GLUCOSE SERPL-MCNC: 75 MG/DL — SIGNIFICANT CHANGE UP (ref 70–99)
HCT VFR BLD CALC: 37.2 % — LOW (ref 42–52)
HCT VFR BLD CALC: 42.1 % — SIGNIFICANT CHANGE UP (ref 42–52)
HGB BLD-MCNC: 12.8 G/DL — LOW (ref 14–18)
HGB BLD-MCNC: 13.9 G/DL — LOW (ref 14–18)
IMM GRANULOCYTES NFR BLD AUTO: 0.5 % — HIGH (ref 0.1–0.3)
LYMPHOCYTES # BLD AUTO: 0.54 K/UL — LOW (ref 1.2–3.4)
LYMPHOCYTES # BLD AUTO: 1.72 K/UL — SIGNIFICANT CHANGE UP (ref 1.2–3.4)
LYMPHOCYTES # BLD AUTO: 23.2 % — SIGNIFICANT CHANGE UP (ref 20.5–51.1)
LYMPHOCYTES # BLD AUTO: 3.5 % — LOW (ref 20.5–51.1)
MACROCYTES BLD QL: SLIGHT — SIGNIFICANT CHANGE UP
MAGNESIUM SERPL-MCNC: 1.4 MG/DL — LOW (ref 1.8–2.4)
MAGNESIUM SERPL-MCNC: 2.2 MG/DL — SIGNIFICANT CHANGE UP (ref 1.8–2.4)
MANUAL SMEAR VERIFICATION: SIGNIFICANT CHANGE UP
MCHC RBC-ENTMCNC: 31 PG — SIGNIFICANT CHANGE UP (ref 27–31)
MCHC RBC-ENTMCNC: 31.4 PG — HIGH (ref 27–31)
MCHC RBC-ENTMCNC: 33 G/DL — SIGNIFICANT CHANGE UP (ref 32–37)
MCHC RBC-ENTMCNC: 34.4 G/DL — SIGNIFICANT CHANGE UP (ref 32–37)
MCV RBC AUTO: 91.2 FL — SIGNIFICANT CHANGE UP (ref 80–94)
MCV RBC AUTO: 93.8 FL — SIGNIFICANT CHANGE UP (ref 80–94)
MONOCYTES # BLD AUTO: 1.01 K/UL — HIGH (ref 0.1–0.6)
MONOCYTES # BLD AUTO: 1.21 K/UL — HIGH (ref 0.1–0.6)
MONOCYTES NFR BLD AUTO: 13.6 % — HIGH (ref 1.7–9.3)
MONOCYTES NFR BLD AUTO: 7.8 % — SIGNIFICANT CHANGE UP (ref 1.7–9.3)
NEUTROPHILS # BLD AUTO: 13.52 K/UL — HIGH (ref 1.4–6.5)
NEUTROPHILS # BLD AUTO: 4.49 K/UL — SIGNIFICANT CHANGE UP (ref 1.4–6.5)
NEUTROPHILS NFR BLD AUTO: 60.7 % — SIGNIFICANT CHANGE UP (ref 42.2–75.2)
NEUTROPHILS NFR BLD AUTO: 86.1 % — HIGH (ref 42.2–75.2)
NEUTS BAND # BLD: 0.9 % — SIGNIFICANT CHANGE UP (ref 0–6)
NRBC # BLD: 0 /100 WBCS — SIGNIFICANT CHANGE UP (ref 0–0)
ORGANISM # SPEC MICROSCOPIC CNT: SIGNIFICANT CHANGE UP
ORGANISM # SPEC MICROSCOPIC CNT: SIGNIFICANT CHANGE UP
OVALOCYTES BLD QL SMEAR: SLIGHT — SIGNIFICANT CHANGE UP
PHOSPHATE SERPL-MCNC: 3 MG/DL — SIGNIFICANT CHANGE UP (ref 2.1–4.9)
PHOSPHATE SERPL-MCNC: 3.4 MG/DL — SIGNIFICANT CHANGE UP (ref 2.1–4.9)
PLAT MORPH BLD: NORMAL — SIGNIFICANT CHANGE UP
PLATELET # BLD AUTO: 144 K/UL — SIGNIFICANT CHANGE UP (ref 130–400)
PLATELET # BLD AUTO: 153 K/UL — SIGNIFICANT CHANGE UP (ref 130–400)
POTASSIUM SERPL-MCNC: 3.9 MMOL/L — SIGNIFICANT CHANGE UP (ref 3.5–5)
POTASSIUM SERPL-MCNC: 4.3 MMOL/L — SIGNIFICANT CHANGE UP (ref 3.5–5)
POTASSIUM SERPL-SCNC: 3.9 MMOL/L — SIGNIFICANT CHANGE UP (ref 3.5–5)
POTASSIUM SERPL-SCNC: 4.3 MMOL/L — SIGNIFICANT CHANGE UP (ref 3.5–5)
RBC # BLD: 4.08 M/UL — LOW (ref 4.7–6.1)
RBC # BLD: 4.49 M/UL — LOW (ref 4.7–6.1)
RBC # FLD: 13.2 % — SIGNIFICANT CHANGE UP (ref 11.5–14.5)
RBC # FLD: 13.6 % — SIGNIFICANT CHANGE UP (ref 11.5–14.5)
RBC BLD AUTO: ABNORMAL
SODIUM SERPL-SCNC: 137 MMOL/L — SIGNIFICANT CHANGE UP (ref 135–146)
SODIUM SERPL-SCNC: 140 MMOL/L — SIGNIFICANT CHANGE UP (ref 135–146)
SPECIMEN SOURCE: SIGNIFICANT CHANGE UP
VARIANT LYMPHS # BLD: 1.7 % — SIGNIFICANT CHANGE UP (ref 0–5)
WBC # BLD: 15.54 K/UL — HIGH (ref 4.8–10.8)
WBC # BLD: 7.41 K/UL — SIGNIFICANT CHANGE UP (ref 4.8–10.8)
WBC # FLD AUTO: 15.54 K/UL — HIGH (ref 4.8–10.8)
WBC # FLD AUTO: 7.41 K/UL — SIGNIFICANT CHANGE UP (ref 4.8–10.8)

## 2022-08-18 PROCEDURE — 88304 TISSUE EXAM BY PATHOLOGIST: CPT | Mod: 26

## 2022-08-18 PROCEDURE — 88307 TISSUE EXAM BY PATHOLOGIST: CPT | Mod: 26

## 2022-08-18 PROCEDURE — 44204 LAPARO PARTIAL COLECTOMY: CPT

## 2022-08-18 PROCEDURE — S2900 ROBOTIC SURGICAL SYSTEM: CPT | Mod: NC

## 2022-08-18 PROCEDURE — 45330 DIAGNOSTIC SIGMOIDOSCOPY: CPT

## 2022-08-18 RX ORDER — PANTOPRAZOLE SODIUM 20 MG/1
40 TABLET, DELAYED RELEASE ORAL DAILY
Refills: 0 | Status: DISCONTINUED | OUTPATIENT
Start: 2022-08-18 | End: 2022-08-23

## 2022-08-18 RX ORDER — HYDROMORPHONE HYDROCHLORIDE 2 MG/ML
0.5 INJECTION INTRAMUSCULAR; INTRAVENOUS; SUBCUTANEOUS
Refills: 0 | Status: DISCONTINUED | OUTPATIENT
Start: 2022-08-18 | End: 2022-08-18

## 2022-08-18 RX ORDER — SODIUM CHLORIDE 9 MG/ML
1000 INJECTION, SOLUTION INTRAVENOUS
Refills: 0 | Status: DISCONTINUED | OUTPATIENT
Start: 2022-08-18 | End: 2022-08-18

## 2022-08-18 RX ORDER — ONDANSETRON 8 MG/1
4 TABLET, FILM COATED ORAL EVERY 6 HOURS
Refills: 0 | Status: DISCONTINUED | OUTPATIENT
Start: 2022-08-18 | End: 2022-08-23

## 2022-08-18 RX ORDER — BUPIVACAINE 13.3 MG/ML
20 INJECTION, SUSPENSION, LIPOSOMAL INFILTRATION ONCE
Refills: 0 | Status: DISCONTINUED | OUTPATIENT
Start: 2022-08-18 | End: 2022-08-23

## 2022-08-18 RX ORDER — ENOXAPARIN SODIUM 100 MG/ML
40 INJECTION SUBCUTANEOUS EVERY 24 HOURS
Refills: 0 | Status: DISCONTINUED | OUTPATIENT
Start: 2022-08-18 | End: 2022-08-24

## 2022-08-18 RX ORDER — ACETAMINOPHEN 500 MG
650 TABLET ORAL EVERY 6 HOURS
Refills: 0 | Status: DISCONTINUED | OUTPATIENT
Start: 2022-08-18 | End: 2022-08-24

## 2022-08-18 RX ORDER — LEVOTHYROXINE SODIUM 125 MCG
100 TABLET ORAL DAILY
Refills: 0 | Status: DISCONTINUED | OUTPATIENT
Start: 2022-08-18 | End: 2022-08-24

## 2022-08-18 RX ORDER — CEFTRIAXONE 500 MG/1
2000 INJECTION, POWDER, FOR SOLUTION INTRAMUSCULAR; INTRAVENOUS EVERY 24 HOURS
Refills: 0 | Status: DISCONTINUED | OUTPATIENT
Start: 2022-08-18 | End: 2022-08-19

## 2022-08-18 RX ORDER — BUPIVACAINE 13.3 MG/ML
20 INJECTION, SUSPENSION, LIPOSOMAL INFILTRATION ONCE
Refills: 0 | Status: DISCONTINUED | OUTPATIENT
Start: 2022-08-18 | End: 2022-08-18

## 2022-08-18 RX ORDER — KETOROLAC TROMETHAMINE 30 MG/ML
15 SYRINGE (ML) INJECTION EVERY 8 HOURS
Refills: 0 | Status: DISCONTINUED | OUTPATIENT
Start: 2022-08-18 | End: 2022-08-23

## 2022-08-18 RX ORDER — MIRTAZAPINE 45 MG/1
15 TABLET, ORALLY DISINTEGRATING ORAL AT BEDTIME
Refills: 0 | Status: DISCONTINUED | OUTPATIENT
Start: 2022-08-18 | End: 2022-08-24

## 2022-08-18 RX ORDER — SODIUM CHLORIDE 9 MG/ML
1000 INJECTION, SOLUTION INTRAVENOUS
Refills: 0 | Status: DISCONTINUED | OUTPATIENT
Start: 2022-08-18 | End: 2022-08-19

## 2022-08-18 RX ORDER — TAMSULOSIN HYDROCHLORIDE 0.4 MG/1
0.4 CAPSULE ORAL AT BEDTIME
Refills: 0 | Status: DISCONTINUED | OUTPATIENT
Start: 2022-08-18 | End: 2022-08-24

## 2022-08-18 RX ORDER — VALPROIC ACID (AS SODIUM SALT) 250 MG/5ML
375 SOLUTION, ORAL ORAL THREE TIMES A DAY
Refills: 0 | Status: DISCONTINUED | OUTPATIENT
Start: 2022-08-18 | End: 2022-08-23

## 2022-08-18 RX ORDER — BUPROPION HYDROCHLORIDE 150 MG/1
150 TABLET, EXTENDED RELEASE ORAL DAILY
Refills: 0 | Status: DISCONTINUED | OUTPATIENT
Start: 2022-08-18 | End: 2022-08-24

## 2022-08-18 RX ORDER — BENZTROPINE MESYLATE 1 MG
1 TABLET ORAL
Refills: 0 | Status: DISCONTINUED | OUTPATIENT
Start: 2022-08-18 | End: 2022-08-24

## 2022-08-18 RX ORDER — OXYCODONE HYDROCHLORIDE 5 MG/1
5 TABLET ORAL EVERY 6 HOURS
Refills: 0 | Status: DISCONTINUED | OUTPATIENT
Start: 2022-08-18 | End: 2022-08-20

## 2022-08-18 RX ORDER — HALOPERIDOL DECANOATE 100 MG/ML
10 INJECTION INTRAMUSCULAR THREE TIMES A DAY
Refills: 0 | Status: DISCONTINUED | OUTPATIENT
Start: 2022-08-18 | End: 2022-08-24

## 2022-08-18 RX ADMIN — HALOPERIDOL DECANOATE 10 MILLIGRAM(S): 100 INJECTION INTRAMUSCULAR at 06:03

## 2022-08-18 RX ADMIN — SODIUM CHLORIDE 100 MILLILITER(S): 9 INJECTION, SOLUTION INTRAVENOUS at 18:07

## 2022-08-18 RX ADMIN — MIRTAZAPINE 15 MILLIGRAM(S): 45 TABLET, ORALLY DISINTEGRATING ORAL at 21:33

## 2022-08-18 RX ADMIN — Medication 53.75 MILLIGRAM(S): at 21:34

## 2022-08-18 RX ADMIN — Medication 1 MILLIGRAM(S): at 06:04

## 2022-08-18 RX ADMIN — Medication 53.75 MILLIGRAM(S): at 15:25

## 2022-08-18 RX ADMIN — ENOXAPARIN SODIUM 40 MILLIGRAM(S): 100 INJECTION SUBCUTANEOUS at 17:21

## 2022-08-18 RX ADMIN — CEFTRIAXONE 100 MILLIGRAM(S): 500 INJECTION, POWDER, FOR SOLUTION INTRAMUSCULAR; INTRAVENOUS at 17:22

## 2022-08-18 RX ADMIN — Medication 650 MILLIGRAM(S): at 18:05

## 2022-08-18 RX ADMIN — SODIUM CHLORIDE 125 MILLILITER(S): 9 INJECTION, SOLUTION INTRAVENOUS at 06:07

## 2022-08-18 RX ADMIN — Medication 1 MILLIGRAM(S): at 18:05

## 2022-08-18 RX ADMIN — HALOPERIDOL DECANOATE 10 MILLIGRAM(S): 100 INJECTION INTRAMUSCULAR at 21:32

## 2022-08-18 RX ADMIN — CHLORHEXIDINE GLUCONATE 1 APPLICATION(S): 213 SOLUTION TOPICAL at 05:39

## 2022-08-18 RX ADMIN — Medication 650 MILLIGRAM(S): at 18:35

## 2022-08-18 RX ADMIN — Medication 100 MICROGRAM(S): at 06:03

## 2022-08-18 RX ADMIN — HALOPERIDOL DECANOATE 10 MILLIGRAM(S): 100 INJECTION INTRAMUSCULAR at 15:25

## 2022-08-18 RX ADMIN — Medication 53.75 MILLIGRAM(S): at 06:06

## 2022-08-18 RX ADMIN — TAMSULOSIN HYDROCHLORIDE 0.4 MILLIGRAM(S): 0.4 CAPSULE ORAL at 21:33

## 2022-08-18 RX ADMIN — SODIUM CHLORIDE 100 MILLILITER(S): 9 INJECTION, SOLUTION INTRAVENOUS at 13:59

## 2022-08-18 NOTE — PROGRESS NOTE ADULT - ATTENDING COMMENTS
Patient is a 51M with PMH schizophrenia, intellectual disability, seizure disorder, hypothyroid who presents with abdominal distention found to have sigmoid volvulus.  Patient is now s/p endoscopic decompression by GI    Patient seen and examined.  He tolerated the prep    Abdomen  - soft, non distended, non tender    Vitals labs and images reviewed    Plan:  - medical risk assessment - low risk for intermediate risk procedure  - robot assisted laparoscopic sigmoidectomy possible open possible ostomy today  - NPO  - Patient is unable to consent for himself.  The current standard of care for sigmoid volvulus is for sigmoidectomy after decompression due to the high risk of recurrence and possible ischemia and perforation.  We will proceed with two physician consent.

## 2022-08-18 NOTE — BRIEF OPERATIVE NOTE - NSICDXBRIEFPROCEDURE_GEN_ALL_CORE_FT
PROCEDURES:  Robot-assisted sigmoidectomy with colorectal anastomosis 18-Aug-2022 12:34:24  Homero Zapata

## 2022-08-18 NOTE — BRIEF OPERATIVE NOTE - OPERATION/FINDINGS
Robot assisted laparoscopic sigmoidectomy.  Redundant sigmoid resected using 28 stapler after confirming with ICG, EEA stapler used for colorectal anastomosis, 2 doughnuts intact, negative leak test.

## 2022-08-18 NOTE — CHART NOTE - NSCHARTNOTEFT_GEN_A_CORE
Service Transfer Note    Transfer from: Medicine    Transfer to: Surgery    Accepting Physician: Dr. Balta Fritz        HPI / CCU COURSE:  52 yo male with PMH of Mild mental retardation, intellectual disability, Schizophrenia, Impulse control disorder, Seizures, Hypothyroidism, Exotropia, Cataracts, Osteopenia, presented from [facility] for hypotension and abdominal pain. Obtained hx from aid at bedside. Per Aid, patient was not feeling well and dry heaving at facility. On vital check at facility found to have a BP of 91/38?, called EMS. Patient reported abdominal pain to EMS and was brought to the hospital. Aid denies, fevers, chills, weight loss, sob, chest pain, vomiting, diarrhea at facility. Symptoms started today. At bedside, patient still reports diffuse abdominal pain, does not radiate anywhere else. Unable to answer other ROS questions.     In ED Vitals: T 95, /109, HR 96, RR 20, 97% on RA. WBC 12.19k, CMP wnl, lactate normal. UA positive   - KUB with distended bowel loops concerning for sigmoid volvulus   - CT A/P with Swirling of the mesentery in the mid pelvis with dilatation of what appears to be the  sigmoid colon, findings are concerning for sigmoid volvulus. New small right pleural effusion with a new associated bibasilar opacities.   - Chest Xray: Mild prominence of the interstitial markings and bibasilar atelectasis/fibrosis.    GI was consulted in ED. S/p rectal tube placement with no decompression. Taken emergently for flex sigmoidoscopy for decompression after 2 physician consent as patient does not have next of kin/HCP/family on chart.     Patient admitted to medicine for further eval.     Hospital Course:  Patient was s/p endoscopic detorsion of volvulus, retwisting of colon. Started on Rocephin and Flagyl empirically. Serial KUBs noted stool and distended colon. General surgery following and planned for sigmoidectomy. Bowel prep with neomycin and flagyl. Patient went to OR on 8/18 for laproscopic sigmoidectomy. Patient will be transferred to surgery service.       Vital Signs Last 24 Hrs  T(C): 36.1 (18 Aug 2022 12:20), Max: 36.7 (18 Aug 2022 06:57)  T(F): 97 (18 Aug 2022 12:20), Max: 98 (18 Aug 2022 06:57)  HR: 91 (18 Aug 2022 14:15) (63 - 91)  BP: 105/72 (18 Aug 2022 14:15) (105/72 - 159/69)  BP(mean): --  RR: 16 (18 Aug 2022 14:15) (16 - 18)  SpO2: 97% (18 Aug 2022 14:15) (95% - 98%)    Parameters below as of 18 Aug 2022 14:15  Patient On (Oxygen Delivery Method): room air          Physical Exam:   Patient taken to OR today prior to examination    LABS:                               13.9   7.41  )-----------( 144      ( 17 Aug 2022 21:00 )             42.1       08-17    140  |  99  |  13  ----------------------------<  75  4.3   |  29  |  0.9    Ca    9.0      17 Aug 2022 21:00  Phos  3.0     08-17  Mg     2.2     08-17        PT/INR - ( 17 Aug 2022 21:00 )   PT: 12.40 sec;   INR: 1.08 ratio         PTT - ( 17 Aug 2022 21:00 )  PTT:33.4 sec      MEDICATIONS  (STANDING):  acetaminophen     Tablet .. 650 milliGRAM(s) Oral every 6 hours  benztropine 1 milliGRAM(s) Oral two times a day  BUpivacaine liposome 1.3% Injectable 20 milliLiter(s) Local Injection once  buPROPion XL (24-Hour) . 150 milliGRAM(s) Oral daily  cefTRIAXone   IVPB 2000 milliGRAM(s) IV Intermittent every 24 hours  enoxaparin Injectable 40 milliGRAM(s) SubCutaneous every 24 hours  haloperidol     Tablet 10 milliGRAM(s) Oral three times a day  lactated ringers. 1000 milliLiter(s) (100 mL/Hr) IV Continuous <Continuous>  levothyroxine 100 MICROGram(s) Oral daily  mirtazapine 15 milliGRAM(s) Oral at bedtime  pantoprazole  Injectable 40 milliGRAM(s) IV Push daily  tamsulosin 0.4 milliGRAM(s) Oral at bedtime  valproate sodium  IVPB 375 milliGRAM(s) IV Intermittent three times a day    MEDICATIONS  (PRN):  HYDROmorphone  Injectable 0.5 milliGRAM(s) IV Push every 10 minutes PRN Moderate Pain (4 - 6)  ketorolac   Injectable 15 milliGRAM(s) IV Push every 8 hours PRN Moderate Pain (4 - 6)  ondansetron Injectable 4 milliGRAM(s) IV Push every 6 hours PRN Nausea and/or Vomiting  oxyCODONE    IR 5 milliGRAM(s) Oral every 6 hours PRN Severe Pain (7 - 10)      Imaging:        ASSESSMENT & PLAN:     52 yo male with PMH of Mild mental retardation, intellectual disability, Schizophrenia, Impulse control disorder, Seizures, Hypothyroidism, Exotropia, Cataracts, Osteopenia, presented from [facility] for hypotension and abdominal pain.    # Sepsis POA- resolved  # Sigmoid Volvulus   - CT Abdomen and Pelvis w/ IV Cont (08.15.22 @ 12:10) > Swirling of the mesentery in the mid pelvis with dilatation of what   appears to be the  sigmoid colon, findings are concerning for sigmoid volvulus. New small right pleural effusion with a new associated bibasilar opacities  -  Xray Kidney Ureter Bladder (08.17.22 @ 08:42) >Large lucency overlying the right upper quadrant and free air under the  diaphragm and bowel perforation cannot be excluded. Alternatively, this may represent a distended loop of bowel. Nonobstructive bowel gas pattern  -  on 8/15 s/p colonoscopy decompression--> Regardless of multiple attempts was able to achieve partial detorsion  - s/p laproscopic sigmoidectomy 8/18  - c/w ceftriaxone, flagyl  - transfer to surgery service    # UTI  - urine Culture Results: >100,000 CFU/ml Klebsiella pneumoniae (08.15.22 @ 15:41)  - blood Culture Results: No Growth Final (07.21.21 @ 16:00)  - c/w ceftriaxone    # H/o seizure disorder  - Seizure precaution  - c/w Depakote    # Hypothyroidism - c/w synthroid    # Hypomagnesemia, resolved    # H/o schizophrenia  # Impulse control disorder  # Intellectual disability  - c/w Remeron, haldol, bentropine    #DVT ppx: lovenox            FOR FOLLOW UP:  [ ]   [ ]   [ ]     Mitzi Espana MD PGY1 Service Transfer Note    Transfer from: Medicine    Transfer to: Surgery    Accepting Physician: Dr. Balta Fritz        HPI / CCU COURSE:  50 yo male with PMH of Mild mental retardation, intellectual disability, Schizophrenia, Impulse control disorder, Seizures, Hypothyroidism, Exotropia, Cataracts, Osteopenia, presented from [facility], [place] for hypotension and abdominal pain. Obtained hx from aid at bedside. Per Aid, patient was not feeling well and dry heaving at facility. On vital check at facility found to have a BP of 91/38?, called EMS. Patient reported abdominal pain to EMS and was brought to the hospital. Aid denies, fevers, chills, weight loss, sob, chest pain, vomiting, diarrhea at facility. Symptoms started today. At bedside, patient still reports diffuse abdominal pain, does not radiate anywhere else. Unable to answer other ROS questions.     In ED Vitals: T 95, /109, HR 96, RR 20, 97% on RA. WBC 12.19k, CMP wnl, lactate normal. UA positive   - KUB with distended bowel loops concerning for sigmoid volvulus   - CT A/P with Swirling of the mesentery in the mid pelvis with dilatation of what appears to be the  sigmoid colon, findings are concerning for sigmoid volvulus. New small right pleural effusion with a new associated bibasilar opacities.   - Chest Xray: Mild prominence of the interstitial markings and bibasilar atelectasis/fibrosis.    GI was consulted in ED. S/p rectal tube placement with no decompression. Taken emergently for flex sigmoidoscopy for decompression after 2 physician consent as patient does not have next of kin/HCP/family on chart.     Patient admitted to medicine for further eval.     Hospital Course:  Patient was s/p endoscopic detorsion of volvulus, retwisting of colon. Started on Rocephin and Flagyl empirically. Serial KUBs noted stool and distended colon. General surgery following and planned for sigmoidectomy. Bowel prep with neomycin and flagyl. Patient went to OR on 8/18 for laproscopic sigmoidectomy. Patient will be transferred to surgery service.       Vital Signs Last 24 Hrs  T(C): 36.1 (18 Aug 2022 12:20), Max: 36.7 (18 Aug 2022 06:57)  T(F): 97 (18 Aug 2022 12:20), Max: 98 (18 Aug 2022 06:57)  HR: 91 (18 Aug 2022 14:15) (63 - 91)  BP: 105/72 (18 Aug 2022 14:15) (105/72 - 159/69)  BP(mean): --  RR: 16 (18 Aug 2022 14:15) (16 - 18)  SpO2: 97% (18 Aug 2022 14:15) (95% - 98%)    Parameters below as of 18 Aug 2022 14:15  Patient On (Oxygen Delivery Method): room air          Physical Exam:   Patient taken to OR today prior to examination    LABS:                               13.9   7.41  )-----------( 144      ( 17 Aug 2022 21:00 )             42.1       08-17    140  |  99  |  13  ----------------------------<  75  4.3   |  29  |  0.9    Ca    9.0      17 Aug 2022 21:00  Phos  3.0     08-17  Mg     2.2     08-17        PT/INR - ( 17 Aug 2022 21:00 )   PT: 12.40 sec;   INR: 1.08 ratio         PTT - ( 17 Aug 2022 21:00 )  PTT:33.4 sec      MEDICATIONS  (STANDING):  acetaminophen     Tablet .. 650 milliGRAM(s) Oral every 6 hours  benztropine 1 milliGRAM(s) Oral two times a day  BUpivacaine liposome 1.3% Injectable 20 milliLiter(s) Local Injection once  buPROPion XL (24-Hour) . 150 milliGRAM(s) Oral daily  cefTRIAXone   IVPB 2000 milliGRAM(s) IV Intermittent every 24 hours  enoxaparin Injectable 40 milliGRAM(s) SubCutaneous every 24 hours  haloperidol     Tablet 10 milliGRAM(s) Oral three times a day  lactated ringers. 1000 milliLiter(s) (100 mL/Hr) IV Continuous <Continuous>  levothyroxine 100 MICROGram(s) Oral daily  mirtazapine 15 milliGRAM(s) Oral at bedtime  pantoprazole  Injectable 40 milliGRAM(s) IV Push daily  tamsulosin 0.4 milliGRAM(s) Oral at bedtime  valproate sodium  IVPB 375 milliGRAM(s) IV Intermittent three times a day    MEDICATIONS  (PRN):  HYDROmorphone  Injectable 0.5 milliGRAM(s) IV Push every 10 minutes PRN Moderate Pain (4 - 6)  ketorolac   Injectable 15 milliGRAM(s) IV Push every 8 hours PRN Moderate Pain (4 - 6)  ondansetron Injectable 4 milliGRAM(s) IV Push every 6 hours PRN Nausea and/or Vomiting  oxyCODONE    IR 5 milliGRAM(s) Oral every 6 hours PRN Severe Pain (7 - 10)      Imaging:        ASSESSMENT & PLAN:     50 yo male with PMH of Mild mental retardation, intellectual disability, Schizophrenia, Impulse control disorder, Seizures, Hypothyroidism, Exotropia, Cataracts, Osteopenia, presented from [facility], [place] for hypotension and abdominal pain.    # Sepsis POA- resolved  # Sigmoid Volvulus   - CT Abdomen and Pelvis w/ IV Cont (08.15.22 @ 12:10) > Swirling of the mesentery in the mid pelvis with dilatation of what   appears to be the  sigmoid colon, findings are concerning for sigmoid volvulus. New small right pleural effusion with a new associated bibasilar opacities  -  Xray Kidney Ureter Bladder (08.17.22 @ 08:42) >Large lucency overlying the right upper quadrant and free air under the  diaphragm and bowel perforation cannot be excluded. Alternatively, this may represent a distended loop of bowel. Nonobstructive bowel gas pattern  -  on 8/15 s/p colonoscopy decompression--> Regardless of multiple attempts was able to achieve partial detorsion  - s/p laproscopic sigmoidectomy 8/18  - c/w ceftriaxone, flagyl  - transfer to surgery service    # UTI  - urine Culture Results: >100,000 CFU/ml Klebsiella pneumoniae (08.15.22 @ 15:41)  - blood Culture Results: No Growth Final (07.21.21 @ 16:00)  - c/w ceftriaxone    # H/o seizure disorder  - Seizure precaution  - c/w Depakote    # Hypothyroidism - c/w synthroid    # Hypomagnesemia, resolved    # H/o schizophrenia  # Impulse control disorder  # Intellectual disability  - c/w Remeron, haldol, bentropine    #DVT ppx: lovenox

## 2022-08-18 NOTE — CHART NOTE - NSCHARTNOTEFT_GEN_A_CORE
Post Operative Note  Patient: ALINA CHERY 51y (1970) Male   MRN: [id number]  [address]  Visit: 08-15-22 Inpatient  Date: 08-18-22 @ 15:41    Procedure: POD0 S/P Robot-assisted sigmoidectomy with colorectal anastomosis    Subjective:   Seen in PACU, AAOx3, NAD, denies pain, asking for food, tolerating CLD, -OOB, abdomen soft/nondistended/AT, incisions closed with dermabon and c/d/i, long in place and UOP>100cc/hr    Objective:  Vitals: T(F): 97 (08-18-22 @ 12:20), Max: 98 (08-18-22 @ 06:57)  HR: 91 (08-18-22 @ 14:15)  BP: 105/72 (08-18-22 @ 14:15) (105/72 - 159/69)  RR: 16 (08-18-22 @ 14:15)  SpO2: 97% (08-18-22 @ 14:15)  Vent Settings:     In:   IV Fluids: lactated ringers. 1000 milliLiter(s) (100 mL/Hr) IV Continuous <Continuous>      Physical Examination:  General Appearance: NAD  Heart: RRR  Lungs: Unlabored breathing at rest  Abdomen:  S/ND/AT  MSK/Extremities: Warm & well-perfused. Peripheral pulses intact.  Skin: Warm, dry. No jaundice.   Incisions/Wounds: clean, dry and intact, no signs of infection/active bleeding/drainage    Medications: [Standing]  acetaminophen     Tablet .. 650 milliGRAM(s) Oral every 6 hours  benztropine 1 milliGRAM(s) Oral two times a day  BUpivacaine liposome 1.3% Injectable 20 milliLiter(s) Local Injection once  buPROPion XL (24-Hour) . 150 milliGRAM(s) Oral daily  cefTRIAXone   IVPB 2000 milliGRAM(s) IV Intermittent every 24 hours  enoxaparin Injectable 40 milliGRAM(s) SubCutaneous every 24 hours  haloperidol     Tablet 10 milliGRAM(s) Oral three times a day  HYDROmorphone  Injectable 0.5 milliGRAM(s) IV Push every 10 minutes PRN  ketorolac   Injectable 15 milliGRAM(s) IV Push every 8 hours PRN  lactated ringers. 1000 milliLiter(s) IV Continuous <Continuous>  levothyroxine 100 MICROGram(s) Oral daily  mirtazapine 15 milliGRAM(s) Oral at bedtime  ondansetron Injectable 4 milliGRAM(s) IV Push every 6 hours PRN  oxyCODONE    IR 5 milliGRAM(s) Oral every 6 hours PRN  pantoprazole  Injectable 40 milliGRAM(s) IV Push daily  tamsulosin 0.4 milliGRAM(s) Oral at bedtime  valproate sodium  IVPB 375 milliGRAM(s) IV Intermittent three times a day    Medications: [PRN]  acetaminophen     Tablet .. 650 milliGRAM(s) Oral every 6 hours  benztropine 1 milliGRAM(s) Oral two times a day  BUpivacaine liposome 1.3% Injectable 20 milliLiter(s) Local Injection once  buPROPion XL (24-Hour) . 150 milliGRAM(s) Oral daily  cefTRIAXone   IVPB 2000 milliGRAM(s) IV Intermittent every 24 hours  enoxaparin Injectable 40 milliGRAM(s) SubCutaneous every 24 hours  haloperidol     Tablet 10 milliGRAM(s) Oral three times a day  HYDROmorphone  Injectable 0.5 milliGRAM(s) IV Push every 10 minutes PRN  ketorolac   Injectable 15 milliGRAM(s) IV Push every 8 hours PRN  lactated ringers. 1000 milliLiter(s) IV Continuous <Continuous>  levothyroxine 100 MICROGram(s) Oral daily  mirtazapine 15 milliGRAM(s) Oral at bedtime  ondansetron Injectable 4 milliGRAM(s) IV Push every 6 hours PRN  oxyCODONE    IR 5 milliGRAM(s) Oral every 6 hours PRN  pantoprazole  Injectable 40 milliGRAM(s) IV Push daily  tamsulosin 0.4 milliGRAM(s) Oral at bedtime  valproate sodium  IVPB 375 milliGRAM(s) IV Intermittent three times a day      DVT PROPHYLAXIS: enoxaparin Injectable 40 milliGRAM(s) SubCutaneous every 24 hours    GI PROPHYLAXIS: pantoprazole  Injectable 40 milliGRAM(s) IV Push daily    ANTICOAGULATION:   ANTIBIOTICS:  cefTRIAXone   IVPB 2000 milliGRAM(s)        Labs:                        13.9   7.41  )-----------( 144      ( 17 Aug 2022 21:00 )             42.1     08-17    140  |  99  |  13  ----------------------------<  75  4.3   |  29  |  0.9    Ca    9.0      17 Aug 2022 21:00  Phos  3.0     08-17  Mg     2.2     08-17      PT/INR - ( 17 Aug 2022 21:00 )   PT: 12.40 sec;   INR: 1.08 ratio         PTT - ( 17 Aug 2022 21:00 )  PTT:33.4 sec        Imaging:  No post-op imaging studies    Assessment:  51yM POD0 s/p RAL sigmoidectomy    Plan:    - Monitor vitals  - Monitor post-op labs and replete as necessary  - Monitor for bowel function  - Continue Pain Medications if necessary  - Continue Antibiotics if necessary  - Encourage ambulation as tolerated  - Monitor urine output and trial of void once Long removed  - DVT and GI Prophylaxis  - Monitor wound and dressing for changes, redress as needed.      Date/Time: 08-18-22 @ 15:41

## 2022-08-18 NOTE — ED ADULT NURSE NOTE - PRIMARY CARE PROVIDER
pcp 107 Show Applicator Variable?: Yes Detail Level: Detailed Number Of Freeze-Thaw Cycles: 1 freeze-thaw cycle Post-Care Instructions: I reviewed with the patient in detail post-care instructions. Patient is to wear sunprotection, and avoid picking at any of the treated lesions. Pt may apply Vaseline to crusted or scabbing areas. Duration Of Freeze Thaw-Cycle (Seconds): 5 Render Note In Bullet Format When Appropriate: No Application Tool (Optional): Liquid Nitrogen Sprayer Consent: The patient's consent was obtained including but not limited to risks of crusting, scabbing, blistering, scarring, darker or lighter pigmentary change, recurrence, incomplete removal and infection.

## 2022-08-19 LAB
ANION GAP SERPL CALC-SCNC: 14 MMOL/L — SIGNIFICANT CHANGE UP (ref 7–14)
ANION GAP SERPL CALC-SCNC: 9 MMOL/L — SIGNIFICANT CHANGE UP (ref 7–14)
BASOPHILS # BLD AUTO: 0.02 K/UL — SIGNIFICANT CHANGE UP (ref 0–0.2)
BASOPHILS # BLD AUTO: 0.03 K/UL — SIGNIFICANT CHANGE UP (ref 0–0.2)
BASOPHILS NFR BLD AUTO: 0.2 % — SIGNIFICANT CHANGE UP (ref 0–1)
BASOPHILS NFR BLD AUTO: 0.3 % — SIGNIFICANT CHANGE UP (ref 0–1)
BUN SERPL-MCNC: 4 MG/DL — LOW (ref 10–20)
BUN SERPL-MCNC: 6 MG/DL — LOW (ref 10–20)
CALCIUM SERPL-MCNC: 8.4 MG/DL — LOW (ref 8.5–10.1)
CALCIUM SERPL-MCNC: 8.8 MG/DL — SIGNIFICANT CHANGE UP (ref 8.5–10.1)
CHLORIDE SERPL-SCNC: 105 MMOL/L — SIGNIFICANT CHANGE UP (ref 98–110)
CHLORIDE SERPL-SCNC: 99 MMOL/L — SIGNIFICANT CHANGE UP (ref 98–110)
CO2 SERPL-SCNC: 25 MMOL/L — SIGNIFICANT CHANGE UP (ref 17–32)
CO2 SERPL-SCNC: 27 MMOL/L — SIGNIFICANT CHANGE UP (ref 17–32)
CREAT SERPL-MCNC: 0.7 MG/DL — SIGNIFICANT CHANGE UP (ref 0.7–1.5)
CREAT SERPL-MCNC: 0.7 MG/DL — SIGNIFICANT CHANGE UP (ref 0.7–1.5)
EGFR: 111 ML/MIN/1.73M2 — SIGNIFICANT CHANGE UP
EGFR: 111 ML/MIN/1.73M2 — SIGNIFICANT CHANGE UP
EOSINOPHIL # BLD AUTO: 0.04 K/UL — SIGNIFICANT CHANGE UP (ref 0–0.7)
EOSINOPHIL # BLD AUTO: 0.12 K/UL — SIGNIFICANT CHANGE UP (ref 0–0.7)
EOSINOPHIL NFR BLD AUTO: 0.3 % — SIGNIFICANT CHANGE UP (ref 0–8)
EOSINOPHIL NFR BLD AUTO: 1.2 % — SIGNIFICANT CHANGE UP (ref 0–8)
GLUCOSE SERPL-MCNC: 105 MG/DL — HIGH (ref 70–99)
GLUCOSE SERPL-MCNC: 83 MG/DL — SIGNIFICANT CHANGE UP (ref 70–99)
HCT VFR BLD CALC: 28.5 % — LOW (ref 42–52)
HCT VFR BLD CALC: 31.9 % — LOW (ref 42–52)
HGB BLD-MCNC: 10.8 G/DL — LOW (ref 14–18)
HGB BLD-MCNC: 9.7 G/DL — LOW (ref 14–18)
IMM GRANULOCYTES NFR BLD AUTO: 0.5 % — HIGH (ref 0.1–0.3)
IMM GRANULOCYTES NFR BLD AUTO: 0.6 % — HIGH (ref 0.1–0.3)
LYMPHOCYTES # BLD AUTO: 1.48 K/UL — SIGNIFICANT CHANGE UP (ref 1.2–3.4)
LYMPHOCYTES # BLD AUTO: 1.55 K/UL — SIGNIFICANT CHANGE UP (ref 1.2–3.4)
LYMPHOCYTES # BLD AUTO: 12.8 % — LOW (ref 20.5–51.1)
LYMPHOCYTES # BLD AUTO: 14.6 % — LOW (ref 20.5–51.1)
MAGNESIUM SERPL-MCNC: 1.5 MG/DL — LOW (ref 1.8–2.4)
MAGNESIUM SERPL-MCNC: 1.9 MG/DL — SIGNIFICANT CHANGE UP (ref 1.8–2.4)
MCHC RBC-ENTMCNC: 31.3 PG — HIGH (ref 27–31)
MCHC RBC-ENTMCNC: 31.6 PG — HIGH (ref 27–31)
MCHC RBC-ENTMCNC: 33.9 G/DL — SIGNIFICANT CHANGE UP (ref 32–37)
MCHC RBC-ENTMCNC: 34 G/DL — SIGNIFICANT CHANGE UP (ref 32–37)
MCV RBC AUTO: 92.5 FL — SIGNIFICANT CHANGE UP (ref 80–94)
MCV RBC AUTO: 92.8 FL — SIGNIFICANT CHANGE UP (ref 80–94)
MONOCYTES # BLD AUTO: 1.16 K/UL — HIGH (ref 0.1–0.6)
MONOCYTES # BLD AUTO: 1.55 K/UL — HIGH (ref 0.1–0.6)
MONOCYTES NFR BLD AUTO: 11.4 % — HIGH (ref 1.7–9.3)
MONOCYTES NFR BLD AUTO: 12.8 % — HIGH (ref 1.7–9.3)
NEUTROPHILS # BLD AUTO: 7.3 K/UL — HIGH (ref 1.4–6.5)
NEUTROPHILS # BLD AUTO: 8.91 K/UL — HIGH (ref 1.4–6.5)
NEUTROPHILS NFR BLD AUTO: 72 % — SIGNIFICANT CHANGE UP (ref 42.2–75.2)
NEUTROPHILS NFR BLD AUTO: 73.3 % — SIGNIFICANT CHANGE UP (ref 42.2–75.2)
NRBC # BLD: 0 /100 WBCS — SIGNIFICANT CHANGE UP (ref 0–0)
NRBC # BLD: 0 /100 WBCS — SIGNIFICANT CHANGE UP (ref 0–0)
PHOSPHATE SERPL-MCNC: 3 MG/DL — SIGNIFICANT CHANGE UP (ref 2.1–4.9)
PHOSPHATE SERPL-MCNC: 3.7 MG/DL — SIGNIFICANT CHANGE UP (ref 2.1–4.9)
PLATELET # BLD AUTO: 136 K/UL — SIGNIFICANT CHANGE UP (ref 130–400)
PLATELET # BLD AUTO: 145 K/UL — SIGNIFICANT CHANGE UP (ref 130–400)
POTASSIUM SERPL-MCNC: 4 MMOL/L — SIGNIFICANT CHANGE UP (ref 3.5–5)
POTASSIUM SERPL-MCNC: 4.5 MMOL/L — SIGNIFICANT CHANGE UP (ref 3.5–5)
POTASSIUM SERPL-SCNC: 4 MMOL/L — SIGNIFICANT CHANGE UP (ref 3.5–5)
POTASSIUM SERPL-SCNC: 4.5 MMOL/L — SIGNIFICANT CHANGE UP (ref 3.5–5)
RBC # BLD: 3.07 M/UL — LOW (ref 4.7–6.1)
RBC # BLD: 3.45 M/UL — LOW (ref 4.7–6.1)
RBC # FLD: 13.6 % — SIGNIFICANT CHANGE UP (ref 11.5–14.5)
RBC # FLD: 13.7 % — SIGNIFICANT CHANGE UP (ref 11.5–14.5)
SODIUM SERPL-SCNC: 135 MMOL/L — SIGNIFICANT CHANGE UP (ref 135–146)
SODIUM SERPL-SCNC: 144 MMOL/L — SIGNIFICANT CHANGE UP (ref 135–146)
WBC # BLD: 10.14 K/UL — SIGNIFICANT CHANGE UP (ref 4.8–10.8)
WBC # BLD: 12.14 K/UL — HIGH (ref 4.8–10.8)
WBC # FLD AUTO: 10.14 K/UL — SIGNIFICANT CHANGE UP (ref 4.8–10.8)
WBC # FLD AUTO: 12.14 K/UL — HIGH (ref 4.8–10.8)

## 2022-08-19 RX ORDER — MAGNESIUM SULFATE 500 MG/ML
2 VIAL (ML) INJECTION ONCE
Refills: 0 | Status: COMPLETED | OUTPATIENT
Start: 2022-08-19 | End: 2022-08-19

## 2022-08-19 RX ORDER — SODIUM CHLORIDE 9 MG/ML
1000 INJECTION, SOLUTION INTRAVENOUS
Refills: 0 | Status: DISCONTINUED | OUTPATIENT
Start: 2022-08-19 | End: 2022-08-22

## 2022-08-19 RX ADMIN — MIRTAZAPINE 15 MILLIGRAM(S): 45 TABLET, ORALLY DISINTEGRATING ORAL at 21:22

## 2022-08-19 RX ADMIN — Medication 53.75 MILLIGRAM(S): at 05:28

## 2022-08-19 RX ADMIN — PANTOPRAZOLE SODIUM 40 MILLIGRAM(S): 20 TABLET, DELAYED RELEASE ORAL at 11:01

## 2022-08-19 RX ADMIN — Medication 650 MILLIGRAM(S): at 00:01

## 2022-08-19 RX ADMIN — Medication 1 MILLIGRAM(S): at 17:01

## 2022-08-19 RX ADMIN — TAMSULOSIN HYDROCHLORIDE 0.4 MILLIGRAM(S): 0.4 CAPSULE ORAL at 21:22

## 2022-08-19 RX ADMIN — SODIUM CHLORIDE 40 MILLILITER(S): 9 INJECTION, SOLUTION INTRAVENOUS at 11:01

## 2022-08-19 RX ADMIN — Medication 53.75 MILLIGRAM(S): at 13:44

## 2022-08-19 RX ADMIN — HALOPERIDOL DECANOATE 10 MILLIGRAM(S): 100 INJECTION INTRAMUSCULAR at 21:22

## 2022-08-19 RX ADMIN — Medication 650 MILLIGRAM(S): at 17:01

## 2022-08-19 RX ADMIN — Medication 650 MILLIGRAM(S): at 11:32

## 2022-08-19 RX ADMIN — Medication 650 MILLIGRAM(S): at 17:31

## 2022-08-19 RX ADMIN — Medication 650 MILLIGRAM(S): at 05:26

## 2022-08-19 RX ADMIN — ENOXAPARIN SODIUM 40 MILLIGRAM(S): 100 INJECTION SUBCUTANEOUS at 17:01

## 2022-08-19 RX ADMIN — HALOPERIDOL DECANOATE 10 MILLIGRAM(S): 100 INJECTION INTRAMUSCULAR at 05:27

## 2022-08-19 RX ADMIN — Medication 53.75 MILLIGRAM(S): at 21:42

## 2022-08-19 RX ADMIN — Medication 1 MILLIGRAM(S): at 05:26

## 2022-08-19 RX ADMIN — Medication 650 MILLIGRAM(S): at 11:01

## 2022-08-19 RX ADMIN — Medication 25 GRAM(S): at 03:10

## 2022-08-19 RX ADMIN — Medication 100 MICROGRAM(S): at 05:27

## 2022-08-19 RX ADMIN — HALOPERIDOL DECANOATE 10 MILLIGRAM(S): 100 INJECTION INTRAMUSCULAR at 13:44

## 2022-08-19 RX ADMIN — BUPROPION HYDROCHLORIDE 150 MILLIGRAM(S): 150 TABLET, EXTENDED RELEASE ORAL at 11:01

## 2022-08-19 NOTE — DIETITIAN INITIAL EVALUATION ADULT - NSFNSNUTRHOMESUPPLEMENTFT_GEN_A_CORE
--51yM  s/p RA Laparoscopic sigmoidectomy on 8/18/22. No acute issues post-operatively.   - Hemodynamic monitoring   - CLD  - f/u bowel function .   n/a

## 2022-08-19 NOTE — DIETITIAN INITIAL EVALUATION ADULT - ADD RECOMMEND
1. Advance diet per sx reccs   2. consider SLP eval prior to advancement of diet   3. Add Ensure Clear TID (660kcal/30g PRO)   4. Add Prosource Gelatein BID (300kcal/40g PRO)  5. Encourage PO intake

## 2022-08-19 NOTE — PHYSICAL THERAPY INITIAL EVALUATION ADULT - GENERAL OBSERVATIONS, REHAB EVAL
Chart reviewed. Pt currently in PACU at 1345. to f/u when available.
PT IE [id number]. Chart reviewed and case discussed with CATRACHITO Givens. Pt encountered semi-reclined in bed. In NAD. No c/o pain stated. + IV line disconnected by RN for mobility, + long.

## 2022-08-19 NOTE — DIETITIAN INITIAL EVALUATION ADULT - ORAL INTAKE PTA/DIET HISTORY
Pt unable to communicate effectively  Pt unable to communicate effectively. Reached group [place] who provided me with RN number ([phone]), however, RN unaware of any nutriiton hx as she takes care of the medical aspect of pt's care.

## 2022-08-19 NOTE — DIETITIAN INITIAL EVALUATION ADULT - PERTINENT LABORATORY DATA
08-19    144  |  105  |  6<L>  ----------------------------<  83  4.5   |  25  |  0.7    Ca    8.8      19 Aug 2022 12:43  Phos  3.7     08-19  Mg     1.9     08-19    A1C with Estimated Average Glucose Result: 5.1 % (08-16-22 @ 05:25)

## 2022-08-19 NOTE — PROGRESS NOTE ADULT - ATTENDING COMMENTS
Patient is a 51M with PMH schizophrenia, intellectual disability, seizure disorder, hypothyroid who presents with abdominal distention found to have sigmoid volvulus.  Patient is now s/p endoscopic decompression by GI  POD 1 s/p robotic sigmoidectomy.    Patient seen and examined.  Patient appears comfortable.  Denies significant pain.    Abdomen  - soft, non distended, appropriately tender.  Wounds healing well with dermabond in place.    Vitals labs and images reviewed    Plan:  - CLD  - mIVF @ 40mL  - DC long  - TOV  - 24 hours antibiotics  - PT consult  - GI / DVT PPX  - tylenol toradol dilaudid for pain  - f/u bowel function

## 2022-08-19 NOTE — DIETITIAN INITIAL EVALUATION ADULT - PERTINENT MEDS FT
MEDICATIONS  (STANDING):  acetaminophen     Tablet .. 650 milliGRAM(s) Oral every 6 hours  benztropine 1 milliGRAM(s) Oral two times a day  BUpivacaine liposome 1.3% Injectable 20 milliLiter(s) Local Injection once  buPROPion XL (24-Hour) . 150 milliGRAM(s) Oral daily  cefTRIAXone   IVPB 2000 milliGRAM(s) IV Intermittent every 24 hours  dextrose 5% + sodium chloride 0.45%. 1000 milliLiter(s) (40 mL/Hr) IV Continuous <Continuous>  enoxaparin Injectable 40 milliGRAM(s) SubCutaneous every 24 hours  haloperidol     Tablet 10 milliGRAM(s) Oral three times a day  levothyroxine 100 MICROGram(s) Oral daily  mirtazapine 15 milliGRAM(s) Oral at bedtime  pantoprazole  Injectable 40 milliGRAM(s) IV Push daily  tamsulosin 0.4 milliGRAM(s) Oral at bedtime  valproate sodium  IVPB 375 milliGRAM(s) IV Intermittent three times a day    MEDICATIONS  (PRN):  ketorolac   Injectable 15 milliGRAM(s) IV Push every 8 hours PRN Moderate Pain (4 - 6)  ondansetron Injectable 4 milliGRAM(s) IV Push every 6 hours PRN Nausea and/or Vomiting  oxyCODONE    IR 5 milliGRAM(s) Oral every 6 hours PRN Severe Pain (7 - 10)   MEDICATIONS  (STANDING):  benztropine 1 milliGRAM(s) Oral two times a day  BUpivacaine liposome 1.3% Injectable 20 milliLiter(s) Local Injection once  buPROPion XL (24-Hour) . 150 milliGRAM(s) Oral daily  cefTRIAXone   IVPB 2000 milliGRAM(s) IV Intermittent every 24 hours  dextrose 5% + sodium chloride 0.45%. 1000 milliLiter(s) (40 mL/Hr) IV Continuous <Continuous>  enoxaparin Injectable 40 milliGRAM(s) SubCutaneous every 24 hours  haloperidol     Tablet 10 milliGRAM(s) Oral three times a day  levothyroxine 100 MICROGram(s) Oral daily  mirtazapine 15 milliGRAM(s) Oral at bedtime  pantoprazole  Injectable 40 milliGRAM(s) IV Push daily  tamsulosin 0.4 milliGRAM(s) Oral at bedtime  valproate sodium  IVPB 375 milliGRAM(s) IV Intermittent three times a day    MEDICATIONS  (PRN):  ketorolac   Injectable 15 milliGRAM(s) IV Push every 8 hours PRN Moderate Pain (4 - 6)  ondansetron Injectable 4 milliGRAM(s) IV Push every 6 hours PRN Nausea and/or Vomiting  oxyCODONE    IR 5 milliGRAM(s) Oral every 6 hours PRN Severe Pain (7 - 10)

## 2022-08-19 NOTE — DIETITIAN INITIAL EVALUATION ADULT - OTHER INFO
----51yM  s/p RA Laparoscopic sigmoidectomy on 8/18/22. No acute issues post-operatively.   - Hemodynamic monitoring   - CLD  - f/u bowel function

## 2022-08-19 NOTE — PHYSICAL THERAPY INITIAL EVALUATION ADULT - PERTINENT HX OF CURRENT PROBLEM, REHAB EVAL
Patient is a 50 yo male with PMH of Mild mental retardation, intellectual disability, Schizophrenia, Impulse control disorder, Seizures, Hypothyroidism, Exotropia, Cataracts, Osteopenia, presented from [facility] facility for hypotension and abdominal pain.

## 2022-08-19 NOTE — DIETITIAN INITIAL EVALUATION ADULT - OTHER CALCULATIONS
22-Feb-2019 10:21
Energy: 1574-1889kcal/day (MSJ 1-1.2AF)   Protein: 73-88g/day (1-1.2g/kg)   Fluid: 2190mL/day (30mL/day)

## 2022-08-19 NOTE — DIETITIAN INITIAL EVALUATION ADULT - EDUCATION DIETARY MODIFICATIONS
unable to communicate due to cognitive limitations/(1) partially meets; needs review/practice/verbalization

## 2022-08-19 NOTE — DIETITIAN INITIAL EVALUATION ADULT - NAME AND PHONE
Nutrition Intervention: meals and snacks, medical food supplements   Nutrition Monitoring:diet order,energy intake,body composition,NFPF

## 2022-08-19 NOTE — DIETITIAN INITIAL EVALUATION ADULT - ENERGY INTAKE
At admit, pt has been drinking his thin liquids, but is also very hungry. However, currently awaiting colorectal sx to upgrade pt's diet.

## 2022-08-19 NOTE — PROGRESS NOTE ADULT - TIME BILLING
Direct patient care. Discussed on rounds with Housestaff
Counseled staff about diagnostic testing and treatment plan. All questions answered.

## 2022-08-20 LAB
ANION GAP SERPL CALC-SCNC: 6 MMOL/L — LOW (ref 7–14)
BASOPHILS # BLD AUTO: 0 K/UL — SIGNIFICANT CHANGE UP (ref 0–0.2)
BASOPHILS # BLD AUTO: 0.06 K/UL — SIGNIFICANT CHANGE UP (ref 0–0.2)
BASOPHILS NFR BLD AUTO: 0 % — SIGNIFICANT CHANGE UP (ref 0–1)
BASOPHILS NFR BLD AUTO: 0.8 % — SIGNIFICANT CHANGE UP (ref 0–1)
BUN SERPL-MCNC: 4 MG/DL — LOW (ref 10–20)
CALCIUM SERPL-MCNC: 8.5 MG/DL — SIGNIFICANT CHANGE UP (ref 8.5–10.1)
CHLORIDE SERPL-SCNC: 105 MMOL/L — SIGNIFICANT CHANGE UP (ref 98–110)
CO2 SERPL-SCNC: 29 MMOL/L — SIGNIFICANT CHANGE UP (ref 17–32)
CREAT SERPL-MCNC: 0.6 MG/DL — LOW (ref 0.7–1.5)
EGFR: 116 ML/MIN/1.73M2 — SIGNIFICANT CHANGE UP
EOSINOPHIL # BLD AUTO: 0.18 K/UL — SIGNIFICANT CHANGE UP (ref 0–0.7)
EOSINOPHIL # BLD AUTO: 0.46 K/UL — SIGNIFICANT CHANGE UP (ref 0–0.7)
EOSINOPHIL NFR BLD AUTO: 2.6 % — SIGNIFICANT CHANGE UP (ref 0–8)
EOSINOPHIL NFR BLD AUTO: 5 % — SIGNIFICANT CHANGE UP (ref 0–8)
GLUCOSE BLDC GLUCOMTR-MCNC: 95 MG/DL — SIGNIFICANT CHANGE UP (ref 70–99)
GLUCOSE SERPL-MCNC: 109 MG/DL — HIGH (ref 70–99)
HCT VFR BLD CALC: 27.5 % — LOW (ref 42–52)
HCT VFR BLD CALC: 27.5 % — LOW (ref 42–52)
HGB BLD-MCNC: 9.3 G/DL — LOW (ref 14–18)
HGB BLD-MCNC: 9.6 G/DL — LOW (ref 14–18)
HYPOCHROMIA BLD QL: SLIGHT — SIGNIFICANT CHANGE UP
LYMPHOCYTES # BLD AUTO: 1.86 K/UL — SIGNIFICANT CHANGE UP (ref 1.2–3.4)
LYMPHOCYTES # BLD AUTO: 2 K/UL — SIGNIFICANT CHANGE UP (ref 1.2–3.4)
LYMPHOCYTES # BLD AUTO: 20 % — LOW (ref 20.5–51.1)
LYMPHOCYTES # BLD AUTO: 28.2 % — SIGNIFICANT CHANGE UP (ref 20.5–51.1)
MACROCYTES BLD QL: SLIGHT — SIGNIFICANT CHANGE UP
MAGNESIUM SERPL-MCNC: 1.6 MG/DL — LOW (ref 1.8–2.4)
MANUAL SMEAR VERIFICATION: SIGNIFICANT CHANGE UP
MANUAL SMEAR VERIFICATION: SIGNIFICANT CHANGE UP
MCHC RBC-ENTMCNC: 31.5 PG — HIGH (ref 27–31)
MCHC RBC-ENTMCNC: 32.7 PG — HIGH (ref 27–31)
MCHC RBC-ENTMCNC: 33.8 G/DL — SIGNIFICANT CHANGE UP (ref 32–37)
MCHC RBC-ENTMCNC: 34.9 G/DL — SIGNIFICANT CHANGE UP (ref 32–37)
MCV RBC AUTO: 93.2 FL — SIGNIFICANT CHANGE UP (ref 80–94)
MCV RBC AUTO: 93.5 FL — SIGNIFICANT CHANGE UP (ref 80–94)
MONOCYTES # BLD AUTO: 0.6 K/UL — SIGNIFICANT CHANGE UP (ref 0.1–0.6)
MONOCYTES # BLD AUTO: 0.7 K/UL — HIGH (ref 0.1–0.6)
MONOCYTES NFR BLD AUTO: 7.5 % — SIGNIFICANT CHANGE UP (ref 1.7–9.3)
MONOCYTES NFR BLD AUTO: 8.5 % — SIGNIFICANT CHANGE UP (ref 1.7–9.3)
NEUTROPHILS # BLD AUTO: 4.19 K/UL — SIGNIFICANT CHANGE UP (ref 1.4–6.5)
NEUTROPHILS # BLD AUTO: 6.27 K/UL — SIGNIFICANT CHANGE UP (ref 1.4–6.5)
NEUTROPHILS NFR BLD AUTO: 59 % — SIGNIFICANT CHANGE UP (ref 42.2–75.2)
NEUTROPHILS NFR BLD AUTO: 67.5 % — SIGNIFICANT CHANGE UP (ref 42.2–75.2)
PHOSPHATE SERPL-MCNC: 3.8 MG/DL — SIGNIFICANT CHANGE UP (ref 2.1–4.9)
PLAT MORPH BLD: NORMAL — SIGNIFICANT CHANGE UP
PLAT MORPH BLD: NORMAL — SIGNIFICANT CHANGE UP
PLATELET # BLD AUTO: 139 K/UL — SIGNIFICANT CHANGE UP (ref 130–400)
PLATELET # BLD AUTO: 191 K/UL — SIGNIFICANT CHANGE UP (ref 130–400)
POLYCHROMASIA BLD QL SMEAR: SLIGHT — SIGNIFICANT CHANGE UP
POTASSIUM SERPL-MCNC: 4 MMOL/L — SIGNIFICANT CHANGE UP (ref 3.5–5)
POTASSIUM SERPL-SCNC: 4 MMOL/L — SIGNIFICANT CHANGE UP (ref 3.5–5)
PROMYELOCYTES # FLD: 0.9 % — HIGH (ref 0–0)
RBC # BLD: 2.94 M/UL — LOW (ref 4.7–6.1)
RBC # BLD: 2.95 M/UL — LOW (ref 4.7–6.1)
RBC # FLD: 13.8 % — SIGNIFICANT CHANGE UP (ref 11.5–14.5)
RBC # FLD: 13.8 % — SIGNIFICANT CHANGE UP (ref 11.5–14.5)
RBC BLD AUTO: ABNORMAL
RBC BLD AUTO: NORMAL — SIGNIFICANT CHANGE UP
SODIUM SERPL-SCNC: 140 MMOL/L — SIGNIFICANT CHANGE UP (ref 135–146)
VALPROATE SERPL-MCNC: 61 UG/ML — SIGNIFICANT CHANGE UP (ref 50–100)
WBC # BLD: 7.1 K/UL — SIGNIFICANT CHANGE UP (ref 4.8–10.8)
WBC # BLD: 9.29 K/UL — SIGNIFICANT CHANGE UP (ref 4.8–10.8)
WBC # FLD AUTO: 7.1 K/UL — SIGNIFICANT CHANGE UP (ref 4.8–10.8)
WBC # FLD AUTO: 9.29 K/UL — SIGNIFICANT CHANGE UP (ref 4.8–10.8)

## 2022-08-20 PROCEDURE — 99024 POSTOP FOLLOW-UP VISIT: CPT

## 2022-08-20 RX ORDER — MAGNESIUM SULFATE 500 MG/ML
2 VIAL (ML) INJECTION ONCE
Refills: 0 | Status: COMPLETED | OUTPATIENT
Start: 2022-08-20 | End: 2022-08-20

## 2022-08-20 RX ADMIN — HALOPERIDOL DECANOATE 10 MILLIGRAM(S): 100 INJECTION INTRAMUSCULAR at 21:10

## 2022-08-20 RX ADMIN — TAMSULOSIN HYDROCHLORIDE 0.4 MILLIGRAM(S): 0.4 CAPSULE ORAL at 21:11

## 2022-08-20 RX ADMIN — PANTOPRAZOLE SODIUM 40 MILLIGRAM(S): 20 TABLET, DELAYED RELEASE ORAL at 13:43

## 2022-08-20 RX ADMIN — Medication 1 MILLIGRAM(S): at 05:36

## 2022-08-20 RX ADMIN — Medication 650 MILLIGRAM(S): at 00:18

## 2022-08-20 RX ADMIN — Medication 650 MILLIGRAM(S): at 13:43

## 2022-08-20 RX ADMIN — Medication 650 MILLIGRAM(S): at 17:52

## 2022-08-20 RX ADMIN — Medication 12.5 GRAM(S): at 00:43

## 2022-08-20 RX ADMIN — ENOXAPARIN SODIUM 40 MILLIGRAM(S): 100 INJECTION SUBCUTANEOUS at 17:51

## 2022-08-20 RX ADMIN — Medication 650 MILLIGRAM(S): at 00:01

## 2022-08-20 RX ADMIN — Medication 53.75 MILLIGRAM(S): at 21:11

## 2022-08-20 RX ADMIN — Medication 53.75 MILLIGRAM(S): at 05:35

## 2022-08-20 RX ADMIN — HALOPERIDOL DECANOATE 10 MILLIGRAM(S): 100 INJECTION INTRAMUSCULAR at 05:36

## 2022-08-20 RX ADMIN — MIRTAZAPINE 15 MILLIGRAM(S): 45 TABLET, ORALLY DISINTEGRATING ORAL at 21:10

## 2022-08-20 RX ADMIN — Medication 100 MICROGRAM(S): at 05:35

## 2022-08-20 RX ADMIN — BUPROPION HYDROCHLORIDE 150 MILLIGRAM(S): 150 TABLET, EXTENDED RELEASE ORAL at 13:43

## 2022-08-20 RX ADMIN — Medication 650 MILLIGRAM(S): at 05:35

## 2022-08-20 RX ADMIN — Medication 1 MILLIGRAM(S): at 17:51

## 2022-08-20 RX ADMIN — Medication 650 MILLIGRAM(S): at 14:14

## 2022-08-20 RX ADMIN — Medication 53.75 MILLIGRAM(S): at 13:42

## 2022-08-20 RX ADMIN — SODIUM CHLORIDE 40 MILLILITER(S): 9 INJECTION, SOLUTION INTRAVENOUS at 21:11

## 2022-08-20 RX ADMIN — SODIUM CHLORIDE 40 MILLILITER(S): 9 INJECTION, SOLUTION INTRAVENOUS at 17:51

## 2022-08-20 NOTE — PROGRESS NOTE ADULT - ATTENDING COMMENTS
50yo male with PMHx of schizophrenia and seizure disorder admitted for sigmoid volvulus POD#2 s/p robotic sigmoidectomy. Reported bloody bowel movements overnight. This morning, patient is difficult to arouse, which is different than usual as per nurse. She also feels that the patient looks pale compared to usual. On exam, abdomen is soft, incisions healing well, melanotic smear on the sheets near his buttock. Labs reviewed - Hb 9.6 from 9.7 from 10.8 from 12.8 from 13 preop. Monitor bowel function, monitor vital signs, serial H/H q12 hours, serial exams.

## 2022-08-21 LAB
ANION GAP SERPL CALC-SCNC: 7 MMOL/L — SIGNIFICANT CHANGE UP (ref 7–14)
ANION GAP SERPL CALC-SCNC: 8 MMOL/L — SIGNIFICANT CHANGE UP (ref 7–14)
BASOPHILS # BLD AUTO: 0.02 K/UL — SIGNIFICANT CHANGE UP (ref 0–0.2)
BASOPHILS # BLD AUTO: 0.03 K/UL — SIGNIFICANT CHANGE UP (ref 0–0.2)
BASOPHILS NFR BLD AUTO: 0.2 % — SIGNIFICANT CHANGE UP (ref 0–1)
BASOPHILS NFR BLD AUTO: 0.4 % — SIGNIFICANT CHANGE UP (ref 0–1)
BUN SERPL-MCNC: 4 MG/DL — LOW (ref 10–20)
BUN SERPL-MCNC: 5 MG/DL — LOW (ref 10–20)
CALCIUM SERPL-MCNC: 8.9 MG/DL — SIGNIFICANT CHANGE UP (ref 8.5–10.1)
CALCIUM SERPL-MCNC: 8.9 MG/DL — SIGNIFICANT CHANGE UP (ref 8.5–10.1)
CHLORIDE SERPL-SCNC: 103 MMOL/L — SIGNIFICANT CHANGE UP (ref 98–110)
CHLORIDE SERPL-SCNC: 105 MMOL/L — SIGNIFICANT CHANGE UP (ref 98–110)
CO2 SERPL-SCNC: 28 MMOL/L — SIGNIFICANT CHANGE UP (ref 17–32)
CO2 SERPL-SCNC: 30 MMOL/L — SIGNIFICANT CHANGE UP (ref 17–32)
CREAT SERPL-MCNC: 0.7 MG/DL — SIGNIFICANT CHANGE UP (ref 0.7–1.5)
CREAT SERPL-MCNC: 0.7 MG/DL — SIGNIFICANT CHANGE UP (ref 0.7–1.5)
CULTURE RESULTS: SIGNIFICANT CHANGE UP
CULTURE RESULTS: SIGNIFICANT CHANGE UP
EGFR: 111 ML/MIN/1.73M2 — SIGNIFICANT CHANGE UP
EGFR: 111 ML/MIN/1.73M2 — SIGNIFICANT CHANGE UP
EOSINOPHIL # BLD AUTO: 0.35 K/UL — SIGNIFICANT CHANGE UP (ref 0–0.7)
EOSINOPHIL # BLD AUTO: 0.37 K/UL — SIGNIFICANT CHANGE UP (ref 0–0.7)
EOSINOPHIL NFR BLD AUTO: 4.3 % — SIGNIFICANT CHANGE UP (ref 0–8)
EOSINOPHIL NFR BLD AUTO: 4.7 % — SIGNIFICANT CHANGE UP (ref 0–8)
GLUCOSE SERPL-MCNC: 76 MG/DL — SIGNIFICANT CHANGE UP (ref 70–99)
GLUCOSE SERPL-MCNC: 86 MG/DL — SIGNIFICANT CHANGE UP (ref 70–99)
HCT VFR BLD CALC: 29 % — LOW (ref 42–52)
HCT VFR BLD CALC: 30.3 % — LOW (ref 42–52)
HGB BLD-MCNC: 10.1 G/DL — LOW (ref 14–18)
HGB BLD-MCNC: 9.8 G/DL — LOW (ref 14–18)
IMM GRANULOCYTES NFR BLD AUTO: 0.6 % — HIGH (ref 0.1–0.3)
IMM GRANULOCYTES NFR BLD AUTO: 0.8 % — HIGH (ref 0.1–0.3)
LYMPHOCYTES # BLD AUTO: 1.57 K/UL — SIGNIFICANT CHANGE UP (ref 1.2–3.4)
LYMPHOCYTES # BLD AUTO: 1.86 K/UL — SIGNIFICANT CHANGE UP (ref 1.2–3.4)
LYMPHOCYTES # BLD AUTO: 18.2 % — LOW (ref 20.5–51.1)
LYMPHOCYTES # BLD AUTO: 24.8 % — SIGNIFICANT CHANGE UP (ref 20.5–51.1)
MAGNESIUM SERPL-MCNC: 1.6 MG/DL — LOW (ref 1.8–2.4)
MAGNESIUM SERPL-MCNC: 2.2 MG/DL — SIGNIFICANT CHANGE UP (ref 1.8–2.4)
MCHC RBC-ENTMCNC: 31.4 PG — HIGH (ref 27–31)
MCHC RBC-ENTMCNC: 32 PG — HIGH (ref 27–31)
MCHC RBC-ENTMCNC: 33.3 G/DL — SIGNIFICANT CHANGE UP (ref 32–37)
MCHC RBC-ENTMCNC: 33.8 G/DL — SIGNIFICANT CHANGE UP (ref 32–37)
MCV RBC AUTO: 94.1 FL — HIGH (ref 80–94)
MCV RBC AUTO: 94.8 FL — HIGH (ref 80–94)
MONOCYTES # BLD AUTO: 0.81 K/UL — HIGH (ref 0.1–0.6)
MONOCYTES # BLD AUTO: 1.01 K/UL — HIGH (ref 0.1–0.6)
MONOCYTES NFR BLD AUTO: 13.5 % — HIGH (ref 1.7–9.3)
MONOCYTES NFR BLD AUTO: 9.4 % — HIGH (ref 1.7–9.3)
NEUTROPHILS # BLD AUTO: 4.19 K/UL — SIGNIFICANT CHANGE UP (ref 1.4–6.5)
NEUTROPHILS # BLD AUTO: 5.8 K/UL — SIGNIFICANT CHANGE UP (ref 1.4–6.5)
NEUTROPHILS NFR BLD AUTO: 55.8 % — SIGNIFICANT CHANGE UP (ref 42.2–75.2)
NEUTROPHILS NFR BLD AUTO: 67.3 % — SIGNIFICANT CHANGE UP (ref 42.2–75.2)
NRBC # BLD: 0 /100 WBCS — SIGNIFICANT CHANGE UP (ref 0–0)
NRBC # BLD: 0 /100 WBCS — SIGNIFICANT CHANGE UP (ref 0–0)
PHOSPHATE SERPL-MCNC: 3.9 MG/DL — SIGNIFICANT CHANGE UP (ref 2.1–4.9)
PHOSPHATE SERPL-MCNC: 4.2 MG/DL — SIGNIFICANT CHANGE UP (ref 2.1–4.9)
PLATELET # BLD AUTO: 165 K/UL — SIGNIFICANT CHANGE UP (ref 130–400)
PLATELET # BLD AUTO: 177 K/UL — SIGNIFICANT CHANGE UP (ref 130–400)
POTASSIUM SERPL-MCNC: 4 MMOL/L — SIGNIFICANT CHANGE UP (ref 3.5–5)
POTASSIUM SERPL-MCNC: 4.1 MMOL/L — SIGNIFICANT CHANGE UP (ref 3.5–5)
POTASSIUM SERPL-SCNC: 4 MMOL/L — SIGNIFICANT CHANGE UP (ref 3.5–5)
POTASSIUM SERPL-SCNC: 4.1 MMOL/L — SIGNIFICANT CHANGE UP (ref 3.5–5)
RBC # BLD: 3.06 M/UL — LOW (ref 4.7–6.1)
RBC # BLD: 3.22 M/UL — LOW (ref 4.7–6.1)
RBC # FLD: 13.7 % — SIGNIFICANT CHANGE UP (ref 11.5–14.5)
RBC # FLD: 13.8 % — SIGNIFICANT CHANGE UP (ref 11.5–14.5)
SODIUM SERPL-SCNC: 139 MMOL/L — SIGNIFICANT CHANGE UP (ref 135–146)
SODIUM SERPL-SCNC: 142 MMOL/L — SIGNIFICANT CHANGE UP (ref 135–146)
SPECIMEN SOURCE: SIGNIFICANT CHANGE UP
SPECIMEN SOURCE: SIGNIFICANT CHANGE UP
WBC # BLD: 7.5 K/UL — SIGNIFICANT CHANGE UP (ref 4.8–10.8)
WBC # BLD: 8.62 K/UL — SIGNIFICANT CHANGE UP (ref 4.8–10.8)
WBC # FLD AUTO: 7.5 K/UL — SIGNIFICANT CHANGE UP (ref 4.8–10.8)
WBC # FLD AUTO: 8.62 K/UL — SIGNIFICANT CHANGE UP (ref 4.8–10.8)

## 2022-08-21 PROCEDURE — 99024 POSTOP FOLLOW-UP VISIT: CPT

## 2022-08-21 RX ADMIN — Medication 650 MILLIGRAM(S): at 06:00

## 2022-08-21 RX ADMIN — MIRTAZAPINE 15 MILLIGRAM(S): 45 TABLET, ORALLY DISINTEGRATING ORAL at 21:46

## 2022-08-21 RX ADMIN — Medication 650 MILLIGRAM(S): at 23:34

## 2022-08-21 RX ADMIN — Medication 100 MICROGRAM(S): at 06:01

## 2022-08-21 RX ADMIN — Medication 12.5 GRAM(S): at 00:30

## 2022-08-21 RX ADMIN — Medication 650 MILLIGRAM(S): at 11:51

## 2022-08-21 RX ADMIN — Medication 53.75 MILLIGRAM(S): at 22:29

## 2022-08-21 RX ADMIN — ENOXAPARIN SODIUM 40 MILLIGRAM(S): 100 INJECTION SUBCUTANEOUS at 17:03

## 2022-08-21 RX ADMIN — Medication 650 MILLIGRAM(S): at 23:04

## 2022-08-21 RX ADMIN — HALOPERIDOL DECANOATE 10 MILLIGRAM(S): 100 INJECTION INTRAMUSCULAR at 14:00

## 2022-08-21 RX ADMIN — TAMSULOSIN HYDROCHLORIDE 0.4 MILLIGRAM(S): 0.4 CAPSULE ORAL at 21:45

## 2022-08-21 RX ADMIN — PANTOPRAZOLE SODIUM 40 MILLIGRAM(S): 20 TABLET, DELAYED RELEASE ORAL at 11:21

## 2022-08-21 RX ADMIN — Medication 53.75 MILLIGRAM(S): at 14:00

## 2022-08-21 RX ADMIN — Medication 53.75 MILLIGRAM(S): at 06:01

## 2022-08-21 RX ADMIN — HALOPERIDOL DECANOATE 10 MILLIGRAM(S): 100 INJECTION INTRAMUSCULAR at 21:45

## 2022-08-21 RX ADMIN — Medication 650 MILLIGRAM(S): at 11:20

## 2022-08-21 RX ADMIN — Medication 1 MILLIGRAM(S): at 06:00

## 2022-08-21 RX ADMIN — Medication 650 MILLIGRAM(S): at 17:03

## 2022-08-21 RX ADMIN — SODIUM CHLORIDE 40 MILLILITER(S): 9 INJECTION, SOLUTION INTRAVENOUS at 17:03

## 2022-08-21 RX ADMIN — Medication 1 MILLIGRAM(S): at 19:28

## 2022-08-21 RX ADMIN — Medication 650 MILLIGRAM(S): at 01:21

## 2022-08-21 RX ADMIN — HALOPERIDOL DECANOATE 10 MILLIGRAM(S): 100 INJECTION INTRAMUSCULAR at 06:01

## 2022-08-21 RX ADMIN — BUPROPION HYDROCHLORIDE 150 MILLIGRAM(S): 150 TABLET, EXTENDED RELEASE ORAL at 11:20

## 2022-08-21 NOTE — PROGRESS NOTE ADULT - ATTENDING COMMENTS
52yo male with PMHx of schizophrenia and seizure disorder admitted for sigmoid volvulus POD#3 s/p robotic sigmoidectomy. Reported bloody bowel movements postop, but none this morning. Patient is back to baseline mental status this morning, more arousable and interactive as per nursing staff. Tolerating CLD. On exam, abdomen is soft, incisions healing well. Labs reviewed - Hb 9.8 from 9.3 from 9.6 from 9.7 from 10.8 from 12.8 from 13 preop. Monitor bowel function, monitor vital signs, serial exams. 50yo male with PMHx of schizophrenia and seizure disorder admitted for sigmoid volvulus POD#3 s/p robotic sigmoidectomy. Reported bloody bowel movements postop, but none this morning. Tolerating CLD. Advance to FLD. On exam, patient is back to baseline mental status this morning, more arousable and interactive, abdomen is soft, incisions healing well. Labs reviewed - Hb 9.8 from 9.3 from 9.6 from 9.7 from 10.8 from 12.8 from 13 preop. Monitor bowel function, monitor vital signs, encourage ambulation/OOB, incentive spirometer, DVT ppx.

## 2022-08-22 LAB
SARS-COV-2 RNA SPEC QL NAA+PROBE: SIGNIFICANT CHANGE UP
SURGICAL PATHOLOGY STUDY: SIGNIFICANT CHANGE UP

## 2022-08-22 RX ORDER — MAGNESIUM SULFATE 500 MG/ML
2 VIAL (ML) INJECTION ONCE
Refills: 0 | Status: COMPLETED | OUTPATIENT
Start: 2022-08-22 | End: 2022-08-22

## 2022-08-22 RX ADMIN — Medication 650 MILLIGRAM(S): at 17:49

## 2022-08-22 RX ADMIN — Medication 100 MICROGRAM(S): at 06:13

## 2022-08-22 RX ADMIN — Medication 650 MILLIGRAM(S): at 06:42

## 2022-08-22 RX ADMIN — Medication 53.75 MILLIGRAM(S): at 22:14

## 2022-08-22 RX ADMIN — TAMSULOSIN HYDROCHLORIDE 0.4 MILLIGRAM(S): 0.4 CAPSULE ORAL at 22:14

## 2022-08-22 RX ADMIN — Medication 650 MILLIGRAM(S): at 12:30

## 2022-08-22 RX ADMIN — Medication 53.75 MILLIGRAM(S): at 14:09

## 2022-08-22 RX ADMIN — Medication 650 MILLIGRAM(S): at 17:04

## 2022-08-22 RX ADMIN — PANTOPRAZOLE SODIUM 40 MILLIGRAM(S): 20 TABLET, DELAYED RELEASE ORAL at 11:43

## 2022-08-22 RX ADMIN — Medication 650 MILLIGRAM(S): at 06:12

## 2022-08-22 RX ADMIN — MIRTAZAPINE 15 MILLIGRAM(S): 45 TABLET, ORALLY DISINTEGRATING ORAL at 22:14

## 2022-08-22 RX ADMIN — HALOPERIDOL DECANOATE 10 MILLIGRAM(S): 100 INJECTION INTRAMUSCULAR at 22:14

## 2022-08-22 RX ADMIN — Medication 12.5 GRAM(S): at 06:14

## 2022-08-22 RX ADMIN — HALOPERIDOL DECANOATE 10 MILLIGRAM(S): 100 INJECTION INTRAMUSCULAR at 06:12

## 2022-08-22 RX ADMIN — Medication 650 MILLIGRAM(S): at 11:43

## 2022-08-22 RX ADMIN — Medication 650 MILLIGRAM(S): at 23:41

## 2022-08-22 RX ADMIN — ENOXAPARIN SODIUM 40 MILLIGRAM(S): 100 INJECTION SUBCUTANEOUS at 17:04

## 2022-08-22 RX ADMIN — Medication 1 MILLIGRAM(S): at 17:04

## 2022-08-22 RX ADMIN — HALOPERIDOL DECANOATE 10 MILLIGRAM(S): 100 INJECTION INTRAMUSCULAR at 14:07

## 2022-08-22 RX ADMIN — Medication 1 MILLIGRAM(S): at 06:13

## 2022-08-22 RX ADMIN — Medication 53.75 MILLIGRAM(S): at 06:14

## 2022-08-22 RX ADMIN — BUPROPION HYDROCHLORIDE 150 MILLIGRAM(S): 150 TABLET, EXTENDED RELEASE ORAL at 11:42

## 2022-08-22 NOTE — PROVIDER CONTACT NOTE (OTHER) - SITUATION
pt noted w/blood from rectum.
wound vac tubing dislodged md states its ok to have it off
Pt had bloody BM.
pt refused labs.

## 2022-08-22 NOTE — CONSULT NOTE ADULT - SUBJECTIVE AND OBJECTIVE BOX
HPI:  Patient is a 52 yo male with PMH of Mild mental retardation, intellectual disability, Schizophrenia, Impulse control disorder, Seizures, Hypothyroidism, Exotropia, Cataracts, Osteopenia, presented from [facility] for hypotension and abdominal pain. Obtained hx from aid at bedside. Per Aid, patient was not feeling well and dry heaving at facility. On vital check at facility found to have a BP of 91/38?, called EMS. Patient reported abdominal pain to EMS and was brought to the hospital. Aid denies, fevers, chills, weight loss, sob, chest pain, vomiting, diarrhea at facility. Symptoms started today. At bedside, patient still reports diffuse abdominal pain, does not radiate anywhere else. Unable to answer other ROS questions.     In ED Vitals: T 95, /109, HR 96, RR 20, 97% on RA. WBC 12.19k, CMP wnl, lactate normal. UA positive   - KUB with distended bowel loops concerning for sigmoid volvulus   - CT A/P with Swirling of the mesentery in the mid pelvis with dilatation of what appears to be the  sigmoid colon, findings are concerning for sigmoid volvulus. New small right pleural effusion with a new associated bibasilar opacities.   - Chest Xray: Mild prominence of the interstitial markings and bibasilar atelectasis/fibrosis.    GI was consulted in ED. S/p rectal tube placement with no decompression. Taken emergently for flex sigmoidoscopy for decompression after 2 physician consent as patient does not have next of kin/HCP/family on chart.     Patient admitted to medicine for further eval.     S/p RA Laparoscopic sigmoidectomy on 8/18/22.       PAST MEDICAL & SURGICAL HISTORY:  Epilepsy  last event 4+ yrs ago      Schizophrenia      Bipolar 1 disorder      Major depressive disorder      Anemia      Dyspepsia      Hypothyroidism      Constipation      MR (mental retardation)      H/O cystoscopy      H/O laminectomy  03/2001      H/O fracture of fibula  nondisplaced 03/2010          Hospital Course:    TODAY'S SUBJECTIVE & REVIEW OF SYMPTOMS:     Constitutional WNL   Cardio WNL   Resp WNL   GI abd pain  Heme WNL  Endo WNL  Skin WNL  MSK WNL  Neuro WNL  Cognitive WNL  Psych WNL      MEDICATIONS  (STANDING):  acetaminophen     Tablet .. 650 milliGRAM(s) Oral every 6 hours  benztropine 1 milliGRAM(s) Oral two times a day  BUpivacaine liposome 1.3% Injectable 20 milliLiter(s) Local Injection once  buPROPion XL (24-Hour) . 150 milliGRAM(s) Oral daily  enoxaparin Injectable 40 milliGRAM(s) SubCutaneous every 24 hours  haloperidol     Tablet 10 milliGRAM(s) Oral three times a day  levothyroxine 100 MICROGram(s) Oral daily  mirtazapine 15 milliGRAM(s) Oral at bedtime  pantoprazole  Injectable 40 milliGRAM(s) IV Push daily  tamsulosin 0.4 milliGRAM(s) Oral at bedtime  valproate sodium  IVPB 375 milliGRAM(s) IV Intermittent three times a day    MEDICATIONS  (PRN):  ketorolac   Injectable 15 milliGRAM(s) IV Push every 8 hours PRN Moderate Pain (4 - 6)  ondansetron Injectable 4 milliGRAM(s) IV Push every 6 hours PRN Nausea and/or Vomiting      FAMILY HISTORY:  No pertinent family history in first degree relatives        Allergies    erythromycin (Other)  phenothiazines (Unknown)    Intolerances        SOCIAL HISTORY:    [  ] Etoh  [  ] Smoking  [  ] Substance abuse     Home Environment:  [   ] Home Alone  [   ] Lives with Family  [   ] Home Health Aid    Dwelling:  [   ] Apartment  [   ] Private House  [  x ] group Home  [   ] Skilled Nursing Facility      [   ] Short Term  [   ] Long Term  [   ] Stairs       Elevator [   ]    FUNCTIONAL STATUS PTA: (Check all that apply)  Ambulation: [    ]Independent    [ x  ] Dependent     [   ] Non-Ambulatory  Assistive Device: [   ] SA Cane  [   ]  Q Cane  [ x  ] Walker  [   ]  Wheelchair  ADL : [   ] Independent  [   x ]  Dependent       Vital Signs Last 24 Hrs  T(C): 36.2 (22 Aug 2022 12:23), Max: 36.4 (22 Aug 2022 05:03)  T(F): 97.2 (22 Aug 2022 12:23), Max: 97.5 (22 Aug 2022 05:03)  HR: 93 (22 Aug 2022 12:23) (71 - 93)  BP: 126/64 (22 Aug 2022 12:23) (126/64 - 140/80)  BP(mean): --  RR: 18 (22 Aug 2022 12:23) (18 - 18)  SpO2: 96% (22 Aug 2022 12:23) (95% - 99%)    Parameters below as of 22 Aug 2022 05:03  Patient On (Oxygen Delivery Method): room air          PHYSICAL EXAM: Awake & Alert  GENERAL: NAD  HEAD:  Normocephalic  CHEST/LUNG: Clear   HEART: S1S2+  ABDOMEN: Soft, Nontender  EXTREMITIES:  no calf tenderness    NERVOUS SYSTEM:  Cranial Nerves 2-12 intact [   ] Abnormal  [   ]  ROM: WFL all extremities [   ]  Abnormal [   ]able to move all ext   Motor Strength: WFL all extremities  [   ]  Abnormal [   ]  Sensation: intact to light touch [   ] Abnormal [   ]    FUNCTIONAL STATUS:  Bed Mobility: Independent [   ]  Supervision [   ]  Needs Assistance [  x ]  N/A [   ]  Transfers: Independent [   ]  Supervision [   ]  Needs Assistance [  x ]  N/A [   ]   Ambulation: Independent [   ]  Supervision [   ]  Needs Assistance [  x ]  N/A [   ]  ADL: Independent [   ] Requires Assistance [   ] N/A [   ]      LABS:                        10.1   8.62  )-----------( 177      ( 21 Aug 2022 20:54 )             30.3     08-21    139  |  103  |  5<L>  ----------------------------<  86  4.1   |  28  |  0.7    Ca    8.9      21 Aug 2022 20:54  Phos  4.2     08-21  Mg     1.6     08-21            RADIOLOGY & ADDITIONAL STUDIES:

## 2022-08-22 NOTE — CONSULT NOTE ADULT - ASSESSMENT
IMPRESSION: Rehab of deconditioning /  s/p RA Laparoscopic sigmoidectomy    PRECAUTIONS: [   ] Cardiac  [   ] Respiratory  [   ] Seizures [   ] Contact Isolation  [   ] Droplet Isolation  [   ] Other    Weight Bearing Status:     RECOMMENDATION:    Out of Bed to Chair     DVT/Decubiti Prophylaxis    REHAB PLAN:     [  x  ] Bedside P/T 3-5 times a week   [    ]   Bedside O/T  2-3 times a week             [    ] Speech Therapy               [    ]  No Rehab Therapy Indicated   Conditioning/ROM                                    ADL  Bed Mobility                                               Conditioning/ROM  Transfers                                                     Bed Mobility  Sitting /Standing Balance                         Transfers                                        Gait Training                                               Sitting/Standing Balance  Stair Training [   ]Applicable                    Home equipment Eval                                                                        Splinting  [   ] Only      GOALS:   ADL   [    ]   Independent                    Transfers  [  x  ] Independent                          Ambulation  [  x  ] Independent     [  x   ] With device                            [    ]  CG                                                         [    ]  CG                                                                  [    ] CG                            [    ] Min A                                                   [    ] Min A                                                              [    ] Min  A          DISCHARGE PLAN:   [    ]  Good candidate for Intensive Rehabilitation/Hospital based                                             Will tolerate 3hrs Intensive Rehab Daily                                       [   x  ]  Short Term Rehab in Skilled Nursing Facility                                        [     ]  Home with Outpatient or  services                                         [     ]  Possible Candidate for Intensive Hospital based Rehab

## 2022-08-22 NOTE — PROVIDER CONTACT NOTE (OTHER) - ACTION/TREATMENT ORDERED:
Blue team [id number] aware, will continue to monitor.
ANGE Brasher made aware, came to bedside to assess. Continue to watch patient.
MD Santos made aware. MD will order new round of labs for am.

## 2022-08-23 LAB
ANION GAP SERPL CALC-SCNC: 8 MMOL/L — SIGNIFICANT CHANGE UP (ref 7–14)
BASOPHILS # BLD AUTO: 0.04 K/UL — SIGNIFICANT CHANGE UP (ref 0–0.2)
BASOPHILS NFR BLD AUTO: 0.4 % — SIGNIFICANT CHANGE UP (ref 0–1)
BUN SERPL-MCNC: 9 MG/DL — LOW (ref 10–20)
CALCIUM SERPL-MCNC: 8.9 MG/DL — SIGNIFICANT CHANGE UP (ref 8.5–10.1)
CHLORIDE SERPL-SCNC: 103 MMOL/L — SIGNIFICANT CHANGE UP (ref 98–110)
CO2 SERPL-SCNC: 29 MMOL/L — SIGNIFICANT CHANGE UP (ref 17–32)
CREAT SERPL-MCNC: 0.8 MG/DL — SIGNIFICANT CHANGE UP (ref 0.7–1.5)
EGFR: 106 ML/MIN/1.73M2 — SIGNIFICANT CHANGE UP
EOSINOPHIL # BLD AUTO: 0.48 K/UL — SIGNIFICANT CHANGE UP (ref 0–0.7)
EOSINOPHIL NFR BLD AUTO: 4.4 % — SIGNIFICANT CHANGE UP (ref 0–8)
GLUCOSE SERPL-MCNC: 82 MG/DL — SIGNIFICANT CHANGE UP (ref 70–99)
HCT VFR BLD CALC: 28.9 % — LOW (ref 42–52)
HGB BLD-MCNC: 10 G/DL — LOW (ref 14–18)
IMM GRANULOCYTES NFR BLD AUTO: 0.8 % — HIGH (ref 0.1–0.3)
LYMPHOCYTES # BLD AUTO: 2.8 K/UL — SIGNIFICANT CHANGE UP (ref 1.2–3.4)
LYMPHOCYTES # BLD AUTO: 25.7 % — SIGNIFICANT CHANGE UP (ref 20.5–51.1)
MAGNESIUM SERPL-MCNC: 1.6 MG/DL — LOW (ref 1.8–2.4)
MCHC RBC-ENTMCNC: 32.4 PG — HIGH (ref 27–31)
MCHC RBC-ENTMCNC: 34.6 G/DL — SIGNIFICANT CHANGE UP (ref 32–37)
MCV RBC AUTO: 93.5 FL — SIGNIFICANT CHANGE UP (ref 80–94)
MONOCYTES # BLD AUTO: 1.15 K/UL — HIGH (ref 0.1–0.6)
MONOCYTES NFR BLD AUTO: 10.6 % — HIGH (ref 1.7–9.3)
NEUTROPHILS # BLD AUTO: 6.32 K/UL — SIGNIFICANT CHANGE UP (ref 1.4–6.5)
NEUTROPHILS NFR BLD AUTO: 58.1 % — SIGNIFICANT CHANGE UP (ref 42.2–75.2)
NRBC # BLD: 0 /100 WBCS — SIGNIFICANT CHANGE UP (ref 0–0)
PHOSPHATE SERPL-MCNC: 4 MG/DL — SIGNIFICANT CHANGE UP (ref 2.1–4.9)
PLATELET # BLD AUTO: 238 K/UL — SIGNIFICANT CHANGE UP (ref 130–400)
POTASSIUM SERPL-MCNC: 4.2 MMOL/L — SIGNIFICANT CHANGE UP (ref 3.5–5)
POTASSIUM SERPL-SCNC: 4.2 MMOL/L — SIGNIFICANT CHANGE UP (ref 3.5–5)
RBC # BLD: 3.09 M/UL — LOW (ref 4.7–6.1)
RBC # FLD: 13.8 % — SIGNIFICANT CHANGE UP (ref 11.5–14.5)
SARS-COV-2 RNA SPEC QL NAA+PROBE: SIGNIFICANT CHANGE UP
SODIUM SERPL-SCNC: 140 MMOL/L — SIGNIFICANT CHANGE UP (ref 135–146)
WBC # BLD: 10.88 K/UL — HIGH (ref 4.8–10.8)
WBC # FLD AUTO: 10.88 K/UL — HIGH (ref 4.8–10.8)

## 2022-08-23 RX ORDER — MAGNESIUM SULFATE 500 MG/ML
2 VIAL (ML) INJECTION ONCE
Refills: 0 | Status: COMPLETED | OUTPATIENT
Start: 2022-08-23 | End: 2022-08-23

## 2022-08-23 RX ORDER — DIVALPROEX SODIUM 500 MG/1
250 TABLET, DELAYED RELEASE ORAL EVERY 8 HOURS
Refills: 0 | Status: DISCONTINUED | OUTPATIENT
Start: 2022-08-23 | End: 2022-08-24

## 2022-08-23 RX ADMIN — MIRTAZAPINE 15 MILLIGRAM(S): 45 TABLET, ORALLY DISINTEGRATING ORAL at 21:27

## 2022-08-23 RX ADMIN — Medication 650 MILLIGRAM(S): at 17:01

## 2022-08-23 RX ADMIN — Medication 100 MICROGRAM(S): at 06:00

## 2022-08-23 RX ADMIN — HALOPERIDOL DECANOATE 10 MILLIGRAM(S): 100 INJECTION INTRAMUSCULAR at 06:00

## 2022-08-23 RX ADMIN — Medication 650 MILLIGRAM(S): at 12:15

## 2022-08-23 RX ADMIN — TAMSULOSIN HYDROCHLORIDE 0.4 MILLIGRAM(S): 0.4 CAPSULE ORAL at 21:27

## 2022-08-23 RX ADMIN — BUPROPION HYDROCHLORIDE 150 MILLIGRAM(S): 150 TABLET, EXTENDED RELEASE ORAL at 12:15

## 2022-08-23 RX ADMIN — Medication 53.75 MILLIGRAM(S): at 06:01

## 2022-08-23 RX ADMIN — HALOPERIDOL DECANOATE 10 MILLIGRAM(S): 100 INJECTION INTRAMUSCULAR at 14:37

## 2022-08-23 RX ADMIN — ENOXAPARIN SODIUM 40 MILLIGRAM(S): 100 INJECTION SUBCUTANEOUS at 17:01

## 2022-08-23 RX ADMIN — Medication 1 MILLIGRAM(S): at 06:00

## 2022-08-23 RX ADMIN — Medication 12.5 GRAM(S): at 06:10

## 2022-08-23 RX ADMIN — Medication 53.75 MILLIGRAM(S): at 14:36

## 2022-08-23 RX ADMIN — Medication 650 MILLIGRAM(S): at 18:00

## 2022-08-23 RX ADMIN — Medication 650 MILLIGRAM(S): at 00:11

## 2022-08-23 RX ADMIN — HALOPERIDOL DECANOATE 10 MILLIGRAM(S): 100 INJECTION INTRAMUSCULAR at 21:27

## 2022-08-23 RX ADMIN — Medication 650 MILLIGRAM(S): at 23:00

## 2022-08-23 RX ADMIN — PANTOPRAZOLE SODIUM 40 MILLIGRAM(S): 20 TABLET, DELAYED RELEASE ORAL at 12:16

## 2022-08-23 RX ADMIN — Medication 1 MILLIGRAM(S): at 17:01

## 2022-08-23 RX ADMIN — DIVALPROEX SODIUM 250 MILLIGRAM(S): 500 TABLET, DELAYED RELEASE ORAL at 21:28

## 2022-08-23 RX ADMIN — Medication 650 MILLIGRAM(S): at 05:59

## 2022-08-24 ENCOUNTER — TRANSCRIPTION ENCOUNTER (OUTPATIENT)
Age: 52
End: 2022-08-24

## 2022-08-24 VITALS
RESPIRATION RATE: 18 BRPM | SYSTOLIC BLOOD PRESSURE: 113 MMHG | DIASTOLIC BLOOD PRESSURE: 77 MMHG | TEMPERATURE: 97 F | HEART RATE: 75 BPM | OXYGEN SATURATION: 97 %

## 2022-08-24 RX ORDER — ACETAMINOPHEN 500 MG
2 TABLET ORAL
Qty: 0 | Refills: 0 | DISCHARGE
Start: 2022-08-24

## 2022-08-24 RX ADMIN — Medication 1 MILLIGRAM(S): at 05:30

## 2022-08-24 RX ADMIN — DIVALPROEX SODIUM 250 MILLIGRAM(S): 500 TABLET, DELAYED RELEASE ORAL at 05:29

## 2022-08-24 RX ADMIN — Medication 650 MILLIGRAM(S): at 05:29

## 2022-08-24 RX ADMIN — HALOPERIDOL DECANOATE 10 MILLIGRAM(S): 100 INJECTION INTRAMUSCULAR at 14:49

## 2022-08-24 RX ADMIN — Medication 650 MILLIGRAM(S): at 00:00

## 2022-08-24 RX ADMIN — DIVALPROEX SODIUM 250 MILLIGRAM(S): 500 TABLET, DELAYED RELEASE ORAL at 15:26

## 2022-08-24 RX ADMIN — Medication 100 MICROGRAM(S): at 05:30

## 2022-08-24 RX ADMIN — BUPROPION HYDROCHLORIDE 150 MILLIGRAM(S): 150 TABLET, EXTENDED RELEASE ORAL at 11:50

## 2022-08-24 RX ADMIN — HALOPERIDOL DECANOATE 10 MILLIGRAM(S): 100 INJECTION INTRAMUSCULAR at 05:29

## 2022-08-24 RX ADMIN — Medication 650 MILLIGRAM(S): at 11:50

## 2022-08-24 NOTE — DISCHARGE NOTE PROVIDER - CARE PROVIDER_API CALL
Balta Fritz)  ColonRectal Surgery; Surgery  [address] [facility], [address] [place] [address]  Phone: [phone]  Fax: [phone]  Follow Up Time:

## 2022-08-24 NOTE — DISCHARGE NOTE NURSING/CASE MANAGEMENT/SOCIAL WORK - NSCORESITESY/N_GEN_A_CORE_RD
No 7 Nsaids Counseling: NSAID Counseling: I discussed with the patient that NSAIDs should be taken with food. Prolonged use of NSAIDs can result in the development of stomach ulcers.  Patient advised to stop taking NSAIDs if abdominal pain occurs.  The patient verbalized understanding of the proper use and possible adverse effects of NSAIDs.  All of the patient's questions and concerns were addressed.

## 2022-08-24 NOTE — DISCHARGE NOTE PROVIDER - HOSPITAL COURSE
Patient is a 51M with PMH schizophrenia, intellectual disability, seizure disorder, hypothyroid who presents with abdominal distention found to have sigmoid volvulus.    Patient underwent endoscopic decompression and partial detorsion by GI.  On 8/ 18/22 pt went to OR for Robot-assisted sigmoidectomy with colorectal anastomosis for sigmoid volvulus.  Post operatively pt treated medically . diet advanced as tolerated. pt evaluated by PT/rehab and ID   On 8/24/22 pt without complaints, tolerated po diet, voided . vital signs stable and afebrile. pt doing well and discharged to SNF in stable condition.  advised to follow up with Dr Fritz in 1 week for wound check and PMD. Resume home medications and precaution provided.

## 2022-08-24 NOTE — DISCHARGE NOTE PROVIDER - NSDCMRMEDTOKEN_GEN_ALL_CORE_FT
acetaminophen 325 mg oral tablet: 2 tab(s) orally every 6 hours, As Needed  benztropine 1 mg oral tablet: 1 tab(s) orally 2 times a day  buPROPion 150 mg/24 hours (XL) oral tablet, extended release: 1 tab(s) orally every 24 hours  divalproex sodium 250 mg oral delayed release tablet: 2 tab(s) orally 3 times a day  haloperidol 10 mg oral tablet: 1 tab(s) orally 3 times a day  mirtazapine 15 mg oral tablet: mirtazapine 15mg tablet 1 tablet oral at bedtime  Senexon-S 50 mg-8.6 mg oral tablet: 1 tab(s) orally once a day (at bedtime)  Synthroid 100 mcg (0.1 mg) oral tablet: synthroid 100mcg tablet (levothyroxine 100 mcg tablet) 1 tablet oral every day  tamsulosin 0.4 mg oral capsule: 1 cap(s) orally once a day (at bedtime)

## 2022-08-24 NOTE — DISCHARGE NOTE PROVIDER - NSDCCPCAREPLAN_GEN_ALL_CORE_FT
PRINCIPAL DISCHARGE DIAGNOSIS  Diagnosis: Sigmoid volvulus  Assessment and Plan of Treatment:

## 2022-08-24 NOTE — PROGRESS NOTE ADULT - PROVIDER SPECIALTY LIST ADULT
Surgery
Colorectal Surgery
Colorectal Surgery
Physiatry
Surgery
Surgery
Colorectal Surgery
Colorectal Surgery
Gastroenterology
Hospitalist
Surgery
Surgery
Infectious Disease
Hospitalist

## 2022-08-24 NOTE — DISCHARGE NOTE PROVIDER - NSDCCPTREATMENT_GEN_ALL_CORE_FT
PRINCIPAL PROCEDURE  Procedure: Robot-assisted sigmoidectomy with colorectal anastomosis  Findings and Treatment:

## 2022-08-24 NOTE — PROGRESS NOTE ADULT - SUBJECTIVE AND OBJECTIVE BOX
VIKALINA  51y, Male  Allergy: erythromycin (Other)  phenothiazines (Unknown)      OVERNIGHT EVENTS:  Patient is s/p urgent colonoscopy 8/15/22 and decompression of sigmoid volvulus. Poor historian but vitals are stable.     PAST MEDICAL & SURGICAL HISTORY:  Epilepsy  last event 4+ yrs ago  Schizophrenia  Bipolar 1 disorder  Major depressive disorder  Anemia  Dyspepsia  Hypothyroidism  Constipation  MR (mental retardation)  H/O cystoscopy  H/O laminectomy  2001  H/O fracture of fibula  nondisplaced 2010      VITALS:  T(F): 98.4 (22 @ 08:00), Max: 98.4 (22 @ 08:00)  HR: 70 (22 @ 12:00)  BP: 126/80 (22 @ 12:00) (122/73 - 170/91)  RR: 20 (22 @ 12:00)  SpO2: 100% (22 @ 12:00)    GENERAL: AAOx1, no acute distress.  HEAD:  Atraumatic, Normocephalic  EYES: conjunctiva and sclera clear  NECK: Supple, no JVD or thyromegaly  CHEST/LUNG: Clear to auscultation bilaterally  HEART: Regular rate and rhythm; normal S1, S2, No murmurs.  ABDOMEN: Soft, nontender, nondistended  NEUROLOGY: No asterixis or tremor.       TESTS & MEASUREMENTS:  Height (cm): 175.3 (08-15-22 @ 18:33)  Weight (kg): 72.6 (08-15-22 @ 18:33)  BMI (kg/m2): 23.6 (08-15-22 @ 18:33)    08-15-22 @ 07:01  -  22 @ 07:00  --------------------------------------------------------  IN: 0 mL / OUT: 1075 mL / NET: -1075 mL    22 @ 07:01  -  22 @ 13:07  --------------------------------------------------------  IN: 0 mL / OUT: 1350 mL / NET: -1350 mL                            12.7   8.60  )-----------( 152      ( 16 Aug 2022 05:25 )             36.8     PT/INR - ( 16 Aug 2022 05:25 )   PT: 11.80 sec;   INR: 1.03 ratio         PTT - ( 16 Aug 2022 05:25 )  PTT:34.6 sec      137  |  104  |  9<L>  ----------------------------<  78  3.9   |  27  |  0.7    Ca    9.0      16 Aug 2022 05:25    TPro  6.4  /  Alb  3.5  /  TBili  0.4  /  DBili  x   /  AST  14  /  ALT  9   /  AlkPhos  74      LIVER FUNCTIONS - ( 16 Aug 2022 05:25 )  Alb: 3.5 g/dL / Pro: 6.4 g/dL / ALK PHOS: 74 U/L / ALT: 9 U/L / AST: 14 U/L / GGT: x               Urinalysis Basic - ( 15 Aug 2022 15:41 )    Color: Light Yellow / Appearance: Clear / S.015 / pH: x  Gluc: x / Ketone: Negative  / Bili: Negative / Urobili: <2 mg/dL   Blood: x / Protein: Negative / Nitrite: Positive   Leuk Esterase: Large / RBC: 1 /HPF / WBC 19 /HPF   Sq Epi: x / Non Sq Epi: 0 /HPF / Bacteria: Many        RADIOLOGY & ADDITIONAL TESTS:    MEDICATIONS:  MEDICATIONS  (STANDING):  benztropine 1 milliGRAM(s) Oral two times a day  buPROPion XL (24-Hour) . 150 milliGRAM(s) Oral daily  enoxaparin Injectable 40 milliGRAM(s) SubCutaneous every 24 hours  haloperidol     Tablet 10 milliGRAM(s) Oral three times a day  levothyroxine 100 MICROGram(s) Oral daily  mirtazapine 15 milliGRAM(s) Oral at bedtime  pantoprazole  Injectable 40 milliGRAM(s) IV Push daily  piperacillin/tazobactam IVPB.. 3.375 Gram(s) IV Intermittent every 8 hours  tamsulosin 0.4 milliGRAM(s) Oral at bedtime  valproate sodium  IVPB 375 milliGRAM(s) IV Intermittent three times a day    MEDICATIONS  (PRN):  ondansetron Injectable 4 milliGRAM(s) IV Push every 6 hours PRN Nausea and/or Vomiting      HEALTH ISSUES - PROBLEM Dx:          Case discussed in details with:       
GENERAL SURGERY PROGRESS NOTE    Patient: ALINA CHERY , 51y ([birth year])Male   MRN: [id number]  Location: [address]  Visit: 08-15-22 Inpatient  Date: 22 @ 12:03    Hospital Day #: 3  Post-Op Day #:    Procedure/Dx/Injuries: sigmoid volvulus s/p endoscopic decompression    Events of past 24 hours:  NAEON  Patient to go to OR tomorrow at 730AM  Patient to recieve bowel prep today  Patient has preop labs, COVID test ordered, fluids    PAST MEDICAL & SURGICAL HISTORY:  Epilepsy  last event 4+ yrs ago      Schizophrenia      Bipolar 1 disorder      Major depressive disorder      Anemia      Dyspepsia      Hypothyroidism      Constipation      MR (mental retardation)      H/O cystoscopy      H/O laminectomy  2001      H/O fracture of fibula  nondisplaced 2010          Vitals:   T(F): 97.4 (22 @ 08:00), Max: 99.7 (22 @ 00:09)  HR: 69 (22 @ 08:00)  BP: 113/66 (22 @ 08:00)  RR: 18 (22 @ 08:00)  SpO2: 97% (22 @ 08:00)      Diet, NPO after Midnight:      NPO Start Date: 17-Aug-2022,   NPO Start Time: 23:59  Diet, NPO:   Except Medications      Fluids: dextrose 5% + sodium chloride 0.45% with potassium chloride 20 mEq/L: Solution, 1000 milliLiter(s) infuse at 125 mL/Hr  dextrose 5% + sodium chloride 0.45%.: Solution, 1000 milliLiter(s) infuse at 125 mL/Hr      I & O's:    22 @ 07:01  -  22 @ 07:00  --------------------------------------------------------  IN:  Total IN: 0 mL    OUT:    Voided (mL): 1350 mL  Total OUT: 1350 mL    Total NET: -1350 mL    PHYSICAL EXAM:  General: NAD, AAOx3  Cardiac: RRR  Respiratory: Unlabored breathing at rest  Abdomen: Soft, distended, non-tender in all quadrants  Musculoskeletal: Strength 5/5 BL UE/LE, ROM intact, compartments soft  Neuro: Sensation grossly intact and equal throughout, no focal deficits  Skin: Warm/dry, normal color, no jaundice    MEDICATIONS  (STANDING):  benztropine 1 milliGRAM(s) Oral two times a day  buPROPion XL (24-Hour) . 150 milliGRAM(s) Oral daily  cefTRIAXone   IVPB 2000 milliGRAM(s) IV Intermittent every 24 hours  dextrose 5% + sodium chloride 0.45% with potassium chloride 20 mEq/L 1000 milliLiter(s) (125 mL/Hr) IV Continuous <Continuous>  dextrose 5% + sodium chloride 0.45%. 1000 milliLiter(s) (125 mL/Hr) IV Continuous <Continuous>  enoxaparin Injectable 40 milliGRAM(s) SubCutaneous every 24 hours  haloperidol     Tablet 10 milliGRAM(s) Oral three times a day  levothyroxine 100 MICROGram(s) Oral daily  magnesium sulfate  IVPB 2 Gram(s) IV Intermittent every 2 hours  metroNIDAZOLE    Tablet 1000 milliGRAM(s) Oral once  metroNIDAZOLE    Tablet 1000 milliGRAM(s) Oral once  metroNIDAZOLE    Tablet 1000 milliGRAM(s) Oral once  metroNIDAZOLE  IVPB 500 milliGRAM(s) IV Intermittent every 8 hours  mirtazapine 15 milliGRAM(s) Oral at bedtime  neomycin 1000 milliGRAM(s) Oral once  neomycin 1000 milliGRAM(s) Oral once  neomycin 1000 milliGRAM(s) Oral once  pantoprazole  Injectable 40 milliGRAM(s) IV Push daily  polyethylene glycol/electrolyte Solution. 1000 milliLiter(s) Oral once  tamsulosin 0.4 milliGRAM(s) Oral at bedtime  valproate sodium  IVPB 375 milliGRAM(s) IV Intermittent three times a day    MEDICATIONS  (PRN):  ondansetron Injectable 4 milliGRAM(s) IV Push every 6 hours PRN Nausea and/or Vomiting      DVT PROPHYLAXIS: enoxaparin Injectable 40 milliGRAM(s) SubCutaneous every 24 hours    GI PROPHYLAXIS: pantoprazole  Injectable 40 milliGRAM(s) IV Push daily    ANTICOAGULATION:   ANTIBIOTICS:  cefTRIAXone   IVPB 2000 milliGRAM(s)  metroNIDAZOLE    Tablet 1000 milliGRAM(s)  metroNIDAZOLE    Tablet 1000 milliGRAM(s)  metroNIDAZOLE    Tablet 1000 milliGRAM(s)  metroNIDAZOLE  IVPB 500 milliGRAM(s)  neomycin 1000 milliGRAM(s)  neomycin 1000 milliGRAM(s)  neomycin 1000 milliGRAM(s)            LAB/STUDIES:  Labs:  CAPILLARY BLOOD GLUCOSE                              13.2   9.92  )-----------( 153      ( 17 Aug 2022 03:18 )             38.1             139  |  102  |  10  ----------------------------<  65<L>  3.9   |  26  |  0.8      Calcium, Total Serum: 9.3 mg/dL (22 @ 03:18)      LFTs:             6.4  | 0.4  | 14       ------------------[74      ( 16 Aug 2022 05:25 )  3.5  | x    | 9           Lipase:x      Amylase:x         Lactate, Blood: 0.6 mmol/L (22 @ 05:25)  Lactate, Blood: 1.0 mmol/L (08-15-22 @ 17:46)      Coags:     11.80  ----< 1.03    ( 16 Aug 2022 05:25 )     34.6                Urinalysis Basic - ( 15 Aug 2022 15:41 )    Color: Light Yellow / Appearance: Clear / S.015 / pH: x  Gluc: x / Ketone: Negative  / Bili: Negative / Urobili: <2 mg/dL   Blood: x / Protein: Negative / Nitrite: Positive   Leuk Esterase: Large / RBC: 1 /HPF / WBC 19 /HPF   Sq Epi: x / Non Sq Epi: 0 /HPF / Bacteria: Many        Culture - Urine (collected 15 Aug 2022 15:41)  Source: Clean Catch Clean Catch (Midstream)  Preliminary Report (16 Aug 2022 22:30):    >100,000 CFU/ml Klebsiella pneumonia
 SURGERY PROGRESS NOTE     Patient: ALINA CHERY , 51y ([birth year])Male   MRN: [id number]  Location: [address]  Visit: 08-15-22 Inpatient  Date: 08-23-22 @ 08:41     Events of past 24 hours: Patient complaining about mouth pain but unable to vocalize what exactly hurts inside his mouth. Patient also isn't answering any other questions at this time. Unsure if tolerating diet, unsure if passing flatus.     PAST MEDICAL & SURGICAL HISTORY:  Epilepsy  last event 4+ yrs ago  Schizophrenia  Bipolar 1 disorder  Major depressive disorder  Anemia  Dyspepsia  Hypothyroidism  Constipation  MR (mental retardation)  H/O cystoscopy  H/O laminectomy  03/2001  H/O fracture of fibula  nondisplaced 03/2010       Vitals:   T(F): 97.4 (08-23-22 @ 04:06), Max: 99.1 (08-22-22 @ 21:06)  HR: 77 (08-23-22 @ 04:06)  BP: 118/71 (08-23-22 @ 04:06)  RR: 18 (08-23-22 @ 04:06)  SpO2: 96% (08-22-22 @ 21:06)      Diet, Low Fiber      Fluids:     I & O's:    Bowel Movement: : [] YES [] NO  Flatus: : [] YES [] NO     PHYSICAL EXAM:   General: NAD, AAOx3, calm and cooperative  Cardiac: RRR S1, S2  Respiratory: CTAB, normal respiratory effort  Abdomen: Soft, non-distended, non-tender, no rebound, no guarding. +BS.    MEDICATIONS  (STANDING):  acetaminophen     Tablet .. 650 milliGRAM(s) Oral every 6 hours  benztropine 1 milliGRAM(s) Oral two times a day  BUpivacaine liposome 1.3% Injectable 20 milliLiter(s) Local Injection once  buPROPion XL (24-Hour) . 150 milliGRAM(s) Oral daily  enoxaparin Injectable 40 milliGRAM(s) SubCutaneous every 24 hours  haloperidol     Tablet 10 milliGRAM(s) Oral three times a day  levothyroxine 100 MICROGram(s) Oral daily  mirtazapine 15 milliGRAM(s) Oral at bedtime  pantoprazole  Injectable 40 milliGRAM(s) IV Push daily  tamsulosin 0.4 milliGRAM(s) Oral at bedtime  valproate sodium  IVPB 375 milliGRAM(s) IV Intermittent three times a day    MEDICATIONS  (PRN):  ketorolac   Injectable 15 milliGRAM(s) IV Push every 8 hours PRN Moderate Pain (4 - 6)  ondansetron Injectable 4 milliGRAM(s) IV Push every 6 hours PRN Nausea and/or Vomiting      DVT PROPHYLAXIS: enoxaparin Injectable 40 milliGRAM(s) SubCutaneous every 24 hours    GI PROPHYLAXIS: pantoprazole  Injectable 40 milliGRAM(s) IV Push daily    ANTICOAGULATION:   ANTIBIOTICS:            LAB/STUDIES:  Labs:  CAPILLARY BLOOD GLUCOSE                              10.0   10.88 )-----------( 238      ( 23 Aug 2022 01:27 )             28.9       Auto Neutrophil %: 58.1 % (08-23-22 @ 01:27)  Auto Immature Granulocyte %: 0.8 % (08-23-22 @ 01:27)    08-23    140  |  103  |  9<L>  ----------------------------<  82  4.2   |  29  |  0.8      Calcium, Total Serum: 8.9 mg/dL (08-23-22 @ 01:27)             
 SURGERY PROGRESS NOTE     Patient: ALINA CHERY , 51y ([birth year])Male   MRN: [id number]  Location: [address]  Visit: 08-15-22 Inpatient  Date: 08-20-22 @ 03:50       Events of past 24 hours: Patient seen resting in bed. States that he's worried about his clothes and wallet that are downstairs. I passed along patient's concerns to overnight nurse and asked if she could help get his things. Patient denies bowel movement but endorses passing a small amount of flatus over the day.     PAST MEDICAL & SURGICAL HISTORY:  Epilepsy  last event 4+ yrs ago      Schizophrenia      Bipolar 1 disorder      Major depressive disorder      Anemia      Dyspepsia      Hypothyroidism      Constipation      MR (mental retardation)      H/O cystoscopy      H/O laminectomy  03/2001      H/O fracture of fibula  nondisplaced 03/2010           Vitals:   T(F): 98.3 (08-19-22 @ 15:40), Max: 98.3 (08-19-22 @ 15:40)  HR: 83 (08-19-22 @ 15:40)  BP: 120/66 (08-19-22 @ 15:40)  RR: 19 (08-19-22 @ 15:40)  SpO2: 97% (08-19-22 @ 08:00)      Diet, Clear Liquid      Fluids: dextrose 5% + sodium chloride 0.45%.: Solution, 1000 milliLiter(s) infuse at 40 mL/Hr      I & O's:    08-18-22 @ 07:01  -  08-19-22 @ 07:00  --------------------------------------------------------  IN:    Lactated Ringers: 1700 mL    Oral Fluid: 540 mL  Total IN: 2240 mL    OUT:    Indwelling Catheter - Urethral (mL): 2480 mL  Total OUT: 2480 mL    Total NET: -240 mL     PHYSICAL EXAM:   General: NAD, AAOx3, calm and cooperative  Cardiac: RRR S1, S2  Respiratory: CTAB, normal respiratory effort  Abdomen: Soft, non-distended, non-tender, no rebound, no guarding. +BS.  Vascular: Pulses 2+ throughout, extremities well perfused  Skin: Warm/dry, normal color, no jaundice  Incision/wound: healing well, dressings in place, clean, dry and intact    MEDICATIONS  (STANDING):  acetaminophen     Tablet .. 650 milliGRAM(s) Oral every 6 hours  benztropine 1 milliGRAM(s) Oral two times a day  BUpivacaine liposome 1.3% Injectable 20 milliLiter(s) Local Injection once  buPROPion XL (24-Hour) . 150 milliGRAM(s) Oral daily  dextrose 5% + sodium chloride 0.45%. 1000 milliLiter(s) (40 mL/Hr) IV Continuous <Continuous>  enoxaparin Injectable 40 milliGRAM(s) SubCutaneous every 24 hours  haloperidol     Tablet 10 milliGRAM(s) Oral three times a day  levothyroxine 100 MICROGram(s) Oral daily  mirtazapine 15 milliGRAM(s) Oral at bedtime  pantoprazole  Injectable 40 milliGRAM(s) IV Push daily  tamsulosin 0.4 milliGRAM(s) Oral at bedtime  valproate sodium  IVPB 375 milliGRAM(s) IV Intermittent three times a day    MEDICATIONS  (PRN):  ketorolac   Injectable 15 milliGRAM(s) IV Push every 8 hours PRN Moderate Pain (4 - 6)  ondansetron Injectable 4 milliGRAM(s) IV Push every 6 hours PRN Nausea and/or Vomiting  oxyCODONE    IR 5 milliGRAM(s) Oral every 6 hours PRN Severe Pain (7 - 10)      DVT PROPHYLAXIS: enoxaparin Injectable 40 milliGRAM(s) SubCutaneous every 24 hours    GI PROPHYLAXIS: pantoprazole  Injectable 40 milliGRAM(s) IV Push daily    ANTICOAGULATION:   ANTIBIOTICS:            LAB/STUDIES:  Labs:  CAPILLARY BLOOD GLUCOSE                              9.7    10.14 )-----------( 136      ( 19 Aug 2022 21:20 )             28.5       Auto Neutrophil %: 72.0 % (08-19-22 @ 21:20)  Auto Immature Granulocyte %: 0.5 % (08-19-22 @ 21:20)  Auto Neutrophil %: 73.3 % (08-19-22 @ 12:43)  Auto Immature Granulocyte %: 0.6 % (08-19-22 @ 12:43)    08-19    135  |  99  |  4<L>  ----------------------------<  105<H>  4.0   |  27  |  0.7      Calcium, Total Serum: 8.4 mg/dL (08-19-22 @ 21:20)          
GENERAL SURGERY PROGRESS NOTE    Hospital Day: 7  Post operative day: 4  Procedure: RA Laparoscopic Sigmoidectomy    24 Hour Events:  Patient seen resting comfortably in bed.   Tolerating full liquid diet  BM+/F+  N/V(-)    Vitals:  T(F): 96.5 (08-22-22 @ 00:47), Max: 97.4 (08-21-22 @ 15:16)  HR: 74 (08-22-22 @ 00:47)  BP: 140/80 (08-22-22 @ 00:47)  RR: 18 (08-22-22 @ 00:47)  SpO2: 99% (08-22-22 @ 00:47)    Diet, Full Liquid    Fluids:     I & O's:    08-20-22 @ 07:01  -  08-21-22 @ 07:00  --------------------------------------------------------  IN:    dextrose 5% + sodium chloride 0.45%: 400 mL    Oral Fluid: 500 mL  Total IN: 900 mL    OUT:    Voided (mL): 700 mL  Total OUT: 700 mL    Total NET: 200 mL    PHYSICAL EXAM:  General: NAD  Cardiac: RRR, S1/S2 identified, nmrg  Respiratory: unlabored breathing at rest, clear to auscultation bilaterally  Abdomen: Soft, non-distended, non-tender, no rebound, no guarding.  Musculoskeletal: FROM in b/l UE and LE  Neuro: Sensation grossly intact and equal throughout, no focal deficits  Skin: Warm/dry, normal color, no jaundice    MEDICATIONS  (STANDING):  acetaminophen     Tablet .. 650 milliGRAM(s) Oral every 6 hours  benztropine 1 milliGRAM(s) Oral two times a day  BUpivacaine liposome 1.3% Injectable 20 milliLiter(s) Local Injection once  buPROPion XL (24-Hour) . 150 milliGRAM(s) Oral daily  dextrose 5% + sodium chloride 0.45%. 1000 milliLiter(s) (40 mL/Hr) IV Continuous <Continuous>  enoxaparin Injectable 40 milliGRAM(s) SubCutaneous every 24 hours  haloperidol     Tablet 10 milliGRAM(s) Oral three times a day  levothyroxine 100 MICROGram(s) Oral daily  magnesium sulfate  IVPB 2 Gram(s) IV Intermittent once  mirtazapine 15 milliGRAM(s) Oral at bedtime  pantoprazole  Injectable 40 milliGRAM(s) IV Push daily  tamsulosin 0.4 milliGRAM(s) Oral at bedtime  valproate sodium  IVPB 375 milliGRAM(s) IV Intermittent three times a day    MEDICATIONS  (PRN):  ketorolac   Injectable 15 milliGRAM(s) IV Push every 8 hours PRN Moderate Pain (4 - 6)  ondansetron Injectable 4 milliGRAM(s) IV Push every 6 hours PRN Nausea and/or Vomiting      DVT PROPHYLAXIS: enoxaparin Injectable 40 milliGRAM(s) SubCutaneous every 24 hours    GI PROPHYLAXIS: pantoprazole  Injectable 40 milliGRAM(s) IV Push daily    LAB/STUDIES:  Labs:  CAPILLARY BLOOD GLUCOSE             10.1   8.62  )-----------( 177      ( 21 Aug 2022 20:54 )             30.3       Auto Neutrophil %: 67.3 % (08-21-22 @ 20:54)  Auto Immature Granulocyte %: 0.6 % (08-21-22 @ 20:54)  Auto Neutrophil %: 55.8 % (08-21-22 @ 04:31)  Auto Immature Granulocyte %: 0.8 % (08-21-22 @ 04:31)    08-21    139  |  103  |  5<L>  ----------------------------<  86  4.1   |  28  |  0.7      Calcium, Total Serum: 8.9 mg/dL (08-21-22 @ 20:54)    
GENERAL SURGERY PROGRESS NOTE    Patient: ALINA CHERY , 51y ([birth year])Male   MRN: [id number]  Location: [address]  Visit: 08-15-22 Inpatient  Date: 08-19-22 @ 07:41    Patient seen and evaluated at bedside overnight. Patient doing well, laying comfortably in bed. He reports that he is tolerating clear liquid diet. He is hemodynamically stable, afebrile, and long catheter in place. Denies nausea, vomiting, fever, or chills.     PAST MEDICAL & SURGICAL HISTORY:  Epilepsy  last event 4+ yrs ago      Schizophrenia      Bipolar 1 disorder      Major depressive disorder      Anemia      Dyspepsia      Hypothyroidism      Constipation      MR (mental retardation)      H/O cystoscopy      H/O laminectomy  03/2001      H/O fracture of fibula  nondisplaced 03/2010    Vitals:   T(F): 96.9 (08-19-22 @ 04:00), Max: 97.9 (08-18-22 @ 16:00)  HR: 77 (08-19-22 @ 04:00)  BP: 113/70 (08-19-22 @ 04:00)  RR: 18 (08-19-22 @ 04:00)  SpO2: 98% (08-18-22 @ 18:00)      Diet, Clear Liquid      Fluids: lactated ringers.: Solution, 1000 milliLiter(s) infuse at 100 mL/Hr  Provider's Contact #: [phone]      I & O's:    08-18-22 @ 07:01  -  08-19-22 @ 07:00  --------------------------------------------------------  IN:    Lactated Ringers: 1700 mL    Oral Fluid: 540 mL  Total IN: 2240 mL    OUT:    Indwelling Catheter - Urethral (mL): 2480 mL  Total OUT: 2480 mL    Total NET: -240 mL    PHYSICAL EXAM:  General: NAD, calm and cooperative  HEENT: NCAT  Cardiac: No peripheral cyanosis or pallor, extremities well perfused   Respiratory: Non-labored breathing, equal chest rise bilaterally   Abdomen: Soft, non-distended, non-tender, no rebound, no guarding  Neuro: at baseline, no focal deficits   Vascular: Pulses 2+ throughout, extremities well perfused  Skin: Warm/dry, normal color, no jaundice  Incision/wound: healing well, dressings in place, clean, dry and intact    MEDICATIONS  (STANDING):  acetaminophen     Tablet .. 650 milliGRAM(s) Oral every 6 hours  benztropine 1 milliGRAM(s) Oral two times a day  BUpivacaine liposome 1.3% Injectable 20 milliLiter(s) Local Injection once  buPROPion XL (24-Hour) . 150 milliGRAM(s) Oral daily  cefTRIAXone   IVPB 2000 milliGRAM(s) IV Intermittent every 24 hours  enoxaparin Injectable 40 milliGRAM(s) SubCutaneous every 24 hours  haloperidol     Tablet 10 milliGRAM(s) Oral three times a day  lactated ringers. 1000 milliLiter(s) (100 mL/Hr) IV Continuous <Continuous>  levothyroxine 100 MICROGram(s) Oral daily  mirtazapine 15 milliGRAM(s) Oral at bedtime  pantoprazole  Injectable 40 milliGRAM(s) IV Push daily  tamsulosin 0.4 milliGRAM(s) Oral at bedtime  valproate sodium  IVPB 375 milliGRAM(s) IV Intermittent three times a day    MEDICATIONS  (PRN):  ketorolac   Injectable 15 milliGRAM(s) IV Push every 8 hours PRN Moderate Pain (4 - 6)  ondansetron Injectable 4 milliGRAM(s) IV Push every 6 hours PRN Nausea and/or Vomiting  oxyCODONE    IR 5 milliGRAM(s) Oral every 6 hours PRN Severe Pain (7 - 10)      DVT PROPHYLAXIS: enoxaparin Injectable 40 milliGRAM(s) SubCutaneous every 24 hours    GI PROPHYLAXIS: pantoprazole  Injectable 40 milliGRAM(s) IV Push daily    ANTICOAGULATION:   ANTIBIOTICS:  cefTRIAXone   IVPB 2000 milliGRAM(s)    LAB/STUDIES:  Labs:  CAPILLARY BLOOD GLUCOSE      POCT Blood Glucose.: 122 mg/dL (18 Aug 2022 12:29)  POCT Blood Glucose.: 104 mg/dL (18 Aug 2022 11:00)                          12.8   15.54 )-----------( 153      ( 18 Aug 2022 17:10 )             37.2       Auto Neutrophil %: 86.1 % (08-18-22 @ 17:10)  Band Neutrophils %: 0.9 % (08-18-22 @ 17:10)    08-18    137  |  102  |  8<L>  ----------------------------<  163<H>  3.9   |  23  |  0.7      Calcium, Total Serum: 8.7 mg/dL (08-18-22 @ 17:10)      LFTs:         Coags:     12.40  ----< 1.08    ( 17 Aug 2022 21:00 )     33.4      
GENERAL SURGERY PROGRESS NOTE    Patient: ALINA CHERY , 51y ([birth year])Male   MRN: [id number]  [address]  Visit: 08-15-22 Inpatient  Date: 08-18-22 @ 16:55    Hospital Day #: 4    Procedure/Dx/Injuries: sigmoid volvulus s/p endoscopic decompression     PAST MEDICAL & SURGICAL HISTORY:  Epilepsy  last event 4+ yrs ago  Schizophrenia  Bipolar 1 disorder  Major depressive disorder  Anemia  Dyspepsia  Hypothyroidism  Constipation  MR (mental retardation)  H/O cystoscopy  H/O laminectomy  03/2001  H/O fracture of fibula  nondisplaced 03/2010    Vitals:   T(F): 97 (08-18-22 @ 12:20), Max: 98 (08-18-22 @ 06:57)  HR: 91 (08-18-22 @ 14:15)  BP: 105/72 (08-18-22 @ 14:15)  RR: 16 (08-18-22 @ 14:15)  SpO2: 97% (08-18-22 @ 14:15)    Diet, Clear Liquid    Fluids: lactated ringers.: Solution, 1000 milliLiter(s) infuse at 100 mL/Hr  Provider's Contact #: [phone]    I & O's:    PHYSICAL EXAM:  General: NAD  HEENT: NCAT, SUE  Cardiac: RRR S1, S2  Respiratory: normal respiratory effort, breath sounds equal BL, no labored breathing   Abdomen: Soft, non-distended, non-tender, no rebound, no guarding.     MEDICATIONS  (STANDING):  acetaminophen     Tablet .. 650 milliGRAM(s) Oral every 6 hours  benztropine 1 milliGRAM(s) Oral two times a day  BUpivacaine liposome 1.3% Injectable 20 milliLiter(s) Local Injection once  buPROPion XL (24-Hour) . 150 milliGRAM(s) Oral daily  cefTRIAXone   IVPB 2000 milliGRAM(s) IV Intermittent every 24 hours  enoxaparin Injectable 40 milliGRAM(s) SubCutaneous every 24 hours  haloperidol     Tablet 10 milliGRAM(s) Oral three times a day  lactated ringers. 1000 milliLiter(s) (100 mL/Hr) IV Continuous <Continuous>  levothyroxine 100 MICROGram(s) Oral daily  mirtazapine 15 milliGRAM(s) Oral at bedtime  pantoprazole  Injectable 40 milliGRAM(s) IV Push daily  tamsulosin 0.4 milliGRAM(s) Oral at bedtime  valproate sodium  IVPB 375 milliGRAM(s) IV Intermittent three times a day    MEDICATIONS  (PRN):  HYDROmorphone  Injectable 0.5 milliGRAM(s) IV Push every 10 minutes PRN Moderate Pain (4 - 6)  ketorolac   Injectable 15 milliGRAM(s) IV Push every 8 hours PRN Moderate Pain (4 - 6)  ondansetron Injectable 4 milliGRAM(s) IV Push every 6 hours PRN Nausea and/or Vomiting  oxyCODONE    IR 5 milliGRAM(s) Oral every 6 hours PRN Severe Pain (7 - 10)    DVT PROPHYLAXIS: enoxaparin Injectable 40 milliGRAM(s) SubCutaneous every 24 hours    GI PROPHYLAXIS: pantoprazole  Injectable 40 milliGRAM(s) IV Push daily    ANTICOAGULATION:   ANTIBIOTICS:  cefTRIAXone   IVPB 2000 milliGRAM(s)    LAB/STUDIES:  Labs:  CAPILLARY BLOOD GLUCOSE    POCT Blood Glucose.: 122 mg/dL (18 Aug 2022 12:29)  POCT Blood Glucose.: 104 mg/dL (18 Aug 2022 11:00)                        13.9   7.41  )-----------( 144      ( 17 Aug 2022 21:00 )             42.1       Auto Neutrophil %: 60.7 % (08-17-22 @ 21:00)  Auto Immature Granulocyte %: 0.5 % (08-17-22 @ 21:00)    08-17    140  |  99  |  13  ----------------------------<  75  4.3   |  29  |  0.9    Calcium, Total Serum: 9.0 mg/dL (08-17-22 @ 21:00)    LFTs:     Lactate, Blood: 0.6 mmol/L (08-16-22 @ 05:25)  Lactate, Blood: 1.0 mmol/L (08-15-22 @ 17:46)    Coags:     12.40  ----< 1.08    ( 17 Aug 2022 21:00 )     33.4      Culture - Blood (collected 16 Aug 2022 05:25)  Source: .Blood Blood-Peripheral  Preliminary Report (17 Aug 2022 15:05):    No growth to date.    Culture - Blood (collected 16 Aug 2022 02:51)  Source: .Blood Blood-Peripheral  Preliminary Report (17 Aug 2022 15:05):    No growth to date.          
GENERAL SURGERY PROGRESS NOTE    Patient: ALINA CHERY , 51y ([birth year])Male   MRN: [id number]  Location: [address]  Visit: 08-15-22 Inpatient  Date: 08-24-22 @ 08:20    Hospital Day #: 10  Post-Op Day #: 6    Events of past 24 hours:    Tolerating diet. Denies nausea/vomiting.  Endorses flatus and BM.  Endorses voiding.  Endorses ambulating.      PAST MEDICAL & SURGICAL HISTORY:  Epilepsy  last event 4+ yrs ago  Schizophrenia  Bipolar 1 disorder  Major depressive disorder  Anemia  Dyspepsia  Hypothyroidism  Constipation  MR (mental retardation)  H/O cystoscopy  H/O laminectomy - 03/2001      H/O fracture of fibula  nondisplaced 03/2010      Vitals:   T(F): 97 (08-24-22 @ 08:04), Max: 99.3 (08-24-22 @ 00:30)  HR: 78 (08-24-22 @ 08:04)  BP: 120/74 (08-24-22 @ 08:04)  RR: 18 (08-24-22 @ 08:04)  SpO2: 95% (08-24-22 @ 08:04)      Diet, Low Fiber      Fluids:     I & O's:    PHYSICAL EXAM:  General: NAD, AAOx3, calm and cooperative.  Cardiac: RRR.  Respiratory: On RA. Speaks in complete sentences. No accessory muscles of respiration in use.  Abdomen: Soft, non-distended, non-tender, no rebound, no guarding.   Neuro: Moves all extremities.  Vascular: Pulses 2+ throughout, extremities well perfused.  Skin: Warm/dry, normal color, no jaundice.      MEDICATIONS  (STANDING):  acetaminophen     Tablet .. 650 milliGRAM(s) Oral every 6 hours  benztropine 1 milliGRAM(s) Oral two times a day  buPROPion XL (24-Hour) . 150 milliGRAM(s) Oral daily  diVALproex  milliGRAM(s) Oral every 8 hours  enoxaparin Injectable 40 milliGRAM(s) SubCutaneous every 24 hours  haloperidol     Tablet 10 milliGRAM(s) Oral three times a day  levothyroxine 100 MICROGram(s) Oral daily  mirtazapine 15 milliGRAM(s) Oral at bedtime  tamsulosin 0.4 milliGRAM(s) Oral at bedtime    MEDICATIONS  (PRN):      DVT PROPHYLAXIS: enoxaparin Injectable 40 milliGRAM(s) SubCutaneous every 24 hours    GI PROPHYLAXIS:   ANTICOAGULATION:   ANTIBIOTICS:        LAB/STUDIES:  Labs:  CAPILLARY BLOOD GLUCOSE                          10.0   10.88 )-----------( 238      ( 23 Aug 2022 01:27 )             28.9         08-23    140  |  103  |  9<L>  ----------------------------<  82  4.2   |  29  |  0.8      IMAGING:  No imaging performed.    ACCESS/ DEVICES:  [x] Peripheral IV    
GENERAL SURGERY PROGRESS NOTE    Patient: ALINA CHERY , 51y ([birth year])Male   MRN: [id number]  Location: [address]  Visit: 08-15-22 Inpatient  Date: 08-21-22 @ 03:15     Hospital Day #: 7  Post-Op Day #:3    Procedure/Dx/Injuries: sigmoid volvulus s/p RA laparoscopic sigmoidectomy    Events of past 24 hours:  Patient has been intermittently lethargic, labs redrawn however stable. Vital signs stable. patient arousable to sternal rub  Fingerstick normal  Med Record Reviewed  No blood per rectum, no reported nausea or vomiting      PAST MEDICAL & SURGICAL HISTORY:  Epilepsy  last event 4+ yrs ago    Schizophrenia    Bipolar 1 disorder    Major depressive disorder      Anemia      Dyspepsia      Hypothyroidism      Constipation      MR (mental retardation)      H/O cystoscopy      H/O laminectomy  03/2001      H/O fracture of fibula  nondisplaced 03/2010      Vitals:   T(F): 97.9 (08-20-22 @ 22:50), Max: 98.3 (08-20-22 @ 08:00)  HR: 66 (08-20-22 @ 22:50)  BP: 130/75 (08-20-22 @ 22:50)  RR: 18 (08-20-22 @ 22:50)  SpO2: 96% (08-20-22 @ 22:50)      Diet, Clear Liquid      Fluids:     I & O's:    08-19-22 @ 07:01  -  08-20-22 @ 07:00  --------------------------------------------------------  IN:    dextrose 5% + sodium chloride 0.45%: 320 mL    Lactated Ringers: 300 mL  Total IN: 620 mL    OUT:    Indwelling Catheter - Urethral (mL): 300 mL    Intermittent Catheterization - Urethral (mL): 300 mL    Voided (mL): 500 mL  Total OUT: 1100 mL    Total NET: -480 mL    PHYSICAL EXAM:   General: NAD, AAOx3, calm and cooperative  Cardiac: RRR S1, S2  Respiratory: CTAB, normal respiratory effort  Abdomen: Soft, non-distended, non-tender, no rebound, no guarding. +BS.  Vascular: Pulses 2+ throughout, extremities well perfused  Skin: Warm/dry, normal color, no jaundice  Incision/wound: healing well, dressings in place, clean, dry and intact    MEDICATIONS  (STANDING):  acetaminophen     Tablet .. 650 milliGRAM(s) Oral every 6 hours  benztropine 1 milliGRAM(s) Oral two times a day  BUpivacaine liposome 1.3% Injectable 20 milliLiter(s) Local Injection once  buPROPion XL (24-Hour) . 150 milliGRAM(s) Oral daily  dextrose 5% + sodium chloride 0.45%. 1000 milliLiter(s) (40 mL/Hr) IV Continuous <Continuous>  enoxaparin Injectable 40 milliGRAM(s) SubCutaneous every 24 hours  haloperidol     Tablet 10 milliGRAM(s) Oral three times a day  levothyroxine 100 MICROGram(s) Oral daily  mirtazapine 15 milliGRAM(s) Oral at bedtime  pantoprazole  Injectable 40 milliGRAM(s) IV Push daily  tamsulosin 0.4 milliGRAM(s) Oral at bedtime  valproate sodium  IVPB 375 milliGRAM(s) IV Intermittent three times a day    MEDICATIONS  (PRN):  ketorolac   Injectable 15 milliGRAM(s) IV Push every 8 hours PRN Moderate Pain (4 - 6)  ondansetron Injectable 4 milliGRAM(s) IV Push every 6 hours PRN Nausea and/or Vomiting    DVT PROPHYLAXIS: enoxaparin Injectable 40 milliGRAM(s) SubCutaneous every 24 hours    GI PROPHYLAXIS: pantoprazole  Injectable 40 milliGRAM(s) IV Push daily    ANTICOAGULATION:   ANTIBIOTICS:          LAB/STUDIES:  Labs:  CAPILLARY BLOOD GLUCOSE      POCT Blood Glucose.: 95 mg/dL (20 Aug 2022 22:57)                          9.3    7.10  )-----------( 139      ( 20 Aug 2022 21:49 )             27.5       Auto Neutrophil %: 59.0 % (08-20-22 @ 21:49)  Auto Neutrophil %: 67.5 % (08-20-22 @ 08:44)    08-20    140  |  105  |  4<L>  ----------------------------<  109<H>  4.0   |  29  |  0.6<L>      Calcium, Total Serum: 8.5 mg/dL (08-20-22 @ 21:49)
GENERAL SURGERY PROGRESS NOTE    Patient: ALINA CHERY , 51y ([birth year])Male   MRN: [id number]  Location: [facility] [address]  Visit: 08-15-22 Inpatient  Date: 22 @ 11:45    Hospital Day #: 2      Events of past 24 hours: No BM. Mild abdominal pain. Daily KUB. Booked for OR thursday.     PAST MEDICAL & SURGICAL HISTORY:  Epilepsy  last event 4+ yrs ago      Schizophrenia      Bipolar 1 disorder      Major depressive disorder      Anemia      Dyspepsia      Hypothyroidism      Constipation      MR (mental retardation)      H/O cystoscopy      H/O laminectomy  2001      H/O fracture of fibula  nondisplaced 2010          Vitals:   T(F): 98.4 (22 @ 08:00), Max: 98.4 (22 @ 08:00)  HR: 75 (22 @ 08:00)  BP: 140/84 (22 @ 08:00)  RR: 19 (22 @ 08:00)  SpO2: 95% (22 @ 08:00)      Diet, NPO      Fluids:     I & O's:    08-15-22 @ 07:01  -  22 @ 07:00  --------------------------------------------------------  IN:  Total IN: 0 mL    OUT:    Voided (mL): 1075 mL  Total OUT: 1075 mL    Total NET: -1075 mL        Bowel Movement: : [] YES [] NO  Flatus: : [] YES [] NO    PHYSICAL EXAM:  General: NAD, AAOx3  Cardiac: RRR  Respiratory: Unlabored breathing at rest  Abdomen: Soft, distended, non-tender in all quadrants  Musculoskeletal: Strength 5/5 BL UE/LE, ROM intact, compartments soft  Neuro: Sensation grossly intact and equal throughout, no focal deficits  Skin: Warm/dry, normal color, no jaundice      MEDICATIONS  (STANDING):  benztropine 1 milliGRAM(s) Oral two times a day  buPROPion XL (24-Hour) . 150 milliGRAM(s) Oral daily  enoxaparin Injectable 40 milliGRAM(s) SubCutaneous every 24 hours  haloperidol     Tablet 10 milliGRAM(s) Oral three times a day  levothyroxine 100 MICROGram(s) Oral daily  mirtazapine 15 milliGRAM(s) Oral at bedtime  pantoprazole  Injectable 40 milliGRAM(s) IV Push daily  piperacillin/tazobactam IVPB.. 3.375 Gram(s) IV Intermittent every 8 hours  tamsulosin 0.4 milliGRAM(s) Oral at bedtime  valproate sodium  IVPB 375 milliGRAM(s) IV Intermittent three times a day    MEDICATIONS  (PRN):  ondansetron Injectable 4 milliGRAM(s) IV Push every 6 hours PRN Nausea and/or Vomiting      DVT PROPHYLAXIS: enoxaparin Injectable 40 milliGRAM(s) SubCutaneous every 24 hours    GI PROPHYLAXIS: pantoprazole  Injectable 40 milliGRAM(s) IV Push daily    ANTICOAGULATION:   ANTIBIOTICS:  piperacillin/tazobactam IVPB.. 3.375 Gram(s)            LAB/STUDIES:  Labs:  CAPILLARY BLOOD GLUCOSE                              12.7   8.60  )-----------( 152      ( 16 Aug 2022 05:25 )             36.8       Auto Neutrophil %: 65.8 % (22 @ 05:25)  Auto Immature Granulocyte %: 0.5 % (22 @ 05:25)  Auto Neutrophil %: 81.7 % (08-15-22 @ 12:00)  Auto Immature Granulocyte %: 0.7 % (08-15-22 @ 12:00)        137  |  104  |  9<L>  ----------------------------<  78  3.9   |  27  |  0.7      Calcium, Total Serum: 9.0 mg/dL (22 @ 05:25)      LFTs:             6.4  | 0.4  | 14       ------------------[74      ( 16 Aug 2022 05:25 )  3.5  | x    | 9           Lipase:x      Amylase:x         Lactate, Blood: 0.6 mmol/L (22 @ 05:25)  Lactate, Blood: 1.0 mmol/L (08-15-22 @ 17:46)      Coags:     11.80  ----< 1.03    ( 16 Aug 2022 05:25 )     34.6                Urinalysis Basic - ( 15 Aug 2022 15:41 )    Color: Light Yellow / Appearance: Clear / S.015 / pH: x  Gluc: x / Ketone: Negative  / Bili: Negative / Urobili: <2 mg/dL   Blood: x / Protein: Negative / Nitrite: Positive   Leuk Esterase: Large / RBC: 1 /HPF / WBC 19 /HPF   Sq Epi: x / Non Sq Epi: 0 /HPF / Bacteria: Many                IMAGING:  
Gastroenterology progress note:     Patient is a 51y old  Male who presents with a chief complaint of hypotension, abdominal pain (16 Aug 2022 09:58)       Admitted on: 08-15-22    We are following the patient for Sigmoid volvulus        Interval History:    No acute events overnight.   - Diet - NPO   - last BM - yesterday   - Abdominal pain - denies pain       PAST MEDICAL & SURGICAL HISTORY:  Epilepsy  last event 4+ yrs ago      Schizophrenia      Bipolar 1 disorder      Major depressive disorder      Anemia      Dyspepsia      Hypothyroidism      Constipation      MR (mental retardation)      H/O cystoscopy      H/O laminectomy  03/2001      H/O fracture of fibula  nondisplaced 03/2010          MEDICATIONS  (STANDING):  benztropine 1 milliGRAM(s) Oral two times a day  buPROPion XL (24-Hour) . 150 milliGRAM(s) Oral daily  enoxaparin Injectable 40 milliGRAM(s) SubCutaneous every 24 hours  haloperidol     Tablet 10 milliGRAM(s) Oral three times a day  levothyroxine 100 MICROGram(s) Oral daily  mirtazapine 15 milliGRAM(s) Oral at bedtime  pantoprazole  Injectable 40 milliGRAM(s) IV Push daily  piperacillin/tazobactam IVPB.. 3.375 Gram(s) IV Intermittent every 8 hours  tamsulosin 0.4 milliGRAM(s) Oral at bedtime  valproate sodium  IVPB 375 milliGRAM(s) IV Intermittent three times a day    MEDICATIONS  (PRN):  ondansetron Injectable 4 milliGRAM(s) IV Push every 6 hours PRN Nausea and/or Vomiting      Allergies  erythromycin (Other)  phenothiazines (Unknown)      Review of Systems:   Gastrointestinal:  As described in HPI    Physical Examination:  T(C): 36.9 (08-16-22 @ 08:00), Max: 36.9 (08-16-22 @ 08:00)  HR: 75 (08-16-22 @ 08:00) (69 - 94)  BP: 140/84 (08-16-22 @ 08:00) (122/73 - 170/91)  RR: 19 (08-16-22 @ 08:00) (16 - 22)  SpO2: 95% (08-16-22 @ 08:00) (93% - 99%)  Weight (kg): 72.6 (08-15-22 @ 18:33)    08-15-22 @ 07:01  -  08-16-22 @ 07:00  --------------------------------------------------------  IN: 0 mL / OUT: 1075 mL / NET: -1075 mL    GENERAL: AAOx1, no acute distress.  HEAD:  Atraumatic, Normocephalic  EYES: conjunctiva and sclera clear  NECK: Supple, no JVD or thyromegaly  CHEST/LUNG: Clear to auscultation bilaterall  HEART: Regular rate and rhythm; normal S1, S2, No murmurs.  ABDOMEN: Soft, nontender, nondistended  NEUROLOGY: No asterixis or tremor.   SKIN: Intact, no jaundice     Data:                        12.7   8.60  )-----------( 152      ( 16 Aug 2022 05:25 )             36.8     Hgb trend:  12.7  08-16-22 @ 05:25  14.1  08-15-22 @ 12:00        08-16    137  |  104  |  9<L>  ----------------------------<  78  3.9   |  27  |  0.7    Ca    9.0      16 Aug 2022 05:25    TPro  6.4  /  Alb  3.5  /  TBili  0.4  /  DBili  x   /  AST  14  /  ALT  9   /  AlkPhos  74  08-16    Liver panel trend:  TBili 0.4   /   AST 14   /   ALT 9   /   AlkP 74   /   Tptn 6.4   /   Alb 3.5    /   DBili --      08-16  TBili 0.3   /   AST 20   /   ALT 10   /   AlkP 95   /   Tptn 7.3   /   Alb 4.2    /   DBili --      08-15      PT/INR - ( 16 Aug 2022 05:25 )   PT: 11.80 sec;   INR: 1.03 ratio         PTT - ( 16 Aug 2022 05:25 )  PTT:34.6 sec       Radiology:  CT Abdomen and Pelvis w/ IV Cont:   ACC: [id number] EXAM:  CT ABDOMEN AND PELVIS IC                          PROCEDURE DATE:  08/15/2022          INTERPRETATION:  CLINICAL STATEMENT: Abdominal pain    TECHNIQUE: Contiguous axial CT images were obtained from the lower chest   to the pubic symphysis 75 cc of Omnipaque 350 intravenous contrast.  Oral   contrast was not given.  Reformatted images in the coronal and sagittal   planes were acquired.    COMPARISON CT: 4/19/2020    Study is limited by artifact due to overlying arms and motion.    FINDINGS:    LOWER CHEST: There is a new small right pleural effusion with new lower   lung field opacities    HEPATOBILIARY: Unremarkable..    SPLEEN: Unremarkable..    PANCREAS: Unremarkable..    ADRENAL GLANDS: Unremarkable..    KIDNEYS: Nohydronephrosis. Subcentimeter hypodensities are too small to   characterize.    ABDOMINOPELVIC NODES: Unremarkable..    PELVIC ORGANS: There is diffuse asymmetric bladder wall thickening with a   severely distended bladder. This does not appear significantly changed   since prior examination. There is mild pelvic ascites.    PERITONEUM/MESENTERY/BOWEL: There is swirling of mesentery in the mid   pelvis for exam on image 54 series 2 with dilatation in what appears to   be the sigmoid colon, findings concerning for sigmoid volvulus. The   remaining large bowel is distended with air and stool. However, the small   bowel is completely collapsed. There is distention of the stomach. There   is no evidence of free air.    BONES/SOFT TISSUES: Degenerative change. The bones are osteopenic...   There are compression deformities of T11, T12 and L2, stable.    OTHER: Mild vascular calcifications..      IMPRESSION:    Swirling of the mesentery in the mid pelvis with dilatation of what   appears to be the  sigmoid colon, findings are concerning for sigmoid   volvulus.    New small right pleural effusion with a new associated bibasilar opacities    ANGE Smith was made aware of the above findings on 8/15/2022 at 2:17 PM   with read back    --- End of Report ---            JOSE ARMANDO NOLASCO MD; Attending Radiologist  This document has been electronically signed. Aug 15 2022  2:18PM (08-15-22 @ 12:10)      
Patient is a 51y old  Male who presents with a chief complaint of hypotension, abdominal pain       HPI:  Patient is a 52 yo male with PMH of Mild mental retardation, intellectual disability, Schizophrenia, Impulse control disorder, Seizures, Hypothyroidism, Exotropia, Cataracts, Osteopenia, presented from [facility] for hypotension and abdominal pain. Obtained hx from aid at bedside. Per Aid, patient was not feeling well and dry heaving at facility. On vital check at facility found to have a BP of 91/38?, called EMS. Patient reported abdominal pain to EMS and was brought to the hospital. Aid denies, fevers, chills, weight loss, sob, chest pain, vomiting, diarrhea at facility. Symptoms started today. At bedside, patient still reports diffuse abdominal pain, does not radiate anywhere else. Unable to answer other ROS questions.     In ED Vitals: T 95, /109, HR 96, RR 20, 97% on RA. WBC 12.19k, CMP wnl, lactate normal. UA positive   - KUB with distended bowel loops concerning for sigmoid volvulus   - CT A/P with Swirling of the mesentery in the mid pelvis with dilatation of what appears to be the  sigmoid colon, findings are concerning for sigmoid volvulus. New small right pleural effusion with a new associated bibasilar opacities.   - Chest Xray: Mild prominence of the interstitial markings and bibasilar atelectasis/fibrosis.    GI was consulted in ED. S/p rectal tube placement with no decompression. Taken emergently for flex sigmoidoscopy for decompression after 2 physician consent as patient does not have next of kin/HCP/family on chart.     Patient admitted to medicine for further eval.     Patient underwent endoscopic decompression and partial detorsion by GI.  On 8/ 18/22 pt went to OR for Robot-assisted sigmoidectomy with colorectal anastomosis for sigmoid volvulus.        PHYSICAL EXAM    Vital Signs Last 24 Hrs  T(C): 36 (24 Aug 2022 12:02), Max: 37.4 (24 Aug 2022 00:30)  T(F): 96.8 (24 Aug 2022 12:02), Max: 99.3 (24 Aug 2022 00:30)  HR: 75 (24 Aug 2022 12:02) (75 - 100)  BP: 113/77 (24 Aug 2022 12:02) (106/67 - 120/74)  BP(mean): --  RR: 18 (24 Aug 2022 12:02) (18 - 18)  SpO2: 97% (24 Aug 2022 12:02) (92% - 97%)    Parameters below as of 23 Aug 2022 21:32  Patient On (Oxygen Delivery Method): room air        Constitutional - NAD  Chest - CTA  Cardiovascular - S1S2+  Abdomen -  Soft  Extremities -  No calf tenderness   Function : bed mobility and transfer mod A                 ambulate 40'x2RW min A    acetaminophen     Tablet .. 650 milliGRAM(s) Oral every 6 hours  benztropine 1 milliGRAM(s) Oral two times a day  buPROPion XL (24-Hour) . 150 milliGRAM(s) Oral daily  diVALproex  milliGRAM(s) Oral every 8 hours  enoxaparin Injectable 40 milliGRAM(s) SubCutaneous every 24 hours  haloperidol     Tablet 10 milliGRAM(s) Oral three times a day  levothyroxine 100 MICROGram(s) Oral daily  mirtazapine 15 milliGRAM(s) Oral at bedtime  tamsulosin 0.4 milliGRAM(s) Oral at bedtime      RECENT LABS/IMAGING                        10.0   10.88 )-----------( 238      ( 23 Aug 2022 01:27 )             28.9     08-23    140  |  103  |  9<L>  ----------------------------<  82  4.2   |  29  |  0.8    Ca    8.9      23 Aug 2022 01:27  Phos  4.0     08-23  Mg     1.6     08-23                
Patient is a 51y old  Male who presents with a chief complaint of hypotension, abdominal pain (17 Aug 2022 12:03)    Patient was seen and examined.  no new complaints    PAST MEDICAL & SURGICAL HISTORY:  Epilepsy, last event 4+ yrs ago  Schizophrenia  Bipolar 1 disorder  Major depressive disorder  Anemia  Dyspepsia  Hypothyroidism  Constipation  MR (mental retardation)  H/O cystoscopy  H/O laminectomy, 2001  H/O fracture of fibula, nondisplaced 2010    Allergies  erythromycin (Other)  phenothiazines (Unknown)    MEDICATIONS  (STANDING):  benztropine 1 milliGRAM(s) Oral two times a day  buPROPion XL (24-Hour) . 150 milliGRAM(s) Oral daily  cefTRIAXone   IVPB 2000 milliGRAM(s) IV Intermittent every 24 hours  dextrose 5% + sodium chloride 0.45% with potassium chloride 20 mEq/L 1000 milliLiter(s) (125 mL/Hr) IV Continuous <Continuous>  dextrose 5% + sodium chloride 0.45%. 1000 milliLiter(s) (125 mL/Hr) IV Continuous <Continuous>  enoxaparin Injectable 40 milliGRAM(s) SubCutaneous every 24 hours  haloperidol     Tablet 10 milliGRAM(s) Oral three times a day  levothyroxine 100 MICROGram(s) Oral daily  magnesium sulfate  IVPB 2 Gram(s) IV Intermittent every 2 hours  metroNIDAZOLE    Tablet 1000 milliGRAM(s) Oral once  metroNIDAZOLE    Tablet 1000 milliGRAM(s) Oral once  metroNIDAZOLE    Tablet 1000 milliGRAM(s) Oral once  mirtazapine 15 milliGRAM(s) Oral at bedtime  neomycin 1000 milliGRAM(s) Oral once  neomycin 1000 milliGRAM(s) Oral once  neomycin 1000 milliGRAM(s) Oral once  pantoprazole  Injectable 40 milliGRAM(s) IV Push daily  polyethylene glycol/electrolyte Solution. 1000 milliLiter(s) Oral once  tamsulosin 0.4 milliGRAM(s) Oral at bedtime  valproate sodium  IVPB 375 milliGRAM(s) IV Intermittent three times a day    MEDICATIONS  (PRN):  ondansetron Injectable 4 milliGRAM(s) IV Push every 6 hours PRN Nausea and/or Vomiting    Vital Signs Last 24 Hrs  T(C): 36.3  T(F): 97.4  HR: 69  BP: 113/66  BP(mean): --  RR: 18  SpO2: 97%    O/E:  Awake,  not in distress.  HEENT: atraumatic, EOMI.  Chest: clear.  CVS: SIS2 +, no murmur.  P/A: Soft, BS+  CNS: awake, alert  Ext: no edema feet.                            13.2<L>  9.92  )-----------( 153      ( 17 Aug 2022 03:18 )             38.1<L>                        12.7<L>  8.60  )-----------( 152      ( 16 Aug 2022 05:25 )             36.8<L>    08-17    139  |  102  |  10  ----------------------------<  65<L>  3.9   |  26  |  0.8  08-16    137  |  104  |  9<L>  ----------------------------<  78  3.9   |  27  |  0.7    Ca    9.3      17 Aug 2022 03:18  Ca    9.0      16 Aug 2022 05:25  Phos  4.3     08-17  Mg     1.7     08-17    TPro  6.4  /  Alb  3.5  /  TBili  0.4  /  DBili  x   /  AST  14  /  ALT  9   /  AlkPhos  74  08-16          PT/INR - ( 16 Aug 2022 05:25 )   PT: 11.80 sec;   INR: 1.03 ratio         PTT - ( 16 Aug 2022 05:25 )  PTT:34.6 sec  Urinalysis Basic - ( 15 Aug 2022 15:41 )    Color: Light Yellow / Appearance: Clear / S.015 / pH: x  Gluc: x / Ketone: Negative  / Bili: Negative / Urobili: <2 mg/dL   Blood: x / Protein: Negative / Nitrite: Positive   Leuk Esterase: Large / RBC: 1 /HPF / WBC 19 /HPF   Sq Epi: x / Non Sq Epi: 0 /HPF / Bacteria: Many        Culture - Urine (collected 15 Aug 2022 15:41)  Source: Clean Catch Clean Catch (Midstream)  Preliminary Report (16 Aug 2022 22:30):    >100,000 CFU/ml Klebsiella pneumoniae          
  ALINA CHERY  51y, Male    All available historical data reviewed    OVERNIGHT EVENTS:  feels well and has no new complaints  No fevers     ROS:  limited    VITALS:  T(F): 96.5, Max: 97.9 (08-18-22 @ 16:00)  HR: 72  BP: 119/74  RR: 18Vital Signs Last 24 Hrs  T(C): 35.8 (19 Aug 2022 08:00), Max: 36.6 (18 Aug 2022 16:00)  T(F): 96.5 (19 Aug 2022 08:00), Max: 97.9 (18 Aug 2022 16:00)  HR: 72 (19 Aug 2022 08:00) (72 - 97)  BP: 119/74 (19 Aug 2022 08:00) (105/72 - 159/69)  BP(mean): 102 (18 Aug 2022 18:00) (102 - 102)  RR: 18 (19 Aug 2022 08:00) (16 - 20)  SpO2: 97% (19 Aug 2022 08:00) (95% - 98%)    Parameters below as of 19 Aug 2022 08:00  Patient On (Oxygen Delivery Method): room air        TESTS & MEASUREMENTS:                        12.8   15.54 )-----------( 153      ( 18 Aug 2022 17:10 )             37.2     08-18    137  |  102  |  8<L>  ----------------------------<  163<H>  3.9   |  23  |  0.7    Ca    8.7      18 Aug 2022 17:10  Phos  3.4     08-18  Mg     1.4     08-18          Culture - Blood (collected 08-16-22 @ 05:25)  Source: .Blood Blood-Peripheral  Preliminary Report (08-17-22 @ 15:05):    No growth to date.    Culture - Blood (collected 08-16-22 @ 02:51)  Source: .Blood Blood-Peripheral  Preliminary Report (08-17-22 @ 15:05):    No growth to date.    Culture - Urine (collected 08-15-22 @ 15:41)  Source: Clean Catch Clean Catch (Midstream)  Final Report (08-18-22 @ 16:08):    >100,000 CFU/ml Klebsiella pneumoniae  Organism: Klebsiella pneumoniae (08-18-22 @ 16:08)  Organism: Klebsiella pneumoniae (08-18-22 @ 16:08)      -  Amikacin: S <=16      -  Amoxicillin/Clavulanic Acid: S <=8/4      -  Ampicillin: R >16 These ampicillin results predict results for amoxicillin      -  Ampicillin/Sulbactam: S <=4/2 Enterobacter, Klebsiella aerogenes, Citrobacter, and Serratia may develop resistance during prolonged therapy (3-4 days)      -  Aztreonam: S <=4      -  Cefazolin: S <=2 ([id number]_[id number]) For uncomplicated UTI with K. pneumoniae, E. coli, or P. mirablis: ULI <=16 is sensitive and ULI >=32 is resistant. This also predicts results for oral agents cefaclor, cefdinir, cefpodoxime, cefprozil, cefuroxime axetil, cephalexin and locarbef for uncomplicated UTI. Note that some isolates may be susceptible to these agents while testing resistant to cefazolin.      -  Cefepime: S <=2      -  Cefoxitin: S <=8      -  Ceftriaxone: S <=1 Enterobacter, Klebsiella aerogenes, Citrobacter, and Serratia may develop resistance during prolonged therapy      -  Ciprofloxacin: S <=0.25      -  Ertapenem: S <=0.5      -  Gentamicin: S <=2      -  Imipenem: S <=1      -  Levofloxacin: S <=0.5      -  Meropenem: S <=1      -  Nitrofurantoin: I 64 Should not be used to treat pyelonephritis      -  Piperacillin/Tazobactam: S <=8      -  Tigecycline: S <=2      -  Tobramycin: S <=2      -  Trimethoprim/Sulfamethoxazole: S <=0.5/9.5      Method Type: ULI            RADIOLOGY & ADDITIONAL TESTS:  Personal review of radiological diagnostics performed  Echo and EKG results noted when applicable.     MEDICATIONS:  acetaminophen     Tablet .. 650 milliGRAM(s) Oral every 6 hours  benztropine 1 milliGRAM(s) Oral two times a day  BUpivacaine liposome 1.3% Injectable 20 milliLiter(s) Local Injection once  buPROPion XL (24-Hour) . 150 milliGRAM(s) Oral daily  cefTRIAXone   IVPB 2000 milliGRAM(s) IV Intermittent every 24 hours  enoxaparin Injectable 40 milliGRAM(s) SubCutaneous every 24 hours  haloperidol     Tablet 10 milliGRAM(s) Oral three times a day  ketorolac   Injectable 15 milliGRAM(s) IV Push every 8 hours PRN  lactated ringers. 1000 milliLiter(s) IV Continuous <Continuous>  levothyroxine 100 MICROGram(s) Oral daily  mirtazapine 15 milliGRAM(s) Oral at bedtime  ondansetron Injectable 4 milliGRAM(s) IV Push every 6 hours PRN  oxyCODONE    IR 5 milliGRAM(s) Oral every 6 hours PRN  pantoprazole  Injectable 40 milliGRAM(s) IV Push daily  tamsulosin 0.4 milliGRAM(s) Oral at bedtime  valproate sodium  IVPB 375 milliGRAM(s) IV Intermittent three times a day      ANTIBIOTICS:  cefTRIAXone   IVPB 2000 milliGRAM(s) IV Intermittent every 24 hours

## 2022-08-24 NOTE — DISCHARGE NOTE PROVIDER - NSDCFUADDINST_GEN_ALL_CORE_FT
follow up with Dr Fritz in 1 week for wound check call office for appointment   follow up with your PMD    resume home medications  keep wound clean and dry  no tub bath  no strenuous activity    if experience fever, shortness of breath, chest pain, dizziness, vomiting, abdominal pain call MD or return to ED

## 2022-08-24 NOTE — DISCHARGE NOTE PROVIDER - NSDCFUSCHEDAPPT_GEN_ALL_CORE_FT
Dejan Jean  [facility] Physician [facility]  PODIATRY [address]  Scheduled Appointment: 09/27/2022    Cj Kang  [facility] Physician [facility]  Otolaryng [address]  Scheduled Appointment: 10/14/2022

## 2022-08-24 NOTE — PROGRESS NOTE ADULT - REASON FOR ADMISSION
hypotension, abdominal pain

## 2022-08-24 NOTE — DISCHARGE NOTE NURSING/CASE MANAGEMENT/SOCIAL WORK - NSDCPEFALRISK_GEN_ALL_CORE
For information on Fall & Injury Prevention, visit: [email or web address] OR  [email or web address] OR  https://www.cdc.gov/steadi/patient.html

## 2022-08-24 NOTE — PROGRESS NOTE ADULT - ASSESSMENT
# Sigmoid volvulus s/p colonoscopic decompression     Urgent colonoscopy 8/15/22: spirally twisted colonic mucosa was seen at 35 to 50 cm in the sigmoid colon signifying the torsional obstruction. After bypassing this point aggressive endoscopic evacuation of air was performed to decompress the dilated bowel. Reached upto 100 cm from the anal verge unable to advance further due to stool impaction. While coming out partial twisting of the colon was noted again. Regardless of multiple attempts was able to achieve partial detorsion.    Recs:   NPO   KUB daily  Serial abdominal exam   IV hydration and correct electrolytes as needed.   Surgery following: sigmoidoscopy during this admission.    
  #Sigmoid Volvulus s/p colonoscopy decompression  - GI recommendations noted   - s/p emergent flex sigmoidoscopy with successful detorsion by GI after 2 physician consent  - NPO except for medications, IVF  - Serial KUB  - serial abdominal exams  - Surgery consulted and recommendation noted    #Sepsis on admission suspect due to UTI  - ID consulted and recommendations noted  - F/U UCX and Blood Culture  - C/w IV antibiotic    #Seizure/ Epilepsy  - Seizure precaution  - c/w Depakote  - f/u valproate level     #Hypothyroidism  - C/w synthroid     #Schizophrenia  #Impulse control Disorder  #Intellectual disability  - c/w home medications       Condition is guarded and currently not stable for discharge            
  Assessment and Plan:   · Assessment	  Assessment and Plan:   · Assessment	  51yM w/ mhx of schizophrenia, seizure disorder, hypothyroid, BPH, osteopenia s/p RA Laparoscopic sigmoidectomy on 8/18/22. No acute issues post-operatively.     Plan:  - Hemodynamic monitoring   - HGB f/u AM CBC  - Multimodal pain control   - Encourage OOB, IS   - Maintain UOP >0.5cc/kg/hr  - Replete electrolytes PRN   - Tolerating CLD   - (-) flatus, (-) BM   - Mechanical and chemical DVT ppx   - Spectra 8285 following   
51yM w/ mhx of schizophrenia, seizure disorder, hypothyroid, BPH, osteopenia s/p RA Laparoscopic sigmoidectomy on 8/18/22. No acute issues post-operatively.     Plan:    - Hemodynamic monitoring   - Multimodal pain control   - Encourage OOB, IS   - Maintain UOP >0.5cc/kg/hr  - Replete electrolytes PRN   - Tolerating CLD   - (-) flatus, (-) BM   - Mechanical and chemical DVT ppx   - Spectra 8285 following   
ASSESSMENT  51yM w/ mhx of schizophrenia, seizure disorder, hypothyroid, BPH, osteopenia s/p RA Laparoscopic sigmoidectomy on 8/18/22. No acute issues post-operatively.     Plan:  - Hemodynamic monitoring   - Multimodal pain control   - Encourage OOB, IS   - Maintain UOP >0.5cc/kg/hr  - Replete electrolytes PRN   - Tolerating FLD, advance as tolerated   - (+) flatus, (+) BM   - Mechanical and chemical DVT ppx   - Spectra 8285 following 
ASSESSMENT:   51yM w/ mhx of schizophrenia, seizure disorder, hypothyroid, BPH, osteopenia s/p RA Laparoscopic sigmoidectomy on 8/18/22. No acute issues post-operatively.     Plan:  - Physiatry reqs appreciated  - Hemodynamic monitoring   - Multimodal pain control   - Encourage OOB, IS   - Maintain UOP >0.5cc/kg/hr  - Replete electrolytes PRN   - Tolerating FLD, advance as tolerated   - Mechanical and chemical DVT ppx   - Spectra 8285 following    
ASSESSMENT:  51M w/ PMH/PSH as above who presented with clinical and radiographic findings of acute sigmoid volvulus.  Discussed with advanced GI team and patient underwent STAT endoscopic decompression.  Patient underwent partial detorsion with twisted mucosa seen at 35-50cm, scope advanced to 100cm, noted to decompress but partial twisting observed upon scope removal.  Patient clinically improved and stool disimpacted during procedure.  Discussed with GI team and medical team plan for medical optimization and risk stratification prior to laparoscopic sigmoidectomy on this admission.    PLAN:  -OR today for Laparoscopic sigmoidectomy possible open possible ostomy    BLUE TEAM SPECTRA: [id number]
Assessment and Plan:   · Assessment	  51yM w/ mhx of schizophrenia, seizure disorder, hypothyroid, BPH, osteopenia s/p RA Laparoscopic sigmoidectomy on 8/18/22. No acute issues post-operatively.     Plan:  - Hemodynamic monitoring   - HGB 12.8 > 10.8 > 9.7 ; f/u AM CBC  - Multimodal pain control   - Encourage OOB, IS   - Maintain UOP >0.5cc/kg/hr  - Replete electrolytes PRN   - Tolerating CLD   - (-) flatus, (-) BM   - Mechanical and chemical DVT ppx   - Spectra 8285 following 
Imp: rehab of deconditioning / s/p sigmoidectomy for volvulus   Plan: continue bedside therapy as tolerated           d/c to STR when medically stable 
Patient is a 50 yo male with PMH of Mild mental retardation, intellectual disability, Schizophrenia, Impulse control disorder, Seizures, Hypothyroidism, Exotropia, Cataracts, Osteopenia, presented from [facility] for hypotension and abdominal pain.    # Sepsis POA- resolved  # Sigmoid Volvulus   - CT Abdomen and Pelvis w/ IV Cont (08.15.22 @ 12:10) > Swirling of the mesentery in the mid pelvis with dilatation of what   appears to be the  sigmoid colon, findings are concerning for sigmoid volvulus. New small right pleural effusion with a new associated bibasilar opacities  -  Xray Kidney Ureter Bladder (08.17.22 @ 08:42) >Large lucency overlying the right upper quadrant and free air under the  diaphragm and bowel perforation cannot be excluded. Alternatively, this may represent a distended loop of bowel. Nonobstructive bowel gas pattern  -  on 8/15 s/p colonoscopy decompression--> Regardless of multiple attempts was able to achieve partial detorsion.  - NPO  - IV fluids  - Surgery --> laparoscopic sigmoidectomy  in AM  -  Medical clearance: Pt is a low risk for intermediate risk procedure  - c/w ceftriaxone, flagyl    # UTI  - urine Culture Results: >100,000 CFU/ml Klebsiella pneumoniae (08.15.22 @ 15:41)  - blood Culture Results: No Growth Final (07.21.21 @ 16:00)  - c/w ceftriaxone    # H/o seizure disoder  - Seizure precaution  - c/w Depakote    # Hypothyroidism  - c/w synthroid     # Hypomagnesemia  - replete mg    # H/o schizophrenia  # Impulse control disorder  # Intellectual disability  - c/w home meds    # DVT prophylaxis    # Full code    #Pending: laparoscopic sigmoidectomy  in AM  # Disposition: [facility] when stable      
  ASSESSMENT  51yMale with a PMH of h PMH of Mild mental retardation, intellectual disability, Schizophrenia, Impulse control disorder, Seizures, Hypothyroidism, Exotropia, Cataracts, Osteopenia, presented from [facility] for hypotension and abdominal pain. Patient is afebrile during the past 24 hours. Ct imaging showed sigmoid volvulus, GI attempted doing a sigmoidoscopy detorsion which was partially successive. Surgery was consulted for surgical evaluation. SIRS on admission, patient was hypothermic 95 and WBC> 12.9, patient was started on zosyn 3.375 mg IV Q 8 hrs    patient is a poor historian due to intellectual disability.    IMPRESSION  On presentation sepsis secondary to sigmoid volvulus with possible ischemia with translocation  S/p partial correction of volvulus  ·  PROCEDURES:  Robot-assisted sigmoidectomy with colorectal anastomosis 18-Aug-2022 12:34:24  Homero Zapata.  Pyuria asymptomatic with asymptomatic bacteruria with Klebsiella  No bacterial PNA  8/16 BCx NG      RECOMMENDATIONS  D/cABx  D/c mercedes  Please do not hesitate to recall ID if any questions arise either through [facility] or through [employer] 
ASSESSMENT:   51yM w/ mhx of schizophrenia, seizure disorder, hypothyroid, BPH, osteopenia s/p RA Laparoscopic sigmoidectomy on 8/18/22. No acute issues post-operatively.     Plan:  - physiatry recommends short term rehab  - pt awaiting placement at nursing home  - Hemodynamic monitoring   - Multimodal pain control   - Encourage OOB, IS   - Maintain UOP >0.5cc/kg/hr  - Replete electrolytes PRN   - Tolerating FLD, advance as tolerated   - Mechanical and chemical DVT ppx      Surgery - Blue Team - [id number]   
ASSESSMENT:  51M w/ PMH/PSH as above who presented with clinical and radiographic findings of acute sigmoid volvulus.  Discussed with advanced GI team and patient underwent STAT endoscopic decompression.  Patient underwent partial detorsion with twisted mucosa seen at 35-50cm, scope advanced to 100cm, noted to decompress but partial twisting observed upon scope removal.  Patient clinically improved and stool disimpacted during procedure.  Discussed with GI team and medical team plan for medical optimization and risk stratification prior to laparoscopic sigmoidectomy on this admission.    PLAN:  - Medical risk stratification   - 2 physician consent   - F/u KUB  - Booked OR thursday   - Pre op wednesday       BLUE TEAM SPECTRA: [id number]    
Assessment and Plan:   · Assessment	  ASSESSMENT:  51M w/ PMH/PSH as above who presented with clinical and radiographic findings of acute sigmoid volvulus.  Discussed with advanced GI team and patient underwent STAT endoscopic decompression.  Patient underwent partial detorsion with twisted mucosa seen at 35-50cm, scope advanced to 100cm, noted to decompress but partial twisting observed upon scope removal.  Patient clinically improved and stool disimpacted during procedure.  Discussed with GI team and medical team plan for medical optimization and risk stratification prior to laparoscopic sigmoidectomy on this admission.    PLAN:  - Medical risk stratification   - 2 physician consent   - Booked OR thursday   - Pre op today, with bowel prep  -IVF  -NPO at midnight      BLUE TEAM SPECTRA: [id number]

## 2022-08-24 NOTE — DISCHARGE NOTE NURSING/CASE MANAGEMENT/SOCIAL WORK - PATIENT PORTAL LINK FT
You can access the FollowMyHealth Patient Portal offered by [facility] by registering at the following website: [email or web address]. By joining [employer]’s FollowMyHealth portal, you will also be able to view your health information using other applications (apps) compatible with our system.

## 2022-08-29 DIAGNOSIS — N39.0 URINARY TRACT INFECTION, SITE NOT SPECIFIED: ICD-10-CM

## 2022-08-29 DIAGNOSIS — M85.80 OTHER SPECIFIED DISORDERS OF BONE DENSITY AND STRUCTURE, UNSPECIFIED SITE: ICD-10-CM

## 2022-08-29 DIAGNOSIS — F31.9 BIPOLAR DISORDER, UNSPECIFIED: ICD-10-CM

## 2022-08-29 DIAGNOSIS — F70 MILD INTELLECTUAL DISABILITIES: ICD-10-CM

## 2022-08-29 DIAGNOSIS — B96.1 KLEBSIELLA PNEUMONIAE [K. PNEUMONIAE] AS THE CAUSE OF DISEASES CLASSIFIED ELSEWHERE: ICD-10-CM

## 2022-08-29 DIAGNOSIS — H50.10 UNSPECIFIED EXOTROPIA: ICD-10-CM

## 2022-08-29 DIAGNOSIS — F20.9 SCHIZOPHRENIA, UNSPECIFIED: ICD-10-CM

## 2022-08-29 DIAGNOSIS — N40.0 BENIGN PROSTATIC HYPERPLASIA WITHOUT LOWER URINARY TRACT SYMPTOMS: ICD-10-CM

## 2022-08-29 DIAGNOSIS — D64.9 ANEMIA, UNSPECIFIED: ICD-10-CM

## 2022-08-29 DIAGNOSIS — J98.11 ATELECTASIS: ICD-10-CM

## 2022-08-29 DIAGNOSIS — F63.9 IMPULSE DISORDER, UNSPECIFIED: ICD-10-CM

## 2022-08-29 DIAGNOSIS — Z75.1 PERSON AWAITING ADMISSION TO ADEQUATE FACILITY ELSEWHERE: ICD-10-CM

## 2022-08-29 DIAGNOSIS — A41.9 SEPSIS, UNSPECIFIED ORGANISM: ICD-10-CM

## 2022-08-29 DIAGNOSIS — E03.9 HYPOTHYROIDISM, UNSPECIFIED: ICD-10-CM

## 2022-08-29 DIAGNOSIS — K56.2 VOLVULUS: ICD-10-CM

## 2022-08-29 DIAGNOSIS — G40.909 EPILEPSY, UNSPECIFIED, NOT INTRACTABLE, WITHOUT STATUS EPILEPTICUS: ICD-10-CM

## 2022-08-29 DIAGNOSIS — Q43.8 OTHER SPECIFIED CONGENITAL MALFORMATIONS OF INTESTINE: ICD-10-CM

## 2022-08-29 DIAGNOSIS — J90 PLEURAL EFFUSION, NOT ELSEWHERE CLASSIFIED: ICD-10-CM

## 2022-08-29 DIAGNOSIS — E83.42 HYPOMAGNESEMIA: ICD-10-CM

## 2022-08-31 ENCOUNTER — APPOINTMENT (OUTPATIENT)
Dept: SURGERY | Facility: CLINIC | Age: 52
End: 2022-08-31

## 2022-08-31 VITALS
BODY MASS INDEX: 26.07 KG/M2 | HEIGHT: 68 IN | DIASTOLIC BLOOD PRESSURE: 77 MMHG | TEMPERATURE: 97 F | SYSTOLIC BLOOD PRESSURE: 113 MMHG | OXYGEN SATURATION: 98 % | HEART RATE: 95 BPM | WEIGHT: 172 LBS

## 2022-08-31 DIAGNOSIS — K56.2 VOLVULUS: ICD-10-CM

## 2022-08-31 PROCEDURE — 99024 POSTOP FOLLOW-UP VISIT: CPT

## 2022-09-10 PROBLEM — K56.2 SIGMOID VOLVULUS: Status: ACTIVE | Noted: 2022-09-10

## 2022-09-10 NOTE — HISTORY OF PRESENT ILLNESS
[FreeTextEntry1] : Patient is a 51M with PMH of schizophrenia, intellectual disability, seizure disorder, hypothyroid presents s/p robotic sigmoidectomy for sigmoid volvulus on 8/18/2022.  According to the patient's aid he is tolerating a diet and has daily BM.  Pathology showed dilated colon. He has not had fevers, chills, nausea, vomiting, abdominal pain, blood in the stool.

## 2022-09-10 NOTE — ASSESSMENT
[FreeTextEntry1] : 51M s/p robotic sigmoidectomy for sigmoid volvulus\par \par He is recovering well from surgery. I recommended a regular diet and return to physical activity.  We will see him back as needed.

## 2022-10-11 ENCOUNTER — APPOINTMENT (OUTPATIENT)
Dept: PODIATRY | Facility: CLINIC | Age: 52
End: 2022-10-11

## 2022-11-29 ENCOUNTER — APPOINTMENT (OUTPATIENT)
Dept: OTOLARYNGOLOGY | Facility: CLINIC | Age: 52
End: 2022-11-29

## 2022-12-12 NOTE — H&P PST ADULT - ANESTHESIA, PREVIOUS REACTION, PROFILE
The patient has had a very successful initial weight loss with the supportive a combination of phentermine and topiramate.  She is tolerating both medications well without any side effects, with the exception of a slight dry mouth due to the phentermine.  I refilled the medication and will have her continue.  She feels she is making better choices and that it is helping curb her cravings.  She has been exercising regularly with her  as well.  Follow-up in 3 months.   none

## 2022-12-27 ENCOUNTER — APPOINTMENT (OUTPATIENT)
Dept: PODIATRY | Facility: CLINIC | Age: 52
End: 2022-12-27

## 2023-01-23 ENCOUNTER — APPOINTMENT (OUTPATIENT)
Dept: OTOLARYNGOLOGY | Facility: CLINIC | Age: 53
End: 2023-01-23

## 2023-02-21 ENCOUNTER — APPOINTMENT (OUTPATIENT)
Dept: PODIATRY | Facility: CLINIC | Age: 53
End: 2023-02-21

## 2023-03-15 ENCOUNTER — RESULT REVIEW (OUTPATIENT)
Age: 53
End: 2023-03-15

## 2023-03-15 ENCOUNTER — OUTPATIENT (OUTPATIENT)
Dept: OUTPATIENT SERVICES | Facility: HOSPITAL | Age: 53
LOS: 1 days | End: 2023-03-15
Payer: MEDICAID

## 2023-03-15 ENCOUNTER — APPOINTMENT (OUTPATIENT)
Dept: ORTHOPEDIC SURGERY | Facility: CLINIC | Age: 53
End: 2023-03-15

## 2023-03-15 DIAGNOSIS — Z98.890 OTHER SPECIFIED POSTPROCEDURAL STATES: Chronic | ICD-10-CM

## 2023-03-15 DIAGNOSIS — Z00.00 ENCOUNTER FOR GENERAL ADULT MEDICAL EXAMINATION WITHOUT ABNORMAL FINDINGS: ICD-10-CM

## 2023-03-15 DIAGNOSIS — M25.511 PAIN IN RIGHT SHOULDER: ICD-10-CM

## 2023-03-15 DIAGNOSIS — Z87.81 PERSONAL HISTORY OF (HEALED) TRAUMATIC FRACTURE: Chronic | ICD-10-CM

## 2023-03-15 PROCEDURE — 73030 X-RAY EXAM OF SHOULDER: CPT | Mod: 26,RT

## 2023-03-15 PROCEDURE — 99204 OFFICE O/P NEW MOD 45 MIN: CPT

## 2023-03-15 PROCEDURE — 73030 X-RAY EXAM OF SHOULDER: CPT | Mod: RT

## 2023-03-16 DIAGNOSIS — M79.672 PAIN IN LEFT FOOT: ICD-10-CM

## 2023-03-16 DIAGNOSIS — M79.671 PAIN IN RIGHT FOOT: ICD-10-CM

## 2023-03-28 ENCOUNTER — APPOINTMENT (OUTPATIENT)
Dept: PODIATRY | Facility: CLINIC | Age: 53
End: 2023-03-28
Payer: MEDICAID

## 2023-03-28 ENCOUNTER — OUTPATIENT (OUTPATIENT)
Dept: OUTPATIENT SERVICES | Facility: HOSPITAL | Age: 53
LOS: 1 days | End: 2023-03-28
Payer: MEDICAID

## 2023-03-28 DIAGNOSIS — Z98.890 OTHER SPECIFIED POSTPROCEDURAL STATES: Chronic | ICD-10-CM

## 2023-03-28 DIAGNOSIS — Z87.81 PERSONAL HISTORY OF (HEALED) TRAUMATIC FRACTURE: Chronic | ICD-10-CM

## 2023-03-28 DIAGNOSIS — Z00.00 ENCOUNTER FOR GENERAL ADULT MEDICAL EXAMINATION WITHOUT ABNORMAL FINDINGS: ICD-10-CM

## 2023-03-28 PROCEDURE — 11721 DEBRIDE NAIL 6 OR MORE: CPT | Mod: NC

## 2023-03-28 PROCEDURE — 11721 DEBRIDE NAIL 6 OR MORE: CPT

## 2023-03-29 DIAGNOSIS — M79.672 PAIN IN LEFT FOOT: ICD-10-CM

## 2023-03-29 DIAGNOSIS — B35.3 TINEA PEDIS: ICD-10-CM

## 2023-03-29 DIAGNOSIS — M79.671 PAIN IN RIGHT FOOT: ICD-10-CM

## 2023-03-29 DIAGNOSIS — B35.1 TINEA UNGUIUM: ICD-10-CM

## 2023-05-04 DIAGNOSIS — Z00.00 ENCOUNTER FOR GENERAL ADULT MEDICAL EXAMINATION W/OUT ABNORMAL FINDINGS: ICD-10-CM

## 2023-05-05 ENCOUNTER — LABORATORY RESULT (OUTPATIENT)
Age: 53
End: 2023-05-05

## 2023-05-08 NOTE — ED ADULT NURSE NOTE - ISOLATION INDICATION CONTACT
Other Specify Skyrizi Counseling: I discussed with the patient the risks of risankizumab-rzaa including but not limited to immunosuppression, and serious infections.  The patient understands that monitoring is required including a PPD at baseline and must alert us or the primary physician if symptoms of infection or other concerning signs are noted.

## 2023-05-09 ENCOUNTER — OUTPATIENT (OUTPATIENT)
Dept: OUTPATIENT SERVICES | Facility: HOSPITAL | Age: 53
LOS: 1 days | End: 2023-05-09
Payer: MEDICAID

## 2023-05-09 ENCOUNTER — APPOINTMENT (OUTPATIENT)
Dept: NEUROLOGY | Facility: CLINIC | Age: 53
End: 2023-05-09

## 2023-05-09 ENCOUNTER — APPOINTMENT (OUTPATIENT)
Dept: NEUROLOGY | Facility: CLINIC | Age: 53
End: 2023-05-09
Payer: MEDICAID

## 2023-05-09 VITALS
HEART RATE: 77 BPM | OXYGEN SATURATION: 96 % | DIASTOLIC BLOOD PRESSURE: 77 MMHG | SYSTOLIC BLOOD PRESSURE: 117 MMHG | TEMPERATURE: 97.2 F | HEIGHT: 68 IN

## 2023-05-09 DIAGNOSIS — Z98.890 OTHER SPECIFIED POSTPROCEDURAL STATES: Chronic | ICD-10-CM

## 2023-05-09 DIAGNOSIS — Z00.00 ENCOUNTER FOR GENERAL ADULT MEDICAL EXAMINATION WITHOUT ABNORMAL FINDINGS: ICD-10-CM

## 2023-05-09 DIAGNOSIS — Z87.81 PERSONAL HISTORY OF (HEALED) TRAUMATIC FRACTURE: Chronic | ICD-10-CM

## 2023-05-09 LAB
ALBUMIN SERPL ELPH-MCNC: 4.2 G/DL
ALP BLD-CCNC: 102 U/L
ALT SERPL-CCNC: 6 U/L
ANION GAP SERPL CALC-SCNC: 12 MMOL/L
AST SERPL-CCNC: 14 U/L
BASOPHILS # BLD AUTO: 0.03 K/UL
BASOPHILS NFR BLD AUTO: 0.3 %
BILIRUB SERPL-MCNC: 0.4 MG/DL
BUN SERPL-MCNC: 11 MG/DL
CALCIUM SERPL-MCNC: 9.9 MG/DL
CHLORIDE SERPL-SCNC: 95 MMOL/L
CO2 SERPL-SCNC: 27 MMOL/L
CREAT SERPL-MCNC: 0.8 MG/DL
EGFR: 106 ML/MIN/1.73M2
EOSINOPHIL # BLD AUTO: 0.04 K/UL
EOSINOPHIL NFR BLD AUTO: 0.4 %
GLUCOSE SERPL-MCNC: 62 MG/DL
HCT VFR BLD CALC: 38.9 %
HGB BLD-MCNC: 13.3 G/DL
IMM GRANULOCYTES NFR BLD AUTO: 0.8 %
LYMPHOCYTES # BLD AUTO: 1.64 K/UL
LYMPHOCYTES NFR BLD AUTO: 16 %
MAN DIFF?: NORMAL
MCHC RBC-ENTMCNC: 32.3 PG
MCHC RBC-ENTMCNC: 34.2 G/DL
MCV RBC AUTO: 94.4 FL
MONOCYTES # BLD AUTO: 1.11 K/UL
MONOCYTES NFR BLD AUTO: 10.9 %
NEUTROPHILS # BLD AUTO: 7.33 K/UL
NEUTROPHILS NFR BLD AUTO: 71.6 %
PLATELET # BLD AUTO: 177 K/UL
POTASSIUM SERPL-SCNC: 4 MMOL/L
PROT SERPL-MCNC: 7.5 G/DL
RBC # BLD: 4.12 M/UL
RBC # FLD: 13.9 %
SODIUM SERPL-SCNC: 134 MMOL/L
VALPROATE SERPL-MCNC: 86 UG/ML
WBC # FLD AUTO: 10.23 K/UL

## 2023-05-09 PROCEDURE — 99213 OFFICE O/P EST LOW 20 MIN: CPT

## 2023-05-09 NOTE — ASSESSMENT
[FreeTextEntry1] : 51M from [facility] w/ h/o IDD, hypothyroidism, mild MR, impulse control disorder, seizure disorder/epilepsy,  controlled on Depakote presenting for follow up. Serum Valproate level 86 and ammonia level 28 on may 04/23. No new seizures. No mood or behavior abnormality. \par \par Recs:\par - cw depakote ER 500mg TID - new script given \par - f/u CBC, CMP, valproate level and ammonia level prior to next visit \par - RTC in 1 yr

## 2023-05-09 NOTE — HISTORY OF PRESENT ILLNESS
[FreeTextEntry1] : 51M from [facility] w/ h/o IDD, hypothyroidism, mild MR, impulse control disorder, seizure disorder/epilepsy,  controlled on Depakote presenting for follow up. Serum Valproate level 86 and ammonia level 28 on may 04/23. Patient is accompanied by aide from group home. As per the aide, patient is doing well, no episodes seizures, no agitation, sleeps 9 hrs per night, good appetite. Patient walks with a walker at group home and uses whee chair while going to long distance. Patient does not have any trouble bowel and bladder control. \par \par Reviewed:\par Labs below

## 2023-05-09 NOTE — PHYSICAL EXAM
[General Appearance - Alert] : alert [General Appearance - In No Acute Distress] : in no acute distress [Affect] : the affect was normal [Mood] : the mood was normal [Cranial Nerves Oculomotor (III)] : extraocular motion intact [Cranial Nerves Facial (VII)] : face symmetrical [Cranial Nerves Vestibulocochlear (VIII)] : hearing was intact bilaterally [Cranial Nerves Accessory (XI - Cranial And Spinal)] : head turning and shoulder shrug symmetric [Cranial Nerves Hypoglossal (XII)] : there was no tongue deviation with protrusion [0] : Patella left 0 [PERRL With Normal Accommodation] : pupils were equal in size, round, reactive to light, with normal accommodation [Extraocular Movements] : extraocular movements were intact [Outer Ear] : the ears and nose were normal in appearance [Hearing Threshold Finger Rub Not Collier] : hearing was normal [Neck Appearance] : the appearance of the neck was normal [Neck Cervical Mass (___cm)] : no neck mass was observed [] : no respiratory distress [Exaggerated Use Of Accessory Muscles For Inspiration] : no accessory muscle use [Heart Sounds] : normal S1 and S2 [FreeTextEntry1] : \par  [FreeTextEntry4] : patient with mental retardation. Communicates with one sentence answers  [FreeTextEntry6] : 4+/5 b/l upper and lower extremity  [FreeTextEntry8] : at home uses cane but uses wheelchair while going to long distances

## 2023-05-09 NOTE — REVIEW OF SYSTEMS
[Fever] : no fever [Chills] : no chills [Change In Personality] : no personality change [Emotional Problems] : no emotional problems [Eye Pain] : no eye pain [Red Eyes] : eyes not red [Earache] : no earache [Nosebleeds] : no nosebleeds [Cough] : no cough [Constipation] : no constipation [Diarrhea] : no diarrhea [Dysuria] : no dysuria [Joint Pain] : no joint pain [Joint Swelling] : no joint swelling

## 2023-05-10 DIAGNOSIS — R56.9 UNSPECIFIED CONVULSIONS: ICD-10-CM

## 2023-05-10 DIAGNOSIS — F70 MILD INTELLECTUAL DISABILITIES: ICD-10-CM

## 2023-05-30 ENCOUNTER — APPOINTMENT (OUTPATIENT)
Dept: PODIATRY | Facility: CLINIC | Age: 53
End: 2023-05-30
Payer: MEDICAID

## 2023-05-30 ENCOUNTER — OUTPATIENT (OUTPATIENT)
Dept: OUTPATIENT SERVICES | Facility: HOSPITAL | Age: 53
LOS: 1 days | End: 2023-05-30
Payer: MEDICAID

## 2023-05-30 DIAGNOSIS — M79.671 PAIN IN RIGHT FOOT: ICD-10-CM

## 2023-05-30 DIAGNOSIS — Z98.890 OTHER SPECIFIED POSTPROCEDURAL STATES: Chronic | ICD-10-CM

## 2023-05-30 DIAGNOSIS — B35.1 TINEA UNGUIUM: ICD-10-CM

## 2023-05-30 DIAGNOSIS — Z87.81 PERSONAL HISTORY OF (HEALED) TRAUMATIC FRACTURE: Chronic | ICD-10-CM

## 2023-05-30 DIAGNOSIS — Z00.00 ENCOUNTER FOR GENERAL ADULT MEDICAL EXAMINATION WITHOUT ABNORMAL FINDINGS: ICD-10-CM

## 2023-05-30 DIAGNOSIS — L60.2 ONYCHOGRYPHOSIS: ICD-10-CM

## 2023-05-30 DIAGNOSIS — M79.672 PAIN IN LEFT FOOT: ICD-10-CM

## 2023-05-30 DIAGNOSIS — F70 MILD INTELLECTUAL DISABILITIES: ICD-10-CM

## 2023-05-30 PROCEDURE — 11721 DEBRIDE NAIL 6 OR MORE: CPT | Mod: 50

## 2023-05-30 PROCEDURE — 11721 DEBRIDE NAIL 6 OR MORE: CPT

## 2023-05-30 NOTE — PHYSICAL EXAM
[FreeTextEntry1] : thick, dystrophic, discolored nails with subungual debris x 10, painful on palpation\par \par

## 2023-05-30 NOTE — HISTORY OF PRESENT ILLNESS
[Walker] : a walker [FreeTextEntry1] : 52 year old MRDD male here today for continued care of mycotic nails \par \par \par

## 2023-06-21 NOTE — PROGRESS NOTE ADULT - PROVIDER SPECIALTY LIST ADULT
Hospitalist
Hospitalist
Infectious Disease
Internal Medicine
Urology
Internal Medicine
The patient is a 73y Female complaining of

## 2023-06-28 ENCOUNTER — EMERGENCY (EMERGENCY)
Facility: HOSPITAL | Age: 53
LOS: 0 days | Discharge: HOME | End: 2023-06-29
Attending: EMERGENCY MEDICINE
Payer: MEDICAID

## 2023-06-28 VITALS
HEART RATE: 90 BPM | SYSTOLIC BLOOD PRESSURE: 126 MMHG | TEMPERATURE: 99 F | DIASTOLIC BLOOD PRESSURE: 83 MMHG | OXYGEN SATURATION: 95 % | WEIGHT: 175.05 LBS | RESPIRATION RATE: 18 BRPM

## 2023-06-28 DIAGNOSIS — F79 UNSPECIFIED INTELLECTUAL DISABILITIES: ICD-10-CM

## 2023-06-28 DIAGNOSIS — S50.812A ABRASION OF LEFT FOREARM, INITIAL ENCOUNTER: ICD-10-CM

## 2023-06-28 DIAGNOSIS — Z87.81 PERSONAL HISTORY OF (HEALED) TRAUMATIC FRACTURE: Chronic | ICD-10-CM

## 2023-06-28 DIAGNOSIS — Z87.19 PERSONAL HISTORY OF OTHER DISEASES OF THE DIGESTIVE SYSTEM: ICD-10-CM

## 2023-06-28 DIAGNOSIS — Z04.3 ENCOUNTER FOR EXAMINATION AND OBSERVATION FOLLOWING OTHER ACCIDENT: ICD-10-CM

## 2023-06-28 DIAGNOSIS — Z98.890 OTHER SPECIFIED POSTPROCEDURAL STATES: Chronic | ICD-10-CM

## 2023-06-28 DIAGNOSIS — Z98.890 OTHER SPECIFIED POSTPROCEDURAL STATES: ICD-10-CM
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
## 2023-06-28 DIAGNOSIS — N39.0 URINARY TRACT INFECTION, SITE NOT SPECIFIED: ICD-10-CM

## 2023-06-28 DIAGNOSIS — S50.312A ABRASION OF LEFT ELBOW, INITIAL ENCOUNTER: ICD-10-CM
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
## 2023-06-28 DIAGNOSIS — Z88.8 ALLERGY STATUS TO OTHER DRUGS, MEDICAMENTS AND BIOLOGICAL SUBSTANCES: ICD-10-CM

## 2023-06-28 DIAGNOSIS — Z88.1 ALLERGY STATUS TO OTHER ANTIBIOTIC AGENTS STATUS: ICD-10-CM

## 2023-06-28 DIAGNOSIS — F31.9 BIPOLAR DISORDER, UNSPECIFIED: ICD-10-CM

## 2023-06-28 DIAGNOSIS — F20.9 SCHIZOPHRENIA, UNSPECIFIED: ICD-10-CM

## 2023-06-28 DIAGNOSIS — G40.909 EPILEPSY, UNSPECIFIED, NOT INTRACTABLE, WITHOUT STATUS EPILEPTICUS: ICD-10-CM
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
## 2023-06-28 DIAGNOSIS — Z86.2 PERSONAL HISTORY OF DISEASES OF THE BLOOD AND BLOOD-FORMING ORGANS AND CERTAIN DISORDERS INVOLVING THE IMMUNE MECHANISM: ICD-10-CM

## 2023-06-28 DIAGNOSIS — Z87.81 PERSONAL HISTORY OF (HEALED) TRAUMATIC FRACTURE: ICD-10-CM

## 2023-06-28 DIAGNOSIS — E03.9 HYPOTHYROIDISM, UNSPECIFIED: ICD-10-CM

## 2023-06-28 PROCEDURE — 73080 X-RAY EXAM OF ELBOW: CPT | Mod: LT

## 2023-06-28 PROCEDURE — 72170 X-RAY EXAM OF PELVIS: CPT

## 2023-06-28 PROCEDURE — 87186 SC STD MICRODIL/AGAR DIL: CPT

## 2023-06-28 PROCEDURE — 87086 URINE CULTURE/COLONY COUNT: CPT

## 2023-06-28 PROCEDURE — 99284 EMERGENCY DEPT VISIT MOD MDM: CPT

## 2023-06-28 PROCEDURE — 81001 URINALYSIS AUTO W/SCOPE: CPT

## 2023-06-28 PROCEDURE — 99284 EMERGENCY DEPT VISIT MOD MDM: CPT | Mod: 25

## 2023-06-28 NOTE — ED ADULT NURSE NOTE - NSFALLHARMRISKINTERV_ED_ALL_ED

## 2023-06-28 NOTE — ED ADULT TRIAGE NOTE - CHIEF COMPLAINT QUOTE
pt BIBEMS from group for multiple mechanical falls today, pt reports no injuries or complaints from patient

## 2023-06-28 NOTE — ED ADULT NURSE NOTE - OBJECTIVE STATEMENT
Patient from [facility], walker dependent. Staff member says that patient has had three falls today Patient from [facility], walker dependent. Staff member says that patient has had three falls today. Patient denies any pain. Staff member reports patietn appears less active the last two days. Patient denies dysuria unclear if patient understands questions fully.

## 2023-06-29 VITALS
HEART RATE: 77 BPM | DIASTOLIC BLOOD PRESSURE: 63 MMHG | SYSTOLIC BLOOD PRESSURE: 112 MMHG | RESPIRATION RATE: 18 BRPM | TEMPERATURE: 99 F | OXYGEN SATURATION: 95 %

## 2023-06-29 LAB
APPEARANCE UR: ABNORMAL
BILIRUB UR-MCNC: NEGATIVE — SIGNIFICANT CHANGE UP
COLOR SPEC: SIGNIFICANT CHANGE UP
DIFF PNL FLD: NEGATIVE — SIGNIFICANT CHANGE UP
GLUCOSE UR QL: NEGATIVE — SIGNIFICANT CHANGE UP
KETONES UR-MCNC: NEGATIVE — SIGNIFICANT CHANGE UP
LEUKOCYTE ESTERASE UR-ACNC: ABNORMAL
NITRITE UR-MCNC: POSITIVE
PH UR: 7 — SIGNIFICANT CHANGE UP (ref 5–8)
PROT UR-MCNC: NEGATIVE — SIGNIFICANT CHANGE UP
SP GR SPEC: 1.01 — SIGNIFICANT CHANGE UP (ref 1.01–1.03)
UROBILINOGEN FLD QL: SIGNIFICANT CHANGE UP

## 2023-06-29 PROCEDURE — 73080 X-RAY EXAM OF ELBOW: CPT | Mod: 26,LT

## 2023-06-29 PROCEDURE — 72170 X-RAY EXAM OF PELVIS: CPT | Mod: 26

## 2023-06-29 RX ORDER — CEFPODOXIME PROXETIL 100 MG
1 TABLET ORAL
Qty: 20 | Refills: 0
Start: 2023-06-29 | End: 2023-07-08

## 2023-06-29 RX ORDER — CEFPODOXIME PROXETIL 100 MG
200 TABLET ORAL ONCE
Refills: 0 | Status: COMPLETED | OUTPATIENT
Start: 2023-06-29 | End: 2023-06-29

## 2023-06-29 RX ADMIN — Medication 200 MILLIGRAM(S): at 06:07

## 2023-06-29 NOTE — ED PROVIDER NOTE - ATTENDING APP SHARED VISIT CONTRIBUTION OF CARE
52-year-old male history of intellectual disability presents with fall today.  Witnessed by aide.  No LOC.  No head injury.  Not anticoagulation.  Patient has no complaints.  Patient usually uses a walker aide says he always hunches over and loses his balance.    Per aide patient is at his baseline mental status.    Patient is alert, smiling following commands.  NCAT PERRL EOMI spine nontender chest wall nontender abdomen soft nontender lungs clear heart RRR.  Moving all extremities no focal deficits no obvious wounds lacerations bruises contusions.

## 2023-06-29 NOTE — ED PROVIDER NOTE - PROGRESS NOTE DETAILS
FF: I discussed urine and radiology results with group home nurse and staff at bedside. advised of strict return precautions discussed at bedside. agreeable to dc. f/u with pcp.

## 2023-06-29 NOTE — ED PROVIDER NOTE - PATIENT PORTAL LINK FT
You can access the FollowMyHealth Patient Portal offered by [facility] by registering at the following website: [email or web address]. By joining [employer]’s FollowMyHealth portal, you will also be able to view your health information using other applications (apps) compatible with our system.

## 2023-06-29 NOTE — ED PROVIDER NOTE - CARE PLAN
Assessment and plan of treatment:	fall  imaging, supportive care   1 Principal Discharge DX:	Acute UTI  Assessment and plan of treatment:	fall  imaging, supportive care  Secondary Diagnosis:	Fall

## 2023-06-29 NOTE — ED PROVIDER NOTE - OBJECTIVE STATEMENT
52-year-old male with a past medical history of schizophrenia, intellectual disability, seizure disorder, history of sigmoid volvulus and hypothyroidism presents to the ED for evaluation of multiple falls fall where while walking with his walker in the group home earlier today.  As per group home patient leans all the way forward while walking with his walker and he lost his balance and fell earlier today.  Patient denies LOC, head trauma, use of blood thinners, weakness, chest pain, neck pain, back pain, shortness of breath numbness or tingling.

## 2023-07-02 LAB
-  AMIKACIN: SIGNIFICANT CHANGE UP
-  AMOXICILLIN/CLAVULANIC ACID: SIGNIFICANT CHANGE UP
-  AMPICILLIN/SULBACTAM: SIGNIFICANT CHANGE UP
-  AMPICILLIN: SIGNIFICANT CHANGE UP
-  AZTREONAM: SIGNIFICANT CHANGE UP
-  CEFAZOLIN: SIGNIFICANT CHANGE UP
-  CEFEPIME: SIGNIFICANT CHANGE UP
-  CEFTRIAXONE: SIGNIFICANT CHANGE UP
-  CEFUROXIME: SIGNIFICANT CHANGE UP
-  CIPROFLOXACIN: SIGNIFICANT CHANGE UP
-  ERTAPENEM: SIGNIFICANT CHANGE UP
-  GENTAMICIN: SIGNIFICANT CHANGE UP
-  IMIPENEM: SIGNIFICANT CHANGE UP
-  LEVOFLOXACIN: SIGNIFICANT CHANGE UP
-  MEROPENEM: SIGNIFICANT CHANGE UP
-  NITROFURANTOIN: SIGNIFICANT CHANGE UP
-  PIPERACILLIN/TAZOBACTAM: SIGNIFICANT CHANGE UP
-  TOBRAMYCIN: SIGNIFICANT CHANGE UP
-  TRIMETHOPRIM/SULFAMETHOXAZOLE: SIGNIFICANT CHANGE UP
CULTURE RESULTS: SIGNIFICANT CHANGE UP
METHOD TYPE: SIGNIFICANT CHANGE UP
ORGANISM # SPEC MICROSCOPIC CNT: SIGNIFICANT CHANGE UP
ORGANISM # SPEC MICROSCOPIC CNT: SIGNIFICANT CHANGE UP
SPECIMEN SOURCE: SIGNIFICANT CHANGE UP

## 2023-07-03 NOTE — ED POST DISCHARGE NOTE - DETAILS
LIVES IN GROUP HOME, SPOKE WITH MANAGER, WHO WILL CONTACT RN. MANAGER TOLD RX FOR AUGMENTIN WILL BE SENT TO PHARMACY. LAB TELE GIVEN TO CALL FOR UCX. MY TELE GIVEN FOR RN TO CALL ME AT ANY TIME

## 2023-08-08 ENCOUNTER — APPOINTMENT (OUTPATIENT)
Dept: PODIATRY | Facility: CLINIC | Age: 53
End: 2023-08-08
Payer: MEDICAID

## 2023-08-08 ENCOUNTER — OUTPATIENT (OUTPATIENT)
Dept: OUTPATIENT SERVICES | Facility: HOSPITAL | Age: 53
LOS: 1 days | End: 2023-08-08
Payer: MEDICAID

## 2023-08-08 DIAGNOSIS — Z98.890 OTHER SPECIFIED POSTPROCEDURAL STATES: Chronic | ICD-10-CM

## 2023-08-08 DIAGNOSIS — B35.3 TINEA PEDIS: ICD-10-CM

## 2023-08-08 DIAGNOSIS — L60.2 ONYCHOGRYPHOSIS: ICD-10-CM

## 2023-08-08 DIAGNOSIS — M79.672 PAIN IN LEFT FOOT: ICD-10-CM

## 2023-08-08 DIAGNOSIS — F70 MILD INTELLECTUAL DISABILITIES: ICD-10-CM

## 2023-08-08 DIAGNOSIS — M79.671 PAIN IN RIGHT FOOT: ICD-10-CM

## 2023-08-08 DIAGNOSIS — Z87.81 PERSONAL HISTORY OF (HEALED) TRAUMATIC FRACTURE: Chronic | ICD-10-CM

## 2023-08-08 DIAGNOSIS — B35.1 TINEA UNGUIUM: ICD-10-CM

## 2023-08-08 DIAGNOSIS — Z00.00 ENCOUNTER FOR GENERAL ADULT MEDICAL EXAMINATION WITHOUT ABNORMAL FINDINGS: ICD-10-CM

## 2023-08-08 PROCEDURE — 11721 DEBRIDE NAIL 6 OR MORE: CPT

## 2023-08-08 PROCEDURE — 11721 DEBRIDE NAIL 6 OR MORE: CPT | Mod: NC

## 2023-08-08 PROCEDURE — 99213 OFFICE O/P EST LOW 20 MIN: CPT | Mod: 25

## 2023-08-08 PROCEDURE — 99213 OFFICE O/P EST LOW 20 MIN: CPT | Mod: NC,25

## 2023-08-08 RX ORDER — CLOTRIMAZOLE 10 MG/G
1 CREAM TOPICAL TWICE DAILY
Qty: 1 | Refills: 3 | Status: ACTIVE | COMMUNITY
Start: 2023-08-08 | End: 1900-01-01

## 2023-08-08 NOTE — HISTORY OF PRESENT ILLNESS
[Walker] : a walker [FreeTextEntry1] : 52 year old MRDD male here today for continued care of mycotic nails and foot care

## 2023-08-08 NOTE — PROCEDURE
[FreeTextEntry1] : -Aseptic debridement of all fungal nails.  Patient has pain about the toes secondary to nail pressure and relates improvement with periodic debridement. -Rx Clotrimazole  -return 2 months

## 2023-08-08 NOTE — PHYSICAL EXAM
[FreeTextEntry1] : thick, dystrophic, discolored nails with subungual debris x 10, painful on palpation Scaly Skin with mosaic distribution B/L feet

## 2023-08-28 NOTE — ED PROVIDER NOTE - NSFOLLOWUPINSTRUCTIONS_ED_ALL_ED_FT
well developed, well nourished , in no acute distress , ambulating without difficulty , normal communication ability
Urinary Tract Infection    A urinary tract infection (UTI) is an infection of any part of the urinary tract, which includes the kidneys, ureters, bladder, and urethra. Risk factors include ignoring your need to urinate, wiping back to front if female, being an uncircumcised male, and having diabetes or a weak immune system. Symptoms include frequent urination, pain or burning with urination, foul smelling urine, cloudy urine, pain in the lower abdomen, blood in the urine, and fever. If you were prescribed an antibiotic medicine, take it as told by your health care provider. Do not stop taking the antibiotic even if you start to feel better.    SEEK IMMEDIATE MEDICAL CARE IF YOU HAVE ANY OF THE FOLLOWING SYMPTOMS: severe back or abdominal pain, fever, inability to keep fluids or medicine down, dizziness/lightheadedness, or a change in mental status.    You were admitted for generalized weakness, which led to a mechanical fall. Our physical therapy team was consulted and recommended that you receive rehabilitation at a facility. Please complete this physical therapy program to decrease your risk for future falls.

## 2023-10-10 ENCOUNTER — OUTPATIENT (OUTPATIENT)
Dept: OUTPATIENT SERVICES | Facility: HOSPITAL | Age: 53
LOS: 1 days | End: 2023-10-10
Payer: MEDICAID

## 2023-10-10 ENCOUNTER — APPOINTMENT (OUTPATIENT)
Dept: PODIATRY | Facility: CLINIC | Age: 53
End: 2023-10-10
Payer: MEDICAID

## 2023-10-10 DIAGNOSIS — B35.3 TINEA PEDIS: ICD-10-CM

## 2023-10-10 DIAGNOSIS — Z87.81 PERSONAL HISTORY OF (HEALED) TRAUMATIC FRACTURE: Chronic | ICD-10-CM

## 2023-10-10 DIAGNOSIS — Z98.890 OTHER SPECIFIED POSTPROCEDURAL STATES: Chronic | ICD-10-CM

## 2023-10-10 DIAGNOSIS — Z00.00 ENCOUNTER FOR GENERAL ADULT MEDICAL EXAMINATION WITHOUT ABNORMAL FINDINGS: ICD-10-CM

## 2023-10-10 DIAGNOSIS — L85.3 XEROSIS CUTIS: ICD-10-CM

## 2023-10-10 PROCEDURE — 11721 DEBRIDE NAIL 6 OR MORE: CPT | Mod: NC

## 2023-10-10 PROCEDURE — 99213 OFFICE O/P EST LOW 20 MIN: CPT | Mod: 25

## 2023-10-10 PROCEDURE — 11721 DEBRIDE NAIL 6 OR MORE: CPT

## 2023-10-10 PROCEDURE — 99213 OFFICE O/P EST LOW 20 MIN: CPT | Mod: NC,25

## 2023-10-10 RX ORDER — CLOTRIMAZOLE 10 MG/G
1 CREAM TOPICAL
Qty: 1 | Refills: 5 | Status: ACTIVE | COMMUNITY
Start: 2023-10-10 | End: 1900-01-01

## 2023-10-12 ENCOUNTER — NON-APPOINTMENT (OUTPATIENT)
Age: 53
End: 2023-10-12

## 2023-10-17 DIAGNOSIS — M79.671 PAIN IN RIGHT FOOT: ICD-10-CM
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
## 2023-10-17 DIAGNOSIS — B35.3 TINEA PEDIS: ICD-10-CM

## 2023-10-17 DIAGNOSIS — L85.3 XEROSIS CUTIS: ICD-10-CM

## 2023-10-17 DIAGNOSIS — M79.672 PAIN IN LEFT FOOT: ICD-10-CM

## 2023-10-17 DIAGNOSIS — B35.1 TINEA UNGUIUM: ICD-10-CM
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
## 2023-11-07 ENCOUNTER — EMERGENCY (EMERGENCY)
Facility: HOSPITAL | Age: 53
LOS: 0 days | Discharge: HOME | End: 2023-11-08
Attending: EMERGENCY MEDICINE
Payer: MEDICAID

## 2023-11-07 VITALS
RESPIRATION RATE: 17 BRPM | OXYGEN SATURATION: 98 % | SYSTOLIC BLOOD PRESSURE: 118 MMHG | WEIGHT: 139.99 LBS | TEMPERATURE: 98 F | DIASTOLIC BLOOD PRESSURE: 77 MMHG | HEART RATE: 80 BPM

## 2023-11-07 DIAGNOSIS — F20.9 SCHIZOPHRENIA, UNSPECIFIED: ICD-10-CM

## 2023-11-07 DIAGNOSIS — Z86.59 PERSONAL HISTORY OF OTHER MENTAL AND BEHAVIORAL DISORDERS: ICD-10-CM

## 2023-11-07 DIAGNOSIS — Z98.890 OTHER SPECIFIED POSTPROCEDURAL STATES: Chronic | ICD-10-CM

## 2023-11-07 DIAGNOSIS — E03.9 HYPOTHYROIDISM, UNSPECIFIED: ICD-10-CM

## 2023-11-07 DIAGNOSIS — F31.9 BIPOLAR DISORDER, UNSPECIFIED: ICD-10-CM

## 2023-11-07 DIAGNOSIS — Z86.2 PERSONAL HISTORY OF DISEASES OF THE BLOOD AND BLOOD-FORMING ORGANS AND CERTAIN DISORDERS INVOLVING THE IMMUNE MECHANISM: ICD-10-CM

## 2023-11-07 DIAGNOSIS — Z88.1 ALLERGY STATUS TO OTHER ANTIBIOTIC AGENTS STATUS: ICD-10-CM

## 2023-11-07 DIAGNOSIS — M79.89 OTHER SPECIFIED SOFT TISSUE DISORDERS: ICD-10-CM

## 2023-11-07 DIAGNOSIS — Z88.8 ALLERGY STATUS TO OTHER DRUGS, MEDICAMENTS AND BIOLOGICAL SUBSTANCES: ICD-10-CM

## 2023-11-07 DIAGNOSIS — R29.898 OTHER SYMPTOMS AND SIGNS INVOLVING THE MUSCULOSKELETAL SYSTEM: ICD-10-CM

## 2023-11-07 DIAGNOSIS — Z87.81 PERSONAL HISTORY OF (HEALED) TRAUMATIC FRACTURE: Chronic | ICD-10-CM

## 2023-11-07 DIAGNOSIS — G40.909 EPILEPSY, UNSPECIFIED, NOT INTRACTABLE, WITHOUT STATUS EPILEPTICUS: ICD-10-CM

## 2023-11-07 DIAGNOSIS — R53.83 OTHER FATIGUE: ICD-10-CM

## 2023-11-07 DIAGNOSIS — Z98.890 OTHER SPECIFIED POSTPROCEDURAL STATES: ICD-10-CM

## 2023-11-07 LAB
ALBUMIN SERPL ELPH-MCNC: 4.2 G/DL — SIGNIFICANT CHANGE UP (ref 3.5–5.2)
ALBUMIN SERPL ELPH-MCNC: 4.2 G/DL — SIGNIFICANT CHANGE UP (ref 3.5–5.2)
ALP SERPL-CCNC: 86 U/L — SIGNIFICANT CHANGE UP (ref 30–115)
ALP SERPL-CCNC: 86 U/L — SIGNIFICANT CHANGE UP (ref 30–115)
ALT FLD-CCNC: 8 U/L — SIGNIFICANT CHANGE UP (ref 0–41)
ALT FLD-CCNC: 8 U/L — SIGNIFICANT CHANGE UP (ref 0–41)
ANION GAP SERPL CALC-SCNC: 11 MMOL/L — SIGNIFICANT CHANGE UP (ref 7–14)
ANION GAP SERPL CALC-SCNC: 11 MMOL/L — SIGNIFICANT CHANGE UP (ref 7–14)
AST SERPL-CCNC: 20 U/L — SIGNIFICANT CHANGE UP (ref 0–41)
AST SERPL-CCNC: 20 U/L — SIGNIFICANT CHANGE UP (ref 0–41)
BASOPHILS # BLD AUTO: 0.02 K/UL — SIGNIFICANT CHANGE UP (ref 0–0.2)
BASOPHILS # BLD AUTO: 0.02 K/UL — SIGNIFICANT CHANGE UP (ref 0–0.2)
BASOPHILS NFR BLD AUTO: 0.2 % — SIGNIFICANT CHANGE UP (ref 0–1)
BASOPHILS NFR BLD AUTO: 0.2 % — SIGNIFICANT CHANGE UP (ref 0–1)
BILIRUB SERPL-MCNC: 0.4 MG/DL — SIGNIFICANT CHANGE UP (ref 0.2–1.2)
BILIRUB SERPL-MCNC: 0.4 MG/DL — SIGNIFICANT CHANGE UP (ref 0.2–1.2)
BUN SERPL-MCNC: 14 MG/DL — SIGNIFICANT CHANGE UP (ref 10–20)
BUN SERPL-MCNC: 14 MG/DL — SIGNIFICANT CHANGE UP (ref 10–20)
CALCIUM SERPL-MCNC: 9.8 MG/DL — SIGNIFICANT CHANGE UP (ref 8.4–10.4)
CALCIUM SERPL-MCNC: 9.8 MG/DL — SIGNIFICANT CHANGE UP (ref 8.4–10.4)
CHLORIDE SERPL-SCNC: 96 MMOL/L — LOW (ref 98–110)
CHLORIDE SERPL-SCNC: 96 MMOL/L — LOW (ref 98–110)
CO2 SERPL-SCNC: 26 MMOL/L — SIGNIFICANT CHANGE UP (ref 17–32)
CO2 SERPL-SCNC: 26 MMOL/L — SIGNIFICANT CHANGE UP (ref 17–32)
CREAT SERPL-MCNC: 0.9 MG/DL — SIGNIFICANT CHANGE UP (ref 0.7–1.5)
CREAT SERPL-MCNC: 0.9 MG/DL — SIGNIFICANT CHANGE UP (ref 0.7–1.5)
EGFR: 102 ML/MIN/1.73M2 — SIGNIFICANT CHANGE UP
EGFR: 102 ML/MIN/1.73M2 — SIGNIFICANT CHANGE UP
EOSINOPHIL # BLD AUTO: 0.05 K/UL — SIGNIFICANT CHANGE UP (ref 0–0.7)
EOSINOPHIL # BLD AUTO: 0.05 K/UL — SIGNIFICANT CHANGE UP (ref 0–0.7)
EOSINOPHIL NFR BLD AUTO: 0.6 % — SIGNIFICANT CHANGE UP (ref 0–8)
EOSINOPHIL NFR BLD AUTO: 0.6 % — SIGNIFICANT CHANGE UP (ref 0–8)
GLUCOSE SERPL-MCNC: 83 MG/DL — SIGNIFICANT CHANGE UP (ref 70–99)
GLUCOSE SERPL-MCNC: 83 MG/DL — SIGNIFICANT CHANGE UP (ref 70–99)
HCT VFR BLD CALC: 39.6 % — LOW (ref 42–52)
HCT VFR BLD CALC: 39.6 % — LOW (ref 42–52)
HGB BLD-MCNC: 13.3 G/DL — LOW (ref 14–18)
HGB BLD-MCNC: 13.3 G/DL — LOW (ref 14–18)
IMM GRANULOCYTES NFR BLD AUTO: 0.5 % — HIGH (ref 0.1–0.3)
IMM GRANULOCYTES NFR BLD AUTO: 0.5 % — HIGH (ref 0.1–0.3)
LYMPHOCYTES # BLD AUTO: 2.19 K/UL — SIGNIFICANT CHANGE UP (ref 1.2–3.4)
LYMPHOCYTES # BLD AUTO: 2.19 K/UL — SIGNIFICANT CHANGE UP (ref 1.2–3.4)
LYMPHOCYTES # BLD AUTO: 26.7 % — SIGNIFICANT CHANGE UP (ref 20.5–51.1)
LYMPHOCYTES # BLD AUTO: 26.7 % — SIGNIFICANT CHANGE UP (ref 20.5–51.1)
MAGNESIUM SERPL-MCNC: 1.9 MG/DL — SIGNIFICANT CHANGE UP (ref 1.8–2.4)
MAGNESIUM SERPL-MCNC: 1.9 MG/DL — SIGNIFICANT CHANGE UP (ref 1.8–2.4)
MCHC RBC-ENTMCNC: 32.4 PG — HIGH (ref 27–31)
MCHC RBC-ENTMCNC: 32.4 PG — HIGH (ref 27–31)
MCHC RBC-ENTMCNC: 33.6 G/DL — SIGNIFICANT CHANGE UP (ref 32–37)
MCHC RBC-ENTMCNC: 33.6 G/DL — SIGNIFICANT CHANGE UP (ref 32–37)
MCV RBC AUTO: 96.4 FL — HIGH (ref 80–94)
MCV RBC AUTO: 96.4 FL — HIGH (ref 80–94)
MONOCYTES # BLD AUTO: 0.98 K/UL — HIGH (ref 0.1–0.6)
MONOCYTES # BLD AUTO: 0.98 K/UL — HIGH (ref 0.1–0.6)
MONOCYTES NFR BLD AUTO: 12 % — HIGH (ref 1.7–9.3)
MONOCYTES NFR BLD AUTO: 12 % — HIGH (ref 1.7–9.3)
NEUTROPHILS # BLD AUTO: 4.92 K/UL — SIGNIFICANT CHANGE UP (ref 1.4–6.5)
NEUTROPHILS # BLD AUTO: 4.92 K/UL — SIGNIFICANT CHANGE UP (ref 1.4–6.5)
NEUTROPHILS NFR BLD AUTO: 60 % — SIGNIFICANT CHANGE UP (ref 42.2–75.2)
NEUTROPHILS NFR BLD AUTO: 60 % — SIGNIFICANT CHANGE UP (ref 42.2–75.2)
NRBC # BLD: 0 /100 WBCS — SIGNIFICANT CHANGE UP (ref 0–0)
NRBC # BLD: 0 /100 WBCS — SIGNIFICANT CHANGE UP (ref 0–0)
NT-PROBNP SERPL-SCNC: 62 PG/ML — SIGNIFICANT CHANGE UP (ref 0–300)
NT-PROBNP SERPL-SCNC: 62 PG/ML — SIGNIFICANT CHANGE UP (ref 0–300)
PLATELET # BLD AUTO: 145 K/UL — SIGNIFICANT CHANGE UP (ref 130–400)
PLATELET # BLD AUTO: 145 K/UL — SIGNIFICANT CHANGE UP (ref 130–400)
PMV BLD: 9.7 FL — SIGNIFICANT CHANGE UP (ref 7.4–10.4)
PMV BLD: 9.7 FL — SIGNIFICANT CHANGE UP (ref 7.4–10.4)
POTASSIUM SERPL-MCNC: 4.8 MMOL/L — SIGNIFICANT CHANGE UP (ref 3.5–5)
POTASSIUM SERPL-MCNC: 4.8 MMOL/L — SIGNIFICANT CHANGE UP (ref 3.5–5)
POTASSIUM SERPL-SCNC: 4.8 MMOL/L — SIGNIFICANT CHANGE UP (ref 3.5–5)
POTASSIUM SERPL-SCNC: 4.8 MMOL/L — SIGNIFICANT CHANGE UP (ref 3.5–5)
PROT SERPL-MCNC: 7.4 G/DL — SIGNIFICANT CHANGE UP (ref 6–8)
PROT SERPL-MCNC: 7.4 G/DL — SIGNIFICANT CHANGE UP (ref 6–8)
RBC # BLD: 4.11 M/UL — LOW (ref 4.7–6.1)
RBC # BLD: 4.11 M/UL — LOW (ref 4.7–6.1)
RBC # FLD: 15 % — HIGH (ref 11.5–14.5)
RBC # FLD: 15 % — HIGH (ref 11.5–14.5)
SODIUM SERPL-SCNC: 133 MMOL/L — LOW (ref 135–146)
SODIUM SERPL-SCNC: 133 MMOL/L — LOW (ref 135–146)
TROPONIN T SERPL-MCNC: <0.01 NG/ML — SIGNIFICANT CHANGE UP
TROPONIN T SERPL-MCNC: <0.01 NG/ML — SIGNIFICANT CHANGE UP
WBC # BLD: 8.2 K/UL — SIGNIFICANT CHANGE UP (ref 4.8–10.8)
WBC # BLD: 8.2 K/UL — SIGNIFICANT CHANGE UP (ref 4.8–10.8)
WBC # FLD AUTO: 8.2 K/UL — SIGNIFICANT CHANGE UP (ref 4.8–10.8)
WBC # FLD AUTO: 8.2 K/UL — SIGNIFICANT CHANGE UP (ref 4.8–10.8)

## 2023-11-07 PROCEDURE — 83735 ASSAY OF MAGNESIUM: CPT

## 2023-11-07 PROCEDURE — 85025 COMPLETE CBC W/AUTO DIFF WBC: CPT

## 2023-11-07 PROCEDURE — 36415 COLL VENOUS BLD VENIPUNCTURE: CPT

## 2023-11-07 PROCEDURE — 93005 ELECTROCARDIOGRAM TRACING: CPT

## 2023-11-07 PROCEDURE — 93010 ELECTROCARDIOGRAM REPORT: CPT

## 2023-11-07 PROCEDURE — 93970 EXTREMITY STUDY: CPT | Mod: 26

## 2023-11-07 PROCEDURE — 93970 EXTREMITY STUDY: CPT

## 2023-11-07 PROCEDURE — 83880 ASSAY OF NATRIURETIC PEPTIDE: CPT

## 2023-11-07 PROCEDURE — 71045 X-RAY EXAM CHEST 1 VIEW: CPT

## 2023-11-07 PROCEDURE — 99285 EMERGENCY DEPT VISIT HI MDM: CPT | Mod: 25

## 2023-11-07 PROCEDURE — 84484 ASSAY OF TROPONIN QUANT: CPT

## 2023-11-07 PROCEDURE — 80053 COMPREHEN METABOLIC PANEL: CPT

## 2023-11-07 PROCEDURE — 99285 EMERGENCY DEPT VISIT HI MDM: CPT

## 2023-11-07 PROCEDURE — 71045 X-RAY EXAM CHEST 1 VIEW: CPT | Mod: 26

## 2023-11-07 NOTE — ED PROVIDER NOTE - PHYSICAL EXAMINATION
As Follows:  CONST: Well appearing in NAD  EYES: PERRL, EOMI, Sclera and conjunctiva clear.   CARD: No murmurs, rubs, or gallops; Normal rate and rhythm  RESP: BS Equal B/L, No wheezes, rhonchi or rales. No distress or accessory breathing  GI: Soft, non-tender, non-distended.  SKIN: Lower extremity swelling. Warm, dry, no acute rashes. MMM  NEURO: intellectual disability, No focal deficits.

## 2023-11-07 NOTE — ED PROVIDER NOTE - OBJECTIVE STATEMENT
patient is a 52 year old male with pmhx of intellectual diability, schizophrenia, seizure d/o, hypothyroidism presents for lower extremity swelling and weakness that began today. patient here with aide who states patient was mored tired than usual and would not ambulate for dinner tonight which is unusual. She states he looks more well appearing than prior to ed presentation. They deny any fever, cough, sore throat, N/V, chest pain, shortness of breath, or urinary complaints at this time.

## 2023-11-07 NOTE — ED ADULT NURSE NOTE - CAS DISCH TRANSFER METHOD
Called pt's guardian Xiang on [employer]. Advised of Dr Magaña note. Verb understanding.     F/u appt made for 10/26/2021 at 11a with Dyana Cooper NP.     Per request result note faxed to guardian at [phone]   Transportation service

## 2023-11-07 NOTE — ED PROVIDER NOTE - ATTENDING APP SHARED VISIT CONTRIBUTION OF CARE
52yM p/w b/l LE edema - does not usually have leg swelling and reports his leg feel heavy and weak, which makes it hard for him to walk.  No recollected trauma/falls.  No fever, CP, SOB.

## 2023-11-07 NOTE — ED PROVIDER NOTE - NSFOLLOWUPINSTRUCTIONS_ED_ALL_ED_FT
Follow up with your Primary Care Provider for further evaluation and possible medical treatment for fluid retention which may include diuretic or LASIX medication.     Our Emergency Department Referral Coordinators will be reaching out to you in the next 24-48 hours from 9:00am to 5:00pm with a follow up appointment. Please expect a phone call from the hospital in that time frame. If you do not receive a call or if you have any questions or concerns, you can reach them at   [phone]    WHAT YOU NEED TO KNOW:    Leg edema is swelling caused by fluid buildup. Your legs may swell if you sit or stand for long periods of time, are pregnant, or are injured. Swelling may also occur if you have heart failure or circulation problems. This means that your heart does not pump blood through your body as it should.    DISCHARGE INSTRUCTIONS:    Self-care:     Elevate your legs: Raise your legs above the level of your heart as often as you can. This will help decrease swelling and pain. Prop your legs on pillows or blankets to keep them elevated comfortably.      Wear pressure stockings: These tight stockings put pressure on your legs to promote blood flow and prevent blood clots. Wear the stockings during the day. Do not wear them while you sleep.      Apply heat: Heat helps decrease pain and swelling. Apply heat on the area for 20 to 30 minutes every 2 hours for as many days as directed.       Stay active: Do not stand or sit for long periods of time. Ask your healthcare provider about the best exercise plan for you.      Eat healthy foods: Healthy foods include fruits, vegetables, whole-grain breads, low-fat dairy products, beans, lean meats, and fish. Ask if you need to be on a special diet. Limit salt. Salt will make your body hold even more fluid.    Follow up with your healthcare provider as directed: Write down your questions so you remember to ask them during your visits.     Contact your healthcare provider if:     You have a fever or feel more tired than usual.      The veins in your legs look larger than usual. They may look full or bulging.      Your legs itch or feel heavy.      You have red or white areas or sores on your legs. The skin may also appear dimpled or have indentations.      You are gaining weight.      You have trouble moving your ankles.      The swelling does not go away, or other parts of your body swell.      You have questions or concerns about your condition or care.    Return to the emergency department if:     You cannot walk.      You feel faint or confused.       Your skin turns blue or gray.      Your leg feels warm, tender, and painful. It may be swollen and red.      You have chest pain or trouble breathing that is worse when you lie down.      You suddenly feel lightheaded and have trouble breathing.      You have new and sudden chest pain. You may have more pain when you take deep breaths or cough. You may also cough up blood.

## 2023-11-07 NOTE — ED PROVIDER NOTE - PATIENT PORTAL LINK FT
You can access the FollowMyHealth Patient Portal offered by [facility] by registering at the following website: [email or web address]. By joining [facility] [employer]’s FollowMyHealth portal, you will also be able to view your health information using other applications (apps) compatible with our system.

## 2023-11-07 NOTE — ED PROVIDER NOTE - ADDITIONAL NOTES AND INSTRUCTIONS:
Mr Elliott was seen in the Emergency Department on 11/7/23 and can return to school or work by the listed date with activity as tolerated.

## 2023-11-07 NOTE — ED PROVIDER NOTE - CLINICAL SUMMARY MEDICAL DECISION MAKING FREE TEXT BOX
52yM seizure schizoprhniea hypothyroidism intellectual disability p/w leg swelling and associated "heaviness" causing difficulty moving around as much.  Pt w/ b/l LE edema but w/o chest pain, SOB, orthopnea, fever/cellulitis or PE risk factors.  Labs reassuring.  CXR w/o ptx or pna.  EKG w/o ischemia or arrhythmia.  US w/o DVT.  Recommend supportive care, o/p PCP f/u and consider diuretics, return precautions.

## 2023-11-13 ENCOUNTER — INPATIENT (INPATIENT)
Facility: HOSPITAL | Age: 53
LOS: 15 days | Discharge: HOME | DRG: 720 | End: 2023-11-29
Attending: INTERNAL MEDICINE | Admitting: STUDENT IN AN ORGANIZED HEALTH CARE EDUCATION/TRAINING PROGRAM
Payer: MEDICAID

## 2023-11-13 VITALS
DIASTOLIC BLOOD PRESSURE: 73 MMHG | SYSTOLIC BLOOD PRESSURE: 122 MMHG | OXYGEN SATURATION: 99 % | TEMPERATURE: 96 F | HEART RATE: 104 BPM | WEIGHT: 159.17 LBS | RESPIRATION RATE: 18 BRPM

## 2023-11-13 DIAGNOSIS — Z87.81 PERSONAL HISTORY OF (HEALED) TRAUMATIC FRACTURE: Chronic | ICD-10-CM

## 2023-11-13 DIAGNOSIS — A41.9 SEPSIS, UNSPECIFIED ORGANISM: ICD-10-CM

## 2023-11-13 DIAGNOSIS — Z98.890 OTHER SPECIFIED POSTPROCEDURAL STATES: Chronic | ICD-10-CM

## 2023-11-13 LAB
ALBUMIN SERPL ELPH-MCNC: 3.9 G/DL — SIGNIFICANT CHANGE UP (ref 3.5–5.2)
ALBUMIN SERPL ELPH-MCNC: 3.9 G/DL — SIGNIFICANT CHANGE UP (ref 3.5–5.2)
ALP SERPL-CCNC: 83 U/L — SIGNIFICANT CHANGE UP (ref 30–115)
ALP SERPL-CCNC: 83 U/L — SIGNIFICANT CHANGE UP (ref 30–115)
ALT FLD-CCNC: 11 U/L — SIGNIFICANT CHANGE UP (ref 0–41)
ALT FLD-CCNC: 11 U/L — SIGNIFICANT CHANGE UP (ref 0–41)
ANION GAP SERPL CALC-SCNC: 13 MMOL/L — SIGNIFICANT CHANGE UP (ref 7–14)
ANION GAP SERPL CALC-SCNC: 13 MMOL/L — SIGNIFICANT CHANGE UP (ref 7–14)
APPEARANCE UR: ABNORMAL
APPEARANCE UR: ABNORMAL
APTT BLD: 31.1 SEC — SIGNIFICANT CHANGE UP (ref 27–39.2)
APTT BLD: 31.1 SEC — SIGNIFICANT CHANGE UP (ref 27–39.2)
AST SERPL-CCNC: 35 U/L — SIGNIFICANT CHANGE UP (ref 0–41)
AST SERPL-CCNC: 35 U/L — SIGNIFICANT CHANGE UP (ref 0–41)
BACTERIA # UR AUTO: ABNORMAL /HPF
BACTERIA # UR AUTO: ABNORMAL /HPF
BASE EXCESS BLDV CALC-SCNC: 4.8 MMOL/L — HIGH (ref -2–3)
BASE EXCESS BLDV CALC-SCNC: 4.8 MMOL/L — HIGH (ref -2–3)
BASOPHILS # BLD AUTO: 0 K/UL — SIGNIFICANT CHANGE UP (ref 0–0.2)
BASOPHILS # BLD AUTO: 0 K/UL — SIGNIFICANT CHANGE UP (ref 0–0.2)
BASOPHILS NFR BLD AUTO: 0 % — SIGNIFICANT CHANGE UP (ref 0–1)
BASOPHILS NFR BLD AUTO: 0 % — SIGNIFICANT CHANGE UP (ref 0–1)
BILIRUB SERPL-MCNC: 0.5 MG/DL — SIGNIFICANT CHANGE UP (ref 0.2–1.2)
BILIRUB SERPL-MCNC: 0.5 MG/DL — SIGNIFICANT CHANGE UP (ref 0.2–1.2)
BILIRUB UR-MCNC: NEGATIVE — SIGNIFICANT CHANGE UP
BILIRUB UR-MCNC: NEGATIVE — SIGNIFICANT CHANGE UP
BUN SERPL-MCNC: 16 MG/DL — SIGNIFICANT CHANGE UP (ref 10–20)
BUN SERPL-MCNC: 16 MG/DL — SIGNIFICANT CHANGE UP (ref 10–20)
CA-I SERPL-SCNC: 1.19 MMOL/L — SIGNIFICANT CHANGE UP (ref 1.15–1.33)
CA-I SERPL-SCNC: 1.19 MMOL/L — SIGNIFICANT CHANGE UP (ref 1.15–1.33)
CALCIUM SERPL-MCNC: 9.1 MG/DL — SIGNIFICANT CHANGE UP (ref 8.4–10.4)
CALCIUM SERPL-MCNC: 9.1 MG/DL — SIGNIFICANT CHANGE UP (ref 8.4–10.4)
CAST: 0 /LPF — SIGNIFICANT CHANGE UP (ref 0–4)
CAST: 0 /LPF — SIGNIFICANT CHANGE UP (ref 0–4)
CHLORIDE SERPL-SCNC: 93 MMOL/L — LOW (ref 98–110)
CHLORIDE SERPL-SCNC: 93 MMOL/L — LOW (ref 98–110)
CO2 SERPL-SCNC: 25 MMOL/L — SIGNIFICANT CHANGE UP (ref 17–32)
CO2 SERPL-SCNC: 25 MMOL/L — SIGNIFICANT CHANGE UP (ref 17–32)
COLOR SPEC: YELLOW — SIGNIFICANT CHANGE UP
COLOR SPEC: YELLOW — SIGNIFICANT CHANGE UP
CREAT SERPL-MCNC: 1.2 MG/DL — SIGNIFICANT CHANGE UP (ref 0.7–1.5)
CREAT SERPL-MCNC: 1.2 MG/DL — SIGNIFICANT CHANGE UP (ref 0.7–1.5)
DIFF PNL FLD: ABNORMAL
DIFF PNL FLD: ABNORMAL
EGFR: 72 ML/MIN/1.73M2 — SIGNIFICANT CHANGE UP
EGFR: 72 ML/MIN/1.73M2 — SIGNIFICANT CHANGE UP
EOSINOPHIL # BLD AUTO: 0 K/UL — SIGNIFICANT CHANGE UP (ref 0–0.7)
EOSINOPHIL # BLD AUTO: 0 K/UL — SIGNIFICANT CHANGE UP (ref 0–0.7)
EOSINOPHIL NFR BLD AUTO: 0 % — SIGNIFICANT CHANGE UP (ref 0–8)
EOSINOPHIL NFR BLD AUTO: 0 % — SIGNIFICANT CHANGE UP (ref 0–8)
FINE GRAN CASTS #/AREA URNS AUTO: PRESENT
FINE GRAN CASTS #/AREA URNS AUTO: PRESENT
GAS PNL BLDV: 128 MMOL/L — LOW (ref 136–145)
GAS PNL BLDV: 128 MMOL/L — LOW (ref 136–145)
GAS PNL BLDV: SIGNIFICANT CHANGE UP
GAS PNL BLDV: SIGNIFICANT CHANGE UP
GLUCOSE SERPL-MCNC: 78 MG/DL — SIGNIFICANT CHANGE UP (ref 70–99)
GLUCOSE SERPL-MCNC: 78 MG/DL — SIGNIFICANT CHANGE UP (ref 70–99)
GLUCOSE UR QL: NEGATIVE MG/DL — SIGNIFICANT CHANGE UP
GLUCOSE UR QL: NEGATIVE MG/DL — SIGNIFICANT CHANGE UP
HCO3 BLDV-SCNC: 30 MMOL/L — HIGH (ref 22–29)
HCO3 BLDV-SCNC: 30 MMOL/L — HIGH (ref 22–29)
HCT VFR BLD CALC: 38 % — LOW (ref 42–52)
HCT VFR BLD CALC: 38 % — LOW (ref 42–52)
HCT VFR BLDA CALC: 39 % — SIGNIFICANT CHANGE UP (ref 39–51)
HCT VFR BLDA CALC: 39 % — SIGNIFICANT CHANGE UP (ref 39–51)
HGB BLD CALC-MCNC: 12.9 G/DL — SIGNIFICANT CHANGE UP (ref 12.6–17.4)
HGB BLD CALC-MCNC: 12.9 G/DL — SIGNIFICANT CHANGE UP (ref 12.6–17.4)
HGB BLD-MCNC: 12.9 G/DL — LOW (ref 14–18)
HGB BLD-MCNC: 12.9 G/DL — LOW (ref 14–18)
INR BLD: 1.14 RATIO — SIGNIFICANT CHANGE UP (ref 0.65–1.3)
INR BLD: 1.14 RATIO — SIGNIFICANT CHANGE UP (ref 0.65–1.3)
KETONES UR-MCNC: ABNORMAL MG/DL
KETONES UR-MCNC: ABNORMAL MG/DL
LACTATE BLDV-MCNC: 1.7 MMOL/L — SIGNIFICANT CHANGE UP (ref 0.5–2)
LACTATE BLDV-MCNC: 1.7 MMOL/L — SIGNIFICANT CHANGE UP (ref 0.5–2)
LACTATE SERPL-SCNC: 1.4 MMOL/L — SIGNIFICANT CHANGE UP (ref 0.7–2)
LACTATE SERPL-SCNC: 1.4 MMOL/L — SIGNIFICANT CHANGE UP (ref 0.7–2)
LEUKOCYTE ESTERASE UR-ACNC: ABNORMAL
LEUKOCYTE ESTERASE UR-ACNC: ABNORMAL
LYMPHOCYTES # BLD AUTO: 0.58 K/UL — LOW (ref 1.2–3.4)
LYMPHOCYTES # BLD AUTO: 0.58 K/UL — LOW (ref 1.2–3.4)
LYMPHOCYTES # BLD AUTO: 3.5 % — LOW (ref 20.5–51.1)
LYMPHOCYTES # BLD AUTO: 3.5 % — LOW (ref 20.5–51.1)
MANUAL SMEAR VERIFICATION: SIGNIFICANT CHANGE UP
MANUAL SMEAR VERIFICATION: SIGNIFICANT CHANGE UP
MCHC RBC-ENTMCNC: 32.4 PG — HIGH (ref 27–31)
MCHC RBC-ENTMCNC: 32.4 PG — HIGH (ref 27–31)
MCHC RBC-ENTMCNC: 33.9 G/DL — SIGNIFICANT CHANGE UP (ref 32–37)
MCHC RBC-ENTMCNC: 33.9 G/DL — SIGNIFICANT CHANGE UP (ref 32–37)
MCV RBC AUTO: 95.5 FL — HIGH (ref 80–94)
MCV RBC AUTO: 95.5 FL — HIGH (ref 80–94)
METAMYELOCYTES # FLD: 2.6 % — HIGH (ref 0–0)
METAMYELOCYTES # FLD: 2.6 % — HIGH (ref 0–0)
MONOCYTES # BLD AUTO: 3.62 K/UL — HIGH (ref 0.1–0.6)
MONOCYTES # BLD AUTO: 3.62 K/UL — HIGH (ref 0.1–0.6)
MONOCYTES NFR BLD AUTO: 21.7 % — HIGH (ref 1.7–9.3)
MONOCYTES NFR BLD AUTO: 21.7 % — HIGH (ref 1.7–9.3)
NEUTROPHILS # BLD AUTO: 10.88 K/UL — HIGH (ref 1.4–6.5)
NEUTROPHILS # BLD AUTO: 10.88 K/UL — HIGH (ref 1.4–6.5)
NEUTROPHILS NFR BLD AUTO: 65.2 % — SIGNIFICANT CHANGE UP (ref 42.2–75.2)
NEUTROPHILS NFR BLD AUTO: 65.2 % — SIGNIFICANT CHANGE UP (ref 42.2–75.2)
NITRITE UR-MCNC: POSITIVE
NITRITE UR-MCNC: POSITIVE
PCO2 BLDV: 45 MMHG — SIGNIFICANT CHANGE UP (ref 42–55)
PCO2 BLDV: 45 MMHG — SIGNIFICANT CHANGE UP (ref 42–55)
PH BLDV: 7.43 — SIGNIFICANT CHANGE UP (ref 7.32–7.43)
PH BLDV: 7.43 — SIGNIFICANT CHANGE UP (ref 7.32–7.43)
PH UR: 6 — SIGNIFICANT CHANGE UP (ref 5–8)
PH UR: 6 — SIGNIFICANT CHANGE UP (ref 5–8)
PLAT MORPH BLD: NORMAL — SIGNIFICANT CHANGE UP
PLAT MORPH BLD: NORMAL — SIGNIFICANT CHANGE UP
PLATELET # BLD AUTO: 140 K/UL — SIGNIFICANT CHANGE UP (ref 130–400)
PLATELET # BLD AUTO: 140 K/UL — SIGNIFICANT CHANGE UP (ref 130–400)
PLATELET COUNT - ESTIMATE: ABNORMAL
PLATELET COUNT - ESTIMATE: ABNORMAL
PMV BLD: 9.2 FL — SIGNIFICANT CHANGE UP (ref 7.4–10.4)
PMV BLD: 9.2 FL — SIGNIFICANT CHANGE UP (ref 7.4–10.4)
PO2 BLDV: 20 MMHG — SIGNIFICANT CHANGE UP
PO2 BLDV: 20 MMHG — SIGNIFICANT CHANGE UP
POIKILOCYTOSIS BLD QL AUTO: SLIGHT — SIGNIFICANT CHANGE UP
POIKILOCYTOSIS BLD QL AUTO: SLIGHT — SIGNIFICANT CHANGE UP
POTASSIUM BLDV-SCNC: 4.3 MMOL/L — SIGNIFICANT CHANGE UP (ref 3.5–5.1)
POTASSIUM BLDV-SCNC: 4.3 MMOL/L — SIGNIFICANT CHANGE UP (ref 3.5–5.1)
POTASSIUM SERPL-MCNC: 4.2 MMOL/L — SIGNIFICANT CHANGE UP (ref 3.5–5)
POTASSIUM SERPL-MCNC: 4.2 MMOL/L — SIGNIFICANT CHANGE UP (ref 3.5–5)
POTASSIUM SERPL-SCNC: 4.2 MMOL/L — SIGNIFICANT CHANGE UP (ref 3.5–5)
POTASSIUM SERPL-SCNC: 4.2 MMOL/L — SIGNIFICANT CHANGE UP (ref 3.5–5)
PROT SERPL-MCNC: 7.3 G/DL — SIGNIFICANT CHANGE UP (ref 6–8)
PROT SERPL-MCNC: 7.3 G/DL — SIGNIFICANT CHANGE UP (ref 6–8)
PROT UR-MCNC: 30 MG/DL
PROT UR-MCNC: 30 MG/DL
PROTHROM AB SERPL-ACNC: 13 SEC — HIGH (ref 9.95–12.87)
PROTHROM AB SERPL-ACNC: 13 SEC — HIGH (ref 9.95–12.87)
RBC # BLD: 3.98 M/UL — LOW (ref 4.7–6.1)
RBC # BLD: 3.98 M/UL — LOW (ref 4.7–6.1)
RBC # FLD: 14.8 % — HIGH (ref 11.5–14.5)
RBC # FLD: 14.8 % — HIGH (ref 11.5–14.5)
RBC BLD AUTO: ABNORMAL
RBC BLD AUTO: ABNORMAL
RBC CASTS # UR COMP ASSIST: 6 /HPF — HIGH (ref 0–4)
RBC CASTS # UR COMP ASSIST: 6 /HPF — HIGH (ref 0–4)
SAO2 % BLDV: 23.5 % — SIGNIFICANT CHANGE UP
SAO2 % BLDV: 23.5 % — SIGNIFICANT CHANGE UP
SODIUM SERPL-SCNC: 131 MMOL/L — LOW (ref 135–146)
SODIUM SERPL-SCNC: 131 MMOL/L — LOW (ref 135–146)
SP GR SPEC: 1.01 — SIGNIFICANT CHANGE UP (ref 1–1.03)
SP GR SPEC: 1.01 — SIGNIFICANT CHANGE UP (ref 1–1.03)
SQUAMOUS # UR AUTO: 15 /HPF — HIGH (ref 0–5)
SQUAMOUS # UR AUTO: 15 /HPF — HIGH (ref 0–5)
STOMATOCYTES BLD QL SMEAR: SLIGHT — SIGNIFICANT CHANGE UP
STOMATOCYTES BLD QL SMEAR: SLIGHT — SIGNIFICANT CHANGE UP
TROPONIN T SERPL-MCNC: <0.01 NG/ML — SIGNIFICANT CHANGE UP
TROPONIN T SERPL-MCNC: <0.01 NG/ML — SIGNIFICANT CHANGE UP
UROBILINOGEN FLD QL: 1 MG/DL — SIGNIFICANT CHANGE UP (ref 0.2–1)
UROBILINOGEN FLD QL: 1 MG/DL — SIGNIFICANT CHANGE UP (ref 0.2–1)
VARIANT LYMPHS # BLD: 7 % — HIGH (ref 0–5)
VARIANT LYMPHS # BLD: 7 % — HIGH (ref 0–5)
WBC # BLD: 16.69 K/UL — HIGH (ref 4.8–10.8)
WBC # BLD: 16.69 K/UL — HIGH (ref 4.8–10.8)
WBC # FLD AUTO: 16.69 K/UL — HIGH (ref 4.8–10.8)
WBC # FLD AUTO: 16.69 K/UL — HIGH (ref 4.8–10.8)
WBC UR QL: 988 /HPF — HIGH (ref 0–5)
WBC UR QL: 988 /HPF — HIGH (ref 0–5)

## 2023-11-13 PROCEDURE — 74177 CT ABD & PELVIS W/CONTRAST: CPT | Mod: 26,MA

## 2023-11-13 PROCEDURE — 80053 COMPREHEN METABOLIC PANEL: CPT

## 2023-11-13 PROCEDURE — 97110 THERAPEUTIC EXERCISES: CPT | Mod: GP

## 2023-11-13 PROCEDURE — 93005 ELECTROCARDIOGRAM TRACING: CPT

## 2023-11-13 PROCEDURE — 71045 X-RAY EXAM CHEST 1 VIEW: CPT | Mod: 26

## 2023-11-13 PROCEDURE — 97163 PT EVAL HIGH COMPLEX 45 MIN: CPT | Mod: GP

## 2023-11-13 PROCEDURE — 82962 GLUCOSE BLOOD TEST: CPT

## 2023-11-13 PROCEDURE — 81001 URINALYSIS AUTO W/SCOPE: CPT

## 2023-11-13 PROCEDURE — 80048 BASIC METABOLIC PNL TOTAL CA: CPT

## 2023-11-13 PROCEDURE — 85025 COMPLETE CBC W/AUTO DIFF WBC: CPT

## 2023-11-13 PROCEDURE — 86901 BLOOD TYPING SEROLOGIC RH(D): CPT

## 2023-11-13 PROCEDURE — 86900 BLOOD TYPING SEROLOGIC ABO: CPT

## 2023-11-13 PROCEDURE — 36415 COLL VENOUS BLD VENIPUNCTURE: CPT

## 2023-11-13 PROCEDURE — 86850 RBC ANTIBODY SCREEN: CPT

## 2023-11-13 PROCEDURE — 84100 ASSAY OF PHOSPHORUS: CPT

## 2023-11-13 PROCEDURE — 87086 URINE CULTURE/COLONY COUNT: CPT

## 2023-11-13 PROCEDURE — 76770 US EXAM ABDO BACK WALL COMP: CPT

## 2023-11-13 PROCEDURE — 97116 GAIT TRAINING THERAPY: CPT | Mod: GP

## 2023-11-13 PROCEDURE — 87040 BLOOD CULTURE FOR BACTERIA: CPT

## 2023-11-13 PROCEDURE — 97530 THERAPEUTIC ACTIVITIES: CPT | Mod: GP

## 2023-11-13 PROCEDURE — 99285 EMERGENCY DEPT VISIT HI MDM: CPT

## 2023-11-13 PROCEDURE — 83735 ASSAY OF MAGNESIUM: CPT

## 2023-11-13 PROCEDURE — 83930 ASSAY OF BLOOD OSMOLALITY: CPT

## 2023-11-13 PROCEDURE — 85027 COMPLETE CBC AUTOMATED: CPT

## 2023-11-13 RX ORDER — ACETAMINOPHEN 500 MG
1000 TABLET ORAL ONCE
Refills: 0 | Status: COMPLETED | OUTPATIENT
Start: 2023-11-13 | End: 2023-11-13

## 2023-11-13 RX ORDER — MEROPENEM 1 G/30ML
1000 INJECTION INTRAVENOUS EVERY 8 HOURS
Refills: 0 | Status: COMPLETED | OUTPATIENT
Start: 2023-11-13 | End: 2023-11-13

## 2023-11-13 RX ORDER — ENOXAPARIN SODIUM 100 MG/ML
40 INJECTION SUBCUTANEOUS EVERY 24 HOURS
Refills: 0 | Status: DISCONTINUED | OUTPATIENT
Start: 2023-11-14 | End: 2023-11-29

## 2023-11-13 RX ORDER — KETOROLAC TROMETHAMINE 30 MG/ML
30 SYRINGE (ML) INJECTION ONCE
Refills: 0 | Status: DISCONTINUED | OUTPATIENT
Start: 2023-11-13 | End: 2023-11-13

## 2023-11-13 RX ORDER — SODIUM CHLORIDE 9 MG/ML
2000 INJECTION, SOLUTION INTRAVENOUS ONCE
Refills: 0 | Status: COMPLETED | OUTPATIENT
Start: 2023-11-13 | End: 2023-11-13

## 2023-11-13 RX ORDER — ACETAMINOPHEN 500 MG
975 TABLET ORAL ONCE
Refills: 0 | Status: COMPLETED | OUTPATIENT
Start: 2023-11-13 | End: 2023-11-13

## 2023-11-13 RX ORDER — PIPERACILLIN AND TAZOBACTAM 4; .5 G/20ML; G/20ML
3.38 INJECTION, POWDER, LYOPHILIZED, FOR SOLUTION INTRAVENOUS ONCE
Refills: 0 | Status: COMPLETED | OUTPATIENT
Start: 2023-11-13 | End: 2023-11-13

## 2023-11-13 RX ORDER — MIDAZOLAM HYDROCHLORIDE 1 MG/ML
2 INJECTION, SOLUTION INTRAMUSCULAR; INTRAVENOUS ONCE
Refills: 0 | Status: DISCONTINUED | OUTPATIENT
Start: 2023-11-13 | End: 2023-11-13

## 2023-11-13 RX ORDER — SODIUM CHLORIDE 9 MG/ML
500 INJECTION, SOLUTION INTRAVENOUS
Refills: 0 | Status: DISCONTINUED | OUTPATIENT
Start: 2023-11-13 | End: 2023-11-13

## 2023-11-13 RX ADMIN — MIDAZOLAM HYDROCHLORIDE 2 MILLIGRAM(S): 1 INJECTION, SOLUTION INTRAMUSCULAR; INTRAVENOUS at 20:08

## 2023-11-13 RX ADMIN — PIPERACILLIN AND TAZOBACTAM 200 GRAM(S): 4; .5 INJECTION, POWDER, LYOPHILIZED, FOR SOLUTION INTRAVENOUS at 17:40

## 2023-11-13 RX ADMIN — Medication 975 MILLIGRAM(S): at 19:00

## 2023-11-13 RX ADMIN — SODIUM CHLORIDE 500 MILLILITER(S): 9 INJECTION, SOLUTION INTRAVENOUS at 18:40

## 2023-11-13 RX ADMIN — Medication 975 MILLIGRAM(S): at 17:25

## 2023-11-13 RX ADMIN — SODIUM CHLORIDE 2000 MILLILITER(S): 9 INJECTION, SOLUTION INTRAVENOUS at 16:45

## 2023-11-13 RX ADMIN — SODIUM CHLORIDE 2000 MILLILITER(S): 9 INJECTION, SOLUTION INTRAVENOUS at 18:40

## 2023-11-13 RX ADMIN — SODIUM CHLORIDE 500 MILLILITER(S): 9 INJECTION, SOLUTION INTRAVENOUS at 18:21

## 2023-11-13 RX ADMIN — Medication 30 MILLIGRAM(S): at 21:11

## 2023-11-13 NOTE — H&P ADULT - NSHPLABSRESULTS_GEN_ALL_CORE
12.9   16.69 )-----------( 140      ( 2023 17:23 )             38.0       11-13    131<L>  |  93<L>  |  16  ----------------------------<  78  4.2   |  25  |  1.2    Ca    9.1      2023 17:23    TPro  7.3  /  Alb  3.9  /  TBili  0.5  /  DBili  x   /  AST  35  /  ALT  11  /  AlkPhos  83  11-13              Urinalysis Basic - ( 2023 17:58 )    Color: Yellow / Appearance: Cloudy / S.015 / pH: x  Gluc: x / Ketone: Trace mg/dL  / Bili: Negative / Urobili: 1.0 mg/dL   Blood: x / Protein: 30 mg/dL / Nitrite: Positive   Leuk Esterase: Moderate / RBC: 6 /HPF /  /HPF   Sq Epi: x / Non Sq Epi: 15 /HPF / Bacteria: Many /HPF        PT/INR - ( 2023 17:23 )   PT: 13.00 sec;   INR: 1.14 ratio         PTT - ( 2023 17:23 )  PTT:31.1 sec    Lactate Trend  -13 @ 17:23 Lactate:1.4       CARDIAC MARKERS ( 2023 17:23 )  x     / <0.01 ng/mL / x     / x     / x            CAPILLARY BLOOD GLUCOSE

## 2023-11-13 NOTE — H&P ADULT - HISTORY OF PRESENT ILLNESS
52-year-old male with PMHx of Mild mental retardation, intellectual disability, Schizophrenia, Impulse control disorder, Seizures, Hypothyroidism, Exotropia, Cataracts, Osteopenia, urethral stricture, presented from [facility] for of weakness.  Patient aide at bedside providing additional history.  Patient with progressive weakness over the last week, today does not want to eat food, has been unable to walk without max assist which is unusual.  Of note patient was diagnosed with UTI 2 weeks ago, started on antibiotics, he was retested a week later was still found to have UTI and prescribed amoxicillin at that time which he is now completed but has not been tested for UTI again yet.  Patient with known urethral stricture, was scheduled for cystoscopy but never got procedure done secondary to consent issues.     In the ED: vitals febrile and tachycardic, on 2L NC. normal BP  labs remarkable for WBC 16.69, ++VE UA  CT abd pelv: Limited examination due to motion artifact and streak artifact. Apparent hypoenhancement at the right renal upper pole with mild hydroureteronephrosis and question of urothelial enhancement raising suspicion for ascending urinary tract infection. No definite radiopaque obstructing stone. Distended urinary bladder with mild wall thickening.   EKG: sinus tach    Patient received 2L IVFs and zosyn and was admitted to medicine for sepsis 2/2 R pyelonephritis.      52-year-old male with PMHx of Mild mental retardation, intellectual disability, Schizophrenia, Impulse control disorder, Seizures, Hypothyroidism, Exotropia, Cataracts, Osteopenia, urethral stricture, presented from [facility] for of weakness.  Patient aide at bedside providing additional history.  Patient with progressive weakness over the last week, today does not want to eat food, has been unable to walk without max assist which is unusual.  Of note patient was diagnosed with UTI 2 weeks ago, started on antibiotics, he was retested a week later was still found to have UTI and prescribed amoxicillin at that time which he is now completed but has not been tested for UTI again yet.  Patient with known urethral stricture, was scheduled for cystoscopy but never got procedure done secondary to consent issues.     In the ED: vitals febrile and tachycardic, on 2L NC. normal BP  labs remarkable for WBC 16.69, ++VE UA  CT abd pelv: Limited examination due to motion artifact and streak artifact. Apparent hypoenhancement at the right renal upper pole with mild hydroureteronephrosis and question of urothelial enhancement raising suspicion for ascending urinary tract infection. No definite radiopaque obstructing stone. Distended urinary bladder with mild wall thickening.   EKG: sinus tach    Patient received 2L IVFs and zosyn and was admitted to medicine for sepsis 2/2 R pyelonephritis.      52-year-old male with PMHx of Mild mental retardation, intellectual disability, Schizophrenia, Impulse control disorder, Seizures, Hypothyroidism, Exotropia, Cataracts, Osteopenia, urethral stricture, presented from [facility], [place], operated by [facility] for of weakness.  Patient aide at bedside providing additional history.  Patient with progressive weakness over the last week, today does not want to eat food, has been unable to walk without max assist which is unusual.  Of note patient was diagnosed with UTI 2 weeks ago, started on antibiotics, he was retested a week later was still found to have UTI and prescribed amoxicillin at that time which he is now completed but has not been tested for UTI again yet.  Patient with known urethral stricture, was scheduled for cystoscopy but never got procedure done secondary to consent issues.     In the ED: vitals febrile and tachycardic, on 2L NC. normal BP  labs remarkable for WBC 16.69, ++VE UA  CT abd pelv: Limited examination due to motion artifact and streak artifact. Apparent hypoenhancement at the right renal upper pole with mild hydroureteronephrosis and question of urothelial enhancement raising suspicion for ascending urinary tract infection. No definite radiopaque obstructing stone. Distended urinary bladder with mild wall thickening.   EKG: sinus tach    Patient received 2L IVFs and zosyn and was admitted to medicine for sepsis 2/2 R pyelonephritis.      52-year-old male with PMHx of Mild mental retardation, intellectual disability, Schizophrenia, Impulse control disorder, Seizures, Hypothyroidism, Exotropia, Cataracts, Osteopenia, urethral stricture, presented from [facility] for weakness. Patient aide at bedside providing additional history.  Patient with progressive weakness over the last week, today does not want to eat food, has been unable to walk without max assist which is unusual.  Of note patient was diagnosed with UTI 2 weeks ago, started on antibiotics, he was retested a week later was still found to have UTI and prescribed amoxicillin at that time which he is now completed but has not been tested for UTI again yet.  Patient with known urethral stricture, was scheduled for cystoscopy but never got procedure done secondary to consent issues.     In the ED: vitals febrile and tachycardic, on 2L NC. normal BP  labs remarkable for WBC 16.69, ++VE UA  CT abd pelv: Limited examination due to motion artifact and streak artifact. Apparent hypoenhancement at the right renal upper pole with mild hydroureteronephrosis and question of urothelial enhancement raising suspicion for ascending urinary tract infection. No definite radiopaque obstructing stone. Distended urinary bladder with mild wall thickening.   EKG: sinus tach    Patient received 2L IVFs and zosyn and was admitted to medicine for sepsis 2/2 R pyelonephritis.      52-year-old male with PMHx of Mild mental retardation, intellectual disability, Schizophrenia, Impulse control disorder, Seizures, Hypothyroidism, Exotropia, Cataracts, Osteopenia, urethral stricture, presented from [facility] for weakness. Patient aide at bedside providing additional history.  Patient with progressive weakness over the last week, today does not want to eat food, has been unable to walk without max assist which is unusual.  Of note patient was diagnosed with UTI 2 weeks ago, started on antibiotics, he was retested a week later was still found to have UTI and prescribed amoxicillin at that time which he is now completed but has not been tested for UTI again yet.  Patient with known urethral stricture, was scheduled for cystoscopy but never got procedure done secondary to consent issues.     In the ED: vitals febrile and tachycardic, on 2L NC. normal BP  labs remarkable for WBC 16.69, ++VE UA  CT abd pelv: Limited examination due to motion artifact and streak artifact. Apparent hypoenhancement at the right renal upper pole with mild hydroureteronephrosis and question of urothelial enhancement raising suspicion for ascending urinary tract infection. No definite radiopaque obstructing stone. Distended urinary bladder with mild wall thickening.   EKG: sinus tach    Patient received 2L IVFs and zosyn and was admitted to medicine for sepsis 2/2 R pyelonephritis.      52-year-old male with PMHx of Mild mental retardation, intellectual disability, Schizophrenia, Impulse control disorder, Seizures, Hypothyroidism, Exotropia, Cataracts, Osteopenia, urethral stricture, presented from senior living [facility] for weakness. Patient aide at bedside providing additional history.  Patient with progressive weakness over the last week, today does not want to eat food, has been unable to walk without max assist which is unusual.  Of note patient was diagnosed with UTI 2 weeks ago, started on antibiotics, he was retested a week later was still found to have UTI and prescribed amoxicillin at that time which he is now completed but has not been tested for UTI again yet.  Patient with known urethral stricture, was scheduled for cystoscopy but never got procedure done secondary to consent issues.     In the ED: vitals febrile and tachycardic, on 2L NC. normal BP  labs remarkable for WBC 16.69, ++VE UA  CT abd pelv: Limited examination due to motion artifact and streak artifact. Apparent hypoenhancement at the right renal upper pole with mild hydroureteronephrosis and question of urothelial enhancement raising suspicion for ascending urinary tract infection. No definite radiopaque obstructing stone. Distended urinary bladder with mild wall thickening.   EKG: sinus tach    Patient received 2L IVFs and zosyn and was admitted to medicine for sepsis 2/2 R pyelonephritis.      52-year-old male with PMHx of Mild mental retardation, intellectual disability, Schizophrenia, Impulse control disorder, Seizures, Hypothyroidism, Exotropia, Cataracts, Osteopenia, urethral stricture, presented from [facility] for weakness. Hx taken from aid at bedside as patient was lethargic when I saw him post versed for CT scan.  He had progressive weakness over the last week, today does not want to eat food, has been unable to walk without max assist which is unusual.  He had a UTI 2 weeks ago and completed abx course then was retested after a week and UA was still +Ve so he completed another course of amoxicillin.   Patient has hx pf urethral stricture, was scheduled for cystoscopy but never got procedure done secondary to consent issues.     In the ED: vitals febrile and tachycardic, on 2L NC. normal BP  labs remarkable for WBC 16.69, ++VE UA  CT abd pelv: Limited examination due to motion artifact and streak artifact. Apparent hypoenhancement at the right renal upper pole with mild hydroureteronephrosis and question of urothelial enhancement raising suspicion for ascending urinary tract infection. No definite radiopaque obstructing stone. Distended urinary bladder with mild wall thickening.   EKG: sinus tach    Patient received 2L IVFs and zosyn and was admitted to medicine for sepsis 2/2 R pyelonephritis.      52-year-old male with PMHx of Mild mental retardation, intellectual disability, Schizophrenia, Impulse control disorder, Seizures, Hypothyroidism, Exotropia, Cataracts, Osteopenia, urethral stricture, presented from assisted [place] for weakness. Hx taken from aid at bedside as patient was lethargic when I saw him post versed for CT scan.  He had progressive weakness over the last week, today does not want to eat food, has been unable to walk without max assist which is unusual.  He had a UTI 2 weeks ago and completed abx course then was retested after a week and UA was still +Ve so he completed another course of amoxicillin.   Patient has hx pf urethral stricture, was scheduled for cystoscopy but never got procedure done secondary to consent issues.     In the ED: vitals febrile and tachycardic, on 2L NC. normal BP  labs remarkable for WBC 16.69, ++VE UA  CT abd pelv: Limited examination due to motion artifact and streak artifact. Apparent hypoenhancement at the right renal upper pole with mild hydroureteronephrosis and question of urothelial enhancement raising suspicion for ascending urinary tract infection. No definite radiopaque obstructing stone. Distended urinary bladder with mild wall thickening.   EKG: sinus tach    Patient received 2L IVFs and zosyn and was admitted to medicine for sepsis 2/2 R pyelonephritis.      52-year-old male with PMHx of Mild mental retardation, intellectual disability, Schizophrenia, Impulse control disorder, Seizures, Hypothyroidism, Exotropia, Cataracts, Osteopenia, urethral stricture, presented from nursing home [place] for weakness. Hx taken from aid at bedside as patient was lethargic when I saw him post versed for CT scan.  He had progressive weakness over the last week, today does not want to eat food, has been unable to walk without max assist which is unusual.  He had a UTI 2 weeks ago and completed abx course then was retested after a week and UA was still +Ve so he completed another course of amoxicillin.   Patient has hx pf urethral stricture, was scheduled for cystoscopy but never got procedure done secondary to consent issues.     In the ED: vitals febrile and tachycardic, on 2L NC. normal BP  labs remarkable for WBC 16.69, ++VE UA  CT abd pelv: Limited examination due to motion artifact and streak artifact. Apparent hypoenhancement at the right renal upper pole with mild hydroureteronephrosis and question of urothelial enhancement raising suspicion for ascending urinary tract infection. No definite radiopaque obstructing stone. Distended urinary bladder with mild wall thickening.   EKG: sinus tach    Patient received 2L IVFs and zosyn and was admitted to medicine for sepsis 2/2 R pyelonephritis.

## 2023-11-13 NOTE — ED PROVIDER NOTE - PHYSICAL EXAMINATION
CONST: Well appearing in NAD  EYES: PERRL, EOMI, Sclera and conjunctiva clear.   ENT: Oropharynx normal appearing, no erythema or exudates. Uvula midline.  CARD: Normal S1 S2; Normal rate and rhythm  RESP: Equal BS B/L, No wheezes, rhonchi or rales. No distress  GI: Soft, non-tender, non-distended.  MS: Normal ROM in all extremities. No midline spinal tenderness.  SKIN: Warm, dry, no acute rashes.   NEURO: A&Ox2, No focal deficits. Strength 5/5 with no sensory deficits.

## 2023-11-13 NOTE — ED ADULT NURSE NOTE - NSFALLRISKINTERV_ED_ALL_ED
Assistance OOB with selected safe patient handling equipment if applicable/Assistance with ambulation/Communicate fall risk and risk factors to all staff, patient, and family/Monitor gait and stability/Provide visual cue: yellow wristband, yellow gown, etc/Reinforce activity limits and safety measures with patient and family/Call bell, personal items and telephone in reach/Instruct patient to call for assistance before getting out of bed/chair/stretcher/Non-slip footwear applied when patient is off stretcher/[place] to call system/Physically safe environment - no spills, clutter or unnecessary equipment/Purposeful Proactive Rounding/Room/bathroom lighting operational, light cord in reach

## 2023-11-13 NOTE — H&P ADULT - ATTENDING COMMENTS
Pt seen in ED4 today. History as above. Previous notes reviewed by me. When I saw him this morning, he was awake, alert and responded to questions. He denied pain. Long draining cloudy urine with sediment. He did not feel like eating breakfast - maintenance IVF started.    T(F): 98.2 (11-14-23 @ 16:43), Max: 103.5 (11-13-23 @ 21:05)  HR: 107 (11-14-23 @ 16:43) (67 - 114)  BP: 123/75 (11-14-23 @ 16:43) (93/55 - 145/66)  RR: 18 (11-14-23 @ 16:43) (16 - 18)  SpO2: 99% (11-14-23 @ 16:43) (97% - 100%) on RA    I&O's Summary    13 Nov 2023 07:01  -  14 Nov 2023 07:00  --------------------------------------------------------  IN: 0 mL / OUT: 450 mL / NET: -450 mL    PHYSICAL EXAM:  GENERAL: NAD, in fetal position  HEAD:  Normocephalic  EYES:  conjunctiva and sclera clear  ENMT: Mouth closed  NERVOUS SYSTEM:  Alert, awake, Good concentration  CHEST/LUNG: CTA b/l  HEART: Regular rate and rhythm  ABDOMEN: Soft, Nontender, Nondistended  EXTREMITIES:   No edema LE  SKIN: cool to touch  : mercedes    Consultant(s) Notes Reviewed:  [x ] YES  [ ] NO  Care Discussed with Consultants/Other Providers [ x] YES  [ ] NO    LABS:                        11.6   14.53 )-----------( 118      ( 14 Nov 2023 07:17 )             34.3     11-14    137  |  100  |  15  ----------------------------<  79  3.7   |  28  |  1.0    Ca    8.3<L>      14 Nov 2023 07:17  Phos  3.9     11-14  Mg     1.7     11-14  TPro  7.3  /  Alb  3.9  /  TBili  0.5  /  DBili  x   /  AST  35  /  ALT  11  /  AlkPhos  83  11-13  PT/INR - ( 13 Nov 2023 17:23 )   PT: 13.00 sec;   INR: 1.14 ratio  PTT - ( 13 Nov 2023 17:23 )  PTT:31.1 sec    RADIOLOGY & ADDITIONAL TESTS:  Imaging report Personally Reviewed:  [x ] YES  [ ] NO  Case discussed with residents, aide from group home and RN on rounds today    53 y/o man with PMH of Mild mental retardation, intellectual disability, Schizophrenia, Impulse control disorder, Seizures, Hypothyroidism, Exotropia, Cataracts, Osteopenia, urethral stricture, and recent treatment for UTI twice with abx as outpt presented from group home [facility] for weakness, lethargy, decreased PO intake and inability to walk without maximal assistance per group aide at bedside. He has hx of urethral stricture and was scheduled for cystoscopy but never had the procedure done due to consent issues.     1. Sepsis present on admission due to right sided pyelonephritis  h/o recently treated UTIs  urinary retention - long placed by  and 700cc was drained  h/o urethral stricture  h/o ESBL E. coli in urine culture 6/23  CT Abd/pelvis: Limited examination due to motion artifact and streak artifact. Apparent hypoenhancement at the right renal upper pole with mild   hydroureteronephrosis and question of urothelial enhancement raising suspicion for ascending urinary tract infection. No definite radiopaque   obstructing stone. Distended urinary bladder with mild wall thickening  meropenem 1g IV q8hr  ID consult appreciated  f/u blood and urine cultures   consult appreciated - keep long for now, check renal/bladder US in 2 days to evaluate for resolution of hydronephrosis, TOV when ambulating  on flomax but monitor BP closely  continue IVF for now  encourage PO intake  PT consult in AM     2. Mild mental retardation, schizophrenia, impulse control disorder - on bupropion, haldol, mirtazapine  3. Hypothyroid on synthroid  4. DVT prophylaxis - lovenox      full code  guarded prognosis  high risk pt Pt seen in ED4 today. History as above. Previous notes reviewed by me. When I saw him this morning, he was awake, alert and responded to questions. He denied pain. Long draining cloudy urine with sediment. He did not feel like eating breakfast - maintenance IVF started.    T(F): 98.2 (11-14-23 @ 16:43), Max: 103.5 (11-13-23 @ 21:05)  HR: 107 (11-14-23 @ 16:43) (67 - 114)  BP: 123/75 (11-14-23 @ 16:43) (93/55 - 145/66)  RR: 18 (11-14-23 @ 16:43) (16 - 18)  SpO2: 99% (11-14-23 @ 16:43) (97% - 100%) on RA    I&O's Summary    13 Nov 2023 07:01  -  14 Nov 2023 07:00  --------------------------------------------------------  IN: 0 mL / OUT: 450 mL / NET: -450 mL    PHYSICAL EXAM:  GENERAL: NAD, in fetal position  HEAD:  Normocephalic  EYES:  conjunctiva and sclera clear  ENMT: Mouth closed  NERVOUS SYSTEM:  Alert, awake, Good concentration  CHEST/LUNG: CTA b/l  HEART: Regular rate and rhythm  ABDOMEN: Soft, Nontender, Nondistended  EXTREMITIES:   No edema LE  SKIN: cool to touch  : mercedes    Consultant(s) Notes Reviewed:  [x ] YES  [ ] NO  Care Discussed with Consultants/Other Providers [ x] YES  [ ] NO    LABS:                        11.6   14.53 )-----------( 118      ( 14 Nov 2023 07:17 )             34.3     11-14    137  |  100  |  15  ----------------------------<  79  3.7   |  28  |  1.0    Ca    8.3<L>      14 Nov 2023 07:17  Phos  3.9     11-14  Mg     1.7     11-14  TPro  7.3  /  Alb  3.9  /  TBili  0.5  /  DBili  x   /  AST  35  /  ALT  11  /  AlkPhos  83  11-13  PT/INR - ( 13 Nov 2023 17:23 )   PT: 13.00 sec;   INR: 1.14 ratio  PTT - ( 13 Nov 2023 17:23 )  PTT:31.1 sec    RADIOLOGY & ADDITIONAL TESTS:  Imaging report Personally Reviewed:  [x ] YES  [ ] NO  Case discussed with residents, aide from group home and RN on rounds today    51 y/o man with PMH of Mild mental retardation, intellectual disability, Schizophrenia, Impulse control disorder, Seizures, Hypothyroidism, Exotropia, Cataracts, Osteopenia, urethral stricture, and recent treatment for UTI twice with abx as outpt presented from group home [facility] for weakness, lethargy, decreased PO intake and inability to walk without maximal assistance per group aide at bedside. He has hx of urethral stricture and was scheduled for cystoscopy but never had the procedure done due to consent issues.     1. Sepsis present on admission due to right sided pyelonephritis  h/o recently treated UTIs  urinary retention - long placed by  and 700cc was drained  h/o urethral stricture  h/o ESBL E. coli in urine culture 6/23  CT Abd/pelvis: Limited examination due to motion artifact and streak artifact. Apparent hypoenhancement at the right renal upper pole with mild   hydroureteronephrosis and question of urothelial enhancement raising suspicion for ascending urinary tract infection. No definite radiopaque   obstructing stone. Distended urinary bladder with mild wall thickening  meropenem 1g IV q8hr  ID consult appreciated  f/u blood and urine cultures   consult appreciated - keep long for now, check renal/bladder US in 2 days to evaluate for resolution of hydronephrosis, TOV when ambulating  on flomax but monitor BP closely  continue IVF for now  encourage PO intake  PT consult in AM     2. Mild mental retardation, schizophrenia, impulse control disorder - on bupropion, haldol, mirtazapine  3. Hypothyroid on synthroid  4. DVT prophylaxis - lovenox      full code  guarded prognosis  high risk pt Pt seen in ED4 today. History as above. Previous notes reviewed by me. When I saw him this morning, he was awake, alert and responded to questions. He denied pain. Long draining cloudy urine with sediment. He did not feel like eating breakfast - maintenance IVF started.    T(F): 98.2 (11-14-23 @ 16:43), Max: 103.5 (11-13-23 @ 21:05)  HR: 107 (11-14-23 @ 16:43) (67 - 114)  BP: 123/75 (11-14-23 @ 16:43) (93/55 - 145/66)  RR: 18 (11-14-23 @ 16:43) (16 - 18)  SpO2: 99% (11-14-23 @ 16:43) (97% - 100%) on RA    I&O's Summary    13 Nov 2023 07:01  -  14 Nov 2023 07:00  --------------------------------------------------------  IN: 0 mL / OUT: 450 mL / NET: -450 mL    PHYSICAL EXAM:  GENERAL: NAD, in fetal position  HEAD:  Normocephalic  EYES:  conjunctiva and sclera clear  ENMT: Mouth closed  NERVOUS SYSTEM:  Alert, awake, Good concentration  CHEST/LUNG: CTA b/l  HEART: Regular rate and rhythm  ABDOMEN: Soft, Nontender, Nondistended  EXTREMITIES:   No edema LE  SKIN: cool to touch  : mercedes    Consultant(s) Notes Reviewed:  [x ] YES  [ ] NO  Care Discussed with Consultants/Other Providers [ x] YES  [ ] NO    LABS:                        11.6   14.53 )-----------( 118      ( 14 Nov 2023 07:17 )             34.3     11-14    137  |  100  |  15  ----------------------------<  79  3.7   |  28  |  1.0    Ca    8.3<L>      14 Nov 2023 07:17  Phos  3.9     11-14  Mg     1.7     11-14  TPro  7.3  /  Alb  3.9  /  TBili  0.5  /  DBili  x   /  AST  35  /  ALT  11  /  AlkPhos  83  11-13  PT/INR - ( 13 Nov 2023 17:23 )   PT: 13.00 sec;   INR: 1.14 ratio  PTT - ( 13 Nov 2023 17:23 )  PTT:31.1 sec    RADIOLOGY & ADDITIONAL TESTS:  Imaging report Personally Reviewed:  [x ] YES  [ ] NO  Case discussed with residents, aide from group home and RN on rounds today    53 y/o man with PMH of Mild mental retardation, intellectual disability, Schizophrenia, Impulse control disorder, Seizures, Hypothyroidism, Exotropia, Cataracts, Osteopenia, urethral stricture, and recent treatment for UTI twice with abx as outpt presented from group home [facility] for weakness, lethargy, decreased PO intake and inability to walk without maximal assistance per group aide at bedside. He has hx of urethral stricture and was scheduled for cystoscopy but never had the procedure done due to consent issues.     1. Sepsis present on admission due to right sided pyelonephritis  h/o recently treated UTIs  urinary retention - long placed by  and 700cc was drained  h/o urethral stricture  h/o ESBL E. coli in urine culture 6/23  CT Abd/pelvis: Limited examination due to motion artifact and streak artifact. Apparent hypoenhancement at the right renal upper pole with mild   hydroureteronephrosis and question of urothelial enhancement raising suspicion for ascending urinary tract infection. No definite radiopaque   obstructing stone. Distended urinary bladder with mild wall thickening  meropenem 1g IV q8hr  ID consult appreciated  f/u blood and urine cultures   consult appreciated - keep long for now, check renal/bladder US in 2 days to evaluate for resolution of hydronephrosis, TOV when ambulating  on flomax but monitor BP closely  continue IVF for now  encourage PO intake  PT consult in AM     2. Mild mental retardation, schizophrenia, impulse control disorder - on bupropion, haldol, mirtazapine  3. Hypothyroid on synthroid  4. DVT prophylaxis - lovenox      full code  guarded prognosis  high risk pt

## 2023-11-13 NOTE — ED PROVIDER NOTE - PROGRESS NOTE DETAILS
JS: Code sepsis called - patient found to be febrile 105 rectally. Fluid bolus and abx ordered. Initial lactate 1.7. Suspected urinary source.

## 2023-11-13 NOTE — H&P ADULT - ASSESSMENT
#sepsis 2/2 Right pyelonephritis  - CT abd pelv: Limited examination due to motion artifact and streak artifact. Apparent hypoenhancement at the right renal upper pole with mild hydroureteronephrosis and question of urothelial enhancement raising suspicion for ascending urinary tract infection. No definite radiopaque obstructing stone. Distended urinary bladder with mild wall thickening.    - patient has hx of Ecoli ESBL --> will start meropenem and consult ID   - f/u Ucx and blood cultures   - IV tylenol for fever     #MISC  - DVT PPx: lovenox 40 sc qd  - GI PPx:   - Diet:   - Activity: IAT  - Labs: ordered  - Code:  Full code  - Dispo: Medicine     - Medrec: confirmed using [facility] charts  #sepsis 2/2 Right pyelonephritis with mild hydroureteronephrosis   - CT abd pelv: Limited examination due to motion artifact and streak artifact. Apparent hypoenhancement at the right renal upper pole with mild hydroureteronephrosis and question of urothelial enhancement raising suspicion for ascending urinary tract infection. No definite radiopaque obstructing stone. Distended urinary bladder with mild wall thickening.    - patient has hx of Ecoli ESBL --> will start meropenem and consult ID   - f/u Ucx and blood cultures   - IV tylenol for fever   - long placed in the ED     Intellectual disability, schizophrenia bipolar : cw home haldol 10 q8, mirtazapine 15 qd, bupropion 150 qd, melatonin bedtime  #hypothyroidism: cw home levothyroxine 100mg qd     #MISC  - DVT PPx: lovenox 40 sc qd  - GI PPx: ;low risk  - Diet: regular   - Activity: IAT  - Labs: ordered  - Code:  Full code  - Dispo: Medicine     - Medrec: confirmed using  charts    nurse: Ashyln Murray    #sepsis 2/2 Right pyelonephritis with mild hydroureteronephrosis   - CT abd pelv: Limited examination due to motion artifact and streak artifact. Apparent hypoenhancement at the right renal upper pole with mild hydroureteronephrosis and question of urothelial enhancement raising suspicion for ascending urinary tract infection. No definite radiopaque obstructing stone. Distended urinary bladder with mild wall thickening.    - patient has hx of Ecoli ESBL --> will start meropenem and consult ID   - f/u Ucx and blood cultures   - IV tylenol for fever   - long placed in the ED     Intellectual disability, schizophrenia bipolar : cw home haldol 10 q8, mirtazapine 15 qd, bupropion 150 qd, melatonin bedtime  #hypothyroidism: cw home levothyroxine 100mg qd     #MISC  - DVT PPx: lovenox 40 sc qd  - GI PPx: ;low risk  - Diet: regular   - Activity: IAT  - Labs: ordered  - Code:  Full code  - Dispo: Medicine     - Medrec: confirmed using  charts    nurse: Ashlyn Murray    #sepsis 2/2 Right pyelonephritis with mild hydroureteronephrosis   - CT abd pelv: Limited examination due to motion artifact and streak artifact. Apparent hypoenhancement at the right renal upper pole with mild hydroureteronephrosis and question of urothelial enhancement raising suspicion for ascending urinary tract infection. No definite radiopaque obstructing stone. Distended urinary bladder with mild wall thickening.    - patient has hx of Ecoli ESBL --> will start meropenem and consult ID   - f/u Ucx and blood cultures   - IV tylenol for fever   - nurse Unable to place long in ED   - Urology consulted for hydroureteronephrosis and long placement     Intellectual disability, schizophrenia bipolar : cw home haldol 10 q8, mirtazapine 15 qd, bupropion 150 qd, melatonin bedtime  #hypothyroidism: cw home levothyroxine 100mg qd     #MISC  - DVT PPx: lovenox 40 sc qd  - GI PPx: ;low risk  - Diet: regular   - Activity: IAT  - Labs: ordered  - Code:  Full code  - Dispo: Medicine     - Medrec: confirmed using  charts    nurse: Ashlyn Murray

## 2023-11-13 NOTE — ED PROVIDER NOTE - OBJECTIVE STATEMENT
52-year-old male with a past medical history of intellectual disability, hypothyroidism presents emerged department complaining of weakness.  Patient aide at bedside providing additional history.  Patient with progressive weakness over the last week, today does not want to eat food, has been unable to walk without max assist which is unusual.  Of note patient was diagnosed with UTI 2 weeks ago, started on antibiotics, he was retested a week later was still found to have UTI and prescribed amoxicillin at that time which he is now completed but has not been tested for UTI again yet.  Patient with known urethral stricture, was scheduled for cystoscopy but never got procedure done secondary to consent issues.  No fevers, nausea, vomiting, cough, diarrhea, abdominal pain.

## 2023-11-13 NOTE — ED PROVIDER NOTE - CLINICAL SUMMARY MEDICAL DECISION MAKING FREE TEXT BOX
52-year-old male with a past medical history of intellectual disability, hypothyroidism presents emerged department complaining of weakness- noted with fever, tachycardia, concern for sepsis. labs with leukocytosis, imaging with ascending UTI, ua with UTI. sepsis bundle initiated. admitted for further management.

## 2023-11-14 LAB
ANION GAP SERPL CALC-SCNC: 9 MMOL/L — SIGNIFICANT CHANGE UP (ref 7–14)
ANION GAP SERPL CALC-SCNC: 9 MMOL/L — SIGNIFICANT CHANGE UP (ref 7–14)
BASOPHILS # BLD AUTO: 0.03 K/UL — SIGNIFICANT CHANGE UP (ref 0–0.2)
BASOPHILS # BLD AUTO: 0.03 K/UL — SIGNIFICANT CHANGE UP (ref 0–0.2)
BASOPHILS NFR BLD AUTO: 0.2 % — SIGNIFICANT CHANGE UP (ref 0–1)
BASOPHILS NFR BLD AUTO: 0.2 % — SIGNIFICANT CHANGE UP (ref 0–1)
BLD GP AB SCN SERPL QL: SIGNIFICANT CHANGE UP
BLD GP AB SCN SERPL QL: SIGNIFICANT CHANGE UP
BUN SERPL-MCNC: 15 MG/DL — SIGNIFICANT CHANGE UP (ref 10–20)
BUN SERPL-MCNC: 15 MG/DL — SIGNIFICANT CHANGE UP (ref 10–20)
CALCIUM SERPL-MCNC: 8.3 MG/DL — LOW (ref 8.4–10.5)
CALCIUM SERPL-MCNC: 8.3 MG/DL — LOW (ref 8.4–10.5)
CHLORIDE SERPL-SCNC: 100 MMOL/L — SIGNIFICANT CHANGE UP (ref 98–110)
CHLORIDE SERPL-SCNC: 100 MMOL/L — SIGNIFICANT CHANGE UP (ref 98–110)
CO2 SERPL-SCNC: 28 MMOL/L — SIGNIFICANT CHANGE UP (ref 17–32)
CO2 SERPL-SCNC: 28 MMOL/L — SIGNIFICANT CHANGE UP (ref 17–32)
CREAT SERPL-MCNC: 1 MG/DL — SIGNIFICANT CHANGE UP (ref 0.7–1.5)
CREAT SERPL-MCNC: 1 MG/DL — SIGNIFICANT CHANGE UP (ref 0.7–1.5)
EGFR: 90 ML/MIN/1.73M2 — SIGNIFICANT CHANGE UP
EGFR: 90 ML/MIN/1.73M2 — SIGNIFICANT CHANGE UP
EOSINOPHIL # BLD AUTO: 0 K/UL — SIGNIFICANT CHANGE UP (ref 0–0.7)
EOSINOPHIL # BLD AUTO: 0 K/UL — SIGNIFICANT CHANGE UP (ref 0–0.7)
EOSINOPHIL NFR BLD AUTO: 0 % — SIGNIFICANT CHANGE UP (ref 0–8)
EOSINOPHIL NFR BLD AUTO: 0 % — SIGNIFICANT CHANGE UP (ref 0–8)
GLUCOSE SERPL-MCNC: 79 MG/DL — SIGNIFICANT CHANGE UP (ref 70–99)
GLUCOSE SERPL-MCNC: 79 MG/DL — SIGNIFICANT CHANGE UP (ref 70–99)
HCT VFR BLD CALC: 34.3 % — LOW (ref 42–52)
HCT VFR BLD CALC: 34.3 % — LOW (ref 42–52)
HGB BLD-MCNC: 11.6 G/DL — LOW (ref 14–18)
HGB BLD-MCNC: 11.6 G/DL — LOW (ref 14–18)
IMM GRANULOCYTES NFR BLD AUTO: 0.6 % — HIGH (ref 0.1–0.3)
IMM GRANULOCYTES NFR BLD AUTO: 0.6 % — HIGH (ref 0.1–0.3)
LYMPHOCYTES # BLD AUTO: 0.89 K/UL — LOW (ref 1.2–3.4)
LYMPHOCYTES # BLD AUTO: 0.89 K/UL — LOW (ref 1.2–3.4)
LYMPHOCYTES # BLD AUTO: 6.1 % — LOW (ref 20.5–51.1)
LYMPHOCYTES # BLD AUTO: 6.1 % — LOW (ref 20.5–51.1)
MAGNESIUM SERPL-MCNC: 1.7 MG/DL — LOW (ref 1.8–2.4)
MAGNESIUM SERPL-MCNC: 1.7 MG/DL — LOW (ref 1.8–2.4)
MCHC RBC-ENTMCNC: 32.4 PG — HIGH (ref 27–31)
MCHC RBC-ENTMCNC: 32.4 PG — HIGH (ref 27–31)
MCHC RBC-ENTMCNC: 33.8 G/DL — SIGNIFICANT CHANGE UP (ref 32–37)
MCHC RBC-ENTMCNC: 33.8 G/DL — SIGNIFICANT CHANGE UP (ref 32–37)
MCV RBC AUTO: 95.8 FL — HIGH (ref 80–94)
MCV RBC AUTO: 95.8 FL — HIGH (ref 80–94)
MONOCYTES # BLD AUTO: 3.3 K/UL — HIGH (ref 0.1–0.6)
MONOCYTES # BLD AUTO: 3.3 K/UL — HIGH (ref 0.1–0.6)
MONOCYTES NFR BLD AUTO: 22.7 % — HIGH (ref 1.7–9.3)
MONOCYTES NFR BLD AUTO: 22.7 % — HIGH (ref 1.7–9.3)
NEUTROPHILS # BLD AUTO: 10.23 K/UL — HIGH (ref 1.4–6.5)
NEUTROPHILS # BLD AUTO: 10.23 K/UL — HIGH (ref 1.4–6.5)
NEUTROPHILS NFR BLD AUTO: 70.4 % — SIGNIFICANT CHANGE UP (ref 42.2–75.2)
NEUTROPHILS NFR BLD AUTO: 70.4 % — SIGNIFICANT CHANGE UP (ref 42.2–75.2)
NRBC # BLD: 0 /100 WBCS — SIGNIFICANT CHANGE UP (ref 0–0)
NRBC # BLD: 0 /100 WBCS — SIGNIFICANT CHANGE UP (ref 0–0)
OSMOLALITY SERPL: 282 MOS/KG — SIGNIFICANT CHANGE UP (ref 275–300)
OSMOLALITY SERPL: 282 MOS/KG — SIGNIFICANT CHANGE UP (ref 275–300)
PHOSPHATE SERPL-MCNC: 3.9 MG/DL — SIGNIFICANT CHANGE UP (ref 2.1–4.9)
PHOSPHATE SERPL-MCNC: 3.9 MG/DL — SIGNIFICANT CHANGE UP (ref 2.1–4.9)
PLATELET # BLD AUTO: 118 K/UL — LOW (ref 130–400)
PLATELET # BLD AUTO: 118 K/UL — LOW (ref 130–400)
PMV BLD: 9.4 FL — SIGNIFICANT CHANGE UP (ref 7.4–10.4)
PMV BLD: 9.4 FL — SIGNIFICANT CHANGE UP (ref 7.4–10.4)
POTASSIUM SERPL-MCNC: 3.7 MMOL/L — SIGNIFICANT CHANGE UP (ref 3.5–5)
POTASSIUM SERPL-MCNC: 3.7 MMOL/L — SIGNIFICANT CHANGE UP (ref 3.5–5)
POTASSIUM SERPL-SCNC: 3.7 MMOL/L — SIGNIFICANT CHANGE UP (ref 3.5–5)
POTASSIUM SERPL-SCNC: 3.7 MMOL/L — SIGNIFICANT CHANGE UP (ref 3.5–5)
RBC # BLD: 3.58 M/UL — LOW (ref 4.7–6.1)
RBC # BLD: 3.58 M/UL — LOW (ref 4.7–6.1)
RBC # FLD: 14.9 % — HIGH (ref 11.5–14.5)
RBC # FLD: 14.9 % — HIGH (ref 11.5–14.5)
SODIUM SERPL-SCNC: 137 MMOL/L — SIGNIFICANT CHANGE UP (ref 135–146)
SODIUM SERPL-SCNC: 137 MMOL/L — SIGNIFICANT CHANGE UP (ref 135–146)
WBC # BLD: 14.53 K/UL — HIGH (ref 4.8–10.8)
WBC # BLD: 14.53 K/UL — HIGH (ref 4.8–10.8)
WBC # FLD AUTO: 14.53 K/UL — HIGH (ref 4.8–10.8)
WBC # FLD AUTO: 14.53 K/UL — HIGH (ref 4.8–10.8)

## 2023-11-14 PROCEDURE — 99223 1ST HOSP IP/OBS HIGH 75: CPT

## 2023-11-14 PROCEDURE — 99222 1ST HOSP IP/OBS MODERATE 55: CPT

## 2023-11-14 PROCEDURE — 93010 ELECTROCARDIOGRAM REPORT: CPT

## 2023-11-14 RX ORDER — MIRTAZAPINE 45 MG/1
1 TABLET, ORALLY DISINTEGRATING ORAL
Qty: 0 | Refills: 0 | DISCHARGE

## 2023-11-14 RX ORDER — MEROPENEM 1 G/30ML
1000 INJECTION INTRAVENOUS EVERY 8 HOURS
Refills: 0 | Status: COMPLETED | OUTPATIENT
Start: 2023-11-14 | End: 2023-11-24

## 2023-11-14 RX ORDER — BENZTROPINE MESYLATE 1 MG
1 TABLET ORAL
Qty: 0 | Refills: 0 | DISCHARGE

## 2023-11-14 RX ORDER — SODIUM CHLORIDE 9 MG/ML
1000 INJECTION, SOLUTION INTRAVENOUS
Refills: 0 | Status: DISCONTINUED | OUTPATIENT
Start: 2023-11-14 | End: 2023-11-17

## 2023-11-14 RX ORDER — LANOLIN ALCOHOL/MO/W.PET/CERES
3 CREAM (GRAM) TOPICAL AT BEDTIME
Refills: 0 | Status: DISCONTINUED | OUTPATIENT
Start: 2023-11-14 | End: 2023-11-29

## 2023-11-14 RX ORDER — HALOPERIDOL DECANOATE 100 MG/ML
10 INJECTION INTRAMUSCULAR THREE TIMES A DAY
Refills: 0 | Status: DISCONTINUED | OUTPATIENT
Start: 2023-11-14 | End: 2023-11-29

## 2023-11-14 RX ORDER — ACETAMINOPHEN 500 MG
975 TABLET ORAL ONCE
Refills: 0 | Status: COMPLETED | OUTPATIENT
Start: 2023-11-14 | End: 2023-11-14

## 2023-11-14 RX ORDER — MIRTAZAPINE 45 MG/1
15 TABLET, ORALLY DISINTEGRATING ORAL DAILY
Refills: 0 | Status: DISCONTINUED | OUTPATIENT
Start: 2023-11-14 | End: 2023-11-29

## 2023-11-14 RX ORDER — MAGNESIUM SULFATE 500 MG/ML
2 VIAL (ML) INJECTION ONCE
Refills: 0 | Status: COMPLETED | OUTPATIENT
Start: 2023-11-14 | End: 2023-11-14

## 2023-11-14 RX ORDER — LEVOTHYROXINE SODIUM 125 MCG
100 TABLET ORAL DAILY
Refills: 0 | Status: DISCONTINUED | OUTPATIENT
Start: 2023-11-14 | End: 2023-11-29

## 2023-11-14 RX ORDER — TAMSULOSIN HYDROCHLORIDE 0.4 MG/1
0.4 CAPSULE ORAL AT BEDTIME
Refills: 0 | Status: DISCONTINUED | OUTPATIENT
Start: 2023-11-14 | End: 2023-11-28

## 2023-11-14 RX ORDER — BUPROPION HYDROCHLORIDE 150 MG/1
150 TABLET, EXTENDED RELEASE ORAL DAILY
Refills: 0 | Status: DISCONTINUED | OUTPATIENT
Start: 2023-11-14 | End: 2023-11-29

## 2023-11-14 RX ORDER — SENNOSIDES/DOCUSATE SODIUM 8.6MG-50MG
1 TABLET ORAL
Qty: 0 | Refills: 0 | DISCHARGE

## 2023-11-14 RX ORDER — DIVALPROEX SODIUM 500 MG/1
2 TABLET, DELAYED RELEASE ORAL
Qty: 0 | Refills: 0 | DISCHARGE

## 2023-11-14 RX ADMIN — TAMSULOSIN HYDROCHLORIDE 0.4 MILLIGRAM(S): 0.4 CAPSULE ORAL at 21:43

## 2023-11-14 RX ADMIN — Medication 975 MILLIGRAM(S): at 18:49

## 2023-11-14 RX ADMIN — SODIUM CHLORIDE 100 MILLILITER(S): 9 INJECTION, SOLUTION INTRAVENOUS at 12:40

## 2023-11-14 RX ADMIN — Medication 400 MILLIGRAM(S): at 00:17

## 2023-11-14 RX ADMIN — Medication 25 GRAM(S): at 12:23

## 2023-11-14 RX ADMIN — BUPROPION HYDROCHLORIDE 150 MILLIGRAM(S): 150 TABLET, EXTENDED RELEASE ORAL at 12:26

## 2023-11-14 RX ADMIN — SODIUM CHLORIDE 100 MILLILITER(S): 9 INJECTION, SOLUTION INTRAVENOUS at 20:00

## 2023-11-14 RX ADMIN — MEROPENEM 100 MILLIGRAM(S): 1 INJECTION INTRAVENOUS at 17:53

## 2023-11-14 RX ADMIN — Medication 100 MICROGRAM(S): at 05:24

## 2023-11-14 RX ADMIN — MIRTAZAPINE 15 MILLIGRAM(S): 45 TABLET, ORALLY DISINTEGRATING ORAL at 12:27

## 2023-11-14 RX ADMIN — MEROPENEM 100 MILLIGRAM(S): 1 INJECTION INTRAVENOUS at 21:56

## 2023-11-14 RX ADMIN — Medication 975 MILLIGRAM(S): at 13:28

## 2023-11-14 RX ADMIN — MEROPENEM 100 MILLIGRAM(S): 1 INJECTION INTRAVENOUS at 00:30

## 2023-11-14 RX ADMIN — Medication 1 APPLICATION(S): at 17:53

## 2023-11-14 RX ADMIN — HALOPERIDOL DECANOATE 10 MILLIGRAM(S): 100 INJECTION INTRAMUSCULAR at 17:53

## 2023-11-14 NOTE — CONSULT NOTE ADULT - CONSULT REASON
sepsis 2/2 Right pyelonephrtiis. hx of Ecoli ESBL started meropenem pending cultures
Hydronephrosis and Difficulty long

## 2023-11-14 NOTE — CONSULT NOTE ADULT - ASSESSMENT
53 yo M with Intellectual disability, Impulse control disorder, Seizures, hypothyroidism, exotropia, catracts, osteopenia, hx of urethral strictures presents from group home for weakness -  c/s for right renal upper pole with mild hydroureteronephrosis & difficult long placement.    Plan:   - Under sterile technique, successfully placed 16 fr catheter into urinary bladder with positive output of 700cc clear yellow urine. Balloon was inflated with 10cc of sterile water. Patient tolerated procedure well, no complications  - Continue Long care   - Cont long - actively draining clear yellow urine at this time, no evidence of hematuria  - Continue IV abx per ID - Zosyn   - Recommend UA, UCx  - Start flomax if not contraindicated  - Continue to monitor CBC and BMP  - Repeat RBUS in 48 hours to assess resolution of hydro  - D/c long when no longer clinically indicated   - TOV when ambulating 150 ft or greater - if patient fails TOV replace long and have patient f/u OP w/ Dr. Cole for further urologic management  - Cont care per primary team  - Will discuss with attending  53 yo M with Intellectual disability, Impulse control disorder, Seizures, hypothyroidism, exotropia, catracts, osteopenia, hx of urethral strictures presents from group home for weakness -  c/s for right renal upper pole with mild hydroureteronephrosis & difficult long placement.    Plan:   - Under sterile technique, successfully placed 16 fr catheter into urinary bladder with positive output of 700cc clear yellow urine. Balloon was inflated with 10cc of sterile water. Patient tolerated procedure well, no complications  - Continue Long care   - Cont long - actively draining clear yellow urine at this time, no evidence of hematuria  - Continue IV abx per ID - Zosyn   - Recommend UA, UCx  - Start flomax if not contraindicated  - Continue to monitor CBC and BMP  - Repeat RBUS in 48 hours to assess resolution of hydro  - D/c long when no longer clinically indicated as  - TOV when ambulating 150 ft or greater - if patient fails TOV replace long and have patient f/u OP w/ Dr. Kang for further urologic management possible urodynamics  - Cont care per primary team

## 2023-11-14 NOTE — CONSULT NOTE ADULT - ASSESSMENT
ASSESSMENT  52yMale with a past medical history of Mild mental retardation, intellectual disability, Schizophrenia, Impulse control disorder, Seizures, Hypothyroidism, Exotropia, Cataracts, Osteopenia, urethral stricture, presented from nursing home [place] for weakness. Consulted for sepsis 2/2 Right pyelonephritis hx of Ecoli ESBL started meropenem pending cultures.      IMPRESSION  #Likely Sepsis 2/2 pyelonephritis, hx of ESBL   #hx of urethral strictures inc. risk of infections    RECOMMENDATIONS  - f/u pending cultures  -Continue with Meropenem 1g IVPB q8  - appreciate urology evaluation     Please call or message on [employer] Teams if with any questions.  Spectra [id number]

## 2023-11-14 NOTE — CONSULT NOTE ADULT - NS ATTEND AMEND GEN_ALL_CORE FT
I personally saw and examined the patient, reviewed the chart and available data. I discussed the situation with the patients bedside aide and ROMAN CASSIDY. I also reviewed and/or amended the note as necessary.

## 2023-11-14 NOTE — CONSULT NOTE ADULT - SUBJECTIVE AND OBJECTIVE BOX
VIKALINA  52y, Male  Allergy: phenothiazines (Unknown)  erythromycin (Other)      CHIEF COMPLAINT: Sepsis 2/2 Pyelonephritis (14 Nov 2023 06:55)      HPI:  52yMale with a past medical history of Mild mental retardation, intellectual disability, Schizophrenia, Impulse control disorder, Seizures, Hypothyroidism, Exotropia, Cataracts, Osteopenia, urethral stricture, presented from [facility] [place] for weakness. Hx taken from aid at bedside as patient was lethargic when I saw him post versed for CT scan.  Pt had progressive weakness over the last week, today does not want to eat food, has been unable to walk without max assist which is unusual.  He had a UTI 2 weeks ago and completed abx course then was retested after a week and UA was still +Ve so he completed another course of amoxicillin.   Patient has hx pf urethral stricture, was scheduled for cystoscopy but never got procedure done secondary to consent issues. Consulted for sepsis 2/2 Right pyelonephrtiis. hx of Ecoli ESBL started meropenem pending cultures    Infectious Diseases History:  Old Micro Data/Cultures:     FAMILY HISTORY:  No pertinent family history in first degree relatives      PAST MEDICAL & SURGICAL HISTORY:  Epilepsy  last event 4+ yrs ago      Schizophrenia      Bipolar 1 disorder      Major depressive disorder      Anemia      Dyspepsia      Hypothyroidism      Constipation      MR (mental retardation)      H/O cystoscopy      H/O laminectomy  03/2001      H/O fracture of fibula  nondisplaced 03/2010          SOCIAL HISTORY  Social History:  Per aid, no smoking, no alcohol (15 Aug 2022 17:42)      Recent Travel:  Other Exposures:     ROS  General: Denies rigors, nightsweats  HEENT: Denies headache, rhinorrhea, sore throat, eye pain  CV: Denies CP, palpitations  PULM: Denies wheezing, hemoptysis  GI: Denies hematemesis, hematochezia, melena  : Denies discharge, hematuria  MSK: Denies arthralgias, myalgias  SKIN: Denies rash, lesions  NEURO: Denies paresthesias, weakness  PSYCH: Denies depression, anxiety    VITALS:  T(F): 101.5, Max: 103.5 (11-13-23 @ 16:50)  HR: 114  BP: 135/71  RR: 18Vital Signs Last 24 Hrs  T(C): 38.6 (14 Nov 2023 12:28), Max: 39.7 (13 Nov 2023 16:50)  T(F): 101.5 (14 Nov 2023 12:28), Max: 103.5 (13 Nov 2023 16:50)  HR: 114 (14 Nov 2023 12:28) (67 - 114)  BP: 135/71 (14 Nov 2023 12:28) (93/55 - 145/66)  BP(mean): 94 (14 Nov 2023 12:28) (77 - 94)  RR: 18 (14 Nov 2023 12:28) (16 - 18)  SpO2: 98% (14 Nov 2023 12:28) (97% - 100%)    Parameters below as of 14 Nov 2023 12:28  Patient On (Oxygen Delivery Method): nasal cannula  O2 Flow (L/min): 2      PHYSICAL EXAM:  GEN: NAD, laying comfortably in bed   SKIN: Good color, non diaphoretic.  HEENT: NC/AT.  RESP: No dyspnea, non-labored breathing. No use of accessory muscles.  ABDO: Soft, NT/ND, no palpable bladder, no suprapubic tenderness  BACK: No CVAT B/L  :  Patient voiding freely   MSK: MAEx4, contracted lower extremities       TESTS & MEASUREMENTS:                        11.6   14.53 )-----------( 118      ( 14 Nov 2023 07:17 )             34.3     11-14    137  |  100  |  15  ----------------------------<  79  3.7   |  28  |  1.0    Ca    8.3<L>      14 Nov 2023 07:17  Phos  3.9     11-14  Mg     1.7     11-14    TPro  7.3  /  Alb  3.9  /  TBili  0.5  /  DBili  x   /  AST  35  /  ALT  11  /  AlkPhos  83  11-13      LIVER FUNCTIONS - ( 13 Nov 2023 17:23 )  Alb: 3.9 g/dL / Pro: 7.3 g/dL / ALK PHOS: 83 U/L / ALT: 11 U/L / AST: 35 U/L / GGT: x           Urinalysis Basic - ( 14 Nov 2023 07:17 )    Color: x / Appearance: x / SG: x / pH: x  Gluc: 79 mg/dL / Ketone: x  / Bili: x / Urobili: x   Blood: x / Protein: x / Nitrite: x   Leuk Esterase: x / RBC: x / WBC x   Sq Epi: x / Non Sq Epi: x / Bacteria: x          Lactate, Blood: 1.4 mmol/L (11-13-23 @ 17:23)  Blood Gas Venous - Lactate: 1.70 mmol/L (11-13-23 @ 16:52)      INFECTIOUS DISEASES TESTING      RADIOLOGY & ADDITIONAL TESTS:  I have personally reviewed the last available Chest xray  CXR  Xray Chest 1 View- PORTABLE-Urgent:   ACC: [id number] EXAM:  XR CHEST PORTABLE URGENT 1V   ORDERED BY: ELIAS RODRIGUEZ     PROCEDURE DATE:  11/13/2023          INTERPRETATION:  Clinical History / Reason for exam: Sepsis    Comparison : Chest radiograph 11/7/2023.    Technique/Positioning: Frontal view.    Findings:    Support devices: None. Telemetry leads    Cardiac/mediastinum/hilum: Unremarkable.    Lung parenchyma/Pleura: There is left basilar opacity superimposed upon   low lung volume..    Skeleton/soft tissues: Degenerative changes of the thoracic spine.    Impression:    Small left basilar opacity which may reflect atelectasis or developing   pneumonia.        --- End of Report ---            BRANNON SAPP MD; Attending Radiologist  This document has been electronically signed. Nov 14 2023  8:51AM (11-13-23 @ 18:25)      CT  CT Abdomen and Pelvis w/ IV Cont:   ACC: [id number] EXAM:  CT ABDOMEN AND PELVIS IC   ORDERED BY: ANKITA ESPINOZA     PROCEDURE DATE:  11/13/2023          INTERPRETATION:  CLINICAL STATEMENT: Abdominal pain.    TECHNIQUE: Contiguous axial CT images were obtained of the abdomen and   pelvis following administration of intravenous contrast.  Oral contrast   was not administered. Reformatted images in the coronal and sagittal   planes were acquired.    COMPARISON CT: 8/15/2022    FINDINGS:    LOWER CHEST: Bibasilar subsegmental atelectasis. Trace pleural effusions    HEPATOBILIARY: Unremarkable.    SPLEEN: Within normal limits.    ADRENALS: Within normal limits.    PANCREAS: Within normal limits.    KIDNEYS: Hypoenhancement at the right renal upper pole with mild   hydroureteronephrosis and question of urothelial enhancement. No definite   radiopaque obstructing stone. No left hydronephrosis.    ABDOMINOPELVIC NODES: No enlarged abdominal or pelvic lymph nodes.    PELVIC ORGANS: Urinary bladder wall mild thickening which is distended    PERITONEUM/MESENTERY/BOWEL: No bowel obstruction, ascites or free   intraperitoneal air. Postsurgical changes of the distal colon. Moderate   fecal load.    BONES/SOFT TISSUES: Degenerative changes of the spine.  Thoracic/lumbar   vertebral body compression deformities without significant change.   Osteopenia.      IMPRESSION:  Limited examination due to motion artifact and streak artifact.    Apparent hypoenhancement at the right renal upper pole with mild   hydroureteronephrosis and question of urothelial enhancement raising   suspicion for ascending urinary tract infection. No definite radiopaque   obstructing stone.    Distended urinary bladder with mild wall thickening    --- End of Report ---            RAY MANCILLA MD; Attending Radiologist  This document has been electronically signed. Nov 13 2023  9:09PM (11-13-23 @ 20:33)      CARDIOLOGY TESTING  12 Lead ECG:   Ventricular Rate 80 BPM    Atrial Rate 80 BPM    P-R Interval 132 ms    QRS Duration 84 ms    Q-T Interval 394 ms    QTC Calculation(Bazett) 454 ms    P Axis 40 degrees    R Axis -19 degrees    T Axis 20 degrees    Diagnosis Line Normal sinus rhythm  Normal ECG    Confirmed by Behuria, Supreeti ([id number]) on 11/14/2023 2:22:08 PM (11-14-23 @ 02:15)  12 Lead ECG:   Ventricular Rate 94 BPM    Atrial Rate 94 BPM    P-R Interval 128 ms    QRS Duration 80 ms    Q-T Interval 342 ms    QTC Calculation(Bazett) 427 ms    P Axis 46 degrees    R Axis -11 degrees    T Axis 41 degrees    Diagnosis Line Normal sinus rhythm  Normal ECG    Confirmed by yaima yeboah ([id number]) on 11/13/2023 8:44:30 PM (11-13-23 @ 18:09)      All available historical records have been reviewed    MEDICATIONS  buPROPion XL (24-Hour) . 150  clotrimazole 1% Cream 1  enoxaparin Injectable 40  haloperidol     Tablet 10  lactated ringers. 1000  levothyroxine 100  magnesium sulfate  IVPB 2  meropenem  IVPB 1000  mirtazapine 15  tamsulosin 0.4      ANTIBIOTICS:  meropenem  IVPB 1000 milliGRAM(s) IV Intermittent every 8 hours      All available historical data has been reviewed    ASSESSMENT  52yMale with a past medical history of Mild mental retardation, intellectual disability, Schizophrenia, Impulse control disorder, Seizures, Hypothyroidism, Exotropia, Cataracts, Osteopenia, urethral stricture, presented from alf [place] for weakness. Consulted for sepsis 2/2 Right pyelonephritis hx of Ecoli ESBL started meropenem pending cultures.      IMPRESSION  #Likely Sepsis 2/2 pyelonephritis, hx of ESBL   #hx of urethral strictures inc. risk of infections    RECOMMENDATIONS  - f/u pending cultures  -Continue with Meropenem 1g IVPB q8    This is a pended note. All final recommendations to follow pending discussion with ID Attending    VIKALINA  52y, Male  Allergy: phenothiazines (Unknown)  erythromycin (Other)      CHIEF COMPLAINT: Sepsis 2/2 Pyelonephritis (14 Nov 2023 06:55)      HPI:  52yMale with a past medical history of Mild mental retardation, intellectual disability, Schizophrenia, Impulse control disorder, Seizures, Hypothyroidism, Exotropia, Cataracts, Osteopenia, urethral stricture, presented from [facility] [place] for weakness. Hx taken from aid at bedside as patient was lethargic when I saw him post versed for CT scan.  Pt had progressive weakness over the last week, today does not want to eat food, has been unable to walk without max assist which is unusual.  He had a UTI 2 weeks ago and completed abx course then was retested after a week and UA was still +Ve so he completed another course of amoxicillin.   Patient has hx pf urethral stricture, was scheduled for cystoscopy but never got procedure done secondary to consent issues. Consulted for sepsis 2/2 Right pyelonephrtiis. hx of Ecoli ESBL started meropenem pending cultures    Infectious Diseases History:  Old Micro Data/Cultures:     FAMILY HISTORY:  No pertinent family history in first degree relatives      PAST MEDICAL & SURGICAL HISTORY:  Epilepsy  last event 4+ yrs ago      Schizophrenia      Bipolar 1 disorder      Major depressive disorder      Anemia      Dyspepsia      Hypothyroidism      Constipation      MR (mental retardation)      H/O cystoscopy      H/O laminectomy  03/2001      H/O fracture of fibula  nondisplaced 03/2010          SOCIAL HISTORY  Social History:  Per aid, no smoking, no alcohol (15 Aug 2022 17:42)      Recent Travel:  Other Exposures:     ROS  Unable to obtain due to ID    VITALS:  T(F): 101.5, Max: 103.5 (11-13-23 @ 16:50)  HR: 114  BP: 135/71  RR: 18Vital Signs Last 24 Hrs  T(C): 38.6 (14 Nov 2023 12:28), Max: 39.7 (13 Nov 2023 16:50)  T(F): 101.5 (14 Nov 2023 12:28), Max: 103.5 (13 Nov 2023 16:50)  HR: 114 (14 Nov 2023 12:28) (67 - 114)  BP: 135/71 (14 Nov 2023 12:28) (93/55 - 145/66)  BP(mean): 94 (14 Nov 2023 12:28) (77 - 94)  RR: 18 (14 Nov 2023 12:28) (16 - 18)  SpO2: 98% (14 Nov 2023 12:28) (97% - 100%)    Parameters below as of 14 Nov 2023 12:28  Patient On (Oxygen Delivery Method): nasal cannula  O2 Flow (L/min): 2      PHYSICAL EXAM:  GEN: NAD, laying comfortably in bed   SKIN: Good color, non diaphoretic.  HEENT: NC/AT.  RESP: No dyspnea, non-labored breathing. No use of accessory muscles.  ABDO: Soft, NT/ND, no palpable bladder, no suprapubic tenderness  BACK: No CVAT B/L  :  Patient voiding freely   MSK: MAEx4, contracted lower extremities       TESTS & MEASUREMENTS:                        11.6   14.53 )-----------( 118      ( 14 Nov 2023 07:17 )             34.3     11-14    137  |  100  |  15  ----------------------------<  79  3.7   |  28  |  1.0    Ca    8.3<L>      14 Nov 2023 07:17  Phos  3.9     11-14  Mg     1.7     11-14    TPro  7.3  /  Alb  3.9  /  TBili  0.5  /  DBili  x   /  AST  35  /  ALT  11  /  AlkPhos  83  11-13      LIVER FUNCTIONS - ( 13 Nov 2023 17:23 )  Alb: 3.9 g/dL / Pro: 7.3 g/dL / ALK PHOS: 83 U/L / ALT: 11 U/L / AST: 35 U/L / GGT: x           Urinalysis Basic - ( 14 Nov 2023 07:17 )    Color: x / Appearance: x / SG: x / pH: x  Gluc: 79 mg/dL / Ketone: x  / Bili: x / Urobili: x   Blood: x / Protein: x / Nitrite: x   Leuk Esterase: x / RBC: x / WBC x   Sq Epi: x / Non Sq Epi: x / Bacteria: x          Lactate, Blood: 1.4 mmol/L (11-13-23 @ 17:23)  Blood Gas Venous - Lactate: 1.70 mmol/L (11-13-23 @ 16:52)      INFECTIOUS DISEASES TESTING      RADIOLOGY & ADDITIONAL TESTS:  I have personally reviewed the last available Chest xray  CXR  Xray Chest 1 View- PORTABLE-Urgent:   ACC: [id number] EXAM:  XR CHEST PORTABLE URGENT 1V   ORDERED BY: ELIAS RODRIGUEZ     PROCEDURE DATE:  11/13/2023          INTERPRETATION:  Clinical History / Reason for exam: Sepsis    Comparison : Chest radiograph 11/7/2023.    Technique/Positioning: Frontal view.    Findings:    Support devices: None. Telemetry leads    Cardiac/mediastinum/hilum: Unremarkable.    Lung parenchyma/Pleura: There is left basilar opacity superimposed upon   low lung volume..    Skeleton/soft tissues: Degenerative changes of the thoracic spine.    Impression:    Small left basilar opacity which may reflect atelectasis or developing   pneumonia.        --- End of Report ---            BRANNON SAPP MD; Attending Radiologist  This document has been electronically signed. Nov 14 2023  8:51AM (11-13-23 @ 18:25)      CT  CT Abdomen and Pelvis w/ IV Cont:   ACC: [id number] EXAM:  CT ABDOMEN AND PELVIS IC   ORDERED BY: ANKITA ESPINOZA     PROCEDURE DATE:  11/13/2023          INTERPRETATION:  CLINICAL STATEMENT: Abdominal pain.    TECHNIQUE: Contiguous axial CT images were obtained of the abdomen and   pelvis following administration of intravenous contrast.  Oral contrast   was not administered. Reformatted images in the coronal and sagittal   planes were acquired.    COMPARISON CT: 8/15/2022    FINDINGS:    LOWER CHEST: Bibasilar subsegmental atelectasis. Trace pleural effusions    HEPATOBILIARY: Unremarkable.    SPLEEN: Within normal limits.    ADRENALS: Within normal limits.    PANCREAS: Within normal limits.    KIDNEYS: Hypoenhancement at the right renal upper pole with mild   hydroureteronephrosis and question of urothelial enhancement. No definite   radiopaque obstructing stone. No left hydronephrosis.    ABDOMINOPELVIC NODES: No enlarged abdominal or pelvic lymph nodes.    PELVIC ORGANS: Urinary bladder wall mild thickening which is distended    PERITONEUM/MESENTERY/BOWEL: No bowel obstruction, ascites or free   intraperitoneal air. Postsurgical changes of the distal colon. Moderate   fecal load.    BONES/SOFT TISSUES: Degenerative changes of the spine.  Thoracic/lumbar   vertebral body compression deformities without significant change.   Osteopenia.      IMPRESSION:  Limited examination due to motion artifact and streak artifact.    Apparent hypoenhancement at the right renal upper pole with mild   hydroureteronephrosis and question of urothelial enhancement raising   suspicion for ascending urinary tract infection. No definite radiopaque   obstructing stone.    Distended urinary bladder with mild wall thickening    --- End of Report ---            RAY MANCILLA MD; Attending Radiologist  This document has been electronically signed. Nov 13 2023  9:09PM (11-13-23 @ 20:33)      CARDIOLOGY TESTING  12 Lead ECG:   Ventricular Rate 80 BPM    Atrial Rate 80 BPM    P-R Interval 132 ms    QRS Duration 84 ms    Q-T Interval 394 ms    QTC Calculation(Bazett) 454 ms    P Axis 40 degrees    R Axis -19 degrees    T Axis 20 degrees    Diagnosis Line Normal sinus rhythm  Normal ECG    Confirmed by Behuria, Supreeti ([id number]) on 11/14/2023 2:22:08 PM (11-14-23 @ 02:15)  12 Lead ECG:   Ventricular Rate 94 BPM    Atrial Rate 94 BPM    P-R Interval 128 ms    QRS Duration 80 ms    Q-T Interval 342 ms    QTC Calculation(Bazett) 427 ms    P Axis 46 degrees    R Axis -11 degrees    T Axis 41 degrees    Diagnosis Line Normal sinus rhythm  Normal ECG    Confirmed by yaima yeboah ([id number]) on 11/13/2023 8:44:30 PM (11-13-23 @ 18:09)      All available historical records have been reviewed    MEDICATIONS  buPROPion XL (24-Hour) . 150  clotrimazole 1% Cream 1  enoxaparin Injectable 40  haloperidol     Tablet 10  lactated ringers. 1000  levothyroxine 100  magnesium sulfate  IVPB 2  meropenem  IVPB 1000  mirtazapine 15  tamsulosin 0.4      ANTIBIOTICS:  meropenem  IVPB 1000 milliGRAM(s) IV Intermittent every 8 hours      All available historical data has been reviewed    ASSESSMENT  52yMale with a past medical history of Mild mental retardation, intellectual disability, Schizophrenia, Impulse control disorder, Seizures, Hypothyroidism, Exotropia, Cataracts, Osteopenia, urethral stricture, presented from [facility] [place] for weakness. Consulted for sepsis 2/2 Right pyelonephritis hx of Ecoli ESBL started meropenem pending cultures.      IMPRESSION  #Likely Sepsis 2/2 pyelonephritis, hx of ESBL   #hx of urethral strictures inc. risk of infections    RECOMMENDATIONS  - f/u pending cultures  -Continue with Meropenem 1g IVPB q8    This is a pended note. All final recommendations to follow pending discussion with ID Attending    VIKALINA  52y, Male  Allergy: phenothiazines (Unknown)  erythromycin (Other)      CHIEF COMPLAINT: Sepsis 2/2 Pyelonephritis (14 Nov 2023 06:55)      HPI:  52yMale with a past medical history of Mild mental retardation, intellectual disability, Schizophrenia, Impulse control disorder, Seizures, Hypothyroidism, Exotropia, Cataracts, Osteopenia, urethral stricture, presented from [facility] [place] for weakness. Hx taken from aid at bedside as patient was lethargic when I saw him post versed for CT scan.  Pt had progressive weakness over the last week, today does not want to eat food, has been unable to walk without max assist which is unusual.  He had a UTI 2 weeks ago and completed abx course then was retested after a week and UA was still +Ve so he completed another course of amoxicillin.   Patient has hx pf urethral stricture, was scheduled for cystoscopy but never got procedure done secondary to consent issues. Consulted for sepsis 2/2 Right pyelonephrtiis. hx of Ecoli ESBL started meropenem pending cultures    Infectious Diseases History:  Old Micro Data/Cultures:     FAMILY HISTORY:  No pertinent family history in first degree relatives      PAST MEDICAL & SURGICAL HISTORY:  Epilepsy  last event 4+ yrs ago      Schizophrenia      Bipolar 1 disorder      Major depressive disorder      Anemia      Dyspepsia      Hypothyroidism      Constipation      MR (mental retardation)      H/O cystoscopy      H/O laminectomy  03/2001      H/O fracture of fibula  nondisplaced 03/2010          SOCIAL HISTORY  Social History:  Per aid, no smoking, no alcohol (15 Aug 2022 17:42)      Recent Travel:  Other Exposures:     ROS  Unable to obtain due to ID    VITALS:  T(F): 101.5, Max: 103.5 (11-13-23 @ 16:50)  HR: 114  BP: 135/71  RR: 18Vital Signs Last 24 Hrs  T(C): 38.6 (14 Nov 2023 12:28), Max: 39.7 (13 Nov 2023 16:50)  T(F): 101.5 (14 Nov 2023 12:28), Max: 103.5 (13 Nov 2023 16:50)  HR: 114 (14 Nov 2023 12:28) (67 - 114)  BP: 135/71 (14 Nov 2023 12:28) (93/55 - 145/66)  BP(mean): 94 (14 Nov 2023 12:28) (77 - 94)  RR: 18 (14 Nov 2023 12:28) (16 - 18)  SpO2: 98% (14 Nov 2023 12:28) (97% - 100%)    Parameters below as of 14 Nov 2023 12:28  Patient On (Oxygen Delivery Method): nasal cannula  O2 Flow (L/min): 2      PHYSICAL EXAM:  GEN: NAD, laying comfortably in bed   SKIN: Good color, non diaphoretic.  HEENT: NC/AT.  RESP: No dyspnea, non-labored breathing. No use of accessory muscles.  ABDO: Soft, NT/ND, no palpable bladder, no suprapubic tenderness  BACK: No CVAT B/L  :  Patient voiding freely   MSK: MAEx4, contracted lower extremities       TESTS & MEASUREMENTS:                        11.6   14.53 )-----------( 118      ( 14 Nov 2023 07:17 )             34.3     11-14    137  |  100  |  15  ----------------------------<  79  3.7   |  28  |  1.0    Ca    8.3<L>      14 Nov 2023 07:17  Phos  3.9     11-14  Mg     1.7     11-14    TPro  7.3  /  Alb  3.9  /  TBili  0.5  /  DBili  x   /  AST  35  /  ALT  11  /  AlkPhos  83  11-13      LIVER FUNCTIONS - ( 13 Nov 2023 17:23 )  Alb: 3.9 g/dL / Pro: 7.3 g/dL / ALK PHOS: 83 U/L / ALT: 11 U/L / AST: 35 U/L / GGT: x           Urinalysis Basic - ( 14 Nov 2023 07:17 )    Color: x / Appearance: x / SG: x / pH: x  Gluc: 79 mg/dL / Ketone: x  / Bili: x / Urobili: x   Blood: x / Protein: x / Nitrite: x   Leuk Esterase: x / RBC: x / WBC x   Sq Epi: x / Non Sq Epi: x / Bacteria: x          Lactate, Blood: 1.4 mmol/L (11-13-23 @ 17:23)  Blood Gas Venous - Lactate: 1.70 mmol/L (11-13-23 @ 16:52)      INFECTIOUS DISEASES TESTING      RADIOLOGY & ADDITIONAL TESTS:  I have personally reviewed the last available Chest xray  CXR  Xray Chest 1 View- PORTABLE-Urgent:   ACC: [id number] EXAM:  XR CHEST PORTABLE URGENT 1V   ORDERED BY: ELIAS RODRIGUEZ     PROCEDURE DATE:  11/13/2023          INTERPRETATION:  Clinical History / Reason for exam: Sepsis    Comparison : Chest radiograph 11/7/2023.    Technique/Positioning: Frontal view.    Findings:    Support devices: None. Telemetry leads    Cardiac/mediastinum/hilum: Unremarkable.    Lung parenchyma/Pleura: There is left basilar opacity superimposed upon   low lung volume..    Skeleton/soft tissues: Degenerative changes of the thoracic spine.    Impression:    Small left basilar opacity which may reflect atelectasis or developing   pneumonia.        --- End of Report ---            BRANNON SAPP MD; Attending Radiologist  This document has been electronically signed. Nov 14 2023  8:51AM (11-13-23 @ 18:25)      CT  CT Abdomen and Pelvis w/ IV Cont:   ACC: [id number] EXAM:  CT ABDOMEN AND PELVIS IC   ORDERED BY: ANKITA ESPINOZA     PROCEDURE DATE:  11/13/2023          INTERPRETATION:  CLINICAL STATEMENT: Abdominal pain.    TECHNIQUE: Contiguous axial CT images were obtained of the abdomen and   pelvis following administration of intravenous contrast.  Oral contrast   was not administered. Reformatted images in the coronal and sagittal   planes were acquired.    COMPARISON CT: 8/15/2022    FINDINGS:    LOWER CHEST: Bibasilar subsegmental atelectasis. Trace pleural effusions    HEPATOBILIARY: Unremarkable.    SPLEEN: Within normal limits.    ADRENALS: Within normal limits.    PANCREAS: Within normal limits.    KIDNEYS: Hypoenhancement at the right renal upper pole with mild   hydroureteronephrosis and question of urothelial enhancement. No definite   radiopaque obstructing stone. No left hydronephrosis.    ABDOMINOPELVIC NODES: No enlarged abdominal or pelvic lymph nodes.    PELVIC ORGANS: Urinary bladder wall mild thickening which is distended    PERITONEUM/MESENTERY/BOWEL: No bowel obstruction, ascites or free   intraperitoneal air. Postsurgical changes of the distal colon. Moderate   fecal load.    BONES/SOFT TISSUES: Degenerative changes of the spine.  Thoracic/lumbar   vertebral body compression deformities without significant change.   Osteopenia.      IMPRESSION:  Limited examination due to motion artifact and streak artifact.    Apparent hypoenhancement at the right renal upper pole with mild   hydroureteronephrosis and question of urothelial enhancement raising   suspicion for ascending urinary tract infection. No definite radiopaque   obstructing stone.    Distended urinary bladder with mild wall thickening    --- End of Report ---            RAY MANCILLA MD; Attending Radiologist  This document has been electronically signed. Nov 13 2023  9:09PM (11-13-23 @ 20:33)      CARDIOLOGY TESTING  12 Lead ECG:   Ventricular Rate 80 BPM    Atrial Rate 80 BPM    P-R Interval 132 ms    QRS Duration 84 ms    Q-T Interval 394 ms    QTC Calculation(Bazett) 454 ms    P Axis 40 degrees    R Axis -19 degrees    T Axis 20 degrees    Diagnosis Line Normal sinus rhythm  Normal ECG    Confirmed by Behuria, Supreeti ([id number]) on 11/14/2023 2:22:08 PM (11-14-23 @ 02:15)  12 Lead ECG:   Ventricular Rate 94 BPM    Atrial Rate 94 BPM    P-R Interval 128 ms    QRS Duration 80 ms    Q-T Interval 342 ms    QTC Calculation(Bazett) 427 ms    P Axis 46 degrees    R Axis -11 degrees    T Axis 41 degrees    Diagnosis Line Normal sinus rhythm  Normal ECG    Confirmed by yaima yeboah ([id number]) on 11/13/2023 8:44:30 PM (11-13-23 @ 18:09)      All available historical records have been reviewed    MEDICATIONS  buPROPion XL (24-Hour) . 150  clotrimazole 1% Cream 1  enoxaparin Injectable 40  haloperidol     Tablet 10  lactated ringers. 1000  levothyroxine 100  magnesium sulfate  IVPB 2  meropenem  IVPB 1000  mirtazapine 15  tamsulosin 0.4      ANTIBIOTICS:  meropenem  IVPB 1000 milliGRAM(s) IV Intermittent every 8 hours      All available historical data has been reviewed

## 2023-11-14 NOTE — CONSULT NOTE ADULT - NSCONSULTADDITIONALINFOA_GEN_ALL_CORE
pt seen on afternoon rounds, no d/c urine clear pt not able to give good hx of urological function  after decompression and at least 48 hours of flomax consider a trial of void. if needed and doable CIC preferred to  indwelling catheter

## 2023-11-14 NOTE — ED ADULT NURSE REASSESSMENT NOTE - NS ED NURSE REASSESS COMMENT FT1
patient admitted to medicine and moved to ED4 bed6.  Report given to CATRACHITO Ferreira.  Patient waiting for urology to come and placed long.  MD aware. Patient in stable condition and nad.

## 2023-11-14 NOTE — CONSULT NOTE ADULT - SUBJECTIVE AND OBJECTIVE BOX
UROLOGY CONSULT     HPI:  52-year-old male with PMHx of Mild mental retardation, intellectual disability, Schizophrenia, Impulse control disorder, Seizures, Hypothyroidism, Exotropia, Cataracts, Osteopenia, urethral stricture, presented from care home [facility] for weakness. Hx taken from aid at bedside as patient was lethargic when I saw him post versed for CT scan.  He had progressive weakness over the last week, today does not want to eat food, has been unable to walk without max assist which is unusual.  He had a UTI 2 weeks ago and completed abx course then was retested after a week and UA was still +Ve so he completed another course of amoxicillin.   Patient has hx pf urethral stricture, was scheduled for cystoscopy but never got procedure done secondary to consent issues.     In the ED: vitals febrile and tachycardic, on 2L NC. normal BP  labs remarkable for WBC 16.69, ++VE UA  CT abd pelv: Limited examination due to motion artifact and streak artifact. Apparent hypoenhancement at the right renal upper pole with mild hydroureteronephrosis and question of urothelial enhancement raising suspicion for ascending urinary tract infection. No definite radiopaque obstructing stone. Distended urinary bladder with mild wall thickening.   EKG: sinus tach    Patient received 2L IVFs and zosyn and was admitted to medicine for sepsis 2/2 R pyelonephritis.      (2023 23:30)      PAST MEDICAL & SURGICAL HISTORY:  Epilepsy  last event 4+ yrs ago      Schizophrenia      Bipolar 1 disorder      Major depressive disorder      Anemia      Dyspepsia      Hypothyroidism      Constipation      MR (mental retardation)      H/O cystoscopy      H/O laminectomy  2001      H/O fracture of fibula  nondisplaced 2010          MEDICATIONS  (STANDING):  buPROPion XL (24-Hour) . 150 milliGRAM(s) Oral daily  clotrimazole 1% Cream 1 Application(s) Topical two times a day  enoxaparin Injectable 40 milliGRAM(s) SubCutaneous every 24 hours  haloperidol     Tablet 10 milliGRAM(s) Oral three times a day  levothyroxine 100 MICROGram(s) Oral daily  mirtazapine 15 milliGRAM(s) Oral daily    MEDICATIONS  (PRN):  melatonin 3 milliGRAM(s) Oral at bedtime PRN Insomnia      Allergies    phenothiazines (Unknown)  erythromycin (Other)    Intolerances        SOCIAL HISTORY: No illicit drug use    FAMILY HISTORY:  No pertinent family history in first degree relatives        REVIEW OF SYSTEMS:  [ ] Due to altered mental status/intubation, subjective information were not able to be obtained from patient. History was obtained, to the extent possible, from review of the chart and collateral sources of information.     Vital Signs Last 24 Hrs  T(C): 36.2 (2023 05:30), Max: 39.7 (2023 16:50)  T(F): 97.2 (2023 05:30), Max: 103.5 (2023 16:50)  HR: 67 (2023 05:30) (67 - 111)  BP: 98/63 (2023 05:30) (93/55 - 145/66)  BP(mean): 86 (2023 19:00) (77 - 86)  RR: 18 (2023 05:30) (16 - 18)  SpO2: 98% (2023 05:30) (97% - 100%)    Parameters below as of 2023 05:30  Patient On (Oxygen Delivery Method): nasal cannula  O2 Flow (L/min): 2      PHYSICAL EXAM:        I&O's Summary    2023 07:01  -  2023 06:55  --------------------------------------------------------  IN: 0 mL / OUT: 450 mL / NET: -450 mL        LABS:                        12.9   16.69 )-----------( 140      ( 2023 17:23 )             38.0     11-13    131<L>  |  93<L>  |  16  ----------------------------<  78  4.2   |  25  |  1.2    Ca    9.1      2023 17:23    TPro  7.3  /  Alb  3.9  /  TBili  0.5  /  DBili  x   /  AST  35  /  ALT  11  /  AlkPhos  83  11-13    PT/INR - ( 2023 17:23 )   PT: 13.00 sec;   INR: 1.14 ratio         PTT - ( 2023 17:23 )  PTT:31.1 sec  Urinalysis Basic - ( 2023 17:58 )    Color: Yellow / Appearance: Cloudy / S.015 / pH: x  Gluc: x / Ketone: Trace mg/dL  / Bili: Negative / Urobili: 1.0 mg/dL   Blood: x / Protein: 30 mg/dL / Nitrite: Positive   Leuk Esterase: Moderate / RBC: 6 /HPF /  /HPF   Sq Epi: x / Non Sq Epi: 15 /HPF / Bacteria: Many /HPF    RADIOLOGY & ADDITIONAL STUDIES:  < from: CT Abdomen and Pelvis w/ IV Cont (23 @ 20:33) >  ACC: [id number] EXAM:  CT ABDOMEN AND PELVIS IC   ORDERED BY: ANKITA ESPINOZA     PROCEDURE DATE:  2023          INTERPRETATION:  CLINICAL STATEMENT: Abdominal pain.    TECHNIQUE: Contiguous axial CT images were obtained of the abdomen and   pelvis following administration of intravenous contrast.  Oral contrast   was not administered. Reformatted images in the coronal and sagittal   planes were acquired.    COMPARISON CT: 8/15/2022    FINDINGS:    LOWER CHEST: Bibasilar subsegmental atelectasis. Trace pleural effusions    HEPATOBILIARY: Unremarkable.    SPLEEN: Within normal limits.    ADRENALS: Within normal limits.    PANCREAS: Within normal limits.    KIDNEYS: Hypoenhancement at the right renal upper pole with mild   hydroureteronephrosis and question of urothelial enhancement. No definite   radiopaque obstructing stone. No left hydronephrosis.    ABDOMINOPELVIC NODES: No enlarged abdominal or pelvic lymph nodes.    PELVIC ORGANS: Urinary bladder wall mild thickening which is distended    PERITONEUM/MESENTERY/BOWEL: No bowel obstruction, ascites or free   intraperitoneal air. Postsurgical changes of the distal colon. Moderate   fecal load.    BONES/SOFT TISSUES: Degenerative changes of the spine.  Thoracic/lumbar   vertebral body compression deformities without significant change.   Osteopenia.      IMPRESSION:  Limited examination due to motion artifact and streak artifact.    Apparent hypoenhancement at the right renal upper pole with mild   hydroureteronephrosis and question of urothelial enhancement raising   suspicion for ascending urinary tract infection. No definite radiopaque   obstructing stone.    Distended urinary bladder with mild wall thickening    --- End of Report ---      RAY MANCILLA MD; Attending Radiologist  This document has been electronically signed. 2023  9:09PM    < end of copied text >   UROLOGY CONSULT     HPI:  52-year-old male with PMHx of Mild mental retardation, intellectual disability, Schizophrenia, Impulse control disorder, Seizures, Hypothyroidism, Exotropia, Cataracts, Osteopenia, urethral stricture, presented from longterm [facility] for weakness. Hx taken from aid at bedside as patient was lethargic when I saw him post versed for CT scan.  He had progressive weakness over the last week, today does not want to eat food, has been unable to walk without max assist which is unusual.  He had a UTI 2 weeks ago and completed abx course then was retested after a week and UA was still +Ve so he completed another course of amoxicillin.   Patient has hx pf urethral stricture, was scheduled for cystoscopy but never got procedure done secondary to consent issues.     In the ED: vitals febrile and tachycardic, on 2L NC. normal BP  labs remarkable for WBC 16.69, ++VE UA  CT abd pelv: Limited examination due to motion artifact and streak artifact. Apparent hypoenhancement at the right renal upper pole with mild hydroureteronephrosis and question of urothelial enhancement raising suspicion for ascending urinary tract infection. No definite radiopaque obstructing stone. Distended urinary bladder with mild wall thickening.   EKG: sinus tach  Patient received 2L IVFs and zosyn and was admitted to medicine for sepsis 2/2 R pyelonephritis.      (2023 23:30)    UROLOGY CONSULT:   53 yo M with Intellectual disability, Impulse control disorder, Seizures, hypothyroidism, exotropia, catracts, osteopenia, hx of urethral strictures presents from group home for weakness -  c/s for right renal upper pole with mild hydroureteronephrosis & difficult long placement. Patient unable to participate in history, subjective information obtained from collateral sources and extensive chart review. Patient       PAST MEDICAL & SURGICAL HISTORY:  Epilepsy  last event 4+ yrs ago      Schizophrenia      Bipolar 1 disorder      Major depressive disorder      Anemia      Dyspepsia      Hypothyroidism      Constipation      MR (mental retardation)      H/O cystoscopy      H/O laminectomy  2001      H/O fracture of fibula  nondisplaced 2010          MEDICATIONS  (STANDING):  buPROPion XL (24-Hour) . 150 milliGRAM(s) Oral daily  clotrimazole 1% Cream 1 Application(s) Topical two times a day  enoxaparin Injectable 40 milliGRAM(s) SubCutaneous every 24 hours  haloperidol     Tablet 10 milliGRAM(s) Oral three times a day  levothyroxine 100 MICROGram(s) Oral daily  mirtazapine 15 milliGRAM(s) Oral daily    MEDICATIONS  (PRN):  melatonin 3 milliGRAM(s) Oral at bedtime PRN Insomnia      Allergies    phenothiazines (Unknown)  erythromycin (Other)    Intolerances        SOCIAL HISTORY: No illicit drug use    FAMILY HISTORY:  No pertinent family history in first degree relatives        REVIEW OF SYSTEMS:  [ ] Due to altered mental status/intubation, subjective information were not able to be obtained from patient. History was obtained, to the extent possible, from review of the chart and collateral sources of information.     Vital Signs Last 24 Hrs  T(C): 36.2 (2023 05:30), Max: 39.7 (2023 16:50)  T(F): 97.2 (2023 05:30), Max: 103.5 (2023 16:50)  HR: 67 (2023 05:30) (67 - 111)  BP: 98/63 (2023 05:30) (93/55 - 145/66)  BP(mean): 86 (2023 19:00) (77 - 86)  RR: 18 (2023 05:30) (16 - 18)  SpO2: 98% (2023 05:30) (97% - 100%)    Parameters below as of 2023 05:30  Patient On (Oxygen Delivery Method): nasal cannula  O2 Flow (L/min): 2      PHYSICAL EXAM:        I&O's Summary    2023 07:01  -  2023 06:55  --------------------------------------------------------  IN: 0 mL / OUT: 450 mL / NET: -450 mL        LABS:                        12.9   16.69 )-----------( 140      ( 2023 17:23 )             38.0     11-13    131<L>  |  93<L>  |  16  ----------------------------<  78  4.2   |  25  |  1.2    Ca    9.1      2023 17:23    TPro  7.3  /  Alb  3.9  /  TBili  0.5  /  DBili  x   /  AST  35  /  ALT  11  /  AlkPhos  83  11-13    PT/INR - ( 2023 17:23 )   PT: 13.00 sec;   INR: 1.14 ratio         PTT - ( 2023 17:23 )  PTT:31.1 sec  Urinalysis Basic - ( 2023 17:58 )    Color: Yellow / Appearance: Cloudy / S.015 / pH: x  Gluc: x / Ketone: Trace mg/dL  / Bili: Negative / Urobili: 1.0 mg/dL   Blood: x / Protein: 30 mg/dL / Nitrite: Positive   Leuk Esterase: Moderate / RBC: 6 /HPF /  /HPF   Sq Epi: x / Non Sq Epi: 15 /HPF / Bacteria: Many /HPF    RADIOLOGY & ADDITIONAL STUDIES:  < from: CT Abdomen and Pelvis w/ IV Cont (23 @ 20:33) >  ACC: [id number] EXAM:  CT ABDOMEN AND PELVIS IC   ORDERED BY: ANKITA ESPINOZA     PROCEDURE DATE:  2023          INTERPRETATION:  CLINICAL STATEMENT: Abdominal pain.    TECHNIQUE: Contiguous axial CT images were obtained of the abdomen and   pelvis following administration of intravenous contrast.  Oral contrast   was not administered. Reformatted images in the coronal and sagittal   planes were acquired.    COMPARISON CT: 8/15/2022    FINDINGS:    LOWER CHEST: Bibasilar subsegmental atelectasis. Trace pleural effusions    HEPATOBILIARY: Unremarkable.    SPLEEN: Within normal limits.    ADRENALS: Within normal limits.    PANCREAS: Within normal limits.    KIDNEYS: Hypoenhancement at the right renal upper pole with mild   hydroureteronephrosis and question of urothelial enhancement. No definite   radiopaque obstructing stone. No left hydronephrosis.    ABDOMINOPELVIC NODES: No enlarged abdominal or pelvic lymph nodes.    PELVIC ORGANS: Urinary bladder wall mild thickening which is distended    PERITONEUM/MESENTERY/BOWEL: No bowel obstruction, ascites or free   intraperitoneal air. Postsurgical changes of the distal colon. Moderate   fecal load.    BONES/SOFT TISSUES: Degenerative changes of the spine.  Thoracic/lumbar   vertebral body compression deformities without significant change.   Osteopenia.      IMPRESSION:  Limited examination due to motion artifact and streak artifact.    Apparent hypoenhancement at the right renal upper pole with mild   hydroureteronephrosis and question of urothelial enhancement raising   suspicion for ascending urinary tract infection. No definite radiopaque   obstructing stone.    Distended urinary bladder with mild wall thickening    --- End of Report ---      RAY MANCILLA MD; Attending Radiologist  This document has been electronically signed. 2023  9:09PM    < end of copied text >   UROLOGY CONSULT     HPI:  52-year-old male with PMHx of Mild mental retardation, intellectual disability, Schizophrenia, Impulse control disorder, Seizures, Hypothyroidism, Exotropia, Cataracts, Osteopenia, urethral stricture, presented from detention [place] for weakness. Hx taken from aid at bedside as patient was lethargic when I saw him post versed for CT scan.  He had progressive weakness over the last week, today does not want to eat food, has been unable to walk without max assist which is unusual.  He had a UTI 2 weeks ago and completed abx course then was retested after a week and UA was still +Ve so he completed another course of amoxicillin.   Patient has hx pf urethral stricture, was scheduled for cystoscopy but never got procedure done secondary to consent issues.     In the ED: vitals febrile and tachycardic, on 2L NC. normal BP  labs remarkable for WBC 16.69, ++VE UA  CT abd pelv: Limited examination due to motion artifact and streak artifact. Apparent hypoenhancement at the right renal upper pole with mild hydroureteronephrosis and question of urothelial enhancement raising suspicion for ascending urinary tract infection. No definite radiopaque obstructing stone. Distended urinary bladder with mild wall thickening.   EKG: sinus tach  Patient received 2L IVFs and zosyn and was admitted to medicine for sepsis 2/2 R pyelonephritis.      (2023 23:30)    UROLOGY CONSULT:   51 yo M with Intellectual disability, Impulse control disorder, Seizures, hypothyroidism, exotropia, catracts, osteopenia, hx of urethral strictures presents from group home for weakness -  c/s for right renal upper pole with mild hydroureteronephrosis & difficult long placement. Patient unable to participate in history, subjective information obtained from collateral sources and extensive chart review. Patient found with right pyelonephritis and admitted for sepsis.     CT A/P shows Limited examination due to motion artifact and streak artifact. Apparent hypoenhancement at the right renal upper pole with mild hydroureteronephrosis and question of urothelial enhancement raising suspicion for ascending urinary tract infection. No definite radiopaque obstructing stone. Distended urinary bladder with mild wall thickening.       PAST MEDICAL & SURGICAL HISTORY:  Epilepsy  last event 4+ yrs ago      Schizophrenia      Bipolar 1 disorder      Major depressive disorder      Anemia      Dyspepsia      Hypothyroidism      Constipation      MR (mental retardation)      H/O cystoscopy      H/O laminectomy  2001      H/O fracture of fibula  nondisplaced 2010          MEDICATIONS  (STANDING):  buPROPion XL (24-Hour) . 150 milliGRAM(s) Oral daily  clotrimazole 1% Cream 1 Application(s) Topical two times a day  enoxaparin Injectable 40 milliGRAM(s) SubCutaneous every 24 hours  haloperidol     Tablet 10 milliGRAM(s) Oral three times a day  levothyroxine 100 MICROGram(s) Oral daily  mirtazapine 15 milliGRAM(s) Oral daily    MEDICATIONS  (PRN):  melatonin 3 milliGRAM(s) Oral at bedtime PRN Insomnia      Allergies    phenothiazines (Unknown)  erythromycin (Other)    Intolerances        SOCIAL HISTORY: No illicit drug use    FAMILY HISTORY:  No pertinent family history in first degree relatives        REVIEW OF SYSTEMS:  [ ] Due to altered mental status/intubation, subjective information were not able to be obtained from patient. History was obtained, to the extent possible, from review of the chart and collateral sources of information.     Vital Signs Last 24 Hrs  T(C): 36.2 (2023 05:30), Max: 39.7 (2023 16:50)  T(F): 97.2 (2023 05:30), Max: 103.5 (2023 16:50)  HR: 67 (2023 05:30) (67 - 111)  BP: 98/63 (2023 05:30) (93/55 - 145/66)  BP(mean): 86 (2023 19:00) (77 - 86)  RR: 18 (2023 05:30) (16 - 18)  SpO2: 98% (2023 05:30) (97% - 100%)    Parameters below as of 2023 05:30  Patient On (Oxygen Delivery Method): nasal cannula  O2 Flow (L/min): 2      PHYSICAL EXAM:  GEN: NAD, sleeping comfortably in bed   SKIN: Good color, non diaphoretic.  HEENT: NC/AT.  RESP: No dyspnea, non-labored breathing. No use of accessory muscles.  ABDO: Soft, NT/ND, no palpable bladder, no suprapubic tenderness  BACK: No CVAT B/L  :  Patient voiding freely   MSK: MAEx4, contracted lower extremities       I&O's Summary    2023 07:01  -  2023 06:55  --------------------------------------------------------  IN: 0 mL / OUT: 450 mL / NET: -450 mL        LABS:                        12.9   16.69 )-----------( 140      ( 2023 17:23 )             38.0     11-13    131<L>  |  93<L>  |  16  ----------------------------<  78  4.2   |  25  |  1.2    Ca    9.1      2023 17:23    TPro  7.3  /  Alb  3.9  /  TBili  0.5  /  DBili  x   /  AST  35  /  ALT  11  /  AlkPhos  83  11-13    PT/INR - ( 2023 17:23 )   PT: 13.00 sec;   INR: 1.14 ratio         PTT - ( 2023 17:23 )  PTT:31.1 sec  Urinalysis Basic - ( 2023 17:58 )    Color: Yellow / Appearance: Cloudy / S.015 / pH: x  Gluc: x / Ketone: Trace mg/dL  / Bili: Negative / Urobili: 1.0 mg/dL   Blood: x / Protein: 30 mg/dL / Nitrite: Positive   Leuk Esterase: Moderate / RBC: 6 /HPF /  /HPF   Sq Epi: x / Non Sq Epi: 15 /HPF / Bacteria: Many /HPF    RADIOLOGY & ADDITIONAL STUDIES:  < from: CT Abdomen and Pelvis w/ IV Cont (23 @ 20:33) >  ACC: [id number] EXAM:  CT ABDOMEN AND PELVIS IC   ORDERED BY: ANKITA ESPINOZA     PROCEDURE DATE:  2023          INTERPRETATION:  CLINICAL STATEMENT: Abdominal pain.    TECHNIQUE: Contiguous axial CT images were obtained of the abdomen and   pelvis following administration of intravenous contrast.  Oral contrast   was not administered. Reformatted images in the coronal and sagittal   planes were acquired.    COMPARISON CT: 8/15/2022    FINDINGS:    LOWER CHEST: Bibasilar subsegmental atelectasis. Trace pleural effusions    HEPATOBILIARY: Unremarkable.    SPLEEN: Within normal limits.    ADRENALS: Within normal limits.    PANCREAS: Within normal limits.    KIDNEYS: Hypoenhancement at the right renal upper pole with mild   hydroureteronephrosis and question of urothelial enhancement. No definite   radiopaque obstructing stone. No left hydronephrosis.    ABDOMINOPELVIC NODES: No enlarged abdominal or pelvic lymph nodes.    PELVIC ORGANS: Urinary bladder wall mild thickening which is distended    PERITONEUM/MESENTERY/BOWEL: No bowel obstruction, ascites or free   intraperitoneal air. Postsurgical changes of the distal colon. Moderate   fecal load.    BONES/SOFT TISSUES: Degenerative changes of the spine.  Thoracic/lumbar   vertebral body compression deformities without significant change.   Osteopenia.      IMPRESSION:  Limited examination due to motion artifact and streak artifact.    Apparent hypoenhancement at the right renal upper pole with mild   hydroureteronephrosis and question of urothelial enhancement raising   suspicion for ascending urinary tract infection. No definite radiopaque   obstructing stone.    Distended urinary bladder with mild wall thickening    --- End of Report ---      RAY MANCILLA MD; Attending Radiologist  This document has been electronically signed. 2023  9:09PM    < end of copied text >

## 2023-11-15 LAB
ALBUMIN SERPL ELPH-MCNC: 2.3 G/DL — LOW (ref 3.5–5.2)
ALBUMIN SERPL ELPH-MCNC: 2.3 G/DL — LOW (ref 3.5–5.2)
ALP SERPL-CCNC: 51 U/L — SIGNIFICANT CHANGE UP (ref 30–115)
ALP SERPL-CCNC: 51 U/L — SIGNIFICANT CHANGE UP (ref 30–115)
ALT FLD-CCNC: 14 U/L — SIGNIFICANT CHANGE UP (ref 0–41)
ALT FLD-CCNC: 14 U/L — SIGNIFICANT CHANGE UP (ref 0–41)
ANION GAP SERPL CALC-SCNC: 7 MMOL/L — SIGNIFICANT CHANGE UP (ref 7–14)
ANION GAP SERPL CALC-SCNC: 7 MMOL/L — SIGNIFICANT CHANGE UP (ref 7–14)
AST SERPL-CCNC: 44 U/L — HIGH (ref 0–41)
AST SERPL-CCNC: 44 U/L — HIGH (ref 0–41)
BASOPHILS # BLD AUTO: 0.02 K/UL — SIGNIFICANT CHANGE UP (ref 0–0.2)
BASOPHILS # BLD AUTO: 0.02 K/UL — SIGNIFICANT CHANGE UP (ref 0–0.2)
BASOPHILS NFR BLD AUTO: 0.2 % — SIGNIFICANT CHANGE UP (ref 0–1)
BASOPHILS NFR BLD AUTO: 0.2 % — SIGNIFICANT CHANGE UP (ref 0–1)
BILIRUB SERPL-MCNC: 0.3 MG/DL — SIGNIFICANT CHANGE UP (ref 0.2–1.2)
BILIRUB SERPL-MCNC: 0.3 MG/DL — SIGNIFICANT CHANGE UP (ref 0.2–1.2)
BUN SERPL-MCNC: 13 MG/DL — SIGNIFICANT CHANGE UP (ref 10–20)
BUN SERPL-MCNC: 13 MG/DL — SIGNIFICANT CHANGE UP (ref 10–20)
CALCIUM SERPL-MCNC: 8 MG/DL — LOW (ref 8.4–10.5)
CALCIUM SERPL-MCNC: 8 MG/DL — LOW (ref 8.4–10.5)
CHLORIDE SERPL-SCNC: 101 MMOL/L — SIGNIFICANT CHANGE UP (ref 98–110)
CHLORIDE SERPL-SCNC: 101 MMOL/L — SIGNIFICANT CHANGE UP (ref 98–110)
CO2 SERPL-SCNC: 29 MMOL/L — SIGNIFICANT CHANGE UP (ref 17–32)
CO2 SERPL-SCNC: 29 MMOL/L — SIGNIFICANT CHANGE UP (ref 17–32)
CREAT SERPL-MCNC: 0.7 MG/DL — SIGNIFICANT CHANGE UP (ref 0.7–1.5)
CREAT SERPL-MCNC: 0.7 MG/DL — SIGNIFICANT CHANGE UP (ref 0.7–1.5)
EGFR: 110 ML/MIN/1.73M2 — SIGNIFICANT CHANGE UP
EGFR: 110 ML/MIN/1.73M2 — SIGNIFICANT CHANGE UP
EOSINOPHIL # BLD AUTO: 0.01 K/UL — SIGNIFICANT CHANGE UP (ref 0–0.7)
EOSINOPHIL # BLD AUTO: 0.01 K/UL — SIGNIFICANT CHANGE UP (ref 0–0.7)
EOSINOPHIL NFR BLD AUTO: 0.1 % — SIGNIFICANT CHANGE UP (ref 0–8)
EOSINOPHIL NFR BLD AUTO: 0.1 % — SIGNIFICANT CHANGE UP (ref 0–8)
GLUCOSE SERPL-MCNC: 75 MG/DL — SIGNIFICANT CHANGE UP (ref 70–99)
GLUCOSE SERPL-MCNC: 75 MG/DL — SIGNIFICANT CHANGE UP (ref 70–99)
HCT VFR BLD CALC: 33.3 % — LOW (ref 42–52)
HCT VFR BLD CALC: 33.3 % — LOW (ref 42–52)
HGB BLD-MCNC: 11.4 G/DL — LOW (ref 14–18)
HGB BLD-MCNC: 11.4 G/DL — LOW (ref 14–18)
IMM GRANULOCYTES NFR BLD AUTO: 0.9 % — HIGH (ref 0.1–0.3)
IMM GRANULOCYTES NFR BLD AUTO: 0.9 % — HIGH (ref 0.1–0.3)
LYMPHOCYTES # BLD AUTO: 1.03 K/UL — LOW (ref 1.2–3.4)
LYMPHOCYTES # BLD AUTO: 1.03 K/UL — LOW (ref 1.2–3.4)
LYMPHOCYTES # BLD AUTO: 8.8 % — LOW (ref 20.5–51.1)
LYMPHOCYTES # BLD AUTO: 8.8 % — LOW (ref 20.5–51.1)
MAGNESIUM SERPL-MCNC: 2.1 MG/DL — SIGNIFICANT CHANGE UP (ref 1.8–2.4)
MAGNESIUM SERPL-MCNC: 2.1 MG/DL — SIGNIFICANT CHANGE UP (ref 1.8–2.4)
MCHC RBC-ENTMCNC: 32.4 PG — HIGH (ref 27–31)
MCHC RBC-ENTMCNC: 32.4 PG — HIGH (ref 27–31)
MCHC RBC-ENTMCNC: 34.2 G/DL — SIGNIFICANT CHANGE UP (ref 32–37)
MCHC RBC-ENTMCNC: 34.2 G/DL — SIGNIFICANT CHANGE UP (ref 32–37)
MCV RBC AUTO: 94.6 FL — HIGH (ref 80–94)
MCV RBC AUTO: 94.6 FL — HIGH (ref 80–94)
MONOCYTES # BLD AUTO: 2.57 K/UL — HIGH (ref 0.1–0.6)
MONOCYTES # BLD AUTO: 2.57 K/UL — HIGH (ref 0.1–0.6)
MONOCYTES NFR BLD AUTO: 22.1 % — HIGH (ref 1.7–9.3)
MONOCYTES NFR BLD AUTO: 22.1 % — HIGH (ref 1.7–9.3)
NEUTROPHILS # BLD AUTO: 7.9 K/UL — HIGH (ref 1.4–6.5)
NEUTROPHILS # BLD AUTO: 7.9 K/UL — HIGH (ref 1.4–6.5)
NEUTROPHILS NFR BLD AUTO: 67.9 % — SIGNIFICANT CHANGE UP (ref 42.2–75.2)
NEUTROPHILS NFR BLD AUTO: 67.9 % — SIGNIFICANT CHANGE UP (ref 42.2–75.2)
NRBC # BLD: 0 /100 WBCS — SIGNIFICANT CHANGE UP (ref 0–0)
NRBC # BLD: 0 /100 WBCS — SIGNIFICANT CHANGE UP (ref 0–0)
PLATELET # BLD AUTO: 80 K/UL — LOW (ref 130–400)
PLATELET # BLD AUTO: 80 K/UL — LOW (ref 130–400)
PMV BLD: 9.2 FL — SIGNIFICANT CHANGE UP (ref 7.4–10.4)
PMV BLD: 9.2 FL — SIGNIFICANT CHANGE UP (ref 7.4–10.4)
POTASSIUM SERPL-MCNC: 3.9 MMOL/L — SIGNIFICANT CHANGE UP (ref 3.5–5)
POTASSIUM SERPL-MCNC: 3.9 MMOL/L — SIGNIFICANT CHANGE UP (ref 3.5–5)
POTASSIUM SERPL-SCNC: 3.9 MMOL/L — SIGNIFICANT CHANGE UP (ref 3.5–5)
POTASSIUM SERPL-SCNC: 3.9 MMOL/L — SIGNIFICANT CHANGE UP (ref 3.5–5)
PROT SERPL-MCNC: 4.6 G/DL — LOW (ref 6–8)
PROT SERPL-MCNC: 4.6 G/DL — LOW (ref 6–8)
RBC # BLD: 3.52 M/UL — LOW (ref 4.7–6.1)
RBC # BLD: 3.52 M/UL — LOW (ref 4.7–6.1)
RBC # FLD: 14.8 % — HIGH (ref 11.5–14.5)
RBC # FLD: 14.8 % — HIGH (ref 11.5–14.5)
SODIUM SERPL-SCNC: 137 MMOL/L — SIGNIFICANT CHANGE UP (ref 135–146)
SODIUM SERPL-SCNC: 137 MMOL/L — SIGNIFICANT CHANGE UP (ref 135–146)
WBC # BLD: 11.64 K/UL — HIGH (ref 4.8–10.8)
WBC # BLD: 11.64 K/UL — HIGH (ref 4.8–10.8)
WBC # FLD AUTO: 11.64 K/UL — HIGH (ref 4.8–10.8)
WBC # FLD AUTO: 11.64 K/UL — HIGH (ref 4.8–10.8)

## 2023-11-15 PROCEDURE — 99232 SBSQ HOSP IP/OBS MODERATE 35: CPT

## 2023-11-15 RX ORDER — ACETAMINOPHEN 500 MG
1000 TABLET ORAL ONCE
Refills: 0 | Status: COMPLETED | OUTPATIENT
Start: 2023-11-15 | End: 2023-11-15

## 2023-11-15 RX ORDER — ACETAMINOPHEN 500 MG
650 TABLET ORAL ONCE
Refills: 0 | Status: COMPLETED | OUTPATIENT
Start: 2023-11-15 | End: 2023-11-15

## 2023-11-15 RX ORDER — MAGNESIUM SULFATE 500 MG/ML
2 VIAL (ML) INJECTION ONCE
Refills: 0 | Status: DISCONTINUED | OUTPATIENT
Start: 2023-11-15 | End: 2023-11-15

## 2023-11-15 RX ADMIN — Medication 1 APPLICATION(S): at 17:43

## 2023-11-15 RX ADMIN — MEROPENEM 100 MILLIGRAM(S): 1 INJECTION INTRAVENOUS at 16:09

## 2023-11-15 RX ADMIN — Medication 1 APPLICATION(S): at 07:27

## 2023-11-15 RX ADMIN — HALOPERIDOL DECANOATE 10 MILLIGRAM(S): 100 INJECTION INTRAMUSCULAR at 14:54

## 2023-11-15 RX ADMIN — Medication 400 MILLIGRAM(S): at 18:02

## 2023-11-15 RX ADMIN — MEROPENEM 100 MILLIGRAM(S): 1 INJECTION INTRAVENOUS at 22:50

## 2023-11-15 RX ADMIN — ENOXAPARIN SODIUM 40 MILLIGRAM(S): 100 INJECTION SUBCUTANEOUS at 22:50

## 2023-11-15 RX ADMIN — MEROPENEM 100 MILLIGRAM(S): 1 INJECTION INTRAVENOUS at 07:28

## 2023-11-15 RX ADMIN — SODIUM CHLORIDE 100 MILLILITER(S): 9 INJECTION, SOLUTION INTRAVENOUS at 11:28

## 2023-11-15 RX ADMIN — Medication 100 MICROGRAM(S): at 06:27

## 2023-11-15 RX ADMIN — MIRTAZAPINE 15 MILLIGRAM(S): 45 TABLET, ORALLY DISINTEGRATING ORAL at 11:28

## 2023-11-15 RX ADMIN — HALOPERIDOL DECANOATE 10 MILLIGRAM(S): 100 INJECTION INTRAMUSCULAR at 07:27

## 2023-11-15 RX ADMIN — BUPROPION HYDROCHLORIDE 150 MILLIGRAM(S): 150 TABLET, EXTENDED RELEASE ORAL at 11:28

## 2023-11-15 NOTE — PROGRESS NOTE ADULT - ASSESSMENT
53 y/o man with PMH of Mild mental retardation, intellectual disability, Schizophrenia, Impulse control disorder, Seizures, Hypothyroidism, Exotropia, Cataracts, Osteopenia, urethral stricture and recent treatment for UTI twice with abx as outpt presented from group home [facility] for weakness, lethargy, decreased PO intake and inability to walk without maximal assistance per group aide at bedside. He has hx of urethral stricture and was scheduled for cystoscopy but never had the procedure done due to consent issues.     1. Sepsis present on admission due to right sided pyelonephritis  h/o recently treated UTIs  urinary retention - long placed by  and 700cc was drained  h/o urethral stricture  h/o ESBL E. coli in urine culture 6/23  CT Abd/pelvis: Limited examination due to motion artifact and streak artifact. Apparent hypoenhancement at the right renal upper pole with mild   hydroureteronephrosis and question of urothelial enhancement raising suspicion for ascending urinary tract infection. No definite radiopaque   obstructing stone. Distended urinary bladder with mild wall thickening  continue meropenem 1g IV q8hr  ID consult appreciated  blood cultures no growth  urine culture >100K E. coli and f/u sensitivities  WBC count trending down   f/u appreciated - keep olng for now, check renal/bladder US tomorrow to evaluate for resolution of hydronephrosis, TOV when ambulating  on flomax and monitor BP closely  on IVF - can stop if pt has adequate PO intake  encourage PO intake  PT consulted    2. Mild mental retardation, schizophrenia, impulse control disorder - on bupropion, haldol, mirtazapine    3. Hypothyroid on synthroid    4. DVT prophylaxis - lovenox      full code      PROGRESS NOTE HANDOFF    Pending: f/u sensitivities from urine culture, PT consult, renal/bladder US, TOV when ambulating    Disposition: from group home

## 2023-11-15 NOTE — PROGRESS NOTE ADULT - ASSESSMENT
Pt is a 52 year old male admitted for weakness and pyelonephritis s/p long placement for urinary retention.     Patient see and examined at bedside with Dr Cole, recommendations below:  - No further acute  intervention indicated at this time  - Cont long, actively draining clear yellow urine at this time without evidence of hematuria  - Continue IV abx per ID - Zosyn   - f/u UCx  - Start flomax if not contraindicated  - Continue to monitor CBC and BMP  - Repeat RBUS in 48 hours of long placement to assess resolution of hydro  - D/c long when no longer medically necessary/ TOV when ambulating 150 ft or greater. When giving TOV, please obtain repeat RBUS w/ PVR to confirm adequate bladder emptying. If patient fails TOV replace long and have patient f/u OP w/  for UDS and further urologic management  - Cont care per primary team  - Recall prn

## 2023-11-15 NOTE — PROGRESS NOTE ADULT - SUBJECTIVE AND OBJECTIVE BOX
ALINA CHERY  52y Male    INTERVAL HPI/OVERNIGHT EVENTS:    pt looks better today  eating breakfast  no fever today  + mercedes  c/o knee pain but nonspecific    T(F): 97.5 (11-15-23 @ 07:48), Max: 100.5 (11-14-23 @ 21:59)  HR: 59 (11-15-23 @ 07:48) (51 - 107)  BP: 109/77 (11-15-23 @ 07:48) (94/60 - 123/75)  RR: 18 (11-15-23 @ 07:48) (18 - 18)  SpO2: 98% (11-15-23 @ 07:48) (95% - 99%) on RA    I&O's Summary    14 Nov 2023 07:01  -  15 Nov 2023 07:00  --------------------------------------------------------  IN: 0 mL / OUT: 100 mL / NET: -100 mL    PHYSICAL EXAM:  GENERAL: NAD  HEAD:  Normocephalic  EYES:  conjunctiva and sclera clear  ENMT: Moist mucous membranes  NERVOUS SYSTEM:  Alert, awake, Good concentration  CHEST/LUNG: CTA b/l  HEART: Regular rate and rhythm  ABDOMEN: Soft, Nontender, Nondistended; Bowel sounds present  EXTREMITIES:   No edema  SKIN: warm, dry  : + mercedes    Consultant(s) Notes Reviewed:  [x ] YES  [ ] NO  Care Discussed with Consultants/Other Providers [ x] YES  [ ] NO    MEDICATIONS  (STANDING):  buPROPion XL (24-Hour) . 150 milliGRAM(s) Oral daily  clotrimazole 1% Cream 1 Application(s) Topical two times a day  enoxaparin Injectable 40 milliGRAM(s) SubCutaneous every 24 hours  haloperidol     Tablet 10 milliGRAM(s) Oral three times a day  lactated ringers. 1000 milliLiter(s) (100 mL/Hr) IV Continuous <Continuous>  levothyroxine 100 MICROGram(s) Oral daily  meropenem  IVPB 1000 milliGRAM(s) IV Intermittent every 8 hours  mirtazapine 15 milliGRAM(s) Oral daily  tamsulosin 0.4 milliGRAM(s) Oral at bedtime    MEDICATIONS  (PRN):  melatonin 3 milliGRAM(s) Oral at bedtime PRN Insomnia      LABS:                        11.4   11.64 )-----------( 80       ( 15 Nov 2023 08:42 )             33.3     11-15    137  |  101  |  13  ----------------------------<  75  3.9   |  29  |  0.7    Ca    8.0<L>      15 Nov 2023 08:42  Phos  3.9     11-14  Mg     2.1     11-15    TPro  4.6<L>  /  Alb  2.3<L>  /  TBili  0.3  /  DBili  x   /  AST  44<H>  /  ALT  14  /  AlkPhos  51  11-15    PT/INR - ( 13 Nov 2023 17:23 )   PT: 13.00 sec;   INR: 1.14 ratio         PTT - ( 13 Nov 2023 17:23 )  PTT:31.1 sec    Culture - Urine (collected 13 Nov 2023 17:58)  Source: Clean Catch Clean Catch (Midstream)  Preliminary Report (14 Nov 2023 19:30):    >100,000 CFU/ml Escherichia coli    Culture - Blood (collected 13 Nov 2023 17:15)  Source: .Blood Blood-Peripheral  Preliminary Report (14 Nov 2023 23:02):    No growth at 24 hours    Culture - Blood (collected 13 Nov 2023 17:15)  Source: .Blood Blood-Peripheral  Preliminary Report (14 Nov 2023 23:02):    No growth at 24 hours            Case discussed with residents and RN on rounds today    Care discussed with group aide at bedside today

## 2023-11-15 NOTE — PROGRESS NOTE ADULT - SUBJECTIVE AND OBJECTIVE BOX
Urology Progress Note:   Pt is a 52 year old male with a pmh of wIntellectual disability, Impulse control disorder, Seizures, hypothyroidism, exotropia, cataracts osteopenia, hx of urethral strictures presents from group home for weakness -  following for for right renal upper pole with mild hydroureteronephrosis & difficult long placement with urine output of ~700cc of clear yellow urine. Pt seen and examined at bedside with Dr Cole, long in place draining clear yellow urine. Patient unable to participate in history, subjective information obtained from collateral sources and extensive chart review. Patient found with right pyelonephritis and admitted for sepsis.     PAST MEDICAL & SURGICAL HISTORY:  Epilepsy  last event 4+ yrs ago  Schizophrenia  Bipolar 1 disorder  Major depressive disorder  Anemia  Dyspepsia  Hypothyroidism  Constipation  MR (mental retardation)  H/O cystoscopy  H/O laminectomy 03/2001  H/O fracture of fibula nondisplaced 03/2010    [ x ] A 10 Point Review of Systems was negative except where noted    MEDICATIONS  (STANDING):  buPROPion XL (24-Hour) . 150 milliGRAM(s) Oral daily  clotrimazole 1% Cream 1 Application(s) Topical two times a day  enoxaparin Injectable 40 milliGRAM(s) SubCutaneous every 24 hours  haloperidol     Tablet 10 milliGRAM(s) Oral three times a day  lactated ringers. 1000 milliLiter(s) (100 mL/Hr) IV Continuous <Continuous>  levothyroxine 100 MICROGram(s) Oral daily  meropenem  IVPB 1000 milliGRAM(s) IV Intermittent every 8 hours  mirtazapine 15 milliGRAM(s) Oral daily  tamsulosin 0.4 milliGRAM(s) Oral at bedtime    MEDICATIONS  (PRN):  melatonin 3 milliGRAM(s) Oral at bedtime PRN Insomnia      Allergies:   phenothiazines (Unknown)  erythromycin (Other)    SOCIAL HISTORY: No illicit drug use    FAMILY HISTORY:  No pertinent family history in first degree relatives    PHYSICAL EXAM:  Constitutional: NAD, well-developed  Resp: No accessory respiratory muscle use  Abd: Soft, NT/ND. No suprapubic ttp  : +long in place draining clear yellow urine  Ext: No edema or cyanosis, MCLEAN x 4, contracted lower extremities  Neuro: Awake and alert  Psych: Normal mood, normal affect  Skin: Good color, non diaphoretic    Vital Signs Last 24 Hrs  T(C): 36.4 (15 Nov 2023 07:48), Max: 38.6 (14 Nov 2023 12:28)  T(F): 97.5 (15 Nov 2023 07:48), Max: 101.5 (14 Nov 2023 12:28)  HR: 59 (15 Nov 2023 07:48) (51 - 114)  BP: 109/77 (15 Nov 2023 07:48) (94/60 - 135/71)  BP(mean): 77 (15 Nov 2023 00:08) (73 - 99)  RR: 18 (15 Nov 2023 07:48) (18 - 18)  SpO2: 98% (15 Nov 2023 07:48) (95% - 99%)    Parameters below as of 15 Nov 2023 07:48  Patient On (Oxygen Delivery Method): nasal cannula  O2 Flow (L/min): 2      I&O's Summary  14 Nov 2023 07:01  -  15 Nov 2023 07:00  --------------------------------------------------------  IN: 0 mL / OUT: 100 mL / NET: -100 mL        LABS:                      11.4   11.64 )-----------( 80       ( 15 Nov 2023 08:42 )             33.3     11-15    137  |  101  |  13  ----------------------------<  75  3.9   |  29  |  0.7    Ca    8.0<L>      15 Nov 2023 08:42  Phos  3.9     11-14  Mg     2.1     11-15    TPro  4.6<L>  /  Alb  2.3<L>  /  TBili  0.3  /  DBili  x   /  AST  44<H>  /  ALT  14  /  AlkPhos  51  11-15    PT/INR - ( 13 Nov 2023 17:23 )   PT: 13.00 sec;   INR: 1.14 ratio      PTT - ( 13 Nov 2023 17:23 )  PTT:31.1 sec    Urinalysis Basic - ( 15 Nov 2023 08:42 )  Color: x / Appearance: x / SG: x / pH: x  Gluc: 75 mg/dL / Ketone: x  / Bili: x / Urobili: x   Blood: x / Protein: x / Nitrite: x   Leuk Esterase: x / RBC: x / WBC x   Sq Epi: x / Non Sq Epi: x / Bacteria: x    RADIOLOGY & ADDITIONAL STUDIES: Urology Progress Note:   Pt is a 52 year old male with a pmh of wIntellectual disability, Impulse control disorder, Seizures, hypothyroidism, exotropia, cataracts osteopenia, hx of urethral strictures presents from group home for weakness -  following for for right renal upper pole with mild hydroureteronephrosis & difficult long placement with urine output of ~700cc of clear yellow urine. Pt seen and examined at bedside with Dr Cole, long in place draining clear yellow urine. Patient unable to participate in history, subjective information obtained from collateral sources and extensive chart review. Patient found with right pyelonephritis and admitted for sepsis.     PAST MEDICAL & SURGICAL HISTORY:  Epilepsy  last event 4+ yrs ago  Schizophrenia  Bipolar 1 disorder  Major depressive disorder  Anemia  Dyspepsia  Hypothyroidism  Constipation  MR (mental retardation)  H/O cystoscopy  H/O laminectomy 03/2001  H/O fracture of fibula nondisplaced 03/2010    [ x ] A 10 Point Review of Systems was negative except where noted    MEDICATIONS  (STANDING):  buPROPion XL (24-Hour) . 150 milliGRAM(s) Oral daily  clotrimazole 1% Cream 1 Application(s) Topical two times a day  enoxaparin Injectable 40 milliGRAM(s) SubCutaneous every 24 hours  haloperidol     Tablet 10 milliGRAM(s) Oral three times a day  lactated ringers. 1000 milliLiter(s) (100 mL/Hr) IV Continuous <Continuous>  levothyroxine 100 MICROGram(s) Oral daily  meropenem  IVPB 1000 milliGRAM(s) IV Intermittent every 8 hours  mirtazapine 15 milliGRAM(s) Oral daily  tamsulosin 0.4 milliGRAM(s) Oral at bedtime    MEDICATIONS  (PRN):  melatonin 3 milliGRAM(s) Oral at bedtime PRN Insomnia      Allergies:   phenothiazines (Unknown)  erythromycin (Other)    SOCIAL HISTORY: No illicit drug use    FAMILY HISTORY:  No pertinent family history in first degree relatives    PHYSICAL EXAM:  Constitutional: NAD, well-developed  Resp: No accessory respiratory muscle use  Abd: Soft, NT/ND. No suprapubic ttp  : +long in place draining clear yellow urine stat lock too tight removed and nurse requested to reposition  Ext: No edema or cyanosis, MCLEAN x 4, contracted lower extremities  Neuro: Awake and alert  Psych: Normal mood, normal affect  Skin: Good color, non diaphoretic    Vital Signs Last 24 Hrs  T(C): 36.4 (15 Nov 2023 07:48), Max: 38.6 (14 Nov 2023 12:28)  T(F): 97.5 (15 Nov 2023 07:48), Max: 101.5 (14 Nov 2023 12:28)  HR: 59 (15 Nov 2023 07:48) (51 - 114)  BP: 109/77 (15 Nov 2023 07:48) (94/60 - 135/71)  BP(mean): 77 (15 Nov 2023 00:08) (73 - 99)  RR: 18 (15 Nov 2023 07:48) (18 - 18)  SpO2: 98% (15 Nov 2023 07:48) (95% - 99%)    Parameters below as of 15 Nov 2023 07:48  Patient On (Oxygen Delivery Method): nasal cannula  O2 Flow (L/min): 2      I&O's Summary  14 Nov 2023 07:01  -  15 Nov 2023 07:00  --------------------------------------------------------  IN: 0 mL / OUT: 100 mL / NET: -100 mL        LABS:                      11.4   11.64 )-----------( 80       ( 15 Nov 2023 08:42 )             33.3     11-15    137  |  101  |  13  ----------------------------<  75  3.9   |  29  |  0.7    Ca    8.0<L>      15 Nov 2023 08:42  Phos  3.9     11-14  Mg     2.1     11-15    TPro  4.6<L>  /  Alb  2.3<L>  /  TBili  0.3  /  DBili  x   /  AST  44<H>  /  ALT  14  /  AlkPhos  51  11-15    PT/INR - ( 13 Nov 2023 17:23 )   PT: 13.00 sec;   INR: 1.14 ratio      PTT - ( 13 Nov 2023 17:23 )  PTT:31.1 sec    Urinalysis Basic - ( 15 Nov 2023 08:42 )  Color: x / Appearance: x / SG: x / pH: x  Gluc: 75 mg/dL / Ketone: x  / Bili: x / Urobili: x   Blood: x / Protein: x / Nitrite: x   Leuk Esterase: x / RBC: x / WBC x   Sq Epi: x / Non Sq Epi: x / Bacteria: x    RADIOLOGY & ADDITIONAL STUDIES:

## 2023-11-15 NOTE — CHART NOTE - NSCHARTNOTEFT_GEN_A_CORE
Informed by RN 16:15 patient febrile to 104.3. Ordered IV tylenol and cooling blanket. 11/13 Blood culture shows NGTD x24h, urine Cx prelim result but shows >100,000 E.Coli. Patient has been on Meropenem since 11/14. Ordered repeat BCx at 20:00.

## 2023-11-16 LAB
-  AMOXICILLIN/CLAVULANIC ACID: SIGNIFICANT CHANGE UP
-  AMOXICILLIN/CLAVULANIC ACID: SIGNIFICANT CHANGE UP
-  AMPICILLIN/SULBACTAM: SIGNIFICANT CHANGE UP
-  AMPICILLIN/SULBACTAM: SIGNIFICANT CHANGE UP
-  AMPICILLIN: SIGNIFICANT CHANGE UP
-  AMPICILLIN: SIGNIFICANT CHANGE UP
-  AZTREONAM: SIGNIFICANT CHANGE UP
-  AZTREONAM: SIGNIFICANT CHANGE UP
-  CEFAZOLIN: SIGNIFICANT CHANGE UP
-  CEFAZOLIN: SIGNIFICANT CHANGE UP
-  CEFEPIME: SIGNIFICANT CHANGE UP
-  CEFEPIME: SIGNIFICANT CHANGE UP
-  CEFTRIAXONE: SIGNIFICANT CHANGE UP
-  CEFTRIAXONE: SIGNIFICANT CHANGE UP
-  CEFUROXIME: SIGNIFICANT CHANGE UP
-  CEFUROXIME: SIGNIFICANT CHANGE UP
-  CIPROFLOXACIN: SIGNIFICANT CHANGE UP
-  CIPROFLOXACIN: SIGNIFICANT CHANGE UP
-  ERTAPENEM: SIGNIFICANT CHANGE UP
-  ERTAPENEM: SIGNIFICANT CHANGE UP
-  GENTAMICIN: SIGNIFICANT CHANGE UP
-  GENTAMICIN: SIGNIFICANT CHANGE UP
-  IMIPENEM: SIGNIFICANT CHANGE UP
-  IMIPENEM: SIGNIFICANT CHANGE UP
-  LEVOFLOXACIN: SIGNIFICANT CHANGE UP
-  LEVOFLOXACIN: SIGNIFICANT CHANGE UP
-  MEROPENEM: SIGNIFICANT CHANGE UP
-  MEROPENEM: SIGNIFICANT CHANGE UP
-  NITROFURANTOIN: SIGNIFICANT CHANGE UP
-  NITROFURANTOIN: SIGNIFICANT CHANGE UP
-  PIPERACILLIN/TAZOBACTAM: SIGNIFICANT CHANGE UP
-  PIPERACILLIN/TAZOBACTAM: SIGNIFICANT CHANGE UP
-  TOBRAMYCIN: SIGNIFICANT CHANGE UP
-  TOBRAMYCIN: SIGNIFICANT CHANGE UP
-  TRIMETHOPRIM/SULFAMETHOXAZOLE: SIGNIFICANT CHANGE UP
-  TRIMETHOPRIM/SULFAMETHOXAZOLE: SIGNIFICANT CHANGE UP
ALBUMIN SERPL ELPH-MCNC: 1.9 G/DL — LOW (ref 3.5–5.2)
ALBUMIN SERPL ELPH-MCNC: 1.9 G/DL — LOW (ref 3.5–5.2)
ALP SERPL-CCNC: 48 U/L — SIGNIFICANT CHANGE UP (ref 30–115)
ALP SERPL-CCNC: 48 U/L — SIGNIFICANT CHANGE UP (ref 30–115)
ALT FLD-CCNC: 15 U/L — SIGNIFICANT CHANGE UP (ref 0–41)
ALT FLD-CCNC: 15 U/L — SIGNIFICANT CHANGE UP (ref 0–41)
ANION GAP SERPL CALC-SCNC: 10 MMOL/L — SIGNIFICANT CHANGE UP (ref 7–14)
ANION GAP SERPL CALC-SCNC: 10 MMOL/L — SIGNIFICANT CHANGE UP (ref 7–14)
APPEARANCE UR: ABNORMAL
APPEARANCE UR: ABNORMAL
AST SERPL-CCNC: 33 U/L — SIGNIFICANT CHANGE UP (ref 0–41)
AST SERPL-CCNC: 33 U/L — SIGNIFICANT CHANGE UP (ref 0–41)
BASOPHILS # BLD AUTO: 0.03 K/UL — SIGNIFICANT CHANGE UP (ref 0–0.2)
BASOPHILS # BLD AUTO: 0.03 K/UL — SIGNIFICANT CHANGE UP (ref 0–0.2)
BASOPHILS NFR BLD AUTO: 0.4 % — SIGNIFICANT CHANGE UP (ref 0–1)
BASOPHILS NFR BLD AUTO: 0.4 % — SIGNIFICANT CHANGE UP (ref 0–1)
BILIRUB SERPL-MCNC: 0.2 MG/DL — SIGNIFICANT CHANGE UP (ref 0.2–1.2)
BILIRUB SERPL-MCNC: 0.2 MG/DL — SIGNIFICANT CHANGE UP (ref 0.2–1.2)
BILIRUB UR-MCNC: NEGATIVE — SIGNIFICANT CHANGE UP
BILIRUB UR-MCNC: NEGATIVE — SIGNIFICANT CHANGE UP
BUN SERPL-MCNC: 8 MG/DL — LOW (ref 10–20)
BUN SERPL-MCNC: 8 MG/DL — LOW (ref 10–20)
CALCIUM SERPL-MCNC: 7.7 MG/DL — LOW (ref 8.4–10.5)
CALCIUM SERPL-MCNC: 7.7 MG/DL — LOW (ref 8.4–10.5)
CHLORIDE SERPL-SCNC: 105 MMOL/L — SIGNIFICANT CHANGE UP (ref 98–110)
CHLORIDE SERPL-SCNC: 105 MMOL/L — SIGNIFICANT CHANGE UP (ref 98–110)
CO2 SERPL-SCNC: 24 MMOL/L — SIGNIFICANT CHANGE UP (ref 17–32)
CO2 SERPL-SCNC: 24 MMOL/L — SIGNIFICANT CHANGE UP (ref 17–32)
COLOR SPEC: YELLOW — SIGNIFICANT CHANGE UP
COLOR SPEC: YELLOW — SIGNIFICANT CHANGE UP
CREAT SERPL-MCNC: 0.6 MG/DL — LOW (ref 0.7–1.5)
CREAT SERPL-MCNC: 0.6 MG/DL — LOW (ref 0.7–1.5)
CULTURE RESULTS: ABNORMAL
CULTURE RESULTS: ABNORMAL
DIFF PNL FLD: ABNORMAL
DIFF PNL FLD: ABNORMAL
EGFR: 115 ML/MIN/1.73M2 — SIGNIFICANT CHANGE UP
EGFR: 115 ML/MIN/1.73M2 — SIGNIFICANT CHANGE UP
EOSINOPHIL # BLD AUTO: 0.03 K/UL — SIGNIFICANT CHANGE UP (ref 0–0.7)
EOSINOPHIL # BLD AUTO: 0.03 K/UL — SIGNIFICANT CHANGE UP (ref 0–0.7)
EOSINOPHIL NFR BLD AUTO: 0.4 % — SIGNIFICANT CHANGE UP (ref 0–8)
EOSINOPHIL NFR BLD AUTO: 0.4 % — SIGNIFICANT CHANGE UP (ref 0–8)
GLUCOSE SERPL-MCNC: 75 MG/DL — SIGNIFICANT CHANGE UP (ref 70–99)
GLUCOSE SERPL-MCNC: 75 MG/DL — SIGNIFICANT CHANGE UP (ref 70–99)
GLUCOSE UR QL: NEGATIVE MG/DL — SIGNIFICANT CHANGE UP
GLUCOSE UR QL: NEGATIVE MG/DL — SIGNIFICANT CHANGE UP
HCT VFR BLD CALC: 31.3 % — LOW (ref 42–52)
HCT VFR BLD CALC: 31.3 % — LOW (ref 42–52)
HGB BLD-MCNC: 10.2 G/DL — LOW (ref 14–18)
HGB BLD-MCNC: 10.2 G/DL — LOW (ref 14–18)
IMM GRANULOCYTES NFR BLD AUTO: 0.8 % — HIGH (ref 0.1–0.3)
IMM GRANULOCYTES NFR BLD AUTO: 0.8 % — HIGH (ref 0.1–0.3)
KETONES UR-MCNC: NEGATIVE MG/DL — SIGNIFICANT CHANGE UP
KETONES UR-MCNC: NEGATIVE MG/DL — SIGNIFICANT CHANGE UP
LEUKOCYTE ESTERASE UR-ACNC: ABNORMAL
LEUKOCYTE ESTERASE UR-ACNC: ABNORMAL
LYMPHOCYTES # BLD AUTO: 1.2 K/UL — SIGNIFICANT CHANGE UP (ref 1.2–3.4)
LYMPHOCYTES # BLD AUTO: 1.2 K/UL — SIGNIFICANT CHANGE UP (ref 1.2–3.4)
LYMPHOCYTES # BLD AUTO: 15.6 % — LOW (ref 20.5–51.1)
LYMPHOCYTES # BLD AUTO: 15.6 % — LOW (ref 20.5–51.1)
MAGNESIUM SERPL-MCNC: 1.4 MG/DL — LOW (ref 1.8–2.4)
MAGNESIUM SERPL-MCNC: 1.4 MG/DL — LOW (ref 1.8–2.4)
MCHC RBC-ENTMCNC: 31.7 PG — HIGH (ref 27–31)
MCHC RBC-ENTMCNC: 31.7 PG — HIGH (ref 27–31)
MCHC RBC-ENTMCNC: 32.6 G/DL — SIGNIFICANT CHANGE UP (ref 32–37)
MCHC RBC-ENTMCNC: 32.6 G/DL — SIGNIFICANT CHANGE UP (ref 32–37)
MCV RBC AUTO: 97.2 FL — HIGH (ref 80–94)
MCV RBC AUTO: 97.2 FL — HIGH (ref 80–94)
METHOD TYPE: SIGNIFICANT CHANGE UP
METHOD TYPE: SIGNIFICANT CHANGE UP
MONOCYTES # BLD AUTO: 1.45 K/UL — HIGH (ref 0.1–0.6)
MONOCYTES # BLD AUTO: 1.45 K/UL — HIGH (ref 0.1–0.6)
MONOCYTES NFR BLD AUTO: 18.8 % — HIGH (ref 1.7–9.3)
MONOCYTES NFR BLD AUTO: 18.8 % — HIGH (ref 1.7–9.3)
NEUTROPHILS # BLD AUTO: 4.93 K/UL — SIGNIFICANT CHANGE UP (ref 1.4–6.5)
NEUTROPHILS # BLD AUTO: 4.93 K/UL — SIGNIFICANT CHANGE UP (ref 1.4–6.5)
NEUTROPHILS NFR BLD AUTO: 64 % — SIGNIFICANT CHANGE UP (ref 42.2–75.2)
NEUTROPHILS NFR BLD AUTO: 64 % — SIGNIFICANT CHANGE UP (ref 42.2–75.2)
NITRITE UR-MCNC: NEGATIVE — SIGNIFICANT CHANGE UP
NITRITE UR-MCNC: NEGATIVE — SIGNIFICANT CHANGE UP
NRBC # BLD: 0 /100 WBCS — SIGNIFICANT CHANGE UP (ref 0–0)
NRBC # BLD: 0 /100 WBCS — SIGNIFICANT CHANGE UP (ref 0–0)
ORGANISM # SPEC MICROSCOPIC CNT: ABNORMAL
ORGANISM # SPEC MICROSCOPIC CNT: ABNORMAL
ORGANISM # SPEC MICROSCOPIC CNT: SIGNIFICANT CHANGE UP
ORGANISM # SPEC MICROSCOPIC CNT: SIGNIFICANT CHANGE UP
PH UR: 8 — SIGNIFICANT CHANGE UP (ref 5–8)
PH UR: 8 — SIGNIFICANT CHANGE UP (ref 5–8)
PLATELET # BLD AUTO: 102 K/UL — LOW (ref 130–400)
PLATELET # BLD AUTO: 102 K/UL — LOW (ref 130–400)
PMV BLD: 9.9 FL — SIGNIFICANT CHANGE UP (ref 7.4–10.4)
PMV BLD: 9.9 FL — SIGNIFICANT CHANGE UP (ref 7.4–10.4)
POTASSIUM SERPL-MCNC: 3.9 MMOL/L — SIGNIFICANT CHANGE UP (ref 3.5–5)
POTASSIUM SERPL-MCNC: 3.9 MMOL/L — SIGNIFICANT CHANGE UP (ref 3.5–5)
POTASSIUM SERPL-SCNC: 3.9 MMOL/L — SIGNIFICANT CHANGE UP (ref 3.5–5)
POTASSIUM SERPL-SCNC: 3.9 MMOL/L — SIGNIFICANT CHANGE UP (ref 3.5–5)
PROT SERPL-MCNC: 4.1 G/DL — LOW (ref 6–8)
PROT SERPL-MCNC: 4.1 G/DL — LOW (ref 6–8)
PROT UR-MCNC: NEGATIVE MG/DL — SIGNIFICANT CHANGE UP
PROT UR-MCNC: NEGATIVE MG/DL — SIGNIFICANT CHANGE UP
RBC # BLD: 3.22 M/UL — LOW (ref 4.7–6.1)
RBC # BLD: 3.22 M/UL — LOW (ref 4.7–6.1)
RBC # FLD: 14.9 % — HIGH (ref 11.5–14.5)
RBC # FLD: 14.9 % — HIGH (ref 11.5–14.5)
SODIUM SERPL-SCNC: 139 MMOL/L — SIGNIFICANT CHANGE UP (ref 135–146)
SODIUM SERPL-SCNC: 139 MMOL/L — SIGNIFICANT CHANGE UP (ref 135–146)
SP GR SPEC: 1.01 — SIGNIFICANT CHANGE UP (ref 1–1.03)
SP GR SPEC: 1.01 — SIGNIFICANT CHANGE UP (ref 1–1.03)
SPECIMEN SOURCE: SIGNIFICANT CHANGE UP
SPECIMEN SOURCE: SIGNIFICANT CHANGE UP
UROBILINOGEN FLD QL: 1 MG/DL — SIGNIFICANT CHANGE UP (ref 0.2–1)
UROBILINOGEN FLD QL: 1 MG/DL — SIGNIFICANT CHANGE UP (ref 0.2–1)
WBC # BLD: 7.7 K/UL — SIGNIFICANT CHANGE UP (ref 4.8–10.8)
WBC # BLD: 7.7 K/UL — SIGNIFICANT CHANGE UP (ref 4.8–10.8)
WBC # FLD AUTO: 7.7 K/UL — SIGNIFICANT CHANGE UP (ref 4.8–10.8)
WBC # FLD AUTO: 7.7 K/UL — SIGNIFICANT CHANGE UP (ref 4.8–10.8)

## 2023-11-16 PROCEDURE — 76770 US EXAM ABDO BACK WALL COMP: CPT | Mod: 26

## 2023-11-16 PROCEDURE — 99233 SBSQ HOSP IP/OBS HIGH 50: CPT

## 2023-11-16 RX ORDER — CHLORHEXIDINE GLUCONATE 213 G/1000ML
1 SOLUTION TOPICAL DAILY
Refills: 0 | Status: DISCONTINUED | OUTPATIENT
Start: 2023-11-16 | End: 2023-11-29

## 2023-11-16 RX ORDER — MAGNESIUM SULFATE 500 MG/ML
2 VIAL (ML) INJECTION
Refills: 0 | Status: COMPLETED | OUTPATIENT
Start: 2023-11-16 | End: 2023-11-16

## 2023-11-16 RX ADMIN — HALOPERIDOL DECANOATE 10 MILLIGRAM(S): 100 INJECTION INTRAMUSCULAR at 00:14

## 2023-11-16 RX ADMIN — MEROPENEM 100 MILLIGRAM(S): 1 INJECTION INTRAVENOUS at 14:04

## 2023-11-16 RX ADMIN — HALOPERIDOL DECANOATE 10 MILLIGRAM(S): 100 INJECTION INTRAMUSCULAR at 14:04

## 2023-11-16 RX ADMIN — MEROPENEM 100 MILLIGRAM(S): 1 INJECTION INTRAVENOUS at 22:13

## 2023-11-16 RX ADMIN — Medication 1 APPLICATION(S): at 17:32

## 2023-11-16 RX ADMIN — SODIUM CHLORIDE 75 MILLILITER(S): 9 INJECTION, SOLUTION INTRAVENOUS at 16:27

## 2023-11-16 RX ADMIN — Medication 25 GRAM(S): at 10:53

## 2023-11-16 RX ADMIN — Medication 1 APPLICATION(S): at 06:01

## 2023-11-16 RX ADMIN — MEROPENEM 100 MILLIGRAM(S): 1 INJECTION INTRAVENOUS at 06:14

## 2023-11-16 RX ADMIN — SODIUM CHLORIDE 100 MILLILITER(S): 9 INJECTION, SOLUTION INTRAVENOUS at 08:43

## 2023-11-16 RX ADMIN — TAMSULOSIN HYDROCHLORIDE 0.4 MILLIGRAM(S): 0.4 CAPSULE ORAL at 22:09

## 2023-11-16 RX ADMIN — SODIUM CHLORIDE 100 MILLILITER(S): 9 INJECTION, SOLUTION INTRAVENOUS at 06:15

## 2023-11-16 RX ADMIN — HALOPERIDOL DECANOATE 10 MILLIGRAM(S): 100 INJECTION INTRAMUSCULAR at 06:02

## 2023-11-16 RX ADMIN — Medication 25 GRAM(S): at 11:54

## 2023-11-16 RX ADMIN — SODIUM CHLORIDE 75 MILLILITER(S): 9 INJECTION, SOLUTION INTRAVENOUS at 22:14

## 2023-11-16 RX ADMIN — HALOPERIDOL DECANOATE 10 MILLIGRAM(S): 100 INJECTION INTRAMUSCULAR at 22:09

## 2023-11-16 RX ADMIN — TAMSULOSIN HYDROCHLORIDE 0.4 MILLIGRAM(S): 0.4 CAPSULE ORAL at 00:14

## 2023-11-16 RX ADMIN — Medication 100 MICROGRAM(S): at 06:02

## 2023-11-16 RX ADMIN — ENOXAPARIN SODIUM 40 MILLIGRAM(S): 100 INJECTION SUBCUTANEOUS at 22:09

## 2023-11-16 NOTE — PHYSICAL THERAPY INITIAL EVALUATION ADULT - TRANSFER SAFETY CONCERNS NOTED: SIT/STAND, REHAB EVAL
crouched/decreased balance during turns/decreased safety awareness/decreased step length/decreased weight-shifting ability

## 2023-11-16 NOTE — PHYSICAL THERAPY INITIAL EVALUATION ADULT - GAIT DEVIATIONS NOTED, PT EVAL
crouched gait/decreased eufemia/increased time in double stance/decreased step length/decreased swing-to-stance ratio/decreased weight-shifting ability

## 2023-11-16 NOTE — PATIENT PROFILE ADULT - VISION (WITH CORRECTIVE LENSES IF THE PATIENT USUALLY WEARS THEM):
patient unable to answer/Partially impaired: cannot see medication labels or newsprint, but can see obstacles in path, and the surrounding layout; can count fingers at arm's length

## 2023-11-16 NOTE — PROGRESS NOTE ADULT - SUBJECTIVE AND OBJECTIVE BOX
SUBJECTIVE:    Patient is a 52y old Male who presents with a chief complaint of Sepsis 2/2 Pyelonephritis (15 Nov 2023 13:43)    Currently admitted to medicine with the primary diagnosis of:    Today is hospital day 3d.     Overnight Events:     patient febrile overnight     PAST MEDICAL & SURGICAL HISTORY  Epilepsy  last event 4+ yrs ago    Schizophrenia    Bipolar 1 disorder    Major depressive disorder    Anemia    Dyspepsia    Hypothyroidism    Constipation    MR (mental retardation)    H/O cystoscopy    H/O laminectomy  03/2001    H/O fracture of fibula  nondisplaced 03/2010      ALLERGIES:  phenothiazines (Unknown)  erythromycin (Other)    MEDICATIONS:  STANDING MEDICATIONS  buPROPion XL (24-Hour) . 150 milliGRAM(s) Oral daily  clotrimazole 1% Cream 1 Application(s) Topical two times a day  enoxaparin Injectable 40 milliGRAM(s) SubCutaneous every 24 hours  haloperidol     Tablet 10 milliGRAM(s) Oral three times a day  lactated ringers. 1000 milliLiter(s) IV Continuous <Continuous>  levothyroxine 100 MICROGram(s) Oral daily  magnesium sulfate  IVPB 2 Gram(s) IV Intermittent every 2 hours  meropenem  IVPB 1000 milliGRAM(s) IV Intermittent every 8 hours  mirtazapine 15 milliGRAM(s) Oral daily  tamsulosin 0.4 milliGRAM(s) Oral at bedtime    PRN MEDICATIONS  melatonin 3 milliGRAM(s) Oral at bedtime PRN    VITALS:   ICU Vital Signs Last 24 Hrs  T(C): 37.1 (16 Nov 2023 06:16), Max: 40.3 (15 Nov 2023 16:45)  T(F): 98.7 (16 Nov 2023 06:16), Max: 104.5 (15 Nov 2023 16:45)  HR: 79 (16 Nov 2023 06:16) (66 - 103)  BP: 110/63 (16 Nov 2023 06:16) (106/56 - 140/93)  BP(mean): 76 (15 Nov 2023 23:47) (76 - 112)  RR: 16 (16 Nov 2023 06:16) (16 - 18)  SpO2: 97% (16 Nov 2023 06:16) (95% - 97%)    O2 Parameters below as of 16 Nov 2023 06:16  Patient On (Oxygen Delivery Method): nasal cannula  O2 Flow (L/min): 2          LABS:                        10.2   7.70  )-----------( 102      ( 16 Nov 2023 08:49 )             31.3     11-16    139  |  105  |  8<L>  ----------------------------<  75  3.9   |  24  |  0.6<L>    Ca    7.7<L>      16 Nov 2023 08:49  Mg     1.4     11-16    TPro  4.1<L>  /  Alb  1.9<L>  /  TBili  0.2  /  DBili  x   /  AST  33  /  ALT  15  /  AlkPhos  48  11-16      Urinalysis Basic - ( 16 Nov 2023 08:49 )    Color: x / Appearance: x / SG: x / pH: x  Gluc: 75 mg/dL / Ketone: x  / Bili: x / Urobili: x   Blood: x / Protein: x / Nitrite: x   Leuk Esterase: x / RBC: x / WBC x   Sq Epi: x / Non Sq Epi: x / Bacteria: x            Culture - Urine (collected 13 Nov 2023 17:58)  Source: Clean Catch Clean Catch (Midstream)  Final Report (16 Nov 2023 08:11):    >100,000 CFU/ml Escherichia coli ESBL  Organism: Escherichia coli ESBL (16 Nov 2023 08:11)  Organism: Escherichia coli ESBL (16 Nov 2023 08:11)    Culture - Blood (collected 13 Nov 2023 17:15)  Source: .Blood Blood-Peripheral  Preliminary Report (15 Nov 2023 23:02):    No growth at 48 Hours    Culture - Blood (collected 13 Nov 2023 17:15)  Source: .Blood Blood-Peripheral  Preliminary Report (15 Nov 2023 23:02):    No growth at 48 Hours            RADIOLOGY:    PHYSICAL EXAM:    GENERAL: NAD, lying in bed comfortably  CHEST/LUNG: Clear to auscultation bilaterally; No rales, rhonchi, wheezing, or rubs. Unlabored respirations  HEART: Regular rate and rhythm; No murmurs, rubs, or gallops  ABDOMEN: Bowel sounds present; Soft, Nontender, Nondistended. No hepatomegally  EXTREMITIES:  2+ Peripheral Pulses, brisk capillary refill. No clubbing, cyanosis, or edema  NERVOUS SYSTEM:  Alert & Oriented X3, speech clear. No deficits   MSK: FROM all 4 extremities, full and equal strength  SKIN: No rashes or lesions      Lines:  Central line:              Date placed:             Indication:   Intravenous Access:   NG tube:   Packer Catheter:   Indwelling Urethral Catheter:     Connect To:  Straight Drainage/[place]    Indication:  Urinary Retention / Obstruction (11-15-23 @ 02:01) (not performed) [Active]  Indwelling Urethral Catheter:     Connect To:  Straight Drainage/[place]    Indication:  Urinary Retention / Obstruction (11-13-23 @ 21:31) (not performed) [Active]         SUBJECTIVE:    Patient is a 52y old Male who presents with a chief complaint of Sepsis 2/2 Pyelonephritis (15 Nov 2023 13:43)    Currently admitted to medicine with the primary diagnosis of:    Today is hospital day 3d.     Overnight Events:     patient febrile overnight     PAST MEDICAL & SURGICAL HISTORY  Epilepsy  last event 4+ yrs ago    Schizophrenia    Bipolar 1 disorder    Major depressive disorder    Anemia    Dyspepsia    Hypothyroidism    Constipation    MR (mental retardation)    H/O cystoscopy    H/O laminectomy  03/2001    H/O fracture of fibula  nondisplaced 03/2010      ALLERGIES:  phenothiazines (Unknown)  erythromycin (Other)    MEDICATIONS:  STANDING MEDICATIONS  buPROPion XL (24-Hour) . 150 milliGRAM(s) Oral daily  clotrimazole 1% Cream 1 Application(s) Topical two times a day  enoxaparin Injectable 40 milliGRAM(s) SubCutaneous every 24 hours  haloperidol     Tablet 10 milliGRAM(s) Oral three times a day  lactated ringers. 1000 milliLiter(s) IV Continuous <Continuous>  levothyroxine 100 MICROGram(s) Oral daily  magnesium sulfate  IVPB 2 Gram(s) IV Intermittent every 2 hours  meropenem  IVPB 1000 milliGRAM(s) IV Intermittent every 8 hours  mirtazapine 15 milliGRAM(s) Oral daily  tamsulosin 0.4 milliGRAM(s) Oral at bedtime    PRN MEDICATIONS  melatonin 3 milliGRAM(s) Oral at bedtime PRN    VITALS:   ICU Vital Signs Last 24 Hrs  T(C): 37.1 (16 Nov 2023 06:16), Max: 40.3 (15 Nov 2023 16:45)  T(F): 98.7 (16 Nov 2023 06:16), Max: 104.5 (15 Nov 2023 16:45)  HR: 79 (16 Nov 2023 06:16) (66 - 103)  BP: 110/63 (16 Nov 2023 06:16) (106/56 - 140/93)  BP(mean): 76 (15 Nov 2023 23:47) (76 - 112)  RR: 16 (16 Nov 2023 06:16) (16 - 18)  SpO2: 97% (16 Nov 2023 06:16) (95% - 97%)    O2 Parameters below as of 16 Nov 2023 06:16  Patient On (Oxygen Delivery Method): nasal cannula  O2 Flow (L/min): 2          LABS:                        10.2   7.70  )-----------( 102      ( 16 Nov 2023 08:49 )             31.3     11-16    139  |  105  |  8<L>  ----------------------------<  75  3.9   |  24  |  0.6<L>    Ca    7.7<L>      16 Nov 2023 08:49  Mg     1.4     11-16    TPro  4.1<L>  /  Alb  1.9<L>  /  TBili  0.2  /  DBili  x   /  AST  33  /  ALT  15  /  AlkPhos  48  11-16      Urinalysis Basic - ( 16 Nov 2023 08:49 )    Color: x / Appearance: x / SG: x / pH: x  Gluc: 75 mg/dL / Ketone: x  / Bili: x / Urobili: x   Blood: x / Protein: x / Nitrite: x   Leuk Esterase: x / RBC: x / WBC x   Sq Epi: x / Non Sq Epi: x / Bacteria: x            Culture - Urine (collected 13 Nov 2023 17:58)  Source: Clean Catch Clean Catch (Midstream)  Final Report (16 Nov 2023 08:11):    >100,000 CFU/ml Escherichia coli ESBL  Organism: Escherichia coli ESBL (16 Nov 2023 08:11)  Organism: Escherichia coli ESBL (16 Nov 2023 08:11)    Culture - Blood (collected 13 Nov 2023 17:15)  Source: .Blood Blood-Peripheral  Preliminary Report (15 Nov 2023 23:02):    No growth at 48 Hours    Culture - Blood (collected 13 Nov 2023 17:15)  Source: .Blood Blood-Peripheral  Preliminary Report (15 Nov 2023 23:02):    No growth at 48 Hours            RADIOLOGY:    PHYSICAL EXAM:    GENERAL: NAD, lying in bed comfortably  CHEST/LUNG: Clear to auscultation bilaterally; No rales, rhonchi, wheezing, or rubs. Unlabored respirations  HEART: Regular rate and rhythm; No murmurs, rubs, or gallops  ABDOMEN: Bowel sounds present; Soft, Nontender, Nondistended. No hepatomegally  EXTREMITIES:  2+ Peripheral Pulses, brisk capillary refill. No clubbing, cyanosis, or edema   : long in place      Lines:  Central line:              Date placed:             Indication:   Intravenous Access:   NG tube:   Long Catheter:   Indwelling Urethral Catheter:     Connect To:  Straight Drainage/[place]    Indication:  Urinary Retention / Obstruction (11-15-23 @ 02:01) (not performed) [Active]  Indwelling Urethral Catheter:     Connect To:  Straight Drainage/[place]    Indication:  Urinary Retention / Obstruction (11-13-23 @ 21:31) (not performed) [Active]

## 2023-11-16 NOTE — PHYSICAL THERAPY INITIAL EVALUATION ADULT - IMPAIRMENTS FOUND, PT EVAL
aerobic capacity/endurance/arousal, attention, and cognition/decreased midline orientation/fine motor/gait, locomotion, and balance/gross motor/muscle strength/poor safety awareness/posture

## 2023-11-16 NOTE — PATIENT PROFILE ADULT - FALL HARM RISK - HARM RISK INTERVENTIONS

## 2023-11-16 NOTE — PROGRESS NOTE ADULT - ASSESSMENT
53 y/o man with PMH of Mild mental retardation, intellectual disability, Schizophrenia, Impulse control disorder, Seizures, Hypothyroidism, Exotropia, Cataracts, Osteopenia, urethral stricture and recent treatment for UTI twice with abx as outpt presented from group home [place] for weakness, lethargy, decreased PO intake and inability to walk without maximal assistance per group aide at bedside. He has hx of urethral stricture and was scheduled for cystoscopy but never had the procedure done due to consent issues.     1. Sepsis present on admission due to right sided pyelonephritis  h/o recently treated UTIs  urinary retention - long placed by  and 700cc was drained  h/o urethral stricture  h/o ESBL E. coli in urine culture 6/23  CT Abd/pelvis: Limited examination due to motion artifact and streak artifact. Apparent hypoenhancement at the right renal upper pole with mild   hydroureteronephrosis and question of urothelial enhancement raising suspicion for ascending urinary tract infection. No definite radiopaque   obstructing stone. Distended urinary bladder with mild wall thickening  continue meropenem 1g IV q8hr  ID consult appreciated  blood cultures no growth  urine culture >100K E. coli and f/u sensitivities  WBC count trending down   f/u appreciated - keep long for now, fu renal/bladder US today  to evaluate for resolution of hydronephrosis,  TOV when ambulating  on flomax and monitor BP closely  dec IVF to 75 cc/hr  encourage PO intake  PT consulted    2. Mild mental disability, schizophrenia, impulse control disorder - on bupropion, haldol, mirtazapine    3. Hypothyroid on synthroid    4. DVT prophylaxis - lovenox      full code      PROGRESS NOTE HANDOFF    Pending: f/u sensitivities from urine culture, PT consult, renal/bladder US fu , TOV when ambulating    Disposition: from group home 53 y/o man with PMH of Mild mental retardation, intellectual disability, Schizophrenia, Impulse control disorder, Seizures, Hypothyroidism, Exotropia, Cataracts, Osteopenia, urethral stricture and recent treatment for UTI twice with abx as outpt presented from group home [place] for weakness, lethargy, decreased PO intake and inability to walk without maximal assistance per group aide at bedside. He has hx of urethral stricture and was scheduled for cystoscopy but never had the procedure done due to consent issues.     # Sepsis present on admission due to right sided pyelonephritis  #h/o recently treated UTIs  #h/o urethral stricture  #h/o ESBL E. coli in urine culture 6/23  #urinary retention - long placed by  and 700cc was drained  #hydroureteronephrosis and question of urothelial enhancement raising suspicion for ascending urinary tract infection.   FU bcx  ucx- ecoli esbl -> sens to meropenam   repeat renal us shows improved hydro  continue meropenem 1g IV q8hr  ID follow up for duration    -> TOV darrel but if fails will be sent home with chronic long-> needs to be placed by urology as complicated anatomy  TOV when ambulating  on flomax and monitor BP closely  dec IVF to 75 cc/hr  encourage PO intake  PT consulted    2. Mild mental disability, schizophrenia, impulse control disorder - on bupropion, haldol, mirtazapine    3. Hypothyroid on synthroid    4. DVT prophylaxis - lovenox      full code      PROGRESS NOTE HANDOFF    Pending:ID fu for abx , TOV, PT , uro follow up    Disposition: from group home

## 2023-11-16 NOTE — PHYSICAL THERAPY INITIAL EVALUATION ADULT - PERTINENT HX OF CURRENT PROBLEM, REHAB EVAL
52-year-old male with PMHx of Mild mental retardation, intellectual disability, Schizophrenia, Impulse control disorder, Seizures, Hypothyroidism, Exotropia, Cataracts, Osteopenia, urethral stricture, presented from long-term [place] for weakness. Hx taken from aid at bedside as patient was lethargic when I saw him post versed for CT scan.  He had progressive weakness over the last week, today does not want to eat food, has been unable to walk without max assist which is unusual.  He had a UTI 2 weeks ago and completed abx course then was retested after a week and UA was still +Ve so he completed another course of amoxicillin.   Patient has hx pf urethral stricture, was scheduled for cystoscopy but never got procedure done secondary to consent issues.

## 2023-11-17 LAB
ANION GAP SERPL CALC-SCNC: 9 MMOL/L — SIGNIFICANT CHANGE UP (ref 7–14)
ANION GAP SERPL CALC-SCNC: 9 MMOL/L — SIGNIFICANT CHANGE UP (ref 7–14)
BASOPHILS # BLD AUTO: 0.04 K/UL — SIGNIFICANT CHANGE UP (ref 0–0.2)
BASOPHILS # BLD AUTO: 0.04 K/UL — SIGNIFICANT CHANGE UP (ref 0–0.2)
BASOPHILS NFR BLD AUTO: 0.5 % — SIGNIFICANT CHANGE UP (ref 0–1)
BASOPHILS NFR BLD AUTO: 0.5 % — SIGNIFICANT CHANGE UP (ref 0–1)
BUN SERPL-MCNC: 9 MG/DL — LOW (ref 10–20)
BUN SERPL-MCNC: 9 MG/DL — LOW (ref 10–20)
CALCIUM SERPL-MCNC: 8.8 MG/DL — SIGNIFICANT CHANGE UP (ref 8.4–10.4)
CALCIUM SERPL-MCNC: 8.8 MG/DL — SIGNIFICANT CHANGE UP (ref 8.4–10.4)
CHLORIDE SERPL-SCNC: 102 MMOL/L — SIGNIFICANT CHANGE UP (ref 98–110)
CHLORIDE SERPL-SCNC: 102 MMOL/L — SIGNIFICANT CHANGE UP (ref 98–110)
CO2 SERPL-SCNC: 25 MMOL/L — SIGNIFICANT CHANGE UP (ref 17–32)
CO2 SERPL-SCNC: 25 MMOL/L — SIGNIFICANT CHANGE UP (ref 17–32)
CREAT SERPL-MCNC: 0.8 MG/DL — SIGNIFICANT CHANGE UP (ref 0.7–1.5)
CREAT SERPL-MCNC: 0.8 MG/DL — SIGNIFICANT CHANGE UP (ref 0.7–1.5)
EGFR: 106 ML/MIN/1.73M2 — SIGNIFICANT CHANGE UP
EGFR: 106 ML/MIN/1.73M2 — SIGNIFICANT CHANGE UP
EOSINOPHIL # BLD AUTO: 0.11 K/UL — SIGNIFICANT CHANGE UP (ref 0–0.7)
EOSINOPHIL # BLD AUTO: 0.11 K/UL — SIGNIFICANT CHANGE UP (ref 0–0.7)
EOSINOPHIL NFR BLD AUTO: 1.3 % — SIGNIFICANT CHANGE UP (ref 0–8)
EOSINOPHIL NFR BLD AUTO: 1.3 % — SIGNIFICANT CHANGE UP (ref 0–8)
GLUCOSE SERPL-MCNC: 94 MG/DL — SIGNIFICANT CHANGE UP (ref 70–99)
GLUCOSE SERPL-MCNC: 94 MG/DL — SIGNIFICANT CHANGE UP (ref 70–99)
HCT VFR BLD CALC: 33.4 % — LOW (ref 42–52)
HCT VFR BLD CALC: 33.4 % — LOW (ref 42–52)
HGB BLD-MCNC: 11.1 G/DL — LOW (ref 14–18)
HGB BLD-MCNC: 11.1 G/DL — LOW (ref 14–18)
IMM GRANULOCYTES NFR BLD AUTO: 1.2 % — HIGH (ref 0.1–0.3)
IMM GRANULOCYTES NFR BLD AUTO: 1.2 % — HIGH (ref 0.1–0.3)
LYMPHOCYTES # BLD AUTO: 1.59 K/UL — SIGNIFICANT CHANGE UP (ref 1.2–3.4)
LYMPHOCYTES # BLD AUTO: 1.59 K/UL — SIGNIFICANT CHANGE UP (ref 1.2–3.4)
LYMPHOCYTES # BLD AUTO: 19.1 % — LOW (ref 20.5–51.1)
LYMPHOCYTES # BLD AUTO: 19.1 % — LOW (ref 20.5–51.1)
MAGNESIUM SERPL-MCNC: 1.9 MG/DL — SIGNIFICANT CHANGE UP (ref 1.8–2.4)
MAGNESIUM SERPL-MCNC: 1.9 MG/DL — SIGNIFICANT CHANGE UP (ref 1.8–2.4)
MCHC RBC-ENTMCNC: 30.9 PG — SIGNIFICANT CHANGE UP (ref 27–31)
MCHC RBC-ENTMCNC: 30.9 PG — SIGNIFICANT CHANGE UP (ref 27–31)
MCHC RBC-ENTMCNC: 33.2 G/DL — SIGNIFICANT CHANGE UP (ref 32–37)
MCHC RBC-ENTMCNC: 33.2 G/DL — SIGNIFICANT CHANGE UP (ref 32–37)
MCV RBC AUTO: 93 FL — SIGNIFICANT CHANGE UP (ref 80–94)
MCV RBC AUTO: 93 FL — SIGNIFICANT CHANGE UP (ref 80–94)
MONOCYTES # BLD AUTO: 1.18 K/UL — HIGH (ref 0.1–0.6)
MONOCYTES # BLD AUTO: 1.18 K/UL — HIGH (ref 0.1–0.6)
MONOCYTES NFR BLD AUTO: 14.2 % — HIGH (ref 1.7–9.3)
MONOCYTES NFR BLD AUTO: 14.2 % — HIGH (ref 1.7–9.3)
NEUTROPHILS # BLD AUTO: 5.29 K/UL — SIGNIFICANT CHANGE UP (ref 1.4–6.5)
NEUTROPHILS # BLD AUTO: 5.29 K/UL — SIGNIFICANT CHANGE UP (ref 1.4–6.5)
NEUTROPHILS NFR BLD AUTO: 63.7 % — SIGNIFICANT CHANGE UP (ref 42.2–75.2)
NEUTROPHILS NFR BLD AUTO: 63.7 % — SIGNIFICANT CHANGE UP (ref 42.2–75.2)
NRBC # BLD: 0 /100 WBCS — SIGNIFICANT CHANGE UP (ref 0–0)
NRBC # BLD: 0 /100 WBCS — SIGNIFICANT CHANGE UP (ref 0–0)
PLATELET # BLD AUTO: 155 K/UL — SIGNIFICANT CHANGE UP (ref 130–400)
PLATELET # BLD AUTO: 155 K/UL — SIGNIFICANT CHANGE UP (ref 130–400)
PMV BLD: 10.4 FL — SIGNIFICANT CHANGE UP (ref 7.4–10.4)
PMV BLD: 10.4 FL — SIGNIFICANT CHANGE UP (ref 7.4–10.4)
POTASSIUM SERPL-MCNC: 4.4 MMOL/L — SIGNIFICANT CHANGE UP (ref 3.5–5)
POTASSIUM SERPL-MCNC: 4.4 MMOL/L — SIGNIFICANT CHANGE UP (ref 3.5–5)
POTASSIUM SERPL-SCNC: 4.4 MMOL/L — SIGNIFICANT CHANGE UP (ref 3.5–5)
POTASSIUM SERPL-SCNC: 4.4 MMOL/L — SIGNIFICANT CHANGE UP (ref 3.5–5)
RBC # BLD: 3.59 M/UL — LOW (ref 4.7–6.1)
RBC # BLD: 3.59 M/UL — LOW (ref 4.7–6.1)
RBC # FLD: 14.6 % — HIGH (ref 11.5–14.5)
RBC # FLD: 14.6 % — HIGH (ref 11.5–14.5)
SODIUM SERPL-SCNC: 136 MMOL/L — SIGNIFICANT CHANGE UP (ref 135–146)
SODIUM SERPL-SCNC: 136 MMOL/L — SIGNIFICANT CHANGE UP (ref 135–146)
WBC # BLD: 8.31 K/UL — SIGNIFICANT CHANGE UP (ref 4.8–10.8)
WBC # BLD: 8.31 K/UL — SIGNIFICANT CHANGE UP (ref 4.8–10.8)
WBC # FLD AUTO: 8.31 K/UL — SIGNIFICANT CHANGE UP (ref 4.8–10.8)
WBC # FLD AUTO: 8.31 K/UL — SIGNIFICANT CHANGE UP (ref 4.8–10.8)

## 2023-11-17 PROCEDURE — 99232 SBSQ HOSP IP/OBS MODERATE 35: CPT

## 2023-11-17 RX ADMIN — MIRTAZAPINE 15 MILLIGRAM(S): 45 TABLET, ORALLY DISINTEGRATING ORAL at 11:18

## 2023-11-17 RX ADMIN — SODIUM CHLORIDE 75 MILLILITER(S): 9 INJECTION, SOLUTION INTRAVENOUS at 11:19

## 2023-11-17 RX ADMIN — MEROPENEM 100 MILLIGRAM(S): 1 INJECTION INTRAVENOUS at 21:26

## 2023-11-17 RX ADMIN — HALOPERIDOL DECANOATE 10 MILLIGRAM(S): 100 INJECTION INTRAMUSCULAR at 05:47

## 2023-11-17 RX ADMIN — Medication 1 APPLICATION(S): at 06:08

## 2023-11-17 RX ADMIN — Medication 100 MICROGRAM(S): at 05:47

## 2023-11-17 RX ADMIN — TAMSULOSIN HYDROCHLORIDE 0.4 MILLIGRAM(S): 0.4 CAPSULE ORAL at 21:21

## 2023-11-17 RX ADMIN — HALOPERIDOL DECANOATE 10 MILLIGRAM(S): 100 INJECTION INTRAMUSCULAR at 21:21

## 2023-11-17 RX ADMIN — MEROPENEM 100 MILLIGRAM(S): 1 INJECTION INTRAVENOUS at 05:52

## 2023-11-17 RX ADMIN — BUPROPION HYDROCHLORIDE 150 MILLIGRAM(S): 150 TABLET, EXTENDED RELEASE ORAL at 11:18

## 2023-11-17 RX ADMIN — ENOXAPARIN SODIUM 40 MILLIGRAM(S): 100 INJECTION SUBCUTANEOUS at 21:54

## 2023-11-17 RX ADMIN — MEROPENEM 100 MILLIGRAM(S): 1 INJECTION INTRAVENOUS at 14:32

## 2023-11-17 NOTE — PROGRESS NOTE ADULT - ASSESSMENT
ASSESSMENT  52yMale with a past medical history of Mild mental retardation, intellectual disability, Schizophrenia, Impulse control disorder, Seizures, Hypothyroidism, Exotropia, Cataracts, Osteopenia, urethral stricture, presented from halfway [place] for weakness. Consulted for sepsis 2/2 Right pyelonephritis hx of Ecoli ESBL started meropenem pending cultures.      IMPRESSION  #Likely Sepsis 2/2 pyelonephritis, hx of ESBL   - Urine Cx ESBL E coli   #hx of urethral strictures inc. risk of infections    RECOMMENDATIONS  - meropenem 1g q 8 hours   - on discharge, ertapenem 1g daily to complete until 11/22  Please call or message on [employer] Teams if with any questions.  Spectra [id number]

## 2023-11-17 NOTE — PROGRESS NOTE ADULT - SUBJECTIVE AND OBJECTIVE BOX
VIKALINA  52y, Male  Allergy: phenothiazines (Unknown)  erythromycin (Other)      LOS  4d    CHIEF COMPLAINT: Sepsis 2/2 Pyelonephritis (17 Nov 2023 12:23)      INTERVAL EVENTS/HPI  - No acute events overnight  - T(F): , Max: 99.1 (11-17-23 @ 05:26)  - Denies any worsening symptoms  - Tolerating medication  - WBC Count: 8.31 (11-17-23 @ 07:36)  WBC Count: 7.70 (11-16-23 @ 08:49)     - Creatinine: 0.8 (11-17-23 @ 07:36)  Creatinine: 0.6 (11-16-23 @ 08:49)       ROS  General: Denies rigors, nightsweats  HEENT: Denies headache, rhinorrhea, sore throat, eye pain  CV: Denies CP, palpitations  PULM: Denies wheezing, hemoptysis  GI: Denies hematemesis, hematochezia, melena  : Denies discharge, hematuria  MSK: Denies arthralgias, myalgias  SKIN: Denies rash, lesions  NEURO: Denies paresthesias, weakness  PSYCH: Denies depression, anxiety    VITALS:  T(F): 96.9, Max: 99.1 (11-17-23 @ 05:26)  HR: 68  BP: 100/59  RR: 18Vital Signs Last 24 Hrs  T(C): 36.1 (17 Nov 2023 19:45), Max: 37.3 (17 Nov 2023 05:26)  T(F): 96.9 (17 Nov 2023 19:45), Max: 99.1 (17 Nov 2023 05:26)  HR: 68 (17 Nov 2023 19:45) (66 - 68)  BP: 100/59 (17 Nov 2023 19:45) (100/59 - 119/76)  BP(mean): --  RR: 18 (17 Nov 2023 19:45) (18 - 18)  SpO2: --        PHYSICAL EXAM:  Gen: NAD, resting in bed  HEENT: Normocephalic, atraumatic  Neck: supple, no lymphadenopathy  CV: Regular rate & regular rhythm  Lungs: decreased BS at bases, no fremitus  Abdomen: Soft, BS present  Ext: Warm, well perfused  Neuro: non focal, awake  Skin: no rash, no erythema  Lines: no phlebitis    FH: Non-contributory  Social Hx: Non-contributory    TESTS & MEASUREMENTS:                        11.1   8.31  )-----------( 155      ( 17 Nov 2023 07:36 )             33.4     11-17    136  |  102  |  9<L>  ----------------------------<  94  4.4   |  25  |  0.8    Ca    8.8      17 Nov 2023 07:36  Mg     1.9     11-17    TPro  4.1<L>  /  Alb  1.9<L>  /  TBili  0.2  /  DBili  x   /  AST  33  /  ALT  15  /  AlkPhos  48  11-16      LIVER FUNCTIONS - ( 16 Nov 2023 08:49 )  Alb: 1.9 g/dL / Pro: 4.1 g/dL / ALK PHOS: 48 U/L / ALT: 15 U/L / AST: 33 U/L / GGT: x           Urinalysis Basic - ( 17 Nov 2023 07:36 )    Color: x / Appearance: x / SG: x / pH: x  Gluc: 94 mg/dL / Ketone: x  / Bili: x / Urobili: x   Blood: x / Protein: x / Nitrite: x   Leuk Esterase: x / RBC: x / WBC x   Sq Epi: x / Non Sq Epi: x / Bacteria: x        Culture - Blood (collected 11-15-23 @ 20:14)  Source: .Blood None  Preliminary Report (11-17-23 @ 07:02):    No growth at 24 hours    Culture - Urine (collected 11-13-23 @ 17:58)  Source: Clean Catch Clean Catch (Midstream)  Final Report (11-16-23 @ 08:11):    >100,000 CFU/ml Escherichia coli ESBL  Organism: Escherichia coli ESBL (11-16-23 @ 08:11)  Organism: Escherichia coli ESBL (11-16-23 @ 08:11)      -  Levofloxacin: R >4      -  Tobramycin: I 4      -  Nitrofurantoin: R >64 Should not be used to treat pyelonephritis      -  Aztreonam: R >16      -  Gentamicin: S <=2      -  Cefazolin: R >16 For uncomplicated UTI with K. pneumoniae, E. coli, or P. mirablis: ULI <=16 is sensitive and ULI >=32 is resistant. This also predicts results for oral agents cefaclor, cefdinir, cefpodoxime, cefprozil, cefuroxime axetil, cephalexin and locarbef for uncomplicated UTI. Note that some isolates may be susceptible to these agents while testing resistant to cefazolin.      -  Cefepime: R >16      -  Piperacillin/Tazobactam: S <=8      -  Ciprofloxacin: R >2      -  Imipenem: S <=1      -  Ceftriaxone: R >32      -  Ampicillin: R >16 These ampicillin results predict results for amoxicillin      Method Type: ULI      -  Meropenem: S <=1      -  Ampicillin/Sulbactam: I 16/8      -  Cefuroxime: R >16      -  Amoxicillin/Clavulanic Acid: S <=8/4      -  Trimethoprim/Sulfamethoxazole: R >2/38      -  Ertapenem: S <=0.5    Culture - Blood (collected 11-13-23 @ 17:15)  Source: .Blood Blood-Peripheral  Preliminary Report (11-17-23 @ 23:00):    No growth at 4 days    Culture - Blood (collected 11-13-23 @ 17:15)  Source: .Blood Blood-Peripheral  Preliminary Report (11-17-23 @ 23:00):    No growth at 4 days        Lactate, Blood: 1.4 mmol/L (11-13-23 @ 17:23)  Blood Gas Venous - Lactate: 1.70 mmol/L (11-13-23 @ 16:52)      INFECTIOUS DISEASES TESTING      INFLAMMATORY MARKERS      RADIOLOGY & ADDITIONAL TESTS:  I have personally reviewed the last available Chest xray  CXR      CT      CARDIOLOGY TESTING  12 Lead ECG:   Ventricular Rate 80 BPM    Atrial Rate 80 BPM    P-R Interval 132 ms    QRS Duration 84 ms    Q-T Interval 394 ms    QTC Calculation(Bazett) 454 ms    P Axis 40 degrees    R Axis -19 degrees    T Axis 20 degrees    Diagnosis Line Normal sinus rhythm  Normal ECG    Confirmed by Behuria, Supreeti ([id number]) on 11/14/2023 2:22:08 PM (11-14-23 @ 02:15)  12 Lead ECG:   Ventricular Rate 94 BPM    Atrial Rate 94 BPM    P-R Interval 128 ms    QRS Duration 80 ms    Q-T Interval 342 ms    QTC Calculation(Bazett) 427 ms    P Axis 46 degrees    R Axis -11 degrees    T Axis 41 degrees    Diagnosis Line Normal sinus rhythm  Normal ECG    Confirmed by yaima yeboah ([id number]) on 11/13/2023 8:44:30 PM (11-13-23 @ 18:09)      MEDICATIONS  buPROPion XL (24-Hour) . 150 Oral daily  chlorhexidine 2% Cloths 1 Topical daily  clotrimazole 1% Cream 1 Topical two times a day  enoxaparin Injectable 40 SubCutaneous every 24 hours  haloperidol     Tablet 10 Oral three times a day  levothyroxine 100 Oral daily  meropenem  IVPB 1000 IV Intermittent every 8 hours  mirtazapine 15 Oral daily  tamsulosin 0.4 Oral at bedtime      WEIGHT  Weight (kg): 72.2 (11-13-23 @ 16:04)  Creatinine: 0.8 mg/dL (11-17-23 @ 07:36)      ANTIBIOTICS:  meropenem  IVPB 1000 milliGRAM(s) IV Intermittent every 8 hours      All available historical records have been reviewed

## 2023-11-17 NOTE — PROGRESS NOTE ADULT - ASSESSMENT
53 y/o man with PMH of Mild mental retardation, intellectual disability, Schizophrenia, Impulse control disorder, Seizures, Hypothyroidism, Exotropia, Cataracts, Osteopenia, urethral stricture and recent treatment for UTI twice with abx as outpt presented from group home [place] for weakness, lethargy, decreased PO intake and inability to walk without maximal assistance per group aide at bedside. He has hx of urethral stricture and was scheduled for cystoscopy but never had the procedure done due to consent issues.     # Sepsis present on admission due to right sided pyelonephritis  #h/o recently treated UTIs  #h/o urethral stricture  #h/o ESBL E. coli in urine culture 6/23  #urinary retention - long placed by  and 700cc was drained  #hydroureteronephrosis and question of urothelial enhancement raising suspicion for ascending urinary tract infection.   -  -ucx- ecoli esbl -> sens to meropenam   -repeat renal us shows improved hydro  -continue meropenem 1g IV q8hr  -ID follow up for duration   - -> TOV darrel but if fails will be sent home with chronic long-> needs to be placed by urology as complicated anatomy  -TOV when ambulating  -on flomax and monitor BP closely  -dec IVF to 75 cc/hr  -encourage PO intake  -PT consulted    2. Mild mental disability, schizophrenia, impulse control disorder - on bupropion, haldol, mirtazapine    3. Hypothyroid on synthroid    4. DVT prophylaxis - lovenox       51 y/o man with PMH of Mild mental retardation, intellectual disability, Schizophrenia, Impulse control disorder, Seizures, Hypothyroidism, Exotropia, Cataracts, Osteopenia, urethral stricture and recent treatment for UTI twice with abx as outpt presented from group home [facility] for weakness, lethargy, decreased PO intake and inability to walk without maximal assistance per group aide at bedside. He has hx of urethral stricture and was scheduled for cystoscopy but never had the procedure done due to consent issues.     # Sepsis present on admission due to right sided pyelonephritis  #h/o recently treated UTIs  #h/o urethral stricture  #h/o ESBL E. coli in urine culture 6/23  #urinary retention - long placed by  and 700cc was drained  #hydroureteronephrosis and question of urothelial enhancement raising suspicion for ascending urinary tract infection.   -ucx- ecoli esbl -> sens to meropenam   -repeat renal us shows improved hydro  -continue meropenem 1g IV q8hr  -ID follow up for duration   - -> TOV darrel but if fails will be sent home with chronic long-> needs to be placed by urology as complicated anatomy  -TOV when ambulating  -on flomax and monitor BP closely  -dec IVF to 75 cc/hr  -encourage PO intake  -PT consulted  -c/w with meropenem    # Mild mental disability, schizophrenia, impulse control disorder   - on bupropion, haldol, mirtazapine    # Hypothyroidism  -on synthroid    4. DVT prophylaxis - lovenox       51 y/o man with PMH of Mild mental retardation, intellectual disability, Schizophrenia, Impulse control disorder, Seizures, Hypothyroidism, Exotropia, Cataracts, Osteopenia, urethral stricture and recent treatment for UTI twice with abx as outpt presented from group home [place] for weakness, lethargy, decreased PO intake and inability to walk without maximal assistance per group aide at bedside. He has hx of urethral stricture and was scheduled for cystoscopy but never had the procedure done due to consent issues.     # Sepsis present on admission due to right sided pyelonephritis  #h/o recently treated UTIs  #h/o urethral stricture  #h/o ESBL E. coli in urine culture 6/23  #urinary retention - long placed by  and 700cc was drained  #hydroureteronephrosis and question of urothelial enhancement raising suspicion for ascending urinary tract infection.   -ucx- ecoli esbl -> sens to meropenam   -repeat renal us shows improved hydro  -continue meropenem 1g IV q8hr  -ID follow up for duration   - -> TOV darrel but if fails will be sent home with chronic long-> needs to be placed by urology as complicated anatomy  -TOV when ambulating  -on flomax and monitor BP closely  -dec IVF to 75 cc/hr  -encourage PO intake  -PT consulted  -c/w with meropenem    # Mild mental disability, schizophrenia, impulse control disorder   - on bupropion, haldol, mirtazapine    # Hypothyroidism  -on synthroid    # DVT prophylaxis - lovenox

## 2023-11-17 NOTE — PROGRESS NOTE ADULT - SUBJECTIVE AND OBJECTIVE BOX
SUBJECTIVE / OVERNIGHT EVENTS  Patient slept well overnight. No acute complaints this AM. Patient does not report fevers, chills, CP, SOB, or n/v/d.    MEDICATIONS  buPROPion XL (24-Hour) . 150 milliGRAM(s) Oral daily  chlorhexidine 2% Cloths 1 Application(s) Topical daily  clotrimazole 1% Cream 1 Application(s) Topical two times a day  enoxaparin Injectable 40 milliGRAM(s) SubCutaneous every 24 hours  haloperidol     Tablet 10 milliGRAM(s) Oral three times a day  lactated ringers. 1000 milliLiter(s) IV Continuous <Continuous>  levothyroxine 100 MICROGram(s) Oral daily  meropenem  IVPB 1000 milliGRAM(s) IV Intermittent every 8 hours  mirtazapine 15 milliGRAM(s) Oral daily  tamsulosin 0.4 milliGRAM(s) Oral at bedtime    melatonin 3 milliGRAM(s) Oral at bedtime PRN Insomnia    VITALS /  EXAM    T(C): 37.3 (11-17-23 @ 05:26), Max: 37.6 (11-16-23 @ 19:28)  HR: 66 (11-17-23 @ 05:26) (64 - 77)  BP: 119/76 (11-17-23 @ 05:26) (116/74 - 181/70)  RR: 18 (11-17-23 @ 05:26) (18 - 18)  SpO2: 93% (11-16-23 @ 19:28) (93% - 93%)    GENERAL: NAD, not in acute distress  CHEST/LUNG: Clear to auscultation bilaterally; No wheezes, rales or rhonchi  HEART: Regular rate and rhythm; No murmurs, rubs, or gallops  ABDOMEN: Soft, Nontender, Nondistended; Bowel sounds present, no masses.  EXTREMITIES:  2+ Peripheral Pulses, No clubbing, cyanosis, or edema    I's & O's     11-16-23 @ 07:01  -  11-17-23 @ 07:00  --------------------------------------------------------  IN:    IV PiggyBack: 600 mL    Oral Fluid: 240 mL  Total IN: 840 mL    OUT:    Indwelling Catheter - Urethral (mL): 4620 mL  Total OUT: 4620 mL    Total NET: -3780 mL        LABS             11.1   8.31  )-----------( 155      ( 11-17-23 @ 07:36 )             33.4     136  |  102  |  9   -------------------------<  94   11-17-23 @ 07:36  4.4  |  25  |  0.8    Ca      8.8     11-17-23 @ 07:36  Mg     1.9     11-17-23 @ 07:36    TPro  4.1  /  Alb  1.9  /  TBili  0.2  /  DBili  x   /  AST  33  /  ALT  15  /  AlkPhos  48  /  GGT  x     11-16-23 @ 08:49                    Urinalysis Basic - ( 17 Nov 2023 07:36 )    Color: x / Appearance: x / SG: x / pH: x  Gluc: 94 mg/dL / Ketone: x  / Bili: x / Urobili: x   Blood: x / Protein: x / Nitrite: x   Leuk Esterase: x / RBC: x / WBC x   Sq Epi: x / Non Sq Epi: x / Bacteria: x      MICRO / IMAGING / CARDIOLOGY  Telemetry: Reviewed   EKG: Reviewed    CULTURES    Culture - Blood (collected 11-15-23 @ 20:14)  Source: .Blood None  Preliminary Report:    No growth at 24 hours    Culture - Urine (collected 11-13-23 @ 17:58)  Source: Clean Catch Clean Catch (Midstream)  Final Report:    >100,000 CFU/ml Escherichia coli ESBL  Organism: Escherichia coli ESBL  Organism: Escherichia coli ESBL      -  Levofloxacin: R >4      -  Tobramycin: I 4      -  Nitrofurantoin: R >64 Should not be used to treat pyelonephritis      -  Aztreonam: R >16      -  Gentamicin: S <=2      -  Cefazolin: R >16 For uncomplicated UTI with K. pneumoniae, E. coli, or P. mirablis: ULI <=16 is sensitive and ULI >=32 is resistant. This also predicts results for oral agents cefaclor, cefdinir, cefpodoxime, cefprozil, cefuroxime axetil, cephalexin and locarbef for uncomplicated UTI. Note that some isolates may be susceptible to these agents while testing resistant to cefazolin.      -  Cefepime: R >16      -  Piperacillin/Tazobactam: S <=8      -  Ciprofloxacin: R >2      -  Imipenem: S <=1      -  Ceftriaxone: R >32      -  Ampicillin: R >16 These ampicillin results predict results for amoxicillin      Method Type: ULI      -  Meropenem: S <=1      -  Ampicillin/Sulbactam: I 16/8      -  Cefuroxime: R >16      -  Amoxicillin/Clavulanic Acid: S <=8/4      -  Trimethoprim/Sulfamethoxazole: R >2/38      -  Ertapenem: S <=0.5    Culture - Blood (collected 11-13-23 @ 17:15)  Source: .Blood Blood-Peripheral  Preliminary Report:    No growth at 72 Hours    Culture - Blood (collected 11-13-23 @ 17:15)  Source: .Blood Blood-Peripheral  Preliminary Report:    No growth at 72 Hours      IMAGING  PACS Image:  (11-16-23 @ 11:06)    CARDIOLOGY   SUBJECTIVE / OVERNIGHT EVENTS  Patient was seen and examined. No overnight events  MEDICATIONS  buPROPion XL (24-Hour) . 150 milliGRAM(s) Oral daily  chlorhexidine 2% Cloths 1 Application(s) Topical daily  clotrimazole 1% Cream 1 Application(s) Topical two times a day  enoxaparin Injectable 40 milliGRAM(s) SubCutaneous every 24 hours  haloperidol     Tablet 10 milliGRAM(s) Oral three times a day  lactated ringers. 1000 milliLiter(s) IV Continuous <Continuous>  levothyroxine 100 MICROGram(s) Oral daily  meropenem  IVPB 1000 milliGRAM(s) IV Intermittent every 8 hours  mirtazapine 15 milliGRAM(s) Oral daily  tamsulosin 0.4 milliGRAM(s) Oral at bedtime    melatonin 3 milliGRAM(s) Oral at bedtime PRN Insomnia    VITALS /  EXAM    T(C): 37.3 (11-17-23 @ 05:26), Max: 37.6 (11-16-23 @ 19:28)  HR: 66 (11-17-23 @ 05:26) (64 - 77)  BP: 119/76 (11-17-23 @ 05:26) (116/74 - 181/70)  RR: 18 (11-17-23 @ 05:26) (18 - 18)  SpO2: 93% (11-16-23 @ 19:28) (93% - 93%)    GENERAL: NAD, not in acute distress  CHEST/LUNG: Clear to auscultation bilaterally; No wheezes, rales or rhonchi  HEART: Regular rate and rhythm; No murmurs, rubs, or gallops  ABDOMEN: Soft, Nontender, Nondistended; Bowel sounds present, no masses.  EXTREMITIES:  2+ Peripheral Pulses, No clubbing, cyanosis, or edema    I's & O's     11-16-23 @ 07:01  -  11-17-23 @ 07:00  --------------------------------------------------------  IN:    IV PiggyBack: 600 mL    Oral Fluid: 240 mL  Total IN: 840 mL    OUT:    Indwelling Catheter - Urethral (mL): 4620 mL  Total OUT: 4620 mL    Total NET: -3780 mL        LABS             11.1   8.31  )-----------( 155      ( 11-17-23 @ 07:36 )             33.4     136  |  102  |  9   -------------------------<  94   11-17-23 @ 07:36  4.4  |  25  |  0.8    Ca      8.8     11-17-23 @ 07:36  Mg     1.9     11-17-23 @ 07:36    TPro  4.1  /  Alb  1.9  /  TBili  0.2  /  DBili  x   /  AST  33  /  ALT  15  /  AlkPhos  48  /  GGT  x     11-16-23 @ 08:49                    Urinalysis Basic - ( 17 Nov 2023 07:36 )    Color: x / Appearance: x / SG: x / pH: x  Gluc: 94 mg/dL / Ketone: x  / Bili: x / Urobili: x   Blood: x / Protein: x / Nitrite: x   Leuk Esterase: x / RBC: x / WBC x   Sq Epi: x / Non Sq Epi: x / Bacteria: x      MICRO / IMAGING / CARDIOLOGY  Telemetry: Reviewed   EKG: Reviewed    CULTURES    Culture - Blood (collected 11-15-23 @ 20:14)  Source: .Blood None  Preliminary Report:    No growth at 24 hours    Culture - Urine (collected 11-13-23 @ 17:58)  Source: Clean Catch Clean Catch (Midstream)  Final Report:    >100,000 CFU/ml Escherichia coli ESBL  Organism: Escherichia coli ESBL  Organism: Escherichia coli ESBL      -  Levofloxacin: R >4      -  Tobramycin: I 4      -  Nitrofurantoin: R >64 Should not be used to treat pyelonephritis      -  Aztreonam: R >16      -  Gentamicin: S <=2      -  Cefazolin: R >16 For uncomplicated UTI with K. pneumoniae, E. coli, or P. mirablis: ULI <=16 is sensitive and ULI >=32 is resistant. This also predicts results for oral agents cefaclor, cefdinir, cefpodoxime, cefprozil, cefuroxime axetil, cephalexin and locarbef for uncomplicated UTI. Note that some isolates may be susceptible to these agents while testing resistant to cefazolin.      -  Cefepime: R >16      -  Piperacillin/Tazobactam: S <=8      -  Ciprofloxacin: R >2      -  Imipenem: S <=1      -  Ceftriaxone: R >32      -  Ampicillin: R >16 These ampicillin results predict results for amoxicillin      Method Type: ULI      -  Meropenem: S <=1      -  Ampicillin/Sulbactam: I 16/8      -  Cefuroxime: R >16      -  Amoxicillin/Clavulanic Acid: S <=8/4      -  Trimethoprim/Sulfamethoxazole: R >2/38      -  Ertapenem: S <=0.5    Culture - Blood (collected 11-13-23 @ 17:15)  Source: .Blood Blood-Peripheral  Preliminary Report:    No growth at 72 Hours    Culture - Blood (collected 11-13-23 @ 17:15)  Source: .Blood Blood-Peripheral  Preliminary Report:    No growth at 72 Hours      IMAGING  PACS Image:  (11-16-23 @ 11:06)    CARDIOLOGY

## 2023-11-18 LAB
ANION GAP SERPL CALC-SCNC: 11 MMOL/L — SIGNIFICANT CHANGE UP (ref 7–14)
ANION GAP SERPL CALC-SCNC: 11 MMOL/L — SIGNIFICANT CHANGE UP (ref 7–14)
BUN SERPL-MCNC: 14 MG/DL — SIGNIFICANT CHANGE UP (ref 10–20)
BUN SERPL-MCNC: 14 MG/DL — SIGNIFICANT CHANGE UP (ref 10–20)
CALCIUM SERPL-MCNC: 8.8 MG/DL — SIGNIFICANT CHANGE UP (ref 8.4–10.5)
CALCIUM SERPL-MCNC: 8.8 MG/DL — SIGNIFICANT CHANGE UP (ref 8.4–10.5)
CHLORIDE SERPL-SCNC: 102 MMOL/L — SIGNIFICANT CHANGE UP (ref 98–110)
CHLORIDE SERPL-SCNC: 102 MMOL/L — SIGNIFICANT CHANGE UP (ref 98–110)
CO2 SERPL-SCNC: 24 MMOL/L — SIGNIFICANT CHANGE UP (ref 17–32)
CO2 SERPL-SCNC: 24 MMOL/L — SIGNIFICANT CHANGE UP (ref 17–32)
CREAT SERPL-MCNC: 0.9 MG/DL — SIGNIFICANT CHANGE UP (ref 0.7–1.5)
CREAT SERPL-MCNC: 0.9 MG/DL — SIGNIFICANT CHANGE UP (ref 0.7–1.5)
CULTURE RESULTS: SIGNIFICANT CHANGE UP
EGFR: 102 ML/MIN/1.73M2 — SIGNIFICANT CHANGE UP
EGFR: 102 ML/MIN/1.73M2 — SIGNIFICANT CHANGE UP
GLUCOSE SERPL-MCNC: 87 MG/DL — SIGNIFICANT CHANGE UP (ref 70–99)
GLUCOSE SERPL-MCNC: 87 MG/DL — SIGNIFICANT CHANGE UP (ref 70–99)
HCT VFR BLD CALC: 35.2 % — LOW (ref 42–52)
HCT VFR BLD CALC: 35.2 % — LOW (ref 42–52)
HGB BLD-MCNC: 11.8 G/DL — LOW (ref 14–18)
HGB BLD-MCNC: 11.8 G/DL — LOW (ref 14–18)
MAGNESIUM SERPL-MCNC: 1.9 MG/DL — SIGNIFICANT CHANGE UP (ref 1.8–2.4)
MAGNESIUM SERPL-MCNC: 1.9 MG/DL — SIGNIFICANT CHANGE UP (ref 1.8–2.4)
MCHC RBC-ENTMCNC: 31.4 PG — HIGH (ref 27–31)
MCHC RBC-ENTMCNC: 31.4 PG — HIGH (ref 27–31)
MCHC RBC-ENTMCNC: 33.5 G/DL — SIGNIFICANT CHANGE UP (ref 32–37)
MCHC RBC-ENTMCNC: 33.5 G/DL — SIGNIFICANT CHANGE UP (ref 32–37)
MCV RBC AUTO: 93.6 FL — SIGNIFICANT CHANGE UP (ref 80–94)
MCV RBC AUTO: 93.6 FL — SIGNIFICANT CHANGE UP (ref 80–94)
NRBC # BLD: 0 /100 WBCS — SIGNIFICANT CHANGE UP (ref 0–0)
NRBC # BLD: 0 /100 WBCS — SIGNIFICANT CHANGE UP (ref 0–0)
PLATELET # BLD AUTO: 198 K/UL — SIGNIFICANT CHANGE UP (ref 130–400)
PLATELET # BLD AUTO: 198 K/UL — SIGNIFICANT CHANGE UP (ref 130–400)
PMV BLD: 9.9 FL — SIGNIFICANT CHANGE UP (ref 7.4–10.4)
PMV BLD: 9.9 FL — SIGNIFICANT CHANGE UP (ref 7.4–10.4)
POTASSIUM SERPL-MCNC: 4.5 MMOL/L — SIGNIFICANT CHANGE UP (ref 3.5–5)
POTASSIUM SERPL-MCNC: 4.5 MMOL/L — SIGNIFICANT CHANGE UP (ref 3.5–5)
POTASSIUM SERPL-SCNC: 4.5 MMOL/L — SIGNIFICANT CHANGE UP (ref 3.5–5)
POTASSIUM SERPL-SCNC: 4.5 MMOL/L — SIGNIFICANT CHANGE UP (ref 3.5–5)
RBC # BLD: 3.76 M/UL — LOW (ref 4.7–6.1)
RBC # BLD: 3.76 M/UL — LOW (ref 4.7–6.1)
RBC # FLD: 14.7 % — HIGH (ref 11.5–14.5)
RBC # FLD: 14.7 % — HIGH (ref 11.5–14.5)
SODIUM SERPL-SCNC: 137 MMOL/L — SIGNIFICANT CHANGE UP (ref 135–146)
SODIUM SERPL-SCNC: 137 MMOL/L — SIGNIFICANT CHANGE UP (ref 135–146)
SPECIMEN SOURCE: SIGNIFICANT CHANGE UP
WBC # BLD: 10.75 K/UL — SIGNIFICANT CHANGE UP (ref 4.8–10.8)
WBC # BLD: 10.75 K/UL — SIGNIFICANT CHANGE UP (ref 4.8–10.8)
WBC # FLD AUTO: 10.75 K/UL — SIGNIFICANT CHANGE UP (ref 4.8–10.8)
WBC # FLD AUTO: 10.75 K/UL — SIGNIFICANT CHANGE UP (ref 4.8–10.8)

## 2023-11-18 PROCEDURE — 99233 SBSQ HOSP IP/OBS HIGH 50: CPT

## 2023-11-18 RX ADMIN — HALOPERIDOL DECANOATE 10 MILLIGRAM(S): 100 INJECTION INTRAMUSCULAR at 13:09

## 2023-11-18 RX ADMIN — CHLORHEXIDINE GLUCONATE 1 APPLICATION(S): 213 SOLUTION TOPICAL at 13:15

## 2023-11-18 RX ADMIN — Medication 100 MICROGRAM(S): at 05:25

## 2023-11-18 RX ADMIN — HALOPERIDOL DECANOATE 10 MILLIGRAM(S): 100 INJECTION INTRAMUSCULAR at 21:21

## 2023-11-18 RX ADMIN — MEROPENEM 100 MILLIGRAM(S): 1 INJECTION INTRAVENOUS at 21:24

## 2023-11-18 RX ADMIN — ENOXAPARIN SODIUM 40 MILLIGRAM(S): 100 INJECTION SUBCUTANEOUS at 22:39

## 2023-11-18 RX ADMIN — MEROPENEM 100 MILLIGRAM(S): 1 INJECTION INTRAVENOUS at 13:08

## 2023-11-18 RX ADMIN — MEROPENEM 100 MILLIGRAM(S): 1 INJECTION INTRAVENOUS at 05:24

## 2023-11-18 RX ADMIN — Medication 1 APPLICATION(S): at 05:26

## 2023-11-18 RX ADMIN — MIRTAZAPINE 15 MILLIGRAM(S): 45 TABLET, ORALLY DISINTEGRATING ORAL at 13:09

## 2023-11-18 RX ADMIN — HALOPERIDOL DECANOATE 10 MILLIGRAM(S): 100 INJECTION INTRAMUSCULAR at 05:25

## 2023-11-18 RX ADMIN — BUPROPION HYDROCHLORIDE 150 MILLIGRAM(S): 150 TABLET, EXTENDED RELEASE ORAL at 13:09

## 2023-11-18 RX ADMIN — TAMSULOSIN HYDROCHLORIDE 0.4 MILLIGRAM(S): 0.4 CAPSULE ORAL at 21:20

## 2023-11-18 RX ADMIN — Medication 1 APPLICATION(S): at 17:56

## 2023-11-18 NOTE — DIETITIAN INITIAL EVALUATION ADULT - PERTINENT MEDS FT
MEDICATIONS  (STANDING):  buPROPion XL (24-Hour) . 150 milliGRAM(s) Oral daily  chlorhexidine 2% Cloths 1 Application(s) Topical daily  clotrimazole 1% Cream 1 Application(s) Topical two times a day  enoxaparin Injectable 40 milliGRAM(s) SubCutaneous every 24 hours  haloperidol     Tablet 10 milliGRAM(s) Oral three times a day  levothyroxine 100 MICROGram(s) Oral daily  meropenem  IVPB 1000 milliGRAM(s) IV Intermittent every 8 hours  mirtazapine 15 milliGRAM(s) Oral daily  tamsulosin 0.4 milliGRAM(s) Oral at bedtime    MEDICATIONS  (PRN):  melatonin 3 milliGRAM(s) Oral at bedtime PRN Insomnia

## 2023-11-18 NOTE — PROGRESS NOTE ADULT - ASSESSMENT
# Sepsis present on admission due to right sided pyelonephritis  #h/o recently treated UTIs  #h/o urethral stricture  #h/o ESBL E. coli in urine culture 6/23  #urinary retention - long placed by  and 700cc was drained  #hydroureteronephrosis and question of urothelial enhancement raising suspicion for ascending urinary tract infection.   -FU bcx: NGTD  -ucx- ecoli esbl -> sens to meropenem  - On  meropenem 1g IV q8hr  - -> TOV when ambulating 150 ft; only did 10; once doing 150ft if he fails will be sent home with chronic long-> needs to be placed by urology as complicated anatomy  -on flomax   -c/w meropenem 1 g q8 IV. On discharge switch ertapenem 1g daily to complete until 11/22  -encourage PO intake  -PT f/u     #Mild mental disability, schizophrenia, impulse control disorder -  -on bupropion, haldol, mirtazapine    #Hypothyroid   -On Synthroid    #DVT prophylaxis - lovenox      full code

## 2023-11-18 NOTE — DIETITIAN INITIAL EVALUATION ADULT - PERTINENT LABORATORY DATA
11-18    137  |  102  |  14  ----------------------------<  87  4.5   |  24  |  0.9    Ca    8.8      18 Nov 2023 05:50  Mg     1.9     11-18

## 2023-11-18 NOTE — DIETITIAN INITIAL EVALUATION ADULT - [id number]_GEN_A_CORE
Dx: 51y/o male with h/o mild mental retardation, intellectual disability, schizophrenia, impulse control disorder, seizures, hypothyroidism, exotropia, cataracts, osteopenia, urethral stricture and recent treatment for UTI twice with abx as outpatient and ESBL Ecoli, presented from a group home [place] for weakness, lethargy, decreased PO intake and inability to walk without maximal assistance per group aide at bedside. He has hx of urethral stricture and was scheduled for cystoscopy but never had the procedure done due to consent issues. Noted to have sepsis due to right sided acute pyelonephritis.

## 2023-11-18 NOTE — DIETITIAN INITIAL EVALUATION ADULT - OTHER CALCULATIONS
Estimated Calorie Needs: 1805-2166kcal/day (25-30kcal/kg of admit weight)  Estimated Protein Needs: 58-72grams/day (0.8-1grams/kg of admit weight)  Estimated Fluid Needs: 1805-2166mL/day (1mL/kcal)

## 2023-11-18 NOTE — PROGRESS NOTE ADULT - SUBJECTIVE AND OBJECTIVE BOX
SUBJECTIVE / OVERNIGHT EVENTS  Patient was seen and examined. No overnight events    MEDICATIONS  buPROPion XL (24-Hour) . 150 milliGRAM(s) Oral daily  chlorhexidine 2% Cloths 1 Application(s) Topical daily  clotrimazole 1% Cream 1 Application(s) Topical two times a day  enoxaparin Injectable 40 milliGRAM(s) SubCutaneous every 24 hours  haloperidol     Tablet 10 milliGRAM(s) Oral three times a day  levothyroxine 100 MICROGram(s) Oral daily  meropenem  IVPB 1000 milliGRAM(s) IV Intermittent every 8 hours  mirtazapine 15 milliGRAM(s) Oral daily  tamsulosin 0.4 milliGRAM(s) Oral at bedtime    melatonin 3 milliGRAM(s) Oral at bedtime PRN Insomnia    VITALS /  EXAM    T(C): 37.4 (11-18-23 @ 04:45), Max: 37.4 (11-18-23 @ 04:45)  HR: 60 (11-18-23 @ 04:45) (60 - 68)  BP: 105/63 (11-18-23 @ 04:45) (100/59 - 113/60)  RR: 18 (11-18-23 @ 04:45) (18 - 18)  SpO2: --    GENERAL: NAD, not in acute distress  CHEST/LUNG: Clear to auscultation bilaterally; No wheezes, rales or rhonchi  HEART: Regular rate and rhythm; No murmurs, rubs, or gallops  ABDOMEN: Soft, Nontender, Nondistended; Bowel sounds present, no masses.  EXTREMITIES:  2+ Peripheral Pulses, No clubbing, cyanosis, or edema        I's & O's     11-17-23 @ 07:01  -  11-18-23 @ 07:00  --------------------------------------------------------  IN:    Oral Fluid: 1250 mL  Total IN: 1250 mL    OUT:    Indwelling Catheter - Urethral (mL): 2300 mL  Total OUT: 2300 mL    Total NET: -1050 mL        LABS             11.8   10.75 )-----------( 198      ( 11-18-23 @ 05:50 )             35.2     137  |  102  |  14  -------------------------<  87   11-18-23 @ 05:50  4.5  |  24  |  0.9    Ca      8.8     11-18-23 @ 05:50  Mg     1.9     11-18-23 @ 05:50                      Urinalysis Basic - ( 18 Nov 2023 05:50 )    Color: x / Appearance: x / SG: x / pH: x  Gluc: 87 mg/dL / Ketone: x  / Bili: x / Urobili: x   Blood: x / Protein: x / Nitrite: x   Leuk Esterase: x / RBC: x / WBC x   Sq Epi: x / Non Sq Epi: x / Bacteria: x      MICRO / IMAGING / CARDIOLOGY  Telemetry: Reviewed   EKG: Reviewed    CULTURES    Culture - Urine (collected 11-16-23 @ 15:33)  Source: Clean Catch Clean Catch (Midstream)  Final Report:    <10,000 CFU/mL Normal Urogenital Destiny    Culture - Blood (collected 11-15-23 @ 20:14)  Source: .Blood None  Preliminary Report:    No growth at 48 Hours    Culture - Urine (collected 11-13-23 @ 17:58)  Source: Clean Catch Clean Catch (Midstream)  Final Report:    >100,000 CFU/ml Escherichia coli ESBL  Organism: Escherichia coli ESBL  Organism: Escherichia coli ESBL      Method Type: ULI      -  Amoxicillin/Clavulanic Acid: S <=8/4      -  Ampicillin: R >16 These ampicillin results predict results for amoxicillin      -  Ampicillin/Sulbactam: I 16/8      -  Aztreonam: R >16      -  Cefazolin: R >16 For uncomplicated UTI with K. pneumoniae, E. coli, or P. mirablis: ULI <=16 is sensitive and ULI >=32 is resistant. This also predicts results for oral agents cefaclor, cefdinir, cefpodoxime, cefprozil, cefuroxime axetil, cephalexin and locarbef for uncomplicated UTI. Note that some isolates may be susceptible to these agents while testing resistant to cefazolin.      -  Cefepime: R >16      -  Ceftriaxone: R >32      -  Cefuroxime: R >16      -  Ciprofloxacin: R >2      -  Ertapenem: S <=0.5      -  Gentamicin: S <=2      -  Imipenem: S <=1      -  Levofloxacin: R >4      -  Meropenem: S <=1      -  Nitrofurantoin: R >64 Should not be used to treat pyelonephritis      -  Piperacillin/Tazobactam: S <=8      -  Tobramycin: I 4      -  Trimethoprim/Sulfamethoxazole: R >2/38    Culture - Blood (collected 11-13-23 @ 17:15)  Source: .Blood Blood-Peripheral  Preliminary Report:    No growth at 4 days    Culture - Blood (collected 11-13-23 @ 17:15)  Source: .Blood Blood-Peripheral  Preliminary Report:    No growth at 4 days      IMAGING  PACS Image:  (11-16-23 @ 11:06)    CARDIOLOGY

## 2023-11-18 NOTE — DIETITIAN INITIAL EVALUATION ADULT - NAME AND PHONE
Goal: 1.Continue to consume/tolerate 75%-100% of meals in 7-10 days (Low Risk)   Intervention: 1.Meals and Snacks  Monitor/Evaluate: Diet order, energy intake, nutrition focused physical findings, anemia profile

## 2023-11-18 NOTE — DIETITIAN INITIAL EVALUATION ADULT - ORAL INTAKE PTA/DIET HISTORY
An interview with the patient could not be completed at this time since the patient is asleep. Once medically feasible, an interview with the patient will be conducted. I spoke to the PCA, who states the patient consumes 100% of meals.

## 2023-11-19 LAB
ANION GAP SERPL CALC-SCNC: 12 MMOL/L — SIGNIFICANT CHANGE UP (ref 7–14)
ANION GAP SERPL CALC-SCNC: 12 MMOL/L — SIGNIFICANT CHANGE UP (ref 7–14)
BUN SERPL-MCNC: 18 MG/DL — SIGNIFICANT CHANGE UP (ref 10–20)
BUN SERPL-MCNC: 18 MG/DL — SIGNIFICANT CHANGE UP (ref 10–20)
CALCIUM SERPL-MCNC: 9 MG/DL — SIGNIFICANT CHANGE UP (ref 8.4–10.5)
CALCIUM SERPL-MCNC: 9 MG/DL — SIGNIFICANT CHANGE UP (ref 8.4–10.5)
CHLORIDE SERPL-SCNC: 100 MMOL/L — SIGNIFICANT CHANGE UP (ref 98–110)
CHLORIDE SERPL-SCNC: 100 MMOL/L — SIGNIFICANT CHANGE UP (ref 98–110)
CO2 SERPL-SCNC: 24 MMOL/L — SIGNIFICANT CHANGE UP (ref 17–32)
CO2 SERPL-SCNC: 24 MMOL/L — SIGNIFICANT CHANGE UP (ref 17–32)
CREAT SERPL-MCNC: 0.9 MG/DL — SIGNIFICANT CHANGE UP (ref 0.7–1.5)
CREAT SERPL-MCNC: 0.9 MG/DL — SIGNIFICANT CHANGE UP (ref 0.7–1.5)
EGFR: 102 ML/MIN/1.73M2 — SIGNIFICANT CHANGE UP
EGFR: 102 ML/MIN/1.73M2 — SIGNIFICANT CHANGE UP
GLUCOSE SERPL-MCNC: 97 MG/DL — SIGNIFICANT CHANGE UP (ref 70–99)
GLUCOSE SERPL-MCNC: 97 MG/DL — SIGNIFICANT CHANGE UP (ref 70–99)
HCT VFR BLD CALC: 37.6 % — LOW (ref 42–52)
HCT VFR BLD CALC: 37.6 % — LOW (ref 42–52)
HGB BLD-MCNC: 12.6 G/DL — LOW (ref 14–18)
HGB BLD-MCNC: 12.6 G/DL — LOW (ref 14–18)
MAGNESIUM SERPL-MCNC: 2 MG/DL — SIGNIFICANT CHANGE UP (ref 1.8–2.4)
MAGNESIUM SERPL-MCNC: 2 MG/DL — SIGNIFICANT CHANGE UP (ref 1.8–2.4)
MCHC RBC-ENTMCNC: 31.9 PG — HIGH (ref 27–31)
MCHC RBC-ENTMCNC: 31.9 PG — HIGH (ref 27–31)
MCHC RBC-ENTMCNC: 33.5 G/DL — SIGNIFICANT CHANGE UP (ref 32–37)
MCHC RBC-ENTMCNC: 33.5 G/DL — SIGNIFICANT CHANGE UP (ref 32–37)
MCV RBC AUTO: 95.2 FL — HIGH (ref 80–94)
MCV RBC AUTO: 95.2 FL — HIGH (ref 80–94)
NRBC # BLD: 0 /100 WBCS — SIGNIFICANT CHANGE UP (ref 0–0)
NRBC # BLD: 0 /100 WBCS — SIGNIFICANT CHANGE UP (ref 0–0)
PLATELET # BLD AUTO: 291 K/UL — SIGNIFICANT CHANGE UP (ref 130–400)
PLATELET # BLD AUTO: 291 K/UL — SIGNIFICANT CHANGE UP (ref 130–400)
PMV BLD: 9.6 FL — SIGNIFICANT CHANGE UP (ref 7.4–10.4)
PMV BLD: 9.6 FL — SIGNIFICANT CHANGE UP (ref 7.4–10.4)
POTASSIUM SERPL-MCNC: 4.6 MMOL/L — SIGNIFICANT CHANGE UP (ref 3.5–5)
POTASSIUM SERPL-MCNC: 4.6 MMOL/L — SIGNIFICANT CHANGE UP (ref 3.5–5)
POTASSIUM SERPL-SCNC: 4.6 MMOL/L — SIGNIFICANT CHANGE UP (ref 3.5–5)
POTASSIUM SERPL-SCNC: 4.6 MMOL/L — SIGNIFICANT CHANGE UP (ref 3.5–5)
RBC # BLD: 3.95 M/UL — LOW (ref 4.7–6.1)
RBC # BLD: 3.95 M/UL — LOW (ref 4.7–6.1)
RBC # FLD: 14.6 % — HIGH (ref 11.5–14.5)
RBC # FLD: 14.6 % — HIGH (ref 11.5–14.5)
SODIUM SERPL-SCNC: 136 MMOL/L — SIGNIFICANT CHANGE UP (ref 135–146)
SODIUM SERPL-SCNC: 136 MMOL/L — SIGNIFICANT CHANGE UP (ref 135–146)
WBC # BLD: 12.79 K/UL — HIGH (ref 4.8–10.8)
WBC # BLD: 12.79 K/UL — HIGH (ref 4.8–10.8)
WBC # FLD AUTO: 12.79 K/UL — HIGH (ref 4.8–10.8)
WBC # FLD AUTO: 12.79 K/UL — HIGH (ref 4.8–10.8)

## 2023-11-19 PROCEDURE — 99232 SBSQ HOSP IP/OBS MODERATE 35: CPT

## 2023-11-19 RX ADMIN — MEROPENEM 100 MILLIGRAM(S): 1 INJECTION INTRAVENOUS at 12:51

## 2023-11-19 RX ADMIN — TAMSULOSIN HYDROCHLORIDE 0.4 MILLIGRAM(S): 0.4 CAPSULE ORAL at 21:05

## 2023-11-19 RX ADMIN — MEROPENEM 100 MILLIGRAM(S): 1 INJECTION INTRAVENOUS at 21:04

## 2023-11-19 RX ADMIN — BUPROPION HYDROCHLORIDE 150 MILLIGRAM(S): 150 TABLET, EXTENDED RELEASE ORAL at 12:52

## 2023-11-19 RX ADMIN — CHLORHEXIDINE GLUCONATE 1 APPLICATION(S): 213 SOLUTION TOPICAL at 12:56

## 2023-11-19 RX ADMIN — HALOPERIDOL DECANOATE 10 MILLIGRAM(S): 100 INJECTION INTRAMUSCULAR at 05:27

## 2023-11-19 RX ADMIN — Medication 100 MICROGRAM(S): at 05:27

## 2023-11-19 RX ADMIN — MIRTAZAPINE 15 MILLIGRAM(S): 45 TABLET, ORALLY DISINTEGRATING ORAL at 12:52

## 2023-11-19 RX ADMIN — Medication 1 APPLICATION(S): at 19:14

## 2023-11-19 RX ADMIN — Medication 1 APPLICATION(S): at 05:28

## 2023-11-19 RX ADMIN — HALOPERIDOL DECANOATE 10 MILLIGRAM(S): 100 INJECTION INTRAMUSCULAR at 21:05

## 2023-11-19 RX ADMIN — ENOXAPARIN SODIUM 40 MILLIGRAM(S): 100 INJECTION SUBCUTANEOUS at 22:23

## 2023-11-19 RX ADMIN — MEROPENEM 100 MILLIGRAM(S): 1 INJECTION INTRAVENOUS at 05:27

## 2023-11-19 RX ADMIN — HALOPERIDOL DECANOATE 10 MILLIGRAM(S): 100 INJECTION INTRAMUSCULAR at 12:52

## 2023-11-19 NOTE — PROGRESS NOTE ADULT - SUBJECTIVE AND OBJECTIVE BOX
Pt seen and examined at bedside.    VITAL SIGNS (Last 24 hrs):  T(C): 37.1 (11-19-23 @ 13:03), Max: 37.1 (11-19-23 @ 13:03)  HR: 70 (11-19-23 @ 13:03) (63 - 70)  BP: 110/60 (11-19-23 @ 13:03) (97/58 - 110/60)  RR: 18 (11-19-23 @ 13:03) (18 - 18)  SpO2: 95% (11-19-23 @ 06:08) (94% - 95%)  Wt(kg): --  Daily     Daily     I&O's Summary    18 Nov 2023 07:01  -  19 Nov 2023 07:00  --------------------------------------------------------  IN: 0 mL / OUT: 1500 mL / NET: -1500 mL    19 Nov 2023 07:01  -  19 Nov 2023 15:40  --------------------------------------------------------  IN: 0 mL / OUT: 600 mL / NET: -600 mL        PHYSICAL EXAM:  GENERAL: NAD   HEAD:  Atraumatic, Normocephalic  EYES: conjunctiva and sclera clear  NECK: Supple, No JVD  CHEST/LUNG: Clear to auscultation bilaterally; No wheeze  HEART: Regular rate and rhythm; No murmurs, rubs, or gallops  ABDOMEN: Soft, Nontender, Nondistended; Bowel sounds present  EXTREMITIES:  2+ Peripheral Pulses, No clubbing, cyanosis, or edema  SKIN: No rashes or lesions    Labs Reviewed        CBC Full  -  ( 19 Nov 2023 07:42 )  WBC Count : 12.79 K/uL  Hemoglobin : 12.6 g/dL  Hematocrit : 37.6 %  Platelet Count - Automated : 291 K/uL  Mean Cell Volume : 95.2 fL  Mean Cell Hemoglobin : 31.9 pg  Mean Cell Hemoglobin Concentration : 33.5 g/dL  Auto Neutrophil # : x  Auto Lymphocyte # : x  Auto Monocyte # : x  Auto Eosinophil # : x  Auto Basophil # : x  Auto Neutrophil % : x  Auto Lymphocyte % : x  Auto Monocyte % : x  Auto Eosinophil % : x  Auto Basophil % : x    BMP:    11-19 @ 07:42    Blood Urea Nitrogen - 18  Calcium - 9.0  Carbond Dioxide - 24  Chloride - 100  Creatinine - 0.9  Glucose - 97  Potassium - 4.6  Sodium - 136      Hemoglobin A1c -     Urine Culture:  11-16 @ 15:33 Urine culture: --    Culture Results:   <10,000 CFU/mL Normal Urogenital Destiny  Method Type: --  Organism: --  Organism Identification: --  Specimen Source: Clean Catch Clean Catch (Midstream)  11-15 @ 20:14 Urine culture: --    Culture Results:   No growth at 72 Hours  Method Type: --  Organism: --  Organism Identification: --  Specimen Source: .Blood None            MEDICATIONS  (STANDING):  buPROPion XL (24-Hour) . 150 milliGRAM(s) Oral daily  chlorhexidine 2% Cloths 1 Application(s) Topical daily  clotrimazole 1% Cream 1 Application(s) Topical two times a day  enoxaparin Injectable 40 milliGRAM(s) SubCutaneous every 24 hours  haloperidol     Tablet 10 milliGRAM(s) Oral three times a day  levothyroxine 100 MICROGram(s) Oral daily  meropenem  IVPB 1000 milliGRAM(s) IV Intermittent every 8 hours  mirtazapine 15 milliGRAM(s) Oral daily  tamsulosin 0.4 milliGRAM(s) Oral at bedtime    MEDICATIONS  (PRN):  melatonin 3 milliGRAM(s) Oral at bedtime PRN Insomnia

## 2023-11-20 LAB
ANION GAP SERPL CALC-SCNC: 9 MMOL/L — SIGNIFICANT CHANGE UP (ref 7–14)
ANION GAP SERPL CALC-SCNC: 9 MMOL/L — SIGNIFICANT CHANGE UP (ref 7–14)
BUN SERPL-MCNC: 21 MG/DL — HIGH (ref 10–20)
BUN SERPL-MCNC: 21 MG/DL — HIGH (ref 10–20)
CALCIUM SERPL-MCNC: 9.1 MG/DL — SIGNIFICANT CHANGE UP (ref 8.4–10.5)
CALCIUM SERPL-MCNC: 9.1 MG/DL — SIGNIFICANT CHANGE UP (ref 8.4–10.5)
CHLORIDE SERPL-SCNC: 102 MMOL/L — SIGNIFICANT CHANGE UP (ref 98–110)
CHLORIDE SERPL-SCNC: 102 MMOL/L — SIGNIFICANT CHANGE UP (ref 98–110)
CO2 SERPL-SCNC: 27 MMOL/L — SIGNIFICANT CHANGE UP (ref 17–32)
CO2 SERPL-SCNC: 27 MMOL/L — SIGNIFICANT CHANGE UP (ref 17–32)
CREAT SERPL-MCNC: 0.9 MG/DL — SIGNIFICANT CHANGE UP (ref 0.7–1.5)
CREAT SERPL-MCNC: 0.9 MG/DL — SIGNIFICANT CHANGE UP (ref 0.7–1.5)
EGFR: 102 ML/MIN/1.73M2 — SIGNIFICANT CHANGE UP
EGFR: 102 ML/MIN/1.73M2 — SIGNIFICANT CHANGE UP
GLUCOSE SERPL-MCNC: 85 MG/DL — SIGNIFICANT CHANGE UP (ref 70–99)
GLUCOSE SERPL-MCNC: 85 MG/DL — SIGNIFICANT CHANGE UP (ref 70–99)
HCT VFR BLD CALC: 35.6 % — LOW (ref 42–52)
HCT VFR BLD CALC: 35.6 % — LOW (ref 42–52)
HGB BLD-MCNC: 11.8 G/DL — LOW (ref 14–18)
HGB BLD-MCNC: 11.8 G/DL — LOW (ref 14–18)
MAGNESIUM SERPL-MCNC: 2 MG/DL — SIGNIFICANT CHANGE UP (ref 1.8–2.4)
MAGNESIUM SERPL-MCNC: 2 MG/DL — SIGNIFICANT CHANGE UP (ref 1.8–2.4)
MCHC RBC-ENTMCNC: 31.2 PG — HIGH (ref 27–31)
MCHC RBC-ENTMCNC: 31.2 PG — HIGH (ref 27–31)
MCHC RBC-ENTMCNC: 33.1 G/DL — SIGNIFICANT CHANGE UP (ref 32–37)
MCHC RBC-ENTMCNC: 33.1 G/DL — SIGNIFICANT CHANGE UP (ref 32–37)
MCV RBC AUTO: 94.2 FL — HIGH (ref 80–94)
MCV RBC AUTO: 94.2 FL — HIGH (ref 80–94)
NRBC # BLD: 0 /100 WBCS — SIGNIFICANT CHANGE UP (ref 0–0)
NRBC # BLD: 0 /100 WBCS — SIGNIFICANT CHANGE UP (ref 0–0)
PLATELET # BLD AUTO: 370 K/UL — SIGNIFICANT CHANGE UP (ref 130–400)
PLATELET # BLD AUTO: 370 K/UL — SIGNIFICANT CHANGE UP (ref 130–400)
PMV BLD: 9.5 FL — SIGNIFICANT CHANGE UP (ref 7.4–10.4)
PMV BLD: 9.5 FL — SIGNIFICANT CHANGE UP (ref 7.4–10.4)
POTASSIUM SERPL-MCNC: 4.4 MMOL/L — SIGNIFICANT CHANGE UP (ref 3.5–5)
POTASSIUM SERPL-MCNC: 4.4 MMOL/L — SIGNIFICANT CHANGE UP (ref 3.5–5)
POTASSIUM SERPL-SCNC: 4.4 MMOL/L — SIGNIFICANT CHANGE UP (ref 3.5–5)
POTASSIUM SERPL-SCNC: 4.4 MMOL/L — SIGNIFICANT CHANGE UP (ref 3.5–5)
RBC # BLD: 3.78 M/UL — LOW (ref 4.7–6.1)
RBC # BLD: 3.78 M/UL — LOW (ref 4.7–6.1)
RBC # FLD: 14.3 % — SIGNIFICANT CHANGE UP (ref 11.5–14.5)
RBC # FLD: 14.3 % — SIGNIFICANT CHANGE UP (ref 11.5–14.5)
SODIUM SERPL-SCNC: 138 MMOL/L — SIGNIFICANT CHANGE UP (ref 135–146)
SODIUM SERPL-SCNC: 138 MMOL/L — SIGNIFICANT CHANGE UP (ref 135–146)
WBC # BLD: 12.95 K/UL — HIGH (ref 4.8–10.8)
WBC # BLD: 12.95 K/UL — HIGH (ref 4.8–10.8)
WBC # FLD AUTO: 12.95 K/UL — HIGH (ref 4.8–10.8)
WBC # FLD AUTO: 12.95 K/UL — HIGH (ref 4.8–10.8)

## 2023-11-20 PROCEDURE — 99232 SBSQ HOSP IP/OBS MODERATE 35: CPT

## 2023-11-20 RX ADMIN — MIRTAZAPINE 15 MILLIGRAM(S): 45 TABLET, ORALLY DISINTEGRATING ORAL at 12:16

## 2023-11-20 RX ADMIN — ENOXAPARIN SODIUM 40 MILLIGRAM(S): 100 INJECTION SUBCUTANEOUS at 21:39

## 2023-11-20 RX ADMIN — Medication 1 APPLICATION(S): at 05:16

## 2023-11-20 RX ADMIN — MEROPENEM 100 MILLIGRAM(S): 1 INJECTION INTRAVENOUS at 05:17

## 2023-11-20 RX ADMIN — CHLORHEXIDINE GLUCONATE 1 APPLICATION(S): 213 SOLUTION TOPICAL at 12:21

## 2023-11-20 RX ADMIN — Medication 100 MICROGRAM(S): at 05:17

## 2023-11-20 RX ADMIN — Medication 1 APPLICATION(S): at 18:21

## 2023-11-20 RX ADMIN — MEROPENEM 100 MILLIGRAM(S): 1 INJECTION INTRAVENOUS at 21:39

## 2023-11-20 RX ADMIN — TAMSULOSIN HYDROCHLORIDE 0.4 MILLIGRAM(S): 0.4 CAPSULE ORAL at 21:38

## 2023-11-20 RX ADMIN — HALOPERIDOL DECANOATE 10 MILLIGRAM(S): 100 INJECTION INTRAMUSCULAR at 12:17

## 2023-11-20 RX ADMIN — HALOPERIDOL DECANOATE 10 MILLIGRAM(S): 100 INJECTION INTRAMUSCULAR at 21:38

## 2023-11-20 RX ADMIN — MEROPENEM 100 MILLIGRAM(S): 1 INJECTION INTRAVENOUS at 12:16

## 2023-11-20 RX ADMIN — BUPROPION HYDROCHLORIDE 150 MILLIGRAM(S): 150 TABLET, EXTENDED RELEASE ORAL at 12:16

## 2023-11-20 RX ADMIN — HALOPERIDOL DECANOATE 10 MILLIGRAM(S): 100 INJECTION INTRAMUSCULAR at 05:17

## 2023-11-20 NOTE — PROGRESS NOTE ADULT - ASSESSMENT
51 y/o man with PMH of Mild mental retardation, intellectual disability, Schizophrenia, Impulse control disorder, Seizures, Hypothyroidism, Exotropia, Cataracts, Osteopenia, urethral stricture and recent treatment for UTI twice with abx as outpt presented from group home [place] for weakness, lethargy, decreased PO intake and inability to walk without maximal assistance per group aide at bedside. He has hx of urethral stricture and was scheduled for cystoscopy but never had the procedure done due to consent issues.     # Sepsis present on admission due to right sided acute pyelonephritis  #h/o recently treated UTIs  #h/o urethral stricture  #h/o ESBL E. coli in urine culture 6/23  #urinary retention - long placed by  and 700cc was drained  #hydroureteronephrosis and question of urothelial enhancement raising suspicion for ascending urinary tract infection.   FU bcx: NGTD  ucx- ecoli esbl -> sens to meropenem  repeat renal us shows improved hydro    ID recommendation on 11/17  Likely Sepsis 2/2 pyelonephritis, hx of ESBL + hx of urethral strictures inc. risk of infections  - meropenem 1g q 8 hours   - on discharge, ertapenem 1g daily to complete until 11/22     -> TOV when ambulating 150 ft; only did 10; once doing 150ft if he fails will be sent home with chronic long-> needs to be placed by urology as complicated anatomy  on flomax      #Mild mental disability, schizophrenia, impulse control disorder - on bupropion, haldol, mirtazapine    #Hypothyroid on synthroid    #DVT prophylaxis - lovenox    full code    Pending:   - SNF placement  - Monitor leukocytosis

## 2023-11-20 NOTE — PROGRESS NOTE ADULT - SUBJECTIVE AND OBJECTIVE BOX
24H events:    Patient is a 52y old Male who presents with a chief complaint of Sepsis 2/2 Pyelonephritis (19 Nov 2023 15:39)    Primary diagnosis of Sepsis    Today is hospital day 7d. This morning patient was seen and examined at bedside.  Patient with mild mental disability, schizophrenia, impulse control disorder - relatively calm this morning, comfortable and not agitated   No acute or major events overnight. Hemodynamically stable, tolerating oral diet.    Code Status: Full code    PAST MEDICAL & SURGICAL HISTORY  Epilepsy last event 4+ yrs ago    Schizophrenia    Bipolar 1 disorder    Major depressive disorder    Anemia    Dyspepsia    Hypothyroidism    Constipation    MR (mental retardation)    H/O cystoscopy    H/O laminectomy 03/2001    H/O fracture of fibula nondisplaced 03/2010    ALLERGIES:  phenothiazines (Unknown)  erythromycin (Other)    MEDICATIONS:  STANDING MEDICATIONS  buPROPion XL (24-Hour) . 150 milliGRAM(s) Oral daily  chlorhexidine 2% Cloths 1 Application(s) Topical daily  clotrimazole 1% Cream 1 Application(s) Topical two times a day  enoxaparin Injectable 40 milliGRAM(s) SubCutaneous every 24 hours  haloperidol     Tablet 10 milliGRAM(s) Oral three times a day  levothyroxine 100 MICROGram(s) Oral daily  meropenem  IVPB 1000 milliGRAM(s) IV Intermittent every 8 hours  mirtazapine 15 milliGRAM(s) Oral daily  tamsulosin 0.4 milliGRAM(s) Oral at bedtime    PRN MEDICATIONS  melatonin 3 milliGRAM(s) Oral at bedtime PRN    VITALS:   Vital Signs Last 24 Hrs  T(C): 36.7 (20 Nov 2023 13:11), Max: 36.9 (19 Nov 2023 22:30)  T(F): 98 (20 Nov 2023 13:11), Max: 98.4 (19 Nov 2023 22:30)  HR: 69 (20 Nov 2023 13:11) (69 - 74)  BP: 112/65 (20 Nov 2023 13:11) (100/62 - 112/65)  BP(mean): --  RR: 18 (20 Nov 2023 13:11) (18 - 18)  SpO2: 96% (20 Nov 2023 04:42) (96% - 97%)    Parameters below as of 20 Nov 2023 04:42  Patient On (Oxygen Delivery Method): room air, MG      PHYSICAL EXAM:  GENERAL: NAD, lying in bed, cooperative  HEART: Regular rate and rhythm, normal S1/S2  LUNGS: No acute respiratory distress, clear b/l breath sounds  ABDOMEN:  soft, non-tender, non-distented  EXTREMITIES: no rashes, no edema, ulcerations or ecchymosis  NERVOUS SYSTEM:  Follows commands, answers questions appropriately   SKIN: No rashes or lesions       [facility] score: 11    ( X ) Indwelling Packer Catheter:   Date insterted:  11/13  Reason (  ) Critical illness     ( X ) urinary retention    (  ) Accurate Ins/Outs Monitoring     (  ) CMO patient    LABS:                        11.8   12.95 )-----------( 370      ( 20 Nov 2023 07:41 )             35.6     11-20    138  |  102  |  21<H>  ----------------------------<  85  4.4   |  27  |  0.9    Ca    9.1      20 Nov 2023 07:41  Mg     2.0     11-20    Urinalysis Basic - ( 20 Nov 2023 07:41 )    Color: x / Appearance: x / SG: x / pH: x  Gluc: 85 mg/dL / Ketone: x  / Bili: x / Urobili: x   Blood: x / Protein: x / Nitrite: x   Leuk Esterase: x / RBC: x / WBC x   Sq Epi: x / Non Sq Epi: x / Bacteria: x

## 2023-11-21 LAB
ALBUMIN SERPL ELPH-MCNC: 3.4 G/DL — LOW (ref 3.5–5.2)
ALBUMIN SERPL ELPH-MCNC: 3.4 G/DL — LOW (ref 3.5–5.2)
ALP SERPL-CCNC: 87 U/L — SIGNIFICANT CHANGE UP (ref 30–115)
ALP SERPL-CCNC: 87 U/L — SIGNIFICANT CHANGE UP (ref 30–115)
ALT FLD-CCNC: 44 U/L — HIGH (ref 0–41)
ALT FLD-CCNC: 44 U/L — HIGH (ref 0–41)
ANION GAP SERPL CALC-SCNC: 11 MMOL/L — SIGNIFICANT CHANGE UP (ref 7–14)
ANION GAP SERPL CALC-SCNC: 11 MMOL/L — SIGNIFICANT CHANGE UP (ref 7–14)
AST SERPL-CCNC: 30 U/L — SIGNIFICANT CHANGE UP (ref 0–41)
AST SERPL-CCNC: 30 U/L — SIGNIFICANT CHANGE UP (ref 0–41)
BILIRUB SERPL-MCNC: 0.3 MG/DL — SIGNIFICANT CHANGE UP (ref 0.2–1.2)
BILIRUB SERPL-MCNC: 0.3 MG/DL — SIGNIFICANT CHANGE UP (ref 0.2–1.2)
BUN SERPL-MCNC: 25 MG/DL — HIGH (ref 10–20)
BUN SERPL-MCNC: 25 MG/DL — HIGH (ref 10–20)
CALCIUM SERPL-MCNC: 9.4 MG/DL — SIGNIFICANT CHANGE UP (ref 8.4–10.4)
CALCIUM SERPL-MCNC: 9.4 MG/DL — SIGNIFICANT CHANGE UP (ref 8.4–10.4)
CHLORIDE SERPL-SCNC: 101 MMOL/L — SIGNIFICANT CHANGE UP (ref 98–110)
CHLORIDE SERPL-SCNC: 101 MMOL/L — SIGNIFICANT CHANGE UP (ref 98–110)
CO2 SERPL-SCNC: 27 MMOL/L — SIGNIFICANT CHANGE UP (ref 17–32)
CO2 SERPL-SCNC: 27 MMOL/L — SIGNIFICANT CHANGE UP (ref 17–32)
CREAT SERPL-MCNC: 0.9 MG/DL — SIGNIFICANT CHANGE UP (ref 0.7–1.5)
CREAT SERPL-MCNC: 0.9 MG/DL — SIGNIFICANT CHANGE UP (ref 0.7–1.5)
CULTURE RESULTS: SIGNIFICANT CHANGE UP
CULTURE RESULTS: SIGNIFICANT CHANGE UP
EGFR: 102 ML/MIN/1.73M2 — SIGNIFICANT CHANGE UP
EGFR: 102 ML/MIN/1.73M2 — SIGNIFICANT CHANGE UP
GLUCOSE SERPL-MCNC: 95 MG/DL — SIGNIFICANT CHANGE UP (ref 70–99)
GLUCOSE SERPL-MCNC: 95 MG/DL — SIGNIFICANT CHANGE UP (ref 70–99)
HCT VFR BLD CALC: 35.6 % — LOW (ref 42–52)
HCT VFR BLD CALC: 35.6 % — LOW (ref 42–52)
HGB BLD-MCNC: 11.6 G/DL — LOW (ref 14–18)
HGB BLD-MCNC: 11.6 G/DL — LOW (ref 14–18)
MAGNESIUM SERPL-MCNC: 1.9 MG/DL — SIGNIFICANT CHANGE UP (ref 1.8–2.4)
MAGNESIUM SERPL-MCNC: 1.9 MG/DL — SIGNIFICANT CHANGE UP (ref 1.8–2.4)
MCHC RBC-ENTMCNC: 31.4 PG — HIGH (ref 27–31)
MCHC RBC-ENTMCNC: 31.4 PG — HIGH (ref 27–31)
MCHC RBC-ENTMCNC: 32.6 G/DL — SIGNIFICANT CHANGE UP (ref 32–37)
MCHC RBC-ENTMCNC: 32.6 G/DL — SIGNIFICANT CHANGE UP (ref 32–37)
MCV RBC AUTO: 96.2 FL — HIGH (ref 80–94)
MCV RBC AUTO: 96.2 FL — HIGH (ref 80–94)
NRBC # BLD: 0 /100 WBCS — SIGNIFICANT CHANGE UP (ref 0–0)
NRBC # BLD: 0 /100 WBCS — SIGNIFICANT CHANGE UP (ref 0–0)
PLATELET # BLD AUTO: 366 K/UL — SIGNIFICANT CHANGE UP (ref 130–400)
PLATELET # BLD AUTO: 366 K/UL — SIGNIFICANT CHANGE UP (ref 130–400)
PMV BLD: 9.3 FL — SIGNIFICANT CHANGE UP (ref 7.4–10.4)
PMV BLD: 9.3 FL — SIGNIFICANT CHANGE UP (ref 7.4–10.4)
POTASSIUM SERPL-MCNC: 4.2 MMOL/L — SIGNIFICANT CHANGE UP (ref 3.5–5)
POTASSIUM SERPL-MCNC: 4.2 MMOL/L — SIGNIFICANT CHANGE UP (ref 3.5–5)
POTASSIUM SERPL-SCNC: 4.2 MMOL/L — SIGNIFICANT CHANGE UP (ref 3.5–5)
POTASSIUM SERPL-SCNC: 4.2 MMOL/L — SIGNIFICANT CHANGE UP (ref 3.5–5)
PROT SERPL-MCNC: 6.5 G/DL — SIGNIFICANT CHANGE UP (ref 6–8)
PROT SERPL-MCNC: 6.5 G/DL — SIGNIFICANT CHANGE UP (ref 6–8)
RBC # BLD: 3.7 M/UL — LOW (ref 4.7–6.1)
RBC # BLD: 3.7 M/UL — LOW (ref 4.7–6.1)
RBC # FLD: 14.5 % — SIGNIFICANT CHANGE UP (ref 11.5–14.5)
RBC # FLD: 14.5 % — SIGNIFICANT CHANGE UP (ref 11.5–14.5)
SODIUM SERPL-SCNC: 139 MMOL/L — SIGNIFICANT CHANGE UP (ref 135–146)
SODIUM SERPL-SCNC: 139 MMOL/L — SIGNIFICANT CHANGE UP (ref 135–146)
SPECIMEN SOURCE: SIGNIFICANT CHANGE UP
SPECIMEN SOURCE: SIGNIFICANT CHANGE UP
WBC # BLD: 9.56 K/UL — SIGNIFICANT CHANGE UP (ref 4.8–10.8)
WBC # BLD: 9.56 K/UL — SIGNIFICANT CHANGE UP (ref 4.8–10.8)
WBC # FLD AUTO: 9.56 K/UL — SIGNIFICANT CHANGE UP (ref 4.8–10.8)
WBC # FLD AUTO: 9.56 K/UL — SIGNIFICANT CHANGE UP (ref 4.8–10.8)

## 2023-11-21 PROCEDURE — 99232 SBSQ HOSP IP/OBS MODERATE 35: CPT

## 2023-11-21 RX ADMIN — Medication 1 APPLICATION(S): at 06:23

## 2023-11-21 RX ADMIN — CHLORHEXIDINE GLUCONATE 1 APPLICATION(S): 213 SOLUTION TOPICAL at 11:40

## 2023-11-21 RX ADMIN — MIRTAZAPINE 15 MILLIGRAM(S): 45 TABLET, ORALLY DISINTEGRATING ORAL at 11:40

## 2023-11-21 RX ADMIN — MEROPENEM 100 MILLIGRAM(S): 1 INJECTION INTRAVENOUS at 21:49

## 2023-11-21 RX ADMIN — Medication 1 APPLICATION(S): at 17:12

## 2023-11-21 RX ADMIN — HALOPERIDOL DECANOATE 10 MILLIGRAM(S): 100 INJECTION INTRAMUSCULAR at 21:48

## 2023-11-21 RX ADMIN — HALOPERIDOL DECANOATE 10 MILLIGRAM(S): 100 INJECTION INTRAMUSCULAR at 14:22

## 2023-11-21 RX ADMIN — MEROPENEM 100 MILLIGRAM(S): 1 INJECTION INTRAVENOUS at 06:23

## 2023-11-21 RX ADMIN — ENOXAPARIN SODIUM 40 MILLIGRAM(S): 100 INJECTION SUBCUTANEOUS at 21:50

## 2023-11-21 RX ADMIN — MEROPENEM 100 MILLIGRAM(S): 1 INJECTION INTRAVENOUS at 14:24

## 2023-11-21 RX ADMIN — Medication 100 MICROGRAM(S): at 06:23

## 2023-11-21 RX ADMIN — HALOPERIDOL DECANOATE 10 MILLIGRAM(S): 100 INJECTION INTRAMUSCULAR at 06:23

## 2023-11-21 RX ADMIN — BUPROPION HYDROCHLORIDE 150 MILLIGRAM(S): 150 TABLET, EXTENDED RELEASE ORAL at 11:40

## 2023-11-21 RX ADMIN — TAMSULOSIN HYDROCHLORIDE 0.4 MILLIGRAM(S): 0.4 CAPSULE ORAL at 21:48

## 2023-11-21 NOTE — PROGRESS NOTE ADULT - SUBJECTIVE AND OBJECTIVE BOX
24H events:    Patient is a 52y old Male who presents with a chief complaint of Sepsis 2/2 Pyelonephritis (20 Nov 2023 15:34)    Primary diagnosis of Sepsis    Today is hospital day 8d. This morning patient was seen and examined at bedside, resting comfortably in bed.    No acute or major events overnight. Hemodynamically stable, tolerating oral diet, voiding appropriately with appropriate bowel movements.     Code Status:    PAST MEDICAL & SURGICAL HISTORY  Epilepsy last event 4+ yrs ago  Schizophrenia  Bipolar 1 disorder  Major depressive disorder  Anemia  Dyspepsia  Hypothyroidism  Constipation  MR (mental retardation)  H/O cystoscopy  H/O laminectomy 03/2001  H/O fracture of fibula nondisplaced 03/2010      ALLERGIES:  phenothiazines (Unknown)  erythromycin (Other)    MEDICATIONS:  STANDING MEDICATIONS  buPROPion XL (24-Hour) . 150 milliGRAM(s) Oral daily  chlorhexidine 2% Cloths 1 Application(s) Topical daily  clotrimazole 1% Cream 1 Application(s) Topical two times a day  enoxaparin Injectable 40 milliGRAM(s) SubCutaneous every 24 hours  haloperidol     Tablet 10 milliGRAM(s) Oral three times a day  levothyroxine 100 MICROGram(s) Oral daily  meropenem  IVPB 1000 milliGRAM(s) IV Intermittent every 8 hours  mirtazapine 15 milliGRAM(s) Oral daily  tamsulosin 0.4 milliGRAM(s) Oral at bedtime    PRN MEDICATIONS  melatonin 3 milliGRAM(s) Oral at bedtime PRN    VITALS:   Vital Signs Last 24 Hrs  T(C): 36.5 (21 Nov 2023 13:05), Max: 36.6 (20 Nov 2023 19:57)  T(F): 97.7 (21 Nov 2023 13:05), Max: 97.8 (20 Nov 2023 19:57)  HR: 62 (21 Nov 2023 13:05) (61 - 80)  BP: 102/57 (21 Nov 2023 13:05) (101/55 - 112/62)  BP(mean): --  RR: 18 (21 Nov 2023 13:05) (18 - 18)  SpO2: --      PHYSICAL EXAM:  GENERAL: NAD, lying in bed comfortably, obtunded, lethargic,  somnolent, cooperative, elderly  HEAD: NCAD, no hematoma or laceration   NECK: Supple, no neck stiffness/nuchal rigidity, no JVD    HEART: Regular rate and rhythm, normal S1/S2, no murmurs, heaves, thrills  LUNGS: No acute respiratory distress, clear b/l breath sounds, no wheezing, rales, rhonchi  ABDOMEN:  soft, non-tender, non-distented, + BS, no hepatosplenomegaly   EXTREMITIES: no rashes, extremities warm/dry, no cyanosis, no edema, ulcerations or ecchymosis  NERVOUS SYSTEM:  A&Ox3, CNII-XII intact, follows commands, answers questions appropriately   SKIN: No rashes or lesions       [facility] score:    LABS:                        11.6   9.56  )-----------( 366      ( 21 Nov 2023 07:57 )             35.6     11-21    139  |  101  |  25<H>  ----------------------------<  95  4.2   |  27  |  0.9    Ca    9.4      21 Nov 2023 07:57  Mg     1.9     11-21    TPro  6.5  /  Alb  3.4<L>  /  TBili  0.3  /  DBili  x   /  AST  30  /  ALT  44<H>  /  AlkPhos  87  11-21      Urinalysis Basic - ( 21 Nov 2023 07:57 )    Color: x / Appearance: x / SG: x / pH: x  Gluc: 95 mg/dL / Ketone: x  / Bili: x / Urobili: x   Blood: x / Protein: x / Nitrite: x   Leuk Esterase: x / RBC: x / WBC x   Sq Epi: x / Non Sq Epi: x / Bacteria: x    RADIOLOGY:      RADIOLOGY   24H events:    Patient is a 52y old Male who presents with a chief complaint of Sepsis 2/2 Pyelonephritis (20 Nov 2023 15:34)    Primary diagnosis of Sepsis    Today is hospital day 8d. This morning patient was seen and examined at bedside.  Patient still has one additional day of antibiotic  No acute or major events overnight. Hemodynamically stable, tolerating oral diet.    Code Status: Full code    PAST MEDICAL & SURGICAL HISTORY  Epilepsy last event 4+ yrs ago  Schizophrenia  Bipolar 1 disorder  Major depressive disorder  Anemia  Dyspepsia  Hypothyroidism  Constipation  MR (mental retardation)  H/O cystoscopy  H/O laminectomy 03/2001  H/O fracture of fibula nondisplaced 03/2010      ALLERGIES:  phenothiazines (Unknown)  erythromycin (Other)    MEDICATIONS:  STANDING MEDICATIONS  buPROPion XL (24-Hour) . 150 milliGRAM(s) Oral daily  chlorhexidine 2% Cloths 1 Application(s) Topical daily  clotrimazole 1% Cream 1 Application(s) Topical two times a day  enoxaparin Injectable 40 milliGRAM(s) SubCutaneous every 24 hours  haloperidol     Tablet 10 milliGRAM(s) Oral three times a day  levothyroxine 100 MICROGram(s) Oral daily  meropenem  IVPB 1000 milliGRAM(s) IV Intermittent every 8 hours  mirtazapine 15 milliGRAM(s) Oral daily  tamsulosin 0.4 milliGRAM(s) Oral at bedtime    PRN MEDICATIONS  melatonin 3 milliGRAM(s) Oral at bedtime PRN    VITALS:   Vital Signs Last 24 Hrs  T(C): 36.5 (21 Nov 2023 13:05), Max: 36.6 (20 Nov 2023 19:57)  T(F): 97.7 (21 Nov 2023 13:05), Max: 97.8 (20 Nov 2023 19:57)  HR: 62 (21 Nov 2023 13:05) (61 - 80)  BP: 102/57 (21 Nov 2023 13:05) (101/55 - 112/62)  BP(mean): --  RR: 18 (21 Nov 2023 13:05) (18 - 18)  SpO2: --      PHYSICAL EXAM:  GENERAL: NAD, lying in bed comfortably,   HEAD: NCAD, no hematoma or laceration   NECK: Supple, no JVD    HEART: Regular rate and rhythm, normal S1/S2   LUNGS: No acute respiratory distress, clear b/l breath sounds   ABDOMEN:  soft, non-tender, non-distented   EXTREMITIES: no rashes, no cyanosis, no edema, ulcerations or ecchymosis  NERVOUS SYSTEM:  A&Ox1, CNII-XII intact, follows commands, answers questions appropriately     [facility] score: 11    LABS:                        11.6   9.56  )-----------( 366      ( 21 Nov 2023 07:57 )             35.6     11-21    139  |  101  |  25<H>  ----------------------------<  95  4.2   |  27  |  0.9    Ca    9.4      21 Nov 2023 07:57  Mg     1.9     11-21    TPro  6.5  /  Alb  3.4<L>  /  TBili  0.3  /  DBili  x   /  AST  30  /  ALT  44<H>  /  AlkPhos  87  11-21      Urinalysis Basic - ( 21 Nov 2023 07:57 )    Color: x / Appearance: x / SG: x / pH: x  Gluc: 95 mg/dL / Ketone: x  / Bili: x / Urobili: x   Blood: x / Protein: x / Nitrite: x   Leuk Esterase: x / RBC: x / WBC x   Sq Epi: x / Non Sq Epi: x / Bacteria: x

## 2023-11-21 NOTE — PROGRESS NOTE ADULT - ATTENDING COMMENTS
53 y/o man with PMH of Mild mental retardation, intellectual disability, Schizophrenia, Impulse control disorder, Seizures, Hypothyroidism, Exotropia, Cataracts, Osteopenia, urethral stricture and recent treatment for UTI twice with abx as outpt presented from group home [place] for weakness, lethargy, decreased PO intake and inability to walk without maximal assistance per group aide at bedside. He has hx of urethral stricture and was scheduled for cystoscopy but never had the procedure done due to consent issues.       # Sepsis present on admission due to right sided acute pyelonephritis  #h/o recently treated UTIs  #h/o urethral stricture  #h/o ESBL E. coli in urine culture 6/23  #urinary retention - long placed by  and 700cc was drained  #hydroureteronephrosis and question of urothelial enhancement raising suspicion for ascending urinary tract infection.   FU bcx: NGTD  ucx- ecoli esbl -> sens to meropenem  repeat renal us shows improved hydro  continue meropenem 1g IV q8hr   -> TOV when ambulating 150 ft; only did 10; once doing 150ft if he fails will be sent home with chronic long-> needs to be placed by urology as complicated anatomy  on flomax      #Mild mental disability, schizophrenia, impulse control disorder - on bupropion, haldol, mirtazapine    #Hypothyroid on synthroid    #DVT prophylaxis - lovenox      full code      PROGRESS NOTE HANDOFF    Pending: SNF placement, monitor leukocytosis
***My note supersedes any discrepancies that may be above in the resident's note***    53 y/o man with PMH of Mild mental retardation, intellectual disability, Schizophrenia, Impulse control disorder, Seizures, Hypothyroidism, Exotropia, Cataracts, Osteopenia, urethral stricture and recent treatment for UTI twice with abx as outpt presented from group home [place] for weakness, lethargy, decreased PO intake and inability to walk without maximal assistance per group aide at bedside. He has hx of urethral stricture and was scheduled for cystoscopy but never had the procedure done due to consent issues.     # Sepsis present on admission due to right sided acute pyelonephritis  #h/o recently treated UTIs  #h/o urethral stricture  #h/o ESBL E. coli in urine culture 6/23  #urinary retention - long placed by  and 700cc was drained  #hydroureteronephrosis and question of urothelial enhancement raising suspicion for ascending urinary tract infection.   FU bcx: NGTD  ucx- ecoli esbl -> sens to meropenem  repeat renal us shows improved hydro    ID recommendation on 11/17  Likely Sepsis 2/2 pyelonephritis, hx of ESBL + hx of urethral strictures inc. risk of infections  - meropenem 1g q 8 hours   - on discharge, ertapenem 1g daily to complete until 11/22     -> TOV when ambulating 150 ft; only did 10; once doing 150ft if he fails will be sent home with chronic long-> needs to be placed by urology as complicated anatomy  on flomax      #Mild mental disability, schizophrenia, impulse control disorder - on bupropion, haldol, mirtazapine    #Hypothyroid on synthroid    #DVT prophylaxis - lovenox    full code    #Progress Note Handoff  Pending (specify):  placement; abx end 11/22  Disposition: JOSUE completed & sent, CM to f/u with confirmation of DC planning, bed offers & possible choices.
51 y/o man with PMH of Mild mental retardation, intellectual disability, Schizophrenia, Impulse control disorder, Seizures, Hypothyroidism, Exotropia, Cataracts, Osteopenia, urethral stricture and recent treatment for UTI twice with abx as outpt presented from group home [place] for weakness, lethargy, decreased PO intake and inability to walk without maximal assistance per group aide at bedside. He has hx of urethral stricture and was scheduled for cystoscopy but never had the procedure done due to consent issues.     # Sepsis present on admission due to right sided acute pyelonephritis  #h/o recently treated UTIs  #h/o urethral stricture  #h/o ESBL E. coli in urine culture 6/23  #urinary retention - long placed by  and 700cc was drained  #hydroureteronephrosis and question of urothelial enhancement raising suspicion for ascending urinary tract infection.   FU bcx: NGTD  ucx- ecoli esbl -> sens to meropenem  repeat renal us shows improved hydro    ID recommendation on 11/17  Likely Sepsis 2/2 pyelonephritis, hx of ESBL + hx of urethral strictures inc. risk of infections  - meropenem 1g q 8 hours   - on discharge, ertapenem 1g daily to complete until 11/22     -> TOV when ambulating 150 ft; only did 10; once doing 150ft if he fails will be sent home with chronic long-> needs to be placed by urology as complicated anatomy  on flomax      #Mild mental disability, schizophrenia, impulse control disorder - on bupropion, haldol, mirtazapine    #Hypothyroid on synthroid    #DVT prophylaxis - lovenox    full code    #Progress Note Handoff  Pending (specify):  placement; abx end 11/22, ongoing PT evaluation  Disposition: group home would like him to return if he can do 50ft. currently at 30ft. PT to work with him again today
**My note supersedes the resident's note in the event of discrepancies**  Patient seen and examined at bedside. No acute events overnight. Patient says he has no complaints. No fevers.    51 y/o man with PMH of Mild mental retardation, intellectual disability, Schizophrenia, Impulse control disorder, Seizures, Hypothyroidism, Exotropia, Cataracts, Osteopenia, urethral stricture and recent treatment for UTI twice with abx as outpt presented from group home [place] for weakness, lethargy, decreased PO intake and inability to walk without maximal assistance per group aide at bedside. He has hx of urethral stricture and was scheduled for cystoscopy but never had the procedure done due to consent issues.     PHYSICAL EXAM:  GENERAL: NAD, lying in bed comfortably   CHEST/LUNG: Clear to auscultation bilaterally; No rales, rhonchi, wheezing, or rubs. Unlabored respirations  HEART: Regular rate and rhythm; No murmurs, rubs, or gallops  ABDOMEN: Bowel sounds present; Soft, Nontender, Nondistended. No hepatomegaly  EXTREMITIES:  2+ Peripheral Pulses, brisk capillary refill. No clubbing, cyanosis, or edema   : long in place  Consultant(s) Notes Reviewed:  [x ] YES  [ ] NO  Care Discussed with Consultants/Other Providers [ x] YES  [ ] NO    # Sepsis present on admission due to right sided pyelonephritis  #h/o recently treated UTIs  #h/o urethral stricture  #h/o ESBL E. coli in urine culture 6/23  #urinary retention - long placed by  and 700cc was drained  #hydroureteronephrosis and question of urothelial enhancement raising suspicion for ascending urinary tract infection.   FU bcx: NGTD  ucx- ecoli esbl -> sens to meropenem  repeat renal us shows improved hydro  continue meropenem 1g IV q8hr  ID follow up for duration    -> TOV when ambulating 150 ft; only did 10; once doing 150ft if he fails will be sent home with chronic long-> needs to be placed by urology as complicated anatomy  on flomax and monitor BP closely  dc IVF   encourage PO intake  PT f/u     #Mild mental disability, schizophrenia, impulse control disorder - on bupropion, haldol, mirtazapine    #Hypothyroid on synthroid    #DVT prophylaxis - lovenox      full code      PROGRESS NOTE HANDOFF    Pending: ID fu for abx, TOV once ambulating 150ft, PT, uro follow up    Disposition: from group home; likely will be here for 24-48 hrs.
51 y/o man with PMH of Mild mental retardation, intellectual disability, Schizophrenia, Impulse control disorder, Seizures, Hypothyroidism, Exotropia, Cataracts, Osteopenia, urethral stricture and recent treatment for UTI twice with abx as outpt presented from group home [place] for weakness, lethargy, decreased PO intake and inability to walk without maximal assistance per group aide at bedside. He has hx of urethral stricture and was scheduled for cystoscopy but never had the procedure done due to consent issues.       # Sepsis present on admission due to right sided acute pyelonephritis  #h/o recently treated UTIs  #h/o urethral stricture  #h/o ESBL E. coli in urine culture 6/23  #urinary retention - long placed by  and 700cc was drained  #hydroureteronephrosis and question of urothelial enhancement raising suspicion for ascending urinary tract infection.   FU bcx: NGTD  ucx- ecoli esbl -> sens to meropenem  repeat renal us shows improved hydro  continue meropenem 1g IV q8hr   -> TOV when ambulating 150 ft; only did 10; once doing 150ft if he fails will be sent home with chronic long-> needs to be placed by urology as complicated anatomy  on flomax      #Mild mental disability, schizophrenia, impulse control disorder - on bupropion, haldol, mirtazapine    #Hypothyroid on synthroid    #DVT prophylaxis - lovenox      full code      PROGRESS NOTE HANDOFF    Pending: IV abx until 11/22, will probably need SNF placement as cannot return to group home with IV abx

## 2023-11-21 NOTE — PROGRESS NOTE ADULT - ASSESSMENT
51 y/o man with PMH of Mild mental retardation, intellectual disability, Schizophrenia, Impulse control disorder, Seizures, Hypothyroidism, Exotropia, Cataracts, Osteopenia, urethral stricture and recent treatment for UTI twice with abx as outpt presented from group home [place] for weakness, lethargy, decreased PO intake and inability to walk without maximal assistance per group aide at bedside. He has hx of urethral stricture and was scheduled for cystoscopy but never had the procedure done due to consent issues.     # Sepsis present on admission due to right sided acute pyelonephritis  #h/o recently treated UTIs  #h/o urethral stricture  #h/o ESBL E. coli in urine culture 6/23  #urinary retention - long placed by  and 700cc was drained  #hydroureteronephrosis and question of urothelial enhancement raising suspicion for ascending urinary tract infection.   FU bcx: NGTD  ucx- ecoli esbl -> sens to meropenem  repeat renal us shows improved hydro    ID recommendation on 11/17  Likely Sepsis 2/2 pyelonephritis, hx of ESBL + hx of urethral strictures inc. risk of infections  - meropenem 1g q 8 hours   - on discharge, ertapenem 1g daily to complete until 11/22     -> TOV when ambulating 150 ft; only did 10; once doing 150ft if he fails will be sent home with chronic long-> needs to be placed by urology as complicated anatomy  on flomax      #Mild mental disability, schizophrenia, impulse control disorder - on bupropion, haldol, mirtazapine    #Hypothyroid on synthroid    #DVT prophylaxis - lovenox    full code    Pending:   - SNF placement  - Monitor leukocytosis

## 2023-11-22 ENCOUNTER — TRANSCRIPTION ENCOUNTER (OUTPATIENT)
Age: 53
End: 2023-11-22

## 2023-11-22 LAB
ALBUMIN SERPL ELPH-MCNC: 3.1 G/DL — LOW (ref 3.5–5.2)
ALBUMIN SERPL ELPH-MCNC: 3.1 G/DL — LOW (ref 3.5–5.2)
ALP SERPL-CCNC: 87 U/L — SIGNIFICANT CHANGE UP (ref 30–115)
ALP SERPL-CCNC: 87 U/L — SIGNIFICANT CHANGE UP (ref 30–115)
ALT FLD-CCNC: 42 U/L — HIGH (ref 0–41)
ALT FLD-CCNC: 42 U/L — HIGH (ref 0–41)
ANION GAP SERPL CALC-SCNC: 9 MMOL/L — SIGNIFICANT CHANGE UP (ref 7–14)
ANION GAP SERPL CALC-SCNC: 9 MMOL/L — SIGNIFICANT CHANGE UP (ref 7–14)
AST SERPL-CCNC: 32 U/L — SIGNIFICANT CHANGE UP (ref 0–41)
AST SERPL-CCNC: 32 U/L — SIGNIFICANT CHANGE UP (ref 0–41)
BILIRUB SERPL-MCNC: 0.3 MG/DL — SIGNIFICANT CHANGE UP (ref 0.2–1.2)
BILIRUB SERPL-MCNC: 0.3 MG/DL — SIGNIFICANT CHANGE UP (ref 0.2–1.2)
BUN SERPL-MCNC: 23 MG/DL — HIGH (ref 10–20)
BUN SERPL-MCNC: 23 MG/DL — HIGH (ref 10–20)
CALCIUM SERPL-MCNC: 9.4 MG/DL — SIGNIFICANT CHANGE UP (ref 8.4–10.5)
CALCIUM SERPL-MCNC: 9.4 MG/DL — SIGNIFICANT CHANGE UP (ref 8.4–10.5)
CHLORIDE SERPL-SCNC: 103 MMOL/L — SIGNIFICANT CHANGE UP (ref 98–110)
CHLORIDE SERPL-SCNC: 103 MMOL/L — SIGNIFICANT CHANGE UP (ref 98–110)
CO2 SERPL-SCNC: 26 MMOL/L — SIGNIFICANT CHANGE UP (ref 17–32)
CO2 SERPL-SCNC: 26 MMOL/L — SIGNIFICANT CHANGE UP (ref 17–32)
CREAT SERPL-MCNC: 0.9 MG/DL — SIGNIFICANT CHANGE UP (ref 0.7–1.5)
CREAT SERPL-MCNC: 0.9 MG/DL — SIGNIFICANT CHANGE UP (ref 0.7–1.5)
EGFR: 102 ML/MIN/1.73M2 — SIGNIFICANT CHANGE UP
EGFR: 102 ML/MIN/1.73M2 — SIGNIFICANT CHANGE UP
GLUCOSE SERPL-MCNC: 75 MG/DL — SIGNIFICANT CHANGE UP (ref 70–99)
GLUCOSE SERPL-MCNC: 75 MG/DL — SIGNIFICANT CHANGE UP (ref 70–99)
HCT VFR BLD CALC: 34.5 % — LOW (ref 42–52)
HCT VFR BLD CALC: 34.5 % — LOW (ref 42–52)
HGB BLD-MCNC: 11.6 G/DL — LOW (ref 14–18)
HGB BLD-MCNC: 11.6 G/DL — LOW (ref 14–18)
MAGNESIUM SERPL-MCNC: 1.9 MG/DL — SIGNIFICANT CHANGE UP (ref 1.8–2.4)
MAGNESIUM SERPL-MCNC: 1.9 MG/DL — SIGNIFICANT CHANGE UP (ref 1.8–2.4)
MCHC RBC-ENTMCNC: 32 PG — HIGH (ref 27–31)
MCHC RBC-ENTMCNC: 32 PG — HIGH (ref 27–31)
MCHC RBC-ENTMCNC: 33.6 G/DL — SIGNIFICANT CHANGE UP (ref 32–37)
MCHC RBC-ENTMCNC: 33.6 G/DL — SIGNIFICANT CHANGE UP (ref 32–37)
MCV RBC AUTO: 95 FL — HIGH (ref 80–94)
MCV RBC AUTO: 95 FL — HIGH (ref 80–94)
NRBC # BLD: 0 /100 WBCS — SIGNIFICANT CHANGE UP (ref 0–0)
NRBC # BLD: 0 /100 WBCS — SIGNIFICANT CHANGE UP (ref 0–0)
PLATELET # BLD AUTO: 409 K/UL — HIGH (ref 130–400)
PLATELET # BLD AUTO: 409 K/UL — HIGH (ref 130–400)
PMV BLD: 9.3 FL — SIGNIFICANT CHANGE UP (ref 7.4–10.4)
PMV BLD: 9.3 FL — SIGNIFICANT CHANGE UP (ref 7.4–10.4)
POTASSIUM SERPL-MCNC: 4.3 MMOL/L — SIGNIFICANT CHANGE UP (ref 3.5–5)
POTASSIUM SERPL-MCNC: 4.3 MMOL/L — SIGNIFICANT CHANGE UP (ref 3.5–5)
POTASSIUM SERPL-SCNC: 4.3 MMOL/L — SIGNIFICANT CHANGE UP (ref 3.5–5)
POTASSIUM SERPL-SCNC: 4.3 MMOL/L — SIGNIFICANT CHANGE UP (ref 3.5–5)
PROT SERPL-MCNC: 6.5 G/DL — SIGNIFICANT CHANGE UP (ref 6–8)
PROT SERPL-MCNC: 6.5 G/DL — SIGNIFICANT CHANGE UP (ref 6–8)
RBC # BLD: 3.63 M/UL — LOW (ref 4.7–6.1)
RBC # BLD: 3.63 M/UL — LOW (ref 4.7–6.1)
RBC # FLD: 14.4 % — SIGNIFICANT CHANGE UP (ref 11.5–14.5)
RBC # FLD: 14.4 % — SIGNIFICANT CHANGE UP (ref 11.5–14.5)
SODIUM SERPL-SCNC: 138 MMOL/L — SIGNIFICANT CHANGE UP (ref 135–146)
SODIUM SERPL-SCNC: 138 MMOL/L — SIGNIFICANT CHANGE UP (ref 135–146)
WBC # BLD: 9.55 K/UL — SIGNIFICANT CHANGE UP (ref 4.8–10.8)
WBC # BLD: 9.55 K/UL — SIGNIFICANT CHANGE UP (ref 4.8–10.8)
WBC # FLD AUTO: 9.55 K/UL — SIGNIFICANT CHANGE UP (ref 4.8–10.8)
WBC # FLD AUTO: 9.55 K/UL — SIGNIFICANT CHANGE UP (ref 4.8–10.8)

## 2023-11-22 PROCEDURE — 99239 HOSP IP/OBS DSCHRG MGMT >30: CPT

## 2023-11-22 RX ORDER — TAMSULOSIN HYDROCHLORIDE 0.4 MG/1
1 CAPSULE ORAL
Qty: 0 | Refills: 0 | DISCHARGE
Start: 2023-11-22

## 2023-11-22 RX ORDER — TAMSULOSIN HYDROCHLORIDE 0.4 MG/1
1 CAPSULE ORAL
Qty: 30 | Refills: 1
Start: 2023-11-22 | End: 2024-01-20

## 2023-11-22 RX ADMIN — CHLORHEXIDINE GLUCONATE 1 APPLICATION(S): 213 SOLUTION TOPICAL at 12:24

## 2023-11-22 RX ADMIN — Medication 1 APPLICATION(S): at 06:25

## 2023-11-22 RX ADMIN — MIRTAZAPINE 15 MILLIGRAM(S): 45 TABLET, ORALLY DISINTEGRATING ORAL at 12:33

## 2023-11-22 RX ADMIN — MEROPENEM 100 MILLIGRAM(S): 1 INJECTION INTRAVENOUS at 21:38

## 2023-11-22 RX ADMIN — Medication 100 MICROGRAM(S): at 06:25

## 2023-11-22 RX ADMIN — HALOPERIDOL DECANOATE 10 MILLIGRAM(S): 100 INJECTION INTRAMUSCULAR at 21:36

## 2023-11-22 RX ADMIN — TAMSULOSIN HYDROCHLORIDE 0.4 MILLIGRAM(S): 0.4 CAPSULE ORAL at 21:36

## 2023-11-22 RX ADMIN — MEROPENEM 100 MILLIGRAM(S): 1 INJECTION INTRAVENOUS at 13:30

## 2023-11-22 RX ADMIN — Medication 1 APPLICATION(S): at 18:23

## 2023-11-22 RX ADMIN — ENOXAPARIN SODIUM 40 MILLIGRAM(S): 100 INJECTION SUBCUTANEOUS at 22:01

## 2023-11-22 RX ADMIN — BUPROPION HYDROCHLORIDE 150 MILLIGRAM(S): 150 TABLET, EXTENDED RELEASE ORAL at 12:33

## 2023-11-22 RX ADMIN — HALOPERIDOL DECANOATE 10 MILLIGRAM(S): 100 INJECTION INTRAMUSCULAR at 06:25

## 2023-11-22 RX ADMIN — MEROPENEM 100 MILLIGRAM(S): 1 INJECTION INTRAVENOUS at 06:25

## 2023-11-22 RX ADMIN — HALOPERIDOL DECANOATE 10 MILLIGRAM(S): 100 INJECTION INTRAMUSCULAR at 13:28

## 2023-11-22 NOTE — DISCHARGE NOTE PROVIDER - NSDCFUSCHEDAPPT_GEN_ALL_CORE_FT
Cj Kang  [facility]  OTOLARYNG [address]  Scheduled Appointment: 11/29/2023    Dejan Jean  [facility]  Scheduled Appointment: 12/26/2023    Dejan Jean  [facility]  PODIATRY  [address]  Scheduled Appointment: 12/26/2023     Dejan Jean  [facility]  Scheduled Appointment: 12/26/2023    [facility] [address]  Scheduled Appointment: 12/26/2023

## 2023-11-22 NOTE — PROGRESS NOTE ADULT - ASSESSMENT
51 y/o man with PMH of Mild mental retardation, intellectual disability, Schizophrenia, Impulse control disorder, Seizures, Hypothyroidism, Exotropia, Cataracts, Osteopenia, urethral stricture and recent treatment for UTI twice with abx as outpt presented from group home [place] for weakness, lethargy, decreased PO intake and inability to walk without maximal assistance per group aide at bedside. He has hx of urethral stricture and was scheduled for cystoscopy but never had the procedure done due to consent issues.     # Sepsis present on admission due to right sided acute pyelonephritis  #h/o recently treated UTIs  #h/o urethral stricture  #h/o ESBL E. coli in urine culture 6/23  #urinary retention - long placed by  and 700cc was drained  #hydroureteronephrosis and question of urothelial enhancement raising suspicion for ascending urinary tract infection.   FU bcx: NGTD  ucx- ecoli esbl -> sens to meropenem--->last day of antibiotics today 11/22  repeat renal us shows improved hydro  ID recommendation on 11/17  Likely Sepsis 2/2 pyelonephritis, hx of ESBL + hx of urethral strictures inc. risk of infections  - meropenem 1g q 8 hours   - >last day of antibiotics today 11/22     -> TOV when ambulating 150 ft; only did 10; once doing 150ft if he fails will be sent home with chronic long-> needs to be placed by urology as complicated anatomy  on flomax      #Mild mental disability, schizophrenia, impulse control disorder - on bupropion, haldol, mirtazapine    #Hypothyroid on synthroid    #DVT prophylaxis - lovenox    full code    #Progress Note Handoff  Pending (specify):  pt did not do well with PT needs to ambulate >50 ft to go back to group home, may need SNF placement   Family discussion: house staff updated pt family  Disposition: group home vs SNF   Decision to admit the pt is based on acuity as above

## 2023-11-22 NOTE — DISCHARGE NOTE PROVIDER - CARE PROVIDER_API CALL
Bakari Bazzi  Internal Medicine  [address] [place] [address]  Phone: [phone]  Fax: [phone]  Follow Up Time: 2 weeks

## 2023-11-22 NOTE — DISCHARGE NOTE PROVIDER - ATTENDING DISCHARGE PHYSICAL EXAMINATION:
Attending attestation  Attending DC note  Pt seen and examined at bedside. No cp or sob. Completed Abx  vitals, labs, exam stable  Hospital course as above.  Plan dw pt and agreed to plan  Medically cleared for DC. Med recc completed.  STEVE resident. Spent 32 mins on case   Attending attestation  Attending DC note  Pt seen and examined at bedside. No cp or sob. Completed Abx  vitals, labs, exam stable  Plan dw pt and agreed to plan  Medically cleared for DC. Med recc completed.  DW resident.  TOV passed - can go back to group home today   -physical therapy as tolerated    Attending attestation  Attending DC note  Pt seen and examined at bedside. No cp or sob. Completed Abx    Vital Signs Last 24 Hrs  T(C): 36.9 (28 Nov 2023 07:14), Max: 36.9 (28 Nov 2023 07:14)  T(F): 98.4 (28 Nov 2023 07:14), Max: 98.4 (28 Nov 2023 07:14)  HR: 88 (28 Nov 2023 07:14) (69 - 88)  BP: 128/84 (28 Nov 2023 07:14) (108/65 - 128/84)  BP(mean): --  RR: 18 (28 Nov 2023 07:14) (18 - 18)  SpO2: 98% (27 Nov 2023 20:20) (98% - 98%)    labs, exam stable  Plan dw pt and agreed to plan  Medically cleared for DC. Med recc completed.  STEVE resident.  TOV passed - can go back to group home today   -physical therapy as tolerated (uses walker)   Vital Signs Last 24 Hrs  T(F): 98.9 (29 Nov 2023 12:56), Max: 98.9 (29 Nov 2023 12:56)  HR: 63 (29 Nov 2023 12:56) (63 - 80)  BP: 114/63 (29 Nov 2023 12:56) (100/68 - 114/63)  RR: 18 (29 Nov 2023 12:56) (18 - 18)  SpO2: --    PHYSICAL EXAM:  GENERAL: NAD, well-groomed, well-developed  HEAD:  Atraumatic, Normocephalic  EYES: EOMI, conjunctiva and sclera clear  ENMT: Moist mucous membranes, Good dentition, no thrush  NECK: Supple, No JVD  CHEST/LUNG: Clear to auscultation bilaterally, good air entry, non-labored breathing  HEART: RRR; S1/S2, No murmur  ABDOMEN: Soft, Nontender, Nondistended; Bowel sounds present  VASCULAR: Normal pulses, Normal capillary refill  EXTREMITIES: No calf tenderness, No cyanosis, No edema  LYMPH: Normal; No lymphadenopathy noted  SKIN: Warm, Intact  PSYCH: Normal mood, Normal affect  NERVOUS SYSTEM:  Alert, Good concentration

## 2023-11-22 NOTE — DISCHARGE NOTE PROVIDER - NSDCCPCAREPLAN_GEN_ALL_CORE_FT
PRINCIPAL DISCHARGE DIAGNOSIS  Diagnosis: Acute pyelonephritis  Assessment and Plan of Treatment: You presented to the emergency department for weakness and loss of apetite. Imaging showed hydroureteronephrosis which means urine backflow in the jkidneys and since you have a urethral stricture and a recent urinary infection, you were started on an IV antibioticcourse of Meropenem to treat this new urinary infection. You completed this course of medication and you were asymptomatic on discharge.  Please seek care immediately should you have any fevers, chills or symptoms of weakness, and altered mental status

## 2023-11-22 NOTE — PROGRESS NOTE ADULT - SUBJECTIVE AND OBJECTIVE BOX
Patient is a 52y old  Male who presents with a chief complaint of Sepsis 2/2 Pyelonephritis (11-22-23)      Pt seen and examined at bedside. No CP or SOB.          PAST MEDICAL & SURGICAL HISTORY:  Epilepsy  last event 4+ yrs ago    Schizophrenia    Bipolar 1 disorder    Major depressive disorder    Anemia    Dyspepsia    Hypothyroidism    Constipation    MR (mental retardation)    H/O cystoscopy    H/O laminectomy  03/2001    H/O fracture of fibula  nondisplaced 03/2010        VITAL SIGNS (Last 24 hrs):  T(C): 36.5 (11-22-23 @ 13:02), Max: 36.7 (11-21-23 @ 19:28)  HR: 65 (11-22-23 @ 13:02) (61 - 68)  BP: 97/63 (11-22-23 @ 13:02) (96/60 - 100/62)  RR: 18 (11-22-23 @ 13:02) (18 - 18)  SpO2: --  Wt(kg): --  Daily     Daily     I&O's Summary    21 Nov 2023 07:01  -  22 Nov 2023 07:00  --------------------------------------------------------  IN: 0 mL / OUT: 2000 mL / NET: -2000 mL    22 Nov 2023 07:01  -  22 Nov 2023 14:54  --------------------------------------------------------  IN: 0 mL / OUT: 800 mL / NET: -800 mL        PHYSICAL EXAM:  GENERAL: NAD, well-developed  HEAD:  Atraumatic, Normocephalic  EYES: EOMI, PERRLA, conjunctiva and sclera clear  NECK: Supple, No JVD  CHEST/LUNG: Clear to auscultation bilaterally; No wheeze  HEART: Regular rate and rhythm; No murmurs, rubs, or gallops  ABDOMEN: Soft, Nontender, Nondistended; Bowel sounds present  EXTREMITIES:  2+ Peripheral Pulses, No clubbing, cyanosis, or edema  PSYCH: alert,  intellectual disability   NEUROLOGY: intellectual disability   SKIN: No rashes or lesions    Labs Reviewed  Spoke to patient in regards to abnormal labs.    CBC Full  -  ( 22 Nov 2023 06:53 )  WBC Count : 9.55 K/uL  Hemoglobin : 11.6 g/dL  Hematocrit : 34.5 %  Platelet Count - Automated : 409 K/uL  Mean Cell Volume : 95.0 fL  Mean Cell Hemoglobin : 32.0 pg  Mean Cell Hemoglobin Concentration : 33.6 g/dL  Auto Neutrophil # : x  Auto Lymphocyte # : x  Auto Monocyte # : x  Auto Eosinophil # : x  Auto Basophil # : x  Auto Neutrophil % : x  Auto Lymphocyte % : x  Auto Monocyte % : x  Auto Eosinophil % : x  Auto Basophil % : x    BMP:    11-22 @ 06:53    Blood Urea Nitrogen - 23  Calcium - 9.4  Carbond Dioxide - 26  Chloride - 103  Creatinine - 0.9  Glucose - 75  Potassium - 4.3  Sodium - 138      Hemoglobin A1c -     Urine Culture:        COVID Labs  CRP:      D-Dimer:            Imaging reviewed independently and reviewed read  < from: US Kidney and Bladder (11.16.23 @ 11:06) >  IMPRESSION:    No hydronephrosis, interval improvement.    < end of copied text >        MEDICATIONS  (STANDING):  buPROPion XL (24-Hour) . 150 milliGRAM(s) Oral daily  chlorhexidine 2% Cloths 1 Application(s) Topical daily  clotrimazole 1% Cream 1 Application(s) Topical two times a day  enoxaparin Injectable 40 milliGRAM(s) SubCutaneous every 24 hours  haloperidol     Tablet 10 milliGRAM(s) Oral three times a day  levothyroxine 100 MICROGram(s) Oral daily  meropenem  IVPB 1000 milliGRAM(s) IV Intermittent every 8 hours  mirtazapine 15 milliGRAM(s) Oral daily  tamsulosin 0.4 milliGRAM(s) Oral at bedtime    MEDICATIONS  (PRN):  melatonin 3 milliGRAM(s) Oral at bedtime PRN Insomnia

## 2023-11-22 NOTE — DISCHARGE NOTE PROVIDER - NSDCMRMEDTOKEN_GEN_ALL_CORE_FT
buPROPion 150 mg/24 hours (XL) oral tablet, extended release: 1 tab(s) orally every 24 hours  clotrimazole 1% topical cream: Apply topically to affected area once a day feet  Cranberry oral capsule: 1,000 milligram(s) orally once a day  haloperidol 10 mg oral tablet: 1 tab(s) orally 3 times a day  mirtazapine 15 mg oral tablet: 1 tab(s) orally once a day  Synthroid 100 mcg (0.1 mg) oral tablet: synthroid 100mcg tablet (levothyroxine 100 mcg tablet) 1 tablet oral every day  tamsulosin 0.4 mg oral capsule: 1 cap(s) orally once a day (at bedtime)   benztropine 1 mg oral tablet: 1 tab(s) orally 2 times a day  buPROPion 150 mg/24 hours (XL) oral tablet, extended release: 1 tab(s) orally every 24 hours  clotrimazole 1% topical cream: Apply topically to affected area once a day feet  Cranberry oral capsule: 1,000 milligram(s) orally once a day  d-mannose 500 mg capsule: take 2 capsules ( g.) by mouth once daily  divalproex sodium 500 mg oral delayed release tablet: 1 tab(s) orally 3 times a day  haloperidol 10 mg oral tablet: 1 tab(s) orally 3 times a day  mirtazapine 15 mg oral tablet: 1 tab(s) orally once a day  probiotic blend 2 billion cell 50 mg capsule: take 2 capsules  (100 mg) by mouth daily  Synthroid 100 mcg (0.1 mg) oral tablet: synthroid 100mcg tablet (levothyroxine 100 mcg tablet) 1 tablet oral every day  tamsulosin 0.4 mg oral capsule: 2 cap(s) orally once a day

## 2023-11-22 NOTE — DISCHARGE NOTE PROVIDER - HOSPITAL COURSE
52-year-old male with PMHx of Mild mental retardation, intellectual disability, Schizophrenia, Impulse control disorder, Seizures, Hypothyroidism, Exotropia, Cataracts, Osteopenia, urethral stricture, presented from [facility] for weakness. Hx taken from aid at bedside as patient was lethargic when I saw him post versed for CT scan.  He had progressive weakness over the last week, on admission he does not want to eat food, has been unable to walk without max assist which is unusual.    He had a UTI 2 weeks prior to presenting to the ED and completed abx course then was retested after a week and UA was still +Ve so he completed another course of amoxicillin.   Patient has hx pf urethral stricture, was scheduled for cystoscopy but never got procedure done secondary to consent issues.     In the ED: vitals febrile and tachycardic, on 2L NC. normal BP  labs remarkable for WBC 16.69, ++VE UA  CT abd pelv: Limited examination due to motion artifact and streak artifact. Apparent hypoenhancement at the right renal upper pole with mild hydroureteronephrosis and question of urothelial enhancement raising suspicion for ascending urinary tract infection. No definite radiopaque obstructing stone. Distended urinary bladder with mild wall thickening.   EKG: sinus tach    Patient received 2L IVFs and zosyn and was admitted to medicine for sepsis 2/2 right sided acute pyelonephritis    # Sepsis present on admission due to right sided acute pyelonephritis  FU bcx: NGTD  ucx- ecoli esbl -> sensitive to meropenem and he was started on a regimen of 1g every 8h until 11/22  A repeat renal us shows improved hydro  And he received a full course of ATB in the hospital.    On 11/22, the patient was stable and can be discharged   52-year-old male with PMHx of Mild mental retardation, intellectual disability, Schizophrenia, Impulse control disorder, Seizures, Hypothyroidism, Exotropia, Cataracts, Osteopenia, urethral stricture, presented from [facility] [place] for weakness. Hx taken from aid at bedside as patient was lethargic when I saw him post versed for CT scan.  He had progressive weakness over the last week, on admission he does not want to eat food, has been unable to walk without max assist which is unusual.    He had a UTI 2 weeks prior to presenting to the ED and completed abx course then was retested after a week and UA was still +Ve so he completed another course of amoxicillin.   Patient has hx pf urethral stricture, was scheduled for cystoscopy but never got procedure done secondary to consent issues.     In the ED: vitals febrile and tachycardic, on 2L NC. normal BP  labs remarkable for WBC 16.69, ++VE UA  CT abd pelv: Limited examination due to motion artifact and streak artifact. Apparent hypoenhancement at the right renal upper pole with mild hydroureteronephrosis and question of urothelial enhancement raising suspicion for ascending urinary tract infection. No definite radiopaque obstructing stone. Distended urinary bladder with mild wall thickening.   EKG: sinus tach    Patient received 2L IVFs and zosyn and was admitted to medicine for sepsis 2/2 right sided acute pyelonephritis    # Sepsis present on admission due to right sided acute pyelonephritis  FU bcx: NGTD  ucx- ecoli esbl -> sensitive to meropenem and he was started on a regimen of 1g every 8h until 11/22  A repeat renal us shows improved hydro  And he received his full course of ATB in the hospital.    On 11/22, the patient was seen and examined, and deemed stable for discharge   52-year-old male with PMHx of Mild mental retardation, intellectual disability, Schizophrenia, Impulse control disorder, Seizures, Hypothyroidism, Exotropia, Cataracts, Osteopenia, urethral stricture, presented from assisted [place] for weakness. Hx taken from aid at bedside as patient was lethargic when I saw him post versed for CT scan.  He had progressive weakness over the last week, on admission he does not want to eat food, has been unable to walk without max assist which is unusual.    He had a UTI 2 weeks prior to presenting to the ED and completed abx course then was retested after a week and UA was still +Ve so he completed another course of amoxicillin.   Patient has hx pf urethral stricture, was scheduled for cystoscopy but never got procedure done secondary to consent issues.     In the ED: vitals febrile and tachycardic, on 2L NC. normal BP  labs remarkable for WBC 16.69, ++VE UA  CT abd pelv: Limited examination due to motion artifact and streak artifact. Apparent hypoenhancement at the right renal upper pole with mild hydroureteronephrosis and question of urothelial enhancement raising suspicion for ascending urinary tract infection. No definite radiopaque obstructing stone. Distended urinary bladder with mild wall thickening.   EKG: sinus tach    Patient received 2L IVFs and zosyn and was admitted to medicine for sepsis 2/2 right sided acute pyelonephritis    # Sepsis present on admission due to right sided acute pyelonephritis  FU bcx: NGTD  ucx- ecoli esbl -> sensitive to meropenem and he was started on a regimen of 1g every 8h until 11/22  A repeat renal us shows improved hydro  And he received his full course of Abx in the hospital.    On 11/22, the patient was seen and examined, and deemed stable for discharge   52-year-old male with PMHx of Mild mental retardation, intellectual disability, Schizophrenia, Impulse control disorder, Seizures, Hypothyroidism, Exotropia, Cataracts, Osteopenia, urethral stricture, presented from [facility] for weakness. Hx taken from aid at bedside as patient was lethargic when I saw him post versed for CT scan.  He had progressive weakness over the last week, on admission he does not want to eat food, has been unable to walk without max assist which is unusual.    He had a UTI 2 weeks prior to presenting to the ED and completed abx course then was retested after a week and UA was still +Ve so he completed another course of amoxicillin.   Patient has hx pf urethral stricture, was scheduled for cystoscopy but never got procedure done secondary to consent issues.     In the ED: vitals febrile and tachycardic, on 2L NC. normal BP  labs remarkable for WBC 16.69, ++VE UA  CT abd pelv: Limited examination due to motion artifact and streak artifact. Apparent hypoenhancement at the right renal upper pole with mild hydroureteronephrosis and question of urothelial enhancement raising suspicion for ascending urinary tract infection. No definite radiopaque obstructing stone. Distended urinary bladder with mild wall thickening.   EKG: sinus tach    Patient received 2L IVFs and zosyn and was admitted to medicine for sepsis 2/2 right sided acute pyelonephritis    # Sepsis present on admission due to right sided acute pyelonephritis  FU bcx: NGTD  ucx- ecoli esbl -> sensitive to meropenem and he was started on a regimen of 1g every 8h until 11/22  A repeat renal us shows improved hydro  And he received his full course of Abx in the hospital.    On 11/22, the patient was seen and examined, and deemed stable for discharge  -passed TOV - long discontinued    52-year-old male with PMHx of Mild mental retardation, intellectual disability, Schizophrenia, Impulse control disorder, Seizures, Hypothyroidism, Exotropia, Cataracts, Osteopenia, urethral stricture, presented from nursing home [place] for weakness. Hx taken from aid at bedside as patient was lethargic when I saw him post versed for CT scan.  He had progressive weakness over the last week, on admission he does not want to eat food, has been unable to walk without max assist which is unusual.    He had a UTI 2 weeks prior to presenting to the ED and completed abx course then was retested after a week and UA was still +Ve so he completed another course of amoxicillin.   Patient has hx pf urethral stricture, was scheduled for cystoscopy but never got procedure done secondary to consent issues.     In the ED: vitals febrile and tachycardic, on 2L NC. normal BP  labs remarkable for WBC 16.69, ++VE UA  CT abd pelv: Limited examination due to motion artifact and streak artifact. Apparent hypoenhancement at the right renal upper pole with mild hydroureteronephrosis and question of urothelial enhancement raising suspicion for ascending urinary tract infection. No definite radiopaque obstructing stone. Distended urinary bladder with mild wall thickening.   EKG: sinus tach    Patient received 2L IVFs and zosyn and was admitted to medicine for sepsis 2/2 right sided acute pyelonephritis    # Sepsis present on admission due to right sided acute pyelonephritis  FU bcx: NGTD  ucx- ecoli esbl -> sensitive to meropenem and he was started on a regimen of 1g every 8h until 11/22  A repeat renal us shows improved hydro  And he received his full course of Abx in the hospital.    On 11/28, the patient was seen and examined, and deemed stable for discharge  -passed TOV yesterday - long discontinued - can return to his group home today

## 2023-11-23 LAB
ALBUMIN SERPL ELPH-MCNC: 3.5 G/DL — SIGNIFICANT CHANGE UP (ref 3.5–5.2)
ALBUMIN SERPL ELPH-MCNC: 3.5 G/DL — SIGNIFICANT CHANGE UP (ref 3.5–5.2)
ALP SERPL-CCNC: 92 U/L — SIGNIFICANT CHANGE UP (ref 30–115)
ALP SERPL-CCNC: 92 U/L — SIGNIFICANT CHANGE UP (ref 30–115)
ALT FLD-CCNC: 41 U/L — SIGNIFICANT CHANGE UP (ref 0–41)
ALT FLD-CCNC: 41 U/L — SIGNIFICANT CHANGE UP (ref 0–41)
ANION GAP SERPL CALC-SCNC: 9 MMOL/L — SIGNIFICANT CHANGE UP (ref 7–14)
ANION GAP SERPL CALC-SCNC: 9 MMOL/L — SIGNIFICANT CHANGE UP (ref 7–14)
AST SERPL-CCNC: 29 U/L — SIGNIFICANT CHANGE UP (ref 0–41)
AST SERPL-CCNC: 29 U/L — SIGNIFICANT CHANGE UP (ref 0–41)
BILIRUB SERPL-MCNC: 0.3 MG/DL — SIGNIFICANT CHANGE UP (ref 0.2–1.2)
BILIRUB SERPL-MCNC: 0.3 MG/DL — SIGNIFICANT CHANGE UP (ref 0.2–1.2)
BUN SERPL-MCNC: 21 MG/DL — HIGH (ref 10–20)
BUN SERPL-MCNC: 21 MG/DL — HIGH (ref 10–20)
CALCIUM SERPL-MCNC: 9.6 MG/DL — SIGNIFICANT CHANGE UP (ref 8.4–10.4)
CALCIUM SERPL-MCNC: 9.6 MG/DL — SIGNIFICANT CHANGE UP (ref 8.4–10.4)
CHLORIDE SERPL-SCNC: 103 MMOL/L — SIGNIFICANT CHANGE UP (ref 98–110)
CHLORIDE SERPL-SCNC: 103 MMOL/L — SIGNIFICANT CHANGE UP (ref 98–110)
CO2 SERPL-SCNC: 28 MMOL/L — SIGNIFICANT CHANGE UP (ref 17–32)
CO2 SERPL-SCNC: 28 MMOL/L — SIGNIFICANT CHANGE UP (ref 17–32)
CREAT SERPL-MCNC: 0.9 MG/DL — SIGNIFICANT CHANGE UP (ref 0.7–1.5)
CREAT SERPL-MCNC: 0.9 MG/DL — SIGNIFICANT CHANGE UP (ref 0.7–1.5)
EGFR: 102 ML/MIN/1.73M2 — SIGNIFICANT CHANGE UP
EGFR: 102 ML/MIN/1.73M2 — SIGNIFICANT CHANGE UP
GLUCOSE SERPL-MCNC: 84 MG/DL — SIGNIFICANT CHANGE UP (ref 70–99)
GLUCOSE SERPL-MCNC: 84 MG/DL — SIGNIFICANT CHANGE UP (ref 70–99)
HCT VFR BLD CALC: 34.8 % — LOW (ref 42–52)
HCT VFR BLD CALC: 34.8 % — LOW (ref 42–52)
HGB BLD-MCNC: 11.3 G/DL — LOW (ref 14–18)
HGB BLD-MCNC: 11.3 G/DL — LOW (ref 14–18)
MAGNESIUM SERPL-MCNC: 2 MG/DL — SIGNIFICANT CHANGE UP (ref 1.8–2.4)
MAGNESIUM SERPL-MCNC: 2 MG/DL — SIGNIFICANT CHANGE UP (ref 1.8–2.4)
MCHC RBC-ENTMCNC: 31.6 PG — HIGH (ref 27–31)
MCHC RBC-ENTMCNC: 31.6 PG — HIGH (ref 27–31)
MCHC RBC-ENTMCNC: 32.5 G/DL — SIGNIFICANT CHANGE UP (ref 32–37)
MCHC RBC-ENTMCNC: 32.5 G/DL — SIGNIFICANT CHANGE UP (ref 32–37)
MCV RBC AUTO: 97.2 FL — HIGH (ref 80–94)
MCV RBC AUTO: 97.2 FL — HIGH (ref 80–94)
NRBC # BLD: 0 /100 WBCS — SIGNIFICANT CHANGE UP (ref 0–0)
NRBC # BLD: 0 /100 WBCS — SIGNIFICANT CHANGE UP (ref 0–0)
PLATELET # BLD AUTO: 411 K/UL — HIGH (ref 130–400)
PLATELET # BLD AUTO: 411 K/UL — HIGH (ref 130–400)
PMV BLD: 9.1 FL — SIGNIFICANT CHANGE UP (ref 7.4–10.4)
PMV BLD: 9.1 FL — SIGNIFICANT CHANGE UP (ref 7.4–10.4)
POTASSIUM SERPL-MCNC: 4.6 MMOL/L — SIGNIFICANT CHANGE UP (ref 3.5–5)
POTASSIUM SERPL-MCNC: 4.6 MMOL/L — SIGNIFICANT CHANGE UP (ref 3.5–5)
POTASSIUM SERPL-SCNC: 4.6 MMOL/L — SIGNIFICANT CHANGE UP (ref 3.5–5)
POTASSIUM SERPL-SCNC: 4.6 MMOL/L — SIGNIFICANT CHANGE UP (ref 3.5–5)
PROT SERPL-MCNC: 6.4 G/DL — SIGNIFICANT CHANGE UP (ref 6–8)
PROT SERPL-MCNC: 6.4 G/DL — SIGNIFICANT CHANGE UP (ref 6–8)
RBC # BLD: 3.58 M/UL — LOW (ref 4.7–6.1)
RBC # BLD: 3.58 M/UL — LOW (ref 4.7–6.1)
RBC # FLD: 14.6 % — HIGH (ref 11.5–14.5)
RBC # FLD: 14.6 % — HIGH (ref 11.5–14.5)
SODIUM SERPL-SCNC: 140 MMOL/L — SIGNIFICANT CHANGE UP (ref 135–146)
SODIUM SERPL-SCNC: 140 MMOL/L — SIGNIFICANT CHANGE UP (ref 135–146)
WBC # BLD: 9.94 K/UL — SIGNIFICANT CHANGE UP (ref 4.8–10.8)
WBC # BLD: 9.94 K/UL — SIGNIFICANT CHANGE UP (ref 4.8–10.8)
WBC # FLD AUTO: 9.94 K/UL — SIGNIFICANT CHANGE UP (ref 4.8–10.8)
WBC # FLD AUTO: 9.94 K/UL — SIGNIFICANT CHANGE UP (ref 4.8–10.8)

## 2023-11-23 PROCEDURE — 99233 SBSQ HOSP IP/OBS HIGH 50: CPT

## 2023-11-23 RX ADMIN — HALOPERIDOL DECANOATE 10 MILLIGRAM(S): 100 INJECTION INTRAMUSCULAR at 21:24

## 2023-11-23 RX ADMIN — Medication 1 APPLICATION(S): at 17:03

## 2023-11-23 RX ADMIN — MEROPENEM 100 MILLIGRAM(S): 1 INJECTION INTRAVENOUS at 05:21

## 2023-11-23 RX ADMIN — Medication 100 MICROGRAM(S): at 05:21

## 2023-11-23 RX ADMIN — BUPROPION HYDROCHLORIDE 150 MILLIGRAM(S): 150 TABLET, EXTENDED RELEASE ORAL at 11:16

## 2023-11-23 RX ADMIN — CHLORHEXIDINE GLUCONATE 1 APPLICATION(S): 213 SOLUTION TOPICAL at 11:16

## 2023-11-23 RX ADMIN — ENOXAPARIN SODIUM 40 MILLIGRAM(S): 100 INJECTION SUBCUTANEOUS at 22:16

## 2023-11-23 RX ADMIN — HALOPERIDOL DECANOATE 10 MILLIGRAM(S): 100 INJECTION INTRAMUSCULAR at 05:20

## 2023-11-23 RX ADMIN — MEROPENEM 100 MILLIGRAM(S): 1 INJECTION INTRAVENOUS at 21:24

## 2023-11-23 RX ADMIN — MEROPENEM 100 MILLIGRAM(S): 1 INJECTION INTRAVENOUS at 13:07

## 2023-11-23 RX ADMIN — HALOPERIDOL DECANOATE 10 MILLIGRAM(S): 100 INJECTION INTRAMUSCULAR at 13:05

## 2023-11-23 RX ADMIN — TAMSULOSIN HYDROCHLORIDE 0.4 MILLIGRAM(S): 0.4 CAPSULE ORAL at 21:24

## 2023-11-23 RX ADMIN — MIRTAZAPINE 15 MILLIGRAM(S): 45 TABLET, ORALLY DISINTEGRATING ORAL at 11:16

## 2023-11-23 RX ADMIN — Medication 1 APPLICATION(S): at 05:20

## 2023-11-23 NOTE — PROGRESS NOTE ADULT - ASSESSMENT
53 y/o man with PMH of Mild mental retardation, intellectual disability, Schizophrenia, Impulse control disorder, Seizures, Hypothyroidism, Exotropia, Cataracts, Osteopenia, urethral stricture and recent treatment for UTI twice with abx as outpt presented from group home [place] for weakness, lethargy, decreased PO intake and inability to walk without maximal assistance per group aide at bedside. He has hx of urethral stricture and was scheduled for cystoscopy but never had the procedure done due to consent issues.     # Sepsis present on admission due to right sided acute pyelonephritis  #h/o recently treated UTIs  #h/o urethral stricture  #h/o ESBL E. coli in urine culture 6/23  #urinary retention - long placed by  and 700cc was drained  #hydroureteronephrosis and question of urothelial enhancement raising suspicion for ascending urinary tract infection.   FU bcx: NGTD  ucx- ecoli esbl -> sens to meropenem-  repeat renal us shows improved hydro  ID recommendation on 11/17  Likely Sepsis 2/2 pyelonephritis, hx of ESBL + hx of urethral strictures inc. risk of infections  - >last day of antibiotics today     -> TOV when ambulating 150 ft; only did 10; once doing 150ft if he fails will be sent home with chronic long-> needs to be placed by urology as complicated anatomy  on flomax      #Mild mental disability, schizophrenia, impulse control disorder - on bupropion, haldol, mirtazapine    #Hypothyroid on synthroid    #DVT prophylaxis - lovenox    full code    #Progress Note Handoff  Pending (specify):  pt did not do well with PT needs to ambulate >50 ft to go back to group home, may need SNF placement   Family discussion: house staff updated pt family  Disposition: group home vs SNF   Decision to admit the pt is based on acuity as above

## 2023-11-23 NOTE — PROGRESS NOTE ADULT - SUBJECTIVE AND OBJECTIVE BOX
ALINA CHERY 52y Male  MRN#: [id number]         SUBJECTIVE  Patient is a 52y old Male who presents with a chief complaint of Sepsis 2/2 Pyelonephritis (22 Nov 2023 14:54)  Currently admitted to medicine with the primary diagnosis of Acute pyelonephritis  No overnight events       OBJECTIVE  PAST MEDICAL & SURGICAL HISTORY  Epilepsy  last event 4+ yrs ago    Schizophrenia    Bipolar 1 disorder    Major depressive disorder    Anemia    Dyspepsia    Hypothyroidism    Constipation    MR (mental retardation)    H/O cystoscopy    H/O laminectomy  03/2001    H/O fracture of fibula  nondisplaced 03/2010      ALLERGIES:  phenothiazines (Unknown)  erythromycin (Other)    MEDICATIONS:  STANDING MEDICATIONS  buPROPion XL (24-Hour) . 150 milliGRAM(s) Oral daily  chlorhexidine 2% Cloths 1 Application(s) Topical daily  clotrimazole 1% Cream 1 Application(s) Topical two times a day  enoxaparin Injectable 40 milliGRAM(s) SubCutaneous every 24 hours  haloperidol     Tablet 10 milliGRAM(s) Oral three times a day  levothyroxine 100 MICROGram(s) Oral daily  meropenem  IVPB 1000 milliGRAM(s) IV Intermittent every 8 hours  mirtazapine 15 milliGRAM(s) Oral daily  tamsulosin 0.4 milliGRAM(s) Oral at bedtime    PRN MEDICATIONS  melatonin 3 milliGRAM(s) Oral at bedtime PRN      VITAL SIGNS: Last 24 Hours  T(C): 36.4 (23 Nov 2023 04:49), Max: 36.6 (22 Nov 2023 20:00)  T(F): 97.5 (23 Nov 2023 04:49), Max: 97.9 (22 Nov 2023 20:00)  HR: 60 (23 Nov 2023 04:49) (60 - 66)  BP: 97/58 (23 Nov 2023 04:49) (97/58 - 102/60)  BP(mean): 71 (23 Nov 2023 04:49) (71 - 71)  RR: 18 (23 Nov 2023 04:49) (18 - 18)  SpO2: --    LABS:                        11.3   9.94  )-----------( 411      ( 23 Nov 2023 06:52 )             34.8     11-23    140  |  103  |  21<H>  ----------------------------<  84  4.6   |  28  |  0.9    Ca    9.6      23 Nov 2023 06:52  Mg     2.0     11-23    TPro  6.4  /  Alb  3.5  /  TBili  0.3  /  DBili  x   /  AST  29  /  ALT  41  /  AlkPhos  92  11-23      Urinalysis Basic - ( 23 Nov 2023 06:52 )    Color: x / Appearance: x / SG: x / pH: x  Gluc: 84 mg/dL / Ketone: x  / Bili: x / Urobili: x   Blood: x / Protein: x / Nitrite: x   Leuk Esterase: x / RBC: x / WBC x   Sq Epi: x / Non Sq Epi: x / Bacteria: x                RADIOLOGY:      PHYSICAL EXAM:    GENERAL: not in distress  HEENT:  Atraumatic, Normocephalic.  PULMONARY: Clear to auscultation bilaterally;  CARDIOVASCULAR: Regular rate and rhythm  GASTROINTESTINAL: Soft, Nontender, Nondistended  MUSCULOSKELETAL:  No clubbing, cyanosis, or edema  NEUROLOGY: ao*0   SKIN: No rashes or lesions

## 2023-11-24 LAB
ANION GAP SERPL CALC-SCNC: 7 MMOL/L — SIGNIFICANT CHANGE UP (ref 7–14)
ANION GAP SERPL CALC-SCNC: 7 MMOL/L — SIGNIFICANT CHANGE UP (ref 7–14)
BUN SERPL-MCNC: 23 MG/DL — HIGH (ref 10–20)
BUN SERPL-MCNC: 23 MG/DL — HIGH (ref 10–20)
CALCIUM SERPL-MCNC: 9.4 MG/DL — SIGNIFICANT CHANGE UP (ref 8.4–10.5)
CALCIUM SERPL-MCNC: 9.4 MG/DL — SIGNIFICANT CHANGE UP (ref 8.4–10.5)
CHLORIDE SERPL-SCNC: 100 MMOL/L — SIGNIFICANT CHANGE UP (ref 98–110)
CHLORIDE SERPL-SCNC: 100 MMOL/L — SIGNIFICANT CHANGE UP (ref 98–110)
CO2 SERPL-SCNC: 29 MMOL/L — SIGNIFICANT CHANGE UP (ref 17–32)
CO2 SERPL-SCNC: 29 MMOL/L — SIGNIFICANT CHANGE UP (ref 17–32)
CREAT SERPL-MCNC: 0.9 MG/DL — SIGNIFICANT CHANGE UP (ref 0.7–1.5)
CREAT SERPL-MCNC: 0.9 MG/DL — SIGNIFICANT CHANGE UP (ref 0.7–1.5)
EGFR: 102 ML/MIN/1.73M2 — SIGNIFICANT CHANGE UP
EGFR: 102 ML/MIN/1.73M2 — SIGNIFICANT CHANGE UP
GLUCOSE SERPL-MCNC: 84 MG/DL — SIGNIFICANT CHANGE UP (ref 70–99)
GLUCOSE SERPL-MCNC: 84 MG/DL — SIGNIFICANT CHANGE UP (ref 70–99)
HCT VFR BLD CALC: 36.2 % — LOW (ref 42–52)
HCT VFR BLD CALC: 36.2 % — LOW (ref 42–52)
HGB BLD-MCNC: 11.6 G/DL — LOW (ref 14–18)
HGB BLD-MCNC: 11.6 G/DL — LOW (ref 14–18)
MCHC RBC-ENTMCNC: 31.4 PG — HIGH (ref 27–31)
MCHC RBC-ENTMCNC: 31.4 PG — HIGH (ref 27–31)
MCHC RBC-ENTMCNC: 32 G/DL — SIGNIFICANT CHANGE UP (ref 32–37)
MCHC RBC-ENTMCNC: 32 G/DL — SIGNIFICANT CHANGE UP (ref 32–37)
MCV RBC AUTO: 98.1 FL — HIGH (ref 80–94)
MCV RBC AUTO: 98.1 FL — HIGH (ref 80–94)
NRBC # BLD: 0 /100 WBCS — SIGNIFICANT CHANGE UP (ref 0–0)
NRBC # BLD: 0 /100 WBCS — SIGNIFICANT CHANGE UP (ref 0–0)
PLATELET # BLD AUTO: 439 K/UL — HIGH (ref 130–400)
PLATELET # BLD AUTO: 439 K/UL — HIGH (ref 130–400)
PMV BLD: 9 FL — SIGNIFICANT CHANGE UP (ref 7.4–10.4)
PMV BLD: 9 FL — SIGNIFICANT CHANGE UP (ref 7.4–10.4)
POTASSIUM SERPL-MCNC: 4.6 MMOL/L — SIGNIFICANT CHANGE UP (ref 3.5–5)
POTASSIUM SERPL-MCNC: 4.6 MMOL/L — SIGNIFICANT CHANGE UP (ref 3.5–5)
POTASSIUM SERPL-SCNC: 4.6 MMOL/L — SIGNIFICANT CHANGE UP (ref 3.5–5)
POTASSIUM SERPL-SCNC: 4.6 MMOL/L — SIGNIFICANT CHANGE UP (ref 3.5–5)
RBC # BLD: 3.69 M/UL — LOW (ref 4.7–6.1)
RBC # BLD: 3.69 M/UL — LOW (ref 4.7–6.1)
RBC # FLD: 14.5 % — SIGNIFICANT CHANGE UP (ref 11.5–14.5)
RBC # FLD: 14.5 % — SIGNIFICANT CHANGE UP (ref 11.5–14.5)
SODIUM SERPL-SCNC: 136 MMOL/L — SIGNIFICANT CHANGE UP (ref 135–146)
SODIUM SERPL-SCNC: 136 MMOL/L — SIGNIFICANT CHANGE UP (ref 135–146)
WBC # BLD: 10.72 K/UL — SIGNIFICANT CHANGE UP (ref 4.8–10.8)
WBC # BLD: 10.72 K/UL — SIGNIFICANT CHANGE UP (ref 4.8–10.8)
WBC # FLD AUTO: 10.72 K/UL — SIGNIFICANT CHANGE UP (ref 4.8–10.8)
WBC # FLD AUTO: 10.72 K/UL — SIGNIFICANT CHANGE UP (ref 4.8–10.8)

## 2023-11-24 PROCEDURE — 99231 SBSQ HOSP IP/OBS SF/LOW 25: CPT

## 2023-11-24 RX ADMIN — MIRTAZAPINE 15 MILLIGRAM(S): 45 TABLET, ORALLY DISINTEGRATING ORAL at 11:52

## 2023-11-24 RX ADMIN — ENOXAPARIN SODIUM 40 MILLIGRAM(S): 100 INJECTION SUBCUTANEOUS at 21:53

## 2023-11-24 RX ADMIN — Medication 1 APPLICATION(S): at 17:03

## 2023-11-24 RX ADMIN — Medication 100 MICROGRAM(S): at 05:16

## 2023-11-24 RX ADMIN — HALOPERIDOL DECANOATE 10 MILLIGRAM(S): 100 INJECTION INTRAMUSCULAR at 05:15

## 2023-11-24 RX ADMIN — MEROPENEM 100 MILLIGRAM(S): 1 INJECTION INTRAVENOUS at 05:15

## 2023-11-24 RX ADMIN — HALOPERIDOL DECANOATE 10 MILLIGRAM(S): 100 INJECTION INTRAMUSCULAR at 13:05

## 2023-11-24 RX ADMIN — Medication 1 APPLICATION(S): at 05:16

## 2023-11-24 RX ADMIN — CHLORHEXIDINE GLUCONATE 1 APPLICATION(S): 213 SOLUTION TOPICAL at 11:53

## 2023-11-24 RX ADMIN — TAMSULOSIN HYDROCHLORIDE 0.4 MILLIGRAM(S): 0.4 CAPSULE ORAL at 21:54

## 2023-11-24 RX ADMIN — BUPROPION HYDROCHLORIDE 150 MILLIGRAM(S): 150 TABLET, EXTENDED RELEASE ORAL at 11:53

## 2023-11-24 RX ADMIN — HALOPERIDOL DECANOATE 5 MILLIGRAM(S): 100 INJECTION INTRAMUSCULAR at 21:53

## 2023-11-24 NOTE — PROGRESS NOTE ADULT - SUBJECTIVE AND OBJECTIVE BOX
ALINA CHERY  52y  Male      Patient is a 52y old  Male who presents with a chief complaint of Sepsis 2/2 Pyelonephritis (23 Nov 2023 11:36)      INTERVAL HPI/OVERNIGHT EVENTS: no acute events.         T(C): 36.4 (11-24-23 @ 05:34), Max: 36.4 (11-24-23 @ 05:34)  HR: 59 (11-24-23 @ 05:34) (59 - 66)  BP: 85/54 (11-24-23 @ 05:34) (85/54 - 106/61)  RR: 18 (11-24-23 @ 05:34) (18 - 18)  SpO2: --  Wt(kg): --Vital Signs Last 24 Hrs  T(C): 36.4 (24 Nov 2023 05:34), Max: 36.4 (24 Nov 2023 05:34)  T(F): 97.6 (24 Nov 2023 05:34), Max: 97.6 (24 Nov 2023 05:34)  HR: 59 (24 Nov 2023 05:34) (59 - 66)  BP: 85/54 (24 Nov 2023 05:34) (85/54 - 106/61)  BP(mean): --  RR: 18 (24 Nov 2023 05:34) (18 - 18)  SpO2: --          11-23-23 @ 07:01  -  11-24-23 @ 07:00  --------------------------------------------------------  IN: 0 mL / OUT: 1700 mL / NET: -1700 mL        PHYSICAL EXAM:  GENERAL: middle-age M, dysmorphic facies, pleasant  PSYCH: no agitation, baseline mentation  NERVOUS SYSTEM:  Alert & Oriented X1  PULMONARY: symmetrical chest rise, no accessory muscle use  CARDIOVASCULAR: non tachycardic  GI:  Nondistended   EXTREMITIES:    No clubbing, cyanosis, or edema  SKIN: No rashes or lesions    Consultant(s) Notes Reviewed:  [x ] YES  [ ] NO    Discussed with Consultants/Other Providers [ x] YES     LABS                          11.6   10.72 )-----------( 439      ( 24 Nov 2023 12:31 )             36.2     11-23    140  |  103  |  21<H>  ----------------------------<  84  4.6   |  28  |  0.9    Ca    9.6      23 Nov 2023 06:52  Mg     2.0     11-23    TPro  6.4  /  Alb  3.5  /  TBili  0.3  /  DBili  x   /  AST  29  /  ALT  41  /  AlkPhos  92  11-23      Urinalysis Basic - ( 23 Nov 2023 06:52 )    Color: x / Appearance: x / SG: x / pH: x  Gluc: 84 mg/dL / Ketone: x  / Bili: x / Urobili: x   Blood: x / Protein: x / Nitrite: x   Leuk Esterase: x / RBC: x / WBC x   Sq Epi: x / Non Sq Epi: x / Bacteria: x    RADIOLOGY & ADDITIONAL TESTS:  - no images 11/24    Imaging Personally Reviewed:  [ ] YES  [ ] NO    MEDICATIONS  (STANDING):  buPROPion XL (24-Hour) . 150 milliGRAM(s) Oral daily  chlorhexidine 2% Cloths 1 Application(s) Topical daily  clotrimazole 1% Cream 1 Application(s) Topical two times a day  enoxaparin Injectable 40 milliGRAM(s) SubCutaneous every 24 hours  haloperidol     Tablet 10 milliGRAM(s) Oral three times a day  levothyroxine 100 MICROGram(s) Oral daily  mirtazapine 15 milliGRAM(s) Oral daily  tamsulosin 0.4 milliGRAM(s) Oral at bedtime    MEDICATIONS  (PRN):  melatonin 3 milliGRAM(s) Oral at bedtime PRN Insomnia

## 2023-11-24 NOTE — PROGRESS NOTE ADULT - SUBJECTIVE AND OBJECTIVE BOX
24H events:    Patient is a 52y old Male who presents with a chief complaint of Sepsis 2/2 Pyelonephritis (24 Nov 2023 13:05)    Primary diagnosis of Acute pyelonephritis      Day 1:  Day 2:  Day 3:     Today is hospital day 11d. This morning patient was seen and examined at bedside, resting comfortably in bed.    No acute or major events overnight. Hemodynamically stable, tolerating oral diet, voiding appropriately with appropriate bowel movements.     Code Status: Full code     PAST MEDICAL & SURGICAL HISTORY  Epilepsy last event 4+ yrs ago  Schizophrenia  Bipolar 1 disorder  Major depressive disorder  Anemia  Dyspepsia  Hypothyroidism  Constipation  MR (mental retardation)  H/O cystoscopy  H/O laminectomy 03/2001  H/O fracture of fibula nondisplaced 03/2010    ALLERGIES:  phenothiazines (Unknown)  erythromycin (Other)    MEDICATIONS:  STANDING MEDICATIONS  buPROPion XL (24-Hour) . 150 milliGRAM(s) Oral daily  chlorhexidine 2% Cloths 1 Application(s) Topical daily  clotrimazole 1% Cream 1 Application(s) Topical two times a day  enoxaparin Injectable 40 milliGRAM(s) SubCutaneous every 24 hours  haloperidol     Tablet 10 milliGRAM(s) Oral three times a day  levothyroxine 100 MICROGram(s) Oral daily  mirtazapine 15 milliGRAM(s) Oral daily  tamsulosin 0.4 milliGRAM(s) Oral at bedtime    PRN MEDICATIONS  melatonin 3 milliGRAM(s) Oral at bedtime PRN    VITALS:   Vital Signs Last 24 Hrs  T(C): 36.4 (24 Nov 2023 05:34), Max: 36.4 (24 Nov 2023 05:34)  T(F): 97.6 (24 Nov 2023 05:34), Max: 97.6 (24 Nov 2023 05:34)  HR: 59 (24 Nov 2023 05:34) (59 - 66)  BP: 85/54 (24 Nov 2023 05:34) (85/54 - 106/61)  BP(mean): --  RR: 18 (24 Nov 2023 05:34) (18 - 18)  SpO2: --    PHYSICAL EXAM:  GENERAL: NAD, lying in bed, cooperative, elderly  HEAD: NCAD, no hematoma or laceration   NECK: Supple, no neck stiffness/nuchal rigidity, no JVD    HEART: Regular rate and rhythm, normal S1/S2, no murmurs, heaves, thrills  LUNGS: No acute respiratory distress, clear b/l breath sounds, no wheezing, rales, rhonchi  ABDOMEN:  soft, non-tender, non-distented, + BS, no hepatosplenomegaly   EXTREMITIES: no rashes, extremities warm/dry, no cyanosis, no edema, ulcerations or ecchymosis  NERVOUS SYSTEM:  A but unable to assess orientation, patient slurring his words,  follows commands   SKIN: No rashes or lesions       [facility] score: 12    ( X ) Indwelling Packer Catheter:  Reason (  ) Critical illness     ( X ) urinary retention    (  ) Accurate Ins/Outs Monitoring     (  ) CMO patient      LABS:                        11.6   10.72 )-----------( 439      ( 24 Nov 2023 12:31 )             36.2     11-24    136  |  100  |  23<H>  ----------------------------<  84  4.6   |  29  |  0.9    Ca    9.4      24 Nov 2023 12:31  Mg     2.0     11-23    TPro  6.4  /  Alb  3.5  /  TBili  0.3  /  DBili  x   /  AST  29  /  ALT  41  /  AlkPhos  92  11-23      Urinalysis Basic - ( 24 Nov 2023 12:31 )    Color: x / Appearance: x / SG: x / pH: x  Gluc: 84 mg/dL / Ketone: x  / Bili: x / Urobili: x   Blood: x / Protein: x / Nitrite: x   Leuk Esterase: x / RBC: x / WBC x   Sq Epi: x / Non Sq Epi: x / Bacteria: x

## 2023-11-24 NOTE — PROGRESS NOTE ADULT - ASSESSMENT
53 y/o man with PMH of Mild mental retardation, intellectual disability, Schizophrenia, Impulse control disorder, Seizures, Hypothyroidism, Exotropia, Cataracts, Osteopenia, urethral stricture and recent treatment for UTI twice with abx as outpt presented from group home [place] for weakness, lethargy, decreased PO intake and inability to walk without maximal assistance per group aide at bedside. He has hx of urethral stricture and was scheduled for cystoscopy but never had the procedure done due to consent issues.     # Sepsis present on admission due to right sided acute pyelonephritis  #h/o recently treated UTIs  #h/o urethral stricture  #h/o ESBL E. coli in urine culture 6/23  #urinary retention - long placed by  and 700cc was drained  #hydroureteronephrosis and question of urothelial enhancement raising suspicion for ascending urinary tract infection.   - FU bcx: NGTD  - ucx- ecoli esbl -> sens to meropenem--->last day of antibiotics 11/22  - repeat renal us shows improved hydro  -  -> TOV when ambulating 150 ft; only did 10; once doing 150ft if he fails will be sent home with chronic long-> needs to be placed by urology as complicated anatomy  on flomax      #Mild mental disability, schizophrenia, impulse control disorder - on bupropion, haldol, mirtazapine    #Hypothyroid on synthroid    #DVT prophylaxis - lovenox    full code    #Progress Note Handoff  Pending (specify):  pt did not do well with PT needs to ambulate >50 ft to go back to group home, may need SNF placement   Family discussion: house staff updated pt family  Disposition: group home vs SNF

## 2023-11-24 NOTE — PROGRESS NOTE ADULT - ASSESSMENT
51 y/o man with PMH of Mild mental retardation, intellectual disability, Schizophrenia, Impulse control disorder, Seizures, Hypothyroidism, Exotropia, Cataracts, Osteopenia, urethral stricture and recent treatment for UTI twice with abx as outpt presented from group home [place] for weakness, lethargy, decreased PO intake and inability to walk without maximal assistance per group aide at bedside. He has hx of urethral stricture and was scheduled for cystoscopy but never had the procedure done due to consent issues.     # Sepsis present on admission due to right sided acute pyelonephritis  #h/o recently treated UTIs  #h/o urethral stricture  #h/o ESBL E. coli in urine culture 6/23  #urinary retention - long placed by  and 700cc was drained  #hydroureteronephrosis and question of urothelial enhancement raising suspicion for ascending urinary tract infection.   - FU bcx: NGTD  - ucx- ecoli esbl -> sens to meropenem--->last day of antibiotics 11/22 - Patient finished his ATB course  - repeat renal us shows improved hydro  -  -> TOV when ambulating 150 ft; only did 10; once doing 150ft if he fails will be sent home with chronic long-> needs to be placed by urology as complicated anatomy  on flomax      #Mild mental disability, schizophrenia, impulse control disorder - on bupropion, haldol, mirtazapine    Physical Therapy assessment on 11/24: Pt encountered in semi-plummer position in bed in NAD, +long, no c/o pain and agreeable to PT. PT performed thera ex in bed, requires CGA in bed mobility, Min A sit/stand transfer and ambulated 30 ft Min A using RW with dec eufemia/step length. Pt demonstrated severe kyphotic posture and impaired balance during ambulation. Pt needs vc's for safety during ambulation. Pt tolerated tx and left in bed as found for safety concern.    #Hypothyroid on synthroid    #DVT prophylaxis - lovenox    full code    #Progress Note Handoff  Pending (specify):  pt did not do well with PT needs to ambulate >50 ft to go back to group home, may need SNF placement   Family discussion: house staff updated pt family  Disposition: group home vs SNF

## 2023-11-25 LAB
ALBUMIN SERPL ELPH-MCNC: 3.2 G/DL — LOW (ref 3.5–5.2)
ALBUMIN SERPL ELPH-MCNC: 3.2 G/DL — LOW (ref 3.5–5.2)
ALP SERPL-CCNC: 90 U/L — SIGNIFICANT CHANGE UP (ref 30–115)
ALP SERPL-CCNC: 90 U/L — SIGNIFICANT CHANGE UP (ref 30–115)
ALT FLD-CCNC: 30 U/L — SIGNIFICANT CHANGE UP (ref 0–41)
ALT FLD-CCNC: 30 U/L — SIGNIFICANT CHANGE UP (ref 0–41)
ANION GAP SERPL CALC-SCNC: 6 MMOL/L — LOW (ref 7–14)
ANION GAP SERPL CALC-SCNC: 6 MMOL/L — LOW (ref 7–14)
AST SERPL-CCNC: 22 U/L — SIGNIFICANT CHANGE UP (ref 0–41)
AST SERPL-CCNC: 22 U/L — SIGNIFICANT CHANGE UP (ref 0–41)
BILIRUB SERPL-MCNC: 0.2 MG/DL — SIGNIFICANT CHANGE UP (ref 0.2–1.2)
BILIRUB SERPL-MCNC: 0.2 MG/DL — SIGNIFICANT CHANGE UP (ref 0.2–1.2)
BUN SERPL-MCNC: 24 MG/DL — HIGH (ref 10–20)
BUN SERPL-MCNC: 24 MG/DL — HIGH (ref 10–20)
CALCIUM SERPL-MCNC: 9.4 MG/DL — SIGNIFICANT CHANGE UP (ref 8.4–10.5)
CALCIUM SERPL-MCNC: 9.4 MG/DL — SIGNIFICANT CHANGE UP (ref 8.4–10.5)
CHLORIDE SERPL-SCNC: 105 MMOL/L — SIGNIFICANT CHANGE UP (ref 98–110)
CHLORIDE SERPL-SCNC: 105 MMOL/L — SIGNIFICANT CHANGE UP (ref 98–110)
CO2 SERPL-SCNC: 28 MMOL/L — SIGNIFICANT CHANGE UP (ref 17–32)
CO2 SERPL-SCNC: 28 MMOL/L — SIGNIFICANT CHANGE UP (ref 17–32)
CREAT SERPL-MCNC: 1 MG/DL — SIGNIFICANT CHANGE UP (ref 0.7–1.5)
CREAT SERPL-MCNC: 1 MG/DL — SIGNIFICANT CHANGE UP (ref 0.7–1.5)
EGFR: 90 ML/MIN/1.73M2 — SIGNIFICANT CHANGE UP
EGFR: 90 ML/MIN/1.73M2 — SIGNIFICANT CHANGE UP
GLUCOSE SERPL-MCNC: 84 MG/DL — SIGNIFICANT CHANGE UP (ref 70–99)
GLUCOSE SERPL-MCNC: 84 MG/DL — SIGNIFICANT CHANGE UP (ref 70–99)
HCT VFR BLD CALC: 35.7 % — LOW (ref 42–52)
HCT VFR BLD CALC: 35.7 % — LOW (ref 42–52)
HGB BLD-MCNC: 11.5 G/DL — LOW (ref 14–18)
HGB BLD-MCNC: 11.5 G/DL — LOW (ref 14–18)
MAGNESIUM SERPL-MCNC: 2 MG/DL — SIGNIFICANT CHANGE UP (ref 1.8–2.4)
MAGNESIUM SERPL-MCNC: 2 MG/DL — SIGNIFICANT CHANGE UP (ref 1.8–2.4)
MCHC RBC-ENTMCNC: 31.5 PG — HIGH (ref 27–31)
MCHC RBC-ENTMCNC: 31.5 PG — HIGH (ref 27–31)
MCHC RBC-ENTMCNC: 32.2 G/DL — SIGNIFICANT CHANGE UP (ref 32–37)
MCHC RBC-ENTMCNC: 32.2 G/DL — SIGNIFICANT CHANGE UP (ref 32–37)
MCV RBC AUTO: 97.8 FL — HIGH (ref 80–94)
MCV RBC AUTO: 97.8 FL — HIGH (ref 80–94)
NRBC # BLD: 0 /100 WBCS — SIGNIFICANT CHANGE UP (ref 0–0)
NRBC # BLD: 0 /100 WBCS — SIGNIFICANT CHANGE UP (ref 0–0)
PLATELET # BLD AUTO: 405 K/UL — HIGH (ref 130–400)
PLATELET # BLD AUTO: 405 K/UL — HIGH (ref 130–400)
PMV BLD: 9 FL — SIGNIFICANT CHANGE UP (ref 7.4–10.4)
PMV BLD: 9 FL — SIGNIFICANT CHANGE UP (ref 7.4–10.4)
POTASSIUM SERPL-MCNC: 4.8 MMOL/L — SIGNIFICANT CHANGE UP (ref 3.5–5)
POTASSIUM SERPL-MCNC: 4.8 MMOL/L — SIGNIFICANT CHANGE UP (ref 3.5–5)
POTASSIUM SERPL-SCNC: 4.8 MMOL/L — SIGNIFICANT CHANGE UP (ref 3.5–5)
POTASSIUM SERPL-SCNC: 4.8 MMOL/L — SIGNIFICANT CHANGE UP (ref 3.5–5)
PROT SERPL-MCNC: 6.5 G/DL — SIGNIFICANT CHANGE UP (ref 6–8)
PROT SERPL-MCNC: 6.5 G/DL — SIGNIFICANT CHANGE UP (ref 6–8)
RBC # BLD: 3.65 M/UL — LOW (ref 4.7–6.1)
RBC # BLD: 3.65 M/UL — LOW (ref 4.7–6.1)
RBC # FLD: 14.6 % — HIGH (ref 11.5–14.5)
RBC # FLD: 14.6 % — HIGH (ref 11.5–14.5)
SODIUM SERPL-SCNC: 139 MMOL/L — SIGNIFICANT CHANGE UP (ref 135–146)
SODIUM SERPL-SCNC: 139 MMOL/L — SIGNIFICANT CHANGE UP (ref 135–146)
WBC # BLD: 9.75 K/UL — SIGNIFICANT CHANGE UP (ref 4.8–10.8)
WBC # BLD: 9.75 K/UL — SIGNIFICANT CHANGE UP (ref 4.8–10.8)
WBC # FLD AUTO: 9.75 K/UL — SIGNIFICANT CHANGE UP (ref 4.8–10.8)
WBC # FLD AUTO: 9.75 K/UL — SIGNIFICANT CHANGE UP (ref 4.8–10.8)

## 2023-11-25 PROCEDURE — 99231 SBSQ HOSP IP/OBS SF/LOW 25: CPT

## 2023-11-25 RX ADMIN — CHLORHEXIDINE GLUCONATE 1 APPLICATION(S): 213 SOLUTION TOPICAL at 11:48

## 2023-11-25 RX ADMIN — MIRTAZAPINE 15 MILLIGRAM(S): 45 TABLET, ORALLY DISINTEGRATING ORAL at 11:49

## 2023-11-25 RX ADMIN — HALOPERIDOL DECANOATE 10 MILLIGRAM(S): 100 INJECTION INTRAMUSCULAR at 21:02

## 2023-11-25 RX ADMIN — HALOPERIDOL DECANOATE 10 MILLIGRAM(S): 100 INJECTION INTRAMUSCULAR at 15:42

## 2023-11-25 RX ADMIN — Medication 1 APPLICATION(S): at 17:49

## 2023-11-25 RX ADMIN — Medication 1 APPLICATION(S): at 05:24

## 2023-11-25 RX ADMIN — TAMSULOSIN HYDROCHLORIDE 0.4 MILLIGRAM(S): 0.4 CAPSULE ORAL at 21:02

## 2023-11-25 RX ADMIN — BUPROPION HYDROCHLORIDE 150 MILLIGRAM(S): 150 TABLET, EXTENDED RELEASE ORAL at 11:49

## 2023-11-25 RX ADMIN — Medication 100 MICROGRAM(S): at 05:23

## 2023-11-25 RX ADMIN — HALOPERIDOL DECANOATE 10 MILLIGRAM(S): 100 INJECTION INTRAMUSCULAR at 05:23

## 2023-11-25 NOTE — PROGRESS NOTE ADULT - ASSESSMENT
53 y/o man with PMH of Mild mental retardation, intellectual disability, Schizophrenia, Impulse control disorder, Seizures, Hypothyroidism, Exotropia, Cataracts, Osteopenia, urethral stricture and recent treatment for UTI twice with abx as outpt presented from group home [facility] for weakness, lethargy, decreased PO intake and inability to walk without maximal assistance per group aide at bedside. He has hx of urethral stricture and was scheduled for cystoscopy but never had the procedure done due to consent issues.     # Sepsis present on admission due to right sided acute pyelonephritis  #h/o recently treated UTIs  #h/o urethral stricture  #h/o ESBL E. coli in urine culture 6/23  #urinary retention - long placed by  and 700cc was drained  #hydroureteronephrosis and question of urothelial enhancement raising suspicion for ascending urinary tract infection.   - FU bcx: NGTD  - ucx- ecoli esbl -> sens to meropenem--->last day of antibiotics 11/22  - repeat renal us shows improved hydro  -  -> TOV when ambulating 150 ft; only did 10; once doing 150ft if he fails will be sent home with chronic long-> needs to be placed by urology as complicated anatomy  on flomax      #Mild mental disability, schizophrenia, impulse control disorder - on bupropion, haldol, mirtazapine    #Hypothyroid on synthroid    #DVT prophylaxis - lovenox    full code    #Progress Note Handoff  Pending (specify):  n/a  Family discussion: house staff updated pt family  Disposition: Kristopher from group home will come monday to assess patient and tentatively accept him back.

## 2023-11-25 NOTE — PROGRESS NOTE ADULT - SUBJECTIVE AND OBJECTIVE BOX
VIK ALINA  52y  Male      Patient is a 52y old  Male who presents with a chief complaint of Sepsis 2/2 Pyelonephritis (24 Nov 2023 15:33)      INTERVAL HPI/OVERNIGHT EVENTS: no acute events overnight. eating breakfast      T(C): 36 (11-25-23 @ 05:47), Max: 36.2 (11-24-23 @ 20:33)  HR: 62 (11-25-23 @ 05:47) (62 - 69)  BP: 102/55 (11-25-23 @ 05:47) (102/55 - 112/74)  RR: 18 (11-25-23 @ 05:47) (18 - 18)  SpO2: --  Wt(kg): --Vital Signs Last 24 Hrs  T(C): 36 (25 Nov 2023 05:47), Max: 36.2 (24 Nov 2023 20:33)  T(F): 96.8 (25 Nov 2023 05:47), Max: 97.1 (24 Nov 2023 20:33)  HR: 62 (25 Nov 2023 05:47) (62 - 69)  BP: 102/55 (25 Nov 2023 05:47) (102/55 - 112/74)  BP(mean): --  RR: 18 (25 Nov 2023 05:47) (18 - 18)  SpO2: --          11-24-23 @ 07:01  -  11-25-23 @ 07:00  --------------------------------------------------------  IN: 1090 mL / OUT: 2700 mL / NET: -1610 mL        PHYSICAL EXAM:  GENERAL: middle-age M, dysmorphic facies, pleasant  PSYCH: no agitation, baseline mentation  NERVOUS SYSTEM:  Alert & Oriented X1  PULMONARY: symmetrical chest rise, no accessory muscle use  CARDIOVASCULAR: non tachycardic  GI:  Nondistended   EXTREMITIES:    No clubbing, cyanosis, or edema  SKIN: No rashes or lesions    Consultant(s) Notes Reviewed:  [x ] YES  [ ] NO    Discussed with Consultants/Other Providers [ x] YES     LABS                          11.6   10.72 )-----------( 439      ( 24 Nov 2023 12:31 )             36.2     11-24    136  |  100  |  23<H>  ----------------------------<  84  4.6   |  29  |  0.9    Ca    9.4      24 Nov 2023 12:31        Urinalysis Basic - ( 24 Nov 2023 12:31 )    Color: x / Appearance: x / SG: x / pH: x  Gluc: 84 mg/dL / Ketone: x  / Bili: x / Urobili: x   Blood: x / Protein: x / Nitrite: x   Leuk Esterase: x / RBC: x / WBC x   Sq Epi: x / Non Sq Epi: x / Bacteria: x    RADIOLOGY & ADDITIONAL TESTS:  - no images 11/25  HEALTH ISSUES - PROBLEM Dx:      MEDICATIONS  (STANDING):  buPROPion XL (24-Hour) . 150 milliGRAM(s) Oral daily  chlorhexidine 2% Cloths 1 Application(s) Topical daily  clotrimazole 1% Cream 1 Application(s) Topical two times a day  enoxaparin Injectable 40 milliGRAM(s) SubCutaneous every 24 hours  haloperidol     Tablet 10 milliGRAM(s) Oral three times a day  levothyroxine 100 MICROGram(s) Oral daily  mirtazapine 15 milliGRAM(s) Oral daily  tamsulosin 0.4 milliGRAM(s) Oral at bedtime    MEDICATIONS  (PRN):  melatonin 3 milliGRAM(s) Oral at bedtime PRN Insomnia

## 2023-11-26 PROCEDURE — 99231 SBSQ HOSP IP/OBS SF/LOW 25: CPT

## 2023-11-26 RX ADMIN — CHLORHEXIDINE GLUCONATE 1 APPLICATION(S): 213 SOLUTION TOPICAL at 12:09

## 2023-11-26 RX ADMIN — HALOPERIDOL DECANOATE 10 MILLIGRAM(S): 100 INJECTION INTRAMUSCULAR at 22:04

## 2023-11-26 RX ADMIN — Medication 1 APPLICATION(S): at 17:35

## 2023-11-26 RX ADMIN — ENOXAPARIN SODIUM 40 MILLIGRAM(S): 100 INJECTION SUBCUTANEOUS at 00:01

## 2023-11-26 RX ADMIN — Medication 100 MICROGRAM(S): at 05:19

## 2023-11-26 RX ADMIN — ENOXAPARIN SODIUM 40 MILLIGRAM(S): 100 INJECTION SUBCUTANEOUS at 22:04

## 2023-11-26 RX ADMIN — BUPROPION HYDROCHLORIDE 150 MILLIGRAM(S): 150 TABLET, EXTENDED RELEASE ORAL at 12:09

## 2023-11-26 RX ADMIN — HALOPERIDOL DECANOATE 10 MILLIGRAM(S): 100 INJECTION INTRAMUSCULAR at 05:19

## 2023-11-26 RX ADMIN — Medication 1 APPLICATION(S): at 05:19

## 2023-11-26 RX ADMIN — MIRTAZAPINE 15 MILLIGRAM(S): 45 TABLET, ORALLY DISINTEGRATING ORAL at 12:10

## 2023-11-26 RX ADMIN — TAMSULOSIN HYDROCHLORIDE 0.4 MILLIGRAM(S): 0.4 CAPSULE ORAL at 21:29

## 2023-11-26 RX ADMIN — HALOPERIDOL DECANOATE 10 MILLIGRAM(S): 100 INJECTION INTRAMUSCULAR at 13:14

## 2023-11-26 NOTE — PROGRESS NOTE ADULT - ASSESSMENT
51 y/o man with PMH of Mild mental retardation, intellectual disability, Schizophrenia, Impulse control disorder, Seizures, Hypothyroidism, Exotropia, Cataracts, Osteopenia, urethral stricture and recent treatment for UTI twice with abx as outpt presented from group home [facility] for weakness, lethargy, decreased PO intake and inability to walk without maximal assistance per group aide at bedside. He has hx of urethral stricture and was scheduled for cystoscopy but never had the procedure done due to consent issues.     # Sepsis present on admission due to right sided acute pyelonephritis  #h/o recently treated UTIs  #h/o urethral stricture  #h/o ESBL E. coli in urine culture 6/23  #urinary retention - long placed by  and 700cc was drained  #hydroureteronephrosis and question of urothelial enhancement raising suspicion for ascending urinary tract infection.   - FU bcx: NGTD  - ucx- ecoli esbl -> sens to meropenem--->last day of antibiotics 11/22  - repeat renal us shows improved hydro  -  -> TOV when ambulating 150 ft; only did 10; once doing 150ft if he fails will be sent home with chronic long-> needs to be placed by urology as complicated anatomy  on flomax      #Mild mental disability, schizophrenia, impulse control disorder - on bupropion, haldol, mirtazapine    #Hypothyroid on synthroid    #DVT prophylaxis - lovenox    full code    #Progress Note Handoff  Pending (specify):  n/a  Family discussion: house staff updated pt family  Disposition: Kristopher from group home will come monday to assess patient and tentatively accept him back.  PT has recommended MENDEZ but GH insistent he is at his baseline.

## 2023-11-26 NOTE — PROGRESS NOTE ADULT - SUBJECTIVE AND OBJECTIVE BOX
ALINA CHERY  52y  Male      Patient is a 52y old  Male who presents with a chief complaint of Sepsis 2/2 Pyelonephritis (25 Nov 2023 09:52)      INTERVAL HPI/OVERNIGHT EVENTS: no acute events overnight. no major complaints.       REVIEW OF SYSTEMS:  unable to accurately obtain      T(C): 36.7 (11-26-23 @ 04:13), Max: 36.8 (11-25-23 @ 20:25)  HR: 55 (11-26-23 @ 04:13) (55 - 73)  BP: 108/60 (11-26-23 @ 04:13) (106/66 - 117/70)  RR: 20 (11-26-23 @ 04:13) (18 - 20)  SpO2: --  Wt(kg): --Vital Signs Last 24 Hrs  T(C): 36.7 (26 Nov 2023 04:13), Max: 36.8 (25 Nov 2023 20:25)  T(F): 98.1 (26 Nov 2023 04:13), Max: 98.2 (25 Nov 2023 20:25)  HR: 55 (26 Nov 2023 04:13) (55 - 73)  BP: 108/60 (26 Nov 2023 04:13) (106/66 - 117/70)  BP(mean): --  RR: 20 (26 Nov 2023 04:13) (18 - 20)  SpO2: --          11-25-23 @ 07:01  -  11-26-23 @ 07:00  --------------------------------------------------------  IN: 840 mL / OUT: 800 mL / NET: 40 mL        PHYSICAL EXAM:  GENERAL: middle-age M, dysmorphic facies, pleasant  PSYCH: no agitation, baseline mentation  NERVOUS SYSTEM:  Alert & Oriented X1  PULMONARY: symmetrical chest rise, no accessory muscle use  CARDIOVASCULAR: non tachycardic  GI:  Nondistended   EXTREMITIES:    No clubbing, cyanosis, or edema  SKIN: No rashes or lesions    Consultant(s) Notes Reviewed:  [x ] YES  [ ] NO    Discussed with Consultants/Other Providers [ x] YES     LABS                          11.5   9.75  )-----------( 405      ( 25 Nov 2023 09:01 )             35.7     11-25    139  |  105  |  24<H>  ----------------------------<  84  4.8   |  28  |  1.0    Ca    9.4      25 Nov 2023 09:01  Mg     2.0     11-25    TPro  6.5  /  Alb  3.2<L>  /  TBili  0.2  /  DBili  x   /  AST  22  /  ALT  30  /  AlkPhos  90  11-25      Urinalysis Basic - ( 25 Nov 2023 09:01 )    Color: x / Appearance: x / SG: x / pH: x  Gluc: 84 mg/dL / Ketone: x  / Bili: x / Urobili: x   Blood: x / Protein: x / Nitrite: x   Leuk Esterase: x / RBC: x / WBC x   Sq Epi: x / Non Sq Epi: x / Bacteria: x      RADIOLOGY & ADDITIONAL TESTS:  - no images 11/26  Imaging Personally Reviewed:  [ ] YES  [ ] NO    HEALTH ISSUES - PROBLEM Dx:      MEDICATIONS  (STANDING):  buPROPion XL (24-Hour) . 150 milliGRAM(s) Oral daily  chlorhexidine 2% Cloths 1 Application(s) Topical daily  clotrimazole 1% Cream 1 Application(s) Topical two times a day  enoxaparin Injectable 40 milliGRAM(s) SubCutaneous every 24 hours  haloperidol     Tablet 10 milliGRAM(s) Oral three times a day  levothyroxine 100 MICROGram(s) Oral daily  mirtazapine 15 milliGRAM(s) Oral daily  tamsulosin 0.4 milliGRAM(s) Oral at bedtime    MEDICATIONS  (PRN):  melatonin 3 milliGRAM(s) Oral at bedtime PRN Insomnia

## 2023-11-27 LAB
ALBUMIN SERPL ELPH-MCNC: 3.6 G/DL — SIGNIFICANT CHANGE UP (ref 3.5–5.2)
ALBUMIN SERPL ELPH-MCNC: 3.6 G/DL — SIGNIFICANT CHANGE UP (ref 3.5–5.2)
ALP SERPL-CCNC: 94 U/L — SIGNIFICANT CHANGE UP (ref 30–115)
ALP SERPL-CCNC: 94 U/L — SIGNIFICANT CHANGE UP (ref 30–115)
ALT FLD-CCNC: 25 U/L — SIGNIFICANT CHANGE UP (ref 0–41)
ALT FLD-CCNC: 25 U/L — SIGNIFICANT CHANGE UP (ref 0–41)
ANION GAP SERPL CALC-SCNC: 7 MMOL/L — SIGNIFICANT CHANGE UP (ref 7–14)
ANION GAP SERPL CALC-SCNC: 7 MMOL/L — SIGNIFICANT CHANGE UP (ref 7–14)
AST SERPL-CCNC: 21 U/L — SIGNIFICANT CHANGE UP (ref 0–41)
AST SERPL-CCNC: 21 U/L — SIGNIFICANT CHANGE UP (ref 0–41)
BASOPHILS # BLD AUTO: 0.04 K/UL — SIGNIFICANT CHANGE UP (ref 0–0.2)
BASOPHILS # BLD AUTO: 0.04 K/UL — SIGNIFICANT CHANGE UP (ref 0–0.2)
BASOPHILS NFR BLD AUTO: 0.4 % — SIGNIFICANT CHANGE UP (ref 0–1)
BASOPHILS NFR BLD AUTO: 0.4 % — SIGNIFICANT CHANGE UP (ref 0–1)
BILIRUB SERPL-MCNC: 0.3 MG/DL — SIGNIFICANT CHANGE UP (ref 0.2–1.2)
BILIRUB SERPL-MCNC: 0.3 MG/DL — SIGNIFICANT CHANGE UP (ref 0.2–1.2)
BUN SERPL-MCNC: 20 MG/DL — SIGNIFICANT CHANGE UP (ref 10–20)
BUN SERPL-MCNC: 20 MG/DL — SIGNIFICANT CHANGE UP (ref 10–20)
CALCIUM SERPL-MCNC: 9.8 MG/DL — SIGNIFICANT CHANGE UP (ref 8.4–10.4)
CALCIUM SERPL-MCNC: 9.8 MG/DL — SIGNIFICANT CHANGE UP (ref 8.4–10.4)
CHLORIDE SERPL-SCNC: 104 MMOL/L — SIGNIFICANT CHANGE UP (ref 98–110)
CHLORIDE SERPL-SCNC: 104 MMOL/L — SIGNIFICANT CHANGE UP (ref 98–110)
CO2 SERPL-SCNC: 28 MMOL/L — SIGNIFICANT CHANGE UP (ref 17–32)
CO2 SERPL-SCNC: 28 MMOL/L — SIGNIFICANT CHANGE UP (ref 17–32)
CREAT SERPL-MCNC: 0.9 MG/DL — SIGNIFICANT CHANGE UP (ref 0.7–1.5)
CREAT SERPL-MCNC: 0.9 MG/DL — SIGNIFICANT CHANGE UP (ref 0.7–1.5)
EGFR: 102 ML/MIN/1.73M2 — SIGNIFICANT CHANGE UP
EGFR: 102 ML/MIN/1.73M2 — SIGNIFICANT CHANGE UP
EOSINOPHIL # BLD AUTO: 0.12 K/UL — SIGNIFICANT CHANGE UP (ref 0–0.7)
EOSINOPHIL # BLD AUTO: 0.12 K/UL — SIGNIFICANT CHANGE UP (ref 0–0.7)
EOSINOPHIL NFR BLD AUTO: 1.3 % — SIGNIFICANT CHANGE UP (ref 0–8)
EOSINOPHIL NFR BLD AUTO: 1.3 % — SIGNIFICANT CHANGE UP (ref 0–8)
GLUCOSE SERPL-MCNC: 87 MG/DL — SIGNIFICANT CHANGE UP (ref 70–99)
GLUCOSE SERPL-MCNC: 87 MG/DL — SIGNIFICANT CHANGE UP (ref 70–99)
HCT VFR BLD CALC: 36.1 % — LOW (ref 42–52)
HCT VFR BLD CALC: 36.1 % — LOW (ref 42–52)
HGB BLD-MCNC: 11.9 G/DL — LOW (ref 14–18)
HGB BLD-MCNC: 11.9 G/DL — LOW (ref 14–18)
IMM GRANULOCYTES NFR BLD AUTO: 0.5 % — HIGH (ref 0.1–0.3)
IMM GRANULOCYTES NFR BLD AUTO: 0.5 % — HIGH (ref 0.1–0.3)
LYMPHOCYTES # BLD AUTO: 2.74 K/UL — SIGNIFICANT CHANGE UP (ref 1.2–3.4)
LYMPHOCYTES # BLD AUTO: 2.74 K/UL — SIGNIFICANT CHANGE UP (ref 1.2–3.4)
LYMPHOCYTES # BLD AUTO: 29.2 % — SIGNIFICANT CHANGE UP (ref 20.5–51.1)
LYMPHOCYTES # BLD AUTO: 29.2 % — SIGNIFICANT CHANGE UP (ref 20.5–51.1)
MAGNESIUM SERPL-MCNC: 1.9 MG/DL — SIGNIFICANT CHANGE UP (ref 1.8–2.4)
MAGNESIUM SERPL-MCNC: 1.9 MG/DL — SIGNIFICANT CHANGE UP (ref 1.8–2.4)
MCHC RBC-ENTMCNC: 32.1 PG — HIGH (ref 27–31)
MCHC RBC-ENTMCNC: 32.1 PG — HIGH (ref 27–31)
MCHC RBC-ENTMCNC: 33 G/DL — SIGNIFICANT CHANGE UP (ref 32–37)
MCHC RBC-ENTMCNC: 33 G/DL — SIGNIFICANT CHANGE UP (ref 32–37)
MCV RBC AUTO: 97.3 FL — HIGH (ref 80–94)
MCV RBC AUTO: 97.3 FL — HIGH (ref 80–94)
MONOCYTES # BLD AUTO: 0.51 K/UL — SIGNIFICANT CHANGE UP (ref 0.1–0.6)
MONOCYTES # BLD AUTO: 0.51 K/UL — SIGNIFICANT CHANGE UP (ref 0.1–0.6)
MONOCYTES NFR BLD AUTO: 5.4 % — SIGNIFICANT CHANGE UP (ref 1.7–9.3)
MONOCYTES NFR BLD AUTO: 5.4 % — SIGNIFICANT CHANGE UP (ref 1.7–9.3)
NEUTROPHILS # BLD AUTO: 5.91 K/UL — SIGNIFICANT CHANGE UP (ref 1.4–6.5)
NEUTROPHILS # BLD AUTO: 5.91 K/UL — SIGNIFICANT CHANGE UP (ref 1.4–6.5)
NEUTROPHILS NFR BLD AUTO: 63.2 % — SIGNIFICANT CHANGE UP (ref 42.2–75.2)
NEUTROPHILS NFR BLD AUTO: 63.2 % — SIGNIFICANT CHANGE UP (ref 42.2–75.2)
NRBC # BLD: 0 /100 WBCS — SIGNIFICANT CHANGE UP (ref 0–0)
NRBC # BLD: 0 /100 WBCS — SIGNIFICANT CHANGE UP (ref 0–0)
PLATELET # BLD AUTO: 400 K/UL — SIGNIFICANT CHANGE UP (ref 130–400)
PLATELET # BLD AUTO: 400 K/UL — SIGNIFICANT CHANGE UP (ref 130–400)
PMV BLD: 9.2 FL — SIGNIFICANT CHANGE UP (ref 7.4–10.4)
PMV BLD: 9.2 FL — SIGNIFICANT CHANGE UP (ref 7.4–10.4)
POTASSIUM SERPL-MCNC: 4.4 MMOL/L — SIGNIFICANT CHANGE UP (ref 3.5–5)
POTASSIUM SERPL-MCNC: 4.4 MMOL/L — SIGNIFICANT CHANGE UP (ref 3.5–5)
POTASSIUM SERPL-SCNC: 4.4 MMOL/L — SIGNIFICANT CHANGE UP (ref 3.5–5)
POTASSIUM SERPL-SCNC: 4.4 MMOL/L — SIGNIFICANT CHANGE UP (ref 3.5–5)
PROT SERPL-MCNC: 6.8 G/DL — SIGNIFICANT CHANGE UP (ref 6–8)
PROT SERPL-MCNC: 6.8 G/DL — SIGNIFICANT CHANGE UP (ref 6–8)
RBC # BLD: 3.71 M/UL — LOW (ref 4.7–6.1)
RBC # BLD: 3.71 M/UL — LOW (ref 4.7–6.1)
RBC # FLD: 14.5 % — SIGNIFICANT CHANGE UP (ref 11.5–14.5)
RBC # FLD: 14.5 % — SIGNIFICANT CHANGE UP (ref 11.5–14.5)
SODIUM SERPL-SCNC: 139 MMOL/L — SIGNIFICANT CHANGE UP (ref 135–146)
SODIUM SERPL-SCNC: 139 MMOL/L — SIGNIFICANT CHANGE UP (ref 135–146)
WBC # BLD: 9.37 K/UL — SIGNIFICANT CHANGE UP (ref 4.8–10.8)
WBC # BLD: 9.37 K/UL — SIGNIFICANT CHANGE UP (ref 4.8–10.8)
WBC # FLD AUTO: 9.37 K/UL — SIGNIFICANT CHANGE UP (ref 4.8–10.8)
WBC # FLD AUTO: 9.37 K/UL — SIGNIFICANT CHANGE UP (ref 4.8–10.8)

## 2023-11-27 PROCEDURE — 99232 SBSQ HOSP IP/OBS MODERATE 35: CPT

## 2023-11-27 RX ADMIN — BUPROPION HYDROCHLORIDE 150 MILLIGRAM(S): 150 TABLET, EXTENDED RELEASE ORAL at 11:49

## 2023-11-27 RX ADMIN — ENOXAPARIN SODIUM 40 MILLIGRAM(S): 100 INJECTION SUBCUTANEOUS at 21:15

## 2023-11-27 RX ADMIN — Medication 100 MICROGRAM(S): at 05:16

## 2023-11-27 RX ADMIN — MIRTAZAPINE 15 MILLIGRAM(S): 45 TABLET, ORALLY DISINTEGRATING ORAL at 11:49

## 2023-11-27 RX ADMIN — CHLORHEXIDINE GLUCONATE 1 APPLICATION(S): 213 SOLUTION TOPICAL at 11:54

## 2023-11-27 RX ADMIN — HALOPERIDOL DECANOATE 10 MILLIGRAM(S): 100 INJECTION INTRAMUSCULAR at 14:17

## 2023-11-27 RX ADMIN — HALOPERIDOL DECANOATE 10 MILLIGRAM(S): 100 INJECTION INTRAMUSCULAR at 21:20

## 2023-11-27 RX ADMIN — Medication 1 APPLICATION(S): at 05:16

## 2023-11-27 RX ADMIN — Medication 1 APPLICATION(S): at 17:13

## 2023-11-27 RX ADMIN — TAMSULOSIN HYDROCHLORIDE 0.4 MILLIGRAM(S): 0.4 CAPSULE ORAL at 21:20

## 2023-11-27 RX ADMIN — HALOPERIDOL DECANOATE 10 MILLIGRAM(S): 100 INJECTION INTRAMUSCULAR at 05:16

## 2023-11-27 NOTE — PROGRESS NOTE ADULT - ASSESSMENT
53 y/o man with PMH of Mild mental retardation, intellectual disability, Schizophrenia, Impulse control disorder, Seizures, Hypothyroidism, Exotropia, Cataracts, Osteopenia, urethral stricture and recent treatment for UTI twice with abx as outpt presented from group home [place] [facility] for weakness, lethargy, decreased PO intake and inability to walk without maximal assistance per group aide at bedside. He has hx of urethral stricture and was scheduled for cystoscopy but never had the procedure done due to consent issues.     # Sepsis present on admission due to right sided acute pyelonephritis  #h/o recently treated UTIs  #h/o urethral stricture  #h/o ESBL E. coli in urine culture 6/23  #urinary retention - long placed by  and 700cc was drained  #hydroureteronephrosis and question of urothelial enhancement raising suspicion for ascending urinary tract infection.   - FU bcx: NGTD  - ucx- ecoli esbl -> sens to meropenem--->last day of antibiotics 11/22  - repeat renal us shows improved hydro  - urology recommendations -> TOV when ambulating 150 ft; only did 10; once doing 150ft if he fails will be sent home with chronic long-> needs to be placed by urology as complicated anatomy  - Patient on tamsulosin once a day --> Walked approximately 50 feet but for the discharge planning TOV will be started on 11/27    #Mild mental disability, schizophrenia, impulse control disorder - on bupropion, haldol, mirtazapine    #Hypothyroid on synthroid    #DVT prophylaxis - lovenox    Disposition:   Kristopher from group home came today (11/27), assessed the patient. Patient physical condition was found to be decreased  The patient will return to group home if he passes the TOV

## 2023-11-27 NOTE — PROGRESS NOTE ADULT - ASSESSMENT
53 y/o man with PMH of Mild mental retardation, intellectual disability, Schizophrenia, Impulse control disorder, Seizures, Hypothyroidism, Exotropia, Cataracts, Osteopenia, urethral stricture and recent treatment for UTI twice with abx as outpt presented from group home [place] for weakness, lethargy, decreased PO intake and inability to walk without maximal assistance per group aide at bedside. He has hx of urethral stricture and was scheduled for cystoscopy but never had the procedure done due to consent issues.     # Sepsis present on admission due to right sided acute pyelonephritis - resolved  #h/o recently treated UTIs  #h/o urethral stricture  #h/o ESBL E. coli in urine culture 6/23  #urinary retention - long placed by  and 700cc was drained  #hydroureteronephrosis and question of urothelial enhancement raising suspicion for ascending urinary tract infection.   - FU bcx: NGTD  - ucx- ecoli esbl -> sens to meropenem--->last day of antibiotics 11/22  - repeat renal us shows improved hydro  - TOV today - if fails then patient will be for NH dispo - otherwise ok with group discharge (group home will not accept with long)     #Mild mental disability, schizophrenia, impulse control disorder - on bupropion, haldol, mirtazapine    #Hypothyroid on synthroid    #DVT prophylaxis - Lovenox    full code    #Progress Note Handoff  Pending (specify): TOV  Family discussion: house staff updated pt family  Disposition: Kristopher from group home will come Monday/today to assess patient and tentatively accept him back.  PT has recommended MENDEZ but GH insistent he is at his baseline. Also if fails TOV, patient will then be for [facility] dispo as they don't take patients with indwelling Long catheters     Attending Physician Dr. Esperanza Hermosillo # [id number]

## 2023-11-27 NOTE — PROGRESS NOTE ADULT - SUBJECTIVE AND OBJECTIVE BOX
24H events:    Patient is a 52y old Male who presents with a chief complaint of Sepsis 2/2 Pyelonephritis (26 Nov 2023 09:21)    Primary diagnosis of Acute pyelonephritis    Today is hospital day 14d. This morning patient was seen and examined at bedside, resting comfortably in bed.    No acute or major events overnight. Hemodynamically stable, tolerating oral diet, voiding appropriately with appropriate bowel movements.     Code Status:    PAST MEDICAL & SURGICAL HISTORY  Epilepsy last event 4+ yrs ago  Schizophrenia  Bipolar 1 disorder  Major depressive disorder  Anemia  Dyspepsia  Hypothyroidism  Constipation  MR (mental retardation)  H/O cystoscopy  H/O laminectomy 03/2001  H/O fracture of fibula nondisplaced 03/2010    ALLERGIES:  phenothiazines (Unknown)  erythromycin (Other)    MEDICATIONS:  STANDING MEDICATIONS  buPROPion XL (24-Hour) . 150 milliGRAM(s) Oral daily  chlorhexidine 2% Cloths 1 Application(s) Topical daily  clotrimazole 1% Cream 1 Application(s) Topical two times a day  enoxaparin Injectable 40 milliGRAM(s) SubCutaneous every 24 hours  haloperidol     Tablet 10 milliGRAM(s) Oral three times a day  levothyroxine 100 MICROGram(s) Oral daily  mirtazapine 15 milliGRAM(s) Oral daily  tamsulosin 0.4 milliGRAM(s) Oral at bedtime    PRN MEDICATIONS  melatonin 3 milliGRAM(s) Oral at bedtime PRN    VITALS:   Vital Signs Last 24 Hrs  T(C): 36.6 (27 Nov 2023 04:45), Max: 36.9 (26 Nov 2023 20:31)  T(F): 97.9 (27 Nov 2023 04:45), Max: 98.4 (26 Nov 2023 20:31)  HR: 58 (27 Nov 2023 04:45) (58 - 78)  BP: 104/64 (27 Nov 2023 04:45) (100/57 - 108/54)  BP(mean): --  RR: 17 (27 Nov 2023 04:45) (17 - 18)  SpO2: 99% (27 Nov 2023 04:45) (98% - 99%)    PHYSICAL EXAM:  GENERAL: NAD, lying in bed comfortably   HEAD: NCAD, no hematoma or laceration   NECK: Supple, no neck stiffness/nuchal rigidity, no JVD    HEART: Regular rate and rhythm, normal S1/S2   LUNGS: No acute respiratory distress, clear b/l breath sounds   ABDOMEN:  soft, non-tender, non-distented, + BS, no hepatosplenomegaly   EXTREMITIES: no rashes, no edema, ulcerations or ecchymosis  NERVOUS SYSTEM:  Alert and oriented, patient's speech is slurred    [facility] score: 12    LABS:                        11.9   9.37  )-----------( 400      ( 27 Nov 2023 06:21 )             36.1     11-27    139  |  104  |  20  ----------------------------<  87  4.4   |  28  |  0.9    Ca    9.8      27 Nov 2023 06:21  Mg     1.9     11-27    TPro  6.8  /  Alb  3.6  /  TBili  0.3  /  DBili  x   /  AST  21  /  ALT  25  /  AlkPhos  94  11-27

## 2023-11-27 NOTE — PROGRESS NOTE ADULT - SUBJECTIVE AND OBJECTIVE BOX
VIK ALINA  52y  Male      Patient is a 52y old  Male who presents with a chief complaint of Sepsis 2/2 Pyelonephritis (25 Nov 2023 09:52)      INTERVAL HPI/OVERNIGHT EVENTS:   pt seen this am   -no overnight events notified to me   -d/c planning back to group home   -TOV today - patient will not be taken if with long (if patient fails TOV will aim for NH dispo then)      REVIEW OF SYSTEMS:  unable to accurately obtain      Vital Signs Last 24 Hrs  T(C): 36.5 (27 Nov 2023 12:35), Max: 36.9 (26 Nov 2023 20:31)  T(F): 97.7 (27 Nov 2023 12:35), Max: 98.4 (26 Nov 2023 20:31)  HR: 69 (27 Nov 2023 12:35) (58 - 77)  BP: 108/65 (27 Nov 2023 12:35) (104/64 - 108/65)  BP(mean): --  RR: 18 (27 Nov 2023 12:35) (17 - 18)  SpO2: 99% (27 Nov 2023 04:45) (99% - 99%)        PHYSICAL EXAM:  GENERAL: middle-age M, dysmorphic facies, pleasant - smiling   PSYCH: no agitation, baseline mentation  NERVOUS SYSTEM:  Alert & Oriented X1  PULMONARY: symmetrical chest rise, no accessory muscle use  CARDIOVASCULAR: non tachycardic  GI:  Nondistended   EXTREMITIES:    No clubbing, cyanosis, or edema  SKIN: No rashes or lesions    Consultant(s) Notes Reviewed:  [x ] YES  [ ] NO    Discussed with Consultants/Other Providers [ x] YES     LABS                                   11.9   9.37  )-----------( 400      ( 27 Nov 2023 06:21 )             36.1                11-27    139  |  104  |  20  ----------------------------<  87  4.4   |  28  |  0.9    Ca    9.8      27 Nov 2023 06:21  Mg     1.9     11-27    TPro  6.8  /  Alb  3.6  /  TBili  0.3  /  DBili  x   /  AST  21  /  ALT  25  /  AlkPhos  94  11-27        Urinalysis Basic - ( 25 Nov 2023 09:01 )    Color: x / Appearance: x / SG: x / pH: x  Gluc: 84 mg/dL / Ketone: x  / Bili: x / Urobili: x   Blood: x / Protein: x / Nitrite: x   Leuk Esterase: x / RBC: x / WBC x   Sq Epi: x / Non Sq Epi: x / Bacteria: x      RADIOLOGY & ADDITIONAL TESTS:  - no images 11/26  Imaging Personally Reviewed:  [ ] YES  [ ] NO    MEDICATIONS  (STANDING):  buPROPion XL (24-Hour) . 150 milliGRAM(s) Oral daily  chlorhexidine 2% Cloths 1 Application(s) Topical daily  clotrimazole 1% Cream 1 Application(s) Topical two times a day  enoxaparin Injectable 40 milliGRAM(s) SubCutaneous every 24 hours  haloperidol     Tablet 10 milliGRAM(s) Oral three times a day  levothyroxine 100 MICROGram(s) Oral daily  mirtazapine 15 milliGRAM(s) Oral daily  tamsulosin 0.4 milliGRAM(s) Oral at bedtime    MEDICATIONS  (PRN):  melatonin 3 milliGRAM(s) Oral at bedtime PRN Insomnia

## 2023-11-28 LAB
ALBUMIN SERPL ELPH-MCNC: 3.6 G/DL — SIGNIFICANT CHANGE UP (ref 3.5–5.2)
ALBUMIN SERPL ELPH-MCNC: 3.6 G/DL — SIGNIFICANT CHANGE UP (ref 3.5–5.2)
ALP SERPL-CCNC: 98 U/L — SIGNIFICANT CHANGE UP (ref 30–115)
ALP SERPL-CCNC: 98 U/L — SIGNIFICANT CHANGE UP (ref 30–115)
ALT FLD-CCNC: 26 U/L — SIGNIFICANT CHANGE UP (ref 0–41)
ALT FLD-CCNC: 26 U/L — SIGNIFICANT CHANGE UP (ref 0–41)
ANION GAP SERPL CALC-SCNC: 9 MMOL/L — SIGNIFICANT CHANGE UP (ref 7–14)
ANION GAP SERPL CALC-SCNC: 9 MMOL/L — SIGNIFICANT CHANGE UP (ref 7–14)
AST SERPL-CCNC: 21 U/L — SIGNIFICANT CHANGE UP (ref 0–41)
AST SERPL-CCNC: 21 U/L — SIGNIFICANT CHANGE UP (ref 0–41)
BILIRUB SERPL-MCNC: 0.3 MG/DL — SIGNIFICANT CHANGE UP (ref 0.2–1.2)
BILIRUB SERPL-MCNC: 0.3 MG/DL — SIGNIFICANT CHANGE UP (ref 0.2–1.2)
BUN SERPL-MCNC: 24 MG/DL — HIGH (ref 10–20)
BUN SERPL-MCNC: 24 MG/DL — HIGH (ref 10–20)
CALCIUM SERPL-MCNC: 9.7 MG/DL — SIGNIFICANT CHANGE UP (ref 8.4–10.5)
CALCIUM SERPL-MCNC: 9.7 MG/DL — SIGNIFICANT CHANGE UP (ref 8.4–10.5)
CHLORIDE SERPL-SCNC: 103 MMOL/L — SIGNIFICANT CHANGE UP (ref 98–110)
CHLORIDE SERPL-SCNC: 103 MMOL/L — SIGNIFICANT CHANGE UP (ref 98–110)
CO2 SERPL-SCNC: 27 MMOL/L — SIGNIFICANT CHANGE UP (ref 17–32)
CO2 SERPL-SCNC: 27 MMOL/L — SIGNIFICANT CHANGE UP (ref 17–32)
CREAT SERPL-MCNC: 0.9 MG/DL — SIGNIFICANT CHANGE UP (ref 0.7–1.5)
CREAT SERPL-MCNC: 0.9 MG/DL — SIGNIFICANT CHANGE UP (ref 0.7–1.5)
EGFR: 102 ML/MIN/1.73M2 — SIGNIFICANT CHANGE UP
EGFR: 102 ML/MIN/1.73M2 — SIGNIFICANT CHANGE UP
GLUCOSE SERPL-MCNC: 83 MG/DL — SIGNIFICANT CHANGE UP (ref 70–99)
GLUCOSE SERPL-MCNC: 83 MG/DL — SIGNIFICANT CHANGE UP (ref 70–99)
HCT VFR BLD CALC: 37.5 % — LOW (ref 42–52)
HCT VFR BLD CALC: 37.5 % — LOW (ref 42–52)
HGB BLD-MCNC: 12.3 G/DL — LOW (ref 14–18)
HGB BLD-MCNC: 12.3 G/DL — LOW (ref 14–18)
MAGNESIUM SERPL-MCNC: 2 MG/DL — SIGNIFICANT CHANGE UP (ref 1.8–2.4)
MAGNESIUM SERPL-MCNC: 2 MG/DL — SIGNIFICANT CHANGE UP (ref 1.8–2.4)
MCHC RBC-ENTMCNC: 31.4 PG — HIGH (ref 27–31)
MCHC RBC-ENTMCNC: 31.4 PG — HIGH (ref 27–31)
MCHC RBC-ENTMCNC: 32.8 G/DL — SIGNIFICANT CHANGE UP (ref 32–37)
MCHC RBC-ENTMCNC: 32.8 G/DL — SIGNIFICANT CHANGE UP (ref 32–37)
MCV RBC AUTO: 95.7 FL — HIGH (ref 80–94)
MCV RBC AUTO: 95.7 FL — HIGH (ref 80–94)
NRBC # BLD: 0 /100 WBCS — SIGNIFICANT CHANGE UP (ref 0–0)
NRBC # BLD: 0 /100 WBCS — SIGNIFICANT CHANGE UP (ref 0–0)
PLATELET # BLD AUTO: 368 K/UL — SIGNIFICANT CHANGE UP (ref 130–400)
PLATELET # BLD AUTO: 368 K/UL — SIGNIFICANT CHANGE UP (ref 130–400)
PMV BLD: 9.3 FL — SIGNIFICANT CHANGE UP (ref 7.4–10.4)
PMV BLD: 9.3 FL — SIGNIFICANT CHANGE UP (ref 7.4–10.4)
POTASSIUM SERPL-MCNC: 4.5 MMOL/L — SIGNIFICANT CHANGE UP (ref 3.5–5)
POTASSIUM SERPL-MCNC: 4.5 MMOL/L — SIGNIFICANT CHANGE UP (ref 3.5–5)
POTASSIUM SERPL-SCNC: 4.5 MMOL/L — SIGNIFICANT CHANGE UP (ref 3.5–5)
POTASSIUM SERPL-SCNC: 4.5 MMOL/L — SIGNIFICANT CHANGE UP (ref 3.5–5)
PROT SERPL-MCNC: 7 G/DL — SIGNIFICANT CHANGE UP (ref 6–8)
PROT SERPL-MCNC: 7 G/DL — SIGNIFICANT CHANGE UP (ref 6–8)
RBC # BLD: 3.92 M/UL — LOW (ref 4.7–6.1)
RBC # BLD: 3.92 M/UL — LOW (ref 4.7–6.1)
RBC # FLD: 14.4 % — SIGNIFICANT CHANGE UP (ref 11.5–14.5)
RBC # FLD: 14.4 % — SIGNIFICANT CHANGE UP (ref 11.5–14.5)
SODIUM SERPL-SCNC: 139 MMOL/L — SIGNIFICANT CHANGE UP (ref 135–146)
SODIUM SERPL-SCNC: 139 MMOL/L — SIGNIFICANT CHANGE UP (ref 135–146)
WBC # BLD: 8.95 K/UL — SIGNIFICANT CHANGE UP (ref 4.8–10.8)
WBC # BLD: 8.95 K/UL — SIGNIFICANT CHANGE UP (ref 4.8–10.8)
WBC # FLD AUTO: 8.95 K/UL — SIGNIFICANT CHANGE UP (ref 4.8–10.8)
WBC # FLD AUTO: 8.95 K/UL — SIGNIFICANT CHANGE UP (ref 4.8–10.8)

## 2023-11-28 PROCEDURE — 99232 SBSQ HOSP IP/OBS MODERATE 35: CPT

## 2023-11-28 RX ORDER — TAMSULOSIN HYDROCHLORIDE 0.4 MG/1
0.8 CAPSULE ORAL AT BEDTIME
Refills: 0 | Status: DISCONTINUED | OUTPATIENT
Start: 2023-11-28 | End: 2023-11-29

## 2023-11-28 RX ORDER — BENZTROPINE MESYLATE 1 MG
1 TABLET ORAL
Refills: 0 | Status: DISCONTINUED | OUTPATIENT
Start: 2023-11-28 | End: 2023-11-29

## 2023-11-28 RX ORDER — DIVALPROEX SODIUM 500 MG/1
500 TABLET, DELAYED RELEASE ORAL THREE TIMES A DAY
Refills: 0 | Status: DISCONTINUED | OUTPATIENT
Start: 2023-11-28 | End: 2023-11-29

## 2023-11-28 RX ADMIN — BUPROPION HYDROCHLORIDE 150 MILLIGRAM(S): 150 TABLET, EXTENDED RELEASE ORAL at 12:25

## 2023-11-28 RX ADMIN — HALOPERIDOL DECANOATE 5 MILLIGRAM(S): 100 INJECTION INTRAMUSCULAR at 21:05

## 2023-11-28 RX ADMIN — HALOPERIDOL DECANOATE 10 MILLIGRAM(S): 100 INJECTION INTRAMUSCULAR at 13:23

## 2023-11-28 RX ADMIN — TAMSULOSIN HYDROCHLORIDE 0.4 MILLIGRAM(S): 0.4 CAPSULE ORAL at 21:04

## 2023-11-28 RX ADMIN — Medication 100 MICROGRAM(S): at 05:15

## 2023-11-28 RX ADMIN — DIVALPROEX SODIUM 500 MILLIGRAM(S): 500 TABLET, DELAYED RELEASE ORAL at 21:05

## 2023-11-28 RX ADMIN — Medication 1 APPLICATION(S): at 05:15

## 2023-11-28 RX ADMIN — Medication 1 MILLIGRAM(S): at 19:12

## 2023-11-28 RX ADMIN — HALOPERIDOL DECANOATE 10 MILLIGRAM(S): 100 INJECTION INTRAMUSCULAR at 05:15

## 2023-11-28 RX ADMIN — MIRTAZAPINE 15 MILLIGRAM(S): 45 TABLET, ORALLY DISINTEGRATING ORAL at 12:25

## 2023-11-28 RX ADMIN — Medication 1 APPLICATION(S): at 17:39

## 2023-11-28 RX ADMIN — CHLORHEXIDINE GLUCONATE 1 APPLICATION(S): 213 SOLUTION TOPICAL at 12:25

## 2023-11-28 NOTE — PROGRESS NOTE ADULT - SUBJECTIVE AND OBJECTIVE BOX
ALINA CHERY  52y  Male      Patient is a 52y old  Male who presents with a chief complaint of Sepsis 2/2 Pyelonephritis (25 Nov 2023 09:52)      INTERVAL HPI/OVERNIGHT EVENTS:   pt seen this am   -no overnight events notified to me   -d/c planning back to group home today  -passed TOV and is voiding     REVIEW OF SYSTEMS:  unable to accurately obtain      Vital Signs Last 24 Hrs  T(C): 36.9 (28 Nov 2023 07:14), Max: 36.9 (28 Nov 2023 07:14)  T(F): 98.4 (28 Nov 2023 07:14), Max: 98.4 (28 Nov 2023 07:14)  HR: 88 (28 Nov 2023 07:14) (69 - 88)  BP: 128/84 (28 Nov 2023 07:14) (108/65 - 128/84)  BP(mean): --  RR: 18 (28 Nov 2023 07:14) (18 - 18)  SpO2: 98% (27 Nov 2023 20:20) (98% - 98%)        PHYSICAL EXAM:  GENERAL: middle-age M, dysmorphic facies, pleasant - smiling   PSYCH: no agitation, baseline mentation  NERVOUS SYSTEM:  Alert & Oriented X1  PULMONARY: symmetrical chest rise, no accessory muscle use  CARDIOVASCULAR: non tachycardic  GI:  Nondistended   EXTREMITIES:    No clubbing, cyanosis, or edema  SKIN: No rashes or lesions    Consultant(s) Notes Reviewed:  [x ] YES  [ ] NO    Discussed with Consultants/Other Providers [ x] YES     LABS                                   11.9   9.37  )-----------( 400      ( 27 Nov 2023 06:21 )             36.1                11-27    139  |  104  |  20  ----------------------------<  87  4.4   |  28  |  0.9    Ca    9.8      27 Nov 2023 06:21  Mg     1.9     11-27    TPro  6.8  /  Alb  3.6  /  TBili  0.3  /  DBili  x   /  AST  21  /  ALT  25  /  AlkPhos  94  11-27        Urinalysis Basic - ( 25 Nov 2023 09:01 )    Color: x / Appearance: x / SG: x / pH: x  Gluc: 84 mg/dL / Ketone: x  / Bili: x / Urobili: x   Blood: x / Protein: x / Nitrite: x   Leuk Esterase: x / RBC: x / WBC x   Sq Epi: x / Non Sq Epi: x / Bacteria: x      RADIOLOGY & ADDITIONAL TESTS:  - no images 11/26  Imaging Personally Reviewed:  [ ] YES  [ ] NO    MEDICATIONS  (STANDING):  buPROPion XL (24-Hour) . 150 milliGRAM(s) Oral daily  chlorhexidine 2% Cloths 1 Application(s) Topical daily  clotrimazole 1% Cream 1 Application(s) Topical two times a day  enoxaparin Injectable 40 milliGRAM(s) SubCutaneous every 24 hours  haloperidol     Tablet 10 milliGRAM(s) Oral three times a day  levothyroxine 100 MICROGram(s) Oral daily  mirtazapine 15 milliGRAM(s) Oral daily  tamsulosin 0.4 milliGRAM(s) Oral at bedtime    MEDICATIONS  (PRN):  melatonin 3 milliGRAM(s) Oral at bedtime PRN Insomnia

## 2023-11-28 NOTE — PROGRESS NOTE ADULT - PROVIDER SPECIALTY LIST ADULT
Hospitalist
Hospitalist
Internal Medicine
Hospitalist
Internal Medicine
Urology
Hospitalist
Infectious Disease
Internal Medicine
Hospitalist
Internal Medicine
Hospitalist

## 2023-11-28 NOTE — PROGRESS NOTE ADULT - TIME BILLING
direct patient care and chart review  -coordinated current plan of care with medical staff on MDR
direct patient care and chart review  -coordinated current plan of care with medical staff on MDR
Total time spent to complete patient's bedside assessment, physical examination, review medical chart including labs & imaging, discuss medical plan of care with housestaff was more than 35 minutes
as above
Total time spent to complete patient's bedside assessment, physical examination, review medical chart including labs & imaging, discuss medical plan of care with housestaff was more than 35 minutes

## 2023-11-28 NOTE — PROGRESS NOTE ADULT - SUBJECTIVE AND OBJECTIVE BOX
24H events:    Patient is a 52y old Male who presents with a chief complaint of Sepsis 2/2 Pyelonephritis (28 Nov 2023 11:29)    Primary diagnosis of Acute pyelonephritis    Today is hospital day 15d. This morning patient was seen and examined at bedside.  First day after Packer removal, patient was able to urinate 700 cc freely but was still retaining significant amounts throughout the day  Hemodynamically stable, tolerating oral diet, voiding appropriately with appropriate bowel movements.     Code Status: Full code    PAST MEDICAL & SURGICAL HISTORY  Epilepsy last event 4+ yrs ago    Schizophrenia    Bipolar 1 disorder    Major depressive disorder    Anemia    Dyspepsia    Hypothyroidism    Constipation    MR (mental retardation)    H/O cystoscopy    H/O laminectomy 03/2001    H/O fracture of fibula nondisplaced 03/2010    ALLERGIES:  phenothiazines (Unknown)  erythromycin (Other)    MEDICATIONS:  MEDICATIONS  (STANDING):  benztropine 1 milliGRAM(s) Oral two times a day  buPROPion XL (24-Hour) . 150 milliGRAM(s) Oral daily  chlorhexidine 2% Cloths 1 Application(s) Topical daily  clotrimazole 1% Cream 1 Application(s) Topical two times a day  divalproex  milliGRAM(s) Oral three times a day  enoxaparin Injectable 40 milliGRAM(s) SubCutaneous every 24 hours  haloperidol     Tablet 10 milliGRAM(s) Oral three times a day  levothyroxine 100 MICROGram(s) Oral daily  mirtazapine 15 milliGRAM(s) Oral daily  tamsulosin 0.8 milliGRAM(s) Oral at bedtime    PRN MEDICATIONS  melatonin 3 milliGRAM(s) Oral at bedtime PRN    VITALS:   Vital Signs Last 24 Hrs  T(C): 36.2 (28 Nov 2023 12:25), Max: 36.9 (28 Nov 2023 07:14)  T(F): 97.2 (28 Nov 2023 12:25), Max: 98.4 (28 Nov 2023 07:14)  HR: 70 (28 Nov 2023 12:25) (70 - 88)  BP: 114/69 (28 Nov 2023 12:25) (114/69 - 128/84)  BP(mean): --  RR: 18 (28 Nov 2023 12:25) (18 - 18)  SpO2: 98% (27 Nov 2023 20:20) (98% - 98%)      PHYSICAL EXAM:  GENERAL: NAD, lying in bed comfortably  NECK: Supple, no neck stiffness/nuchal rigidity, no JVD    HEART: Regular rate and rhythm, normal S1/S2, no murmurs, heaves, thrills  LUNGS: No acute respiratory distress, clear b/l breath sounds, no wheezing, rales, rhonchi  ABDOMEN:  soft, non-tender, non-distented  EXTREMITIES: no edema, ulcerations or ecchymosis  NERVOUS SYSTEM:  A&Ox2, CNII-XII intact, follows commands     [facility] score: 12    LABS:                        12.3   8.95  )-----------( 368      ( 28 Nov 2023 08:52 )             37.5     11-28    139  |  103  |  24<H>  ----------------------------<  83  4.5   |  27  |  0.9    Ca    9.7      28 Nov 2023 08:52  Mg     2.0     11-28    TPro  7.0  /  Alb  3.6  /  TBili  0.3  /  DBili  x   /  AST  21  /  ALT  26  /  AlkPhos  98  11-28      Urinalysis Basic - ( 28 Nov 2023 08:52 )    Color: x / Appearance: x / SG: x / pH: x  Gluc: 83 mg/dL / Ketone: x  / Bili: x / Urobili: x   Blood: x / Protein: x / Nitrite: x   Leuk Esterase: x / RBC: x / WBC x   Sq Epi: x / Non Sq Epi: x / Bacteria: x

## 2023-11-28 NOTE — PROGRESS NOTE ADULT - ASSESSMENT
53 y/o man with PMH of Mild mental retardation, intellectual disability, Schizophrenia, Impulse control disorder, Seizures, Hypothyroidism, Exotropia, Cataracts, Osteopenia, urethral stricture and recent treatment for UTI twice with abx as outpt presented from group home [facility] for weakness, lethargy, decreased PO intake and inability to walk without maximal assistance per group aide at bedside. He has hx of urethral stricture and was scheduled for cystoscopy but never had the procedure done due to consent issues.     # Sepsis present on admission due to right sided acute pyelonephritis - resolved  #h/o recently treated UTIs  #h/o urethral stricture  #h/o ESBL E. coli in urine culture 6/23  #urinary retention - long placed by  and 700cc was drained  #hydroureteronephrosis and question of urothelial enhancement raising suspicion for ascending urinary tract infection.   - FU bcx: NGTD  - ucx- ecoli esbl -> sens to meropenem--->last day of antibiotics 11/22  - repeat renal us shows improved hydro  - passed TOV and long was removed     #Mild mental disability, schizophrenia, impulse control disorder - on bupropion, haldol, mirtazapine    #Hypothyroid on synthroid    #DVT prophylaxis - Lovenox    full code    #Progress Note Handoff  Family discussion: house staff updated pt family  Disposition: group home tomorrow if no retention

## 2023-11-28 NOTE — PROGRESS NOTE ADULT - ASSESSMENT
53 y/o man with PMH of Mild mental retardation, intellectual disability, Schizophrenia, Impulse control disorder, Seizures, Hypothyroidism, Exotropia, Cataracts, Osteopenia, urethral stricture and recent treatment for UTI twice with abx as outpt presented from group home [facility] for weakness, lethargy, decreased PO intake and inability to walk without maximal assistance per group aide at bedside. He has hx of urethral stricture and was scheduled for cystoscopy but never had the procedure done due to consent issues.     # Sepsis present on admission due to right sided acute pyelonephritis - resolved  #h/o recently treated UTIs  #h/o urethral stricture  #h/o ESBL E. coli in urine culture 6/23  #urinary retention - long placed by  and 700cc was drained  #hydroureteronephrosis and question of urothelial enhancement raising suspicion for ascending urinary tract infection.   - FU bcx: NGTD  - ucx- ecoli esbl -> sens to meropenem--->last day of antibiotics 11/22  - repeat renal us shows improved hydro  - passed TOV and long was removed     #Mild mental disability, schizophrenia, impulse control disorder - on bupropion, haldol, mirtazapine    #Hypothyroid on synthroid    #DVT prophylaxis - Lovenox    full code    #Progress Note Handoff  Family discussion: house staff updated pt family  Disposition: group home today     Attending Physician Dr. Esperanza Hermosillo # [id number]

## 2023-11-29 ENCOUNTER — APPOINTMENT (OUTPATIENT)
Dept: OTOLARYNGOLOGY | Facility: CLINIC | Age: 53
End: 2023-11-29

## 2023-11-29 ENCOUNTER — TRANSCRIPTION ENCOUNTER (OUTPATIENT)
Age: 53
End: 2023-11-29

## 2023-11-29 VITALS
TEMPERATURE: 99 F | RESPIRATION RATE: 18 BRPM | DIASTOLIC BLOOD PRESSURE: 63 MMHG | SYSTOLIC BLOOD PRESSURE: 114 MMHG | HEART RATE: 63 BPM

## 2023-11-29 LAB
ALBUMIN SERPL ELPH-MCNC: 3.2 G/DL — LOW (ref 3.5–5.2)
ALBUMIN SERPL ELPH-MCNC: 3.2 G/DL — LOW (ref 3.5–5.2)
ALP SERPL-CCNC: 92 U/L — SIGNIFICANT CHANGE UP (ref 30–115)
ALP SERPL-CCNC: 92 U/L — SIGNIFICANT CHANGE UP (ref 30–115)
ALT FLD-CCNC: 21 U/L — SIGNIFICANT CHANGE UP (ref 0–41)
ALT FLD-CCNC: 21 U/L — SIGNIFICANT CHANGE UP (ref 0–41)
ANION GAP SERPL CALC-SCNC: 6 MMOL/L — LOW (ref 7–14)
ANION GAP SERPL CALC-SCNC: 6 MMOL/L — LOW (ref 7–14)
AST SERPL-CCNC: 16 U/L — SIGNIFICANT CHANGE UP (ref 0–41)
AST SERPL-CCNC: 16 U/L — SIGNIFICANT CHANGE UP (ref 0–41)
BILIRUB SERPL-MCNC: 0.2 MG/DL — SIGNIFICANT CHANGE UP (ref 0.2–1.2)
BILIRUB SERPL-MCNC: 0.2 MG/DL — SIGNIFICANT CHANGE UP (ref 0.2–1.2)
BUN SERPL-MCNC: 25 MG/DL — HIGH (ref 10–20)
BUN SERPL-MCNC: 25 MG/DL — HIGH (ref 10–20)
CALCIUM SERPL-MCNC: 9.4 MG/DL — SIGNIFICANT CHANGE UP (ref 8.4–10.5)
CALCIUM SERPL-MCNC: 9.4 MG/DL — SIGNIFICANT CHANGE UP (ref 8.4–10.5)
CHLORIDE SERPL-SCNC: 106 MMOL/L — SIGNIFICANT CHANGE UP (ref 98–110)
CHLORIDE SERPL-SCNC: 106 MMOL/L — SIGNIFICANT CHANGE UP (ref 98–110)
CO2 SERPL-SCNC: 27 MMOL/L — SIGNIFICANT CHANGE UP (ref 17–32)
CO2 SERPL-SCNC: 27 MMOL/L — SIGNIFICANT CHANGE UP (ref 17–32)
CREAT SERPL-MCNC: 0.8 MG/DL — SIGNIFICANT CHANGE UP (ref 0.7–1.5)
CREAT SERPL-MCNC: 0.8 MG/DL — SIGNIFICANT CHANGE UP (ref 0.7–1.5)
EGFR: 106 ML/MIN/1.73M2 — SIGNIFICANT CHANGE UP
EGFR: 106 ML/MIN/1.73M2 — SIGNIFICANT CHANGE UP
GLUCOSE BLDC GLUCOMTR-MCNC: 121 MG/DL — HIGH (ref 70–99)
GLUCOSE BLDC GLUCOMTR-MCNC: 121 MG/DL — HIGH (ref 70–99)
GLUCOSE BLDC GLUCOMTR-MCNC: 86 MG/DL — SIGNIFICANT CHANGE UP (ref 70–99)
GLUCOSE BLDC GLUCOMTR-MCNC: 86 MG/DL — SIGNIFICANT CHANGE UP (ref 70–99)
GLUCOSE SERPL-MCNC: 114 MG/DL — HIGH (ref 70–99)
GLUCOSE SERPL-MCNC: 114 MG/DL — HIGH (ref 70–99)
HCT VFR BLD CALC: 35.3 % — LOW (ref 42–52)
HCT VFR BLD CALC: 35.3 % — LOW (ref 42–52)
HGB BLD-MCNC: 11.8 G/DL — LOW (ref 14–18)
HGB BLD-MCNC: 11.8 G/DL — LOW (ref 14–18)
MAGNESIUM SERPL-MCNC: 1.8 MG/DL — SIGNIFICANT CHANGE UP (ref 1.8–2.4)
MAGNESIUM SERPL-MCNC: 1.8 MG/DL — SIGNIFICANT CHANGE UP (ref 1.8–2.4)
MCHC RBC-ENTMCNC: 31.8 PG — HIGH (ref 27–31)
MCHC RBC-ENTMCNC: 31.8 PG — HIGH (ref 27–31)
MCHC RBC-ENTMCNC: 33.4 G/DL — SIGNIFICANT CHANGE UP (ref 32–37)
MCHC RBC-ENTMCNC: 33.4 G/DL — SIGNIFICANT CHANGE UP (ref 32–37)
MCV RBC AUTO: 95.1 FL — HIGH (ref 80–94)
MCV RBC AUTO: 95.1 FL — HIGH (ref 80–94)
NRBC # BLD: 0 /100 WBCS — SIGNIFICANT CHANGE UP (ref 0–0)
NRBC # BLD: 0 /100 WBCS — SIGNIFICANT CHANGE UP (ref 0–0)
PLATELET # BLD AUTO: 312 K/UL — SIGNIFICANT CHANGE UP (ref 130–400)
PLATELET # BLD AUTO: 312 K/UL — SIGNIFICANT CHANGE UP (ref 130–400)
PMV BLD: 9.3 FL — SIGNIFICANT CHANGE UP (ref 7.4–10.4)
PMV BLD: 9.3 FL — SIGNIFICANT CHANGE UP (ref 7.4–10.4)
POTASSIUM SERPL-MCNC: 4.2 MMOL/L — SIGNIFICANT CHANGE UP (ref 3.5–5)
POTASSIUM SERPL-MCNC: 4.2 MMOL/L — SIGNIFICANT CHANGE UP (ref 3.5–5)
POTASSIUM SERPL-SCNC: 4.2 MMOL/L — SIGNIFICANT CHANGE UP (ref 3.5–5)
POTASSIUM SERPL-SCNC: 4.2 MMOL/L — SIGNIFICANT CHANGE UP (ref 3.5–5)
PROT SERPL-MCNC: 6.7 G/DL — SIGNIFICANT CHANGE UP (ref 6–8)
PROT SERPL-MCNC: 6.7 G/DL — SIGNIFICANT CHANGE UP (ref 6–8)
RBC # BLD: 3.71 M/UL — LOW (ref 4.7–6.1)
RBC # BLD: 3.71 M/UL — LOW (ref 4.7–6.1)
RBC # FLD: 14.5 % — SIGNIFICANT CHANGE UP (ref 11.5–14.5)
RBC # FLD: 14.5 % — SIGNIFICANT CHANGE UP (ref 11.5–14.5)
SODIUM SERPL-SCNC: 139 MMOL/L — SIGNIFICANT CHANGE UP (ref 135–146)
SODIUM SERPL-SCNC: 139 MMOL/L — SIGNIFICANT CHANGE UP (ref 135–146)
WBC # BLD: 7 K/UL — SIGNIFICANT CHANGE UP (ref 4.8–10.8)
WBC # BLD: 7 K/UL — SIGNIFICANT CHANGE UP (ref 4.8–10.8)
WBC # FLD AUTO: 7 K/UL — SIGNIFICANT CHANGE UP (ref 4.8–10.8)
WBC # FLD AUTO: 7 K/UL — SIGNIFICANT CHANGE UP (ref 4.8–10.8)

## 2023-11-29 PROCEDURE — 99232 SBSQ HOSP IP/OBS MODERATE 35: CPT

## 2023-11-29 RX ORDER — BENZTROPINE MESYLATE 1 MG
1 TABLET ORAL
Qty: 0 | Refills: 0 | DISCHARGE
Start: 2023-11-29

## 2023-11-29 RX ORDER — DIVALPROEX SODIUM 500 MG/1
1 TABLET, DELAYED RELEASE ORAL
Qty: 0 | Refills: 0 | DISCHARGE
Start: 2023-11-29

## 2023-11-29 RX ADMIN — HALOPERIDOL DECANOATE 10 MILLIGRAM(S): 100 INJECTION INTRAMUSCULAR at 13:40

## 2023-11-29 RX ADMIN — ENOXAPARIN SODIUM 40 MILLIGRAM(S): 100 INJECTION SUBCUTANEOUS at 00:22

## 2023-11-29 RX ADMIN — HALOPERIDOL DECANOATE 10 MILLIGRAM(S): 100 INJECTION INTRAMUSCULAR at 05:03

## 2023-11-29 RX ADMIN — Medication 1 APPLICATION(S): at 05:02

## 2023-11-29 RX ADMIN — Medication 100 MICROGRAM(S): at 05:04

## 2023-11-29 RX ADMIN — DIVALPROEX SODIUM 500 MILLIGRAM(S): 500 TABLET, DELAYED RELEASE ORAL at 05:02

## 2023-11-29 RX ADMIN — DIVALPROEX SODIUM 500 MILLIGRAM(S): 500 TABLET, DELAYED RELEASE ORAL at 13:41

## 2023-11-29 RX ADMIN — Medication 1 MILLIGRAM(S): at 17:21

## 2023-11-29 RX ADMIN — CHLORHEXIDINE GLUCONATE 1 APPLICATION(S): 213 SOLUTION TOPICAL at 13:39

## 2023-11-29 RX ADMIN — MIRTAZAPINE 15 MILLIGRAM(S): 45 TABLET, ORALLY DISINTEGRATING ORAL at 12:36

## 2023-11-29 RX ADMIN — Medication 1 MILLIGRAM(S): at 05:03

## 2023-11-29 RX ADMIN — BUPROPION HYDROCHLORIDE 150 MILLIGRAM(S): 150 TABLET, EXTENDED RELEASE ORAL at 12:37

## 2023-11-29 NOTE — DISCHARGE NOTE NURSING/CASE MANAGEMENT/SOCIAL WORK - NSDCPEFALRISK_GEN_ALL_CORE
For information on Fall & Injury Prevention, visit: [email or web address] OR  [email or web address] OR  https://www.cdc.gov/steadi/patient.html

## 2023-11-29 NOTE — DISCHARGE NOTE NURSING/CASE MANAGEMENT/SOCIAL WORK - NSPROEXTENSIONSOFSELF_GEN_A_NUR
Interpretation/ remarks:   See later    [x] Patient information given   [x] ACDS CAMP information's (# [id number], and [id number]) to following compounds:  Colophony, Abitol, Propolis,  peppermint oil     [] General information's to following compounds: ......      Assessment & Plan:    ==> Final Diagnosis:     # Recurrent eczematous facial eruption  * chronic complicated illness  > partially atopic dermatitis with atopy  > partially allergic contact dermatitis to various resins/adhesives and fragrance = peppermint oil  DDx perioral dermatitis/rosacea       # Atopic predisposition with  childhood eczema  seasonal ear itchiness in Spring = mold allergy (Aspergillus)  * chronic stable illness    These conclusions are made at the best of one's knowledge and belief based on the provided evidence such as patient's history and allergy test results and they can change over time or can be incomplete because of missing information's.    ==> Treatment Plan:    >> follow the recommendations of the CAMP Shadi and use only products from the Shadi (incl tooth paste)    > continue with topical treatment tacrolimus ointment BID PRN for facial eruption (prescribed by Dr. Guan) and for acute flare ups use Desonide cream (but not more than 2xweekly)  
none

## 2023-11-29 NOTE — DISCHARGE NOTE NURSING/CASE MANAGEMENT/SOCIAL WORK - PATIENT PORTAL LINK FT
You can access the FollowMyHealth Patient Portal offered by [facility] by registering at the following website: [email or web address]. By joining [facility]’s FollowMyHealth portal, you will also be able to view your health information using other applications (apps) compatible with our system.

## 2023-12-01 ENCOUNTER — RX RENEWAL (OUTPATIENT)
Age: 53
End: 2023-12-01

## 2023-12-01 RX ORDER — DIVALPROEX SODIUM 250 MG/1
250 TABLET, DELAYED RELEASE ORAL 3 TIMES DAILY
Qty: 180 | Refills: 11 | Status: DISCONTINUED | COMMUNITY
Start: 2017-05-23 | End: 2023-12-01

## 2023-12-03 DIAGNOSIS — H50.10 UNSPECIFIED EXOTROPIA: ICD-10-CM

## 2023-12-03 DIAGNOSIS — N12 TUBULO-INTERSTITIAL NEPHRITIS, NOT SPECIFIED AS ACUTE OR CHRONIC: ICD-10-CM

## 2023-12-03 DIAGNOSIS — A41.9 SEPSIS, UNSPECIFIED ORGANISM: ICD-10-CM

## 2023-12-03 DIAGNOSIS — Z16.12 EXTENDED SPECTRUM BETA LACTAMASE (ESBL) RESISTANCE: ICD-10-CM

## 2023-12-03 DIAGNOSIS — M85.80 OTHER SPECIFIED DISORDERS OF BONE DENSITY AND STRUCTURE, UNSPECIFIED SITE: ICD-10-CM

## 2023-12-03 DIAGNOSIS — D64.9 ANEMIA, UNSPECIFIED: ICD-10-CM

## 2023-12-03 DIAGNOSIS — F20.9 SCHIZOPHRENIA, UNSPECIFIED: ICD-10-CM

## 2023-12-03 DIAGNOSIS — R33.8 OTHER RETENTION OF URINE: ICD-10-CM

## 2023-12-03 DIAGNOSIS — F63.9 IMPULSE DISORDER, UNSPECIFIED: ICD-10-CM

## 2023-12-03 DIAGNOSIS — E03.9 HYPOTHYROIDISM, UNSPECIFIED: ICD-10-CM

## 2023-12-03 DIAGNOSIS — B96.20 UNSPECIFIED ESCHERICHIA COLI [E. COLI] AS THE CAUSE OF DISEASES CLASSIFIED ELSEWHERE: ICD-10-CM

## 2023-12-03 DIAGNOSIS — F70 MILD INTELLECTUAL DISABILITIES: ICD-10-CM

## 2023-12-03 DIAGNOSIS — F32.9 MAJOR DEPRESSIVE DISORDER, SINGLE EPISODE, UNSPECIFIED: ICD-10-CM

## 2023-12-03 DIAGNOSIS — E83.42 HYPOMAGNESEMIA: ICD-10-CM

## 2023-12-03 DIAGNOSIS — N13.30 UNSPECIFIED HYDRONEPHROSIS: ICD-10-CM

## 2023-12-03 DIAGNOSIS — G40.909 EPILEPSY, UNSPECIFIED, NOT INTRACTABLE, WITHOUT STATUS EPILEPTICUS: ICD-10-CM

## 2023-12-03 DIAGNOSIS — N35.919 UNSPECIFIED URETHRAL STRICTURE, MALE, UNSPECIFIED SITE: ICD-10-CM

## 2023-12-03 DIAGNOSIS — Z79.890 HORMONE REPLACEMENT THERAPY: ICD-10-CM

## 2024-01-21 NOTE — CDI QUERY NOTE - NSCDIOTHERTXTBX_GEN_ALL_CORE_HH
Based on your professional judgment and the clinical indicators, please clarify the significance of the below creatinine lab values:      - Acute kidney injury   - Insignificant creatinine lab values  - Other (please specify):   - Unable to clinically determine the significance of the creatinine lab values        CLINICAL INDICATORS    11/13 H&P Adult: Sepsis due to right sided pyelonephritis … urinary retention – long placed by  and 700cc was drained    11/22 Discharge Note Provider: Acute pyelonephritis … imaging showed hydroureteronephrosis which means urine backflow in the kidneys and … uretheral stricture    Labs:   11/13: Creat 1.2  11/14: Creat 1.0  11/15: Creat 0.7  11/16: Creat 0.6    Orders:   11/13: 2L LR IV Bolus STAT; 500mL LR IV Bolus  11/13-11/15: CMP in am  11/14: LR at 75mL/hr  11/16-11/19: BMP in am      Thank you,   Yokasta GUERRA, RN, BSN  [phone]

## 2024-01-22 NOTE — ED PROVIDER NOTE - NSTIMEPROVIDERCAREINITIATE_GEN_ER
28-Jun-2023 23:14
Quality 226: Preventive Care And Screening: Tobacco Use: Screening And Cessation Intervention: Patient screened for tobacco use and is an ex/non-smoker
Detail Level: Detailed

## 2024-01-26 ENCOUNTER — APPOINTMENT (OUTPATIENT)
Dept: OTOLARYNGOLOGY | Facility: CLINIC | Age: 54
End: 2024-01-26
Payer: MEDICAID

## 2024-01-26 DIAGNOSIS — H90.3 SENSORINEURAL HEARING LOSS, BILATERAL: ICD-10-CM

## 2024-01-26 DIAGNOSIS — Z86.69 PERSONAL HISTORY OF OTHER DISEASES OF THE NERVOUS SYSTEM AND SENSE ORGANS: ICD-10-CM

## 2024-01-26 DIAGNOSIS — H93.8X3 OTHER SPECIFIED DISORDERS OF EAR, BILATERAL: ICD-10-CM

## 2024-01-26 PROCEDURE — 92550 TYMPANOMETRY & REFLEX THRESH: CPT | Mod: 52

## 2024-01-26 PROCEDURE — G0268 REMOVAL OF IMPACTED WAX MD: CPT

## 2024-01-26 PROCEDURE — 92553 AUDIOMETRY AIR & BONE: CPT

## 2024-01-26 PROCEDURE — 92555 SPEECH THRESHOLD AUDIOMETRY: CPT

## 2024-01-26 PROCEDURE — 99213 OFFICE O/P EST LOW 20 MIN: CPT | Mod: 25

## 2024-01-26 NOTE — PHYSICAL EXAM
[Normal] : mucosa is normal [Midline] : trachea located in midline position [de-identified] : B/L cerumen impaction removed with curette

## 2024-01-26 NOTE — HISTORY OF PRESENT ILLNESS
[FreeTextEntry1] : Patient returns today c/o clogged ears. Nurse and aid reports that you need to speak close to him in order for him to hear that has been ongoing. They would like a hearing test today. No further complaints.

## 2024-01-26 NOTE — ASSESSMENT
[FreeTextEntry1] : Wax found and cleaned. Cleaning well tolerated by patient. Patient felt improvement in cloginess after cleaning.  I reviewed, interpreted, and discussed the Audiogram done today. bilateral SNHL.

## 2024-01-30 ENCOUNTER — OUTPATIENT (OUTPATIENT)
Dept: OUTPATIENT SERVICES | Facility: HOSPITAL | Age: 54
LOS: 1 days | Discharge: HOME | End: 2024-01-30
Payer: MEDICAID

## 2024-01-30 ENCOUNTER — TRANSCRIPTION ENCOUNTER (OUTPATIENT)
Age: 54
End: 2024-01-30

## 2024-01-30 VITALS
SYSTOLIC BLOOD PRESSURE: 149 MMHG | RESPIRATION RATE: 17 BRPM | DIASTOLIC BLOOD PRESSURE: 90 MMHG | HEART RATE: 83 BPM | OXYGEN SATURATION: 97 %

## 2024-01-30 VITALS
DIASTOLIC BLOOD PRESSURE: 87 MMHG | RESPIRATION RATE: 18 BRPM | HEIGHT: 66 IN | SYSTOLIC BLOOD PRESSURE: 144 MMHG | HEART RATE: 78 BPM | WEIGHT: 160.06 LBS | TEMPERATURE: 97 F

## 2024-01-30 DIAGNOSIS — Z98.890 OTHER SPECIFIED POSTPROCEDURAL STATES: Chronic | ICD-10-CM

## 2024-01-30 DIAGNOSIS — Z87.81 PERSONAL HISTORY OF (HEALED) TRAUMATIC FRACTURE: Chronic | ICD-10-CM

## 2024-01-30 DIAGNOSIS — K56.2 VOLVULUS: ICD-10-CM

## 2024-01-30 DIAGNOSIS — Z12.11 ENCOUNTER FOR SCREENING FOR MALIGNANT NEOPLASM OF COLON: ICD-10-CM

## 2024-01-30 RX ORDER — LEVOTHYROXINE SODIUM 125 MCG
1 TABLET ORAL
Qty: 0 | Refills: 0 | DISCHARGE

## 2024-01-30 RX ORDER — TAMSULOSIN HYDROCHLORIDE 0.4 MG/1
2 CAPSULE ORAL
Qty: 0 | Refills: 0 | DISCHARGE

## 2024-01-30 RX ORDER — MIRTAZAPINE 45 MG/1
1 TABLET, ORALLY DISINTEGRATING ORAL
Refills: 0 | DISCHARGE

## 2024-01-30 RX ORDER — DIVALPROEX SODIUM 500 MG/1
1 TABLET, DELAYED RELEASE ORAL
Refills: 0 | DISCHARGE

## 2024-01-30 RX ORDER — BUPROPION HYDROCHLORIDE 150 MG/1
1 TABLET, EXTENDED RELEASE ORAL
Qty: 0 | Refills: 0 | DISCHARGE

## 2024-01-30 NOTE — H&P PST ADULT - ASSESSMENT
Patient with History of sigmoid volvulus s/p resection for colonoscopy. Risks, Benefits, Alternatives explained.
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
## 2024-01-30 NOTE — ASU PATIENT PROFILE, ADULT - FALL HARM RISK - HARM RISK INTERVENTIONS

## 2024-01-30 NOTE — CHART NOTE - NSCHARTNOTEFT_GEN_A_CORE
PACU ANESTHESIA ADMISSION NOTE      Procedure: colonoscopy  Post op diagnosis:  melanosis, hemorrhoids    ____  Intubated  TV:______       Rate: ______      FiO2: ______    __x__  Patent Airway    __x__  Full return of protective reflexes    _x___  Full recovery from anesthesia / back to baseline     Vitals:   T: 97.7          R:   12               BP:  112/70                Sat: 97%                  P: 68      Mental Status:  __x__ Awake   x_____ Alert   _____ Drowsy   _____ Sedated    Nausea/Vomiting:  __x__ NO  ______Yes,   See Post - Op Orders          Pain Scale (0-10):  __0___    Treatment: ____ None    ____ See Post - Op/PCA Orders    Post - Operative Fluids:   __x__ Oral   ____ See Post - Op Orders    Plan: Discharge:   _x___Home       _____Floor     _____Critical Care    _____  Other:_________________    Comments:

## 2024-01-30 NOTE — ASU DISCHARGE PLAN (ADULT/PEDIATRIC) - CARE PROVIDER_API CALL
Sarath Butt J  Gastroenterology  [address] [place] [address]  Phone: [phone]  Fax: [phone]  Established Patient  Follow Up Time:

## 2024-01-30 NOTE — H&P PST ADULT - HISTORY OF PRESENT ILLNESS
Patient with History of Sigmoid volvulus s/p surgical resection for colonoscopy. Patient poor historian. History via chart/ Aides. Patient presents from group facility

## 2024-02-02 DIAGNOSIS — Q43.8 OTHER SPECIFIED CONGENITAL MALFORMATIONS OF INTESTINE: ICD-10-CM

## 2024-02-02 DIAGNOSIS — K63.89 OTHER SPECIFIED DISEASES OF INTESTINE: ICD-10-CM

## 2024-02-02 DIAGNOSIS — F32.9 MAJOR DEPRESSIVE DISORDER, SINGLE EPISODE, UNSPECIFIED: ICD-10-CM

## 2024-02-02 DIAGNOSIS — E03.9 HYPOTHYROIDISM, UNSPECIFIED: ICD-10-CM

## 2024-02-02 DIAGNOSIS — F79 UNSPECIFIED INTELLECTUAL DISABILITIES: ICD-10-CM

## 2024-02-02 DIAGNOSIS — F20.9 SCHIZOPHRENIA, UNSPECIFIED: ICD-10-CM

## 2024-02-02 DIAGNOSIS — K64.8 OTHER HEMORRHOIDS: ICD-10-CM

## 2024-02-02 DIAGNOSIS — G40.909 EPILEPSY, UNSPECIFIED, NOT INTRACTABLE, WITHOUT STATUS EPILEPTICUS: ICD-10-CM

## 2024-02-02 DIAGNOSIS — K56.2 VOLVULUS: ICD-10-CM

## 2024-02-06 ENCOUNTER — OUTPATIENT (OUTPATIENT)
Dept: OUTPATIENT SERVICES | Facility: HOSPITAL | Age: 54
LOS: 1 days | End: 2024-02-06
Payer: MEDICAID

## 2024-02-06 ENCOUNTER — APPOINTMENT (OUTPATIENT)
Dept: PODIATRY | Facility: CLINIC | Age: 54
End: 2024-02-06
Payer: MEDICAID

## 2024-02-06 DIAGNOSIS — Z87.81 PERSONAL HISTORY OF (HEALED) TRAUMATIC FRACTURE: Chronic | ICD-10-CM

## 2024-02-06 DIAGNOSIS — Z00.00 ENCOUNTER FOR GENERAL ADULT MEDICAL EXAMINATION WITHOUT ABNORMAL FINDINGS: ICD-10-CM

## 2024-02-06 DIAGNOSIS — M79.671 PAIN IN RIGHT FOOT: ICD-10-CM

## 2024-02-06 DIAGNOSIS — M79.672 PAIN IN LEFT FOOT: ICD-10-CM
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
## 2024-02-06 DIAGNOSIS — Z98.890 OTHER SPECIFIED POSTPROCEDURAL STATES: Chronic | ICD-10-CM

## 2024-02-06 PROCEDURE — 11721 DEBRIDE NAIL 6 OR MORE: CPT

## 2024-02-06 NOTE — HISTORY OF PRESENT ILLNESS
[Walker] : a walker [FreeTextEntry1] : 53 year old MRDD male here today for continued care of mycotic nails and foot care
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
## 2024-02-09 DIAGNOSIS — M79.671 PAIN IN RIGHT FOOT: ICD-10-CM

## 2024-02-09 DIAGNOSIS — B35.1 TINEA UNGUIUM: ICD-10-CM

## 2024-02-09 DIAGNOSIS — M79.672 PAIN IN LEFT FOOT: ICD-10-CM

## 2024-02-20 ENCOUNTER — APPOINTMENT (OUTPATIENT)
Dept: SPEECH THERAPY | Facility: CLINIC | Age: 54
End: 2024-02-20

## 2024-02-20 ENCOUNTER — OUTPATIENT (OUTPATIENT)
Dept: OUTPATIENT SERVICES | Facility: HOSPITAL | Age: 54
LOS: 1 days | End: 2024-02-20

## 2024-02-20 DIAGNOSIS — Z98.890 OTHER SPECIFIED POSTPROCEDURAL STATES: Chronic | ICD-10-CM

## 2024-02-20 DIAGNOSIS — H90.3 SENSORINEURAL HEARING LOSS, BILATERAL: ICD-10-CM

## 2024-02-20 DIAGNOSIS — Z87.81 PERSONAL HISTORY OF (HEALED) TRAUMATIC FRACTURE: Chronic | ICD-10-CM

## 2024-03-11 ENCOUNTER — APPOINTMENT (OUTPATIENT)
Dept: UROLOGY | Facility: CLINIC | Age: 54
End: 2024-03-11
Payer: MEDICAID

## 2024-03-11 ENCOUNTER — OUTPATIENT (OUTPATIENT)
Dept: OUTPATIENT SERVICES | Facility: HOSPITAL | Age: 54
LOS: 1 days | End: 2024-03-11
Payer: MEDICAID

## 2024-03-11 VITALS — TEMPERATURE: 97.4 F | OXYGEN SATURATION: 94 % | HEART RATE: 66 BPM | HEIGHT: 68 IN

## 2024-03-11 DIAGNOSIS — Z98.890 OTHER SPECIFIED POSTPROCEDURAL STATES: Chronic | ICD-10-CM

## 2024-03-11 DIAGNOSIS — Z00.00 ENCOUNTER FOR GENERAL ADULT MEDICAL EXAMINATION WITHOUT ABNORMAL FINDINGS: ICD-10-CM

## 2024-03-11 DIAGNOSIS — Z87.81 PERSONAL HISTORY OF (HEALED) TRAUMATIC FRACTURE: Chronic | ICD-10-CM

## 2024-03-11 PROCEDURE — G2211 COMPLEX E/M VISIT ADD ON: CPT | Mod: NC,1L

## 2024-03-11 PROCEDURE — 99204 OFFICE O/P NEW MOD 45 MIN: CPT

## 2024-03-11 NOTE — PLAN

## 2024-03-11 NOTE — ASSESSMENT
[FreeTextEntry1] : #Incomplete bladder emptying/Urethral stricture/LUTS - C/w Flomax - rx by his PCP/private urologist - [facility] - UCx - Bladder US to assess prostate size and PVR - Will schedule for in-office cystoscopy to evaluate his urethral stricture disease. Pending findings will determine if surgical intervention is warranted such as urethral dilation.

## 2024-03-11 NOTE — PLAN

## 2024-03-11 NOTE — HISTORY OF PRESENT ILLNESS
[FreeTextEntry1] : This is a 48 year male with mild mental handicap who was seen in the hospital for a difficult long catheter. Patient had a false passage/urethral stricture that required bedside cystoscopy to help with long placement. The catheter was removed before discharge from the hospital and he passed a trial of void. He has also states that he is urinating without any difficult. no blood, no dysuria. Aide states that she has not noticed any issue with his urination  April 23, 2018 US Bladder; showed: bladder trabeculation. PVR was 187. prostate 25g. US Bladder on oct 15 th showed: thickening and trabeculated bladder with intraluminal debris  ml, prostate volume 33 ml no new issues since his last visit.  Nov 2019-- images reviewed by me today US Bladder; prostate 16g with pvr 301ml US Renal; normal renal us  Office visit 3/11/24 Pt presents with direct patient caretaker. Also spoke w RN from facility.

## 2024-03-12 DIAGNOSIS — N35.912 UNSPECIFIED BULBOUS URETHRAL STRICTURE, MALE: ICD-10-CM

## 2024-03-12 DIAGNOSIS — R33.9 RETENTION OF URINE, UNSPECIFIED: ICD-10-CM

## 2024-03-18 ENCOUNTER — RESULT REVIEW (OUTPATIENT)
Age: 54
End: 2024-03-18

## 2024-03-18 ENCOUNTER — OUTPATIENT (OUTPATIENT)
Dept: OUTPATIENT SERVICES | Facility: HOSPITAL | Age: 54
LOS: 1 days | End: 2024-03-18
Payer: MEDICAID

## 2024-03-18 DIAGNOSIS — Z98.890 OTHER SPECIFIED POSTPROCEDURAL STATES: Chronic | ICD-10-CM

## 2024-03-18 DIAGNOSIS — Z00.8 ENCOUNTER FOR OTHER GENERAL EXAMINATION: ICD-10-CM

## 2024-03-18 DIAGNOSIS — Z87.81 PERSONAL HISTORY OF (HEALED) TRAUMATIC FRACTURE: Chronic | ICD-10-CM

## 2024-03-18 DIAGNOSIS — R33.9 RETENTION OF URINE, UNSPECIFIED: ICD-10-CM

## 2024-03-18 PROCEDURE — 76857 US EXAM PELVIC LIMITED: CPT

## 2024-03-18 PROCEDURE — 76857 US EXAM PELVIC LIMITED: CPT | Mod: 26

## 2024-03-19 DIAGNOSIS — R33.9 RETENTION OF URINE, UNSPECIFIED: ICD-10-CM

## 2024-03-21 ENCOUNTER — OUTPATIENT (OUTPATIENT)
Dept: OUTPATIENT SERVICES | Facility: HOSPITAL | Age: 54
LOS: 1 days | End: 2024-03-21
Payer: MEDICAID

## 2024-03-21 ENCOUNTER — APPOINTMENT (OUTPATIENT)
Dept: UROLOGY | Facility: CLINIC | Age: 54
End: 2024-03-21
Payer: MEDICAID

## 2024-03-21 VITALS
BODY MASS INDEX: 25.01 KG/M2 | DIASTOLIC BLOOD PRESSURE: 69 MMHG | TEMPERATURE: 97.2 F | OXYGEN SATURATION: 99 % | WEIGHT: 165 LBS | SYSTOLIC BLOOD PRESSURE: 101 MMHG | HEIGHT: 68 IN | HEART RATE: 65 BPM

## 2024-03-21 DIAGNOSIS — Z00.00 ENCOUNTER FOR GENERAL ADULT MEDICAL EXAMINATION WITHOUT ABNORMAL FINDINGS: ICD-10-CM

## 2024-03-21 DIAGNOSIS — Z98.890 OTHER SPECIFIED POSTPROCEDURAL STATES: Chronic | ICD-10-CM

## 2024-03-21 PROCEDURE — 51702 INSERT TEMP BLADDER CATH: CPT

## 2024-03-21 PROCEDURE — 51703 INSERT BLADDER CATH COMPLEX: CPT

## 2024-03-21 PROCEDURE — 99213 OFFICE O/P EST LOW 20 MIN: CPT | Mod: 25

## 2024-03-21 PROCEDURE — 99214 OFFICE O/P EST MOD 30 MIN: CPT

## 2024-03-21 NOTE — ASSESSMENT
[FreeTextEntry1] : #Incomplete bladder emptying/Urethral stricture/LUTS/Incomplete bladder emptying with significantly elevated PVR - 12fr silicone catheter placed under sterile technique without issues - C/w Flomax - rx by his PCP/private urologist - KENYA - UCx  - Bladder US: Trabeculated walls of the urinary bladder. Debris noted. Right urinary jet demonstrated. Elevated Post void volume: 484 cc ml - Will reschedule for in-office cystoscopy to evaluate his urethral stricture disease, may also consider UDS at future date to assess bladder function pending its appearance on cystoscopy - Packer should be replaced every 4-6 weeks to prevent CAUTI

## 2024-03-21 NOTE — HISTORY OF PRESENT ILLNESS
[FreeTextEntry1] : This is a 48 year male with mild mental handicap who was seen in the hospital for a difficult long catheter. Patient had a false passage/urethral stricture that required bedside cystoscopy to help with long placement. The catheter was removed before discharge from the hospital and he passed a trial of void. He has also states that he is urinating without any difficult. no blood, no dysuria. Aide states that she has not noticed any issue with his urination  April 23, 2018 US Bladder; showed: bladder trabeculation. PVR was 187. prostate 25g. US Bladder on oct 15 th showed: thickening and trabeculated bladder with intraluminal debris  ml, prostate volume 33 ml no new issues since his last visit.  Nov 2019-- images reviewed by me today US Bladder; prostate 16g with pvr 301ml US Renal; normal renal us  Office visit 3/11/24 Pt presents with direct patient caretaker. Also spoke w RN from facility.  Office Visit 3/21/24: Pt presents with direct patient caretaker for long placement. Also spoke w CATRACHITO Arroyo from facility.

## 2024-03-21 NOTE — PLAN
[TextEntry] : The patient was discussed with PA/NP. I agree with the assessment/plan and made any necessary changes.  The submitted E/M billing level for this visit reflects the total time spent on the day of the visit including face-to-face time spent with the patient, non-face-to-face review of medical records and relevant information, documentation, and asynchronous communication with the patient after a visit via phone, email, or patients EHR portal after the visit. The medical records reviewed are either scanned into the chart or reviewed with the patient using a patient's electronic medical records portal for patients with records not available to [facility] via electronic transmission platforms from other institutions and labs.   I have reviewed and verified information regarding the chief complaint and history recorded by the ancillary staff and/or the patient. I have independently reviewed and interpreted tests performed by other physicians and facilities as necessary. I have discussed with the patient differential diagnosis, reason for auxiliary tests if ordered, risks, benefits, alternatives, and complications of each form of therapy were discussed. Social determinants of disease were assessed and necessary measures implemented.   Time spent counseling and performing coordination of care was also included in determining the appropriate EM billing level.

## 2024-03-28 DIAGNOSIS — N35.912 UNSPECIFIED BULBOUS URETHRAL STRICTURE, MALE: ICD-10-CM

## 2024-03-28 DIAGNOSIS — R33.9 RETENTION OF URINE, UNSPECIFIED: ICD-10-CM

## 2024-03-29 ENCOUNTER — APPOINTMENT (OUTPATIENT)
Dept: UROLOGY | Facility: CLINIC | Age: 54
End: 2024-03-29

## 2024-04-09 ENCOUNTER — APPOINTMENT (OUTPATIENT)
Dept: PODIATRY | Facility: CLINIC | Age: 54
End: 2024-04-09
Payer: MEDICAID

## 2024-04-09 ENCOUNTER — OUTPATIENT (OUTPATIENT)
Dept: OUTPATIENT SERVICES | Facility: HOSPITAL | Age: 54
LOS: 1 days | End: 2024-04-09
Payer: MEDICAID

## 2024-04-09 DIAGNOSIS — Z98.890 OTHER SPECIFIED POSTPROCEDURAL STATES: Chronic | ICD-10-CM

## 2024-04-09 DIAGNOSIS — Z87.81 PERSONAL HISTORY OF (HEALED) TRAUMATIC FRACTURE: Chronic | ICD-10-CM

## 2024-04-09 DIAGNOSIS — I73.9 PERIPHERAL VASCULAR DISEASE, UNSPECIFIED: ICD-10-CM

## 2024-04-09 DIAGNOSIS — B35.1 TINEA UNGUIUM: ICD-10-CM

## 2024-04-09 DIAGNOSIS — Z00.00 ENCOUNTER FOR GENERAL ADULT MEDICAL EXAMINATION WITHOUT ABNORMAL FINDINGS: ICD-10-CM

## 2024-04-09 PROCEDURE — 99213 OFFICE O/P EST LOW 20 MIN: CPT | Mod: 25

## 2024-04-09 PROCEDURE — 11721 DEBRIDE NAIL 6 OR MORE: CPT

## 2024-04-09 NOTE — PROCEDURE
[FreeTextEntry1] : -Aseptic debridement of all fungal nails.  Patient has pain about the toes secondary to nail pressure and relates improvement with periodic debridement. -Patient to be referred to vascular due to cold lower extremity possible occlusion - continue compression stockings for Edema  -return 2 months
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
## 2024-04-19 DIAGNOSIS — R60.9 EDEMA, UNSPECIFIED: ICD-10-CM

## 2024-04-19 DIAGNOSIS — M79.671 PAIN IN RIGHT FOOT: ICD-10-CM

## 2024-04-19 DIAGNOSIS — B35.1 TINEA UNGUIUM: ICD-10-CM
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
## 2024-04-19 DIAGNOSIS — I73.9 PERIPHERAL VASCULAR DISEASE, UNSPECIFIED: ICD-10-CM

## 2024-04-19 DIAGNOSIS — M79.672 PAIN IN LEFT FOOT: ICD-10-CM

## 2024-04-25 ENCOUNTER — OUTPATIENT (OUTPATIENT)
Dept: OUTPATIENT SERVICES | Facility: HOSPITAL | Age: 54
LOS: 1 days | End: 2024-04-25
Payer: MEDICAID

## 2024-04-25 ENCOUNTER — APPOINTMENT (OUTPATIENT)
Dept: UROLOGY | Facility: CLINIC | Age: 54
End: 2024-04-25
Payer: MEDICAID

## 2024-04-25 VITALS
DIASTOLIC BLOOD PRESSURE: 83 MMHG | TEMPERATURE: 97.4 F | HEART RATE: 66 BPM | OXYGEN SATURATION: 99 % | SYSTOLIC BLOOD PRESSURE: 116 MMHG

## 2024-04-25 DIAGNOSIS — Z00.00 ENCOUNTER FOR GENERAL ADULT MEDICAL EXAMINATION WITHOUT ABNORMAL FINDINGS: ICD-10-CM

## 2024-04-25 DIAGNOSIS — Z87.81 PERSONAL HISTORY OF (HEALED) TRAUMATIC FRACTURE: Chronic | ICD-10-CM

## 2024-04-25 DIAGNOSIS — Z98.890 OTHER SPECIFIED POSTPROCEDURAL STATES: Chronic | ICD-10-CM

## 2024-04-25 PROCEDURE — 99214 OFFICE O/P EST MOD 30 MIN: CPT

## 2024-04-25 PROCEDURE — 99213 OFFICE O/P EST LOW 20 MIN: CPT | Mod: 25

## 2024-04-25 PROCEDURE — 51702 INSERT TEMP BLADDER CATH: CPT

## 2024-04-25 RX ORDER — TAMSULOSIN HYDROCHLORIDE 0.4 MG/1
2 CAPSULE ORAL
Qty: 60 | Refills: 3
Start: 2024-04-25 | End: 2024-08-22

## 2024-04-25 NOTE — ASSESSMENT
[FreeTextEntry1] : 52y/o M with hx of bulbous urethral stricture, LUTS, incomplete bladder emptying here for routine catheter change  - Under sterile technique, placed a 14Fr coude long catheter into bladder without issue, placed 10cc water into balloon.  - Given relative ease of larger catheter placement, will defer office cystoscopy for urethral dilation and continue with soft dilations with each catheter change.  - UA, UCx - Continue double dose Flomax, start Cranberry with Vitamin C tab for UTI prevention - Consider TOV on next visit in 4-6 weeks as per home's request.

## 2024-04-25 NOTE — PLAN
[TextEntry] : The patient was discussed with PA/NP. I agree with the assessment/plan and made any necessary changes. The submitted E/M billing level for this visit reflects the total time spent on the day of the visit including face-to-face time spent with the patient, non-face-to-face review of medical records and relevant information, documentation, and asynchronous communication with the patient after a visit via phone, email, or patients EHR portal after the visit. The medical records reviewed are either scanned into the chart or reviewed with the patient using a patient's electronic medical records portal for patients with records not available to [facility] via electronic transmission platforms from other institutions and labs.   I have reviewed and verified information regarding the chief complaint and history recorded by the ancillary staff and/or the patient. I have independently reviewed and interpreted tests performed by other physicians and facilities as necessary. I have discussed with the patient differential diagnosis, reason for auxiliary tests if ordered, risks, benefits, alternatives, and complications of each form of therapy were discussed. Social determinants of disease were assessed and necessary measures implemented.   Time spent counseling and performing coordination of care was also included in determining the appropriate EM billing level.

## 2024-04-25 NOTE — HISTORY OF PRESENT ILLNESS
[FreeTextEntry1] : Pt is a 52y/o M presenting for catheter change. He has hx of urethral strictures for which caretaker was inquiring about office cystoscopy to evaluate stricture disease. No acute complaints at this time.

## 2024-04-25 NOTE — PHYSICAL EXAM
[General Appearance - Well Developed] : well developed [General Appearance - Well Nourished] : well nourished [] : no respiratory distress [Abdomen Soft] : soft [Penis Abnormality] : normal circumcised penis [de-identified] : wheelchair bound

## 2024-04-30 DIAGNOSIS — N35.912 UNSPECIFIED BULBOUS URETHRAL STRICTURE, MALE: ICD-10-CM

## 2024-04-30 DIAGNOSIS — R33.9 RETENTION OF URINE, UNSPECIFIED: ICD-10-CM

## 2024-05-07 ENCOUNTER — APPOINTMENT (OUTPATIENT)
Dept: VASCULAR SURGERY | Facility: CLINIC | Age: 54
End: 2024-05-07
Payer: MEDICAID

## 2024-05-07 VITALS
DIASTOLIC BLOOD PRESSURE: 72 MMHG | WEIGHT: 165 LBS | SYSTOLIC BLOOD PRESSURE: 126 MMHG | BODY MASS INDEX: 25.01 KG/M2 | HEIGHT: 68 IN

## 2024-05-07 DIAGNOSIS — R20.9 UNSPECIFIED DISTURBANCES OF SKIN SENSATION: ICD-10-CM

## 2024-05-07 PROCEDURE — 99203 OFFICE O/P NEW LOW 30 MIN: CPT

## 2024-05-07 PROCEDURE — 93925 LOWER EXTREMITY STUDY: CPT

## 2024-05-07 NOTE — DATA REVIEWED
[FreeTextEntry1] : I performed an arterial duplex which was medically necessary to evaluate for arterial insufficiency. It showed no significant arterial disease in the lower extremities bilaterally.

## 2024-05-07 NOTE — ASSESSMENT
[FreeTextEntry1] : 54 y/o M with intellectual disability with coldness to feet.  He has palpable pedal pulses on exam and arterial duplex today showed no evidence of arterial insufficiency in the lower extremities bilaterally.  Follow up as needed.  I, Dr. Addison Doss, personally performed the evaluation and management (E/M) services for this new patient. That E/M includes conducting the clinically appropriate initial history &/or exam, assessing all conditions, and establishing the plan of care. Today, my TAMICA, Bethany Diaz PA-C, was here to observe my evaluation and management service for this patient & follow plan of care established by me going forward.

## 2024-05-07 NOTE — HISTORY OF PRESENT ILLNESS
[FreeTextEntry1] : 54 y/o M sent in from group home for evaluation of PVD. He was seen by Podiatry for nail care and noted to have cold feet. Pt ambulates with walker as per aide. Denies any pain. Had leg swelling that has now resolved with use of compression stockings.

## 2024-05-07 NOTE — CONSULT LETTER
[Dear  ___] : Dear  [unfilled], [Consult Letter:] : I had the pleasure of evaluating your patient, [unfilled]. [Please see my note below.] : Please see my note below. [FreeTextEntry2] : Dear Dr. Dejan Jean,

## 2024-05-10 ENCOUNTER — NON-APPOINTMENT (OUTPATIENT)
Age: 54
End: 2024-05-10

## 2024-05-14 ENCOUNTER — OUTPATIENT (OUTPATIENT)
Dept: OUTPATIENT SERVICES | Facility: HOSPITAL | Age: 54
LOS: 1 days | End: 2024-05-14
Payer: MEDICAID

## 2024-05-14 ENCOUNTER — APPOINTMENT (OUTPATIENT)
Dept: NEUROLOGY | Facility: CLINIC | Age: 54
End: 2024-05-14
Payer: MEDICAID

## 2024-05-14 VITALS
HEIGHT: 68 IN | OXYGEN SATURATION: 97 % | SYSTOLIC BLOOD PRESSURE: 115 MMHG | HEART RATE: 75 BPM | BODY MASS INDEX: 25.01 KG/M2 | TEMPERATURE: 97 F | DIASTOLIC BLOOD PRESSURE: 73 MMHG | WEIGHT: 165 LBS

## 2024-05-14 DIAGNOSIS — Z00.00 ENCOUNTER FOR GENERAL ADULT MEDICAL EXAMINATION WITHOUT ABNORMAL FINDINGS: ICD-10-CM

## 2024-05-14 DIAGNOSIS — R56.9 UNSPECIFIED CONVULSIONS: ICD-10-CM

## 2024-05-14 DIAGNOSIS — Z98.890 OTHER SPECIFIED POSTPROCEDURAL STATES: Chronic | ICD-10-CM

## 2024-05-14 DIAGNOSIS — Z87.81 PERSONAL HISTORY OF (HEALED) TRAUMATIC FRACTURE: Chronic | ICD-10-CM

## 2024-05-14 PROCEDURE — 99213 OFFICE O/P EST LOW 20 MIN: CPT

## 2024-05-14 NOTE — REVIEW OF SYSTEMS
[FreeTextEntry2] : Unable to obtain [de-identified] : Unable to obtain [de-identified] : Unable to obtain [FreeTextEntry3] : Unable to obtain [FreeTextEntry4] : Unable to obtain [FreeTextEntry5] : Unable to obtain [FreeTextEntry6] : Unable to obtain [FreeTextEntry7] : Unable to obtain [FreeTextEntry8] : Unable to obtain [FreeTextEntry9] : Unable to obtain [de-identified] : Unable to obtain [de-identified] : Unable to obtain [de-identified] : Unable to obtain

## 2024-05-14 NOTE — ASSESSMENT
[FreeTextEntry1] : 53M w/ h/o IDD, hypothyroidism, mild MR, impulse control disorder, seizure disorder/epilepsy, controlled on Depakote presenting for follow up. Serum Valproate level 88 and ammonia level WNL on May 07/24. No new seizures. No mood or behavior abnormality. Currently stable. Continue same medications.  Plan: - C/w Depakote ER 500mg TID - RTC in 1 yr.

## 2024-05-14 NOTE — PHYSICAL EXAM
[FreeTextEntry1] : GEN: pleasant, cooperative  NEURO:    MENTAL STATUS: AAOx1 (name and age)   LANG/SPEECH: fragmented speech   CRANIAL NERVES:   II: Pupils equal and reactive,   III, IV, VI: no gaze preference or deviation, no nystagmus   VII: no facial asymmetry   MOTOR: moving all extremities. Holds arms against gravity. In a wheelchair. Gait not assessed

## 2024-05-14 NOTE — HISTORY OF PRESENT ILLNESS
[FreeTextEntry1] : Josemanuel is here for a follow-up accompanied by a staff. As per staff, he has been doing well, with no seizures. She reports last seizure was many years ago. Josemanuel has been tolerating Depakote with no adverse events and is usually complaint with his medication. He is able to ambulate short distances only, Good appetite. No recent complaints.'  Reviewed: Labs - Na 134, VPA 80, ammonia normal

## 2024-05-14 NOTE — END OF VISIT
[] : Resident [FreeTextEntry3] : Seizure free on valproic acid, no side effects, labs unremarkable - continue f/u 1 year

## 2024-05-16 DIAGNOSIS — R56.9 UNSPECIFIED CONVULSIONS: ICD-10-CM

## 2024-05-29 ENCOUNTER — RX RENEWAL (OUTPATIENT)
Age: 54
End: 2024-05-29

## 2024-05-29 RX ORDER — DIVALPROEX SODIUM 500 1/1
500 TABLET, EXTENDED RELEASE ORAL
Qty: 270 | Refills: 1 | Status: ACTIVE | COMMUNITY
Start: 2023-05-09 | End: 1900-01-01

## 2024-05-30 ENCOUNTER — OUTPATIENT (OUTPATIENT)
Dept: OUTPATIENT SERVICES | Facility: HOSPITAL | Age: 54
LOS: 1 days | End: 2024-05-30
Payer: MEDICAID

## 2024-05-30 ENCOUNTER — APPOINTMENT (OUTPATIENT)
Dept: UROLOGY | Facility: CLINIC | Age: 54
End: 2024-05-30
Payer: MEDICAID

## 2024-05-30 DIAGNOSIS — R33.9 RETENTION OF URINE, UNSPECIFIED: ICD-10-CM

## 2024-05-30 DIAGNOSIS — Z98.890 OTHER SPECIFIED POSTPROCEDURAL STATES: Chronic | ICD-10-CM

## 2024-05-30 DIAGNOSIS — N35.912 UNSPECIFIED BULBOUS URETHRAL STRICTURE, MALE: ICD-10-CM

## 2024-05-30 DIAGNOSIS — Z00.00 ENCOUNTER FOR GENERAL ADULT MEDICAL EXAMINATION WITHOUT ABNORMAL FINDINGS: ICD-10-CM

## 2024-05-30 PROCEDURE — 51702 INSERT TEMP BLADDER CATH: CPT

## 2024-05-30 PROCEDURE — 99213 OFFICE O/P EST LOW 20 MIN: CPT | Mod: 25

## 2024-05-30 PROCEDURE — 51703 INSERT BLADDER CATH COMPLEX: CPT

## 2024-05-30 PROCEDURE — 99214 OFFICE O/P EST MOD 30 MIN: CPT

## 2024-05-30 NOTE — HISTORY OF PRESENT ILLNESS
[FreeTextEntry1] : Pt is a 54y/o M presenting for catheter upsize. He has a questionable hx of urethral strictures for which caretaker was inquiring about office cystoscopy to evaluate stricture disease. No acute complaints at this time.  Pt reports no new acute complaints. Packer has been draining clear yellow urine.   Bladder US 3/2024 Post void volume: 4 84 cc ml. Reproductive organs: Not evaluated IMPRESSION: Urinary bladder retention. Trabeculated urinary bladder suggesting neurogenic bladder.

## 2024-05-30 NOTE — ASSESSMENT
[FreeTextEntry1] : #urinary retention/NGB #urethral strictures - upsized long catheter from 14Fr to 16Fr, passive dilation  - Removed 14fr coude without issues. Under sterile technique, placed a 16fr coude long catheter into bladder without issue, placed 10cc water into balloon.  - Pt will need UDS with Dr Hernandez to assess bladder function/pressures to r/o pt having an atonic bladder - Continue double dose Flomax, start Cranberry with Vitamin C tab for UTI prevention - F/u in 4-6 weeks for further management

## 2024-05-30 NOTE — PLAN
[TextEntry] : The patient was discussed with PA/NP. I agree with the assessment/plan and made any necessary changes.  The submitted E/M billing level for this visit reflects the total time spent on the day of the visit including face-to-face time spent with the patient, non-face-to-face review of medical records and relevant information, documentation, and asynchronous communication with the patient after a visit via phone, email, or patients EHR portal after the visit. The medical records reviewed are either scanned into the chart or reviewed with the patient using a patient's electronic medical records portal for patients with records not available to [facility] via electronic transmission platforms from other institutions and labs.   I have reviewed and verified information regarding the chief complaint and history recorded by the ancillary staff and/or the patient. I have independently reviewed and interpreted tests performed by other physicians and facilities as necessary. I have discussed with the patient differential diagnosis, reason for auxiliary tests if ordered, risks, benefits, alternatives, and complications of each form of therapy were discussed. Social determinants of disease were assessed and necessary measures implemented.   Time spent counseling and performing coordination of care was also included in determining the appropriate EM billing level.

## 2024-05-30 NOTE — PHYSICAL EXAM
[Normal Appearance] : normal appearance [Well Groomed] : well groomed [General Appearance - In No Acute Distress] : no acute distress [Edema] : no peripheral edema [Exaggerated Use Of Accessory Muscles For Inspiration] : no accessory muscle use [Abdomen Soft] : soft [Abdomen Tenderness] : non-tender [Costovertebral Angle Tenderness] : no ~M costovertebral angle tenderness [Urinary Bladder Findings] : the bladder was normal on palpation [Normal Station and Gait] : the gait and station were normal for the patient's age [] : no rash [No Focal Deficits] : no focal deficits [Oriented To Time, Place, And Person] : oriented to person, place, and time [Affect] : the affect was normal [Mood] : the mood was normal [No Palpable Adenopathy] : no palpable adenopathy

## 2024-05-31 DIAGNOSIS — R33.9 RETENTION OF URINE, UNSPECIFIED: ICD-10-CM

## 2024-05-31 DIAGNOSIS — N35.912 UNSPECIFIED BULBOUS URETHRAL STRICTURE, MALE: ICD-10-CM

## 2024-06-10 NOTE — ED ADULT TRIAGE NOTE - TEMPERATURE IN FAHRENHEIT (DEGREES F)
With redness of one eye, this is more likely to be related to viral infection or allergic irritation of eye.   Given history of allergies, would start on some antihistamines like Zaditor twice daily.   She might develop some crustiness in am , but should not have a lot of mucous from her eyes during daytime.   
95

## 2024-06-11 ENCOUNTER — APPOINTMENT (OUTPATIENT)
Dept: PODIATRY | Facility: CLINIC | Age: 54
End: 2024-06-11
Payer: MEDICAID

## 2024-06-11 ENCOUNTER — OUTPATIENT (OUTPATIENT)
Dept: OUTPATIENT SERVICES | Facility: HOSPITAL | Age: 54
LOS: 1 days | End: 2024-06-11
Payer: MEDICAID

## 2024-06-11 DIAGNOSIS — Z87.81 PERSONAL HISTORY OF (HEALED) TRAUMATIC FRACTURE: Chronic | ICD-10-CM

## 2024-06-11 DIAGNOSIS — L60.2 ONYCHOGRYPHOSIS: ICD-10-CM

## 2024-06-11 DIAGNOSIS — F70 MILD INTELLECTUAL DISABILITIES: ICD-10-CM

## 2024-06-11 DIAGNOSIS — Z00.00 ENCOUNTER FOR GENERAL ADULT MEDICAL EXAMINATION WITHOUT ABNORMAL FINDINGS: ICD-10-CM

## 2024-06-11 DIAGNOSIS — Z98.890 OTHER SPECIFIED POSTPROCEDURAL STATES: Chronic | ICD-10-CM

## 2024-06-11 PROCEDURE — 11721 DEBRIDE NAIL 6 OR MORE: CPT

## 2024-06-11 NOTE — PROCEDURE
[FreeTextEntry1] : -Aseptic debridement of all fungal nails x10 .  Patient has pain about the toes secondary to nail pressure and relates improvement with periodic debridement. -return 2 months

## 2024-06-21 DIAGNOSIS — L60.2 ONYCHOGRYPHOSIS: ICD-10-CM
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
## 2024-06-21 DIAGNOSIS — F70 MILD INTELLECTUAL DISABILITIES: ICD-10-CM

## 2024-07-10 ENCOUNTER — APPOINTMENT (OUTPATIENT)
Dept: UROLOGY | Facility: CLINIC | Age: 54
End: 2024-07-10
Payer: MEDICAID

## 2024-07-10 ENCOUNTER — OUTPATIENT (OUTPATIENT)
Dept: PREADMISSION | Facility: HOSPITAL | Age: 54
LOS: 1 days | Discharge: HOME | End: 2024-07-10
Payer: MEDICAID

## 2024-07-10 VITALS
HEART RATE: 80 BPM | DIASTOLIC BLOOD PRESSURE: 80 MMHG | OXYGEN SATURATION: 98 % | TEMPERATURE: 97.6 F | SYSTOLIC BLOOD PRESSURE: 122 MMHG | BODY MASS INDEX: 25.01 KG/M2 | WEIGHT: 165 LBS | HEIGHT: 68 IN

## 2024-07-10 DIAGNOSIS — Z00.00 ENCOUNTER FOR GENERAL ADULT MEDICAL EXAMINATION WITHOUT ABNORMAL FINDINGS: ICD-10-CM

## 2024-07-10 DIAGNOSIS — R33.9 RETENTION OF URINE, UNSPECIFIED: ICD-10-CM

## 2024-07-10 DIAGNOSIS — Z87.81 PERSONAL HISTORY OF (HEALED) TRAUMATIC FRACTURE: Chronic | ICD-10-CM

## 2024-07-10 DIAGNOSIS — Z98.890 OTHER SPECIFIED POSTPROCEDURAL STATES: Chronic | ICD-10-CM

## 2024-07-10 DIAGNOSIS — N35.912 UNSPECIFIED BULBOUS URETHRAL STRICTURE, MALE: ICD-10-CM

## 2024-07-10 PROCEDURE — 99213 OFFICE O/P EST LOW 20 MIN: CPT

## 2024-07-10 PROCEDURE — 51702 INSERT TEMP BLADDER CATH: CPT

## 2024-07-10 PROCEDURE — 99213 OFFICE O/P EST LOW 20 MIN: CPT | Mod: 25

## 2024-07-25 DIAGNOSIS — N35.912 UNSPECIFIED BULBOUS URETHRAL STRICTURE, MALE: ICD-10-CM

## 2024-07-25 DIAGNOSIS — R33.9 RETENTION OF URINE, UNSPECIFIED: ICD-10-CM

## 2024-08-13 ENCOUNTER — APPOINTMENT (OUTPATIENT)
Dept: PODIATRY | Facility: CLINIC | Age: 54
End: 2024-08-13
Payer: MEDICAID

## 2024-08-13 DIAGNOSIS — I73.9 PERIPHERAL VASCULAR DISEASE, UNSPECIFIED: ICD-10-CM

## 2024-08-13 DIAGNOSIS — B35.3 TINEA PEDIS: ICD-10-CM

## 2024-08-13 DIAGNOSIS — M79.671 PAIN IN RIGHT FOOT: ICD-10-CM

## 2024-08-13 DIAGNOSIS — M79.672 PAIN IN LEFT FOOT: ICD-10-CM

## 2024-08-13 DIAGNOSIS — B35.1 TINEA UNGUIUM: ICD-10-CM

## 2024-08-13 PROCEDURE — 11721 DEBRIDE NAIL 6 OR MORE: CPT

## 2024-08-13 PROCEDURE — 99213 OFFICE O/P EST LOW 20 MIN: CPT | Mod: 25

## 2024-08-13 NOTE — PROCEDURE
[FreeTextEntry1] : -Aseptic debridement of all fungal nails x10 .  - rx clotrimazole to be applied as discussed in detail to b/l feet due to fungal infection  -Patient has pain about the toes secondary to nail pressure and relates improvement with periodic debridement. -return 2 months

## 2024-08-13 NOTE — REASON FOR VISIT
[Follow-Up Visit] : a follow-up visit for
PAST MEDICAL HISTORY:  History of spontaneous intraparenchymal intracranial hemorrhage     HTN (hypertension)     Moyamoya disease

## 2024-08-14 ENCOUNTER — APPOINTMENT (OUTPATIENT)
Age: 54
End: 2024-08-14
Payer: MEDICAID

## 2024-08-14 VITALS
HEIGHT: 68 IN | DIASTOLIC BLOOD PRESSURE: 69 MMHG | SYSTOLIC BLOOD PRESSURE: 110 MMHG | WEIGHT: 165 LBS | BODY MASS INDEX: 25.01 KG/M2 | OXYGEN SATURATION: 97 % | TEMPERATURE: 98.2 F | HEART RATE: 69 BPM

## 2024-08-14 DIAGNOSIS — N35.912 UNSPECIFIED BULBOUS URETHRAL STRICTURE, MALE: ICD-10-CM

## 2024-08-14 DIAGNOSIS — R33.9 RETENTION OF URINE, UNSPECIFIED: ICD-10-CM

## 2024-08-14 PROCEDURE — 99213 OFFICE O/P EST LOW 20 MIN: CPT | Mod: 25

## 2024-08-14 PROCEDURE — 51702 INSERT TEMP BLADDER CATH: CPT

## 2024-08-14 NOTE — PLAN
[TextEntry] : The patient was discussed with PA/NP. I agree with the assessment/plan and made any necessary changes.  The submitted E/M billing level for this visit reflects the total time spent on the day of the visit including face-to-face time spent with the patient, non-face-to-face review of medical records and relevant information, documentation, and asynchronous communication with the patient after a visit via phone, email, or patients EHR portal after the visit. The medical records reviewed are either scanned into the chart or reviewed with the patient using a patient's electronic medical records portal for patients with records not available to [facility] via electronic transmission platforms from other institutions and labs.   I have reviewed and verified information regarding the chief complaint and history recorded by the ancillary staff and/or the patient. I have independently reviewed and interpreted tests performed by other physicians and facilities as necessary. I have discussed with the patient differential diagnosis, reason for auxiliary tests if ordered, risks, benefits, alternatives, and complications of each form of therapy were discussed. Social determinants of disease were assessed and necessary measures implemented.   Time spent counseling and performing coordination of care was also included in determining the appropriate EM billing level.

## 2024-08-14 NOTE — HISTORY OF PRESENT ILLNESS
[FreeTextEntry1] : Pt is a 54y/o M with PMH of Bulbous Urethral stricture, Urinary retention, incomplete bladder emptying, UTI currently with 16 Fr Long catheter comes to  clinic for catheter exchange. As per formal care giver hx of urethral strictures for couple of years required periodic dilatation of urethra. No acute complaints at this time, no new complains. Long catheter in place draining yellow urine. Pt. needs UDS DR. Hernandez to assess bladder function /pressure to r/o pt. having atonic bladder. as per discussion with formal caregiver Kristopher WINSTON Pt. is able to follow up some of the verbal commands but caregiver need to be present to help with questions.  In sterile technique 16 Fr Long catheter exchanged w/o any difficulty. 10 cc Long catheter balloon inflated. Pt. tolerated procedure well, left in NAD.  3/24/24 UA nitrates negative, blood +1, leuks 3+ Urine cx : No growth  Urine cx 5/02/24 Greater than 100,000 CFU/mL od Serratia, marcescens  Bladder US 3/2024 Post void volume: 484 cc ml. Reproductive organs: Not evaluated IMPRESSION: Urinary bladder retention. Trabeculated urinary bladder suggesting neurogenic bladder.  Office visit 8/14/24: Patient returns to  clinic for catheter exchange. Patient has a scheduled appointment with Dr. Hernandez for UDS on 9/27/2024.  The patient denies having any fevers, chills, nausea, vomiting, flank/abdominal pain. No issues with long catheter.

## 2024-08-14 NOTE — ASSESSMENT
[FreeTextEntry1] : #Urinary Retention/Hx of Urethral stricture # Catheter exchange  - F/u with Dr. Hernandez for UDS scheduled for 9/27/24 to assess for bladder function/pressure to r/o atonic bladder  - Continue Flomax 0.8mg qhs  - F/u in  clinic for catheter exchange every 4- 6 weeks

## 2024-08-19 ENCOUNTER — APPOINTMENT (OUTPATIENT)
Dept: OTOLARYNGOLOGY | Facility: CLINIC | Age: 54
End: 2024-08-19
Payer: MEDICAID

## 2024-08-19 DIAGNOSIS — H90.3 SENSORINEURAL HEARING LOSS, BILATERAL: ICD-10-CM

## 2024-08-19 DIAGNOSIS — H61.20 IMPACTED CERUMEN, UNSPECIFIED EAR: ICD-10-CM

## 2024-08-19 DIAGNOSIS — H93.8X3 OTHER SPECIFIED DISORDERS OF EAR, BILATERAL: ICD-10-CM

## 2024-08-19 PROCEDURE — 69210 REMOVE IMPACTED EAR WAX UNI: CPT

## 2024-08-19 NOTE — HISTORY OF PRESENT ILLNESS
[FreeTextEntry1] : Patient returns today c/o clogged ears, SNHL. Accompanied by aide. Aide states that he is here today for ear checkup. As per aide, he is refusing to wear hearing aids. Last audiogram 1/2024.

## 2024-08-19 NOTE — ASSESSMENT
[FreeTextEntry1] : B/L ear wax removed with curette. Well tolerated. Reports improvement in clogged ear symptoms.  RTC in 6 months

## 2024-08-19 NOTE — PHYSICAL EXAM
[Normal] : mucosa is normal [Midline] : trachea located in midline position [de-identified] : B/L ear wax removed with curette

## 2024-09-03 RX ORDER — TAMSULOSIN HYDROCHLORIDE 0.4 MG/1
0.4 CAPSULE ORAL DAILY
Qty: 180 | Refills: 3 | Status: ACTIVE | COMMUNITY
Start: 2024-09-03 | End: 1900-01-01

## 2024-09-25 ENCOUNTER — OUTPATIENT (OUTPATIENT)
Dept: OUTPATIENT SERVICES | Facility: HOSPITAL | Age: 54
LOS: 1 days | End: 2024-09-25
Payer: MEDICAID

## 2024-09-25 ENCOUNTER — APPOINTMENT (OUTPATIENT)
Age: 54
End: 2024-09-25
Payer: MEDICAID

## 2024-09-25 VITALS
TEMPERATURE: 97 F | WEIGHT: 165 LBS | HEIGHT: 68 IN | OXYGEN SATURATION: 94 % | DIASTOLIC BLOOD PRESSURE: 87 MMHG | SYSTOLIC BLOOD PRESSURE: 119 MMHG | HEART RATE: 70 BPM | BODY MASS INDEX: 25.01 KG/M2

## 2024-09-25 DIAGNOSIS — Z98.890 OTHER SPECIFIED POSTPROCEDURAL STATES: Chronic | ICD-10-CM

## 2024-09-25 DIAGNOSIS — R30.0 DYSURIA: ICD-10-CM

## 2024-09-25 DIAGNOSIS — Z00.00 ENCOUNTER FOR GENERAL ADULT MEDICAL EXAMINATION WITHOUT ABNORMAL FINDINGS: ICD-10-CM

## 2024-09-25 DIAGNOSIS — R33.9 RETENTION OF URINE, UNSPECIFIED: ICD-10-CM

## 2024-09-25 DIAGNOSIS — Z87.81 PERSONAL HISTORY OF (HEALED) TRAUMATIC FRACTURE: Chronic | ICD-10-CM

## 2024-09-25 PROCEDURE — 99213 OFFICE O/P EST LOW 20 MIN: CPT | Mod: 25

## 2024-09-25 PROCEDURE — 99213 OFFICE O/P EST LOW 20 MIN: CPT

## 2024-09-25 PROCEDURE — 51702 INSERT TEMP BLADDER CATH: CPT

## 2024-09-25 PROCEDURE — 51703 INSERT BLADDER CATH COMPLEX: CPT

## 2024-09-25 RX ORDER — BIOTIN 10 MG
140-100 TABLET ORAL
Qty: 30 | Refills: 5 | Status: ACTIVE | COMMUNITY
Start: 2024-09-25 | End: 1900-01-01

## 2024-09-25 NOTE — PLAN
[TextEntry] : The patient was discussed with PA/NP. I agree with the assessment/plan and made any necessary changes.  The submitted E/M billing level for this visit reflects the total time spent on the day of the visit including face-to-face time spent with the patient, non-face-to-face review of medical records and relevant information, documentation, and asynchronous communication with the patient after a visit via phone, email, or patients EHR portal after the visit. The medical records reviewed are either scanned into the chart or reviewed with the patient using a patient's electronic medical records portal for patients with records not available to [facility] via electronic transmission platforms from other institutions and labs.   I have reviewed and verified information regarding the chief complaint and history recorded by the ancillary staff and/or the patient. I have independently reviewed and interpreted tests performed by other physicians and facilities as necessary. I have discussed with the patient differential diagnosis, reason for auxiliary tests if ordered, risks, benefits, alternatives, and complications of each form of therapy were discussed. Social determinants of disease were assessed and necessary measures implemented.   Time spent counseling and performing coordination of care was also included in determining the appropriate EM billing level.

## 2024-09-25 NOTE — HISTORY OF PRESENT ILLNESS
[FreeTextEntry1] : Pt is a 54y/o M with PMH of Bulbous Urethral stricture, Urinary retention, incomplete bladder emptying, UTI currently with 16 Fr Long catheter comes to  clinic for catheter exchange. As per formal care giver hx of urethral strictures for couple of years required periodic dilatation of urethra. No acute complaints at this time, no new complains. Long catheter in place draining yellow urine. Pt. needs UDS DR. Hernandez to assess bladder function /pressure to r/o pt. having atonic bladder. as per discussion with formal caregiver Kristopher WINSTON Pt. is able to follow up some of the verbal commands but caregiver need to be present to help with questions.  Bladder US 3/2024 Post void volume: 484 cc ml. IMPRESSION: Urinary bladder retention. Trabeculated urinary bladder suggesting neurogenic bladder.  Office visit 8/14/24: Patient returns to  clinic for catheter exchange. Patient has a scheduled appointment with Dr. Hernandez for UDS on 9/27/2024. The patient denies having any fevers, chills, nausea, vomiting, flank/abdominal pain. No issues with long catheter.  Office Visit 09/25/2024  Per aide at bedside, staff have reported foul smelling urine from pt - no other sxs. Long catheter exchanged without issues, dictated in separate procedure note.

## 2024-09-25 NOTE — PHYSICAL EXAM
[Urethral Meatus] : meatus normal [Penis Abnormality] : normal circumcised penis [de-identified] : 16Fr long with cloudy yellow urine

## 2024-09-25 NOTE — ASSESSMENT
[FreeTextEntry1] : 53yoM with hx of urethral strictures, urinary retention with chronic long who presents to clinic for catheter exchange.  PLAN: -Under sterile conditions, a 14Fr coude was exchanged w/o complication --> pt/aide did not have pt's usual 16Fr long, so 14Fr coude was used in its place -UA, UCx -Appt scheduled with Dr. Hernandez for UDS/active TOV on 9/27 -- f/u appt to be made pending results from further testing

## 2024-09-26 ENCOUNTER — LABORATORY RESULT (OUTPATIENT)
Age: 54
End: 2024-09-26

## 2024-09-26 ENCOUNTER — OUTPATIENT (OUTPATIENT)
Dept: OUTPATIENT SERVICES | Facility: HOSPITAL | Age: 54
LOS: 1 days | End: 2024-09-26
Payer: MEDICAID

## 2024-09-26 DIAGNOSIS — Z98.890 OTHER SPECIFIED POSTPROCEDURAL STATES: Chronic | ICD-10-CM

## 2024-09-26 DIAGNOSIS — R30.0 DYSURIA: ICD-10-CM

## 2024-09-26 DIAGNOSIS — Z87.81 PERSONAL HISTORY OF (HEALED) TRAUMATIC FRACTURE: Chronic | ICD-10-CM

## 2024-09-26 PROCEDURE — 87086 URINE CULTURE/COLONY COUNT: CPT

## 2024-09-26 PROCEDURE — 87077 CULTURE AEROBIC IDENTIFY: CPT

## 2024-09-26 PROCEDURE — 81001 URINALYSIS AUTO W/SCOPE: CPT

## 2024-09-27 DIAGNOSIS — R30.0 DYSURIA: ICD-10-CM

## 2024-09-27 DIAGNOSIS — R33.9 RETENTION OF URINE, UNSPECIFIED: ICD-10-CM

## 2024-09-27 LAB
APPEARANCE: CLEAR
BILIRUBIN URINE: NEGATIVE
BLOOD URINE: NEGATIVE
COLOR: YELLOW
GLUCOSE QUALITATIVE U: NEGATIVE MG/DL
KETONES URINE: NEGATIVE MG/DL
LEUKOCYTE ESTERASE URINE: ABNORMAL
NITRITE URINE: POSITIVE
PH URINE: 7
PROTEIN URINE: NEGATIVE MG/DL
SPECIFIC GRAVITY URINE: 1.01
UROBILINOGEN URINE: 0.2 MG/DL

## 2024-10-08 ENCOUNTER — OUTPATIENT (OUTPATIENT)
Dept: OUTPATIENT SERVICES | Facility: HOSPITAL | Age: 54
LOS: 1 days | End: 2024-10-08
Payer: MEDICAID

## 2024-10-08 ENCOUNTER — APPOINTMENT (OUTPATIENT)
Dept: PODIATRY | Facility: CLINIC | Age: 54
End: 2024-10-08
Payer: MEDICAID

## 2024-10-08 DIAGNOSIS — B35.1 TINEA UNGUIUM: ICD-10-CM

## 2024-10-08 DIAGNOSIS — I73.9 PERIPHERAL VASCULAR DISEASE, UNSPECIFIED: ICD-10-CM

## 2024-10-08 DIAGNOSIS — M79.671 PAIN IN RIGHT FOOT: ICD-10-CM

## 2024-10-08 DIAGNOSIS — Z87.81 PERSONAL HISTORY OF (HEALED) TRAUMATIC FRACTURE: Chronic | ICD-10-CM
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
## 2024-10-08 DIAGNOSIS — M79.672 PAIN IN LEFT FOOT: ICD-10-CM

## 2024-10-08 DIAGNOSIS — Z98.890 OTHER SPECIFIED POSTPROCEDURAL STATES: Chronic | ICD-10-CM

## 2024-10-08 DIAGNOSIS — Z00.00 ENCOUNTER FOR GENERAL ADULT MEDICAL EXAMINATION WITHOUT ABNORMAL FINDINGS: ICD-10-CM

## 2024-10-08 DIAGNOSIS — L30.9 DERMATITIS, UNSPECIFIED: ICD-10-CM

## 2024-10-08 PROCEDURE — 11721 DEBRIDE NAIL 6 OR MORE: CPT

## 2024-10-08 PROCEDURE — 99213 OFFICE O/P EST LOW 20 MIN: CPT | Mod: 25

## 2024-10-18 ENCOUNTER — APPOINTMENT (OUTPATIENT)
Dept: UROLOGY | Facility: CLINIC | Age: 54
End: 2024-10-18
Payer: MEDICAID

## 2024-10-18 DIAGNOSIS — R30.0 DYSURIA: ICD-10-CM

## 2024-10-18 PROCEDURE — 51702 INSERT TEMP BLADDER CATH: CPT

## 2024-10-23 LAB — URINE CULTURE <10: ABNORMAL

## 2024-10-28 ENCOUNTER — NON-APPOINTMENT (OUTPATIENT)
Age: 54
End: 2024-10-28

## 2024-11-19 ENCOUNTER — APPOINTMENT (OUTPATIENT)
Dept: UROLOGY | Facility: CLINIC | Age: 54
End: 2024-11-19
Payer: MEDICAID

## 2024-11-19 PROCEDURE — 51702 INSERT TEMP BLADDER CATH: CPT

## 2024-11-20 ENCOUNTER — OUTPATIENT (OUTPATIENT)
Dept: OUTPATIENT SERVICES | Facility: HOSPITAL | Age: 54
LOS: 1 days | End: 2024-11-20
Payer: MEDICAID

## 2024-11-20 DIAGNOSIS — Z98.890 OTHER SPECIFIED POSTPROCEDURAL STATES: Chronic | ICD-10-CM

## 2024-11-20 DIAGNOSIS — Z87.81 PERSONAL HISTORY OF (HEALED) TRAUMATIC FRACTURE: Chronic | ICD-10-CM

## 2024-11-20 DIAGNOSIS — R56.9 UNSPECIFIED CONVULSIONS: ICD-10-CM

## 2024-11-20 PROCEDURE — 80164 ASSAY DIPROPYLACETIC ACD TOT: CPT

## 2024-11-20 PROCEDURE — 36415 COLL VENOUS BLD VENIPUNCTURE: CPT

## 2024-11-20 PROCEDURE — 80165 DIPROPYLACETIC ACID FREE: CPT

## 2024-11-21 DIAGNOSIS — R56.9 UNSPECIFIED CONVULSIONS: ICD-10-CM

## 2024-11-26 RX ORDER — CIPROFLOXACIN HYDROCHLORIDE 500 MG/1
500 TABLET, FILM COATED ORAL
Qty: 14 | Refills: 0 | Status: ACTIVE | COMMUNITY
Start: 2024-11-26 | End: 1900-01-01

## 2024-12-06 ENCOUNTER — APPOINTMENT (OUTPATIENT)
Dept: UROLOGY | Facility: CLINIC | Age: 54
End: 2024-12-06
Payer: MEDICAID

## 2024-12-06 DIAGNOSIS — R33.9 RETENTION OF URINE, UNSPECIFIED: ICD-10-CM

## 2024-12-06 PROCEDURE — 51797 INTRAABDOMINAL PRESSURE TEST: CPT

## 2024-12-06 PROCEDURE — 51728 CYSTOMETROGRAM W/VP: CPT

## 2024-12-06 PROCEDURE — 51784 ANAL/URINARY MUSCLE STUDY: CPT

## 2024-12-06 RX ORDER — VITAMINS A AND D OINTMENT 15.5; 53.4 G/100G; G/100G
OINTMENT TOPICAL 3 TIMES DAILY
Qty: 1 | Refills: 0 | Status: ACTIVE | COMMUNITY
Start: 2024-12-06 | End: 1900-01-01

## 2024-12-10 ENCOUNTER — APPOINTMENT (OUTPATIENT)
Dept: PODIATRY | Facility: CLINIC | Age: 54
End: 2024-12-10

## 2024-12-10 ENCOUNTER — OUTPATIENT (OUTPATIENT)
Dept: OUTPATIENT SERVICES | Facility: HOSPITAL | Age: 54
LOS: 1 days | End: 2024-12-10
Payer: MEDICAID

## 2024-12-10 DIAGNOSIS — L85.3 XEROSIS CUTIS: ICD-10-CM

## 2024-12-10 DIAGNOSIS — Z98.890 OTHER SPECIFIED POSTPROCEDURAL STATES: Chronic | ICD-10-CM

## 2024-12-10 DIAGNOSIS — L60.2 ONYCHOGRYPHOSIS: ICD-10-CM

## 2024-12-10 DIAGNOSIS — F70 MILD INTELLECTUAL DISABILITIES: ICD-10-CM

## 2024-12-10 DIAGNOSIS — Z87.81 PERSONAL HISTORY OF (HEALED) TRAUMATIC FRACTURE: Chronic | ICD-10-CM

## 2024-12-10 DIAGNOSIS — Z00.00 ENCOUNTER FOR GENERAL ADULT MEDICAL EXAMINATION WITHOUT ABNORMAL FINDINGS: ICD-10-CM

## 2024-12-10 PROCEDURE — 99213 OFFICE O/P EST LOW 20 MIN: CPT | Mod: 25

## 2024-12-10 PROCEDURE — 11721 DEBRIDE NAIL 6 OR MORE: CPT

## 2024-12-10 RX ORDER — ADHESIVE
12 LIQUID (ML) MISCELLANEOUS TWICE DAILY
Qty: 1 | Refills: 3 | Status: ACTIVE | COMMUNITY
Start: 2024-12-10 | End: 1900-01-01

## 2024-12-19 DIAGNOSIS — F70 MILD INTELLECTUAL DISABILITIES: ICD-10-CM
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
## 2024-12-19 DIAGNOSIS — L60.2 ONYCHOGRYPHOSIS: ICD-10-CM

## 2025-01-02 NOTE — ED ADULT NURSE NOTE - CARDIO WDL
That would be ok to do both.    John Oconnor, CNP     Normal rate, regular rhythm, normal S1, S2 heart sounds heard.

## 2025-01-10 ENCOUNTER — APPOINTMENT (OUTPATIENT)
Dept: UROLOGY | Facility: CLINIC | Age: 55
End: 2025-01-10
Payer: MEDICAID

## 2025-01-10 DIAGNOSIS — R33.9 RETENTION OF URINE, UNSPECIFIED: ICD-10-CM

## 2025-01-10 PROCEDURE — 51702 INSERT TEMP BLADDER CATH: CPT

## 2025-02-05 NOTE — PROCEDURE
Orthopaedic Clinic Note - Established Patient    NAME:  German Mccord   : 1966  MRN: [id number]      2/10/2025      CHIEF COMPLAINT:  follow up left knee pain, repeat injection      HISTORY OF PRESENT ILLNESS:   The patient is a 58 y.o. male who returns today for follow up of left knee pain, requesting repeat injection. It has been 3 months since last injection. Patient denies any recent trauma, fall, or other other injury. Pain is rated 5/10 today.  He is getting ready to go on a trip to [place].    Past Medical History:        Diagnosis Date    Fractures 7 left ribs last May 2024    Osteoarthritis 2 years ago    Dr Shaffer       Past Surgical History:        Procedure Laterality Date    KNEE ARTHROSCOPY  quad tendon repair    Dr Shaffer       Current Medications:   Prior to Admission medications    Medication Sig Start Date End Date Taking? Authorizing Provider   sulfamethoxazole-trimethoprim (BACTRIM DS;SEPTRA DS) 800-160 MG per tablet Take 1 tablet by mouth 2 times daily for 7 days 2/10/25 2/17/25 Yes Leida Mayen PA-C   FARXIGA 10 MG tablet Take 1 tablet by mouth daily 24  Yes Provider, MD Kamla   [employer] ALLERGY RELIEF D  MG per extended release tablet Take 1 tablet by mouth 2 times daily as needed 10/11/24  Yes Provider, Historical, MD   furosemide (LASIX) 80 MG tablet  24  Yes Provider, HistoricalMD BILL 100 UNIT/ML injection pen INJECT 68 UNITS EVERY DAY BY SUBCUTANEOUS ROUTE. 10/21/24  Yes Provider, MD FAWN Cason-LIV ULTRAFINE III SHORT PEN 31G X 8 MM MISC  10/28/24  Yes Provider, Historical, MD   losartan (COZAAR) 100 MG tablet  24  Yes Provider, Historical, MD   montelukast (SINGULAIR) 10 MG tablet Take 1 tablet by mouth daily   Yes Provider, Historical, MD   rosuvastatin (CRESTOR) 20 MG tablet Take 1 tablet by mouth daily 10/5/24  Yes Provider, MD Kamla   OZEMPIC, 2 MG/DOSE, 8 MG/3ML SOPN sc injection INJECT 2 MG INTO THE SKIN ONCE WEEKLY  [FreeTextEntry1] : -Aseptic debridement of all fungal nails.  Patient has pain about the toes secondary to nail pressure and relates improvement with periodic debridement. -Rx Clotrimazole  -return 2 months

## 2025-02-10 ENCOUNTER — APPOINTMENT (OUTPATIENT)
Dept: UROLOGY | Facility: CLINIC | Age: 55
End: 2025-02-10
Payer: MEDICAID

## 2025-02-10 DIAGNOSIS — R33.9 RETENTION OF URINE, UNSPECIFIED: ICD-10-CM

## 2025-02-10 PROCEDURE — 51702 INSERT TEMP BLADDER CATH: CPT

## 2025-02-21 ENCOUNTER — APPOINTMENT (OUTPATIENT)
Dept: OTOLARYNGOLOGY | Facility: CLINIC | Age: 55
End: 2025-02-21
Payer: MEDICAID

## 2025-02-21 DIAGNOSIS — H93.8X3 OTHER SPECIFIED DISORDERS OF EAR, BILATERAL: ICD-10-CM

## 2025-02-21 DIAGNOSIS — H61.20 IMPACTED CERUMEN, UNSPECIFIED EAR: ICD-10-CM

## 2025-02-21 PROCEDURE — 69210 REMOVE IMPACTED EAR WAX UNI: CPT

## 2025-02-25 ENCOUNTER — OUTPATIENT (OUTPATIENT)
Dept: OUTPATIENT SERVICES | Facility: HOSPITAL | Age: 55
LOS: 1 days | End: 2025-02-25
Payer: MEDICAID

## 2025-02-25 ENCOUNTER — OUTPATIENT (OUTPATIENT)
Dept: OUTPATIENT SERVICES | Facility: HOSPITAL | Age: 55
LOS: 1 days | End: 2025-02-25

## 2025-02-25 ENCOUNTER — APPOINTMENT (OUTPATIENT)
Dept: PODIATRY | Facility: CLINIC | Age: 55
End: 2025-02-25
Payer: MEDICAID

## 2025-02-25 ENCOUNTER — APPOINTMENT (OUTPATIENT)
Dept: PODIATRY | Facility: CLINIC | Age: 55
End: 2025-02-25

## 2025-02-25 DIAGNOSIS — Z98.890 OTHER SPECIFIED POSTPROCEDURAL STATES: Chronic | ICD-10-CM

## 2025-02-25 DIAGNOSIS — B35.1 TINEA UNGUIUM: ICD-10-CM

## 2025-02-25 DIAGNOSIS — Z00.00 ENCOUNTER FOR GENERAL ADULT MEDICAL EXAMINATION WITHOUT ABNORMAL FINDINGS: ICD-10-CM
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
## 2025-02-25 DIAGNOSIS — Z87.81 PERSONAL HISTORY OF (HEALED) TRAUMATIC FRACTURE: Chronic | ICD-10-CM

## 2025-02-25 DIAGNOSIS — M79.672 PAIN IN LEFT FOOT: ICD-10-CM

## 2025-02-25 DIAGNOSIS — M79.671 PAIN IN RIGHT FOOT: ICD-10-CM
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
## 2025-02-25 PROCEDURE — 11721 DEBRIDE NAIL 6 OR MORE: CPT

## 2025-02-25 PROCEDURE — 99212 OFFICE O/P EST SF 10 MIN: CPT | Mod: 25

## 2025-02-25 RX ORDER — AMMONIUM LACTATE 12 %
12 CREAM (GRAM) TOPICAL
Qty: 1 | Refills: 5 | Status: ACTIVE | COMMUNITY
Start: 2025-02-25 | End: 1900-01-01

## 2025-03-10 ENCOUNTER — APPOINTMENT (OUTPATIENT)
Dept: UROLOGY | Facility: CLINIC | Age: 55
End: 2025-03-10
Payer: MEDICAID

## 2025-03-10 DIAGNOSIS — R33.9 RETENTION OF URINE, UNSPECIFIED: ICD-10-CM

## 2025-03-10 PROCEDURE — 51702 INSERT TEMP BLADDER CATH: CPT

## 2025-03-18 NOTE — ED ADULT NURSE NOTE - NS ED NURSE DC INFO COMPLEXITY
How Severe Are Your Spot(S)?: mild What Type Of Note Output Would You Prefer (Optional)?: Bullet Format What Is The Reason For Today's Visit?: Full Body Skin Examination What Is The Reason For Today's Visit? (Being Monitored For X): concerning skin lesions on an annual basis Simple: Patient demonstrates quick and easy understanding/Verbalized Understanding

## 2025-04-12 NOTE — DISCHARGE NOTE PROVIDER - NSDCCPCAREPLAN_GEN_ALL_CORE_FT
PRINCIPAL DISCHARGE DIAGNOSIS  Diagnosis: Costochondritis  Assessment and Plan of Treatment: Costochondritis, also known as chest wall pain syndrome is a benign inflammatory condition affecting the upper costochondral junctions and costosternal joints. Costochondritis is a common cause of chest pain, which is considered a medical emergency until life-threatening conditions can be ruled out.  The cause of costochondritis is not known however, it is suggested infection, trauma, or overuse may lead to the development of the condition. Diagnosis is predominantly clinical and based on physical examination, history, and ruling other more serious conditions out.   Costochondritis is considered a self-limited condition that will resolve on its own. Treatment options usually involve rest, pain-relieving medications, nonsteroidal anti-inflammatory drugs, ice, heat, and manual therapy.      SECONDARY DISCHARGE DIAGNOSES  Diagnosis: Fall  Assessment and Plan of Treatment: you had a negative trauma work up    Diagnosis: Hyponatremia  Assessment and Plan of Treatment: you had low sodium blood levels when you came to the hospital,. you were given IV fluids which corrected it.    Diagnosis: Hypochloremia  Assessment and Plan of Treatment: you had low chloride blood levels when you came to the hospital,. you were given IV fluids which corrected it.    Diagnosis: Inability to ambulate due to ankle or foot  Assessment and Plan of Treatment: you worked with physical therapy to help with ambulation    
Digestive/Medications

## 2025-04-14 ENCOUNTER — APPOINTMENT (OUTPATIENT)
Dept: UROLOGY | Facility: CLINIC | Age: 55
End: 2025-04-14
Payer: MEDICAID

## 2025-04-14 DIAGNOSIS — R33.9 RETENTION OF URINE, UNSPECIFIED: ICD-10-CM

## 2025-04-14 DIAGNOSIS — N35.912 UNSPECIFIED BULBOUS URETHRAL STRICTURE, MALE: ICD-10-CM

## 2025-04-14 PROCEDURE — 51703 INSERT BLADDER CATH COMPLEX: CPT

## 2025-04-29 ENCOUNTER — APPOINTMENT (OUTPATIENT)
Dept: PODIATRY | Facility: CLINIC | Age: 55
End: 2025-04-29

## 2025-04-29 ENCOUNTER — OUTPATIENT (OUTPATIENT)
Dept: OUTPATIENT SERVICES | Facility: HOSPITAL | Age: 55
LOS: 1 days | End: 2025-04-29
Payer: MEDICAID

## 2025-04-29 ENCOUNTER — APPOINTMENT (OUTPATIENT)
Dept: PODIATRY | Facility: CLINIC | Age: 55
End: 2025-04-29
Payer: MEDICAID

## 2025-04-29 DIAGNOSIS — Z87.81 PERSONAL HISTORY OF (HEALED) TRAUMATIC FRACTURE: Chronic | ICD-10-CM

## 2025-04-29 DIAGNOSIS — F70 MILD INTELLECTUAL DISABILITIES: ICD-10-CM

## 2025-04-29 DIAGNOSIS — Z98.890 OTHER SPECIFIED POSTPROCEDURAL STATES: Chronic | ICD-10-CM

## 2025-04-29 DIAGNOSIS — B35.1 TINEA UNGUIUM: ICD-10-CM

## 2025-04-29 DIAGNOSIS — L60.2 ONYCHOGRYPHOSIS: ICD-10-CM

## 2025-04-29 DIAGNOSIS — M79.671 PAIN IN RIGHT FOOT: ICD-10-CM

## 2025-04-29 DIAGNOSIS — M79.672 PAIN IN LEFT FOOT: ICD-10-CM

## 2025-04-29 DIAGNOSIS — Z00.00 ENCOUNTER FOR GENERAL ADULT MEDICAL EXAMINATION WITHOUT ABNORMAL FINDINGS: ICD-10-CM

## 2025-04-29 PROCEDURE — 11721 DEBRIDE NAIL 6 OR MORE: CPT

## 2025-05-06 DIAGNOSIS — M79.671 PAIN IN RIGHT FOOT: ICD-10-CM

## 2025-05-06 DIAGNOSIS — F70 MILD INTELLECTUAL DISABILITIES: ICD-10-CM

## 2025-05-06 DIAGNOSIS — M79.672 PAIN IN LEFT FOOT: ICD-10-CM

## 2025-05-06 DIAGNOSIS — B35.1 TINEA UNGUIUM: ICD-10-CM

## 2025-05-06 DIAGNOSIS — L60.2 ONYCHOGRYPHOSIS: ICD-10-CM
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
## 2025-05-13 ENCOUNTER — APPOINTMENT (OUTPATIENT)
Dept: NEUROLOGY | Facility: CLINIC | Age: 55
End: 2025-05-13
Payer: MEDICAID

## 2025-05-13 ENCOUNTER — OUTPATIENT (OUTPATIENT)
Dept: OUTPATIENT SERVICES | Facility: HOSPITAL | Age: 55
LOS: 1 days | End: 2025-05-13
Payer: MEDICAID

## 2025-05-13 VITALS
HEART RATE: 73 BPM | OXYGEN SATURATION: 100 % | WEIGHT: 174 LBS | SYSTOLIC BLOOD PRESSURE: 111 MMHG | BODY MASS INDEX: 26.46 KG/M2 | DIASTOLIC BLOOD PRESSURE: 79 MMHG

## 2025-05-13 DIAGNOSIS — Z87.81 PERSONAL HISTORY OF (HEALED) TRAUMATIC FRACTURE: Chronic | ICD-10-CM

## 2025-05-13 DIAGNOSIS — Z98.890 OTHER SPECIFIED POSTPROCEDURAL STATES: Chronic | ICD-10-CM

## 2025-05-13 DIAGNOSIS — R56.9 UNSPECIFIED CONVULSIONS: ICD-10-CM

## 2025-05-13 DIAGNOSIS — Z00.00 ENCOUNTER FOR GENERAL ADULT MEDICAL EXAMINATION WITHOUT ABNORMAL FINDINGS: ICD-10-CM

## 2025-05-13 PROCEDURE — 99203 OFFICE O/P NEW LOW 30 MIN: CPT

## 2025-05-13 PROCEDURE — G2211 COMPLEX E/M VISIT ADD ON: CPT | Mod: NC

## 2025-05-13 PROCEDURE — 99213 OFFICE O/P EST LOW 20 MIN: CPT

## 2025-05-15 DIAGNOSIS — R56.9 UNSPECIFIED CONVULSIONS: ICD-10-CM

## 2025-05-16 ENCOUNTER — APPOINTMENT (OUTPATIENT)
Dept: UROLOGY | Facility: CLINIC | Age: 55
End: 2025-05-16
Payer: MEDICAID

## 2025-05-16 DIAGNOSIS — N35.912 UNSPECIFIED BULBOUS URETHRAL STRICTURE, MALE: ICD-10-CM

## 2025-05-16 PROCEDURE — 51703 INSERT BLADDER CATH COMPLEX: CPT

## 2025-05-27 NOTE — CONSULT NOTE ADULT - RS GEN PE MLT RESP DETAILS PC
ADMISSION NOTE    Name: Hector Jensen Jr   : 1952   MRN: [id number]   Date: 2025      Reason for ICU Admission: electrolyte imbalance     ASSESSMENT and PLAN     Hypervolemic Hyponatremia   Nephrology has been consulted to follow.   Q 6 hrs labs to monitor sodium   Fluid restriction 1200 cc/day  Start on Ure-Na (15 grams orally twice daily) per nephrology   If becomes neurologically symptomatic or sodium level continues to decrease may then need 3% saline intermittent bolus.   Prevent rapid correction goal no more than 6 mmol/L per day per nephrology recommendations. If corrects too quickly will start on D5%.   Urine studies and cortisol added by nephrology.   Check TSH    Diffuse groundglass consolidation with pulmonary edema and bilateral pulmonary effusions on CTA   On home diuretics -  hold for now given hyponatremia   Pro-BNP 6,758 - recheck in am   Supplemental O2 to keep sats greater than 93%.  Given 40 mg IV Lasix given in the ED. May need additional diureses if respiratory status worsens.  Strict I/Os  Afebrile and aleukocytosis   Will check LA and Procalcitonin   Defer antibiotics at this time as opacities less likely infectious/pneumonia.   If clinical status worsens or has s/s of infection will get blood cultures and start empiric antibiotics.   Send sputum cx if able to get sample.     Hyperlipidemia   Continue atorvastatin     Hx atrial fibrillation, CAD s/p CABG May 2025  EKG currently SR with 1' AVB   Continue amiodorone and metoprolol - home meds  Telemetry   Consider courtesy consult to cardiology in am   Diuretics on hold at present, reassess need to start in am.     Hx CVA   Continue ASA daily and statin     Chronic back pain   Hx T7 and L1 anterior wedge compression fracture - stable on last imaging 25. Follows with [facility] [place] [facility].   Acetaminophen PRN.  Hold Robaxin    May have lidocaine patch tonight.     HISTORY OF PRESENT ILLNESS:     Hector Jensen Jr is a 
no wheezes/normal/breath sounds equal/good air movement

## 2025-06-19 ENCOUNTER — APPOINTMENT (OUTPATIENT)
Dept: UROLOGY | Facility: CLINIC | Age: 55
End: 2025-06-19
Payer: MEDICAID

## 2025-06-19 PROBLEM — R21 SCROTAL RASH: Status: ACTIVE | Noted: 2025-06-19

## 2025-06-19 PROCEDURE — 51703 INSERT BLADDER CATH COMPLEX: CPT

## 2025-06-20 ENCOUNTER — INPATIENT (INPATIENT)
Facility: HOSPITAL | Age: 55
LOS: 5 days | Discharge: HOME | DRG: 342 | End: 2025-06-26
Attending: INTERNAL MEDICINE | Admitting: STUDENT IN AN ORGANIZED HEALTH CARE EDUCATION/TRAINING PROGRAM
Payer: MEDICAID

## 2025-06-20 VITALS
HEART RATE: 83 BPM | RESPIRATION RATE: 18 BRPM | TEMPERATURE: 99 F | DIASTOLIC BLOOD PRESSURE: 63 MMHG | SYSTOLIC BLOOD PRESSURE: 159 MMHG | OXYGEN SATURATION: 100 %

## 2025-06-20 DIAGNOSIS — F63.9 IMPULSE DISORDER, UNSPECIFIED: ICD-10-CM

## 2025-06-20 DIAGNOSIS — E03.9 HYPOTHYROIDISM, UNSPECIFIED: ICD-10-CM

## 2025-06-20 DIAGNOSIS — G40.909 EPILEPSY, UNSPECIFIED, NOT INTRACTABLE, WITHOUT STATUS EPILEPTICUS: ICD-10-CM

## 2025-06-20 DIAGNOSIS — Z87.81 PERSONAL HISTORY OF (HEALED) TRAUMATIC FRACTURE: Chronic | ICD-10-CM

## 2025-06-20 DIAGNOSIS — S82.841A DISPLACED BIMALLEOLAR FRACTURE OF RIGHT LOWER LEG, INITIAL ENCOUNTER FOR CLOSED FRACTURE: ICD-10-CM

## 2025-06-20 DIAGNOSIS — Z98.890 OTHER SPECIFIED POSTPROCEDURAL STATES: Chronic | ICD-10-CM
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
## 2025-06-20 DIAGNOSIS — Z88.1 ALLERGY STATUS TO OTHER ANTIBIOTIC AGENTS: ICD-10-CM

## 2025-06-20 DIAGNOSIS — E87.1 HYPO-OSMOLALITY AND HYPONATREMIA: ICD-10-CM

## 2025-06-20 DIAGNOSIS — F31.9 BIPOLAR DISORDER, UNSPECIFIED: ICD-10-CM

## 2025-06-20 DIAGNOSIS — F70 MILD INTELLECTUAL DISABILITIES: ICD-10-CM

## 2025-06-20 DIAGNOSIS — D64.9 ANEMIA, UNSPECIFIED: ICD-10-CM
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
## 2025-06-20 DIAGNOSIS — F20.9 SCHIZOPHRENIA, UNSPECIFIED: ICD-10-CM

## 2025-06-20 DIAGNOSIS — K59.00 CONSTIPATION, UNSPECIFIED: ICD-10-CM

## 2025-06-20 LAB
ALBUMIN SERPL ELPH-MCNC: 4 G/DL — SIGNIFICANT CHANGE UP (ref 3.5–5.2)
ALP SERPL-CCNC: 107 U/L — SIGNIFICANT CHANGE UP (ref 30–115)
ALT FLD-CCNC: 11 U/L — SIGNIFICANT CHANGE UP (ref 0–41)
ANION GAP SERPL CALC-SCNC: 10 MMOL/L — SIGNIFICANT CHANGE UP (ref 7–14)
AST SERPL-CCNC: 33 U/L — SIGNIFICANT CHANGE UP (ref 0–41)
BASOPHILS # BLD AUTO: 0.03 K/UL — SIGNIFICANT CHANGE UP (ref 0–0.2)
BASOPHILS NFR BLD AUTO: 0.2 % — SIGNIFICANT CHANGE UP (ref 0–1)
BILIRUB SERPL-MCNC: 0.5 MG/DL — SIGNIFICANT CHANGE UP (ref 0.2–1.2)
BUN SERPL-MCNC: 18 MG/DL — SIGNIFICANT CHANGE UP (ref 10–20)
CALCIUM SERPL-MCNC: 9.4 MG/DL — SIGNIFICANT CHANGE UP (ref 8.4–10.5)
CHLORIDE SERPL-SCNC: 94 MMOL/L — LOW (ref 98–110)
CO2 SERPL-SCNC: 24 MMOL/L — SIGNIFICANT CHANGE UP (ref 17–32)
CREAT SERPL-MCNC: 0.9 MG/DL — SIGNIFICANT CHANGE UP (ref 0.7–1.5)
EGFR: 101 ML/MIN/1.73M2 — SIGNIFICANT CHANGE UP
EGFR: 101 ML/MIN/1.73M2 — SIGNIFICANT CHANGE UP
EOSINOPHIL # BLD AUTO: 0.06 K/UL — SIGNIFICANT CHANGE UP (ref 0–0.7)
EOSINOPHIL NFR BLD AUTO: 0.5 % — SIGNIFICANT CHANGE UP (ref 0–8)
GLUCOSE SERPL-MCNC: 94 MG/DL — SIGNIFICANT CHANGE UP (ref 70–99)
HCT VFR BLD CALC: 33.5 % — LOW (ref 42–52)
HGB BLD-MCNC: 11.7 G/DL — LOW (ref 14–18)
IMM GRANULOCYTES NFR BLD AUTO: 0.7 % — HIGH (ref 0.1–0.3)
LACTATE SERPL-SCNC: 1.2 MMOL/L — SIGNIFICANT CHANGE UP (ref 0.7–2)
LYMPHOCYTES # BLD AUTO: 1.21 K/UL — SIGNIFICANT CHANGE UP (ref 1.2–3.4)
LYMPHOCYTES # BLD AUTO: 9.9 % — LOW (ref 20.5–51.1)
MCHC RBC-ENTMCNC: 32.1 PG — HIGH (ref 27–31)
MCHC RBC-ENTMCNC: 34.9 G/DL — SIGNIFICANT CHANGE UP (ref 32–37)
MCV RBC AUTO: 92 FL — SIGNIFICANT CHANGE UP (ref 80–94)
MONOCYTES # BLD AUTO: 1.44 K/UL — HIGH (ref 0.1–0.6)
MONOCYTES NFR BLD AUTO: 11.7 % — HIGH (ref 1.7–9.3)
NEUTROPHILS # BLD AUTO: 9.46 K/UL — HIGH (ref 1.4–6.5)
NEUTROPHILS NFR BLD AUTO: 77 % — HIGH (ref 42.2–75.2)
NRBC BLD AUTO-RTO: 0 /100 WBCS — SIGNIFICANT CHANGE UP (ref 0–0)
PLATELET # BLD AUTO: 167 K/UL — SIGNIFICANT CHANGE UP (ref 130–400)
PMV BLD: 9.9 FL — SIGNIFICANT CHANGE UP (ref 7.4–10.4)
POTASSIUM SERPL-MCNC: 4.6 MMOL/L — SIGNIFICANT CHANGE UP (ref 3.5–5)
POTASSIUM SERPL-SCNC: 4.6 MMOL/L — SIGNIFICANT CHANGE UP (ref 3.5–5)
PROT SERPL-MCNC: 6.9 G/DL — SIGNIFICANT CHANGE UP (ref 6–8)
RBC # BLD: 3.64 M/UL — LOW (ref 4.7–6.1)
RBC # FLD: 13.2 % — SIGNIFICANT CHANGE UP (ref 11.5–14.5)
SODIUM SERPL-SCNC: 128 MMOL/L — LOW (ref 135–146)
WBC # BLD: 12.28 K/UL — HIGH (ref 4.8–10.8)
WBC # FLD AUTO: 12.28 K/UL — HIGH (ref 4.8–10.8)

## 2025-06-20 PROCEDURE — 73630 X-RAY EXAM OF FOOT: CPT | Mod: 26,RT

## 2025-06-20 PROCEDURE — 73610 X-RAY EXAM OF ANKLE: CPT | Mod: 26,RT

## 2025-06-20 PROCEDURE — 73590 X-RAY EXAM OF LOWER LEG: CPT | Mod: 26,RT

## 2025-06-20 PROCEDURE — 99285 EMERGENCY DEPT VISIT HI MDM: CPT

## 2025-06-20 RX ORDER — LIDOCAINE HCL/PF 20 MG/ML
5 AMPUL, LUER TIP INJECTION ONCE
Refills: 0 | Status: DISCONTINUED | OUTPATIENT
Start: 2025-06-20 | End: 2025-06-26

## 2025-06-20 RX ORDER — KETOROLAC TROMETHAMINE 30 MG/ML
15 INJECTION, SOLUTION INTRAMUSCULAR; INTRAVENOUS ONCE
Refills: 0 | Status: DISCONTINUED | OUTPATIENT
Start: 2025-06-20 | End: 2025-06-20

## 2025-06-20 RX ADMIN — KETOROLAC TROMETHAMINE 15 MILLIGRAM(S): 30 INJECTION, SOLUTION INTRAMUSCULAR; INTRAVENOUS at 20:57

## 2025-06-20 NOTE — ED PROVIDER NOTE - PHYSICAL EXAMINATION
Physical Exam: VS reviewed.   Constitutional: Patient is sitting in wheelchair, speaking to self. Mildly coherent (baseline)  SKIN: erythematous right LE  MSK: Erythematous right LE, ecchymosis over rigth ankle, 2+ pedal pulse. Limited exam due to patient's developmental delay

## 2025-06-20 NOTE — ED PROVIDER NOTE - OBJECTIVE STATEMENT
54-year-old male with past medical history of mild mental dysregulation, intellectual disability, schizophrenia, impulse control disorder, seizures, hypothyroidism, pleural stricture, cataracts, osteopenia, presenting from group after a fall with his right lower extremity to be wrapped up with bruising around the right ankle while bathing him.  Patient is a very poor historian, however states he "fell down about trying to make it to the bed."  No other history able to be provided by patient or aide.

## 2025-06-20 NOTE — ED PROVIDER NOTE - CLINICAL SUMMARY MEDICAL DECISION MAKING FREE TEXT BOX
52-year-old male with PMHx of Mild mental retardation, intellectual disability, Schizophrenia, Impulse control disorder, Seizures, Hypothyroidism, Exotropia, Cataracts, Osteopenia, urethral stricture, presenting for right leg swelling.  Aide at bedside noticed 1 hour ago at shift change that his right ankle was swollen and bruised.  He normally walks with walker but is unable to due to pain.  Denies fever. Patient states he tripped getting into van earlier today but aide does not know what happened.    Labs were ordered and reviewed.  Imaging was ordered and reviewed by me.  Additional history was obtained from aide at bedside.  Escalation to admission/observation was considered.  bilateral malleolar fracture on xray.  Orthopedics consulted.  Patient unable to ambulate per usual and unable to use crutches.  Requires inpatient hospitalization. 52-year-old male with PMHx of Mild mental retardation, intellectual disability, Schizophrenia, Impulse control disorder, Seizures, Hypothyroidism, Exotropia, Cataracts, Osteopenia, urethral stricture, presenting for right leg swelling.  Aide at bedside noticed 1 hour ago at shift change that his right ankle was swollen and bruised.  He normally walks with walker but is unable to due to pain.  Denies fever. Patient states he tripped getting into van earlier today but aide does not know what happened.    Labs were ordered and reviewed.  Imaging was ordered and reviewed by me.  Additional history was obtained from aide at bedside.  Escalation to admission/observation was considered.  bilateral malleolar fracture on xray.  Orthopedics consulted and splinted.  Patient unable to ambulate per usual and unable to use crutches.  Requires inpatient hospitalization.

## 2025-06-20 NOTE — ED PROVIDER NOTE - PROGRESS NOTE DETAILS
Italian: Patient signed out to Dr. Mc pending orthopedics consult for right ankle bimalleolar fracture and labs

## 2025-06-20 NOTE — ED PROVIDER NOTE - ATTENDING CONTRIBUTION TO CARE
52-year-old male with PMHx of Mild mental retardation, intellectual disability, Schizophrenia, Impulse control disorder, Seizures, Hypothyroidism, Exotropia, Cataracts, Osteopenia, urethral stricture, presenting for right leg swelling.  Aide at bedside noticed 1 hour ago at shift change that his right ankle was swollen and bruised.  He normally walks with walker but is unable to due to pain.  Denies fever. Patient states he tripped getting into van earlier today but aide does not know what happened.      CONSTITUTIONAL: Well-developed; well-nourished; in no acute distress.   SKIN: warm, dry  HEAD: Normocephalic; atraumatic.  EYES: no conjunctival erythema  ENT: No nasal discharge; airway clear.  NECK: Supple; non tender.  EXT: +right lower extremity swelling foot to mid shin, ecchymoses of lateral ankle, erythema of anterior shin with superficial scrapes  NEURO: Alert, oriented, grossly unremarkable  PSYCH: Cooperative, appropriate.

## 2025-06-20 NOTE — ED ADULT TRIAGE NOTE - TEMP AT ED ARRIVAL (C)
pt states she belongs to meth[facility]one clinic and was late. requesting dose for today and tomorrow. denies medical complaints. 37.2

## 2025-06-21 DIAGNOSIS — S82.891A OTHER FRACTURE OF RIGHT LOWER LEG, INITIAL ENCOUNTER FOR CLOSED FRACTURE: ICD-10-CM

## 2025-06-21 LAB
ALBUMIN SERPL ELPH-MCNC: 3.6 G/DL — SIGNIFICANT CHANGE UP (ref 3.5–5.2)
ALP SERPL-CCNC: 84 U/L — SIGNIFICANT CHANGE UP (ref 30–115)
ALT FLD-CCNC: 9 U/L — SIGNIFICANT CHANGE UP (ref 0–41)
ANION GAP SERPL CALC-SCNC: 9 MMOL/L — SIGNIFICANT CHANGE UP (ref 7–14)
AST SERPL-CCNC: 25 U/L — SIGNIFICANT CHANGE UP (ref 0–41)
BILIRUB SERPL-MCNC: 0.4 MG/DL — SIGNIFICANT CHANGE UP (ref 0.2–1.2)
BUN SERPL-MCNC: 17 MG/DL — SIGNIFICANT CHANGE UP (ref 10–20)
CALCIUM SERPL-MCNC: 8.6 MG/DL — SIGNIFICANT CHANGE UP (ref 8.4–10.5)
CHLORIDE SERPL-SCNC: 97 MMOL/L — LOW (ref 98–110)
CO2 SERPL-SCNC: 26 MMOL/L — SIGNIFICANT CHANGE UP (ref 17–32)
CREAT SERPL-MCNC: 0.7 MG/DL — SIGNIFICANT CHANGE UP (ref 0.7–1.5)
EGFR: 109 ML/MIN/1.73M2 — SIGNIFICANT CHANGE UP
EGFR: 109 ML/MIN/1.73M2 — SIGNIFICANT CHANGE UP
GLUCOSE SERPL-MCNC: 89 MG/DL — SIGNIFICANT CHANGE UP (ref 70–99)
HCT VFR BLD CALC: 31 % — LOW (ref 42–52)
HGB BLD-MCNC: 10.5 G/DL — LOW (ref 14–18)
MCHC RBC-ENTMCNC: 31.1 PG — HIGH (ref 27–31)
MCHC RBC-ENTMCNC: 33.9 G/DL — SIGNIFICANT CHANGE UP (ref 32–37)
MCV RBC AUTO: 91.7 FL — SIGNIFICANT CHANGE UP (ref 80–94)
NRBC BLD AUTO-RTO: 0 /100 WBCS — SIGNIFICANT CHANGE UP (ref 0–0)
PLATELET # BLD AUTO: 150 K/UL — SIGNIFICANT CHANGE UP (ref 130–400)
PMV BLD: 10 FL — SIGNIFICANT CHANGE UP (ref 7.4–10.4)
POTASSIUM SERPL-MCNC: 4.2 MMOL/L — SIGNIFICANT CHANGE UP (ref 3.5–5)
POTASSIUM SERPL-SCNC: 4.2 MMOL/L — SIGNIFICANT CHANGE UP (ref 3.5–5)
PROT SERPL-MCNC: 6 G/DL — SIGNIFICANT CHANGE UP (ref 6–8)
RBC # BLD: 3.38 M/UL — LOW (ref 4.7–6.1)
RBC # FLD: 13.3 % — SIGNIFICANT CHANGE UP (ref 11.5–14.5)
SODIUM SERPL-SCNC: 132 MMOL/L — LOW (ref 135–146)
WBC # BLD: 9.2 K/UL — SIGNIFICANT CHANGE UP (ref 4.8–10.8)
WBC # FLD AUTO: 9.2 K/UL — SIGNIFICANT CHANGE UP (ref 4.8–10.8)

## 2025-06-21 PROCEDURE — 86901 BLOOD TYPING SEROLOGIC RH(D): CPT

## 2025-06-21 PROCEDURE — 99222 1ST HOSP IP/OBS MODERATE 55: CPT | Mod: GC

## 2025-06-21 PROCEDURE — 80048 BASIC METABOLIC PNL TOTAL CA: CPT

## 2025-06-21 PROCEDURE — 99223 1ST HOSP IP/OBS HIGH 75: CPT

## 2025-06-21 PROCEDURE — 97162 PT EVAL MOD COMPLEX 30 MIN: CPT | Mod: GP

## 2025-06-21 PROCEDURE — 86850 RBC ANTIBODY SCREEN: CPT

## 2025-06-21 PROCEDURE — 80053 COMPREHEN METABOLIC PANEL: CPT

## 2025-06-21 PROCEDURE — 85027 COMPLETE CBC AUTOMATED: CPT

## 2025-06-21 PROCEDURE — 73610 X-RAY EXAM OF ANKLE: CPT | Mod: 26,RT

## 2025-06-21 PROCEDURE — 36415 COLL VENOUS BLD VENIPUNCTURE: CPT

## 2025-06-21 PROCEDURE — 86900 BLOOD TYPING SEROLOGIC ABO: CPT

## 2025-06-21 PROCEDURE — 73610 X-RAY EXAM OF ANKLE: CPT | Mod: RT

## 2025-06-21 RX ORDER — TRIAMCINOLONE ACETONIDE 1 MG/G
1 CREAM TOPICAL
Refills: 0 | DISCHARGE

## 2025-06-21 RX ORDER — ENOXAPARIN SODIUM 100 MG/ML
40 INJECTION SUBCUTANEOUS EVERY 24 HOURS
Refills: 0 | Status: DISCONTINUED | OUTPATIENT
Start: 2025-06-21 | End: 2025-06-26

## 2025-06-21 RX ORDER — MIRTAZAPINE 30 MG/1
15 TABLET, FILM COATED ORAL
Refills: 0 | Status: DISCONTINUED | OUTPATIENT
Start: 2025-06-21 | End: 2025-06-26

## 2025-06-21 RX ORDER — HALOPERIDOL 10 MG/1
10 TABLET ORAL EVERY 8 HOURS
Refills: 0 | Status: DISCONTINUED | OUTPATIENT
Start: 2025-06-21 | End: 2025-06-26

## 2025-06-21 RX ORDER — BUPROPION HYDROBROMIDE 522 MG/1
150 TABLET, EXTENDED RELEASE ORAL EVERY 24 HOURS
Refills: 0 | Status: DISCONTINUED | OUTPATIENT
Start: 2025-06-21 | End: 2025-06-26

## 2025-06-21 RX ORDER — SODIUM CHLORIDE 9 G/1000ML
500 INJECTION, SOLUTION INTRAVENOUS ONCE
Refills: 0 | Status: COMPLETED | OUTPATIENT
Start: 2025-06-21 | End: 2025-06-21

## 2025-06-21 RX ORDER — SENNA 187 MG
2 TABLET ORAL AT BEDTIME
Refills: 0 | Status: DISCONTINUED | OUTPATIENT
Start: 2025-06-21 | End: 2025-06-26

## 2025-06-21 RX ORDER — BENZTROPINE MESYLATE 2 MG
1 TABLET ORAL EVERY 12 HOURS
Refills: 0 | Status: DISCONTINUED | OUTPATIENT
Start: 2025-06-21 | End: 2025-06-26

## 2025-06-21 RX ORDER — ACETAMINOPHEN 500 MG/5ML
650 LIQUID (ML) ORAL EVERY 6 HOURS
Refills: 0 | Status: DISCONTINUED | OUTPATIENT
Start: 2025-06-21 | End: 2025-06-26

## 2025-06-21 RX ORDER — IBUPROFEN 200 MG
400 TABLET ORAL EVERY 8 HOURS
Refills: 0 | Status: DISCONTINUED | OUTPATIENT
Start: 2025-06-21 | End: 2025-06-26

## 2025-06-21 RX ORDER — LEVOTHYROXINE SODIUM 300 MCG
100 TABLET ORAL DAILY
Refills: 0 | Status: DISCONTINUED | OUTPATIENT
Start: 2025-06-22 | End: 2025-06-26

## 2025-06-21 RX ORDER — TAMSULOSIN HYDROCHLORIDE 0.4 MG/1
0.8 CAPSULE ORAL AT BEDTIME
Refills: 0 | Status: DISCONTINUED | OUTPATIENT
Start: 2025-06-21 | End: 2025-06-26

## 2025-06-21 RX ORDER — SENNOSIDES, DOCUSATE SODIUM 8.6; 5 MG/1; MG/1
1 TABLET ORAL
Refills: 0 | DISCHARGE

## 2025-06-21 RX ADMIN — ENOXAPARIN SODIUM 40 MILLIGRAM(S): 100 INJECTION SUBCUTANEOUS at 14:13

## 2025-06-21 RX ADMIN — Medication 650 MILLIGRAM(S): at 15:11

## 2025-06-21 RX ADMIN — Medication 2 TABLET(S): at 21:30

## 2025-06-21 RX ADMIN — Medication 250 MILLIGRAM(S): at 22:05

## 2025-06-21 RX ADMIN — Medication 650 MILLIGRAM(S): at 14:11

## 2025-06-21 RX ADMIN — BUPROPION HYDROBROMIDE 150 MILLIGRAM(S): 522 TABLET, EXTENDED RELEASE ORAL at 16:16

## 2025-06-21 RX ADMIN — MIRTAZAPINE 15 MILLIGRAM(S): 30 TABLET, FILM COATED ORAL at 18:19

## 2025-06-21 RX ADMIN — HALOPERIDOL 10 MILLIGRAM(S): 10 TABLET ORAL at 21:30

## 2025-06-21 RX ADMIN — SODIUM CHLORIDE 500 MILLILITER(S): 9 INJECTION, SOLUTION INTRAVENOUS at 01:00

## 2025-06-21 RX ADMIN — Medication 400 MILLIGRAM(S): at 18:19

## 2025-06-21 RX ADMIN — TAMSULOSIN HYDROCHLORIDE 0.8 MILLIGRAM(S): 0.4 CAPSULE ORAL at 21:30

## 2025-06-21 RX ADMIN — Medication 250 MILLIGRAM(S): at 16:16

## 2025-06-21 RX ADMIN — Medication 1 MILLIGRAM(S): at 18:18

## 2025-06-21 RX ADMIN — Medication 1 APPLICATION(S): at 14:13

## 2025-06-21 NOTE — PATIENT PROFILE ADULT - FALL HARM RISK - HARM RISK INTERVENTIONS

## 2025-06-21 NOTE — H&P ADULT - ATTENDING COMMENTS
My note supersedes the resident's note in the event of a discrepancy -    Patient seen and examined this morning with his group home aid bedside  lying in bed  in nad   no complaints  chronic long in place - leg bag - asked RN to place to gravity     T(C): 36.4 (06-21-25 @ 07:05), Max: 37.2 (06-20-25 @ 20:17)  HR: 76 (06-21-25 @ 07:05) (76 - 85)  BP: 118/77 (06-21-25 @ 07:05) (114/74 - 159/63)  RR: 18 (06-21-25 @ 07:05) (18 - 18)  SpO2: 98% (06-21-25 @ 07:05) (96% - 100%)    acetaminophen     Tablet .. 650 milliGRAM(s) Oral every 6 hours PRN  chlorhexidine 2% Cloths 1 Application(s) Topical <User Schedule>  enoxaparin Injectable 40 milliGRAM(s) SubCutaneous every 24 hours  lidocaine 2% Injectable 5 milliLiter(s) Local Injection Once    55 y/o man with PMH of Mild mental retardation, intellectual disability, Schizophrenia, Impulse control disorder, Seizures, Hypothyroidism, Exotropia, Cataracts, Osteopenia, urethral stricture and chronic long presented from group home for fall? and ankle fracture.     #R bimalleolar ankle fracture.  -ortho eval appreciated : Discussed injury with staff from patient's living facility. Advised that given fracture pattern, in many cases surgical intervention would be pursued for optimal functional outcome, however in this case and attempt at conservative mgmnt with immobilization and NWB may be reasonable as patient is minimally ambulatory. Regardless, there is no urgent indication for orthopedic intervention and further management can be pursued on an outpatient basis. If patient is admitted, recommend NWB and PT/OT.  - Pain control  - Activity: NWB  - PT/OT ordered  - Keep splint C/D/I. Splint care explained.  - Extremity icing/elevation  - DVT ppx per primary, given reduced mobility patient should be on chemical dvt ppx unless medically contraindicated  - F/U with Dr. Atkins in 2 weeks. Please call [phone].  - discussed with Group home aid -patient will likely need to go to SNF   - discussed with CM as well - will need JOSUE post PT eval     #h/o urethral stricture with chronic long  #Mild mental disability, schizophrenia, impulse control disorder - continue home meds  #Hypothyroid on synthroid  #mild anemia - outpatient follow up  #hyponatremia - improving and not clinically significant; outpatient follow up     Progress Note Handoff  Pending Consults: PT, OT,  CM  Pending Tests:none  Pending Results: none  Family Discussion: Discussed labs, meds, PT ameena and dc planning to SNF in 48hrs. PFD placed for 6/23  Disposition: Home_____/SNF__x____/Other_____/Unknown at this time_____  Spent over 75 min reviewing chart, speaking with patient/family and on coordinating patient care during interdisciplinary rounds. The necessity of the time spent during the encounter on this date of service was due to: Direct clinical care, patient education and counseling,  coordination with PT consultants, nursing and case management/social work teams, review of tests and scheduled medications,  independent review of previous medical records and available test results including ED admissions elsewhere, and resident supervision. Time spent on the encounter excludes teaching time and/or separately reported services.

## 2025-06-21 NOTE — H&P ADULT - HISTORY OF PRESENT ILLNESS
52-year-old male with PMHx of intellectual disability, Schizophrenia, Impulse control disorder, Seizures, Hypothyroidism, Exotropia, Cataracts, Osteopenia, urethral stricture, presenting for right leg swelling.  Aide at bedside noticed 1 hour ago at shift change that his right ankle was swollen and bruised.  He normally walks with walker but is unable to due to pain.  Denies fever. Patient states he tripped getting into van earlier today but aide does not know what happened.    Triage VS: /63, HR 83, T 98.9F, SpO2 100%.  Labs: WBC 12.28k, hemoglobin 11.7, Na 128.  Imaging XR: R foot, ankle, tib/fib: Bimalleolar ankle fracture without significant displacement No other fracture/dislocation.    Patient was seen by orthopedic surgery, they performed intraarticular block with 2% lidocaine to the right ankle. The ankle was closed reduced and placed in an AO splint. Post splint XR demonstrate maintained alignment. Post splint NV exam unchanged.    Patient admitted to medicine for PT and disposition. 52-year-old male with PMHx (noted from group home paperwork) of intellectual disability, Schizophrenia, Impulse control disorder, Seizures (reportedly from childhood), Hypothyroidism, Exotropia, Cataracts, Osteopenia, urethral stricture, presenting for right leg swelling. Per ED hx, Aide at bedside noticed 1 hour prior at shift change that his right ankle was swollen and bruised.  He normally walks with walker but is unable to due to pain. Denies fever. Patient states he tripped getting into van earlier today but aide does not know what happened.    Triage VS: /63, HR 83, T 98.9F, SpO2 100%.  Labs: WBC 12.28k, hemoglobin 11.7, Na 128.  Imaging XR: R foot, ankle, tib/fib: Bimalleolar ankle fracture without significant displacement No other fracture/dislocation.    Patient was seen by orthopedic surgery, they performed intraarticular block with 2% lidocaine to the right ankle. The ankle was closed reduced and placed in an AO splint. Post splint XR demonstrate maintained alignment. Post splint NV exam unchanged.    Patient admitted to medicine for PT and disposition.

## 2025-06-21 NOTE — CONSULT NOTE ADULT - SUBJECTIVE AND OBJECTIVE BOX
ORTHOPAEDIC SURGERY CONSULT NOTE    Reason for Consult: Right ankle fracture    HPI: 54yMale presents with pain in the right ankle. Patient is unable to provide history due to cognitive impairment. History obtained from staff aid from his living facility at bedside and nurse from the facility via telephone. Staff did not witness any recent falls. Today, they noticed he was not walking and that his right ankle was swollen and thus they brought him to the ED for evaluation. At baseline, the patient is mostly chair/bedbound, though does ambulate with a walker for short distances.     PAST MEDICAL & SURGICAL HISTORY:  Epilepsy  last event 4+ yrs ago      Schizophrenia      Bipolar 1 disorder      Major depressive disorder      Anemia      Dyspepsia      Hypothyroidism      Constipation      MR (mental retardation)      H/O cystoscopy      H/O laminectomy  03/2001      H/O fracture of fibula  nondisplaced 03/2010        Allergies: phenothiazines (Unknown)  erythromycin (Other)    Medications: lidocaine 2% Injectable 5 milliLiter(s) Local Injection Once      PHYSICAL EXAM:  Vital Signs Last 24 Hrs  T(C): 36.4 (21 Jun 2025 01:33), Max: 37.2 (20 Jun 2025 20:17)  T(F): 97.6 (21 Jun 2025 01:33), Max: 98.9 (20 Jun 2025 20:17)  HR: 82 (21 Jun 2025 01:33) (82 - 85)  BP: 133/88 (21 Jun 2025 01:33) (133/88 - 159/63)  BP(mean): --  RR: 18 (21 Jun 2025 01:33) (18 - 18)  SpO2: 98% (21 Jun 2025 01:33) (98% - 100%)    Parameters below as of 21 Jun 2025 01:33  Patient On (Oxygen Delivery Method): room air        Physical exam:  Awake  NAD  Able to vocalize short sentences and answer simple yes/no questions    Bilat UE  Skin intact  No swelling/erythema/ecchymosis noted  No deformity/laceration/abrasion noted  No stepoff, deformity   ROM grossly intact and painless in all joints  Unable to assess sensorimotor status due to patient status  WWP distally      RLE  3 subcentimeter scabs over anterior mid-shin with surrounding erythema, No fluctuance/drainage  Otherwise skin intact  Swelling and ecchymosis at the ankle both medial and lateral  No deformity/laceration/abrasion noted  ROM - freely moves the hip and knee  Ankle ROM deferred  TTP at the ankle medially and laterally  NTTP hip, knee, foot  Compartments soft and compressible, no pain w/ passive stretch of digits  Unable to assess sensorimotor status due to patient status  WWP distally    LLE  1-2 subcentimeter scabs over anterior mid-shin with mild erythema, No fluctuance/drainage  Otherwise skin intact  No swelling/erythema/ecchymosis noted  TTP noted at ankle  No TTP hip/knee/foot  No stepoff, deformity   ROM grossly intact and painless in all joints  Unable to assess sensorimotor status due to patient status  WWP distally    Labs:                        11.7   12.28 )-----------( 167      ( 20 Jun 2025 22:15 )             33.5     06-20    128[L]  |  94[L]  |  18  ----------------------------<  94  4.6   |  24  |  0.9    Ca    9.4      20 Jun 2025 22:15    TPro  6.9  /  Alb  4.0  /  TBili  0.5  /  DBili  x   /  AST  33  /  ALT  11  /  AlkPhos  107  06-20        Imaging XR: R foot, ankle, tib/fib: Bimalleolar ankle fracture without significant displacement No other fracture/dislocation.    Procedure: An intraarticular block was applied with 2% lidocaine to the right ankle. The ankle was closed reduced and placed in an AO splint. Post splint XR demonstrate maintained alignment. Post splint NV exam unchanged.     A/P: 54y Male with intellectual disability, minimal household ambulator, with R bimalleolar ankle fracture.    Discussed injury with staff from patient's living facility. Advised that given fracture pattern, in many cases surgical intervention would be pursued for optimal functional outcome, however in this case and attempt at conservative mgmnt with immobilization and NWB may be reasonable as patient is minimally ambulatory. Regardless, there is no urgent indication for orthopedic intervention and further management can be pursued on an outpatient basis. If patient is admitted, recommend NWB and PT/OT.    - Pain control  - Activity: NWB  - PT/OT  - Keep splint C/D/I. Splint care explained.  - Extremity icing/elevation  - DVT ppx per primary, given reduced mobility patient should be on chemical dvt ppx unless medically contraindicated  - F/U with Dr. Atkins in 2 weeks. Please call [phone].

## 2025-06-21 NOTE — H&P ADULT - ASSESSMENT
52-year-old male with PMHx (noted from group home paperwork) of intellectual disability, Schizophrenia, Impulse control disorder, Seizures (reportedly from childhood), Hypothyroidism, Exotropia, Cataracts, Osteopenia, urethral stricture, presenting for right leg swelling, found to have R foot bimalleolar ankle fracture.    #R foot bimalleolar fracture  - Patient denies having dizziness, chest pain, SOB.  - HD stable.  - Imaging XR: R foot, ankle, tib/fib: Bimalleolar ankle fracture without significant displacement No other fracture/dislocation.  - Procedure: An intraarticular block was applied with 2% lidocaine to the right ankle. The ankle was closed reduced and placed in an AO splint. Post splint XR demonstrate maintained alignment. Post splint NV exam unchanged.  Plan:  - PT consult.  - Activity order in place (NWB RLE).  - Will likely need physical therapy rehab.  - Extremity icing/elevation  - DVT ppx per primary (started on 40mg QD enoxaparin here), given reduced mobility patient should be on chemical dvt ppx unless medically contraindicated  - F/U with Dr. Atkins in 2 weeks. Please call [phone].  - Pain: Tylenol PRN mild, ibuprofen PRN moderate.    #Hx of urethral stricture  - Patient has a chronic Packer.  - Exchanged on 6/21/25.  - Continue Packer care.    #Intellectual disability  #Schizophrenia  #Reported hx of seizure in childhood  - Continue home meds: haldol, benztropine, mirtazapine, bupropion, Divalproex.    #Hypothyroidism  - Continue levothyroxine 100mcg QD.    #Constipation  - Continue Senna 2 tabs QHS.    #DIET: Regular  #DVTppx: Enoxaparin 40 QD  #GIppx: NI  #Activity: NWB RLE  #CODE: FULL  #Disposition: from group home, will likely need STR.

## 2025-06-21 NOTE — H&P ADULT - ATTENDING SUPERVISION STATEMENT
Her urine is suspicious for a UTI. A culture has been sent. We will call you with the final culture results.  In terms of her urination habits. She should not try to hold her urine in and we need her to wipe from back to front as discussed in the office.  For the irritation in the vaginal area, keep the skin hydrated with emollient like Vaseline or their generics or even diaper creams. Avoid bubble baths and sitting in wet clothing.  
Resident

## 2025-06-22 LAB
HCT VFR BLD CALC: 30.5 % — LOW (ref 42–52)
HGB BLD-MCNC: 10.4 G/DL — LOW (ref 14–18)
MCHC RBC-ENTMCNC: 31.7 PG — HIGH (ref 27–31)
MCHC RBC-ENTMCNC: 34.1 G/DL — SIGNIFICANT CHANGE UP (ref 32–37)
MCV RBC AUTO: 93 FL — SIGNIFICANT CHANGE UP (ref 80–94)
NRBC BLD AUTO-RTO: 0 /100 WBCS — SIGNIFICANT CHANGE UP (ref 0–0)
PLATELET # BLD AUTO: 185 K/UL — SIGNIFICANT CHANGE UP (ref 130–400)
PMV BLD: 9.8 FL — SIGNIFICANT CHANGE UP (ref 7.4–10.4)
RBC # BLD: 3.28 M/UL — LOW (ref 4.7–6.1)
RBC # FLD: 13.4 % — SIGNIFICANT CHANGE UP (ref 11.5–14.5)
WBC # BLD: 9.36 K/UL — SIGNIFICANT CHANGE UP (ref 4.8–10.8)
WBC # FLD AUTO: 9.36 K/UL — SIGNIFICANT CHANGE UP (ref 4.8–10.8)

## 2025-06-22 PROCEDURE — 99232 SBSQ HOSP IP/OBS MODERATE 35: CPT

## 2025-06-22 RX ADMIN — HALOPERIDOL 10 MILLIGRAM(S): 10 TABLET ORAL at 13:01

## 2025-06-22 RX ADMIN — Medication 250 MILLIGRAM(S): at 21:32

## 2025-06-22 RX ADMIN — Medication 250 MILLIGRAM(S): at 13:01

## 2025-06-22 RX ADMIN — ENOXAPARIN SODIUM 40 MILLIGRAM(S): 100 INJECTION SUBCUTANEOUS at 13:05

## 2025-06-22 RX ADMIN — HALOPERIDOL 10 MILLIGRAM(S): 10 TABLET ORAL at 21:32

## 2025-06-22 RX ADMIN — Medication 1 APPLICATION(S): at 05:16

## 2025-06-22 RX ADMIN — TAMSULOSIN HYDROCHLORIDE 0.8 MILLIGRAM(S): 0.4 CAPSULE ORAL at 21:32

## 2025-06-22 RX ADMIN — Medication 1 MILLIGRAM(S): at 05:13

## 2025-06-22 RX ADMIN — BUPROPION HYDROBROMIDE 150 MILLIGRAM(S): 522 TABLET, EXTENDED RELEASE ORAL at 17:05

## 2025-06-22 RX ADMIN — Medication 100 MICROGRAM(S): at 05:13

## 2025-06-22 RX ADMIN — Medication 250 MILLIGRAM(S): at 05:17

## 2025-06-22 RX ADMIN — MIRTAZAPINE 15 MILLIGRAM(S): 30 TABLET, FILM COATED ORAL at 18:18

## 2025-06-22 RX ADMIN — HALOPERIDOL 10 MILLIGRAM(S): 10 TABLET ORAL at 05:13

## 2025-06-22 RX ADMIN — Medication 2 TABLET(S): at 21:32

## 2025-06-22 RX ADMIN — Medication 1 MILLIGRAM(S): at 17:05

## 2025-06-22 NOTE — PROGRESS NOTE ADULT - ASSESSMENT
55 y/o man with PMH of Mild mental retardation, intellectual disability, Schizophrenia, Impulse control disorder, Seizures, Hypothyroidism, Exotropia, Cataracts, Osteopenia, urethral stricture and chronic long presented from group home for fall and ankle fracture.     #R bimalleolar ankle fracture  -ortho evaluated- there is no urgent indication for orthopedic intervention and further management can be pursued on an outpatient basis. If patient is admitted, recommend NWB and PT/OT.  - Pain control  - Activity: NWB  - PT/OT ordered  - Keep splint C/D/I. Splint care explained.  - Extremity icing/elevation  - DVT ppx per primary, given reduced mobility patient should be on chemical dvt ppx unless medically contraindicated  - F/U with Dr. Atkins in 2 weeks. Please call [phone].  -as per Group home aid -patient will likely need to go to SNF   - - will need JOSUE post PT eval -- PT did not see today as activity orders not specified    #h/o urethral stricture with chronic long  #Mild mental disability, schizophrenia, impulse control disorder - mirtazapine, haldol, divaloproex, bupropion  #Hypothyroid on synthroid  #mild anemia - outpatient follow up  #hyponatremia - improving 128-->132    Pending Consults: PT, OT,  CM  dc plan AM    Disposition: Home_____/SNF__x____/Other_____/Unknown at this time_____

## 2025-06-22 NOTE — PROGRESS NOTE ADULT - SUBJECTIVE AND OBJECTIVE BOX
SUBJECTIVE:    Patient is a 54y old Male who presents with a chief complaint of   Currently admitted to medicine with the primary diagnosis of Closed right malleolar fracture       Today is hospital day 1d.     PAST MEDICAL & SURGICAL HISTORY  Epilepsy  last event 4+ yrs ago    Schizophrenia    Bipolar 1 disorder    Major depressive disorder    Anemia    Dyspepsia    Hypothyroidism    Constipation    MR (mental retardation)    H/O cystoscopy    H/O laminectomy  03/2001    H/O fracture of fibula  nondisplaced 03/2010      ALLERGIES:  phenothiazines (Unknown)  erythromycin (Other)    MEDICATIONS:  STANDING MEDICATIONS  benztropine 1 milliGRAM(s) Oral every 12 hours  buPROPion XL (24-Hour) . 150 milliGRAM(s) Oral every 24 hours  chlorhexidine 2% Cloths 1 Application(s) Topical <User Schedule>  divalproex  milliGRAM(s) Oral <User Schedule>  enoxaparin Injectable 40 milliGRAM(s) SubCutaneous every 24 hours  haloperidol     Tablet 10 milliGRAM(s) Oral every 8 hours  levothyroxine 100 MICROGram(s) Oral daily  lidocaine 2% Injectable 5 milliLiter(s) Local Injection Once  mirtazapine 15 milliGRAM(s) Oral <User Schedule>  senna 2 Tablet(s) Oral at bedtime  tamsulosin 0.8 milliGRAM(s) Oral at bedtime    PRN MEDICATIONS  acetaminophen     Tablet .. 650 milliGRAM(s) Oral every 6 hours PRN  ibuprofen  Tablet. 400 milliGRAM(s) Oral every 8 hours PRN    VITALS:   T(F): 97.3  HR: 69  BP: 108/71  RR: 18  SpO2: 97%    LABS:                        10.4   9.36  )-----------( 185      ( 22 Jun 2025 05:57 )             30.5     06-21    132[L]  |  97[L]  |  17  ----------------------------<  89  4.2   |  26  |  0.7    Ca    8.6      21 Jun 2025 07:54    TPro  6.0  /  Alb  3.6  /  TBili  0.4  /  DBili  x   /  AST  25  /  ALT  9   /  AlkPhos  84  06-21      Urinalysis Basic - ( 21 Jun 2025 07:54 )    Color: x / Appearance: x / SG: x / pH: x  Gluc: 89 mg/dL / Ketone: x  / Bili: x / Urobili: x   Blood: x / Protein: x / Nitrite: x   Leuk Esterase: x / RBC: x / WBC x   Sq Epi: x / Non Sq Epi: x / Bacteria: x            Culture - Blood (collected 20 Jun 2025 22:15)  Source: Blood Blood-Peripheral  Preliminary Report (22 Jun 2025 07:01):    No growth at 24 hours    Culture - Blood (collected 20 Jun 2025 22:15)  Source: Blood Blood-Peripheral  Preliminary Report (22 Jun 2025 07:01):    No growth at 24 hours          RADIOLOGY:    PHYSICAL EXAM:  GEN: No acute distress  LUNGS: Clear to auscultation bilaterally   HEART: S1/S2 present. RRR.   ABD/ GI: Soft, non-tender, non-distended. Bowel sounds present  EXT:   NEURO: AAOX1     SUBJECTIVE:    Patient is a 54y old Male who presents with a chief complaint of   Currently admitted to medicine with the primary diagnosis of Closed right malleolar fracture       Today is hospital day 1d.     PAST MEDICAL & SURGICAL HISTORY  Epilepsy  last event 4+ yrs ago    Schizophrenia    Bipolar 1 disorder    Major depressive disorder    Anemia    Dyspepsia    Hypothyroidism    Constipation    MR (mental retardation)    H/O cystoscopy    H/O laminectomy  03/2001    H/O fracture of fibula  nondisplaced 03/2010      ALLERGIES:  phenothiazines (Unknown)  erythromycin (Other)    MEDICATIONS:  STANDING MEDICATIONS  benztropine 1 milliGRAM(s) Oral every 12 hours  buPROPion XL (24-Hour) . 150 milliGRAM(s) Oral every 24 hours  chlorhexidine 2% Cloths 1 Application(s) Topical <User Schedule>  divalproex  milliGRAM(s) Oral <User Schedule>  enoxaparin Injectable 40 milliGRAM(s) SubCutaneous every 24 hours  haloperidol     Tablet 10 milliGRAM(s) Oral every 8 hours  levothyroxine 100 MICROGram(s) Oral daily  lidocaine 2% Injectable 5 milliLiter(s) Local Injection Once  mirtazapine 15 milliGRAM(s) Oral <User Schedule>  senna 2 Tablet(s) Oral at bedtime  tamsulosin 0.8 milliGRAM(s) Oral at bedtime    PRN MEDICATIONS  acetaminophen     Tablet .. 650 milliGRAM(s) Oral every 6 hours PRN  ibuprofen  Tablet. 400 milliGRAM(s) Oral every 8 hours PRN    VITALS:   T(F): 97.3  HR: 69  BP: 108/71  RR: 18  SpO2: 97%    LABS:                        10.4   9.36  )-----------( 185      ( 22 Jun 2025 05:57 )             30.5     06-21    132[L]  |  97[L]  |  17  ----------------------------<  89  4.2   |  26  |  0.7    Ca    8.6      21 Jun 2025 07:54    TPro  6.0  /  Alb  3.6  /  TBili  0.4  /  DBili  x   /  AST  25  /  ALT  9   /  AlkPhos  84  06-21      Urinalysis Basic - ( 21 Jun 2025 07:54 )    Color: x / Appearance: x / SG: x / pH: x  Gluc: 89 mg/dL / Ketone: x  / Bili: x / Urobili: x   Blood: x / Protein: x / Nitrite: x   Leuk Esterase: x / RBC: x / WBC x   Sq Epi: x / Non Sq Epi: x / Bacteria: x            Culture - Blood (collected 20 Jun 2025 22:15)  Source: Blood Blood-Peripheral  Preliminary Report (22 Jun 2025 07:01):    No growth at 24 hours    Culture - Blood (collected 20 Jun 2025 22:15)  Source: Blood Blood-Peripheral  Preliminary Report (22 Jun 2025 07:01):    No growth at 24 hours          RADIOLOGY:    PHYSICAL EXAM:  GEN: No acute distress  LUNGS: Clear to auscultation bilaterally   HEART: S1/S2 present. RRR.   ABD/ GI: Soft, non-tender, non-distended. Bowel sounds present  EXT: rt ankle in cast  NEURO: AAOX1

## 2025-06-22 NOTE — PROGRESS NOTE ADULT - SUBJECTIVE AND OBJECTIVE BOX
Patient is a 54y old Male who presents with a chief complaint of R ankle pain. Dx of Closed right malleolar fracture. Today is hospital day 1d. This morning patient was seen and examined at bedside, resting comfortably in bed. No acute or major events overnight. Patient was given shampoo and toothbrush. Educated on the importance of ice and elevation.     PAST MEDICAL & SURGICAL HISTORY  Epilepsy  last event 4+ yrs ago    Schizophrenia    Bipolar 1 disorder    Major depressive disorder    Anemia    Dyspepsia    Hypothyroidism    Constipation    MR (mental retardation)    H/O cystoscopy    H/O laminectomy  03/2001    H/O fracture of fibula  nondisplaced 03/2010    ALLERGIES:  phenothiazines (Unknown)  erythromycin (Other)    MEDICATIONS:  STANDING MEDICATIONS  benztropine 1 milliGRAM(s) Oral every 12 hours  buPROPion XL (24-Hour) . 150 milliGRAM(s) Oral every 24 hours  chlorhexidine 2% Cloths 1 Application(s) Topical <User Schedule>  divalproex  milliGRAM(s) Oral <User Schedule>  enoxaparin Injectable 40 milliGRAM(s) SubCutaneous every 24 hours  haloperidol     Tablet 10 milliGRAM(s) Oral every 8 hours  levothyroxine 100 MICROGram(s) Oral daily  lidocaine 2% Injectable 5 milliLiter(s) Local Injection Once  mirtazapine 15 milliGRAM(s) Oral <User Schedule>  senna 2 Tablet(s) Oral at bedtime  tamsulosin 0.8 milliGRAM(s) Oral at bedtime    PRN MEDICATIONS  acetaminophen     Tablet .. 650 milliGRAM(s) Oral every 6 hours PRN  ibuprofen  Tablet. 400 milliGRAM(s) Oral every 8 hours PRN    VITALS:   T(F): 97.6  HR: 108  BP: 118/83  RR: 18  SpO2: 95%    PHYSICAL EXAM:  GENERAL:   ( x ) NAD, lying in bed comfortably     (  ) obtunded     (  ) lethargic     (  ) somnolent    HEAD:   ( x ) Atraumatic     (  ) hematoma     (  ) laceration (specify location:       )     NECK:  ( x ) Supple     (  ) neck stiffness     (  ) nuchal rigidity     (  )  no JVD     (  ) JVD present ( -- cm)    HEART:  Rate -->     ( x ) normal rate     (  ) bradycardic     (  ) tachycardic  Rhythm -->     ( x ) regular     (  ) regularly irregular     (  ) irregularly irregular  Murmurs -->     ( x ) normal s1s2     (  ) systolic murmur     (  ) diastolic murmur     (  ) continuous murmur      (  ) S3 present     (  ) S4 present    LUNGS:   ( x )Unlabored respirations     (  ) tachypnea  ( x ) B/L air entry     (  ) decreased breath sounds in:  (location     )    (  ) no adventitious sound     (  ) crackles     (  ) wheezing      (  ) rhonchi      (specify location:       )  (  ) chest wall tenderness (specify location:       )    ABDOMEN:   ( x ) Soft     (  ) tense   |   (  ) nondistended     (  ) distended   |   (  ) +BS     (  ) hypoactive bowel sounds     (  ) hyperactive bowel sounds  (  ) nontender     (  ) RUQ tenderness     (  ) RLQ tenderness     (  ) LLQ tenderness     (  ) epigastric tenderness     (  ) diffuse tenderness  (  ) Splenomegaly      (  ) Hepatomegaly      (  ) Jaundice     (  ) ecchymosis     EXTREMITIES:  (x  ) Normal     (  ) Rash     (  ) ecchymosis     (  ) varicose veins      (  ) pitting edema     (  ) non-pitting edema   (  ) ulceration     (  ) gangrene:     (location:     )  X R short boot casted with ace wrap    NERVOUS SYSTEM:    ( x ) A&Ox3     (  ) confused     (  ) lethargic  CN II-XII:     (  ) Intact     (  ) deficits found     (Specify:     )   Upper extremities:     (  ) no sensorimotor deficits     (  ) weakness     (  ) loss of proprioception/vibration     (  ) loss of touch/temperature (specify:    )  Lower extremities:     (  ) no sensorimotor deficits     (  ) weakness     (  ) loss of proprioception/vibration     (  ) loss of touch/temperature (specify:    )    SKIN:   (x  ) No rashes or lesions     (  ) maculopapular rash     (  ) pustules     (  ) vesicles     (  ) ulcer     (  ) ecchymosis     (specify location:     )    LABS:                        10.4   9.36  )-----------( 185      ( 22 Jun 2025 05:57 )             30.5     06-21    132[L]  |  97[L]  |  17  ----------------------------<  89  4.2   |  26  |  0.7    Ca    8.6      21 Jun 2025 07:54    TPro  6.0  /  Alb  3.6  /  TBili  0.4  /  DBili  x   /  AST  25  /  ALT  9   /  AlkPhos  84  06-21    Culture - Blood (collected 20 Jun 2025 22:15)  Source: Blood Blood-Peripheral  Preliminary Report (22 Jun 2025 07:01):    No growth at 24 hours    Culture - Blood (collected 20 Jun 2025 22:15)  Source: Blood Blood-Peripheral  Preliminary Report (22 Jun 2025 07:01):    No growth at 24 hours

## 2025-06-22 NOTE — PROGRESS NOTE ADULT - ASSESSMENT
53 y/o man with PMH of Mild mental retardation, intellectual disability, Schizophrenia, Impulse control disorder, Seizures, Hypothyroidism, Exotropia, Cataracts, Osteopenia, urethral stricture and chronic long presented from group home for fall? and ankle fracture.     #R bimalleolar ankle fracture.  -ortho eval appreciated : Discussed injury with staff from patient's living facility. Advised that given fracture pattern, in many cases surgical intervention would be pursued for optimal functional outcome, however in this case and attempt at conservative mgmnt with immobilization and NWB may be reasonable as patient is minimally ambulatory. Regardless, there is no urgent indication for orthopedic intervention and further management can be pursued on an outpatient basis. If patient is admitted, recommend NWB and PT/OT.  - Pain control  - Activity: NWB  - PT/OT ordered  - Keep splint C/D/I. Splint care explained.  - Extremity icing/elevation  - DVT ppx per primary, given reduced mobility patient should be on chemical dvt ppx unless medically contraindicated  - F/U with Dr. Atkins in 2 weeks. Please call [phone].  - discussed with Group home aid -patient will likely need to go to SNF   - discussed with CM as well - will need JOSUE post PT eval     #h/o urethral stricture with chronic long  #Mild mental disability, schizophrenia, impulse control disorder - continue home meds  #Hypothyroid on synthroid  #mild anemia - outpatient follow up  #hyponatremia - improving and not clinically significant; outpatient follow up     Progress Note Handoff  Pending Consults: PT, OT,  CM  Pending Tests:none  Pending Results: none  Family Discussion: Discussed labs, meds, PT ameena and dc planning to SNF in 48hrs. PFD placed for 6/23  Disposition: Home_____/SNF__x____/Other_____/Unknown at this time_____  Spent over 75 min reviewing chart, speaking with patient/family and on coordinating patient care during interdisciplinary rounds. The necessity of the time spent during the encounter on this date of service was due to: Direct clinical care, patient education and counseling,  coordination with PT consultants, nursing and case management/social work teams, review of tests and scheduled medications,  independent review of previous medical records and available test results including ED admissions elsewhere, and resident supervision. Time spent on the encounter excludes teaching time and/or separately reported services.      Electronic Signatures:   55 y/o man with PMH of Mild mental retardation, intellectual disability, Schizophrenia, Impulse control disorder, Seizures, Hypothyroidism, Exotropia, Cataracts, Osteopenia, urethral stricture and chronic long presented from group home for fall? and ankle fracture.     #R bimalleolar ankle fracture.  -ortho eval appreciated : Discussed injury with staff from patient's living facility. Advised that given fracture pattern, in many cases surgical intervention would be pursued for optimal functional outcome, however in this case and attempt at conservative mgmnt with immobilization and NWB may be reasonable as patient is minimally ambulatory. Regardless, there is no urgent indication for orthopedic intervention and further management can be pursued on an outpatient basis. If patient is admitted, recommend NWB and PT/OT.  - Pain control  - Activity: NWB  - PT/OT ordered  - Keep splint C/D/I. Splint care explained.  - Extremity icing/elevation  - DVT ppx per primary, given reduced mobility patient should be on chemical dvt ppx unless medically contraindicated  - F/U with Dr. Atkins in 2 weeks. Please call [phone].  -as per Group home aid -patient will likely need to go to SNF   - - will need JOSUE post PT eval -- PT did not see today as activity orders not specified    #h/o urethral stricture with chronic long  #Mild mental disability, schizophrenia, impulse control disorder - mirtazapine, haldol, divaloproex, bupropion  #Hypothyroid on synthroid  #mild anemia - outpatient follow up  #hyponatremia - improving 128-->132      Pending Consults: PT, OT,  CM  dc plan AM    Disposition: Home_____/SNF__x____/Other_____/Unknown at this time_____  Spent over 75 min reviewing chart, speaking with patient/family and on coordinating patient care during interdisciplinary rounds. The necessity of the time spent during the encounter on this date of service was due to: Direct clinical care, patient education and counseling,  coordination with PT consultants, nursing and case management/social work teams, review of tests and scheduled medications,  independent review of previous medical records and available test results including ED admissions elsewhere, and resident supervision. Time spent on the encounter excludes teaching time and/or separately reported services.      Electronic Signatures:

## 2025-06-23 LAB
ANION GAP SERPL CALC-SCNC: 12 MMOL/L — SIGNIFICANT CHANGE UP (ref 7–14)
BUN SERPL-MCNC: 17 MG/DL — SIGNIFICANT CHANGE UP (ref 10–20)
CALCIUM SERPL-MCNC: 8.8 MG/DL — SIGNIFICANT CHANGE UP (ref 8.4–10.5)
CHLORIDE SERPL-SCNC: 97 MMOL/L — LOW (ref 98–110)
CO2 SERPL-SCNC: 24 MMOL/L — SIGNIFICANT CHANGE UP (ref 17–32)
CREAT SERPL-MCNC: 0.9 MG/DL — SIGNIFICANT CHANGE UP (ref 0.7–1.5)
EGFR: 101 ML/MIN/1.73M2 — SIGNIFICANT CHANGE UP
EGFR: 101 ML/MIN/1.73M2 — SIGNIFICANT CHANGE UP
GLUCOSE SERPL-MCNC: 84 MG/DL — SIGNIFICANT CHANGE UP (ref 70–99)
HCT VFR BLD CALC: 32.2 % — LOW (ref 42–52)
HGB BLD-MCNC: 11.1 G/DL — LOW (ref 14–18)
MCHC RBC-ENTMCNC: 31.9 PG — HIGH (ref 27–31)
MCHC RBC-ENTMCNC: 34.5 G/DL — SIGNIFICANT CHANGE UP (ref 32–37)
MCV RBC AUTO: 92.5 FL — SIGNIFICANT CHANGE UP (ref 80–94)
NRBC BLD AUTO-RTO: 0 /100 WBCS — SIGNIFICANT CHANGE UP (ref 0–0)
PLATELET # BLD AUTO: 198 K/UL — SIGNIFICANT CHANGE UP (ref 130–400)
PMV BLD: 9.6 FL — SIGNIFICANT CHANGE UP (ref 7.4–10.4)
POTASSIUM SERPL-MCNC: 3.9 MMOL/L — SIGNIFICANT CHANGE UP (ref 3.5–5)
POTASSIUM SERPL-SCNC: 3.9 MMOL/L — SIGNIFICANT CHANGE UP (ref 3.5–5)
RBC # BLD: 3.48 M/UL — LOW (ref 4.7–6.1)
RBC # FLD: 13.4 % — SIGNIFICANT CHANGE UP (ref 11.5–14.5)
SODIUM SERPL-SCNC: 133 MMOL/L — LOW (ref 135–146)
WBC # BLD: 13.56 K/UL — HIGH (ref 4.8–10.8)
WBC # FLD AUTO: 13.56 K/UL — HIGH (ref 4.8–10.8)

## 2025-06-23 PROCEDURE — 99233 SBSQ HOSP IP/OBS HIGH 50: CPT

## 2025-06-23 RX ORDER — ENOXAPARIN SODIUM 100 MG/ML
40 INJECTION SUBCUTANEOUS
Qty: 0 | Refills: 0 | DISCHARGE
Start: 2025-06-23

## 2025-06-23 RX ORDER — HALOPERIDOL 10 MG/1
1 TABLET ORAL
Qty: 0 | Refills: 0 | DISCHARGE
Start: 2025-06-23

## 2025-06-23 RX ORDER — ACETAMINOPHEN 500 MG/5ML
2 LIQUID (ML) ORAL
Qty: 0 | Refills: 0 | DISCHARGE
Start: 2025-06-23

## 2025-06-23 RX ADMIN — Medication 2 TABLET(S): at 21:23

## 2025-06-23 RX ADMIN — TAMSULOSIN HYDROCHLORIDE 0.8 MILLIGRAM(S): 0.4 CAPSULE ORAL at 21:23

## 2025-06-23 RX ADMIN — Medication 100 MICROGRAM(S): at 05:35

## 2025-06-23 RX ADMIN — Medication 250 MILLIGRAM(S): at 21:23

## 2025-06-23 RX ADMIN — MIRTAZAPINE 15 MILLIGRAM(S): 30 TABLET, FILM COATED ORAL at 18:09

## 2025-06-23 RX ADMIN — HALOPERIDOL 10 MILLIGRAM(S): 10 TABLET ORAL at 13:30

## 2025-06-23 RX ADMIN — Medication 1 APPLICATION(S): at 05:35

## 2025-06-23 RX ADMIN — Medication 1 MILLIGRAM(S): at 05:34

## 2025-06-23 RX ADMIN — HALOPERIDOL 10 MILLIGRAM(S): 10 TABLET ORAL at 05:35

## 2025-06-23 RX ADMIN — HALOPERIDOL 10 MILLIGRAM(S): 10 TABLET ORAL at 21:22

## 2025-06-23 RX ADMIN — ENOXAPARIN SODIUM 40 MILLIGRAM(S): 100 INJECTION SUBCUTANEOUS at 13:30

## 2025-06-23 RX ADMIN — Medication 1 MILLIGRAM(S): at 17:14

## 2025-06-23 RX ADMIN — Medication 250 MILLIGRAM(S): at 13:30

## 2025-06-23 RX ADMIN — BUPROPION HYDROBROMIDE 150 MILLIGRAM(S): 522 TABLET, EXTENDED RELEASE ORAL at 17:14

## 2025-06-23 RX ADMIN — Medication 250 MILLIGRAM(S): at 05:34

## 2025-06-23 NOTE — DISCHARGE NOTE PROVIDER - NSDCFUADDAPPT_GEN_ALL_CORE_FT
APPTS ARE READY TO BE MADE: [ x] YES    Best Family or Patient Contact (if needed):    Additional Information about above appointments (if needed):    1: Addison Atkins  2:   3:     Other comments or requests:

## 2025-06-23 NOTE — DISCHARGE NOTE PROVIDER - NSDCCPCAREPLAN_GEN_ALL_CORE_FT
PRINCIPAL DISCHARGE DIAGNOSIS  Diagnosis: Closed right malleolar fracture  Assessment and Plan of Treatment: You hurt your ankle. It is broken. You have a splint to protect it.  Do not put weight on your right foot. Use your left foot only to walk.  Keep your splint clean, dry, and intact (C/D/I). Do not remove it.  Put ice on your ankle. Keep your foot raised up higher than your heart as much as you can. This will help with swelling.  Take your medications as directed by the doctor. This will help with pain and prevent blood clots.  A physical therapist will help you learn how to move and get stronger.  You need to see an orthopedic foot doctor (Dr. Atkins) in 2 weeks. Call [phone] to make an appointment. If you have any questions or problems before then, call the doctor's office.

## 2025-06-23 NOTE — DISCHARGE NOTE PROVIDER - CARE PROVIDER_API CALL
Addison Atkins  Orthopaedic Surgery  [address] [facility] [place] [place] [address]  Phone: [phone]  Fax: [phone]  Follow Up Time: 2 weeks

## 2025-06-23 NOTE — DISCHARGE NOTE PROVIDER - HOSPITAL COURSE
55 y/o man with PMH of Mild mental retardation, intellectual disability, Schizophrenia, Impulse control disorder, Seizures, Hypothyroidism, Exotropia, Cataracts, Osteopenia, urethral stricture and chronic long presented from group home for fall and ankle fracture.  found to have a R bimalleolar ankle fracture, seen by ortho, no urgent indication for orthopedic intervention, splint placed        #R bimalleolar ankle fracture  -ortho evaluated- there is no urgent indication for orthopedic intervention and further management can be pursued on an outpatient basis. If patient is admitted, recommend NWB and PT/OT.  - Pain control  - Activity: NWB on R ankle . WB as tolerated on L Ankle   - PT/OT ordered  - Keep splint C/D/I. Splint care explained.  - Extremity icing/elevation  - DVT ppx   - F/U with Dr. Atkins in 2 weeks. call [phone].    #h/o urethral stricture with chronic long  #Mild mental disability, schizophrenia, impulse control disorder - mirtazapine, haldol, divaloproex, bupropion  #Hypothyroid on synthroid  #mild anemia - outpatient follow up  #hyponatremia - improving 128-->132

## 2025-06-23 NOTE — DISCHARGE NOTE PROVIDER - NSDCFUADDINST_GEN_ALL_CORE_FT
Activity - Increase As Tolerated:     Time/Priority:  Routine (06-22-25 @ 22:49) [Active]  Activity - Weight Bearing Status:     Left Lower Extremity:  Full Weight Bearing    Right Lower Extremity:  NWB (06-22-25 @ 15:06) [Active]  Activity - Weight Bearing Status:     Right Lower Extremity:  Non-Weight Bearing (06-21-25 @ 08:54) [Active]

## 2025-06-23 NOTE — PROGRESS NOTE ADULT - ASSESSMENT
53 y/o man with PMH of Mild mental retardation, intellectual disability, Schizophrenia, Impulse control disorder, Seizures, Hypothyroidism, Exotropia, Cataracts, Osteopenia, urethral stricture and chronic long presented from group home for fall? and ankle fracture.    #R bimalleolar ankle fracture  -ortho eval appreciated : Discussed injury with staff from patient's living facility. Advised that given fracture pattern, in many cases surgical intervention would be pursued for optimal functional outcome, however in this case and attempt at conservative mgmnt with immobilization and NWB may be reasonable as patient is minimally ambulatory. Regardless, there is no urgent indication for orthopedic intervention and further management can be pursued on an outpatient basis. If patient is admitted, recommend NWB and PT/OT.  - Pain control  - Activity: NWB  - PT/OT ordered  - Keep splint C/D/I. Splint care explained.  - Extremity icing/elevation  - DVT ppx per primary, given reduced mobility patient should be on chemical dvt ppx unless medically contraindicated  - F/U with Dr. Atkins in 2 weeks. Please call [phone].  - discussed with Group home aid on 6/21-patient will likely need to go to SNF   - discussed with CM as well - will need JOSUE post PT eval and group home meeting    #h/o urethral stricture with chronic long  #Mild mental disability, schizophrenia, impulse control disorder - continue home meds  #Hypothyroid on synthroid  #mild anemia - outpatient follow up  #hyponatremia - improving and not clinically significant; outpatient follow up     Progress Note Handoff  Pending Consults: PT  Pending Tests: none  Pending Results: none  Family Discussion: Discussed labs, meds, PT eval and dc planning with pt and medical staff. PFD for 24hrs  Disposition: Home_____/SNF___x___/Other_____/Unknown at this time_____  Spent over 55 min reviewing chart, speaking with patient/family and on coordinating patient care during interdisciplinary rounds. The necessity of the time spent during the encounter on this date of service was due to: Direct clinical care, patient education and counseling,  coordination with PT consultants, nursing and case management/social work teams, review of tests and scheduled medications,  independent review of previous medical records and available test results including ED admissions elsewhere, and resident supervision. Time spent on the encounter excludes teaching time and/or separately reported services.    Please call me with any questions at [phone]

## 2025-06-23 NOTE — DISCHARGE NOTE PROVIDER - CARE PROVIDERS DIRECT ADDRESSES
[email or web address]

## 2025-06-23 NOTE — DISCHARGE NOTE PROVIDER - ATTENDING DISCHARGE PHYSICAL EXAMINATION:
GEN Lying in no acute distress  HEENT Pupils equal and reactive to light and accommodationSupple Neck  PULM Clear to auscultation bilaterally  CV s1s2 regular rate and rhythm  GI + bowel sounds nontnender  EXT no cyanosis or edema  PSYCH awake alert and oriented x 3  INTEG No Lesions  NEURO Moves all extremities   GEN Lying in no acute distress  HEENT Pupils equal and reactive to light and accommodationSupple Neck  PULM Clear to auscultation bilaterally  CV s1s2 regular rate and rhythm  GI + bowel sounds nontnender  EXT no cyanosis or edema  INTEG No Lesions  NEURO Moves all extremities

## 2025-06-23 NOTE — DISCHARGE NOTE PROVIDER - NSDCMRMEDTOKEN_GEN_ALL_CORE_FT
acetaminophen 325 mg oral tablet: 2 tab(s) orally every 6 hours As needed Mild Pain (1 - 3)  benztropine 1 mg oral tablet: 1 tab(s) orally 2 times a day  buPROPion 150 mg/24 hours (XL) oral tablet, extended release: 1 tab(s) orally every 24 hours  divalproex sodium 250 mg oral tablet, extended release: 1 tab(s) orally 3 times a day  enoxaparin: 40 milligram(s) subcutaneous once a day  haloperidol 10 mg oral tablet: 1 tab(s) orally every 8 hours  mirtazapine 15 mg oral tablet: 1 tab(s) orally once a day (in the evening)  Senna-Time S 50 mg-8.6 mg oral tablet: 1 tab(s) orally once a day (at bedtime)  Synthroid 100 mcg (0.1 mg) oral tablet: 1 tab(s) orally once a day (in the morning)  tamsulosin 0.4 mg oral capsule: 2 cap(s) orally once a day  triamcinolone 0.5% topical cream: Apply topically to affected area 3 times a day

## 2025-06-23 NOTE — PROGRESS NOTE ADULT - SUBJECTIVE AND OBJECTIVE BOX
ALINA CHERY  54y  Male      Patient is a 54y old Male who presents with a chief complaint of ankle fracture  (23 Jun 2025 07:32)      INTERVAL HPI/OVERNIGHT EVENTS:  Patient seen and examined earlier this morning  lying comfortably in bed   in nad; eating breakfast     T(C): 37.1 (06-23-25 @ 09:11), Max: 37.1 (06-23-25 @ 09:11)  HR: 69 (06-23-25 @ 09:11) (69 - 108)  BP: 96/58 (06-23-25 @ 09:11) (96/58 - 118/83)  RR: 18 (06-23-25 @ 09:11) (18 - 18)  SpO2: 93% (06-23-25 @ 09:11) (93% - 95%)    PHYSICAL EXAM:  GENERAL: NAD, well-groomed,   HEAD:  Atraumatic, Normocephalic  EYES: conjunctiva and sclera clear  ENMT: Moist mucous membranes,  No visible lesions  NECK: Supple, No JVD, Normal thyroid  NERVOUS SYSTEM:  Awake  CHEST/LUNG: good air entry   HEART: Regular rate and rhythm; No murmurs, rubs, or gallops  ABDOMEN: Soft, Nontender, Nondistended; Bowel sounds present  EXTREMITIES:  right ankle in cast   LYMPH: No lymphadenopathy noted  SKIN: No rashes or lesions    Consultant(s) Notes Reviewed:  [x ] YES  [ ] NO  Care Discussed with Consultants/Other Providers [ x] YES  [ ] NO    LAB:                        11.1   13.56 )-----------( 198      ( 23 Jun 2025 07:10 )             32.2     06-23    133[L]  |  97[L]  |  17  ----------------------------<  84  3.9   |  24  |  0.9    Ca    8.8      23 Jun 2025 07:10    Drug Dosing Weight  Height (cm): 175.3 (21 Jun 2025 03:14)  Weight (kg): 95.3 (21 Jun 2025 02:53)  BMI (kg/m2): 31 (21 Jun 2025 03:14)  BSA (m2): 2.11 (21 Jun 2025 03:14)      I&O's Summary    22 Jun 2025 07:01  -  23 Jun 2025 07:00  --------------------------------------------------------  IN: 116 mL / OUT: 0 mL / NET: 116 mL    23 Jun 2025 07:01  -  23 Jun 2025 13:07  --------------------------------------------------------  IN: 0 mL / OUT: 800 mL / NET: -800 mL      Urinalysis Basic - ( 23 Jun 2025 07:10 )    Color: x / Appearance: x / SG: x / pH: x  Gluc: 84 mg/dL / Ketone: x  / Bili: x / Urobili: x   Blood: x / Protein: x / Nitrite: x   Leuk Esterase: x / RBC: x / WBC x   Sq Epi: x / Non Sq Epi: x / Bacteria: x        RADIOLOGY & ADDITIONAL TESTS:  Imaging Personally Reviewed:  [x] YES  [ ] NO    MEDS:  acetaminophen     Tablet .. 650 milliGRAM(s) Oral every 6 hours PRN  benztropine 1 milliGRAM(s) Oral every 12 hours  buPROPion XL (24-Hour) . 150 milliGRAM(s) Oral every 24 hours  chlorhexidine 2% Cloths 1 Application(s) Topical <User Schedule>  divalproex  milliGRAM(s) Oral <User Schedule>  enoxaparin Injectable 40 milliGRAM(s) SubCutaneous every 24 hours  haloperidol     Tablet 10 milliGRAM(s) Oral every 8 hours  ibuprofen  Tablet. 400 milliGRAM(s) Oral every 8 hours PRN  levothyroxine 100 MICROGram(s) Oral daily  lidocaine 2% Injectable 5 milliLiter(s) Local Injection Once  mirtazapine 15 milliGRAM(s) Oral <User Schedule>  senna 2 Tablet(s) Oral at bedtime  tamsulosin 0.8 milliGRAM(s) Oral at bedtime

## 2025-06-23 NOTE — DISCHARGE NOTE PROVIDER - NSDCFUSCHEDAPPT_GEN_ALL_CORE_FT
Dejan Jean  [facility]  Scheduled Appointment: 07/15/2025    Dejan Jean  [facility]  PODIATRY  [address]  Scheduled Appointment: 07/15/2025    Addison Knox  [facility]  UROLOGY [address]  Scheduled Appointment: 07/17/2025    Larry Navarro  [facility]  OTOLARYNG [address]  Scheduled Appointment: 08/22/2025

## 2025-06-24 PROCEDURE — 99232 SBSQ HOSP IP/OBS MODERATE 35: CPT

## 2025-06-24 RX ADMIN — Medication 2 TABLET(S): at 21:44

## 2025-06-24 RX ADMIN — ENOXAPARIN SODIUM 40 MILLIGRAM(S): 100 INJECTION SUBCUTANEOUS at 14:01

## 2025-06-24 RX ADMIN — TAMSULOSIN HYDROCHLORIDE 0.8 MILLIGRAM(S): 0.4 CAPSULE ORAL at 21:45

## 2025-06-24 RX ADMIN — HALOPERIDOL 10 MILLIGRAM(S): 10 TABLET ORAL at 05:04

## 2025-06-24 RX ADMIN — Medication 100 MICROGRAM(S): at 05:05

## 2025-06-24 RX ADMIN — MIRTAZAPINE 15 MILLIGRAM(S): 30 TABLET, FILM COATED ORAL at 18:27

## 2025-06-24 RX ADMIN — Medication 250 MILLIGRAM(S): at 14:01

## 2025-06-24 RX ADMIN — HALOPERIDOL 10 MILLIGRAM(S): 10 TABLET ORAL at 21:45

## 2025-06-24 RX ADMIN — Medication 1 MILLIGRAM(S): at 05:04

## 2025-06-24 RX ADMIN — Medication 250 MILLIGRAM(S): at 21:45

## 2025-06-24 RX ADMIN — Medication 1 MILLIGRAM(S): at 18:27

## 2025-06-24 RX ADMIN — HALOPERIDOL 10 MILLIGRAM(S): 10 TABLET ORAL at 14:01

## 2025-06-24 RX ADMIN — Medication 250 MILLIGRAM(S): at 05:05

## 2025-06-24 RX ADMIN — Medication 1 APPLICATION(S): at 05:05

## 2025-06-24 NOTE — PROGRESS NOTE ADULT - SUBJECTIVE AND OBJECTIVE BOX
ALINA CHERY  54y  Male      Patient is a 54y old Male who presents with a chief complaint of ankle fracture  (23 Jun 2025 07:32)    INTERVAL HPI/OVERNIGHT EVENTS:  Patient seen and examined earlier this morning  lying comfortably in bed   in nad;   PT to follow up today     Vital Signs Last 24 Hrs  T(C): 36.8 (24 Jun 2025 08:08), Max: 36.8 (24 Jun 2025 08:08)  T(F): 98.2 (24 Jun 2025 08:08), Max: 98.2 (24 Jun 2025 08:08)  HR: 69 (24 Jun 2025 08:08) (67 - 71)  BP: 102/69 (24 Jun 2025 08:08) (100/63 - 128/66)  BP(mean): --  RR: 17 (24 Jun 2025 08:08) (17 - 18)  SpO2: 95% (24 Jun 2025 08:08) (94% - 96%)    Parameters below as of 24 Jun 2025 08:08  Patient On (Oxygen Delivery Method): room air    PHYSICAL EXAM:  GENERAL: NAD, well-groomed,   HEAD:  Atraumatic, Normocephalic  EYES: conjunctiva and sclera clear  ENMT: Moist mucous membranes,  No visible lesions  NECK: Supple, No JVD, Normal thyroid  NERVOUS SYSTEM:  Awake  CHEST/LUNG: good air entry   HEART: Regular rate and rhythm; No murmurs, rubs, or gallops  ABDOMEN: Soft, Nontender, Nondistended; Bowel sounds present  EXTREMITIES:  right ankle in cast   LYMPH: No lymphadenopathy noted  SKIN: No rashes or lesions    Consultant(s) Notes Reviewed:  [x ] YES  [ ] NO  Care Discussed with Consultants/Other Providers [ x] YES  [ ] NO    LAB:                        11.1   13.56 )-----------( 198      ( 23 Jun 2025 07:10 )             32.2     06-23    133[L]  |  97[L]  |  17  ----------------------------<  84  3.9   |  24  |  0.9    Ca    8.8      23 Jun 2025 07:10    Drug Dosing Weight  Height (cm): 175.3 (21 Jun 2025 03:14)  Weight (kg): 95.3 (21 Jun 2025 02:53)  BMI (kg/m2): 31 (21 Jun 2025 03:14)  BSA (m2): 2.11 (21 Jun 2025 03:14)      Urinalysis Basic - ( 23 Jun 2025 07:10 )    Color: x / Appearance: x / SG: x / pH: x  Gluc: 84 mg/dL / Ketone: x  / Bili: x / Urobili: x   Blood: x / Protein: x / Nitrite: x   Leuk Esterase: x / RBC: x / WBC x   Sq Epi: x / Non Sq Epi: x / Bacteria: x        RADIOLOGY & ADDITIONAL TESTS:  Imaging Personally Reviewed:  [x] YES  [ ] NO      MEDICATIONS  (STANDING):  benztropine 1 milliGRAM(s) Oral every 12 hours  buPROPion XL (24-Hour) . 150 milliGRAM(s) Oral every 24 hours  chlorhexidine 2% Cloths 1 Application(s) Topical <User Schedule>  divalproex  milliGRAM(s) Oral <User Schedule>  enoxaparin Injectable 40 milliGRAM(s) SubCutaneous every 24 hours  haloperidol     Tablet 10 milliGRAM(s) Oral every 8 hours  levothyroxine 100 MICROGram(s) Oral daily  lidocaine 2% Injectable 5 milliLiter(s) Local Injection Once  mirtazapine 15 milliGRAM(s) Oral <User Schedule>  senna 2 Tablet(s) Oral at bedtime  tamsulosin 0.8 milliGRAM(s) Oral at bedtime    MEDICATIONS  (PRN):  acetaminophen     Tablet .. 650 milliGRAM(s) Oral every 6 hours PRN Mild Pain (1 - 3)  ibuprofen  Tablet. 400 milliGRAM(s) Oral every 8 hours PRN Moderate Pain (4 - 6)

## 2025-06-24 NOTE — PROGRESS NOTE ADULT - SUBJECTIVE AND OBJECTIVE BOX
ORTHOPAEDIC SURGERY PROGRESS NOTE    Interval History:  Patient seen and examined at bedside.  At baseline. Appears comfortable. Pleasant & interacting with team.     MEDICATIONS  (STANDING):  benztropine 1 milliGRAM(s) Oral every 12 hours  buPROPion XL (24-Hour) . 150 milliGRAM(s) Oral every 24 hours  chlorhexidine 2% Cloths 1 Application(s) Topical <User Schedule>  divalproex  milliGRAM(s) Oral <User Schedule>  enoxaparin Injectable 40 milliGRAM(s) SubCutaneous every 24 hours  haloperidol     Tablet 10 milliGRAM(s) Oral every 8 hours  levothyroxine 100 MICROGram(s) Oral daily  lidocaine 2% Injectable 5 milliLiter(s) Local Injection Once  mirtazapine 15 milliGRAM(s) Oral <User Schedule>  senna 2 Tablet(s) Oral at bedtime  tamsulosin 0.8 milliGRAM(s) Oral at bedtime    MEDICATIONS  (PRN):  acetaminophen     Tablet .. 650 milliGRAM(s) Oral every 6 hours PRN Mild Pain (1 - 3)  ibuprofen  Tablet. 400 milliGRAM(s) Oral every 8 hours PRN Moderate Pain (4 - 6)      Vital Signs Last 24 Hrs  T(C): 36.8 (24 Jun 2025 08:08), Max: 36.8 (24 Jun 2025 08:08)  T(F): 98.2 (24 Jun 2025 08:08), Max: 98.2 (24 Jun 2025 08:08)  HR: 69 (24 Jun 2025 08:08) (67 - 71)  BP: 102/69 (24 Jun 2025 08:08) (100/63 - 128/66)  BP(mean): --  RR: 17 (24 Jun 2025 08:08) (17 - 18)  SpO2: 95% (24 Jun 2025 08:08) (94% - 96%)    Physical Exam:   Awake, alert  Follows some commands    RLE  Splint removed for exam  Swelling of ankle with some mild ecchymosis medially and laterally. Compartments soft and compressible. No blistering or ulceration  ROM ankle deferred  NV unable to assess given mental baseline  CR<2sec, palpable pulses                           11.1   13.56 )-----------( 198      ( 23 Jun 2025 07:10 )             32.2     06-23    133[L]  |  97[L]  |  17  ----------------------------<  84  3.9   |  24  |  0.9    Ca    8.8      23 Jun 2025 07:10          A/P: 54yMale with intellectual disability, minimal household ambulator, and R bimalleolar ankle fracture. Given patients baseline functional status, it's a reasonable option to pursue non-operative management. Short leg cast was placed -- patient tolerated well.       - Trial of conservative management with WB restrictions and immobilization   - NWB RLE  - Pain control   - Extremity icing/elevation  - DVT Prophylaxis as per primary  - Discharge planning    - If discharged, patient should follow up with Dr. Atkins at [address] [place] [address] 2 weeks after discharge. Phone number [phone] for scheduling/appointment.  - Patient should be discharged on 6 weeks (from injury/surgery date) of appropriate DVT prophylaxis due to their decreased functional/ambulation status after lower extremity surgery/ injury. Orthopaedic preference would be Aspirin 81 mg BID  if there are no contraindications. If patient is already on home anticoagulation, they may continue home anticoagulation medication instead of aspirin. If patient is going to inpatient rehab/nursing facility, and they plan for DVT PPx with SQH/LVX, they may be placed on that instead of aspirin.

## 2025-06-24 NOTE — PROGRESS NOTE ADULT - SUBJECTIVE AND OBJECTIVE BOX
SUBJECTIVE:    Patient is a 54y old Male who presents with a chief complaint of ankle fracture	  Currently admitted to medicine with the primary diagnosis of Closed right malleolar fracture    Today is hospital day 3d. This morning he is resting comfortably in bed and reports no new issues or overnight events.     PAST MEDICAL & SURGICAL HISTORY  Epilepsy  last event 4+ yrs ago    Schizophrenia    Bipolar 1 disorder    Major depressive disorder    Anemia    Dyspepsia    Hypothyroidism    Constipation    MR (mental retardation)    H/O cystoscopy    H/O laminectomy  03/2001    H/O fracture of fibula  nondisplaced 03/2010      SOCIAL HISTORY:  Negative for smoking/alcohol/drug use.     ALLERGIES:  phenothiazines (Unknown)  erythromycin (Other)    MEDICATIONS:  STANDING MEDICATIONS  benztropine 1 milliGRAM(s) Oral every 12 hours  buPROPion XL (24-Hour) . 150 milliGRAM(s) Oral every 24 hours  chlorhexidine 2% Cloths 1 Application(s) Topical <User Schedule>  divalproex  milliGRAM(s) Oral <User Schedule>  enoxaparin Injectable 40 milliGRAM(s) SubCutaneous every 24 hours  haloperidol     Tablet 10 milliGRAM(s) Oral every 8 hours  levothyroxine 100 MICROGram(s) Oral daily  lidocaine 2% Injectable 5 milliLiter(s) Local Injection Once  mirtazapine 15 milliGRAM(s) Oral <User Schedule>  senna 2 Tablet(s) Oral at bedtime  tamsulosin 0.8 milliGRAM(s) Oral at bedtime    PRN MEDICATIONS  acetaminophen     Tablet .. 650 milliGRAM(s) Oral every 6 hours PRN  ibuprofen  Tablet. 400 milliGRAM(s) Oral every 8 hours PRN    VITALS:   T(F): 98.2  HR: 69  BP: 102/69  RR: 17  SpO2: 95%    LABS:                        11.1   13.56 )-----------( 198      ( 23 Jun 2025 07:10 )             32.2     06-23    133[L]  |  97[L]  |  17  ----------------------------<  84  3.9   |  24  |  0.9    Ca    8.8      23 Jun 2025 07:10      PHYSICAL EXAM:  GEN: No acute distress, syndromatic  LUNGS: Clear to auscultation bilaterally   HEART: Regular  ABD: Soft, non-tender, non-distended.  EXT: NC/NC/NE/2+PP/MCLEAN/Skin Intact.   NEURO: AAOX2

## 2025-06-24 NOTE — PHYSICAL THERAPY INITIAL EVALUATION ADULT - ADDITIONAL COMMENTS
Patient lives in a group home. Assisted in ADLs and ambulates using RW with assistance, short distances in a stooped posture

## 2025-06-24 NOTE — PROGRESS NOTE ADULT - ASSESSMENT
55 y/o man with PMH of Mild mental retardation, intellectual disability, Schizophrenia, Impulse control disorder, Seizures, Hypothyroidism, Exotropia, Cataracts, Osteopenia, urethral stricture and chronic long presented from group home for fall? and ankle fracture.    #R bimalleolar ankle fracture  -ortho eval appreciated : Discussed injury with staff from patient's living facility. Advised that given fracture pattern, in many cases surgical intervention would be pursued for optimal functional outcome, however in this case and attempt at conservative mgmnt with immobilization and NWB may be reasonable as patient is minimally ambulatory. Regardless, there is no urgent indication for orthopedic intervention and further management can be pursued on an outpatient basis. If patient is admitted, recommend NWB and PT/OT.  - Pain control  - Activity: NWB  - PT/OT ordered  - Keep splint C/D/I. Splint care explained.  - Extremity icing/elevation  - DVT ppx per primary, given reduced mobility patient should be on chemical dvt ppx unless medically contraindicated  - F/U with Dr. Atkins in 2 weeks. Please call [phone].  - discussed with Group home aid on 6/21-patient will likely need to go to SNF   - discussed with CM as well - will need JOSUE post PT eval today  - group home meeting scheduled for 10:30 am     #h/o urethral stricture with chronic long  #Mild mental disability, schizophrenia, impulse control disorder - continue home meds  #Hypothyroid on synthroid  #mild anemia - outpatient follow up  #hyponatremia - improving and not clinically significant; outpatient follow up     Progress Note Handoff  Pending Consults: PT  Pending Tests: none  Pending Results: none  Family Discussion: Discussed labs, meds, PT eval and dc planning with pt and medical staff. PFD for 24hrs  Disposition: Home_____/SNF___x___/Other_____/Unknown at this time_____  Spent over 55 min reviewing chart, speaking with patient/family and on coordinating patient care during interdisciplinary rounds. The necessity of the time spent during the encounter on this date of service was due to: Direct clinical care, patient education and counseling,  coordination with PT consultants, nursing and case management/social work teams, review of tests and scheduled medications, independent review of previous medical records and available test results including ED admissions elsewhere, and resident supervision. Time spent on the encounter excludes teaching time and/or separately reported services.    Please call me with any questions at extension [phone]

## 2025-06-24 NOTE — PHYSICAL THERAPY INITIAL EVALUATION ADULT - SPECIFY REASON(S)
Chart reviewed. Pt. currently without activity orders. PT evaluation on hold pending activity orders as appropriate. Will follow.
PT evaluation attempted. patient refusing to participate in therapy. Gets agitated when encouraged to participate. Patient (+) mild intellectual disability
PT evaluation attempted. Patient refusing to participate in therapy. Gets agitated when encouraged o participate. Patient has mild mental disability. Will follow-up.

## 2025-06-24 NOTE — PROGRESS NOTE ADULT - ASSESSMENT
55 y/o man with PMH of Mild mental retardation, intellectual disability, Schizophrenia, Impulse control disorder, Seizures, Hypothyroidism, Exotropia, Cataracts, Osteopenia, urethral stricture and chronic long presented from group home for fall and ankle fracture.    #R bimalleolar ankle fracture  -ortho eval:   discussed injury with staff from patient's living facility. Advised that given fracture pattern, in many cases surgical intervention would be pursued for optimal functional outcome, however in this case and attempt at conservative mgmnt with immobilization and NWB may be reasonable as patient is minimally ambulatory. Regardless, there is no urgent indication for orthopedic intervention and further management can be pursued on an outpatient basis. If patient is admitted, recommend NWB and PT/OT.  - now he is s/p casting by ortho today 6/24   - Pain control with tylenol, NSAIDs PRN   - Activity: NWB on RLE   - PT/OT ordered  > did PT today   - Extremity icing/elevation  - DVT ppx per primary, given reduced mobility patient should be on chemical dvt ppx unless medically contraindicated; currently on lovenox   - F/U with Dr. Atkins in 2 weeks. ( phone # ; [phone].)   - patient will likely need to go to SNF     #h/o urethral stricture with chronic long  #Mild mental disability, schizophrenia, impulse control disorder - continue home meds ( on bupropion 150 Q24 xl, divalproex  TID , haldol 10 mg Q8  , benztropine 1 mg q12 , and mirtazapine 15 mg daily   #Hypothyroid on synthroid  #mild anemia - outpatient follow up  #hyponatremia - improving and not clinically significant; outpatient follow up     #MIsc  -DVT ppx ; lovenox  -Diet regular  -Activity iat, NWB RLE    -gi PPX no need    Pending > DC planning to SNF

## 2025-06-24 NOTE — PHYSICAL THERAPY INITIAL EVALUATION ADULT - PERTINENT HX OF CURRENT PROBLEM, REHAB EVAL
52-year-old male with PMHx (noted from group home paperwork) of intellectual disability, Schizophrenia, Impulse control disorder, Seizures (reportedly from childhood), Hypothyroidism, Exotropia, Cataracts, Osteopenia, urethral stricture, presenting for right leg swelling. Per ED hx, Aide at bedside noticed 1 hour prior at shift change that his right ankle was swollen and bruised.  He normally walks with walker but is unable to due to pain. Denies fever. Patient states he tripped getting into van earlier today but aide does not know what happened.

## 2025-06-25 PROCEDURE — 99232 SBSQ HOSP IP/OBS MODERATE 35: CPT

## 2025-06-25 PROCEDURE — 73610 X-RAY EXAM OF ANKLE: CPT | Mod: 26,RT

## 2025-06-25 RX ADMIN — HALOPERIDOL 10 MILLIGRAM(S): 10 TABLET ORAL at 14:24

## 2025-06-25 RX ADMIN — HALOPERIDOL 10 MILLIGRAM(S): 10 TABLET ORAL at 05:37

## 2025-06-25 RX ADMIN — ENOXAPARIN SODIUM 40 MILLIGRAM(S): 100 INJECTION SUBCUTANEOUS at 14:23

## 2025-06-25 RX ADMIN — Medication 1 APPLICATION(S): at 05:36

## 2025-06-25 RX ADMIN — Medication 250 MILLIGRAM(S): at 05:36

## 2025-06-25 RX ADMIN — Medication 1 MILLIGRAM(S): at 18:27

## 2025-06-25 RX ADMIN — Medication 250 MILLIGRAM(S): at 21:53

## 2025-06-25 RX ADMIN — MIRTAZAPINE 15 MILLIGRAM(S): 30 TABLET, FILM COATED ORAL at 18:27

## 2025-06-25 RX ADMIN — Medication 2 TABLET(S): at 21:53

## 2025-06-25 RX ADMIN — HALOPERIDOL 10 MILLIGRAM(S): 10 TABLET ORAL at 21:53

## 2025-06-25 RX ADMIN — Medication 100 MICROGRAM(S): at 05:37

## 2025-06-25 RX ADMIN — BUPROPION HYDROBROMIDE 150 MILLIGRAM(S): 522 TABLET, EXTENDED RELEASE ORAL at 16:04

## 2025-06-25 RX ADMIN — Medication 1 MILLIGRAM(S): at 05:37

## 2025-06-25 RX ADMIN — Medication 250 MILLIGRAM(S): at 14:24

## 2025-06-25 RX ADMIN — TAMSULOSIN HYDROCHLORIDE 0.8 MILLIGRAM(S): 0.4 CAPSULE ORAL at 21:53

## 2025-06-25 NOTE — PROGRESS NOTE ADULT - SUBJECTIVE AND OBJECTIVE BOX
ALINA CHERY  54y  [facility] [address]      Patient is a 54y old  Male who presents with a chief complaint of Ankle fracture (24 Jun 2025 14:23)      My note supersedes the resident's note      INTERVAL HPI/OVERNIGHT EVENTS:    no events overnight     REVIEW OF SYSTEMS:  ROS neg   FAMILY HISTORY:  No pertinent family history in first degree relatives      T(C): 36.5 (06-25-25 @ 08:27), Max: 36.9 (06-25-25 @ 00:34)  HR: 73 (06-25-25 @ 08:27) (73 - 76)  BP: 120/81 (06-25-25 @ 08:27) (105/67 - 120/81)  RR: 18 (06-25-25 @ 08:27) (18 - 18)  SpO2: 95% (06-25-25 @ 08:27) (95% - 97%)  Wt(kg): --Vital Signs Last 24 Hrs  T(C): 36.5 (25 Jun 2025 08:27), Max: 36.9 (25 Jun 2025 00:34)  T(F): 97.7 (25 Jun 2025 08:27), Max: 98.4 (25 Jun 2025 00:34)  HR: 73 (25 Jun 2025 08:27) (73 - 76)  BP: 120/81 (25 Jun 2025 08:27) (105/67 - 120/81)  BP(mean): --  RR: 18 (25 Jun 2025 08:27) (18 - 18)  SpO2: 95% (25 Jun 2025 08:27) (95% - 97%)    Parameters below as of 25 Jun 2025 08:27  Patient On (Oxygen Delivery Method): room air        PHYSICAL EXAM:  GENERAL: NAD,   HEAD:  Atraumatic, Normocephalic  EYES: EOMI, PERRLA, conjunctiva and sclera clear  ENMT: No tonsillar erythema, exudates, or enlargement; Moist mucous membranes, Good dentition, No lesions  NECK: Supple, No JVD, Normal thyroid  PULM: Clear to auscultation bilaterally  CARDIAC: Regular rate and rhythm; No murmurs, rubs, or gallops  GI: Soft, Nontender, Nondistended; Bowel sounds present  EXTREMITIES:  2+ Peripheral Pulses, No clubbing, cyanosis, or edema  LYMPH: No lymphadenopathy noted  SKIN: No rashes or lesions    Consultant(s) Notes Reviewed:  [x ] YES  [ ] NO  Care Discussed with Consultants/Other Providers [ x] YES  [ ] NO    LABS:                    acetaminophen     Tablet .. 650 milliGRAM(s) Oral every 6 hours PRN  benztropine 1 milliGRAM(s) Oral every 12 hours  buPROPion XL (24-Hour) . 150 milliGRAM(s) Oral every 24 hours  chlorhexidine 2% Cloths 1 Application(s) Topical <User Schedule>  divalproex  milliGRAM(s) Oral <User Schedule>  enoxaparin Injectable 40 milliGRAM(s) SubCutaneous every 24 hours  haloperidol     Tablet 10 milliGRAM(s) Oral every 8 hours  ibuprofen  Tablet. 400 milliGRAM(s) Oral every 8 hours PRN  levothyroxine 100 MICROGram(s) Oral daily  lidocaine 2% Injectable 5 milliLiter(s) Local Injection Once  mirtazapine 15 milliGRAM(s) Oral <User Schedule>  senna 2 Tablet(s) Oral at bedtime  tamsulosin 0.8 milliGRAM(s) Oral at bedtime      HEALTH ISSUES - PROBLEM Dx:          Case Discussed with House Staff     Spectra [phone]

## 2025-06-25 NOTE — PROGRESS NOTE ADULT - PROVIDER SPECIALTY LIST ADULT
Hospitalist
Hospitalist
Internal Medicine
Hospitalist
Hospitalist
Internal Medicine
Orthopedics
Internal Medicine

## 2025-06-25 NOTE — PROGRESS NOTE ADULT - SUBJECTIVE AND OBJECTIVE BOX
SUBJECTIVE:    Patient is a 54y old Male who presents with a chief complaint of Ankle fracture (24 Jun 2025 14:23)    Currently admitted to medicine with the primary diagnosis of Closed right malleolar fracture       Today is hospital day 4d. This morning he is resting comfortably in bed and reports no new issues or overnight events.     PAST MEDICAL & SURGICAL HISTORY  Epilepsy  last event 4+ yrs ago    Schizophrenia    Bipolar 1 disorder    Major depressive disorder    Anemia    Dyspepsia    Hypothyroidism    Constipation    MR (mental retardation)    H/O cystoscopy    H/O laminectomy  03/2001    H/O fracture of fibula  nondisplaced 03/2010      SOCIAL HISTORY:  Negative for smoking/alcohol/drug use.     ALLERGIES:  phenothiazines (Unknown)  erythromycin (Other)    MEDICATIONS:  STANDING MEDICATIONS  benztropine 1 milliGRAM(s) Oral every 12 hours  buPROPion XL (24-Hour) . 150 milliGRAM(s) Oral every 24 hours  chlorhexidine 2% Cloths 1 Application(s) Topical <User Schedule>  divalproex  milliGRAM(s) Oral <User Schedule>  enoxaparin Injectable 40 milliGRAM(s) SubCutaneous every 24 hours  haloperidol     Tablet 10 milliGRAM(s) Oral every 8 hours  levothyroxine 100 MICROGram(s) Oral daily  lidocaine 2% Injectable 5 milliLiter(s) Local Injection Once  mirtazapine 15 milliGRAM(s) Oral <User Schedule>  senna 2 Tablet(s) Oral at bedtime  tamsulosin 0.8 milliGRAM(s) Oral at bedtime    PRN MEDICATIONS  acetaminophen     Tablet .. 650 milliGRAM(s) Oral every 6 hours PRN  ibuprofen  Tablet. 400 milliGRAM(s) Oral every 8 hours PRN    VITALS:   T(F): 97.7  HR: 73  BP: 120/81  RR: 18  SpO2: 95%      PHYSICAL EXAM:  GEN: No acute distress  LUNGS: Clear to auscultation bilaterally   HEART: Regular  ABD: Soft, non-tender, non-distended.  EXT: NC/NC/NE/2+PP/MCLEAN/Skin Intact.   NEURO: AAOX2

## 2025-06-25 NOTE — PROGRESS NOTE ADULT - ASSESSMENT
53 y/o man with PMH of Mild mental retardation, intellectual disability, Schizophrenia, Impulse control disorder, Seizures, Hypothyroidism, Exotropia, Cataracts, Osteopenia, urethral stricture and chronic long presented from group home for fall? and ankle fracture.    #R bimalleolar ankle fracture  non weight bearing   op ortho follow up   casted     #h/o urethral stricture with chronic long    #Mild mental disability, schizophrenia, impulse control disorder - continue home meds    #Hypothyroid on synthroid    #mild anemia - outpatient follow up      PROGRESS NOTE HANDOFF    Pending:  nursing home arrangements     Family discussion: group home aware     Disposition: Nursing facility

## 2025-06-25 NOTE — PROGRESS NOTE ADULT - ASSESSMENT
55 y/o man with PMH of Mild mental retardation, intellectual disability, Schizophrenia, Impulse control disorder, Seizures, Hypothyroidism, Exotropia, Cataracts, Osteopenia, urethral stricture and chronic long presented from group home for fall and ankle fracture.    #R bimalleolar ankle fracture  -ortho eval:   discussed injury with staff from patient's living facility. Advised that given fracture pattern, in many cases surgical intervention would be pursued for optimal functional outcome, however in this case and attempt at conservative mgmnt with immobilization and NWB may be reasonable as patient is minimally ambulatory. Regardless, there is no urgent indication for orthopedic intervention and further management can be pursued on an outpatient basis. If patient is admitted, recommend NWB and PT/OT.  - now he is s/p casting by ortho yesterday 6/24   - Pain control with tylenol, NSAIDs PRN   - Activity: NWB on RLE   - PT/OT ordered  > did PT yesterday  - Extremity icing/elevation  - DVT ppx per primary, given reduced mobility patient should be on chemical dvt ppx unless medically contraindicated; currently on lovenox   - F/U with Dr. Atkins in 2 weeks. ( phone # ; [phone].)   - patient will likely need to go to SNF     #h/o urethral stricture with chronic long  #Mild mental disability, schizophrenia, impulse control disorder   - continue home meds ( on bupropion 150 Q24 xl, divalproex  TID , haldol 10 mg Q8  , benztropine 1 mg q12 , and mirtazapine 15 mg daily   #Hypothyroid on synthroid  #mild anemia - outpatient follow up  #hyponatremia - improving and not clinically significant; outpatient follow up     #MIsc  -DVT ppx ; lovenox  -Diet regular  -Activity iat, NWB RLE    -gi PPX no need    Pending > DC planning to SNF, pending approval

## 2025-06-26 ENCOUNTER — TRANSCRIPTION ENCOUNTER (OUTPATIENT)
Age: 55
End: 2025-06-26

## 2025-06-26 VITALS
DIASTOLIC BLOOD PRESSURE: 69 MMHG | HEART RATE: 67 BPM | RESPIRATION RATE: 18 BRPM | TEMPERATURE: 98 F | SYSTOLIC BLOOD PRESSURE: 106 MMHG | OXYGEN SATURATION: 96 %

## 2025-06-26 PROCEDURE — 99239 HOSP IP/OBS DSCHRG MGMT >30: CPT

## 2025-06-26 RX ADMIN — Medication 250 MILLIGRAM(S): at 05:25

## 2025-06-26 RX ADMIN — Medication 100 MICROGRAM(S): at 05:25

## 2025-06-26 RX ADMIN — HALOPERIDOL 10 MILLIGRAM(S): 10 TABLET ORAL at 05:25

## 2025-06-26 RX ADMIN — Medication 1 MILLIGRAM(S): at 05:25

## 2025-06-26 RX ADMIN — Medication 1 APPLICATION(S): at 05:25

## 2025-06-26 NOTE — DISCHARGE NOTE NURSING/CASE MANAGEMENT/SOCIAL WORK - FINANCIAL ASSISTANCE
[facility] provides services at a reduced cost to those who are determined to be eligible through [facility]’s financial assistance program. Information regarding [facility]’s financial assistance program can be found by going to [email or web address] or by calling [phone].

## 2025-06-26 NOTE — DISCHARGE NOTE NURSING/CASE MANAGEMENT/SOCIAL WORK - PATIENT PORTAL LINK FT
You can access the FollowMyHealth Patient Portal offered by [facility] by registering at the following website: [email or web address]. By joining [facility]’s FollowMyHealth portal, you will also be able to view your health information using other applications (apps) compatible with our system.

## 2025-07-08 ENCOUNTER — APPOINTMENT (OUTPATIENT)
Dept: ORTHOPEDIC SURGERY | Facility: ASSISTED LIVING FACILITY | Age: 55
End: 2025-07-08

## 2025-07-08 PROCEDURE — 99024 POSTOP FOLLOW-UP VISIT: CPT

## 2025-07-09 PROBLEM — S82.841S: Status: ACTIVE | Noted: 2025-07-09

## 2025-07-09 NOTE — ED PROVIDER NOTE - SECONDARY DIAGNOSIS.
Resume physical therapy and continue to remain as active as possible    Consider aqua therapy, dry needling or cupping    Follow up in 1yr for your 2yr visit with xrays or sooner with any new questions, concerns or changes in your condition     Hypochloremia Chest pain Inability to ambulate due to ankle or foot Fall Hyponatremia

## 2025-07-15 ENCOUNTER — APPOINTMENT (OUTPATIENT)
Dept: PODIATRY | Facility: CLINIC | Age: 55
End: 2025-07-15
Payer: MEDICAID

## 2025-07-15 ENCOUNTER — OUTPATIENT (OUTPATIENT)
Dept: OUTPATIENT SERVICES | Facility: HOSPITAL | Age: 55
LOS: 1 days | End: 2025-07-15
Payer: MEDICAID

## 2025-07-15 DIAGNOSIS — Z98.890 OTHER SPECIFIED POSTPROCEDURAL STATES: Chronic | ICD-10-CM

## 2025-07-15 DIAGNOSIS — Z87.81 PERSONAL HISTORY OF (HEALED) TRAUMATIC FRACTURE: Chronic | ICD-10-CM

## 2025-07-15 DIAGNOSIS — Z00.00 ENCOUNTER FOR GENERAL ADULT MEDICAL EXAMINATION WITHOUT ABNORMAL FINDINGS: ICD-10-CM

## 2025-07-15 PROCEDURE — 11721 DEBRIDE NAIL 6 OR MORE: CPT

## 2025-07-15 PROCEDURE — 99213 OFFICE O/P EST LOW 20 MIN: CPT | Mod: 25

## 2025-07-17 ENCOUNTER — APPOINTMENT (OUTPATIENT)
Dept: UROLOGY | Facility: CLINIC | Age: 55
End: 2025-07-17

## 2025-07-22 ENCOUNTER — APPOINTMENT (OUTPATIENT)
Dept: ORTHOPEDIC SURGERY | Facility: ASSISTED LIVING FACILITY | Age: 55
End: 2025-07-22

## 2025-07-22 PROCEDURE — 99307 SBSQ NF CARE SF MDM 10: CPT

## 2025-08-06 ENCOUNTER — APPOINTMENT (OUTPATIENT)
Dept: UROLOGY | Facility: CLINIC | Age: 55
End: 2025-08-06

## 2025-08-22 ENCOUNTER — APPOINTMENT (OUTPATIENT)
Dept: OTOLARYNGOLOGY | Facility: CLINIC | Age: 55
End: 2025-08-22

## 2025-09-03 ENCOUNTER — APPOINTMENT (OUTPATIENT)
Dept: ORTHOPEDIC SURGERY | Facility: CLINIC | Age: 55
End: 2025-09-03
Payer: MEDICAID

## 2025-09-03 ENCOUNTER — OUTPATIENT (OUTPATIENT)
Dept: OUTPATIENT SERVICES | Facility: HOSPITAL | Age: 55
LOS: 1 days | End: 2025-09-03
Payer: MEDICAID

## 2025-09-03 ENCOUNTER — RESULT REVIEW (OUTPATIENT)
Age: 55
End: 2025-09-03

## 2025-09-03 DIAGNOSIS — Z98.890 OTHER SPECIFIED POSTPROCEDURAL STATES: Chronic | ICD-10-CM

## 2025-09-03 DIAGNOSIS — Z00.00 ENCOUNTER FOR GENERAL ADULT MEDICAL EXAMINATION WITHOUT ABNORMAL FINDINGS: ICD-10-CM

## 2025-09-03 DIAGNOSIS — Z87.81 PERSONAL HISTORY OF (HEALED) TRAUMATIC FRACTURE: Chronic | ICD-10-CM

## 2025-09-03 PROCEDURE — 73610 X-RAY EXAM OF ANKLE: CPT | Mod: 26,RT

## 2025-09-03 PROCEDURE — 73610 X-RAY EXAM OF ANKLE: CPT | Mod: RT

## 2025-09-03 PROCEDURE — 99213 OFFICE O/P EST LOW 20 MIN: CPT

## 2025-09-04 ENCOUNTER — APPOINTMENT (OUTPATIENT)
Dept: UROLOGY | Facility: CLINIC | Age: 55
End: 2025-09-04
Payer: MEDICAID

## 2025-09-04 DIAGNOSIS — L30.9 DERMATITIS, UNSPECIFIED: ICD-10-CM

## 2025-09-04 DIAGNOSIS — R21 RASH AND OTHER NONSPECIFIC SKIN ERUPTION: ICD-10-CM
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
## 2025-09-04 DIAGNOSIS — S82.63XA DISPLACED FRACTURE OF LATERAL MALLEOLUS OF UNSPECIFIED FIBULA, INITIAL ENCOUNTER FOR CLOSED FRACTURE: ICD-10-CM

## 2025-09-04 PROCEDURE — 99214 OFFICE O/P EST MOD 30 MIN: CPT

## 2025-09-04 PROCEDURE — G2211 COMPLEX E/M VISIT ADD ON: CPT | Mod: NC

## 2025-09-04 RX ORDER — NYSTATIN 100000 [USP'U]/G
100000 CREAM TOPICAL TWICE DAILY
Qty: 1 | Refills: 3 | Status: ACTIVE | COMMUNITY
Start: 2025-09-04 | End: 1900-01-01